# Patient Record
Sex: MALE | Race: BLACK OR AFRICAN AMERICAN | Employment: OTHER | URBAN - METROPOLITAN AREA
[De-identification: names, ages, dates, MRNs, and addresses within clinical notes are randomized per-mention and may not be internally consistent; named-entity substitution may affect disease eponyms.]

---

## 2017-01-03 ENCOUNTER — APPOINTMENT (OUTPATIENT)
Dept: GENERAL RADIOLOGY | Age: 52
DRG: 291 | End: 2017-01-03
Payer: MEDICARE

## 2017-01-03 ENCOUNTER — APPOINTMENT (OUTPATIENT)
Dept: CT IMAGING | Age: 52
DRG: 291 | End: 2017-01-03
Attending: EMERGENCY MEDICINE
Payer: MEDICARE

## 2017-01-03 ENCOUNTER — APPOINTMENT (OUTPATIENT)
Dept: GENERAL RADIOLOGY | Age: 52
DRG: 291 | End: 2017-01-03
Attending: EMERGENCY MEDICINE
Payer: MEDICARE

## 2017-01-03 ENCOUNTER — HOSPITAL ENCOUNTER (EMERGENCY)
Age: 52
Discharge: HOME | DRG: 291 | End: 2017-01-03
Attending: EMERGENCY MEDICINE
Payer: MEDICARE

## 2017-01-03 VITALS
HEIGHT: 66 IN | OXYGEN SATURATION: 98 % | BODY MASS INDEX: 50.62 KG/M2 | HEART RATE: 76 BPM | RESPIRATION RATE: 18 BRPM | WEIGHT: 315 LBS | DIASTOLIC BLOOD PRESSURE: 69 MMHG | TEMPERATURE: 98.3 F | SYSTOLIC BLOOD PRESSURE: 178 MMHG

## 2017-01-03 DIAGNOSIS — R06.02 SOB (SHORTNESS OF BREATH): Primary | ICD-10-CM

## 2017-01-03 DIAGNOSIS — K59.00 CONSTIPATION, UNSPECIFIED CONSTIPATION TYPE: ICD-10-CM

## 2017-01-03 LAB
ALBUMIN SERPL BCP-MCNC: 3.1 G/DL (ref 3.5–5)
ALBUMIN/GLOB SERPL: 0.8 {RATIO} (ref 1.2–3.5)
ALP SERPL-CCNC: 148 U/L (ref 50–136)
ALT SERPL-CCNC: 25 U/L (ref 12–65)
ANION GAP BLD CALC-SCNC: 7 MMOL/L (ref 7–16)
AST SERPL W P-5'-P-CCNC: 18 U/L (ref 15–37)
BASOPHILS # BLD AUTO: 0 K/UL (ref 0–0.2)
BASOPHILS # BLD: 0 % (ref 0–2)
BILIRUB SERPL-MCNC: 1.1 MG/DL (ref 0.2–1.1)
BNP SERPL-MCNC: 371 PG/ML
BUN SERPL-MCNC: 33 MG/DL (ref 6–23)
CALCIUM SERPL-MCNC: 8.3 MG/DL (ref 8.3–10.4)
CHLORIDE SERPL-SCNC: 100 MMOL/L (ref 98–107)
CO2 SERPL-SCNC: 31 MMOL/L (ref 21–32)
CREAT SERPL-MCNC: 1.62 MG/DL (ref 0.8–1.5)
DIFFERENTIAL METHOD BLD: ABNORMAL
DIGOXIN SERPL-MCNC: 0.6 NG/ML (ref 0.9–2.1)
EOSINOPHIL # BLD: 0.1 K/UL (ref 0–0.8)
EOSINOPHIL NFR BLD: 1 % (ref 0.5–7.8)
ERYTHROCYTE [DISTWIDTH] IN BLOOD BY AUTOMATED COUNT: 19 % (ref 11.9–14.6)
GLOBULIN SER CALC-MCNC: 4.1 G/DL (ref 2.3–3.5)
GLUCOSE SERPL-MCNC: 142 MG/DL (ref 65–100)
HCT VFR BLD AUTO: 37.5 % (ref 41.1–50.3)
HGB BLD-MCNC: 11.9 G/DL (ref 13.6–17.2)
IMM GRANULOCYTES # BLD: 0 K/UL (ref 0–0.5)
IMM GRANULOCYTES NFR BLD AUTO: 0.4 % (ref 0–5)
LYMPHOCYTES # BLD AUTO: 24 % (ref 13–44)
LYMPHOCYTES # BLD: 2.3 K/UL (ref 0.5–4.6)
MCH RBC QN AUTO: 25.8 PG (ref 26.1–32.9)
MCHC RBC AUTO-ENTMCNC: 31.7 G/DL (ref 31.4–35)
MCV RBC AUTO: 81.2 FL (ref 79.6–97.8)
MONOCYTES # BLD: 0.8 K/UL (ref 0.1–1.3)
MONOCYTES NFR BLD AUTO: 8 % (ref 4–12)
NEUTS SEG # BLD: 6.4 K/UL (ref 1.7–8.2)
NEUTS SEG NFR BLD AUTO: 67 % (ref 43–78)
PLATELET # BLD AUTO: 242 K/UL (ref 150–450)
PMV BLD AUTO: 10.3 FL (ref 10.8–14.1)
POTASSIUM SERPL-SCNC: 3.9 MMOL/L (ref 3.5–5.1)
PROT SERPL-MCNC: 7.2 G/DL (ref 6.3–8.2)
RBC # BLD AUTO: 4.62 M/UL (ref 4.23–5.67)
SODIUM SERPL-SCNC: 138 MMOL/L (ref 136–145)
WBC # BLD AUTO: 9.7 K/UL (ref 4.3–11.1)

## 2017-01-03 RX ORDER — SODIUM CHLORIDE 0.9 % (FLUSH) 0.9 %
5-10 SYRINGE (ML) INJECTION AS NEEDED
Status: DISCONTINUED | OUTPATIENT
Start: 2017-01-03 | End: 2017-01-03 | Stop reason: HOSPADM

## 2017-01-03 RX ORDER — SODIUM CHLORIDE 0.9 % (FLUSH) 0.9 %
5-10 SYRINGE (ML) INJECTION EVERY 8 HOURS
Status: DISCONTINUED | OUTPATIENT
Start: 2017-01-03 | End: 2017-01-03 | Stop reason: HOSPADM

## 2017-01-03 RX ORDER — TORSEMIDE 100 MG/1
100 TABLET ORAL 2 TIMES DAILY
Qty: 10 TAB | Refills: 0 | Status: SHIPPED | OUTPATIENT
Start: 2017-01-03 | End: 2017-01-13

## 2017-01-03 NOTE — DISCHARGE INSTRUCTIONS
As we discussed, if after you go home, you do not experience relief from the torsemide and GoLYTELY you should return to the emergency department for reevaluation. Follow-up with your cardiologist or primary care doctor in one or 2 days for reevaluation.

## 2017-01-03 NOTE — ED PROVIDER NOTES
HPI Comments: 48-year-old gentleman with a history of severe congestive heart failure who presents with concerns about progressive shortness of breath and lower leg swelling. Patient says that he feels like over the last week he has held onto more fluid than normal.  He says that she has no fevers or vomiting but he gets extremely winded when he tries to get up and walk. He denies any vomiting or diarrhea and says he has no chest pain. Patient says that he ran out of his torsemide for about 3 weeks then was able to get restarted on it for 4 days ago. Elements of this note were created using speech recognition software. As such, errors of speech recognition may be present. Patient is a 45 y.o. male presenting with shortness of breath. The history is provided by the patient. Shortness of Breath   Associated symptoms include leg swelling. Pertinent negatives include no fever, no headaches, no rhinorrhea, no sore throat, no cough, no wheezing, no chest pain, no vomiting, no abdominal pain and no rash. Past Medical History:   Diagnosis Date    Acute systolic heart failure ([address] May, 2009     Also had transient acute renal failure secondary to poor perfusion.     AICD (automatic cardioverter/defibrillator) present 10/21/2015    Atypical chest pain 4/23/2010    Bronchitis     CAD (coronary artery disease)     Cardiomyopathy     Chest pain 10/21/2015    Chronic kidney disease      renal insufficiency    Chronic pain      back    Congestive heart failure (CHF) ([address] 10/21/2015    COPD     Diabetes ([address]      type 2 insulin reliant- BS average- 160's-180's    Diabetes mellitus type II, uncontrolled ([address] 7/2/2013    GERD (gastroesophageal reflux disease)     Gout     Heart failure ([address]      cardiomyopathy with ef 10-20%    Hypertension     Hyponatremia 12/20/2010    Ill-defined condition      gout, neuropathy, sciatica    Morbid obesity (HCC)     Nausea & vomiting 11/30/2015    Neuropathy     Obstructive sleep apnea 2/15/2010    Other unknown and unspecified cause of morbidity or mortality      Gout    Severe sepsis (HCC)     Unspecified sleep apnea      cpap       Past Surgical History:   Procedure Laterality Date    Hx pacemaker       may 2010    Hx heart catheterization  Sandy 10, 2008     Severe multivessel disease with severe LV dysfunction    Pr chest surgery procedure unlisted       thorocentesis         Family History:   Problem Relation Age of Onset    Heart Disease Father     Stroke Father     Diabetes Sister     Stroke Sister     Diabetes Sister     Heart Disease Other        Social History     Social History    Marital status:      Spouse name: N/A    Number of children: N/A    Years of education: N/A     Occupational History    Not on file. Social History Main Topics    Smoking status: Former Smoker     Packs/day: 0.25     Years: 1.00     Quit date: 7/12/1984    Smokeless tobacco: Never Used      Comment: pt states that he only tried smoking as a kid     Alcohol use No    Drug use: No    Sexual activity: Not on file     Other Topics Concern    Not on file     Social History Narrative         ALLERGIES: Dilaudid [hydromorphone]; Iodinated contrast media - oral and iv dye; and Penicillins    Review of Systems   Constitutional: Negative for chills, diaphoresis and fever. HENT: Negative for congestion, rhinorrhea and sore throat. Eyes: Negative for redness and visual disturbance. Respiratory: Positive for shortness of breath. Negative for cough, chest tightness and wheezing. Cardiovascular: Positive for leg swelling. Negative for chest pain and palpitations. Gastrointestinal: Negative for abdominal pain, blood in stool, diarrhea, nausea and vomiting. Endocrine: Negative for polydipsia and polyuria. Genitourinary: Negative for dysuria and hematuria. Musculoskeletal: Negative for arthralgias, myalgias and neck stiffness.    Skin: Negative for rash. Allergic/Immunologic: Negative for environmental allergies and food allergies. Neurological: Negative for dizziness, weakness and headaches. Hematological: Negative for adenopathy. Does not bruise/bleed easily. Psychiatric/Behavioral: Negative for confusion and sleep disturbance. The patient is not nervous/anxious. Vitals:    01/03/17 1110   BP: 122/65   Pulse: 100   Resp: 18   Temp: 97.6 °F (36.4 °C)   SpO2: 95%   Weight: (!) 168.7 kg (372 lb)   Height: 5' 6\" (1.676 m)            Physical Exam   Constitutional: He is oriented to person, place, and time. He appears well-developed and well-nourished. HENT:   Head: Normocephalic and atraumatic. Eyes: Conjunctivae and EOM are normal. Pupils are equal, round, and reactive to light. Neck: Normal range of motion. Cardiovascular: Normal rate and regular rhythm. Pulmonary/Chest: Effort normal and breath sounds normal. No respiratory distress. He has no wheezes. He has no rales. He exhibits no tenderness. Abdominal: Soft. Bowel sounds are normal. There is no rebound and no guarding. Musculoskeletal: Normal range of motion. He exhibits no edema or tenderness. Lymphadenopathy:     He has no cervical adenopathy. Neurological: He is alert and oriented to person, place, and time. Skin: Skin is warm and dry. Psychiatric: He has a normal mood and affect. Nursing note and vitals reviewed. MDM  Number of Diagnoses or Management Options  Diagnosis management comments: Patient's chest x-ray was unrevealing. However, he says that his foot is never seen on x-ray. Given his body habitus I do think the sensitivity of a chest x-ray may be suboptimal.  Therefore, I will get a noncontrasted CT of his chest.  I will also check a BNP and a digoxin level. Patient digoxin level was not elevated and his BNP was lower than previous. His CT scan did not show significant pulmonary edema or pulmonary infiltrate.   I discussed with the patient the options of constipation treatment and increasing his torsemide dose versus admission. At this time we will plan to discharge him home with constipation medicine in the torsemide change and see how he does.     ED Course       Procedures

## 2017-01-06 ENCOUNTER — HOSPITAL ENCOUNTER (INPATIENT)
Age: 52
LOS: 7 days | Discharge: HOME | DRG: 291 | End: 2017-01-13
Attending: INTERNAL MEDICINE | Admitting: INTERNAL MEDICINE
Payer: MEDICARE

## 2017-01-06 DIAGNOSIS — I42.9 CARDIOMYOPATHY (HCC): Primary | Chronic | ICD-10-CM

## 2017-01-06 LAB
ANION GAP BLD CALC-SCNC: 7 MMOL/L (ref 7–16)
BUN SERPL-MCNC: 31 MG/DL (ref 6–23)
CALCIUM SERPL-MCNC: 8.2 MG/DL (ref 8.3–10.4)
CHLORIDE SERPL-SCNC: 100 MMOL/L (ref 98–107)
CO2 SERPL-SCNC: 33 MMOL/L (ref 21–32)
CREAT SERPL-MCNC: 1.41 MG/DL (ref 0.8–1.5)
ERYTHROCYTE [DISTWIDTH] IN BLOOD BY AUTOMATED COUNT: 18.9 % (ref 11.9–14.6)
GLUCOSE BLD STRIP.AUTO-MCNC: 186 MG/DL (ref 65–100)
GLUCOSE BLD STRIP.AUTO-MCNC: 228 MG/DL (ref 65–100)
GLUCOSE BLD STRIP.AUTO-MCNC: 229 MG/DL (ref 65–100)
GLUCOSE SERPL-MCNC: 185 MG/DL (ref 65–100)
HCT VFR BLD AUTO: 39.1 % (ref 41.1–50.3)
HGB BLD-MCNC: 12.3 G/DL (ref 13.6–17.2)
MAGNESIUM SERPL-MCNC: 2.3 MG/DL (ref 1.8–2.4)
MCH RBC QN AUTO: 25.5 PG (ref 26.1–32.9)
MCHC RBC AUTO-ENTMCNC: 31.5 G/DL (ref 31.4–35)
MCV RBC AUTO: 81.1 FL (ref 79.6–97.8)
PLATELET # BLD AUTO: 261 K/UL (ref 150–450)
PMV BLD AUTO: 10 FL (ref 10.8–14.1)
POTASSIUM SERPL-SCNC: 3.9 MMOL/L (ref 3.5–5.1)
RBC # BLD AUTO: 4.82 M/UL (ref 4.23–5.67)
SODIUM SERPL-SCNC: 140 MMOL/L (ref 136–145)
WBC # BLD AUTO: 8.6 K/UL (ref 4.3–11.1)

## 2017-01-06 PROCEDURE — 85027 COMPLETE CBC AUTOMATED: CPT | Performed by: NURSE PRACTITIONER

## 2017-01-06 PROCEDURE — 82962 GLUCOSE BLOOD TEST: CPT

## 2017-01-06 PROCEDURE — 74011250636 HC RX REV CODE- 250/636: Performed by: NURSE PRACTITIONER

## 2017-01-06 PROCEDURE — 80048 BASIC METABOLIC PNL TOTAL CA: CPT | Performed by: NURSE PRACTITIONER

## 2017-01-06 PROCEDURE — 94760 N-INVAS EAR/PLS OXIMETRY 1: CPT

## 2017-01-06 PROCEDURE — 65660000000 HC RM CCU STEPDOWN

## 2017-01-06 PROCEDURE — 99218 HC RM OBSERVATION: CPT

## 2017-01-06 PROCEDURE — 83735 ASSAY OF MAGNESIUM: CPT | Performed by: NURSE PRACTITIONER

## 2017-01-06 PROCEDURE — 74011250637 HC RX REV CODE- 250/637: Performed by: NURSE PRACTITIONER

## 2017-01-06 PROCEDURE — 77010033678 HC OXYGEN DAILY

## 2017-01-06 PROCEDURE — 65270000029 HC RM PRIVATE

## 2017-01-06 PROCEDURE — 74011636637 HC RX REV CODE- 636/637: Performed by: NURSE PRACTITIONER

## 2017-01-06 RX ORDER — CARVEDILOL 25 MG/1
25 TABLET ORAL 2 TIMES DAILY WITH MEALS
Status: DISCONTINUED | OUTPATIENT
Start: 2017-01-06 | End: 2017-01-13 | Stop reason: HOSPADM

## 2017-01-06 RX ORDER — PRAVASTATIN SODIUM 80 MG/1
80 TABLET ORAL
Status: DISCONTINUED | OUTPATIENT
Start: 2017-01-06 | End: 2017-01-13 | Stop reason: HOSPADM

## 2017-01-06 RX ORDER — HYDRALAZINE HYDROCHLORIDE 25 MG/1
25 TABLET, FILM COATED ORAL 3 TIMES DAILY
Status: DISCONTINUED | OUTPATIENT
Start: 2017-01-06 | End: 2017-01-12

## 2017-01-06 RX ORDER — OXYCODONE HYDROCHLORIDE 5 MG/1
15 TABLET ORAL
Status: DISCONTINUED | OUTPATIENT
Start: 2017-01-06 | End: 2017-01-13 | Stop reason: HOSPADM

## 2017-01-06 RX ORDER — SODIUM CHLORIDE 0.9 % (FLUSH) 0.9 %
5-10 SYRINGE (ML) INJECTION EVERY 8 HOURS
Status: DISCONTINUED | OUTPATIENT
Start: 2017-01-06 | End: 2017-01-13 | Stop reason: HOSPADM

## 2017-01-06 RX ORDER — POLYETHYLENE GLYCOL 3350 17 G/17G
17 POWDER, FOR SOLUTION ORAL
Status: DISCONTINUED | OUTPATIENT
Start: 2017-01-06 | End: 2017-01-09

## 2017-01-06 RX ORDER — ISOSORBIDE DINITRATE 20 MG/1
20 TABLET ORAL 3 TIMES DAILY
Status: DISCONTINUED | OUTPATIENT
Start: 2017-01-06 | End: 2017-01-13 | Stop reason: HOSPADM

## 2017-01-06 RX ORDER — INSULIN LISPRO 100 [IU]/ML
INJECTION, SOLUTION INTRAVENOUS; SUBCUTANEOUS
Status: DISCONTINUED | OUTPATIENT
Start: 2017-01-06 | End: 2017-01-13 | Stop reason: HOSPADM

## 2017-01-06 RX ORDER — FUROSEMIDE 10 MG/ML
40 INJECTION INTRAMUSCULAR; INTRAVENOUS 2 TIMES DAILY
Status: DISCONTINUED | OUTPATIENT
Start: 2017-01-06 | End: 2017-01-08

## 2017-01-06 RX ORDER — SODIUM CHLORIDE 0.9 % (FLUSH) 0.9 %
5-10 SYRINGE (ML) INJECTION AS NEEDED
Status: DISCONTINUED | OUTPATIENT
Start: 2017-01-06 | End: 2017-01-13 | Stop reason: HOSPADM

## 2017-01-06 RX ORDER — ALLOPURINOL 100 MG/1
100 TABLET ORAL DAILY
Status: DISCONTINUED | OUTPATIENT
Start: 2017-01-07 | End: 2017-01-13 | Stop reason: HOSPADM

## 2017-01-06 RX ORDER — POTASSIUM CHLORIDE 750 MG/1
10 TABLET, EXTENDED RELEASE ORAL 2 TIMES DAILY
Status: DISCONTINUED | OUTPATIENT
Start: 2017-01-06 | End: 2017-01-12

## 2017-01-06 RX ORDER — GABAPENTIN 300 MG/1
600 CAPSULE ORAL 2 TIMES DAILY
Status: DISCONTINUED | OUTPATIENT
Start: 2017-01-06 | End: 2017-01-13 | Stop reason: HOSPADM

## 2017-01-06 RX ORDER — COLCHICINE 0.6 MG/1
0.6 TABLET ORAL DAILY
Status: DISCONTINUED | OUTPATIENT
Start: 2017-01-07 | End: 2017-01-13 | Stop reason: HOSPADM

## 2017-01-06 RX ORDER — DOCUSATE SODIUM 100 MG/1
100 CAPSULE, LIQUID FILLED ORAL 2 TIMES DAILY
Status: DISCONTINUED | OUTPATIENT
Start: 2017-01-06 | End: 2017-01-08

## 2017-01-06 RX ORDER — NITROGLYCERIN 0.4 MG/1
0.4 TABLET SUBLINGUAL
Status: DISCONTINUED | OUTPATIENT
Start: 2017-01-06 | End: 2017-01-13 | Stop reason: HOSPADM

## 2017-01-06 RX ORDER — FAMOTIDINE 20 MG/1
20 TABLET, FILM COATED ORAL EVERY EVENING
Status: DISCONTINUED | OUTPATIENT
Start: 2017-01-06 | End: 2017-01-13 | Stop reason: HOSPADM

## 2017-01-06 RX ORDER — INSULIN GLARGINE 100 [IU]/ML
50 INJECTION, SOLUTION SUBCUTANEOUS
Status: DISCONTINUED | OUTPATIENT
Start: 2017-01-06 | End: 2017-01-13 | Stop reason: HOSPADM

## 2017-01-06 RX ORDER — SPIRONOLACTONE 25 MG/1
25 TABLET ORAL DAILY
Status: DISCONTINUED | OUTPATIENT
Start: 2017-01-07 | End: 2017-01-13 | Stop reason: HOSPADM

## 2017-01-06 RX ORDER — DIGOXIN 125 MCG
0.12 TABLET ORAL DAILY
Status: DISCONTINUED | OUTPATIENT
Start: 2017-01-07 | End: 2017-01-09

## 2017-01-06 RX ADMIN — HYDRALAZINE HYDROCHLORIDE 25 MG: 25 TABLET, FILM COATED ORAL at 14:02

## 2017-01-06 RX ADMIN — GABAPENTIN 600 MG: 300 CAPSULE ORAL at 23:05

## 2017-01-06 RX ADMIN — DOCUSATE SODIUM 100 MG: 100 CAPSULE, LIQUID FILLED ORAL at 17:06

## 2017-01-06 RX ADMIN — Medication 5 ML: at 21:54

## 2017-01-06 RX ADMIN — POLYETHYLENE GLYCOL 3350 17 G: 17 POWDER, FOR SOLUTION ORAL at 23:05

## 2017-01-06 RX ADMIN — OXYCODONE HYDROCHLORIDE 15 MG: 5 TABLET ORAL at 23:05

## 2017-01-06 RX ADMIN — HYDRALAZINE HYDROCHLORIDE 25 MG: 25 TABLET, FILM COATED ORAL at 21:50

## 2017-01-06 RX ADMIN — APIXABAN 5 MG: 5 TABLET, FILM COATED ORAL at 21:50

## 2017-01-06 RX ADMIN — FAMOTIDINE 20 MG: 20 TABLET ORAL at 17:06

## 2017-01-06 RX ADMIN — APIXABAN 5 MG: 5 TABLET, FILM COATED ORAL at 14:02

## 2017-01-06 RX ADMIN — ISOSORBIDE DINITRATE 20 MG: 20 TABLET ORAL at 21:50

## 2017-01-06 RX ADMIN — GABAPENTIN 600 MG: 300 CAPSULE ORAL at 17:06

## 2017-01-06 RX ADMIN — INSULIN LISPRO 186 UNITS: 100 INJECTION, SOLUTION INTRAVENOUS; SUBCUTANEOUS at 17:51

## 2017-01-06 RX ADMIN — FUROSEMIDE 40 MG: 10 INJECTION, SOLUTION INTRAMUSCULAR; INTRAVENOUS at 17:06

## 2017-01-06 RX ADMIN — POTASSIUM CHLORIDE 10 MEQ: 10 TABLET, EXTENDED RELEASE ORAL at 17:06

## 2017-01-06 RX ADMIN — INSULIN GLARGINE 50 UNITS: 100 INJECTION, SOLUTION SUBCUTANEOUS at 22:01

## 2017-01-06 RX ADMIN — CARVEDILOL 25 MG: 25 TABLET, FILM COATED ORAL at 17:06

## 2017-01-06 RX ADMIN — Medication 10 ML: at 14:07

## 2017-01-06 RX ADMIN — ISOSORBIDE DINITRATE 20 MG: 20 TABLET ORAL at 14:02

## 2017-01-06 RX ADMIN — INSULIN LISPRO 4 UNITS: 100 INJECTION, SOLUTION INTRAVENOUS; SUBCUTANEOUS at 22:02

## 2017-01-06 RX ADMIN — PRAVASTATIN SODIUM 80 MG: 80 TABLET ORAL at 21:50

## 2017-01-06 NOTE — PROGRESS NOTES
Soap suds enema given to pt. Pt placed on left side and 700 ML instilled to rectum. Pt instructed to hold enema as long as he can and to call for assistance when he needs to get up.   BSC has been placed next to the bed to assist pt and provide safety when he has to have a BM

## 2017-01-06 NOTE — PROGRESS NOTES
Patient received to room 331 as direct admit from Dr. Jin Martínez. Patient oriented to room, call light and plan of care. Admission assessment completed. Admission skin assessment completed with second RN and reveals the following: Pt skin is without breakkdown or redness. His abdomen is very tight with stretch marks on it that he states are only there when he is this full of fluid.

## 2017-01-06 NOTE — IP AVS SNAPSHOT
[address] [address] 
[phone] Patient: Kassi Orourke MRN: [id number] You are allergic to the following Allergen Reactions Dilaudid (Hydromorphone) Other (comments) Hotflashes, patient states he's not allergic Iodinated Contrast Media - Oral And Iv Dye Other (comments) \"shuts my kidneys down\" Penicillins Unknown (comments) Pt states he is not allergic his mother just wouldn't allow him take it Recent Documentation Height Weight BMI Smoking Status 1.676 m (!) 179.2 kg 63.75 kg/m2 Former Smoker Emergency Contacts Name Discharge Info Relation Home Work Mobile Holly Lamb  Other Relative [6] [phone] Edinson Cabrera  Child [2] [phone] Che Roche  Daughter [21] [phone] About your hospitalization You were admitted on:  January 6, 2017 You last received care in the:  [facility] 3 CLINICAL OBSERVATION You were discharged on:  January 13, 2017 Unit phone number:  [phone] Why you were hospitalized Your primary diagnosis was:  Not on File Your diagnoses also included:  Cardiomyopathy (Hcc), Morbid Obesity (Hcc), Ckd (Chronic Kidney Disease) Stage 3, Gfr 30-59 Ml/Min, Constipation Providers Seen During Your Hospitalizations Provider Role Specialty Primary office phone Fred Murphy MD Attending Provider Cardiology [phone] Your Primary Care Physician (PCP) Primary Care Physician Office Phone Office Fax [place] [phone] Follow-up Information Follow up With Details Comments Contact Info G[address]LE[address] [facility] [address] 
[phone] rFed Murphy MD  Thursday Jan 19 at [facility] office  Formerly [facility], [facility] [address]  [address] [facility] [address] [phone] Fahad Flynn MD  As needed [phone] [address] [address] [facility] [id number] [phone] [facility]  Office will call you to make an appointment. [address] [place] [address] Adele Walsh [phone] Your Appointments Thursday January 19, 2017 11:30 AM EST TRANSITIONAL CARE MANAGEMENT with Camilla Anguiano MD  
[facility] ([address] [place] [address]) [address] [place]  
[address] Abe Sim 81  
[phone] Wednesday February 01, 2017  2:00 PM EST MONITOR APPLICATION with [facility] ([address] [place] [address]) 2 [address] 
[address] Abe Sim [address]  
[phone] Tuesday February 07, 2017 10:00 AM EST Office Visit with Camilla Anguiano MD  
[facility] ([address] [place] [address]) [address] [place]  
[address] Abe [place] [address]  
[phone] Current Discharge Medication List  
  
START taking these medications Dose & Instructions Dispensing Information Comments Morning Noon Evening Bedtime  
 bisacodyl 10 mg suppository Commonly known as:  DULCOLAX Your next dose is:  Tomorrow Dose:  10 mg Insert 10 mg into rectum daily. Quantity:  1 Suppository Refills:  2 CONTINUE these medications which have CHANGED Dose & Instructions Dispensing Information Comments Morning Noon Evening Bedtime  
 torsemide 100 mg tablet Commonly known as:  DEMADEX What changed:  when to take this Your next dose is:  Tomorrow Dose:  100 mg Take 1 Tab by mouth daily. Quantity:  30 Tab Refills:  11 CONTINUE these medications which have NOT CHANGED Dose & Instructions Dispensing Information Comments Morning Noon Evening Bedtime  
 allopurinol 100 mg tablet Commonly known as:  Delona Gauze  
   
 Dose:  100 mg Take 100 mg by mouth daily. Refills:  0  
     
   
   
   
  
 apixaban 5 mg tablet Commonly known as:  Gigi Means Dose:  5 mg Take 1 Tab by mouth every twelve (12) hours. Quantity:  60 Tab Refills:  0  
     
   
   
   
  
 carvedilol 25 mg tablet Commonly known as:  Ignacio Mabry Dose:  25 mg Take 1 Tab by mouth two (2) times daily (with meals). Quantity:  60 Tab Refills:  3  
     
   
   
   
  
 colchicine 0.6 mg tablet Dose:  0.6 mg Take 0.6 mg by mouth every morning. Refills:  0  
     
   
   
   
  
 cpap machine kit 10 cm qhs Refills:  0  
     
   
   
   
  
 docusate sodium 100 mg capsule Commonly known as:  Martha Pacific Dose:  100 mg Take 1 Cap by mouth two (2) times a day for 90 days. Quantity:  60 Cap Refills:  0  
     
   
   
   
  
 gabapentin 600 mg tablet Commonly known as:  NEURONTIN Take  by mouth two (2) times a day. Refills:  0  
     
   
   
   
  
 glucose blood VI test strips strip Commonly known as:  ASCENSIA AUTODISC VI, ONE TOUCH ULTRA TEST VI Test strips for 30 day supply Quantity:  120 strip Refills:  0  
     
   
   
   
  
 hydrALAZINE 25 mg tablet Commonly known as:  APRESOLINE Dose:  25 mg Take 1 Tab by mouth three (3) times daily. Quantity:  90 Tab Refills:  3  
     
   
   
   
  
 insulin glargine 100 unit/mL injection Commonly known as:  LANTUS Dose:  50 Units 50 Units by SubCUTAneous route nightly. Quantity:  1 Vial  
Refills:  0  
     
   
   
   
  
 insulin lispro 100 unit/mL injection Commonly known as:  HUMALOG Dose:  15 Units 15 Units by SubCUTAneous route three (3) times daily (with meals). Quantity:  1 Vial  
Refills:  0  
     
   
   
   
  
 isosorbide dinitrate 20 mg tablet Commonly known as:  ISORDIL Dose:  20 mg Take 1 Tab by mouth three (3) times daily. Quantity:  90 Tab Refills:  3 nitroglycerin 0.4 mg SL tablet Commonly known as:  NITROSTAT Dose:  0.4 mg  
1 Tab by SubLINGual route every five (5) minutes as needed for Chest Pain. Quantity:  4 Bottle Refills:  6  
     
   
   
   
  
 oxyCODONE IR 15 mg immediate release tablet Commonly known as:  OXY-IR Dose:  15 mg Take 15 mg by mouth every four (4) hours as needed for Pain. Refills:  0 OXYGEN-AIR DELIVERY SYSTEMS  
   
 2 lpm qhs Refills:  0  
     
   
   
   
  
 polyethylene glycol 17 gram packet Commonly known as:  Ernestene Score Dose:  17 g Take 1 Packet by mouth daily as needed (constipation). Quantity:  10 Packet Refills:  5  
     
   
   
   
  
 potassium chloride 10 mEq tablet Commonly known as:  K-DUR, KLOR-CON Dose:  10 mEq Take 1 Tab by mouth two (2) times a day. Quantity:  60 Tab Refills:  5  
     
   
   
   
  
 pravastatin 80 mg tablet Commonly known as:  PRAVACHOL Dose:  80 mg Take 1 Tab by mouth nightly. Quantity:  30 Tab Refills:  0  
     
   
   
   
  
 raNITIdine 150 mg tablet Commonly known as:  ZANTAC Dose:  150 mg Take 150 mg by mouth nightly. Refills:  0  
     
   
   
   
  
 spironolactone 25 mg tablet Commonly known as:  ALDACTONE Dose:  25 mg Take 1 Tab by mouth daily. Quantity:  30 Tab Refills:  11 STOP taking these medications   
 digoxin 0.125 mg tablet Commonly known as:  LANOXIN Where to Get Your Medications Information on where to get these meds will be given to you by the nurse or doctor. ! Ask your nurse or doctor about these medications  
  bisacodyl 10 mg suppository  
 torsemide 100 mg tablet Discharge Instructions Constipation: Care Instructions Your Care Instructions Constipation means that you have a hard time passing stools (bowel movements). People pass stools from 3 times a day to once every 3 days. What is normal for you may be different. Constipation may occur with pain in the rectum and cramping. The pain may get worse when you try to pass stools. Sometimes there are small amounts of bright red blood on toilet paper or the surface of stools. This is because of enlarged veins near the rectum (hemorrhoids). A few changes in your diet and lifestyle may help you avoid ongoing constipation. Your doctor may also prescribe medicine to help loosen your stool. Some medicines can cause constipation. These include pain medicines and antidepressants. Tell your doctor about all the medicines you take. Your doctor may want to make a medicine change to ease your symptoms. Follow-up care is a key part of your treatment and safety. Be sure to make and go to all appointments, and call your doctor if you are having problems. It's also a good idea to know your test results and keep a list of the medicines you take. How can you care for yourself at home? · Drink plenty of fluids, enough so that your urine is light yellow or clear like water. If you have kidney, heart, or liver disease and have to limit fluids, talk with your doctor before you increase the amount of fluids you drink. · Include high-fiber foods in your diet each day. These include fruits, vegetables, beans, and whole grains. · Get at least 30 minutes of exercise on most days of the week. Walking is a good choice. You also may want to do other activities, such as running, swimming, cycling, or playing tennis or team sports. · Take a fiber supplement, such as Citrucel or Metamucil, every day. Read and follow all instructions on the label. · Schedule time each day for a bowel movement. A daily routine may help. Take your time having your bowel movement. · Support your feet with a small step stool when you sit on the toilet. This helps flex your hips and places your pelvis in a squatting position. · Your doctor may recommend an over-the-counter laxative to relieve your constipation. Examples are Milk of Magnesia and MiraLax. Read and follow all instructions on the label. Do not use laxatives on a long-term basis. When should you call for help? Call your doctor now or seek immediate medical care if: 
· You have new or worse belly pain. · You have new or worse nausea or vomiting. · You have blood in your stools. Watch closely for changes in your health, and be sure to contact your doctor if: 
· Your constipation is getting worse. · You do not get better as expected. Where can you learn more? Go to http://armando-nirav.info/. Enter 21 [phone] in the search box to learn more about \"Constipation: Care Instructions. \" Current as of: May 27, 2016 Content Version: 11.1 © 3056-2503 [employer]. Care instructions adapted under license by [employer] (which disclaims liability or warranty for this information). If you have questions about a medical condition or this instruction, always ask your healthcare professional. [place] [address] any warranty or liability for your use of this information. Hypertrophic Cardiomyopathy: Care Instructions Your Care Instructions Hypertrophic cardiomyopathy (say \"hy-per-TROH-fik dwk-qio-xb-xk-UWX-po-thee\") is a disease in which the heart muscle grows too thick. The thickened heart muscle can make it hard for the heart to pump blood well. This can cause shortness of breath, chest pain, dizziness, fainting, and fatigue. It also can affect the heart's electrical system. This increases the risk for life-threatening abnormal heartbeats. Your doctor may recommend a device called an ICD (implantable cardioverter-defibrillator) to stop these abnormal heartbeats.  
Good care at home can help you cope with symptoms and get the best results from your medications. It is very important that you follow up with your doctor. Follow-up care is a key part of your treatment and safety. Be sure to make and go to all appointments, and call your doctor if you are having problems. It's also a good idea to know your test results and keep a list of the medicines you take. How can you care for yourself at home? · Take your medicines exactly as prescribed. Call your doctor if you think you are having a problem with your medicine. You will get more details on the specific medicines your doctor prescribes. · Limit alcohol. Alcohol can make your heart weaker. · Talk to your doctor about what level of exercise and what kinds of activities are safe for you. You may need to avoid too much strenuous activity. · Have your family members tested for hypertrophic cardiomyopathy. The condition runs in families, and regular testing can identify it before it causes symptoms. · Do not smoke. Smoking can make this condition worse. If you need help quitting, talk to your doctor about stop-smoking programs and medicines. These can increase your chances of quitting for good. When should you call for help? Call 911 anytime you think you may need emergency care. For example, call if: 
· You have symptoms of a heart attack. These may include: ¨ Chest pain or pressure, or a strange feeling in the chest. 
¨ Sweating. ¨ Shortness of breath. ¨ Nausea or vomiting. ¨ Pain, pressure, or a strange feeling in the back, neck, jaw, or upper belly or in one or both shoulders or arms. ¨ Lightheadedness or sudden weakness. After you call 911, the  may tell you to chew 1 adult-strength or 2 to 4 low-dose aspirin. Wait for an ambulance. Do not try to drive yourself. · You have severe trouble breathing. · You cough up pink, foamy mucus and you have trouble breathing. · You passed out (lost consciousness). Call your doctor now or seek immediate medical care if: · You have new or increased shortness of breath. · You are dizzy or lightheaded, or you feel like you may faint. · You have sudden weight gain, such as 3 pounds or more in 2 to 3 days. · You have increased swelling in your legs, ankles, or feet. · You have an ICD and you have signs of infection, such as: 
¨ Increased pain, swelling, warmth, or redness. ¨ Red streaks leading from the cut (incision). ¨ Pus draining from the incision. ¨ A fever. · You have a sudden episode of a skipping heartbeat or a very fast heartbeat. Watch closely for changes in your health, and be sure to contact your doctor if you have any problems. Where can you learn more? Go to http://armando-nirav.info/. Enter Q219 in the search box to learn more about \"Hypertrophic Cardiomyopathy: Care Instructions. \" Current as of: January 27, 2016 Content Version: 11.1 © 9331-1682 [employer]. Care instructions adapted under license by [employer] (which disclaims liability or warranty for this information). If you have questions about a medical condition or this instruction, always ask your healthcare professional. [place] [address] any warranty or liability for your use of this information. Heart Failure: Care Instructions Your Care Instructions Heart failure occurs when your heart does not pump as much blood as the body needs. Failure does not mean that the heart has stopped pumping but rather that it is not pumping as well as it should. Over time, this causes fluid buildup in your lungs and other parts of your body. Fluid buildup can cause shortness of breath, fatigue, swollen ankles, and other problems. By taking medicines regularly, reducing sodium (salt) in your diet, checking your weight every day, and making lifestyle changes, you can feel better and live longer. Follow-up care is a key part of your treatment and safety.  Be sure to make and go to all appointments, and call your doctor if you are having problems. It's also a good idea to know your test results and keep a list of the medicines you take. How can you care for yourself at home? Medicines · Be safe with medicines. Take your medicines exactly as prescribed. Call your doctor if you think you are having a problem with your medicine. · Do not take any vitamins, over-the-counter medicine, or herbal products without talking to your doctor first. Lisa Links not take ibuprofen (Advil or Motrin) and naproxen (Aleve) without talking to your doctor first. They could make your heart failure worse. · You may be taking some of the following medicine. ¨ Beta-blockers can slow heart rate, decrease blood pressure, and improve your condition. Taking a beta-blocker may lower your chance of needing to be hospitalized. ¨ Angiotensin-converting enzyme inhibitors (ACEIs) reduce the heart's workload, lower blood pressure, and reduce swelling. Taking an ACEI may lower your chance of needing to be hospitalized again. ¨ Angiotensin II receptor blockers (ARBs) work like ACEIs. Your doctor may prescribe them instead of ACEIs. ¨ Diuretics, also called water pills, reduce swelling. ¨ Potassium supplements replace this important mineral, which is sometimes lost with diuretics. ¨ Aspirin and other blood thinners prevent blood clots, which can cause a stroke or heart attack. You will get more details on the specific medicines your doctor prescribes. Diet · Your doctor may suggest that you limit sodium to 2,000 milligrams (mg) a day or less. That is less than 1 teaspoon of salt a day, including all the salt you eat in cooking or in packaged foods. People get most of their sodium from processed foods. Fast food and restaurant meals also tend to be very high in sodium. · Ask your doctor how much liquid you can drink each day. You may have to limit liquids. Weight · Weigh yourself without clothing at the same time each day. Record your weight. Call your doctor if you gain more than 3 pounds in 2 to 3 days. A sudden weight gain may mean that your heart failure is getting worse. Activity level · Start light exercise (if your doctor says it is okay). Even if you can only do a small amount, exercise will help you get stronger, have more energy, and manage your weight and your stress. Walking is an easy way to get exercise. Start out by walking a little more than you did before. Bit by bit, increase the amount you walk. · When you exercise, watch for signs that your heart is working too hard. You are pushing yourself too hard if you cannot talk while you are exercising. If you become short of breath or dizzy or have chest pain, stop, sit down, and rest. 
· If you feel \"wiped out\" the day after you exercise, walk slower or for a shorter distance until you can work up to a better pace. · Get enough rest at night. Sleeping with 1 or 2 pillows under your upper body and head may help you breathe easier. Lifestyle changes · Do not smoke. Smoking can make a heart condition worse. If you need help quitting, talk to your doctor about stop-smoking programs and medicines. These can increase your chances of quitting for good. Quitting smoking may be the most important step you can take to protect your heart. · Limit alcohol to 2 drinks a day for men and 1 drink a day for women. Too much alcohol can cause health problems. · Avoid getting sick from colds and the flu. Get a pneumococcal vaccine shot. If you have had one before, ask your doctor whether you need another dose. Get a flu shot each year. If you must be around people with colds or the flu, wash your hands often. When should you call for help? Call 911 if you have symptoms of sudden heart failure such as: 
· You have severe trouble breathing. · You cough up pink, foamy mucus. · You have a new irregular or rapid heartbeat. Call your doctor now or seek immediate medical care if: 
· You have new or increased shortness of breath. · You are dizzy or lightheaded, or you feel like you may faint. · You have sudden weight gain, such as 3 pounds or more in 2 to 3 days. · You have increased swelling in your legs, ankles, or feet. · You are suddenly so tired or weak that you cannot do your usual activities. Watch closely for changes in your health, and be sure to contact your doctor if: 
· You develop new symptoms. Where can you learn more? Go to http://armando-nirav.info/. Enter S262 in the search box to learn more about \"Heart Failure: Care Instructions. \" Current as of: January 27, 2016 Content Version: 11.1 © 8811-7209 [employer]. Care instructions adapted under license by [employer] (which disclaims liability or warranty for this information). If you have questions about a medical condition or this instruction, always ask your healthcare professional. [place] [address] any warranty or liability for your use of this information. Limiting Sodium and Fluids With Heart Failure: Care Instructions Your Care Instructions Sodium causes your body to keep extra water, making it harder for your heart to pump. By limiting sodium, you will feel better and lower your risk of having to go to the hospital. 
People get most of their sodium from processed foods. Fast food and restaurant meals also tend to be very high in sodium. Your doctor may suggest that you limit sodium to 2,000 milligrams (mg) a day or less. That is less than 1 teaspoon of salt a day, including all the salt you eat in cooked or packaged foods. Usually, you have to limit the amount of liquids you drink only if your heart failure is severe. Limiting sodium alone often is enough to help your body get rid of extra fluids. However, your doctor may tell you to limit your fluid intake to a set amount each day. Follow-up care is a key part of your treatment and safety. Be sure to make and go to all appointments, and call your doctor if you are having problems. It's also a good idea to know your test results and keep a list of the medicines you take. How can you care for yourself at home? Read food labels · Read food labels on cans and food packages. The labels tell you how much sodium is in each serving. Make sure that you look at the serving size. If you eat more than the serving size, you have eaten more sodium than is listed for one serving. · Food labels also tell you the Percent Daily Value. If the Percent Daily Value says 50%, it means that you will get at least 50% of all the sodium you need for the entire day in one serving. Choose products with low Percent Daily Values for sodium. · Be aware that sodium can come in forms other than salt, including monosodium glutamate (MSG), sodium citrate, and sodium bicarbonate (baking soda). MSG is often added to Asian food. You can sometimes ask for food without MSG or salt. Buy low-sodium foods · Buy foods that are labeled \"unsalted\" (no salt added), \"sodium-free\" (less than 5 mg of sodium per serving), or \"low-sodium\" (less than 140 mg of sodium per serving). A food labeled \"light sodium\" has less than half of the full-sodium version of that food. Foods labeled \"reduced-sodium\" may still have too much sodium. · Buy fresh vegetables or plain, frozen vegetables. Buy low-sodium versions of canned vegetables, soups, and other canned goods. Prepare low-sodium meals · Use less salt each day when cooking. Reducing salt in this way will help you adjust to the taste. Do not add salt after cooking. Take the salt shaker off the table. · Flavor your food with garlic, lemon juice, onion, vinegar, herbs, and spices instead of salt. Do not use soy sauce, steak sauce, onion salt, garlic salt, mustard, or ketchup on your food. · Make your own salad dressings, sauces, and ketchup without adding salt. · Use less salt (or none) when recipes call for it. You can often use half the salt a recipe calls for without losing flavor. Other dishes like rice, pasta, and grains do not need added salt. · Rinse canned vegetables. This removes somebut not allof the salt. · Avoid water that has a naturally high sodium content or that has been treated with water softeners, which add sodium. Call your local water company to find out the sodium content of your water supply. If you buy bottled water, read the label and choose a sodium-free brand. Avoid high-sodium foods, such as: 
· Smoked, cured, salted, and canned meat, fish, and poultry. · Ham, fuller, hot dogs, and luncheon meats. · Regular, hard, and processed cheese and regular peanut butter. · Crackers with salted tops. · Frozen prepared meals. · Canned and dried soups, broths, and bouillon, unless labeled sodium-free or low-sodium. · Canned vegetables, unless labeled sodium-free or low-sodium. · Salted snack foods such as chips and pretzels. · Western Dariela fries, pizza, tacos, and other fast foods. · Pickles, olives, ketchup, and other condiments, especially soy sauce, unless labeled sodium-free or low-sodium. If you cannot cook for yourself · Have family members or friends help you, or have someone cook low-sodium meals. · Check with your local senior nutrition program to find out where meals are served and whether they offer a low-sodium option. You can often find these programs through your local health department or hospital. 
· Have meals delivered to your home. Most [facility] have a Meals on [employer]. These programs provide one hot meal a day for older adults, delivered to their homes. Ask whether these meals are low-sodium. Let them know that you are on a low-sodium diet. Limiting fluid intake · Find a method that works for you.  You might simply write down how much you drink every time you do. Some people keep a container filled with the amount of fluid allowed for that day. If they drink from a source other than the container, then they pour out that amount. · Measure your regular drinking glasses to find out how much fluid each one holds. Once you know this, you will not have to measure every time. · Besides water, milk, juices, and other drinks, some foods have a lot of fluid. Count any foods that will melt (such as ice cream or gelatin dessert) or liquid foods (such as soup) as part of your fluid intake for the day. Where can you learn more? Go to http://armando-nirav.info/. Enter A166 in the search box to learn more about \"Limiting Sodium and Fluids With Heart Failure: Care Instructions. \" Current as of: January 27, 2016 Content Version: 11.1 © 2969-1893 [employer]. Care instructions adapted under license by [employer] (which disclaims liability or warranty for this information). If you have questions about a medical condition or this instruction, always ask your healthcare professional. [place] [address] any warranty or liability for your use of this information. Discharge Orders Procedure Order Date Status Priority Quantity Spec Type Associated Dx REFERRAL TO CARDIAC REHAB 01/13/17 1426 Normal Routine 1 METABOLIC PANEL, BASIC 41/72/20 1431 Future Routine 1 Serum produkte[employer] Announcement We are excited to announce that we are making your provider's discharge notes available to you in Buffer. You will see these notes when they are completed and signed by the physician that discharged you from your recent hospital stay. If you have any questions or concerns about any information you see in Buffer, please call the Health Information Department where you were seen or reach out to your Primary Care Provider for more information about your plan of care. Introducing [facility]! [employer] introduces [employer] patient portal. Now you can access parts of your medical record, email your doctor's office, and request medication refills online. 1. In your internet browser, go to [email or web address] 2. Click on the First Time User? Click Here link in the Sign In box. You will see the New Member Sign Up page. 3. Enter your [employer] Access Code exactly as it appears below. You will not need to use this code after youve completed the sign-up process. If you do not sign up before the expiration date, you must request a new code. · [employer] Access Code: [email or web address] Expires: 4/10/2017  3:06 PM 
 
4. Enter the last four digits of your Social Security Number (xxxx) and Date of Birth (mm/dd/yyyy) as indicated and click Submit. You will be taken to the next sign-up page. 5. Create a [employer] ID. This will be your [employer] login ID and cannot be changed, so think of one that is secure and easy to remember. 6. Create a [employer] password. You can change your password at any time. 7. Enter your Password Reset Question and Answer. This can be used at a later time if you forget your password. 8. Enter your e-mail address. You will receive e-mail notification when new information is available in [address] 9. Click Sign Up. You can now view and download portions of your medical record. 10. Click the Download Summary menu link to download a portable copy of your medical information. If you have questions, please visit the Frequently Asked Questions section of the [employer] website. Remember, [employer] is NOT to be used for urgent needs. For medical emergencies, dial 911. Now available from your iPhone and Android! General Information Please provide this summary of care documentation to your next provider. Patient Signature:  ____________________________________________________________ Date:  ____________________________________________________________  
  
Hurshel Och Provider Signature:  ____________________________________________________________ Date:  ____________________________________________________________

## 2017-01-06 NOTE — IP AVS SNAPSHOT
Current Discharge Medication List  
  
Take these medications at their scheduled times Dose & Instructions Dispensing Information Comments Morning Noon Evening Bedtime  
 allopurinol 100 mg tablet Commonly known as:  Mary Annimelda Jasper Dose:  100 mg Take 100 mg by mouth daily. Refills:  0  
     
   
   
   
  
 apixaban 5 mg tablet Commonly known as:  Albino Beards Dose:  5 mg Take 1 Tab by mouth every twelve (12) hours. Quantity:  60 Tab Refills:  0  
     
   
   
   
  
 bisacodyl 10 mg suppository Commonly known as:  DULCOLAX Your next dose is:  Tomorrow Dose:  10 mg Insert 10 mg into rectum daily. Quantity:  1 Suppository Refills:  2  
     
   
   
   
  
 carvedilol 25 mg tablet Commonly known as:  Es Locus Dose:  25 mg Take 1 Tab by mouth two (2) times daily (with meals). Quantity:  60 Tab Refills:  3  
     
   
   
   
  
 colchicine 0.6 mg tablet Dose:  0.6 mg Take 0.6 mg by mouth every morning. Refills:  0  
     
   
   
   
  
 docusate sodium 100 mg capsule Commonly known as:  Theola Duet Dose:  100 mg Take 1 Cap by mouth two (2) times a day for 90 days. Quantity:  60 Cap Refills:  0  
     
   
   
   
  
 gabapentin 600 mg tablet Commonly known as:  NEURONTIN Take  by mouth two (2) times a day. Refills:  0  
     
   
   
   
  
 hydrALAZINE 25 mg tablet Commonly known as:  APRESOLINE Dose:  25 mg Take 1 Tab by mouth three (3) times daily. Quantity:  90 Tab Refills:  3  
     
   
   
   
  
 insulin glargine 100 unit/mL injection Commonly known as:  LANTUS Dose:  50 Units 50 Units by SubCUTAneous route nightly. Quantity:  1 Vial  
Refills:  0  
     
   
   
   
  
 insulin lispro 100 unit/mL injection Commonly known as:  HUMALOG Dose:  15 Units 15 Units by SubCUTAneous route three (3) times daily (with meals). Quantity:  1 Vial  
Refills:  0 isosorbide dinitrate 20 mg tablet Commonly known as:  ISORDIL Dose:  20 mg Take 1 Tab by mouth three (3) times daily. Quantity:  90 Tab Refills:  3  
     
   
   
   
  
 potassium chloride 10 mEq tablet Commonly known as:  K-DUR KLOR-CON Dose:  10 mEq Take 1 Tab by mouth two (2) times a day. Quantity:  60 Tab Refills:  5  
     
   
   
   
  
 pravastatin 80 mg tablet Commonly known as:  PRAVACHOL Dose:  80 mg Take 1 Tab by mouth nightly. Quantity:  30 Tab Refills:  0  
     
   
   
   
  
 raNITIdine 150 mg tablet Commonly known as:  ZANTAC Dose:  150 mg Take 150 mg by mouth nightly. Refills:  0  
     
   
   
   
  
 spironolactone 25 mg tablet Commonly known as:  ALDACTONE Dose:  25 mg Take 1 Tab by mouth daily. Quantity:  30 Tab Refills:  11  
     
   
   
   
  
 torsemide 100 mg tablet Commonly known as:  DEMADEX Your next dose is:  Tomorrow Dose:  100 mg Take 1 Tab by mouth daily. Quantity:  30 Tab Refills:  11 Take these medications as needed Dose & Instructions Dispensing Information Comments Morning Noon Evening Bedtime  
 nitroglycerin 0.4 mg SL tablet Commonly known as:  NITROSTAT Dose:  0.4 mg  
1 Tab by SubLINGual route every five (5) minutes as needed for Chest Pain. Quantity:  4 Bottle Refills:  6  
     
   
   
   
  
 oxyCODONE IR 15 mg immediate release tablet Commonly known as:  OXY-IR Dose:  15 mg Take 15 mg by mouth every four (4) hours as needed for Pain. Refills:  0  
     
   
   
   
  
 polyethylene glycol 17 gram packet Commonly known as:  Gillermina [place] Dose:  17 g Take 1 Packet by mouth daily as needed (constipation). Quantity:  10 Packet Refills:  5 Take these medications as directed Dose & Instructions Dispensing Information Comments Morning Noon Evening Bedtime  
 cpap machine kit 10 cm qhs Refills:  0  
     
   
   
   
  
 glucose blood VI test strips strip Commonly known as:  ASCENSIA AUTODISC VI, ONE TOUCH ULTRA TEST VI Test strips for 30 day supply Quantity:  120 strip Refills:  0 OXYGEN-AIR DELIVERY SYSTEMS  
   
 2 lpm qhs Refills:  0 Where to Get Your Medications Information about where to get these medications is not yet available ! Ask your nurse or doctor about these medications  
  bisacodyl 10 mg suppository  
 torsemide 100 mg tablet

## 2017-01-07 LAB
ANION GAP BLD CALC-SCNC: 8 MMOL/L (ref 7–16)
BUN SERPL-MCNC: 34 MG/DL (ref 6–23)
CALCIUM SERPL-MCNC: 8.4 MG/DL (ref 8.3–10.4)
CHLORIDE SERPL-SCNC: 101 MMOL/L (ref 98–107)
CO2 SERPL-SCNC: 32 MMOL/L (ref 21–32)
CREAT SERPL-MCNC: 1.64 MG/DL (ref 0.8–1.5)
GLUCOSE BLD STRIP.AUTO-MCNC: 170 MG/DL (ref 65–100)
GLUCOSE BLD STRIP.AUTO-MCNC: 186 MG/DL (ref 65–100)
GLUCOSE BLD STRIP.AUTO-MCNC: 223 MG/DL (ref 65–100)
GLUCOSE BLD STRIP.AUTO-MCNC: 266 MG/DL (ref 65–100)
GLUCOSE SERPL-MCNC: 170 MG/DL (ref 65–100)
MAGNESIUM SERPL-MCNC: 2.2 MG/DL (ref 1.8–2.4)
POTASSIUM SERPL-SCNC: 4 MMOL/L (ref 3.5–5.1)
SODIUM SERPL-SCNC: 141 MMOL/L (ref 136–145)

## 2017-01-07 PROCEDURE — 74011250637 HC RX REV CODE- 250/637: Performed by: NURSE PRACTITIONER

## 2017-01-07 PROCEDURE — 80048 BASIC METABOLIC PNL TOTAL CA: CPT | Performed by: NURSE PRACTITIONER

## 2017-01-07 PROCEDURE — 74011636637 HC RX REV CODE- 636/637: Performed by: NURSE PRACTITIONER

## 2017-01-07 PROCEDURE — 65660000000 HC RM CCU STEPDOWN

## 2017-01-07 PROCEDURE — 36415 COLL VENOUS BLD VENIPUNCTURE: CPT | Performed by: NURSE PRACTITIONER

## 2017-01-07 PROCEDURE — 83735 ASSAY OF MAGNESIUM: CPT | Performed by: NURSE PRACTITIONER

## 2017-01-07 PROCEDURE — 82962 GLUCOSE BLOOD TEST: CPT

## 2017-01-07 PROCEDURE — 74011250636 HC RX REV CODE- 250/636: Performed by: NURSE PRACTITIONER

## 2017-01-07 RX ADMIN — COLCHICINE 0.6 MG: 0.6 TABLET, FILM COATED ORAL at 09:01

## 2017-01-07 RX ADMIN — FUROSEMIDE 40 MG: 10 INJECTION, SOLUTION INTRAMUSCULAR; INTRAVENOUS at 17:36

## 2017-01-07 RX ADMIN — DOCUSATE SODIUM 100 MG: 100 CAPSULE, LIQUID FILLED ORAL at 17:37

## 2017-01-07 RX ADMIN — GABAPENTIN 600 MG: 300 CAPSULE ORAL at 09:04

## 2017-01-07 RX ADMIN — FAMOTIDINE 20 MG: 20 TABLET ORAL at 17:37

## 2017-01-07 RX ADMIN — DOCUSATE SODIUM 100 MG: 100 CAPSULE, LIQUID FILLED ORAL at 09:04

## 2017-01-07 RX ADMIN — HYDRALAZINE HYDROCHLORIDE 25 MG: 25 TABLET, FILM COATED ORAL at 14:33

## 2017-01-07 RX ADMIN — GABAPENTIN 600 MG: 300 CAPSULE ORAL at 17:37

## 2017-01-07 RX ADMIN — HYDRALAZINE HYDROCHLORIDE 25 MG: 25 TABLET, FILM COATED ORAL at 06:33

## 2017-01-07 RX ADMIN — INSULIN LISPRO 2 UNITS: 100 INJECTION, SOLUTION INTRAVENOUS; SUBCUTANEOUS at 08:20

## 2017-01-07 RX ADMIN — ALLOPURINOL 100 MG: 100 TABLET ORAL at 09:02

## 2017-01-07 RX ADMIN — Medication 10 ML: at 14:42

## 2017-01-07 RX ADMIN — SPIRONOLACTONE 25 MG: 25 TABLET, FILM COATED ORAL at 09:04

## 2017-01-07 RX ADMIN — DIGOXIN 0.12 MG: 125 TABLET ORAL at 09:02

## 2017-01-07 RX ADMIN — PRAVASTATIN SODIUM 80 MG: 80 TABLET ORAL at 21:04

## 2017-01-07 RX ADMIN — CARVEDILOL 25 MG: 25 TABLET, FILM COATED ORAL at 08:20

## 2017-01-07 RX ADMIN — ISOSORBIDE DINITRATE 20 MG: 20 TABLET ORAL at 06:33

## 2017-01-07 RX ADMIN — Medication 10 ML: at 21:06

## 2017-01-07 RX ADMIN — ISOSORBIDE DINITRATE 20 MG: 20 TABLET ORAL at 21:05

## 2017-01-07 RX ADMIN — FUROSEMIDE 40 MG: 10 INJECTION, SOLUTION INTRAMUSCULAR; INTRAVENOUS at 09:04

## 2017-01-07 RX ADMIN — Medication 10 ML: at 06:33

## 2017-01-07 RX ADMIN — POLYETHYLENE GLYCOL 3350 17 G: 17 POWDER, FOR SOLUTION ORAL at 12:15

## 2017-01-07 RX ADMIN — POTASSIUM CHLORIDE 10 MEQ: 10 TABLET, EXTENDED RELEASE ORAL at 09:04

## 2017-01-07 RX ADMIN — INSULIN LISPRO 4 UNITS: 100 INJECTION, SOLUTION INTRAVENOUS; SUBCUTANEOUS at 21:40

## 2017-01-07 RX ADMIN — ISOSORBIDE DINITRATE 20 MG: 20 TABLET ORAL at 14:33

## 2017-01-07 RX ADMIN — HYDRALAZINE HYDROCHLORIDE 25 MG: 25 TABLET, FILM COATED ORAL at 21:05

## 2017-01-07 RX ADMIN — INSULIN LISPRO 2 UNITS: 100 INJECTION, SOLUTION INTRAVENOUS; SUBCUTANEOUS at 12:15

## 2017-01-07 RX ADMIN — POTASSIUM CHLORIDE 10 MEQ: 10 TABLET, EXTENDED RELEASE ORAL at 17:37

## 2017-01-07 RX ADMIN — INSULIN LISPRO 6 UNITS: 100 INJECTION, SOLUTION INTRAVENOUS; SUBCUTANEOUS at 17:36

## 2017-01-07 RX ADMIN — OXYCODONE HYDROCHLORIDE 15 MG: 5 TABLET ORAL at 21:05

## 2017-01-07 RX ADMIN — APIXABAN 5 MG: 5 TABLET, FILM COATED ORAL at 21:04

## 2017-01-07 RX ADMIN — APIXABAN 5 MG: 5 TABLET, FILM COATED ORAL at 09:01

## 2017-01-07 RX ADMIN — CARVEDILOL 25 MG: 25 TABLET, FILM COATED ORAL at 17:37

## 2017-01-07 RX ADMIN — INSULIN GLARGINE 50 UNITS: 100 INJECTION, SOLUTION SUBCUTANEOUS at 21:40

## 2017-01-07 NOTE — PROGRESS NOTES
Bedside and Verbal shift change report given to self (oncoming nurse) by Stacie Smith RN (offgoing nurse). Report included the following information SBAR, Kardex, MAR and Recent Results.

## 2017-01-07 NOTE — PROGRESS NOTES
Verbal bedside report given to monica Dunn RN. Patient's situation, background, assessment and recommendations provided. Opportunity for questions provided. Oncoming RN assumed care of patient.

## 2017-01-07 NOTE — PROGRESS NOTES
Problem: Falls - Risk of  Goal: *Absence of falls  Outcome: Progressing Towards Goal  Patient Alert, oriented x3,  proper use of call light, pages for assistance before getting  OOB. Nonskid socks on feet prior to getting OOB. Staff compliant with keeping bed in low, locked position; with both left and right upper side rails raised/ elevated. Bedside table and personal items within reach.  Telephone, eye glassess, [employer]

## 2017-01-07 NOTE — PROGRESS NOTES
[facility] CARDIOLOGY PROGRESS NOTE           1/7/2017 9:51 AM    Admit Date: 1/6/2017    Admit Diagnosis: sob;Cardiomyopathy ([address]      Subjective:   No complaints this AM, improved shortness of breath    Interval History: (History of pertinent interval events obtained from nursing staff)  Diuresis overnight    ROS:  GEN:  No fever or chills  Cardiovascular:  As noted above  Pulmonary:  As noted above  Neuro:  No new focal motor or sensory loss      Objective:     Vitals:    01/07/17 0048 01/07/17 0425 01/07/17 0745 01/07/17 0901   BP: 103/57 117/90 131/59 122/75   Pulse: 85 85 85 86   Resp: 16 17 22    Temp: 97.9 °F (36.6 °C) 97.6 °F (36.4 °C) 98.1 °F (36.7 °C)    SpO2: 96% 97% 98%    Weight:  (!) 174.3 kg (384 lb 3.2 oz)     Height:           Physical Exam:  General- morbidly obese, NAD  Neck- supple  CV- regular rate and rhythm, very distant S1, S2  Lung- clear bilaterally but distant  Abd- soft, nontender, nondistended  Ext- + edema  Skin- warm and dry    Current Facility-Administered Medications   Medication Dose Route Frequency    carvedilol (COREG) tablet 25 mg  25 mg Oral BID WITH MEALS    oxyCODONE IR (ROXICODONE) tablet 15 mg  15 mg Oral Q4H PRN    gabapentin (NEURONTIN) capsule 600 mg  600 mg Oral BID    pravastatin (PRAVACHOL) tablet 80 mg  80 mg Oral QHS    hydrALAZINE (APRESOLINE) tablet 25 mg  25 mg Oral TID    colchicine tablet 0.6 mg  0.6 mg Oral DAILY    digoxin (LANOXIN) tablet 0.125 mg  0.125 mg Oral DAILY    apixaban (ELIQUIS) tablet 5 mg  5 mg Oral Q12H    famotidine (PEPCID) tablet 20 mg  20 mg Oral QPM    allopurinol (ZYLOPRIM) tablet 100 mg  100 mg Oral DAILY    insulin glargine (LANTUS) injection 50 Units  50 Units SubCUTAneous QHS    docusate sodium (COLACE) capsule 100 mg  100 mg Oral BID    polyethylene glycol (MIRALAX) packet 17 g  17 g Oral DAILY PRN    potassium chloride (K-DUR, KLOR-CON) tablet 10 mEq  10 mEq Oral BID    spironolactone (ALDACTONE) tablet 25 mg  25 mg Oral DAILY    isosorbide dinitrate (ISORDIL) tablet 20 mg  20 mg Oral TID    nitroglycerin (NITROSTAT) tablet 0.4 mg  0.4 mg SubLINGual Q5MIN PRN    sodium chloride (NS) flush 5-10 mL  5-10 mL IntraVENous Q8H    sodium chloride (NS) flush 5-10 mL  5-10 mL IntraVENous PRN    furosemide (LASIX) injection 40 mg  40 mg IntraVENous BID    insulin lispro (HUMALOG) injection   SubCUTAneous AC&HS     Data Review:   Recent Results (from the past 24 hour(s))   GLUCOSE, POC    Collection Time: 01/06/17 12:43 PM   Result Value Ref Range    Glucose (POC) 229 (H) 65 - 721 mg/dL   METABOLIC PANEL, BASIC    Collection Time: 01/06/17  1:55 PM   Result Value Ref Range    Sodium 140 136 - 145 mmol/L    Potassium 3.9 3.5 - 5.1 mmol/L    Chloride 100 98 - 107 mmol/L    CO2 33 (H) 21 - 32 mmol/L    Anion gap 7 7 - 16 mmol/L    Glucose 185 (H) 65 - 100 mg/dL    BUN 31 (H) 6 - 23 MG/DL    Creatinine 1.41 0.8 - 1.5 MG/DL    GFR est AA >60 >60 ml/min/1.73m2    GFR est non-AA 56 (L) >60 ml/min/1.73m2    Calcium 8.2 (L) 8.3 - 10.4 MG/DL   CBC W/O DIFF    Collection Time: 01/06/17  1:55 PM   Result Value Ref Range    WBC 8.6 4.3 - 11.1 K/uL    RBC 4.82 4.23 - 5.67 M/uL    HGB 12.3 (L) 13.6 - 17.2 g/dL    HCT 39.1 (L) 41.1 - 50.3 %    MCV 81.1 79.6 - 97.8 FL    MCH 25.5 (L) 26.1 - 32.9 PG    MCHC 31.5 31.4 - 35.0 g/dL    RDW 18.9 (H) 11.9 - 14.6 %    PLATELET 743 905 - 790 K/uL    MPV 10.0 (L) 10.8 - 14.1 FL   MAGNESIUM    Collection Time: 01/06/17  1:55 PM   Result Value Ref Range    Magnesium 2.3 1.8 - 2.4 mg/dL   GLUCOSE, POC    Collection Time: 01/06/17  4:23 PM   Result Value Ref Range    Glucose (POC) 186 (H) 65 - 100 mg/dL   GLUCOSE, POC    Collection Time: 01/06/17  9:06 PM   Result Value Ref Range    Glucose (POC) 228 (H) 65 - 210 mg/dL   METABOLIC PANEL, BASIC    Collection Time: 01/07/17  4:00 AM   Result Value Ref Range    Sodium 141 136 - 145 mmol/L    Potassium 4.0 3.5 - 5.1 mmol/L    Chloride 101 98 - 107 mmol/L    CO2 32 21 - 32 mmol/L    Anion gap 8 7 - 16 mmol/L    Glucose 170 (H) 65 - 100 mg/dL    BUN 34 (H) 6 - 23 MG/DL    Creatinine 1.64 (H) 0.8 - 1.5 MG/DL    GFR est AA 57 (L) >60 ml/min/1.73m2    GFR est non-AA 47 (L) >60 ml/min/1.73m2    Calcium 8.4 8.3 - 10.4 MG/DL   MAGNESIUM    Collection Time: 01/07/17  4:00 AM   Result Value Ref Range    Magnesium 2.2 1.8 - 2.4 mg/dL   GLUCOSE, POC    Collection Time: 01/07/17  6:17 AM   Result Value Ref Range    Glucose (POC) 170 (H) 65 - 100 mg/dL       EKG:  (EKG has been independently visualized by me with interpretation below)  Assessment:     Active Problems:    Cardiomyopathy ([address] (10/21/2015)      Plan:   1. NICM, uncontrolled: Pt body habitus makes assessment of his volume status very difficult. He has had significant weight gain since November and reports worsening SORENSON, orthonpea, PND, leg swelling consistent with decompensated CHF. Pt was seen in the ER several days ago and had his diuretic increased with reportedly no results. --continue lasix diuresis today, follow up BMP in AM, BNP in AM  --consider transitioning to oral lasix tomorrow     3. Constipation: BM yesterday     4. ICD: cont device follow up, functioning normally     5. CKD: will need daily monitoring while getting diuresed    Hermila Roque MD  Cardiology/Electrophysiology

## 2017-01-08 PROBLEM — K59.00 CONSTIPATION: Status: ACTIVE | Noted: 2017-01-08

## 2017-01-08 LAB
ANION GAP BLD CALC-SCNC: 7 MMOL/L (ref 7–16)
BASOPHILS # BLD AUTO: 0 K/UL (ref 0–0.2)
BASOPHILS # BLD: 0 % (ref 0–2)
BNP SERPL-MCNC: 492 PG/ML
BUN SERPL-MCNC: 41 MG/DL (ref 6–23)
CALCIUM SERPL-MCNC: 7.9 MG/DL (ref 8.3–10.4)
CHLORIDE SERPL-SCNC: 99 MMOL/L (ref 98–107)
CO2 SERPL-SCNC: 31 MMOL/L (ref 21–32)
CREAT SERPL-MCNC: 1.87 MG/DL (ref 0.8–1.5)
DIFFERENTIAL METHOD BLD: ABNORMAL
EOSINOPHIL # BLD: 0.3 K/UL (ref 0–0.8)
EOSINOPHIL NFR BLD: 4 % (ref 0.5–7.8)
ERYTHROCYTE [DISTWIDTH] IN BLOOD BY AUTOMATED COUNT: 18.7 % (ref 11.9–14.6)
GLUCOSE BLD STRIP.AUTO-MCNC: 151 MG/DL (ref 65–100)
GLUCOSE BLD STRIP.AUTO-MCNC: 212 MG/DL (ref 65–100)
GLUCOSE BLD STRIP.AUTO-MCNC: 240 MG/DL (ref 65–100)
GLUCOSE BLD STRIP.AUTO-MCNC: 258 MG/DL (ref 65–100)
GLUCOSE SERPL-MCNC: 155 MG/DL (ref 65–100)
HCT VFR BLD AUTO: 35.9 % (ref 41.1–50.3)
HGB BLD-MCNC: 11.2 G/DL (ref 13.6–17.2)
IMM GRANULOCYTES # BLD: 0 K/UL (ref 0–0.5)
IMM GRANULOCYTES NFR BLD AUTO: 0.3 % (ref 0–5)
LYMPHOCYTES # BLD AUTO: 34 % (ref 13–44)
LYMPHOCYTES # BLD: 2.9 K/UL (ref 0.5–4.6)
MAGNESIUM SERPL-MCNC: 2.1 MG/DL (ref 1.8–2.4)
MCH RBC QN AUTO: 25.2 PG (ref 26.1–32.9)
MCHC RBC AUTO-ENTMCNC: 31.2 G/DL (ref 31.4–35)
MCV RBC AUTO: 80.7 FL (ref 79.6–97.8)
MONOCYTES # BLD: 0.6 K/UL (ref 0.1–1.3)
MONOCYTES NFR BLD AUTO: 7 % (ref 4–12)
NEUTS SEG # BLD: 4.8 K/UL (ref 1.7–8.2)
NEUTS SEG NFR BLD AUTO: 55 % (ref 43–78)
PLATELET # BLD AUTO: 227 K/UL (ref 150–450)
PMV BLD AUTO: 9.7 FL (ref 10.8–14.1)
POTASSIUM SERPL-SCNC: 4 MMOL/L (ref 3.5–5.1)
RBC # BLD AUTO: 4.45 M/UL (ref 4.23–5.67)
SODIUM SERPL-SCNC: 137 MMOL/L (ref 136–145)
WBC # BLD AUTO: 8.7 K/UL (ref 4.3–11.1)

## 2017-01-08 PROCEDURE — 82962 GLUCOSE BLOOD TEST: CPT

## 2017-01-08 PROCEDURE — 83880 ASSAY OF NATRIURETIC PEPTIDE: CPT | Performed by: INTERNAL MEDICINE

## 2017-01-08 PROCEDURE — 74011000250 HC RX REV CODE- 250: Performed by: INTERNAL MEDICINE

## 2017-01-08 PROCEDURE — 83735 ASSAY OF MAGNESIUM: CPT | Performed by: NURSE PRACTITIONER

## 2017-01-08 PROCEDURE — 80048 BASIC METABOLIC PNL TOTAL CA: CPT | Performed by: NURSE PRACTITIONER

## 2017-01-08 PROCEDURE — 65660000000 HC RM CCU STEPDOWN

## 2017-01-08 PROCEDURE — 74011250637 HC RX REV CODE- 250/637: Performed by: NURSE PRACTITIONER

## 2017-01-08 PROCEDURE — 85025 COMPLETE CBC W/AUTO DIFF WBC: CPT | Performed by: NURSE PRACTITIONER

## 2017-01-08 PROCEDURE — 74011250637 HC RX REV CODE- 250/637: Performed by: INTERNAL MEDICINE

## 2017-01-08 PROCEDURE — 36415 COLL VENOUS BLD VENIPUNCTURE: CPT | Performed by: NURSE PRACTITIONER

## 2017-01-08 PROCEDURE — 74011636637 HC RX REV CODE- 636/637: Performed by: NURSE PRACTITIONER

## 2017-01-08 PROCEDURE — 74011250636 HC RX REV CODE- 250/636: Performed by: INTERNAL MEDICINE

## 2017-01-08 RX ORDER — TETRAHYDROZOLINE HCL 0.05 %
1 DROPS OPHTHALMIC (EYE) 3 TIMES DAILY
Status: DISCONTINUED | OUTPATIENT
Start: 2017-01-08 | End: 2017-01-13 | Stop reason: HOSPADM

## 2017-01-08 RX ORDER — POLYETHYLENE GLYCOL 3350 17 G/17G
17 POWDER, FOR SOLUTION ORAL DAILY
Status: DISCONTINUED | OUTPATIENT
Start: 2017-01-08 | End: 2017-01-09

## 2017-01-08 RX ORDER — DEXTROMETHORPHAN POLISTIREX 30 MG/5 ML
SUSPENSION, EXTENDED RELEASE 12 HR ORAL
Status: ACTIVE | OUTPATIENT
Start: 2017-01-08 | End: 2017-01-08

## 2017-01-08 RX ORDER — FUROSEMIDE 10 MG/ML
80 INJECTION INTRAMUSCULAR; INTRAVENOUS 2 TIMES DAILY
Status: DISCONTINUED | OUTPATIENT
Start: 2017-01-08 | End: 2017-01-10

## 2017-01-08 RX ORDER — AMOXICILLIN 250 MG
3 CAPSULE ORAL EVERY 12 HOURS
Status: DISCONTINUED | OUTPATIENT
Start: 2017-01-08 | End: 2017-01-09

## 2017-01-08 RX ADMIN — HYDRALAZINE HYDROCHLORIDE 25 MG: 25 TABLET, FILM COATED ORAL at 14:08

## 2017-01-08 RX ADMIN — TETRAHYDROZOLINE HYDROCHLORIDE 1 DROP: 0.5 SOLUTION/ DROPS OPHTHALMIC at 18:10

## 2017-01-08 RX ADMIN — Medication 10 ML: at 14:00

## 2017-01-08 RX ADMIN — ALLOPURINOL 100 MG: 100 TABLET ORAL at 09:21

## 2017-01-08 RX ADMIN — INSULIN LISPRO 4 UNITS: 100 INJECTION, SOLUTION INTRAVENOUS; SUBCUTANEOUS at 12:45

## 2017-01-08 RX ADMIN — STANDARDIZED SENNA CONCENTRATE AND DOCUSATE SODIUM 3 TABLET: 8.6; 5 TABLET, FILM COATED ORAL at 11:18

## 2017-01-08 RX ADMIN — STANDARDIZED SENNA CONCENTRATE AND DOCUSATE SODIUM 3 TABLET: 8.6; 5 TABLET, FILM COATED ORAL at 21:46

## 2017-01-08 RX ADMIN — POTASSIUM CHLORIDE 10 MEQ: 10 TABLET, EXTENDED RELEASE ORAL at 18:08

## 2017-01-08 RX ADMIN — Medication 10 ML: at 21:46

## 2017-01-08 RX ADMIN — APIXABAN 5 MG: 5 TABLET, FILM COATED ORAL at 09:21

## 2017-01-08 RX ADMIN — CARVEDILOL 25 MG: 25 TABLET, FILM COATED ORAL at 18:08

## 2017-01-08 RX ADMIN — Medication 5 ML: at 05:01

## 2017-01-08 RX ADMIN — COLCHICINE 0.6 MG: 0.6 TABLET, FILM COATED ORAL at 09:21

## 2017-01-08 RX ADMIN — POTASSIUM CHLORIDE 10 MEQ: 10 TABLET, EXTENDED RELEASE ORAL at 09:21

## 2017-01-08 RX ADMIN — ISOSORBIDE DINITRATE 20 MG: 20 TABLET ORAL at 14:08

## 2017-01-08 RX ADMIN — HYDRALAZINE HYDROCHLORIDE 25 MG: 25 TABLET, FILM COATED ORAL at 21:46

## 2017-01-08 RX ADMIN — INSULIN GLARGINE 50 UNITS: 100 INJECTION, SOLUTION SUBCUTANEOUS at 21:48

## 2017-01-08 RX ADMIN — HYDRALAZINE HYDROCHLORIDE 25 MG: 25 TABLET, FILM COATED ORAL at 05:01

## 2017-01-08 RX ADMIN — SPIRONOLACTONE 25 MG: 25 TABLET, FILM COATED ORAL at 09:22

## 2017-01-08 RX ADMIN — OXYCODONE HYDROCHLORIDE 15 MG: 5 TABLET ORAL at 21:47

## 2017-01-08 RX ADMIN — PRAVASTATIN SODIUM 80 MG: 80 TABLET ORAL at 21:45

## 2017-01-08 RX ADMIN — FAMOTIDINE 20 MG: 20 TABLET ORAL at 18:08

## 2017-01-08 RX ADMIN — FUROSEMIDE 80 MG: 10 INJECTION, SOLUTION INTRAMUSCULAR; INTRAVENOUS at 18:13

## 2017-01-08 RX ADMIN — INSULIN LISPRO 2 UNITS: 100 INJECTION, SOLUTION INTRAVENOUS; SUBCUTANEOUS at 09:23

## 2017-01-08 RX ADMIN — GABAPENTIN 600 MG: 300 CAPSULE ORAL at 09:21

## 2017-01-08 RX ADMIN — INSULIN LISPRO 4 UNITS: 100 INJECTION, SOLUTION INTRAVENOUS; SUBCUTANEOUS at 21:48

## 2017-01-08 RX ADMIN — APIXABAN 5 MG: 5 TABLET, FILM COATED ORAL at 21:45

## 2017-01-08 RX ADMIN — POLYETHYLENE GLYCOL 3350 17 G: 17 POWDER, FOR SOLUTION ORAL at 11:22

## 2017-01-08 RX ADMIN — INSULIN LISPRO 6 UNITS: 100 INJECTION, SOLUTION INTRAVENOUS; SUBCUTANEOUS at 18:09

## 2017-01-08 RX ADMIN — ISOSORBIDE DINITRATE 20 MG: 20 TABLET ORAL at 05:01

## 2017-01-08 RX ADMIN — ISOSORBIDE DINITRATE 20 MG: 20 TABLET ORAL at 22:04

## 2017-01-08 RX ADMIN — TETRAHYDROZOLINE HYDROCHLORIDE 1 DROP: 0.5 SOLUTION/ DROPS OPHTHALMIC at 11:18

## 2017-01-08 RX ADMIN — GABAPENTIN 600 MG: 300 CAPSULE ORAL at 18:08

## 2017-01-08 RX ADMIN — DIGOXIN 0.12 MG: 125 TABLET ORAL at 09:22

## 2017-01-08 RX ADMIN — FUROSEMIDE 80 MG: 10 INJECTION, SOLUTION INTRAMUSCULAR; INTRAVENOUS at 09:24

## 2017-01-08 RX ADMIN — TETRAHYDROZOLINE HYDROCHLORIDE 1 DROP: 0.5 SOLUTION/ DROPS OPHTHALMIC at 21:46

## 2017-01-08 RX ADMIN — CARVEDILOL 25 MG: 25 TABLET, FILM COATED ORAL at 09:21

## 2017-01-08 NOTE — PROGRESS NOTES
Bedside shift change report received from [address]. Report included the following information SBAR, Kardex, MAR and Recent Results.

## 2017-01-08 NOTE — PROGRESS NOTES
Educated pt importance of using urinal. Explained importance of measuring output while diuresing. Pt verbalizes understanding and states he will start using urinal. Will continue to monitor.

## 2017-01-08 NOTE — PROGRESS NOTES
Bedside and Verbal shift change report given to Dea Quintero RN (oncoming nurse) by self Marta Hutchison nurse). Report included the following information SBAR, Kardex, MAR and Recent Results.

## 2017-01-08 NOTE — PROGRESS NOTES
Problem: Heart Failure: Day 2  Goal: Medications  Outcome: Progressing Towards Goal  Pt progressing towards goal. Pt currently on IVP lasix BID.

## 2017-01-08 NOTE — PROGRESS NOTES
Problem: Falls - Risk of  Goal: *Absence of falls  Outcome: Progressing Towards Goal  Pt progressing towards goal. No falls since admission. Bed low and locked. Call light within reach. Side rails x 2. Gripper socks applied. Personal belongings within reach. Pt verbalizes understanding to call for assistance.

## 2017-01-08 NOTE — PROGRESS NOTES
[facility] CARDIOLOGY PROGRESS NOTE           1/8/2017 8:42 AM    Admit Date: 1/6/2017    Admit Diagnosis: sob;Cardiomyopathy ([address]      Subjective:   Continues to report constipation, no chest pain, shortness of breath continues    Interval History: (History of pertinent interval events obtained from nursing staff)  Minimal diuresis overnight    ROS:  GEN:  No fever or chills  Cardiovascular:  As noted above  Pulmonary:  As noted above  Neuro:  No new focal motor or sensory loss      Objective:     Vitals:    01/07/17 2105 01/07/17 2352 01/08/17 0501 01/08/17 0538   BP: 117/78 96/59 103/83 103/83   Pulse: 85 80 79 78   Resp:  16  18   Temp:  97.3 °F (36.3 °C)  97.5 °F (36.4 °C)   SpO2:  99%  99%   Weight:    (!) 175.1 kg (386 lb)   Height:    5' 6\" (1.676 m)       Physical Exam:  General- morbidly obese, NAD  Neck- supple  CV- regular rate and rhythm, very distant S1, S2  Lung- clear bilaterally but distant  Abd- soft, nontender, nondistended  Ext- + edema  Skin- warm and dry    Current Facility-Administered Medications   Medication Dose Route Frequency    furosemide (LASIX) injection 80 mg  80 mg IntraVENous BID    carvedilol (COREG) tablet 25 mg  25 mg Oral BID WITH MEALS    oxyCODONE IR (ROXICODONE) tablet 15 mg  15 mg Oral Q4H PRN    gabapentin (NEURONTIN) capsule 600 mg  600 mg Oral BID    pravastatin (PRAVACHOL) tablet 80 mg  80 mg Oral QHS    hydrALAZINE (APRESOLINE) tablet 25 mg  25 mg Oral TID    colchicine tablet 0.6 mg  0.6 mg Oral DAILY    digoxin (LANOXIN) tablet 0.125 mg  0.125 mg Oral DAILY    apixaban (ELIQUIS) tablet 5 mg  5 mg Oral Q12H    famotidine (PEPCID) tablet 20 mg  20 mg Oral QPM    allopurinol (ZYLOPRIM) tablet 100 mg  100 mg Oral DAILY    insulin glargine (LANTUS) injection 50 Units  50 Units SubCUTAneous QHS    docusate sodium (COLACE) capsule 100 mg  100 mg Oral BID    polyethylene glycol (MIRALAX) packet 17 g  17 g Oral DAILY PRN    potassium chloride (K-DUR, KLOR-CON) tablet 10 mEq  10 mEq Oral BID    spironolactone (ALDACTONE) tablet 25 mg  25 mg Oral DAILY    isosorbide dinitrate (ISORDIL) tablet 20 mg  20 mg Oral TID    nitroglycerin (NITROSTAT) tablet 0.4 mg  0.4 mg SubLINGual Q5MIN PRN    sodium chloride (NS) flush 5-10 mL  5-10 mL IntraVENous Q8H    sodium chloride (NS) flush 5-10 mL  5-10 mL IntraVENous PRN    insulin lispro (HUMALOG) injection   SubCUTAneous AC&HS     Data Review:   Recent Results (from the past 24 hour(s))   GLUCOSE, POC    Collection Time: 01/07/17 11:42 AM   Result Value Ref Range    Glucose (POC) 186 (H) 65 - 100 mg/dL   GLUCOSE, POC    Collection Time: 01/07/17  5:26 PM   Result Value Ref Range    Glucose (POC) 266 (H) 65 - 100 mg/dL   GLUCOSE, POC    Collection Time: 01/07/17  9:16 PM   Result Value Ref Range    Glucose (POC) 223 (H) 65 - 150 mg/dL   METABOLIC PANEL, BASIC    Collection Time: 01/08/17  5:20 AM   Result Value Ref Range    Sodium 137 136 - 145 mmol/L    Potassium 4.0 3.5 - 5.1 mmol/L    Chloride 99 98 - 107 mmol/L    CO2 31 21 - 32 mmol/L    Anion gap 7 7 - 16 mmol/L    Glucose 155 (H) 65 - 100 mg/dL    BUN 41 (H) 6 - 23 MG/DL    Creatinine 1.87 (H) 0.8 - 1.5 MG/DL    GFR est AA 49 (L) >60 ml/min/1.73m2    GFR est non-AA 41 (L) >60 ml/min/1.73m2    Calcium 7.9 (L) 8.3 - 10.4 MG/DL   MAGNESIUM    Collection Time: 01/08/17  5:20 AM   Result Value Ref Range    Magnesium 2.1 1.8 - 2.4 mg/dL   BNP    Collection Time: 01/08/17  5:20 AM   Result Value Ref Range     pg/mL   CBC WITH AUTOMATED DIFF    Collection Time: 01/08/17  5:20 AM   Result Value Ref Range    WBC 8.7 4.3 - 11.1 K/uL    RBC 4.45 4.23 - 5.67 M/uL    HGB 11.2 (L) 13.6 - 17.2 g/dL    HCT 35.9 (L) 41.1 - 50.3 %    MCV 80.7 79.6 - 97.8 FL    MCH 25.2 (L) 26.1 - 32.9 PG    MCHC 31.2 (L) 31.4 - 35.0 g/dL    RDW 18.7 (H) 11.9 - 14.6 %    PLATELET 875 580 - 152 K/uL    MPV 9.7 (L) 10.8 - 14.1 FL    DF AUTOMATED      NEUTROPHILS 55 43 - 78 % LYMPHOCYTES 34 13 - 44 %    MONOCYTES 7 4.0 - 12.0 %    EOSINOPHILS 4 0.5 - 7.8 %    BASOPHILS 0 0.0 - 2.0 %    IMMATURE GRANULOCYTES 0.3 0.0 - 5.0 %    ABS. NEUTROPHILS 4.8 1.7 - 8.2 K/UL    ABS. LYMPHOCYTES 2.9 0.5 - 4.6 K/UL    ABS. MONOCYTES 0.6 0.1 - 1.3 K/UL    ABS. EOSINOPHILS 0.3 0.0 - 0.8 K/UL    ABS. BASOPHILS 0.0 0.0 - 0.2 K/UL    ABS. IMM. GRANS. 0.0 0.0 - 0.5 K/UL   GLUCOSE, POC    Collection Time: 01/08/17  6:09 AM   Result Value Ref Range    Glucose (POC) 151 (H) 65 - 100 mg/dL       EKG:  (EKG has been independently visualized by me with interpretation below)  Assessment:     Active Problems: Morbid obesity ([facility] [address] (7/2/2013)      CKD (chronic kidney disease) stage 3, GFR 30-59 ml/min (8/13/2014)      Cardiomyopathy ([address] (10/21/2015)      Constipation (1/8/2017)      Plan:   1. NICM, uncontrolled: Pt body habitus makes assessment of his volume status very difficult. He has had significant weight gain since November and reports worsening SORENSON, orthonpea, PND, leg swelling consistent with decompensated CHF. Poor diuresis yesterday, elevated BNP.  --increase lasix today to 80mg IV BID  --continue daily BMP      3. Constipation: pericolace BID, miralax and enema this AM      4. ICD: cont device follow up, functioning normally      5. CKD: renal function slightly worse this AM, cont to monitor, increasing diruesis today    Babatunde Roque MD  Cardiology/Electrophysiology

## 2017-01-09 ENCOUNTER — APPOINTMENT (OUTPATIENT)
Dept: GENERAL RADIOLOGY | Age: 52
DRG: 291 | End: 2017-01-09
Attending: INTERNAL MEDICINE
Payer: MEDICARE

## 2017-01-09 LAB
GLUCOSE BLD STRIP.AUTO-MCNC: 171 MG/DL (ref 65–100)
GLUCOSE BLD STRIP.AUTO-MCNC: 207 MG/DL (ref 65–100)
GLUCOSE BLD STRIP.AUTO-MCNC: 224 MG/DL (ref 65–100)
GLUCOSE BLD STRIP.AUTO-MCNC: 268 MG/DL (ref 65–100)
MAGNESIUM SERPL-MCNC: 2.4 MG/DL (ref 1.8–2.4)

## 2017-01-09 PROCEDURE — 74011636637 HC RX REV CODE- 636/637: Performed by: NURSE PRACTITIONER

## 2017-01-09 PROCEDURE — 83735 ASSAY OF MAGNESIUM: CPT | Performed by: NURSE PRACTITIONER

## 2017-01-09 PROCEDURE — 74011250637 HC RX REV CODE- 250/637: Performed by: NURSE PRACTITIONER

## 2017-01-09 PROCEDURE — 82962 GLUCOSE BLOOD TEST: CPT

## 2017-01-09 PROCEDURE — 74000 XR ABD (KUB): CPT

## 2017-01-09 PROCEDURE — 74011250637 HC RX REV CODE- 250/637: Performed by: INTERNAL MEDICINE

## 2017-01-09 PROCEDURE — 36415 COLL VENOUS BLD VENIPUNCTURE: CPT | Performed by: NURSE PRACTITIONER

## 2017-01-09 PROCEDURE — 74011250637 HC RX REV CODE- 250/637: Performed by: PHYSICIAN ASSISTANT

## 2017-01-09 PROCEDURE — 74011250636 HC RX REV CODE- 250/636: Performed by: INTERNAL MEDICINE

## 2017-01-09 PROCEDURE — 65660000000 HC RM CCU STEPDOWN

## 2017-01-09 PROCEDURE — 77030018846 HC SOL IRR STRL H20 ICUM -A

## 2017-01-09 PROCEDURE — 51798 US URINE CAPACITY MEASURE: CPT

## 2017-01-09 RX ORDER — POLYETHYLENE GLYCOL 3350 17 G/17G
17 POWDER, FOR SOLUTION ORAL 2 TIMES DAILY
Status: DISCONTINUED | OUTPATIENT
Start: 2017-01-09 | End: 2017-01-13 | Stop reason: HOSPADM

## 2017-01-09 RX ORDER — DOCUSATE SODIUM 100 MG/1
100 CAPSULE, LIQUID FILLED ORAL 2 TIMES DAILY
Status: DISCONTINUED | OUTPATIENT
Start: 2017-01-09 | End: 2017-01-13 | Stop reason: HOSPADM

## 2017-01-09 RX ADMIN — TETRAHYDROZOLINE HYDROCHLORIDE 1 DROP: 0.5 SOLUTION/ DROPS OPHTHALMIC at 08:50

## 2017-01-09 RX ADMIN — CARVEDILOL 25 MG: 25 TABLET, FILM COATED ORAL at 17:48

## 2017-01-09 RX ADMIN — APIXABAN 5 MG: 5 TABLET, FILM COATED ORAL at 08:56

## 2017-01-09 RX ADMIN — DOCUSATE SODIUM 100 MG: 100 CAPSULE, LIQUID FILLED ORAL at 17:48

## 2017-01-09 RX ADMIN — INSULIN LISPRO 2 UNITS: 100 INJECTION, SOLUTION INTRAVENOUS; SUBCUTANEOUS at 08:53

## 2017-01-09 RX ADMIN — INSULIN LISPRO 4 UNITS: 100 INJECTION, SOLUTION INTRAVENOUS; SUBCUTANEOUS at 11:30

## 2017-01-09 RX ADMIN — HYDRALAZINE HYDROCHLORIDE 25 MG: 25 TABLET, FILM COATED ORAL at 17:10

## 2017-01-09 RX ADMIN — POTASSIUM CHLORIDE 10 MEQ: 10 TABLET, EXTENDED RELEASE ORAL at 09:00

## 2017-01-09 RX ADMIN — GABAPENTIN 600 MG: 300 CAPSULE ORAL at 17:49

## 2017-01-09 RX ADMIN — ISOSORBIDE DINITRATE 20 MG: 20 TABLET ORAL at 06:01

## 2017-01-09 RX ADMIN — GABAPENTIN 600 MG: 300 CAPSULE ORAL at 08:59

## 2017-01-09 RX ADMIN — HYDRALAZINE HYDROCHLORIDE 25 MG: 25 TABLET, FILM COATED ORAL at 06:01

## 2017-01-09 RX ADMIN — COLCHICINE 0.6 MG: 0.6 TABLET, FILM COATED ORAL at 08:58

## 2017-01-09 RX ADMIN — ALLOPURINOL 100 MG: 100 TABLET ORAL at 08:56

## 2017-01-09 RX ADMIN — OXYCODONE HYDROCHLORIDE 15 MG: 5 TABLET ORAL at 22:01

## 2017-01-09 RX ADMIN — APIXABAN 5 MG: 5 TABLET, FILM COATED ORAL at 21:52

## 2017-01-09 RX ADMIN — PRAVASTATIN SODIUM 80 MG: 80 TABLET ORAL at 21:52

## 2017-01-09 RX ADMIN — POLYETHYLENE GLYCOL 3350 17 G: 17 POWDER, FOR SOLUTION ORAL at 17:51

## 2017-01-09 RX ADMIN — ISOSORBIDE DINITRATE 20 MG: 20 TABLET ORAL at 22:32

## 2017-01-09 RX ADMIN — STANDARDIZED SENNA CONCENTRATE AND DOCUSATE SODIUM 3 TABLET: 8.6; 5 TABLET, FILM COATED ORAL at 08:58

## 2017-01-09 RX ADMIN — INSULIN LISPRO 6 UNITS: 100 INJECTION, SOLUTION INTRAVENOUS; SUBCUTANEOUS at 22:03

## 2017-01-09 RX ADMIN — INSULIN GLARGINE 50 UNITS: 100 INJECTION, SOLUTION SUBCUTANEOUS at 22:03

## 2017-01-09 RX ADMIN — FAMOTIDINE 20 MG: 20 TABLET ORAL at 17:48

## 2017-01-09 RX ADMIN — INSULIN LISPRO 4 UNITS: 100 INJECTION, SOLUTION INTRAVENOUS; SUBCUTANEOUS at 16:30

## 2017-01-09 RX ADMIN — FUROSEMIDE 80 MG: 10 INJECTION, SOLUTION INTRAMUSCULAR; INTRAVENOUS at 17:49

## 2017-01-09 RX ADMIN — SPIRONOLACTONE 25 MG: 25 TABLET, FILM COATED ORAL at 08:59

## 2017-01-09 RX ADMIN — TETRAHYDROZOLINE HYDROCHLORIDE 1 DROP: 0.5 SOLUTION/ DROPS OPHTHALMIC at 22:00

## 2017-01-09 RX ADMIN — Medication 10 ML: at 22:03

## 2017-01-09 RX ADMIN — FUROSEMIDE 80 MG: 10 INJECTION, SOLUTION INTRAMUSCULAR; INTRAVENOUS at 08:58

## 2017-01-09 RX ADMIN — CARVEDILOL 25 MG: 25 TABLET, FILM COATED ORAL at 09:01

## 2017-01-09 RX ADMIN — POTASSIUM CHLORIDE 10 MEQ: 10 TABLET, EXTENDED RELEASE ORAL at 17:50

## 2017-01-09 RX ADMIN — HYDRALAZINE HYDROCHLORIDE 25 MG: 25 TABLET, FILM COATED ORAL at 21:52

## 2017-01-09 RX ADMIN — TETRAHYDROZOLINE HYDROCHLORIDE 1 DROP: 0.5 SOLUTION/ DROPS OPHTHALMIC at 17:50

## 2017-01-09 NOTE — ROUTINE PROCESS
TRANSFER - IN REPORT:    Verbal report received from xray on Burnice Raven  being received from [facility] for routine progression of care      Report consisted of patients Situation, Background, Assessment and   Recommendations(SBAR). Information from the following report(s) Kardex was reviewed with the receiving nurse. Opportunity for questions and clarification was provided. Assessment completed upon patients arrival to unit and care assumed.

## 2017-01-09 NOTE — PROGRESS NOTES
Bedside shift change report given to Ana Spence RN. Report included the following information SBAR, Kardex, MAR and Recent Results.

## 2017-01-09 NOTE — CONSULTS
[facility] Consult Note       Primary GI Physician:     Referring Physician:  Dr. Thomas Shoemaker Date:  1/9/2017    Admit Date:  1/6/2017    Chief Complaint:  Constipation, failed mag citrate, multiple enema    Subjective:     History of Present Illness:  Patient is a 45 y.o. 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 Camelia Leyva for constipation, failed mag citrate, multiple enema. He was admitted after presenting with worsening SOB, weight gain, leg swelling, constipation. He had been seen in the ER a couple of days prior to admission and Demadex dosing was increased and he was recommended stool softener for constipation, but did not have improvement. He states he usually have a BM every other day to every 2 days, but has been having gradually worsening constipation for awhile, especially recently, with no BM for 7 days. He states he was prescribed Golytely by the ER on 3 Saul 2017, but had no response to it (he does not recall trying Mag citrate). He has had increased fullness and bloating over his abdomen, with discomfort related to the bloating and distention. He has also had increased SOB. He has not been eating as much due to the increased fullness. Since his admission, he was initially treated with Miralax daily PRN (receiving a dose daily) and Colace BID, and had 2 watery BMs on 6 Jan 2017 after receiving an enema, but then did not have another BM. A fleet enema was ordered on 8 Jan 2017, but he states he did not receive it. On 8 Jan 2017, Colace was held and he was started on Pericolace BID and Miralax changed to scheduled daily dosing. He has been receiving Lasix over this admission, but continues to have increased swelling and SOB.   He was sent for an abd x-ray today, which was negative for obstruction or significant amount of retained stool. He had a small BM after returning from radiology, passing soft stool with gas, and has felt some decrease in SOB since then. He denies any bloody or black stools. He has had nausea and small amount of vomiting occasionally, but none recently. He denies any prior evaluation with a colonoscopy. He denies any family history of GI illnesses. He was previously seen in Dec 2015 for diarrhea and was not felt to be a candidate for colonoscopy due to his cardiac disease. Stool studies were negative for infection and he was treated with Imodium, and after symptoms improved, recommended Imodium and Miralax PRN. He was recommended to follow up as outpatient, but we have not seen him since then. He states the diarrhea did eventually improve after that admission and he continued using Imodium and Miralax PRN. He denies any heartburn or difficulty swallowing. He has had chest pain with the recent admission, but more severe prior to this hospitalization. He has also had increased coughing and hoarseness. PMH:  Past Medical History   Diagnosis Date    Acute systolic heart failure ([address] May, 2009     Also had transient acute renal failure secondary to poor perfusion.     AICD (automatic cardioverter/defibrillator) present 10/21/2015    Atypical chest pain 4/23/2010    Bronchitis     CAD (coronary artery disease)     Cardiomyopathy     Chest pain 10/21/2015    Chronic kidney disease      renal insufficiency    Chronic pain      back    Congestive heart failure (CHF) ([address] 10/21/2015    COPD     Diabetes ([address]      type 2 insulin reliant- BS average- 160's-180's    Diabetes mellitus type II, uncontrolled ([address] 7/2/2013    GERD (gastroesophageal reflux disease)     Gout     Heart failure ([address]      cardiomyopathy with ef 10-20%    Hypertension     Hyponatremia 12/20/2010    Ill-defined condition      gout, neuropathy, sciatica    Morbid obesity [facility]     Nausea & vomiting 11/30/2015    Neuropathy     Obstructive sleep apnea 2/15/2010    Other unknown and unspecified cause of morbidity or mortality      Gout    Severe sepsis (HCC)     Unspecified sleep apnea      cpap     Per prior consult note - He has had complications with anesthesia in the past when changing the pacemaker battery and reported that he \"stopped breathing for 20 minutes. \"    PSH:  Past Surgical History   Procedure Laterality Date    Hx pacemaker       may 2010    Hx heart catheterization  Sandy 10, 2008     Severe multivessel disease with severe LV dysfunction    Pr chest surgery procedure unlisted       thorocentesis       Allergies: Allergies   Allergen Reactions    Dilaudid [Hydromorphone] Other (comments)     Hotflashes, patient states he's not allergic    Iodinated Contrast Media - Oral And Iv Dye Other (comments)     \"shuts my kidneys down\"    Penicillins Unknown (comments)     Pt states he is not allergic his mother just wouldn't allow him take it       Home Medications:  Prior to Admission medications    Medication Sig Start Date End Date Taking? Authorizing Provider   torsemide (DEMADEX) 100 mg tablet Take 1 Tab by mouth two (2) times a day for 5 days. 1/3/17 1/8/17  Yenifer Arthur MD   apixaban (ELIQUIS) 5 mg tablet Take 1 Tab by mouth every twelve (12) hours. 12/26/16   Laura Ybarra MD   gabapentin (NEURONTIN) 600 mg tablet Take  by mouth two (2) times a day. Historical Provider   oxyCODONE IR (OXY-IR) 15 mg immediate release tablet Take 15 mg by mouth every four (4) hours as needed for Pain. Historical Provider   carvedilol (COREG) 25 mg tablet Take 1 Tab by mouth two (2) times daily (with meals). 10/26/16   Lidia Mcfadden PA-C   hydrALAZINE (APRESOLINE) 25 mg tablet Take 1 Tab by mouth three (3) times daily. 10/26/16   Lidia Mcfadden PA-C   digoxin (LANOXIN) 0.125 mg tablet Take 1 Tab by mouth daily.  10/26/16   Lidia Mcfadden PA-C   insulin glargine (LANTUS) 100 unit/mL injection 50 Units by SubCUTAneous route nightly. 10/26/16   Lidia Mcfadden PA-C   insulin lispro (HUMALOG) 100 unit/mL injection 15 Units by SubCUTAneous route three (3) times daily (with meals). 10/26/16   Lidia Mcfadden PA-C   docusate sodium (COLACE) 100 mg capsule Take 1 Cap by mouth two (2) times a day for 90 days. 10/26/16 1/24/17  Lidia Mcfadden PA-C   isosorbide dinitrate (ISORDIL) 20 mg tablet Take 1 Tab by mouth three (3) times daily. 10/26/16   Lidia Mcfadden PA-C   ranitidine (ZANTAC) 150 mg tablet Take 150 mg by mouth nightly. Historical Provider   spironolactone (ALDACTONE) 25 mg tablet Take 1 Tab by mouth daily. 7/14/16   Ariadna Machuca MD   pravastatin (PRAVACHOL) 80 mg tablet Take 1 Tab by mouth nightly. 7/14/16   Ariadna Machuca MD   potassium chloride (K-DUR, KLOR-CON) 10 mEq tablet Take 1 Tab by mouth two (2) times a day. 5/18/16   Neil Sood MD   polyethylene glycol (MIRALAX) 17 gram packet Take 1 Packet by mouth daily as needed (constipation). 12/4/15   Smith Oneill MD   cpap machine kit 10 cm qhs    Historical Provider   OXYGEN-AIR DELIVERY SYSTEMS 2 lpm qhs    Historical Provider   allopurinol (ZYLOPRIM) 100 mg tablet Take 100 mg by mouth daily. Rhiannon Mosqueda MD   glucose blood VI test strips (ASCENSIA AUTODISC VI, ONE TOUCH ULTRA TEST VI) strip Test strips for 30 day supply 8/22/14   Elicia Garcia MD   nitroglycerin (NITROSTAT) 0.4 mg SL tablet 1 Tab by SubLINGual route every five (5) minutes as needed for Chest Pain.  4/24/10   FATEMEH Reyes   colchicine 0.6 mg tablet Take 0.6 mg by mouth every morning. 3/22/10   Rhiannon Mosqueda MD       Hospital Medications:  Current Facility-Administered Medications   Medication Dose Route Frequency    furosemide (LASIX) injection 80 mg  80 mg IntraVENous BID    senna-docusate (PERICOLACE) 8.6-50 mg per tablet 3 Tab  3 Tab Oral Q12H    polyethylene glycol (MIRALAX) packet 17 g  17 g Oral DAILY    tetrahydrozoline (OPTI-CLEAR) 0.05 % ophthalmic drops 1 Drop  1 Drop Both Eyes TID    carvedilol (COREG) tablet 25 mg  25 mg Oral BID WITH MEALS    oxyCODONE IR (ROXICODONE) tablet 15 mg  15 mg Oral Q4H PRN    gabapentin (NEURONTIN) capsule 600 mg  600 mg Oral BID    pravastatin (PRAVACHOL) tablet 80 mg  80 mg Oral QHS    hydrALAZINE (APRESOLINE) tablet 25 mg  25 mg Oral TID    colchicine tablet 0.6 mg  0.6 mg Oral DAILY    apixaban (ELIQUIS) tablet 5 mg  5 mg Oral Q12H    famotidine (PEPCID) tablet 20 mg  20 mg Oral QPM    allopurinol (ZYLOPRIM) tablet 100 mg  100 mg Oral DAILY    insulin glargine (LANTUS) injection 50 Units  50 Units SubCUTAneous QHS    polyethylene glycol (MIRALAX) packet 17 g  17 g Oral DAILY PRN    potassium chloride (K-DUR, KLOR-CON) tablet 10 mEq  10 mEq Oral BID    spironolactone (ALDACTONE) tablet 25 mg  25 mg Oral DAILY    isosorbide dinitrate (ISORDIL) tablet 20 mg  20 mg Oral TID    nitroglycerin (NITROSTAT) tablet 0.4 mg  0.4 mg SubLINGual Q5MIN PRN    sodium chloride (NS) flush 5-10 mL  5-10 mL IntraVENous Q8H    sodium chloride (NS) flush 5-10 mL  5-10 mL IntraVENous PRN    insulin lispro (HUMALOG) injection   SubCUTAneous AC&HS       Social History:  Social History   Substance Use Topics    Smoking status: Former Smoker     Packs/day: 0.25     Years: 1.00     Quit date: 7/12/1984    Smokeless tobacco: Never Used      Comment: pt states that he only tried smoking as a kid     Alcohol use None in over 3 yrs       Pt has children. He has a history of drug use, but none in over 20 yrs. Family History:  Family History   Problem Relation Age of Onset    Heart Disease Father     Stroke Father     Diabetes Sister     Stroke Sister     Diabetes Sister     Heart Disease Other      Father had prostate cancer. No family history of GI illnesses. Review of Systems:  A detailed 10 system ROS is obtained, with pertinent positives as listed above. All others are negative.     Diet: Cardiac    Objective:     Physical Exam:  Vitals:  Visit Vitals    /71 (BP 1 Location: Left arm, BP Patient Position: Head of bed elevated (Comment degrees))    Pulse 80    Temp 97.4 °F (36.3 °C)    Resp 18    Ht 5' 6\" (1.676 m)    Wt (!) 176.7 kg (389 lb 9.6 oz)    SpO2 94%    BMI 62.88 kg/m2     Gen:  Pt is alert, cooperative, no acute distress, sitting at bedside  Skin:  Extremities and face reveal no rashes. HEENT: Sclerae anicteric. Extra-occular muscles are intact. No oral ulcers. No abnormal pigmentation of the lips. The neck is supple. Cardiovascular: Regular rate and rhythm. No murmurs, gallops, or rubs. Respiratory:  Comfortable breathing with no accessory muscle use. Coarse breath sounds anteriorly. GI:  Abdomen nondistended, soft, obese, and nontender. Normal active bowel sounds. Fluid wave noted and some pitting edema noted when stethoscope placed on abd. Rectal:  Performed with no stool noted in rectal vault. Very trace amount of stool noted on gloved finger, with no blood noted. Musculoskeletal:  Pitting edema of the lower legs. Neurological:  Gross memory appears intact. Patient is alert and oriented. Psychiatric:  Mood appears appropriate with judgement intact. Lymphatic:  No cervical or supraclavicular adenopathy. Laboratory:    Recent Labs      01/09/17   0440  01/08/17   0520  01/07/17   0400  01/06/17   1355   WBC   --   8.7   --   8.6   HGB   --   11.2*   --   12.3*   HCT   --   35.9*   --   39.1*   PLT   --   227   --   261   MCV   --   80.7   --   81.1   NA   --   137  141  140   K   --   4.0  4.0  3.9   CL   --   99  101  100   CO2   --   31  32  33*   BUN   --   41*  34*  31*   CREA   --   1.87*  1.64*  1.41   CA   --   7.9*  8.4  8.2*   MG  2.4  2.1  2.2  2.3   GLU   --   155*  170*  185*      Ref.  Range 1/3/2017 11:16 1/6/2017 13:55 1/7/2017 04:00 1/8/2017 05:20   BNP Latest Units: pg/mL 371   492     CT THORAX WITHOUT CONTRAST 3 Saul 2017   HISTORY: shortness of breath chronic, 45 years Male family history of aortic  aneurysm, history of CHF   COMPARISON: CT chest August 15, 2014   TECHNIQUE: Noncontrast axial helical images from the thoracic inlet through  diaphragm were obtained. Radiation dose reduction techniques were used for this  study: Our CT scanners use one or all of the following: Automated exposure  control, adjustment of the mA and/or kVp according to patient's size, iterative  reconstruction.   FINDINGS:  Partially visualized thyroid unremarkable. No evidence of significant  mediastinal, or axillary lymphadenopathy. Mild calcific atherosclerosis of a  normal caliber aortic arch and descending aorta. Low lung volumes. Mild  dependent subsegmental atelectasis bilateral lung bases. No evidence of pleural  effusion or air space consolidation. No evidence of new pulmonary nodule. Visualized proximal airways unremarkable.    Visualized upper abdominal viscera including the adrenal glands are otherwise  unremarkable. Visualized osseous structures unremarkable.   IMPRESSION  IMPRESSION:   No acute pathology identified. Low lung volumes. KUB supine views 3 Saul 2017   History: constipation, 45 years Male acute moderate constipation and abdominal  pain x 1 week   Comparison: Chest radiograph earlier today   Findings: No free air is evident. The bowel gas pattern is nonspecific and  there is no evidence of ileus or obstruction. No suspicious renal or ureteral  calculi are evident. Visualized osseous structures unremarkable.   IMPRESSION  Impression: No acute pathology identified.     KUB 9 Jan 2017  FINDINGS: The bowel gas pattern is nonspecific. No evidence of free air or  obstruction. The lung bases are clear.   IMPRESSION  IMPRESSION: No findings to suggest free air or obstruction. Assessment:     Active Problems:     Morbid obesity ([address] (7/2/2013)      CKD (chronic kidney disease) stage 3, GFR 30-59 ml/min (8/13/2014)      Cardiomyopathy ([address] (10/21/2015)      Constipation (1/8/2017)    44 yo male with PMH of CAD, nonischemic dilated cardiomyopathy with EF 10-15%, CKD stage III, CHF, s/p Biotronik ICD, COPD, DM 2, GERD, gout, HTN, sleep apnea - uses CPAP, Atrial flutter s/p ablation, who was seen in consultation at the request of Dr. Hipolito Arizmendi for constipation, failed mag citrate, multiple enema. He was admitted for uncontrolled cardiomyopathy and decompensated CHF, and has been treated with IV Lasix. He has had continued constipation despite taking Miralax daily and completing Golytely prior to admission. He had 2 BMs on 6 Jan 2017, but only after an enema. KUB on 3 Saul 2017 and 9 Jan 2017 is not significant for obstruction or large amount of stool present. Plan:     -Miralax increase to 17 gms po BID. -Colace 100 mg po BID. -Consider CT colonography to rule out other etiologies given his increased risk with sedation, once Cr improves. -Follow response. Patient is seen and examined in collaboration with Dr. Vincent Mcmillan. Assessment and plan as per Dr. John Hamilton. Sarah Anderson PA-C  [facility]    Patient seen and examined. Agree with above. Patient lethargic on my rounds and did not participate in discussion. Discussed assessment and plans with daughter at bedside. She expresses concern for invasive procedures given his severe heart disease. Possible evaluation with Sigmoidoscopy or limited Colonoscopy with limited bowel prep and sedation was discussed but will be deferred if he responds to conservative management. Alan Hamilton MD

## 2017-01-09 NOTE — PROGRESS NOTES
Bedside and Verbal shift change report given to Joe Yi RN (oncoming nurse) by self Gabriella Martínez nurse). Report included the following information SBAR, Kardex, MAR and Recent Results.

## 2017-01-09 NOTE — PROGRESS NOTES
[facility] CARDIOLOGY PROGRESS NOTE           1/9/2017 8:31 AM    Admit Date: 1/6/2017    Admit Diagnosis: sob;Cardiomyopathy ([address]      Subjective:   No chest pain or shortness of breath, continues to complain of constipation    Interval History: (History of pertinent interval events obtained from nursing staff)  No significant results after multiple enemas this weekend    ROS:  GEN:  No fever or chills  Cardiovascular:  As noted above  Pulmonary:  As noted above  Neuro:  No new focal motor or sensory loss      Objective:     Vitals:    01/08/17 2123 01/09/17 0044 01/09/17 0558 01/09/17 0752   BP: (!) 127/96 119/72 119/70 (!) 82/55   Pulse: 87 78 79 77   Resp: 19 18 18 18   Temp: 97.8 °F (36.6 °C) 96.7 °F (35.9 °C) 97 °F (36.1 °C) 97.5 °F (36.4 °C)   SpO2: 99% 97% 93% 94%   Weight:   (!) 176.7 kg (389 lb 9.6 oz)    Height:           Physical Exam:  General- morbidly obese, NAD  Neck- supple  CV- regular rate and rhythm, very distant S1, S2  Lung- clear bilaterally but distant  Abd- soft, nontender, nondistended  Ext- + edema  Skin- warm and dry    Current Facility-Administered Medications   Medication Dose Route Frequency    furosemide (LASIX) injection 80 mg  80 mg IntraVENous BID    senna-docusate (PERICOLACE) 8.6-50 mg per tablet 3 Tab  3 Tab Oral Q12H    polyethylene glycol (MIRALAX) packet 17 g  17 g Oral DAILY    tetrahydrozoline (OPTI-CLEAR) 0.05 % ophthalmic drops 1 Drop  1 Drop Both Eyes TID    carvedilol (COREG) tablet 25 mg  25 mg Oral BID WITH MEALS    oxyCODONE IR (ROXICODONE) tablet 15 mg  15 mg Oral Q4H PRN    gabapentin (NEURONTIN) capsule 600 mg  600 mg Oral BID    pravastatin (PRAVACHOL) tablet 80 mg  80 mg Oral QHS    hydrALAZINE (APRESOLINE) tablet 25 mg  25 mg Oral TID    colchicine tablet 0.6 mg  0.6 mg Oral DAILY    apixaban (ELIQUIS) tablet 5 mg  5 mg Oral Q12H    famotidine (PEPCID) tablet 20 mg  20 mg Oral QPM    allopurinol (ZYLOPRIM) tablet 100 mg  100 mg Oral DAILY    insulin glargine (LANTUS) injection 50 Units  50 Units SubCUTAneous QHS    polyethylene glycol (MIRALAX) packet 17 g  17 g Oral DAILY PRN    potassium chloride (K-DUR, KLOR-CON) tablet 10 mEq  10 mEq Oral BID    spironolactone (ALDACTONE) tablet 25 mg  25 mg Oral DAILY    isosorbide dinitrate (ISORDIL) tablet 20 mg  20 mg Oral TID    nitroglycerin (NITROSTAT) tablet 0.4 mg  0.4 mg SubLINGual Q5MIN PRN    sodium chloride (NS) flush 5-10 mL  5-10 mL IntraVENous Q8H    sodium chloride (NS) flush 5-10 mL  5-10 mL IntraVENous PRN    insulin lispro (HUMALOG) injection   SubCUTAneous AC&HS     Data Review:   Recent Results (from the past 24 hour(s))   GLUCOSE, POC    Collection Time: 01/08/17 12:20 PM   Result Value Ref Range    Glucose (POC) 240 (H) 65 - 100 mg/dL   GLUCOSE, POC    Collection Time: 01/08/17  5:03 PM   Result Value Ref Range    Glucose (POC) 258 (H) 65 - 100 mg/dL   GLUCOSE, POC    Collection Time: 01/08/17  9:21 PM   Result Value Ref Range    Glucose (POC) 212 (H) 65 - 100 mg/dL   MAGNESIUM    Collection Time: 01/09/17  4:40 AM   Result Value Ref Range    Magnesium 2.4 1.8 - 2.4 mg/dL   GLUCOSE, POC    Collection Time: 01/09/17  6:23 AM   Result Value Ref Range    Glucose (POC) 171 (H) 65 - 100 mg/dL       EKG:  (EKG has been independently visualized by me with interpretation below)  Assessment:     Active Problems: Morbid obesity ([address] (7/2/2013)      CKD (chronic kidney disease) stage 3, GFR 30-59 ml/min (8/13/2014)      Cardiomyopathy ([address] (10/21/2015)      Constipation (1/8/2017)      Plan:   1. NICM, uncontrolled: Pt body habitus makes assessment of his volume status very difficult. He has had significant weight gain since November and reports worsening SORENSON, orthonpea, PND, leg swelling consistent with decompensated CHF. Poor diuresis yesterday, elevated BNP, however there is concern we are not getting an accurate assessment of his UOP.   --basic metabolic pending this AM  --continue daily BMP  --may adjust lasix dose based on AM labs      3. Constipation: Pt had mag citrate prior to admission with no results, pericolace BID, miralax and enemas x 2 this admission with no significant results, will check KUB today and consult GI for management.      4. ICD: cont device follow up, functioning normally      5. CKD: renal function slightly worse yesterday, BMP this AM pending, cont to monitor, cont current lasix dose at this time    Angelika Roque MD  Cardiology/Electrophysiology

## 2017-01-09 NOTE — PROGRESS NOTES
Care Management Interventions  PCP Verified by CM: Yes  Transition of Care Consult (CM Consult): [address]: Yes  Current Support Network: Relative's Home  Confirm Follow Up Transport: Family  Plan discussed with Pt/Family/Caregiver: Yes  Freedom of Choice Offered: Yes  Discharge Location  Discharge Placement: Home with home health    [facility] screening. Adm with CHF. Alert and oriented in all spheres. Ambulates occasionally with a cane. Independent with ADLs. [address] - home oxygen, nebulizer, CPAP   Has a PCP and Rx plan  See past SW note dated 10/18/16 for details of pt's social/housing situation. Currently, he and his daughter, Yaakov Gutierrez, and son Cristian Powell, are living b/w the homes of his son Samantha Smith (who lives in [place]) and zhang [facility] Adhikari (who lives in [place]). Manuel Renteria [phone], lives at [place] [phone] [phone]. Pt can be reached at [phone]  Pt was living in a rental house most recently. Stated that it had mold. He contacted the landlord who reportedly did nothing to resolve the problem. Pt held back the rent and was evicted. Pt was given contact #s for [facility] at [facility] and [employer] workers who can assist in facilitating linkage with rehousing resources. Given CHF , pt would benefit from [facility] nursing for meds and disease education, eval for telemontioring at [facility]. He is agreeable to same. [facility] following.

## 2017-01-10 ENCOUNTER — HOME HEALTH ADMISSION (OUTPATIENT)
Dept: HOME HEALTH SERVICES | Facility: HOME HEALTH | Age: 52
End: 2017-01-10
Payer: MEDICARE

## 2017-01-10 LAB
ANION GAP BLD CALC-SCNC: 10 MMOL/L (ref 7–16)
APPEARANCE UR: CLEAR
ATRIAL RATE: 78 BPM
BASOPHILS # BLD AUTO: 0 K/UL (ref 0–0.2)
BASOPHILS # BLD: 0 % (ref 0–2)
BILIRUB UR QL: NEGATIVE
BNP SERPL-MCNC: 611 PG/ML
BUN SERPL-MCNC: 51 MG/DL (ref 6–23)
CALCIUM SERPL-MCNC: 7.8 MG/DL (ref 8.3–10.4)
CALCULATED P AXIS, ECG09: 60 DEGREES
CALCULATED R AXIS, ECG10: -166 DEGREES
CALCULATED T AXIS, ECG11: 60 DEGREES
CHLORIDE SERPL-SCNC: 100 MMOL/L (ref 98–107)
CO2 SERPL-SCNC: 28 MMOL/L (ref 21–32)
COLOR UR: YELLOW
CREAT SERPL-MCNC: 1.97 MG/DL (ref 0.8–1.5)
DIAGNOSIS, 93000: NORMAL
DIASTOLIC BP, ECG02: NORMAL MMHG
DIFFERENTIAL METHOD BLD: ABNORMAL
EOSINOPHIL # BLD: 0.2 K/UL (ref 0–0.8)
EOSINOPHIL NFR BLD: 3 % (ref 0.5–7.8)
ERYTHROCYTE [DISTWIDTH] IN BLOOD BY AUTOMATED COUNT: 18.7 % (ref 11.9–14.6)
GLUCOSE BLD STRIP.AUTO-MCNC: 132 MG/DL (ref 65–100)
GLUCOSE BLD STRIP.AUTO-MCNC: 143 MG/DL (ref 65–100)
GLUCOSE BLD STRIP.AUTO-MCNC: 148 MG/DL (ref 65–100)
GLUCOSE BLD STRIP.AUTO-MCNC: 171 MG/DL (ref 65–100)
GLUCOSE SERPL-MCNC: 165 MG/DL (ref 65–100)
GLUCOSE UR STRIP.AUTO-MCNC: NEGATIVE MG/DL
HCT VFR BLD AUTO: 35.1 % (ref 41.1–50.3)
HGB BLD-MCNC: 11.1 G/DL (ref 13.6–17.2)
HGB UR QL STRIP: NEGATIVE
IMM GRANULOCYTES # BLD: 0 K/UL (ref 0–0.5)
IMM GRANULOCYTES NFR BLD AUTO: 0.1 % (ref 0–5)
KETONES UR QL STRIP.AUTO: NEGATIVE MG/DL
LEUKOCYTE ESTERASE UR QL STRIP.AUTO: NEGATIVE
LYMPHOCYTES # BLD AUTO: 35 % (ref 13–44)
LYMPHOCYTES # BLD: 2.7 K/UL (ref 0.5–4.6)
MAGNESIUM SERPL-MCNC: 2.5 MG/DL (ref 1.8–2.4)
MCH RBC QN AUTO: 25.4 PG (ref 26.1–32.9)
MCHC RBC AUTO-ENTMCNC: 31.6 G/DL (ref 31.4–35)
MCV RBC AUTO: 80.3 FL (ref 79.6–97.8)
MONOCYTES # BLD: 0.7 K/UL (ref 0.1–1.3)
MONOCYTES NFR BLD AUTO: 9 % (ref 4–12)
NEUTS SEG # BLD: 4.1 K/UL (ref 1.7–8.2)
NEUTS SEG NFR BLD AUTO: 53 % (ref 43–78)
NITRITE UR QL STRIP.AUTO: NEGATIVE
P-R INTERVAL, ECG05: 230 MS
PH UR STRIP: 5 [PH] (ref 5–9)
PLATELET # BLD AUTO: 226 K/UL (ref 150–450)
PMV BLD AUTO: 9.7 FL (ref 10.8–14.1)
POTASSIUM SERPL-SCNC: 4.1 MMOL/L (ref 3.5–5.1)
PROT UR STRIP-MCNC: ABNORMAL MG/DL
Q-T INTERVAL, ECG07: 406 MS
QRS DURATION, ECG06: 94 MS
QTC CALCULATION (BEZET), ECG08: 462 MS
RBC # BLD AUTO: 4.37 M/UL (ref 4.23–5.67)
SODIUM SERPL-SCNC: 138 MMOL/L (ref 136–145)
SODIUM UR-SCNC: 9 MMOL/L
SP GR UR REFRACTOMETRY: 1.01 (ref 1–1.02)
SYSTOLIC BP, ECG01: NORMAL MMHG
UROBILINOGEN UR QL STRIP.AUTO: 1 EU/DL (ref 0.2–1)
VENTRICULAR RATE, ECG03: 78 BPM
WBC # BLD AUTO: 7.7 K/UL (ref 4.3–11.1)

## 2017-01-10 PROCEDURE — 74011250637 HC RX REV CODE- 250/637: Performed by: PHYSICIAN ASSISTANT

## 2017-01-10 PROCEDURE — 74011250637 HC RX REV CODE- 250/637: Performed by: NURSE PRACTITIONER

## 2017-01-10 PROCEDURE — 74011250637 HC RX REV CODE- 250/637: Performed by: INTERNAL MEDICINE

## 2017-01-10 PROCEDURE — 83735 ASSAY OF MAGNESIUM: CPT | Performed by: NURSE PRACTITIONER

## 2017-01-10 PROCEDURE — 84300 ASSAY OF URINE SODIUM: CPT | Performed by: INTERNAL MEDICINE

## 2017-01-10 PROCEDURE — 36415 COLL VENOUS BLD VENIPUNCTURE: CPT | Performed by: NURSE PRACTITIONER

## 2017-01-10 PROCEDURE — 76937 US GUIDE VASCULAR ACCESS: CPT

## 2017-01-10 PROCEDURE — 81003 URINALYSIS AUTO W/O SCOPE: CPT | Performed by: INTERNAL MEDICINE

## 2017-01-10 PROCEDURE — 93005 ELECTROCARDIOGRAM TRACING: CPT | Performed by: INTERNAL MEDICINE

## 2017-01-10 PROCEDURE — 80048 BASIC METABOLIC PNL TOTAL CA: CPT | Performed by: NURSE PRACTITIONER

## 2017-01-10 PROCEDURE — 65660000000 HC RM CCU STEPDOWN

## 2017-01-10 PROCEDURE — 82962 GLUCOSE BLOOD TEST: CPT

## 2017-01-10 PROCEDURE — 85025 COMPLETE CBC W/AUTO DIFF WBC: CPT | Performed by: NURSE PRACTITIONER

## 2017-01-10 PROCEDURE — 74011636637 HC RX REV CODE- 636/637: Performed by: NURSE PRACTITIONER

## 2017-01-10 PROCEDURE — 83880 ASSAY OF NATRIURETIC PEPTIDE: CPT | Performed by: INTERNAL MEDICINE

## 2017-01-10 RX ORDER — FACIAL-BODY WIPES
10 EACH TOPICAL DAILY
Status: DISCONTINUED | OUTPATIENT
Start: 2017-01-10 | End: 2017-01-13 | Stop reason: HOSPADM

## 2017-01-10 RX ADMIN — COLCHICINE 0.6 MG: 0.6 TABLET, FILM COATED ORAL at 08:42

## 2017-01-10 RX ADMIN — DOCUSATE SODIUM 100 MG: 100 CAPSULE, LIQUID FILLED ORAL at 08:41

## 2017-01-10 RX ADMIN — OXYCODONE HYDROCHLORIDE 15 MG: 5 TABLET ORAL at 20:46

## 2017-01-10 RX ADMIN — INSULIN GLARGINE 50 UNITS: 100 INJECTION, SOLUTION SUBCUTANEOUS at 22:15

## 2017-01-10 RX ADMIN — Medication 10 ML: at 14:49

## 2017-01-10 RX ADMIN — HYDRALAZINE HYDROCHLORIDE 25 MG: 25 TABLET, FILM COATED ORAL at 05:55

## 2017-01-10 RX ADMIN — APIXABAN 5 MG: 5 TABLET, FILM COATED ORAL at 20:46

## 2017-01-10 RX ADMIN — APIXABAN 5 MG: 5 TABLET, FILM COATED ORAL at 08:42

## 2017-01-10 RX ADMIN — FAMOTIDINE 20 MG: 20 TABLET ORAL at 17:07

## 2017-01-10 RX ADMIN — POTASSIUM CHLORIDE 10 MEQ: 10 TABLET, EXTENDED RELEASE ORAL at 08:42

## 2017-01-10 RX ADMIN — DOCUSATE SODIUM 100 MG: 100 CAPSULE, LIQUID FILLED ORAL at 17:07

## 2017-01-10 RX ADMIN — TETRAHYDROZOLINE HYDROCHLORIDE 1 DROP: 0.5 SOLUTION/ DROPS OPHTHALMIC at 16:44

## 2017-01-10 RX ADMIN — HYDRALAZINE HYDROCHLORIDE 25 MG: 25 TABLET, FILM COATED ORAL at 22:15

## 2017-01-10 RX ADMIN — Medication 10 ML: at 22:15

## 2017-01-10 RX ADMIN — POTASSIUM CHLORIDE 10 MEQ: 10 TABLET, EXTENDED RELEASE ORAL at 17:07

## 2017-01-10 RX ADMIN — ALLOPURINOL 100 MG: 100 TABLET ORAL at 08:42

## 2017-01-10 RX ADMIN — TETRAHYDROZOLINE HYDROCHLORIDE 1 DROP: 0.5 SOLUTION/ DROPS OPHTHALMIC at 22:15

## 2017-01-10 RX ADMIN — ISOSORBIDE DINITRATE 20 MG: 20 TABLET ORAL at 14:49

## 2017-01-10 RX ADMIN — ISOSORBIDE DINITRATE 20 MG: 20 TABLET ORAL at 22:15

## 2017-01-10 RX ADMIN — GABAPENTIN 600 MG: 300 CAPSULE ORAL at 17:07

## 2017-01-10 RX ADMIN — BISACODYL 10 MG: 10 SUPPOSITORY RECTAL at 17:07

## 2017-01-10 RX ADMIN — POLYETHYLENE GLYCOL 3350 17 G: 17 POWDER, FOR SOLUTION ORAL at 17:07

## 2017-01-10 RX ADMIN — ISOSORBIDE DINITRATE 20 MG: 20 TABLET ORAL at 05:54

## 2017-01-10 RX ADMIN — HYDRALAZINE HYDROCHLORIDE 25 MG: 25 TABLET, FILM COATED ORAL at 14:50

## 2017-01-10 RX ADMIN — CARVEDILOL 25 MG: 25 TABLET, FILM COATED ORAL at 08:42

## 2017-01-10 RX ADMIN — INSULIN LISPRO 2 UNITS: 100 INJECTION, SOLUTION INTRAVENOUS; SUBCUTANEOUS at 16:30

## 2017-01-10 RX ADMIN — PRAVASTATIN SODIUM 80 MG: 80 TABLET ORAL at 22:15

## 2017-01-10 RX ADMIN — GABAPENTIN 600 MG: 300 CAPSULE ORAL at 08:42

## 2017-01-10 RX ADMIN — SPIRONOLACTONE 25 MG: 25 TABLET, FILM COATED ORAL at 08:42

## 2017-01-10 RX ADMIN — TETRAHYDROZOLINE HYDROCHLORIDE 1 DROP: 0.5 SOLUTION/ DROPS OPHTHALMIC at 08:42

## 2017-01-10 RX ADMIN — POLYETHYLENE GLYCOL 3350 17 G: 17 POWDER, FOR SOLUTION ORAL at 08:41

## 2017-01-10 RX ADMIN — CARVEDILOL 25 MG: 25 TABLET, FILM COATED ORAL at 17:07

## 2017-01-10 NOTE — ADT AUTH CERT NOTES
Cari Bhardwaj MD   Cardiology  H & P     []Hide copied text     FATEMEH Gutiérrez  [address] [facility], [address]  PHONE: [phone]  1965      SUBJECTIVE:   Ingrid Sims is a 45 y.o. male seen for a follow up visit regarding the following:          Chief Complaint   Patient presents with    Follow-up   This note will function as and H and P for his admission today     HPI:   Ingrid Sims is a very pleasant 46yo with KELSIE, morbid obesity, nonischemic dilated cardiomyopathy with EF 10%, uncontrolled DM, CKD stage III, s/p [employer] ICD who presents today with multiple complaints. Pt main complaint is he has not had a bowel movement in a very long time. He complains he has gained weight and also thinks he has excess fluid in his system. He feel short of breath, has SORENSON that is chronic but worsening, orthopnea, and chronic OSMAN that he feels is worsening. He was recently seen in the ER, had labs completed and was not felt to be in acutely decompensated CHF and was discharged with oral stool softener for constipation. Pt presents today and reports he needs to be admitted to the hospital. Pt has constipation that is worsening, no aggravating or alleviating factors. Pt has severe NICM with last EF of 10-15% and reports worsening SOB/SORENSON, weight gain, leg swelling, orthopnea, no alleviating factors, had his demadex increased when he presented to the ER to twice a day dosing.      Cardiac PMH: (Old records have been reviewed and summarized below)  Aflutter ablation 10/21/16     Past Medical History, Past Surgical History, Family history, Social History, and Medications were all reviewed with the patient today and updated as necessary.             Outpatient Prescriptions Marked as Taking for the 17 encounter (Office Visit) with Cari Bhardwaj MD   Medication Sig Dispense Refill    torsemide (DEMADEX) 100 mg tablet Take 1 Tab by mouth two (2) times a day for 5 days.  10 Tab 0    apixaban (ELIQUIS) 5 mg tablet Take 1 Tab by mouth every twelve (12) hours. 60 Tab 0    gabapentin (NEURONTIN) 600 mg tablet Take by mouth two (2) times a day.        oxyCODONE IR (OXY-IR) 15 mg immediate release tablet Take 15 mg by mouth every four (4) hours as needed for Pain.        carvedilol (COREG) 25 mg tablet Take 1 Tab by mouth two (2) times daily (with meals). 60 Tab 3    hydrALAZINE (APRESOLINE) 25 mg tablet Take 1 Tab by mouth three (3) times daily. 90 Tab 3    digoxin (LANOXIN) 0.125 mg tablet Take 1 Tab by mouth daily. 30 Tab 3    insulin glargine (LANTUS) 100 unit/mL injection 50 Units by SubCUTAneous route nightly. 1 Vial 0    insulin lispro (HUMALOG) 100 unit/mL injection 15 Units by SubCUTAneous route three (3) times daily (with meals). 1 Vial 0    docusate sodium (COLACE) 100 mg capsule Take 1 Cap by mouth two (2) times a day for 90 days. 60 Cap 0    isosorbide dinitrate (ISORDIL) 20 mg tablet Take 1 Tab by mouth three (3) times daily. 90 Tab 3    ranitidine (ZANTAC) 150 mg tablet Take 150 mg by mouth nightly.        spironolactone (ALDACTONE) 25 mg tablet Take 1 Tab by mouth daily. 30 Tab 11    pravastatin (PRAVACHOL) 80 mg tablet Take 1 Tab by mouth nightly. 30 Tab 0    potassium chloride (K-DUR, KLOR-CON) 10 mEq tablet Take 1 Tab by mouth two (2) times a day. 60 Tab 5    polyethylene glycol (MIRALAX) 17 gram packet Take 1 Packet by mouth daily as needed (constipation). 10 Packet 5    cpap machine kit 10 cm qhs        OXYGEN-AIR DELIVERY SYSTEMS 2 lpm qhs        allopurinol (ZYLOPRIM) 100 mg tablet Take 100 mg by mouth daily.        glucose blood VI test strips (ASCENSIA AUTODISC VI, ONE TOUCH ULTRA TEST VI) strip Test strips for 30 day supply 120 strip 0    nitroglycerin (NITROSTAT) 0.4 mg SL tablet 1 Tab by SubLINGual route every five (5) minutes as needed for Chest Pain.  4 Bottle 6    colchicine 0.6 mg tablet Take 0.6 mg by mouth every morning.         Allergies   Allergen Reactions    Dilaudid [Hydromorphone] Other (comments)       Hotflashes, patient states he's not allergic    Iodinated Contrast Media - Oral And Iv Dye Other (comments)       \"shuts my kidneys down\"    Penicillins Unknown (comments)       Pt states he is not allergic his mother just wouldn't allow him take it           Patient Active Problem List     Diagnosis    Morbid obesity with BMI of 60.0-69.9, adult (HCC)    Hypoxemia    Hypersomnia    Obesity hypoventilation syndrome (HCC)    Atrial flutter (HCC)    Acute on chronic systolic (congestive) heart failure (HCC)    Nausea & vomiting    Lactic acidosis    Hypertension    Chest pain    AICD (automatic cardioverter/defibrillator) present    Congestive heart failure (CHF) (HCC)    Cardiomyopathy (HCC)    Abdominal fluid collection       Perisplenic and splenic, likely related to pancreatitis    Pleural effusion    CKD (chronic kidney disease) stage 3, GFR 30-59 ml/min    Chronic pancreatitis (HCC)    Morbid obesity (HCC)    Nonischemic dilated cardiomyopathy (HCC)       LVEF 10-15% on ECHO from 12/17/10    Dyslipidemia    Diabetes mellitus type II, uncontrolled ([address]    Noncompliance with medication regimen    Chronic back pain    Obstructive sleep apnea            Past Medical History   Diagnosis Date    Acute systolic heart failure ([address] May, 2009       Also had transient acute renal failure secondary to poor perfusion.     AICD (automatic cardioverter/defibrillator) present 10/21/2015    Atypical chest pain 4/23/2010    Bronchitis      CAD (coronary artery disease)      Cardiomyopathy      Chest pain 10/21/2015    Chronic kidney disease         renal insufficiency    Chronic pain         back    Congestive heart failure (CHF) ([address] 10/21/2015    COPD      Diabetes (HCC)         type 2 insulin reliant- BS average- 160's-180's    Diabetes mellitus type II, uncontrolled ([address] 7/2/2013    GERD (gastroesophageal reflux disease)      Gout      Heart failure ([facility]         cardiomyopathy with ef 10-20%    Hypertension      Hyponatremia 12/20/2010    Ill-defined condition         gout, neuropathy, sciatica    Morbid obesity ([facility]      Nausea & vomiting 11/30/2015    Neuropathy      Obstructive sleep apnea 2/15/2010    Other unknown and unspecified cause of morbidity or mortality         Gout    Severe sepsis (HCC)      Unspecified sleep apnea         cpap             Past Surgical History   Procedure Laterality Date    Hx pacemaker           may 2010    Hx heart catheterization   Sandy 10, 2008       Severe multivessel disease with severe LV dysfunction    Pr chest surgery procedure unlisted           thorocentesis            Family History   Problem Relation Age of Onset    Heart Disease Father      Stroke Father      Diabetes Sister      Stroke Sister      Diabetes Sister      Heart Disease Other               Social History   Substance Use Topics    Smoking status: Former Smoker       Packs/day: 0.25       Years: 1.00       Quit date: 7/12/1984    Smokeless tobacco: Never Used         Comment: pt states that he only tried smoking as a kid     Alcohol use No         PHYSICAL EXAM:          Visit Vitals    BP (!) 140/100 (BP 1 Location: Left arm, BP Patient Position: Sitting)    Pulse (!) 108    Ht 5' 6\" (1.676 m)    Wt (!) 384 lb (174.2 kg)    BMI 61.98 kg/m2             Wt Readings from Last 5 Encounters:   01/06/17 (!) 384 lb (174.2 kg)   01/03/17 (!) 372 lb (168.7 kg)   11/28/16 (!) 372 lb (168.7 kg)   11/23/16 (!) 368 lb (166.9 kg)   11/08/16 (!) 356 lb (161.5 kg)             BP Readings from Last 5 Encounters:   01/06/17 (!) 140/100   01/03/17 178/69   11/28/16 (!) 158/100   11/23/16 122/78   11/08/16 130/90         General appearance: Alert, morbidly obese, mild respiratory distress  ENT: Hearing grossly normal bilaterally, no oral lesions, poor dentition  CV: RRR, distant S1, S2, no M/R/G, PMI not palpable, no JVD, normal distal pulses, 1+ OSMAN  Pulm: Clear to auscultation but very distant BS, pulmonary exam limited by obesity  GI: Soft, nontender, nondistended, normal bowel sounds  Neuro: Alert and oriented  Psych: normal affect and appropriate mood  MSK: no muscle tenderness     Medical problems and test results were reviewed with the patient today.            Lab Results   Component Value Date/Time     Potassium 3.9 01/03/2017 11:16 AM     POTASSIUM 5.22 10/21/2016 03:15 PM            Lab Results   Component Value Date/Time     Creatinine 1.62 01/03/2017 11:16 AM            Lab Results   Component Value Date/Time     HGB 11.9 01/03/2017 11:16 AM            Lab Results   Component Value Date/Time     INR 1.0 09/17/2015 12:45 PM     INR 1.0 09/02/2015 10:42 AM     INR 1.1 08/18/2014 08:45 AM     Prothrombin time 10.2 09/17/2015 12:45 PM     Prothrombin time 9.9 09/02/2015 10:42 AM     Prothrombin time 11.7 08/18/2014 08:45 AM         EKG: (EKG has been independently visualized by me with interpretation below)  Sinus Rhythm -First degree A-V block   Sammie = 228  -Prominent R(V1) and right axis -consider right ventricular hypertrophy Low voltage -possible pulmonary disease.   -Old anterior infarct.   - Nonspecific T-abnormality.          ICD-10-CM ICD-9-CM     1. Atrial flutter, unspecified type (HCC) I48.92 427.32 AMB POC EKG ROUTINE W/ 12 LEADS, INTER & REP         ASSESSMENT and PLAN  1. Aflutter: Pt is s/p ablation and is doing well today without recurrence.     2. NICM, uncontrolled: Pt body habitus makes assessment of his volume status very difficult. He has had significant weight gain since November and reports worsening SORENSON, orthonpea, PND, leg swelling consistent with decompensated CHF. Pt was seen in the ER several days ago and had his diuretic increased with reportedly no results.   --admit to telemetry floor, daily weights, strict I and Os  --will need IV diuresis, monitoring RF and electrolytes  --cont OMT     3. Constipation, uncontrolled: feel this is a significant contributor to his overall feeling poorly and symptoms. Will need enema after admission.     4. ICD: cont device follow up, functioning normally     5. CKD: will need daily monitoring while getting diuresed     Patient has been instructed and agrees to call our office with any issues or other concerns related to their cardiac condition(s) and/or complaint(s).     Follow-up Disposition: Not on File           Babatunde Holloway MD, MS  Cardiology/Electrophysiology  1/6/2017  10:22 AM      Electronically signed by Vianey Ragsdale MD at 01/06/17 1035                                         Note shared with patient   Note Details   Author Vianey Ragsdale MD File Time 01/06/17 1035   Author Type Physician Status Signed   Last  Vianey Ragsdale MD Specialty Cardiology

## 2017-01-10 NOTE — PROGRESS NOTES
Progress Note    Patient: Ingrid Sims MRN: [id number]  SSN: [id number]    YOB: 1965  Age: 45 y.o. Sex: male      Admit Date: 1/6/2017    LOS: 4 days     Subjective:     Presents resting in bed with his eyes closed. Responds to questions appropriately. Edema persists in lower extremities. Denies CP, some SOB. No nausea, vomiting. Objective:     Vitals:    01/10/17 0420 01/10/17 0554 01/10/17 0819 01/10/17 1230   BP: 101/71 116/69 93/60 122/75   Pulse: 71 74 73 76   Resp: 18  18 19   Temp: 96.3 °F (35.7 °C)  97.5 °F (36.4 °C) 98 °F (36.7 °C)   SpO2: 99%  96% 98%   Weight: (!) 176.9 kg (390 lb)      Height:            Intake and Output:  Current Shift:    Last three shifts: 01/08 1901 - 01/10 0700  In: 480 [P.O.:480]  Out: 450 [Urine:450]    Physical Exam:   GEN: Morbidly obese -American male who presents resting in bed  HEENT: EOMI, Mucosa moist  CARDIO: Heart sounds difficult to appreciate. RRR  PULM: CTA  ABD: Protuberant, distended. BS minimal x 4. No tenderness to palpation  MSK: Edema in bilateral lower extremities to the knee    Lab/Data Review:  Lab results reviewed. For significant abnormal values and values requiring intervention, see assessment and plan. Assessment:     Active Problems: Morbid obesity ([address] (7/2/2013)      CKD (chronic kidney disease) stage 3, GFR 30-59 ml/min (8/13/2014)      Cardiomyopathy ([address] (10/21/2015)      Constipation (1/8/2017)        Plan:     - BUN currently 51. Averaged 30s during prior hospitalizations. Creatinine currently 1.97. Per prior hospital records, averaged 1.30-1. 40. GFR 46.  - Likely unable to dialyze given poor EF (10-15%) and multiple comorbidities. - Hold lasix as tolerated. Monitor I&O, weights. Signed By: Scot Quintana     January 10, 2017            *ATTENTION:  This note has been created by a medical student for educational purposes only.   Please do not refer to the content of this note for clinical decision-making, billing, or other purposes. Please see attending physicians note to obtain clinical information on this patient. *

## 2017-01-10 NOTE — PROGRESS NOTES
[facility] CARDIOLOGY PROGRESS NOTE           1/10/2017 7:16 AM    Admit Date: 1/6/2017    Admit Diagnosis: sob;Cardiomyopathy ([facility]      Subjective:   No complaints this AM, no chest pain or shortness of breath    Interval History: (History of pertinent interval events obtained from nursing staff)  Creatinine continues to worsen    ROS:  GEN:  No fever or chills  Cardiovascular:  As noted above  Pulmonary:  As noted above  Neuro:  No new focal motor or sensory loss      Objective:     Vitals:    01/09/17 2152 01/10/17 0038 01/10/17 0420 01/10/17 0554   BP: 121/80 141/73 101/71 116/69   Pulse: 77 80 71 74   Resp:  18 18    Temp:  97.7 °F (36.5 °C) 96.3 °F (35.7 °C)    SpO2:  92% 99%    Weight:   (!) 176.9 kg (390 lb)    Height:           Physical Exam:  General- morbidly obese, NAD  Neck- supple  CV- regular rate and rhythm, very distant S1, S2  Lung- clear bilaterally but distant  Abd- soft, nontender, nondistended  Ext- + edema  Skin- warm and dry    Current Facility-Administered Medications   Medication Dose Route Frequency    polyethylene glycol (MIRALAX) packet 17 g  17 g Oral BID    docusate sodium (COLACE) capsule 100 mg  100 mg Oral BID    tetrahydrozoline (OPTI-CLEAR) 0.05 % ophthalmic drops 1 Drop  1 Drop Both Eyes TID    carvedilol (COREG) tablet 25 mg  25 mg Oral BID WITH MEALS    oxyCODONE IR (ROXICODONE) tablet 15 mg  15 mg Oral Q4H PRN    gabapentin (NEURONTIN) capsule 600 mg  600 mg Oral BID    pravastatin (PRAVACHOL) tablet 80 mg  80 mg Oral QHS    hydrALAZINE (APRESOLINE) tablet 25 mg  25 mg Oral TID    colchicine tablet 0.6 mg  0.6 mg Oral DAILY    apixaban (ELIQUIS) tablet 5 mg  5 mg Oral Q12H    famotidine (PEPCID) tablet 20 mg  20 mg Oral QPM    allopurinol (ZYLOPRIM) tablet 100 mg  100 mg Oral DAILY    insulin glargine (LANTUS) injection 50 Units  50 Units SubCUTAneous QHS    potassium chloride (K-DUR, KLOR-CON) tablet 10 mEq  10 mEq Oral BID    spironolactone (ALDACTONE) tablet 25 mg  25 mg Oral DAILY    isosorbide dinitrate (ISORDIL) tablet 20 mg  20 mg Oral TID    nitroglycerin (NITROSTAT) tablet 0.4 mg  0.4 mg SubLINGual Q5MIN PRN    sodium chloride (NS) flush 5-10 mL  5-10 mL IntraVENous Q8H    sodium chloride (NS) flush 5-10 mL  5-10 mL IntraVENous PRN    insulin lispro (HUMALOG) injection   SubCUTAneous AC&HS     Data Review:   Recent Results (from the past 24 hour(s))   GLUCOSE, POC    Collection Time: 01/09/17 10:38 AM   Result Value Ref Range    Glucose (POC) 207 (H) 65 - 100 mg/dL   GLUCOSE, POC    Collection Time: 01/09/17  4:00 PM   Result Value Ref Range    Glucose (POC) 224 (H) 65 - 100 mg/dL   GLUCOSE, POC    Collection Time: 01/09/17  9:26 PM   Result Value Ref Range    Glucose (POC) 268 (H) 65 - 100 mg/dL   MAGNESIUM    Collection Time: 01/10/17  4:20 AM   Result Value Ref Range    Magnesium 2.5 (H) 1.8 - 2.4 mg/dL   METABOLIC PANEL, BASIC    Collection Time: 01/10/17  4:20 AM   Result Value Ref Range    Sodium 138 136 - 145 mmol/L    Potassium 4.1 3.5 - 5.1 mmol/L    Chloride 100 98 - 107 mmol/L    CO2 28 21 - 32 mmol/L    Anion gap 10 7 - 16 mmol/L    Glucose 165 (H) 65 - 100 mg/dL    BUN 51 (H) 6 - 23 MG/DL    Creatinine 1.97 (H) 0.8 - 1.5 MG/DL    GFR est AA 46 (L) >60 ml/min/1.73m2    GFR est non-AA 38 (L) >60 ml/min/1.73m2    Calcium 7.8 (L) 8.3 - 10.4 MG/DL   CBC WITH AUTOMATED DIFF    Collection Time: 01/10/17  4:20 AM   Result Value Ref Range    WBC 7.7 4.3 - 11.1 K/uL    RBC 4.37 4.23 - 5.67 M/uL    HGB 11.1 (L) 13.6 - 17.2 g/dL    HCT 35.1 (L) 41.1 - 50.3 %    MCV 80.3 79.6 - 97.8 FL    MCH 25.4 (L) 26.1 - 32.9 PG    MCHC 31.6 31.4 - 35.0 g/dL    RDW 18.7 (H) 11.9 - 14.6 %    PLATELET 052 427 - 885 K/uL    MPV 9.7 (L) 10.8 - 14.1 FL    DF AUTOMATED      NEUTROPHILS 53 43 - 78 %    LYMPHOCYTES 35 13 - 44 %    MONOCYTES 9 4.0 - 12.0 %    EOSINOPHILS 3 0.5 - 7.8 %    BASOPHILS 0 0.0 - 2.0 %    IMMATURE GRANULOCYTES 0.1 0.0 - 5.0 %    ABS. NEUTROPHILS 4.1 1.7 - 8.2 K/UL    ABS. LYMPHOCYTES 2.7 0.5 - 4.6 K/UL    ABS. MONOCYTES 0.7 0.1 - 1.3 K/UL    ABS. EOSINOPHILS 0.2 0.0 - 0.8 K/UL    ABS. BASOPHILS 0.0 0.0 - 0.2 K/UL    ABS. IMM. GRANS. 0.0 0.0 - 0.5 K/UL   GLUCOSE, POC    Collection Time: 01/10/17  5:37 AM   Result Value Ref Range    Glucose (POC) 148 (H) 65 - 100 mg/dL       EKG:  (EKG has been independently visualized by me with interpretation below)  Assessment:     Active Problems: Morbid obesity ([facility] (7/2/2013)      CKD (chronic kidney disease) stage 3, GFR 30-59 ml/min (8/13/2014)      Cardiomyopathy ([facility] (10/21/2015)      Constipation (1/8/2017)      Plan:   1. NICM, uncontrolled: Pt body habitus makes assessment of his volume status very difficult. Prior to admission he has had significant weight gain since November and reports worsening SORENSON, orthonpea, PND, leg swelling consistent with decompensated CHF. He has been on increased doses of lasix since admission, and am concerned we are not getting an accurate assessment of his I and Os. As his creatinine continues to worsen, will stop lasix today and ask nephrology to see the patient. He has daily weights ordered, but these are not being completed and will reinforce this with the nursing staff today. Will repeat BNP today. --hold lasix this AM       --nephrology consult       --DAILY WEIGHTS       --recheck BNP today      3. Constipation, uncontrolled: Appreciate GI consult      4. ICD: cont device follow up, functioning normally      5. CKD, worsening: hold lasix, nephrology, BNP, daily weights    Babatunde Roque MD  Cardiology/Electrophysiology

## 2017-01-10 NOTE — PROGRESS NOTES
GI DAILY PROGRESS NOTE    Admit Date:  1/6/2017    Today's Date:  1/10/2017    CC:  Constipation, failed mag citrate, multiple enema    Subjective:     Patient reports having a small \"mushy\" nonbloody BM yesterday after obtaining XRAY. No N/V or abdominal pain. Medications:   Current Facility-Administered Medications   Medication Dose Route Frequency    polyethylene glycol (MIRALAX) packet 17 g  17 g Oral BID    docusate sodium (COLACE) capsule 100 mg  100 mg Oral BID    tetrahydrozoline (OPTI-CLEAR) 0.05 % ophthalmic drops 1 Drop  1 Drop Both Eyes TID    carvedilol (COREG) tablet 25 mg  25 mg Oral BID WITH MEALS    oxyCODONE IR (ROXICODONE) tablet 15 mg  15 mg Oral Q4H PRN    gabapentin (NEURONTIN) capsule 600 mg  600 mg Oral BID    pravastatin (PRAVACHOL) tablet 80 mg  80 mg Oral QHS    hydrALAZINE (APRESOLINE) tablet 25 mg  25 mg Oral TID    colchicine tablet 0.6 mg  0.6 mg Oral DAILY    apixaban (ELIQUIS) tablet 5 mg  5 mg Oral Q12H    famotidine (PEPCID) tablet 20 mg  20 mg Oral QPM    allopurinol (ZYLOPRIM) tablet 100 mg  100 mg Oral DAILY    insulin glargine (LANTUS) injection 50 Units  50 Units SubCUTAneous QHS    potassium chloride (K-DUR, KLOR-CON) tablet 10 mEq  10 mEq Oral BID    spironolactone (ALDACTONE) tablet 25 mg  25 mg Oral DAILY    isosorbide dinitrate (ISORDIL) tablet 20 mg  20 mg Oral TID    nitroglycerin (NITROSTAT) tablet 0.4 mg  0.4 mg SubLINGual Q5MIN PRN    sodium chloride (NS) flush 5-10 mL  5-10 mL IntraVENous Q8H    sodium chloride (NS) flush 5-10 mL  5-10 mL IntraVENous PRN    insulin lispro (HUMALOG) injection   SubCUTAneous AC&HS       Review of Systems:  ROS was obtained, with pertinent positives as listed above. No chest pain or SOB. Diet:  Reg diet.      Objective:   Vitals:  Visit Vitals    BP 93/60 (BP 1 Location: Left arm, BP Patient Position: At rest)    Pulse 73    Temp 97.5 °F (36.4 °C)    Resp 18    Ht 5' 6\" (1.676 m)    Wt (!) 176.9 kg (390 lb)    SpO2 96%    BMI 62.95 kg/m2     Intake/Output:     01/08 1901 - 01/10 0700  In: 480 [P.O.:480]  Out: 450 [Urine:450]  Exam:  General appearance: alert, cooperative, no distress  Lungs: DECREASE BREATH SOUNDS DUE TO BODY HABITUS, COARSE BS NOTED ANTERIOR LY. Heart: RRR  Abdomen: OBESE, SOFT, NT with NABS. Extremities: PITTING EDEMA NOTED. Neuro:  alert and oriented    Data Review (Labs):    Recent Labs      01/10/17   0420  01/09/17   0440  01/08/17   0520   WBC  7.7   --   8.7   HGB  11.1*   --   11.2*   HCT  35.1*   --   35.9*   PLT  226   --   227   MCV  80.3   --   80.7   NA  138   --   137   K  4.1   --   4.0   CL  100   --   99   CO2  28   --   31   BUN  51*   --   41*   CREA  1.97*   --   1.87*   CA  7.8*   --   7.9*   MG  2.5*  2.4  2.1   GLU  165*   --   155*       Assessment:     Active Problems: Morbid obesity ([facility] (7/2/2013)  CKD (chronic kidney disease) stage 3, GFR 30-59 ml/min (8/13/2014)  Cardiomyopathy ([facility] (10/21/2015)  Constipation (1/8/2017)      Plan:     Continue Miralax 17g bid & colace 100mg po bid -- monitor response. Possible evaluation with sigmoidoscopy, limited colonoscopy with limited bowel prep, & CT colonography were discussed with patient based on patient's clinical course, improvement in clinical status, & if patient does not respond to conservative management. Lasix held due to worsening Cr. Nephrology consult pending. Nicole Winkler PA-C    Patient seen and examined. Agree with above. Feels bloated and constipated. Also SOB. Discussed assessment and plans with patient and two daughters at bedside. Suppositories and Enemas now and as needed. Too sick for Colonoscopy. May consider Gastrograffin enema to rule out obstruction and possible therapeutic relief. .    Alan Jordan MD

## 2017-01-10 NOTE — PROGRESS NOTES
Bedside and Verbal shift change report given to Leonie Shoemaker RN (oncoming nurse) by self (offgoing nurse). Report included the following information SBAR, Kardex, MAR and Recent Results.

## 2017-01-10 NOTE — CONSULTS
[facility] consultation    We were asked to see the patient at the request of Dr. Olga Wolfe for evaluation of ARF on CRF    Admission Date:  1/6/2017    Admission Diagnosis:  sob  Cardiomyopathy ([address]    History of Present Illness:    Patient is a 46 y/o morbidly obese male with hx of NIDCM (EF 10-15%), CHF, ICD (2010), KELSIE (CPAP), HTN presented with constipation and weight gain. He also reported some dyspnea on exertion and orthopnea. He was admitted and diuresed with Lasix 40 mg bid on 1/6 and 1/7. He then had his Lasix increased to 80 mg bid through 1/9. His baseline creatinine is 1.4 mg/dL. He also tells me he was on dialysis for a couple treatments back in 2014 here at [facility] after he received some contrast.  However, after thorough chart review, there is no evidence to support that. Hemodynamics have been relatively stable. He denies NSAIDs. Past Medical History   Diagnosis Date    Acute systolic heart failure ([address] May, 2009     Also had transient acute renal failure secondary to poor perfusion.     AICD (automatic cardioverter/defibrillator) present 10/21/2015    Atypical chest pain 4/23/2010    Bronchitis     CAD (coronary artery disease)     Cardiomyopathy     Chest pain 10/21/2015    Chronic kidney disease      renal insufficiency    Chronic pain      back    Congestive heart failure (CHF) ([address] 10/21/2015    COPD     Diabetes ([address]      type 2 insulin reliant- BS average- 160's-180's    Diabetes mellitus type II, uncontrolled ([address] 7/2/2013    GERD (gastroesophageal reflux disease)     Gout     Heart failure ([address]      cardiomyopathy with ef 10-20%    Hypertension     Hyponatremia 12/20/2010    Ill-defined condition      gout, neuropathy, sciatica    Morbid obesity (HCC)     Nausea & vomiting 11/30/2015    Neuropathy     Obstructive sleep apnea 2/15/2010    Other unknown and unspecified cause of morbidity or mortality      Gout    Severe sepsis ([address]  Unspecified sleep apnea      cpap      Past Surgical History   Procedure Laterality Date    Hx pacemaker       may 2010    Hx heart catheterization  S[place] 10, 2008     Severe multivessel disease with severe LV dysfunction    Pr chest surgery procedure unlisted       thorocentesis      Current Facility-Administered Medications   Medication Dose Route Frequency    polyethylene glycol (MIRALAX) packet 17 g  17 g Oral BID    docusate sodium (COLACE) capsule 100 mg  100 mg Oral BID    tetrahydrozoline (OPTI-CLEAR) 0.05 % ophthalmic drops 1 Drop  1 Drop Both Eyes TID    carvedilol (COREG) tablet 25 mg  25 mg Oral BID WITH MEALS    oxyCODONE IR (ROXICODONE) tablet 15 mg  15 mg Oral Q4H PRN    gabapentin (NEURONTIN) capsule 600 mg  600 mg Oral BID    pravastatin (PRAVACHOL) tablet 80 mg  80 mg Oral QHS    hydrALAZINE (APRESOLINE) tablet 25 mg  25 mg Oral TID    colchicine tablet 0.6 mg  0.6 mg Oral DAILY    apixaban (ELIQUIS) tablet 5 mg  5 mg Oral Q12H    famotidine (PEPCID) tablet 20 mg  20 mg Oral QPM    allopurinol (ZYLOPRIM) tablet 100 mg  100 mg Oral DAILY    insulin glargine (LANTUS) injection 50 Units  50 Units SubCUTAneous QHS    potassium chloride (K-DUR, KLOR-CON) tablet 10 mEq  10 mEq Oral BID    spironolactone (ALDACTONE) tablet 25 mg  25 mg Oral DAILY    isosorbide dinitrate (ISORDIL) tablet 20 mg  20 mg Oral TID    nitroglycerin (NITROSTAT) tablet 0.4 mg  0.4 mg SubLINGual Q5MIN PRN    sodium chloride (NS) flush 5-10 mL  5-10 mL IntraVENous Q8H    sodium chloride (NS) flush 5-10 mL  5-10 mL IntraVENous PRN    insulin lispro (HUMALOG) injection   SubCUTAneous AC&HS     Allergies   Allergen Reactions    Dilaudid [Hydromorphone] Other (comments)     Hotflashes, patient states he's not allergic    Iodinated Contrast Media - Oral And Iv Dye Other (comments)     \"shuts my kidneys down\"    Penicillins Unknown (comments)     Pt states he is not allergic his mother just wouldn't allow him take it      Social History   Substance Use Topics    Smoking status: Former Smoker     Packs/day: 0.25     Years: 1.00     Quit date: 7/12/1984    Smokeless tobacco: Never Used      Comment: pt states that he only tried smoking as a kid     Alcohol use No      Family History   Problem Relation Age of Onset    Heart Disease Father     Stroke Father     Diabetes Sister     Stroke Sister     Diabetes Sister     Heart Disease Other         Review of Systems:  Gen - no fever, appetite good - he is hungry  CV - no chest pain, no palpitation  Lung - noted shortness of breath, no cough  Abd - no tenderness, no nausea/vomiting, no diarrhea  Ext - noted edema      Objective:  Vitals:    01/10/17 0420 01/10/17 0554 01/10/17 0819 01/10/17 1230   BP: 101/71 116/69 93/60 122/75   Pulse: 71 74 73 76   Resp: 18  18 19   Temp: 96.3 °F (35.7 °C)  97.5 °F (36.4 °C) 98 °F (36.7 °C)   SpO2: 99%  96% 98%   Weight: (!) 176.9 kg (390 lb)      Height:           Intake/Output Summary (Last 24 hours) at 01/10/17 1238  Last data filed at 01/09/17 1847   Gross per 24 hour   Intake              240 ml   Output                0 ml   Net              240 ml     Wt Readings from Last 3 Encounters:   01/10/17 (!) 176.9 kg (390 lb)   01/06/17 (!) 174.2 kg (384 lb)   01/03/17 (!) 168.7 kg (372 lb)       GEN - in no distress, alert and oriented, lying comfortably on CPAP upon entrance to room  Neck - unable to evaluate JVD  CV - S1, S2, no rub  Lung - clear bilaterally, lungs expand symmetrically  Chest wall - normal appearance  Abd - soft, obese  Ext - trace edema        Data Review:     Recent Labs      01/10/17   0420  01/08/17   0520   WBC  7.7  8.7   HGB  11.1*  11.2*   HCT  35.1*  35.9*   PLT  226  227        Recent Labs      01/10/17   0420  01/09/17   0440  01/08/17   0520   NA  138   --   137   K  4.1   --   4.0   CL  100   --   99   CO2  28   --   31   BUN  51*   --   41*   CREA  1.97*   --   1.87*   CA  7.8*   --   7.9*   GLU  165* --   155*   MG  2.5*  2.4  2.1         Assessment/Plan:     1.  ARF/RED on Stage III CKD - pre-renal.    2.  Morbid Obesity/KELSIE    3. HTN - stable    4. LVSD with EF 10-15%    Again, morbidly obese male with underlying pre-renal RED. Agree with temporarily holding Lasix but will need to be re-started once renal indices stabilize. We will follow with you. All was discussed with patient and daughter at bedside.

## 2017-01-11 LAB
ANION GAP BLD CALC-SCNC: 9 MMOL/L (ref 7–16)
BUN SERPL-MCNC: 54 MG/DL (ref 6–23)
CALCIUM SERPL-MCNC: 8.3 MG/DL (ref 8.3–10.4)
CHLORIDE SERPL-SCNC: 103 MMOL/L (ref 98–107)
CO2 SERPL-SCNC: 28 MMOL/L (ref 21–32)
CREAT SERPL-MCNC: 1.86 MG/DL (ref 0.8–1.5)
GLUCOSE BLD STRIP.AUTO-MCNC: 130 MG/DL (ref 65–100)
GLUCOSE BLD STRIP.AUTO-MCNC: 147 MG/DL (ref 65–100)
GLUCOSE BLD STRIP.AUTO-MCNC: 91 MG/DL (ref 65–100)
GLUCOSE BLD STRIP.AUTO-MCNC: 95 MG/DL (ref 65–100)
GLUCOSE SERPL-MCNC: 88 MG/DL (ref 65–100)
MAGNESIUM SERPL-MCNC: 2.8 MG/DL (ref 1.8–2.4)
POTASSIUM SERPL-SCNC: 4.3 MMOL/L (ref 3.5–5.1)
SODIUM SERPL-SCNC: 140 MMOL/L (ref 136–145)

## 2017-01-11 PROCEDURE — 36415 COLL VENOUS BLD VENIPUNCTURE: CPT | Performed by: NURSE PRACTITIONER

## 2017-01-11 PROCEDURE — 74011250637 HC RX REV CODE- 250/637: Performed by: NURSE PRACTITIONER

## 2017-01-11 PROCEDURE — 74011250637 HC RX REV CODE- 250/637: Performed by: PHYSICIAN ASSISTANT

## 2017-01-11 PROCEDURE — 65660000000 HC RM CCU STEPDOWN

## 2017-01-11 PROCEDURE — 74011636637 HC RX REV CODE- 636/637: Performed by: NURSE PRACTITIONER

## 2017-01-11 PROCEDURE — 80048 BASIC METABOLIC PNL TOTAL CA: CPT | Performed by: NURSE PRACTITIONER

## 2017-01-11 PROCEDURE — 74011250636 HC RX REV CODE- 250/636: Performed by: INTERNAL MEDICINE

## 2017-01-11 PROCEDURE — 77030011943

## 2017-01-11 PROCEDURE — 77030018846 HC SOL IRR STRL H20 ICUM -A

## 2017-01-11 PROCEDURE — 82962 GLUCOSE BLOOD TEST: CPT

## 2017-01-11 PROCEDURE — 83735 ASSAY OF MAGNESIUM: CPT | Performed by: NURSE PRACTITIONER

## 2017-01-11 RX ORDER — FUROSEMIDE 10 MG/ML
40 INJECTION INTRAMUSCULAR; INTRAVENOUS ONCE
Status: COMPLETED | OUTPATIENT
Start: 2017-01-11 | End: 2017-01-11

## 2017-01-11 RX ADMIN — POLYETHYLENE GLYCOL 3350 17 G: 17 POWDER, FOR SOLUTION ORAL at 08:19

## 2017-01-11 RX ADMIN — APIXABAN 5 MG: 5 TABLET, FILM COATED ORAL at 08:19

## 2017-01-11 RX ADMIN — ALLOPURINOL 100 MG: 100 TABLET ORAL at 08:19

## 2017-01-11 RX ADMIN — POLYETHYLENE GLYCOL 3350 17 G: 17 POWDER, FOR SOLUTION ORAL at 17:11

## 2017-01-11 RX ADMIN — COLCHICINE 0.6 MG: 0.6 TABLET, FILM COATED ORAL at 08:19

## 2017-01-11 RX ADMIN — POTASSIUM CHLORIDE 10 MEQ: 10 TABLET, EXTENDED RELEASE ORAL at 08:19

## 2017-01-11 RX ADMIN — ISOSORBIDE DINITRATE 20 MG: 20 TABLET ORAL at 05:37

## 2017-01-11 RX ADMIN — HYDRALAZINE HYDROCHLORIDE 25 MG: 25 TABLET, FILM COATED ORAL at 13:15

## 2017-01-11 RX ADMIN — CARVEDILOL 25 MG: 25 TABLET, FILM COATED ORAL at 17:11

## 2017-01-11 RX ADMIN — CARVEDILOL 25 MG: 25 TABLET, FILM COATED ORAL at 08:19

## 2017-01-11 RX ADMIN — Medication 5 ML: at 05:37

## 2017-01-11 RX ADMIN — HYDRALAZINE HYDROCHLORIDE 25 MG: 25 TABLET, FILM COATED ORAL at 05:37

## 2017-01-11 RX ADMIN — GABAPENTIN 600 MG: 300 CAPSULE ORAL at 08:19

## 2017-01-11 RX ADMIN — TETRAHYDROZOLINE HYDROCHLORIDE 1 DROP: 0.5 SOLUTION/ DROPS OPHTHALMIC at 08:20

## 2017-01-11 RX ADMIN — SPIRONOLACTONE 25 MG: 25 TABLET, FILM COATED ORAL at 08:19

## 2017-01-11 RX ADMIN — FUROSEMIDE 40 MG: 10 INJECTION, SOLUTION INTRAMUSCULAR; INTRAVENOUS at 13:15

## 2017-01-11 RX ADMIN — DOCUSATE SODIUM 100 MG: 100 CAPSULE, LIQUID FILLED ORAL at 08:19

## 2017-01-11 RX ADMIN — APIXABAN 5 MG: 5 TABLET, FILM COATED ORAL at 22:01

## 2017-01-11 RX ADMIN — DOCUSATE SODIUM 100 MG: 100 CAPSULE, LIQUID FILLED ORAL at 17:11

## 2017-01-11 RX ADMIN — PRAVASTATIN SODIUM 80 MG: 80 TABLET ORAL at 22:01

## 2017-01-11 RX ADMIN — POTASSIUM CHLORIDE 10 MEQ: 10 TABLET, EXTENDED RELEASE ORAL at 17:11

## 2017-01-11 RX ADMIN — GABAPENTIN 600 MG: 300 CAPSULE ORAL at 17:14

## 2017-01-11 RX ADMIN — OXYCODONE HYDROCHLORIDE 15 MG: 5 TABLET ORAL at 22:58

## 2017-01-11 RX ADMIN — INSULIN GLARGINE 50 UNITS: 100 INJECTION, SOLUTION SUBCUTANEOUS at 22:01

## 2017-01-11 RX ADMIN — TETRAHYDROZOLINE HYDROCHLORIDE 1 DROP: 0.5 SOLUTION/ DROPS OPHTHALMIC at 22:02

## 2017-01-11 RX ADMIN — TETRAHYDROZOLINE HYDROCHLORIDE 1 DROP: 0.5 SOLUTION/ DROPS OPHTHALMIC at 16:00

## 2017-01-11 RX ADMIN — ISOSORBIDE DINITRATE 20 MG: 20 TABLET ORAL at 13:15

## 2017-01-11 RX ADMIN — Medication 5 ML: at 13:15

## 2017-01-11 RX ADMIN — Medication 5 ML: at 22:02

## 2017-01-11 RX ADMIN — FAMOTIDINE 20 MG: 20 TABLET ORAL at 17:11

## 2017-01-11 NOTE — PROGRESS NOTES
[facility]        Subjective: Breathing is about the same. Review of Systems -   General ROS: negative for - chills, fatigue or fever  Respiratory ROS: no cough, shortness of breath, or wheezing  Cardiovascular ROS: no chest pain or dyspnea on exertion  Gastrointestinal ROS: no abdominal pain, change in bowel habits, or black or bloody stools  Genito-Urinary ROS: no dysuria, trouble voiding, or hematuria  Neurological ROS: no TIA or stroke symptoms        Objective:    Vitals:    01/11/17 0536 01/11/17 0537 01/11/17 0831 01/11/17 1233   BP: 94/46 108/77 118/84 107/40   Pulse: 68 67 68 70   Resp:   18 19   Temp:   98 °F (36.7 °C) 97.3 °F (36.3 °C)   SpO2:   99% 100%   Weight:       Height:           PE  Gen: in no acute distress  CV: reg rate  Chest: clear  Abd: soft  Ext/Access:2+ edema       . LAB  Recent Labs      01/10/17   0420   WBC  7.7   HGB  11.1*   HCT  35.1*   PLT  226     Recent Labs      01/11/17   0513  01/10/17   0420  01/09/17   0440   NA  140  138   --    K  4.3  4.1   --    CL  103  100   --    CO2  28  28   --    GLU  88  165*   --    BUN  54*  51*   --    CREA  1.86*  1.97*   --    MG  2.8*  2.5*  2.4   CA  8.3  7.8*   --    BNPP   --   611   --            Radiology    A/P:   Patient Active Problem List   Diagnosis Code    Obstructive sleep apnea G47.33    Chronic back pain M54.9, G89.29    Morbid obesity (HCC) E66.01    Nonischemic dilated cardiomyopathy (HCC) I42.9    Dyslipidemia E78.5    Diabetes mellitus type II, uncontrolled ([facility]) E11.65    Noncompliance with medication regimen Z91.14    Pleural effusion J90    CKD (chronic kidney disease) stage 3, GFR 30-59 ml/min N18.3    Chronic pancreatitis ([facility]) K86.1    Abdominal fluid collection R18.8    Chest pain R07.9    AICD (automatic cardioverter/defibrillator) present Z95.810    Congestive heart failure (CHF) ([facility]) I50.9    Cardiomyopathy ([facility]) I42.9    Nausea & vomiting R11.2    Lactic acidosis E87.2    Hypertension I10    Acute on chronic systolic (congestive) heart failure (HCC) I50.23    Atrial flutter (HCC) I48.92    Obesity hypoventilation syndrome (HCC) E66.2    Morbid obesity with BMI of 60.0-69.9, adult (HCC) E66.01, Z68.44    Hypoxemia R09.02    Hypersomnia G47.10    Constipation K59.00         Wt is up, so I agree with restarting lasix. Renal function is stable.      Zaheer Sullivan MD

## 2017-01-11 NOTE — PROGRESS NOTES
[facility] CARDIOLOGY PROGRESS NOTE           1/11/2017 12:31 PM    Admit Date: 1/6/2017    Admit Diagnosis: sob;Cardiomyopathy (HCC)      Subjective:   Patient reports having fluid overload. He was able to urinated this morning. Objective:     Vitals:    01/11/17 0534 01/11/17 0536 01/11/17 0537 01/11/17 0831   BP: 97/48 94/46 108/77 118/84   Pulse: 68 68 67 68   Resp: 18   18   Temp: 98.2 °F (36.8 °C)   98 °F (36.7 °C)   SpO2: 100%   99%   Weight: (!) 390 lb 6.4 oz (177.1 kg)      Height:           Physical Exam:  General-Well Developed, Well Nourished, No Acute Distress, Alert & Oriented x 3, appropriate mood. Neck- supple, no JVD  CV- regular rate and rhythm no MRG  Lung- clear bilaterally  Abd- soft, nontender, nondistended  Ext- +2 edema bilaterally.   Skin- warm and dry    Current Facility-Administered Medications   Medication Dose Route Frequency    bisacodyl (DULCOLAX) suppository 10 mg  10 mg Rectal DAILY    polyethylene glycol (MIRALAX) packet 17 g  17 g Oral BID    docusate sodium (COLACE) capsule 100 mg  100 mg Oral BID    tetrahydrozoline (OPTI-CLEAR) 0.05 % ophthalmic drops 1 Drop  1 Drop Both Eyes TID    carvedilol (COREG) tablet 25 mg  25 mg Oral BID WITH MEALS    oxyCODONE IR (ROXICODONE) tablet 15 mg  15 mg Oral Q4H PRN    gabapentin (NEURONTIN) capsule 600 mg  600 mg Oral BID    pravastatin (PRAVACHOL) tablet 80 mg  80 mg Oral QHS    hydrALAZINE (APRESOLINE) tablet 25 mg  25 mg Oral TID    colchicine tablet 0.6 mg  0.6 mg Oral DAILY    apixaban (ELIQUIS) tablet 5 mg  5 mg Oral Q12H    famotidine (PEPCID) tablet 20 mg  20 mg Oral QPM    allopurinol (ZYLOPRIM) tablet 100 mg  100 mg Oral DAILY    insulin glargine (LANTUS) injection 50 Units  50 Units SubCUTAneous QHS    potassium chloride (K-DUR, KLOR-CON) tablet 10 mEq  10 mEq Oral BID    spironolactone (ALDACTONE) tablet 25 mg  25 mg Oral DAILY    isosorbide dinitrate (ISORDIL) tablet 20 mg  20 mg Oral TID   Dominique Marks nitroglycerin (NITROSTAT) tablet 0.4 mg  0.4 mg SubLINGual Q5MIN PRN    sodium chloride (NS) flush 5-10 mL  5-10 mL IntraVENous Q8H    sodium chloride (NS) flush 5-10 mL  5-10 mL IntraVENous PRN    insulin lispro (HUMALOG) injection   SubCUTAneous AC&HS     Data Review:   Recent Results (from the past 24 hour(s))   URINALYSIS W/ RFLX MICROSCOPIC    Collection Time: 01/10/17  2:26 PM   Result Value Ref Range    Color YELLOW      Appearance CLEAR      Specific gravity 1.012 1.001 - 1.023      pH (UA) 5.0 5.0 - 9.0      Protein TRACE (A) NEG mg/dL    Glucose NEGATIVE  mg/dL    Ketone NEGATIVE  NEG mg/dL    Bilirubin NEGATIVE  NEG      Blood NEGATIVE  NEG      Urobilinogen 1.0 0.2 - 1.0 EU/dL    Nitrites NEGATIVE  NEG      Leukocyte Esterase NEGATIVE  NEG     SODIUM, UR, RANDOM    Collection Time: 01/10/17  2:27 PM   Result Value Ref Range    Sodium urine, random 9 MMOL/L   GLUCOSE, POC    Collection Time: 01/10/17  4:00 PM   Result Value Ref Range    Glucose (POC) 171 (H) 65 - 100 mg/dL   EKG, 12 LEAD, INITIAL    Collection Time: 01/10/17  5:28 PM   Result Value Ref Range    Systolic BP  mmHg    Diastolic BP  mmHg    Ventricular Rate 78 BPM    Atrial Rate 78 BPM    P-R Interval 230 ms    QRS Duration 94 ms    Q-T Interval 406 ms    QTC Calculation (Bezet) 462 ms    Calculated P Axis 60 degrees    Calculated R Axis -166 degrees    Calculated T Axis 60 degrees    Diagnosis       Sinus rhythm with 1st degree A-V block  Right superior axis deviation  Low voltage QRS  Cannot rule out Anterior infarct , age undetermined  Abnormal ECG  Confirmed by ST ARSLAN BARKER MD (), RYLAND MANCIA ([id number]) on 1/10/2017 8:45:26 PM     GLUCOSE, POC    Collection Time: 01/10/17  9:35 PM   Result Value Ref Range    Glucose (POC) 143 (H) 65 - 100 mg/dL   MAGNESIUM    Collection Time: 01/11/17  5:13 AM   Result Value Ref Range    Magnesium 2.8 (H) 1.8 - 2.4 mg/dL   METABOLIC PANEL, BASIC    Collection Time: 01/11/17  5:13 AM   Result Value Ref Range Sodium 140 136 - 145 mmol/L    Potassium 4.3 3.5 - 5.1 mmol/L    Chloride 103 98 - 107 mmol/L    CO2 28 21 - 32 mmol/L    Anion gap 9 7 - 16 mmol/L    Glucose 88 65 - 100 mg/dL    BUN 54 (H) 6 - 23 MG/DL    Creatinine 1.86 (H) 0.8 - 1.5 MG/DL    GFR est AA 49 (L) >60 ml/min/1.73m2    GFR est non-AA 41 (L) >60 ml/min/1.73m2    Calcium 8.3 8.3 - 10.4 MG/DL   GLUCOSE, POC    Collection Time: 01/11/17  6:24 AM   Result Value Ref Range    Glucose (POC) 91 65 - 100 mg/dL   GLUCOSE, POC    Collection Time: 01/11/17 11:03 AM   Result Value Ref Range    Glucose (POC) 95 65 - 100 mg/dL     Assessment:     Active Problems: Morbid obesity ([facility] (7/2/2013)      CKD (chronic kidney disease) stage 3, GFR 30-59 ml/min (8/13/2014)      Cardiomyopathy ([facility] (10/21/2015)      Constipation (1/8/2017)      Plan:   1. Acute on Chronic Systolic Heart Failure - EF 10-15%, Holding lasix 80 secondary to a/c renal failure. On Coreg. No ACEI/ARB secondary to renal function. Will give on dose of lasix iv 40mg today. Monitor BP, May need to adjust meds  2. CKD - Has stabilized - May need to adjust HTN meds    Rodney Blount. Ashish Bashir M.D., F.A.C.C, F.H.R.S.   Cardiology/Electrophysiology

## 2017-01-11 NOTE — PROGRESS NOTES
Bedside and Verbal shift change report given to Alcon Ramirez RN (oncoming nurse) by self (offgoing nurse). Report included the following information SBAR, Kardex, MAR and Recent Results.

## 2017-01-11 NOTE — PROGRESS NOTES
[facility] [place] - INPATIENT  Face to Face Encounter    Patients Name: Grant Welch    YOB: 1965    Ordering Physician: Dr. Jaron Yu    Primary Diagnosis: Cardiomyopathy; CHF    Date of Face to Face:   1/9/2017                                  Face to Face Encounter findings are related to primary reason for home care:   yes. 1. I certify that the patient needs intermittent care as follows: skilled nursing care:  skilled observation/assessment, patient education; eval for telemonitoring    2. I certify that this patient is homebound, that is: 1) patient requires the use of a cane device, special transportation, or assistance of another to leave the home; or 2) patient's condition makes leaving the home medically contraindicated; and 3) patient has a normal inability to leave the home and leaving the home requires considerable and taxing effort. Patient may leave the home for infrequent and short duration for medical reasons, and occasional absences for non-medical reasons. Homebound status is due to the following functional limitations: Patient with strength deficits limiting the performance of all ADL's without caregiver assistance or the use of an assistive device. ; shortness of breath limiting ambulation    3. I certify that this patient is under my care and that I, or a nurse practitioner or  [id number], or clinical nurse specialist, or certified nurse midwife, working with me, had a Face-to-Face Encounter that meets the physician Face-to-Face Encounter requirements.   The following are the clinical findings from the [address] [place] [address] encounter that support the need for skilled services and is a summary of the encounter:     See  Hospital chart      [place]  1/11/2017      THE FOLLOWING TO BE COMPLETED BY THE COMMUNITY PHYSICIAN:    I concur with the findings described above from the [facility] encounter that this patient is homebound and in need of a skilled service.     Certifying Physician: _____________________________________      Printed Certifying Physician Name: _____________________________________    Date: _________________

## 2017-01-11 NOTE — PROGRESS NOTES
GI DAILY PROGRESS NOTE    Admit Date:  1/6/2017    Today's Date:  1/11/2017    CC:  Constipation, failed Golytely, multiple enemas    Subjective:     Patient states he has had only a small BM with passing gas today, like a squirt. He denies any abdominal pain, but has had increased bloating over his abdomen and feels like he is retaining more fluid. He has not urinated today. He denies any vomiting, but has nausea in the morning, improved with spitting up phlegm.     Medications:   Current Facility-Administered Medications   Medication Dose Route Frequency    bisacodyl (DULCOLAX) suppository 10 mg  10 mg Rectal DAILY    polyethylene glycol (MIRALAX) packet 17 g  17 g Oral BID    docusate sodium (COLACE) capsule 100 mg  100 mg Oral BID    tetrahydrozoline (OPTI-CLEAR) 0.05 % ophthalmic drops 1 Drop  1 Drop Both Eyes TID    carvedilol (COREG) tablet 25 mg  25 mg Oral BID WITH MEALS    oxyCODONE IR (ROXICODONE) tablet 15 mg  15 mg Oral Q4H PRN    gabapentin (NEURONTIN) capsule 600 mg  600 mg Oral BID    pravastatin (PRAVACHOL) tablet 80 mg  80 mg Oral QHS    hydrALAZINE (APRESOLINE) tablet 25 mg  25 mg Oral TID    colchicine tablet 0.6 mg  0.6 mg Oral DAILY    apixaban (ELIQUIS) tablet 5 mg  5 mg Oral Q12H    famotidine (PEPCID) tablet 20 mg  20 mg Oral QPM    allopurinol (ZYLOPRIM) tablet 100 mg  100 mg Oral DAILY    insulin glargine (LANTUS) injection 50 Units  50 Units SubCUTAneous QHS    potassium chloride (K-DUR, KLOR-CON) tablet 10 mEq  10 mEq Oral BID    spironolactone (ALDACTONE) tablet 25 mg  25 mg Oral DAILY    isosorbide dinitrate (ISORDIL) tablet 20 mg  20 mg Oral TID    nitroglycerin (NITROSTAT) tablet 0.4 mg  0.4 mg SubLINGual Q5MIN PRN    sodium chloride (NS) flush 5-10 mL  5-10 mL IntraVENous Q8H    sodium chloride (NS) flush 5-10 mL  5-10 mL IntraVENous PRN    insulin lispro (HUMALOG) injection   SubCUTAneous AC&HS       Review of Systems:  ROS was obtained, with pertinent positives as listed above. He states he has had chest pain overnight and discussed it with the RN. SOB has been stable with O2. Diet:      Objective:   Vitals:  Visit Vitals    /84 (BP 1 Location: Left arm, BP Patient Position: At rest)    Pulse 68    Temp 98 °F (36.7 °C)    Resp 18    Ht 5' 6\" (1.676 m)    Wt (!) 177.1 kg (390 lb 6.4 oz)    SpO2 99%    BMI 63.01 kg/m2     Intake/Output:        Exam:  General appearance: alert, cooperative, no distress, lying on left side in bed, O2 NC in place  Lungs: coarse BS to auscultation bilaterally anteriorly  Heart: regular rate and rhythm  Abdomen: distended, non-tender. Bowel sounds normal. No masses, no organomegaly  Neuro:  alert and oriented    Data Review (Labs):    Recent Labs      01/11/17   0513  01/10/17   0420  01/09/17   0440   WBC   --   7.7   --    HGB   --   11.1*   --    HCT   --   35.1*   --    PLT   --   226   --    MCV   --   80.3   --    NA  140  138   --    K  4.3  4.1   --    CL  103  100   --    CO2  28  28   --    BUN  54*  51*   --    CREA  1.86*  1.97*   --    CA  8.3  7.8*   --    MG  2.8*  2.5*  2.4   GLU  88  165*   --        Ref. Range 1/3/2017 11:16 1/6/2017 13:55 1/7/2017 04:00 1/8/2017 05:20   BNP Latest Units: pg/mL 371     492      CT THORAX WITHOUT CONTRAST 3 Saul 2017   HISTORY: shortness of breath chronic, 45 years Male family history of aortic  aneurysm, history of CHF   COMPARISON: CT chest August 15, 2014   TECHNIQUE: Noncontrast axial helical images from the thoracic inlet through  diaphragm were obtained. Radiation dose reduction techniques were used for this  study: Our CT scanners use one or all of the following: Automated exposure  control, adjustment of the mA and/or kVp according to patient's size, iterative  reconstruction.   FINDINGS:  Partially visualized thyroid unremarkable. No evidence of significant  mediastinal, or axillary lymphadenopathy.  Mild calcific atherosclerosis of a  normal caliber aortic arch and descending aorta. Low lung volumes. Mild  dependent subsegmental atelectasis bilateral lung bases. No evidence of pleural  effusion or air space consolidation. No evidence of new pulmonary nodule. Visualized proximal airways unremarkable.    Visualized upper abdominal viscera including the adrenal glands are otherwise  unremarkable. Visualized osseous structures unremarkable.   IMPRESSION  IMPRESSION:   No acute pathology identified. Low lung volumes.     KUB supine views 3 Saul 2017   History: constipation, 45 years Male acute moderate constipation and abdominal  pain x 1 week   Comparison: Chest radiograph earlier today   Findings: No free air is evident. The bowel gas pattern is nonspecific and  there is no evidence of ileus or obstruction. No suspicious renal or ureteral  calculi are evident. Visualized osseous structures unremarkable.   IMPRESSION  Impression: No acute pathology identified.      KUB 9 Jan 2017  FINDINGS: The bowel gas pattern is nonspecific. No evidence of free air or  obstruction. The lung bases are clear.   IMPRESSION  IMPRESSION: No findings to suggest free air or obstruction. Assessment:     Active Problems: Morbid obesity ([address] (7/2/2013)      CKD (chronic kidney disease) stage 3, GFR 30-59 ml/min (8/13/2014)      Cardiomyopathy ([address] (10/21/2015)      Constipation (1/8/2017)      44 yo male with PMH of CAD, nonischemic dilated cardiomyopathy with EF 10-15%, CKD stage III, CHF, s/p Biotronik ICD, COPD, DM 2, GERD, gout, HTN, sleep apnea - uses CPAP, Atrial flutter s/p ablation, who was seen in consultation at the request of Dr. Eben Zelaya for constipation, failed mag citrate, multiple enema. He was admitted for uncontrolled cardiomyopathy and decompensated CHF, and has been treated with IV Lasix. He has had continued constipation despite taking Miralax daily and completing Golytely prior to admission. He had 2 BMs on 6 Jan 2017, but only after an enema.  KUB on 3 Saul 2017 and 9 Jan 2017 is not significant for obstruction or large amount of stool present. He is describing only have very small amount of BM today and continued bloating. He states he has not urinated today. ? If bloating related to fluid overload or urinary retention as x-rays have been negative for significant constipation. Plan:     -Continue Miralax 17g BID and Colace 100mg po bid.  -Recommend Gastrograffin enema as he is not having improvement.   -Lasix held due to worsening Cr. Nephrology following.   -Discussed with RN bladder scanning versus in/out cath to assess for any urinary retention.  -Monitor response. Patient is seen and examined in collaboration with Dr. Brandon Aguilar. Assessment and plan as per Dr. Timur Rosado. Sarah Botello PA-C  [facility]    Patient seen and examined. Agree with above. Had another bowel movement which was much better. Feels improved with less abdominal distension. Wants to eat. Will order cardia diet and reevaluate in am.    Discussed assessment and plans. Continue bowel regimen. Consider Gastografin enema if symptoms recur. Alan Rosado MD

## 2017-01-11 NOTE — PROGRESS NOTES
Bedside and Verbal shift change report given to self (oncoming nurse) by Dionte Elmore RN (offgoing nurse). Report included the following information SBAR, Kardex, MAR and Recent Results.

## 2017-01-11 NOTE — PROGRESS NOTES
Pt reported to GI NP that he hasn't voided today. Pt reported that he feels he is retaining urine. Discussed with Dr. Stanislaw Childress the advantages of bladder scan vs in and out cath. Due to patient's weight in and out cath determined to be best. When I entered the room to discuss the in and out cath, pt states that he has voided a large amount this morning.

## 2017-01-11 NOTE — PROGRESS NOTES
Pt c/o SOB and pain. VS stable. Lung sounds diminished. Pt rating right sided back pain at 9/10. Pt states he has had this pain before. Pt requesting pain medication. Will continue to monitor.

## 2017-01-12 LAB
ANION GAP BLD CALC-SCNC: 11 MMOL/L (ref 7–16)
BUN SERPL-MCNC: 54 MG/DL (ref 6–23)
CALCIUM SERPL-MCNC: 8 MG/DL (ref 8.3–10.4)
CHLORIDE SERPL-SCNC: 102 MMOL/L (ref 98–107)
CO2 SERPL-SCNC: 28 MMOL/L (ref 21–32)
CREAT SERPL-MCNC: 1.99 MG/DL (ref 0.8–1.5)
GLUCOSE BLD STRIP.AUTO-MCNC: 113 MG/DL (ref 65–100)
GLUCOSE BLD STRIP.AUTO-MCNC: 183 MG/DL (ref 65–100)
GLUCOSE BLD STRIP.AUTO-MCNC: 193 MG/DL (ref 65–100)
GLUCOSE BLD STRIP.AUTO-MCNC: 235 MG/DL (ref 65–100)
GLUCOSE SERPL-MCNC: 113 MG/DL (ref 65–100)
MAGNESIUM SERPL-MCNC: 2.6 MG/DL (ref 1.8–2.4)
POTASSIUM SERPL-SCNC: 4.3 MMOL/L (ref 3.5–5.1)
SODIUM SERPL-SCNC: 141 MMOL/L (ref 136–145)

## 2017-01-12 PROCEDURE — 83735 ASSAY OF MAGNESIUM: CPT | Performed by: NURSE PRACTITIONER

## 2017-01-12 PROCEDURE — 74011250637 HC RX REV CODE- 250/637: Performed by: INTERNAL MEDICINE

## 2017-01-12 PROCEDURE — 74011250637 HC RX REV CODE- 250/637: Performed by: NURSE PRACTITIONER

## 2017-01-12 PROCEDURE — 74011636637 HC RX REV CODE- 636/637: Performed by: NURSE PRACTITIONER

## 2017-01-12 PROCEDURE — 82962 GLUCOSE BLOOD TEST: CPT

## 2017-01-12 PROCEDURE — 80048 BASIC METABOLIC PNL TOTAL CA: CPT | Performed by: NURSE PRACTITIONER

## 2017-01-12 PROCEDURE — 74011250637 HC RX REV CODE- 250/637: Performed by: PHYSICIAN ASSISTANT

## 2017-01-12 PROCEDURE — 65660000000 HC RM CCU STEPDOWN

## 2017-01-12 PROCEDURE — 36415 COLL VENOUS BLD VENIPUNCTURE: CPT | Performed by: NURSE PRACTITIONER

## 2017-01-12 RX ORDER — HYDRALAZINE HYDROCHLORIDE 25 MG/1
25 TABLET, FILM COATED ORAL 2 TIMES DAILY
Status: DISCONTINUED | OUTPATIENT
Start: 2017-01-12 | End: 2017-01-13 | Stop reason: HOSPADM

## 2017-01-12 RX ORDER — TORSEMIDE 100 MG/1
100 TABLET ORAL DAILY
Status: DISCONTINUED | OUTPATIENT
Start: 2017-01-12 | End: 2017-01-13 | Stop reason: HOSPADM

## 2017-01-12 RX ADMIN — Medication 5 ML: at 21:11

## 2017-01-12 RX ADMIN — GABAPENTIN 600 MG: 300 CAPSULE ORAL at 08:48

## 2017-01-12 RX ADMIN — CARVEDILOL 25 MG: 25 TABLET, FILM COATED ORAL at 08:48

## 2017-01-12 RX ADMIN — Medication 5 ML: at 05:43

## 2017-01-12 RX ADMIN — ISOSORBIDE DINITRATE 20 MG: 20 TABLET ORAL at 22:29

## 2017-01-12 RX ADMIN — ALLOPURINOL 100 MG: 100 TABLET ORAL at 08:48

## 2017-01-12 RX ADMIN — POLYETHYLENE GLYCOL 3350 17 G: 17 POWDER, FOR SOLUTION ORAL at 08:48

## 2017-01-12 RX ADMIN — APIXABAN 5 MG: 5 TABLET, FILM COATED ORAL at 08:48

## 2017-01-12 RX ADMIN — OXYCODONE HYDROCHLORIDE 15 MG: 5 TABLET ORAL at 21:08

## 2017-01-12 RX ADMIN — SPIRONOLACTONE 25 MG: 25 TABLET, FILM COATED ORAL at 08:48

## 2017-01-12 RX ADMIN — Medication 5 ML: at 14:00

## 2017-01-12 RX ADMIN — PRAVASTATIN SODIUM 80 MG: 80 TABLET ORAL at 22:29

## 2017-01-12 RX ADMIN — TETRAHYDROZOLINE HYDROCHLORIDE 1 DROP: 0.5 SOLUTION/ DROPS OPHTHALMIC at 22:47

## 2017-01-12 RX ADMIN — FAMOTIDINE 20 MG: 20 TABLET ORAL at 18:06

## 2017-01-12 RX ADMIN — CARVEDILOL 25 MG: 25 TABLET, FILM COATED ORAL at 18:06

## 2017-01-12 RX ADMIN — DOCUSATE SODIUM 100 MG: 100 CAPSULE, LIQUID FILLED ORAL at 08:48

## 2017-01-12 RX ADMIN — TETRAHYDROZOLINE HYDROCHLORIDE 1 DROP: 0.5 SOLUTION/ DROPS OPHTHALMIC at 08:50

## 2017-01-12 RX ADMIN — DOCUSATE SODIUM 100 MG: 100 CAPSULE, LIQUID FILLED ORAL at 18:06

## 2017-01-12 RX ADMIN — POLYETHYLENE GLYCOL 3350 17 G: 17 POWDER, FOR SOLUTION ORAL at 18:06

## 2017-01-12 RX ADMIN — ISOSORBIDE DINITRATE 20 MG: 20 TABLET ORAL at 13:18

## 2017-01-12 RX ADMIN — COLCHICINE 0.6 MG: 0.6 TABLET, FILM COATED ORAL at 08:48

## 2017-01-12 RX ADMIN — ISOSORBIDE DINITRATE 20 MG: 20 TABLET ORAL at 05:43

## 2017-01-12 RX ADMIN — HYDRALAZINE HYDROCHLORIDE 25 MG: 25 TABLET, FILM COATED ORAL at 05:43

## 2017-01-12 RX ADMIN — INSULIN GLARGINE 50 UNITS: 100 INJECTION, SOLUTION SUBCUTANEOUS at 22:29

## 2017-01-12 RX ADMIN — HYDRALAZINE HYDROCHLORIDE 25 MG: 25 TABLET, FILM COATED ORAL at 18:06

## 2017-01-12 RX ADMIN — POTASSIUM CHLORIDE 10 MEQ: 10 TABLET, EXTENDED RELEASE ORAL at 08:48

## 2017-01-12 RX ADMIN — INSULIN LISPRO 4 UNITS: 100 INJECTION, SOLUTION INTRAVENOUS; SUBCUTANEOUS at 22:30

## 2017-01-12 RX ADMIN — GABAPENTIN 600 MG: 300 CAPSULE ORAL at 18:06

## 2017-01-12 RX ADMIN — INSULIN LISPRO 2 UNITS: 100 INJECTION, SOLUTION INTRAVENOUS; SUBCUTANEOUS at 18:06

## 2017-01-12 RX ADMIN — APIXABAN 5 MG: 5 TABLET, FILM COATED ORAL at 21:05

## 2017-01-12 RX ADMIN — TETRAHYDROZOLINE HYDROCHLORIDE 1 DROP: 0.5 SOLUTION/ DROPS OPHTHALMIC at 16:00

## 2017-01-12 RX ADMIN — INSULIN LISPRO 2 UNITS: 100 INJECTION, SOLUTION INTRAVENOUS; SUBCUTANEOUS at 13:18

## 2017-01-12 RX ADMIN — TORSEMIDE 100 MG: 100 TABLET ORAL at 13:18

## 2017-01-12 NOTE — PROGRESS NOTES
BP 92/68 right arm. BP 99/72 left arm. Pt asymptomatic and resting quietly. Spoke with Dr. Lashaun Solano and received orders to hold hydralazine 25mg and isordil 20mg tonight. Will continue to monitor.

## 2017-01-12 NOTE — PROGRESS NOTES
Subjective:   Daily Progress Note: 2017 12:01 PM    No complaints    Current Facility-Administered Medications   Medication Dose Route Frequency    hydrALAZINE (APRESOLINE) tablet 25 mg  25 mg Oral BID    bisacodyl (DULCOLAX) suppository 10 mg  10 mg Rectal DAILY    polyethylene glycol (MIRALAX) packet 17 g  17 g Oral BID    docusate sodium (COLACE) capsule 100 mg  100 mg Oral BID    tetrahydrozoline (OPTI-CLEAR) 0.05 % ophthalmic drops 1 Drop  1 Drop Both Eyes TID    carvedilol (COREG) tablet 25 mg  25 mg Oral BID WITH MEALS    oxyCODONE IR (ROXICODONE) tablet 15 mg  15 mg Oral Q4H PRN    gabapentin (NEURONTIN) capsule 600 mg  600 mg Oral BID    pravastatin (PRAVACHOL) tablet 80 mg  80 mg Oral QHS    colchicine tablet 0.6 mg  0.6 mg Oral DAILY    apixaban (ELIQUIS) tablet 5 mg  5 mg Oral Q12H    famotidine (PEPCID) tablet 20 mg  20 mg Oral QPM    allopurinol (ZYLOPRIM) tablet 100 mg  100 mg Oral DAILY    insulin glargine (LANTUS) injection 50 Units  50 Units SubCUTAneous QHS    spironolactone (ALDACTONE) tablet 25 mg  25 mg Oral DAILY    isosorbide dinitrate (ISORDIL) tablet 20 mg  20 mg Oral TID    nitroglycerin (NITROSTAT) tablet 0.4 mg  0.4 mg SubLINGual Q5MIN PRN    sodium chloride (NS) flush 5-10 mL  5-10 mL IntraVENous Q8H    sodium chloride (NS) flush 5-10 mL  5-10 mL IntraVENous PRN    insulin lispro (HUMALOG) injection   SubCUTAneous AC&HS               Objective:     Visit Vitals    /58 (BP 1 Location: Right arm, BP Patient Position: Lying left side)    Pulse 72    Temp 97.4 °F (36.3 °C)    Resp 19    Ht 5' 6\" (1.676 m)    Wt (!) 178.4 kg (393 lb 3.2 oz)    SpO2 99%    BMI 63.46 kg/m2    O2 Flow Rate (L/min): 2 l/min O2 Device: Nasal cannula    Temp (24hrs), Av.3 °F (36.3 °C), Min:96.8 °F (36 °C), Max:97.6 °F (36.4 °C)      01/12 0701 - 1900  In: 250 [P.O.:250]  Out: -   01/10 1901 -  07  In:  [P.O.:]  Out: 3151 [Urine:3150]      Visit Vitals  /58 (BP 1 Location: Right arm, BP Patient Position: Lying left side)    Pulse 72    Temp 97.4 °F (36.3 °C)    Resp 19    Ht 5' 6\" (1.676 m)    Wt (!) 178.4 kg (393 lb 3.2 oz)    SpO2 99%    BMI 63.46 kg/m2       Lungs: clear to auscultation bilaterally  Heart: regular rate and rhythm  Abdomen: soft, non-tender.   Extremities: + edema          Data Review    Recent Results (from the past 48 hour(s))   URINALYSIS W/ RFLX MICROSCOPIC    Collection Time: 01/10/17  2:26 PM   Result Value Ref Range    Color YELLOW      Appearance CLEAR      Specific gravity 1.012 1.001 - 1.023      pH (UA) 5.0 5.0 - 9.0      Protein TRACE (A) NEG mg/dL    Glucose NEGATIVE  mg/dL    Ketone NEGATIVE  NEG mg/dL    Bilirubin NEGATIVE  NEG      Blood NEGATIVE  NEG      Urobilinogen 1.0 0.2 - 1.0 EU/dL    Nitrites NEGATIVE  NEG      Leukocyte Esterase NEGATIVE  NEG     SODIUM, UR, RANDOM    Collection Time: 01/10/17  2:27 PM   Result Value Ref Range    Sodium urine, random 9 MMOL/L   GLUCOSE, POC    Collection Time: 01/10/17  4:00 PM   Result Value Ref Range    Glucose (POC) 171 (H) 65 - 100 mg/dL   EKG, 12 LEAD, INITIAL    Collection Time: 01/10/17  5:28 PM   Result Value Ref Range    Systolic BP  mmHg    Diastolic BP  mmHg    Ventricular Rate 78 BPM    Atrial Rate 78 BPM    P-R Interval 230 ms    QRS Duration 94 ms    Q-T Interval 406 ms    QTC Calculation (Bezet) 462 ms    Calculated P Axis 60 degrees    Calculated R Axis -166 degrees    Calculated T Axis 60 degrees    Diagnosis       Sinus rhythm with 1st degree A-V block  Right superior axis deviation  Low voltage QRS  Cannot rule out Anterior infarct , age undetermined  Abnormal ECG  Confirmed by ST ARSLAN BARKER MD (), RYLAND MANCIA ([id number]) on 1/10/2017 8:45:26 PM     GLUCOSE, POC    Collection Time: 01/10/17  9:35 PM   Result Value Ref Range    Glucose (POC) 143 (H) 65 - 100 mg/dL   MAGNESIUM    Collection Time: 01/11/17  5:13 AM   Result Value Ref Range    Magnesium 2.8 (H) 1.8 - 2.4 mg/dL   METABOLIC PANEL, BASIC    Collection Time: 01/11/17  5:13 AM   Result Value Ref Range    Sodium 140 136 - 145 mmol/L    Potassium 4.3 3.5 - 5.1 mmol/L    Chloride 103 98 - 107 mmol/L    CO2 28 21 - 32 mmol/L    Anion gap 9 7 - 16 mmol/L    Glucose 88 65 - 100 mg/dL    BUN 54 (H) 6 - 23 MG/DL    Creatinine 1.86 (H) 0.8 - 1.5 MG/DL    GFR est AA 49 (L) >60 ml/min/1.73m2    GFR est non-AA 41 (L) >60 ml/min/1.73m2    Calcium 8.3 8.3 - 10.4 MG/DL   GLUCOSE, POC    Collection Time: 01/11/17  6:24 AM   Result Value Ref Range    Glucose (POC) 91 65 - 100 mg/dL   GLUCOSE, POC    Collection Time: 01/11/17 11:03 AM   Result Value Ref Range    Glucose (POC) 95 65 - 100 mg/dL   GLUCOSE, POC    Collection Time: 01/11/17  4:06 PM   Result Value Ref Range    Glucose (POC) 130 (H) 65 - 100 mg/dL   GLUCOSE, POC    Collection Time: 01/11/17  9:25 PM   Result Value Ref Range    Glucose (POC) 147 (H) 65 - 100 mg/dL   MAGNESIUM    Collection Time: 01/12/17  4:05 AM   Result Value Ref Range    Magnesium 2.6 (H) 1.8 - 2.4 mg/dL   METABOLIC PANEL, BASIC    Collection Time: 01/12/17  4:05 AM   Result Value Ref Range    Sodium 141 136 - 145 mmol/L    Potassium 4.3 3.5 - 5.1 mmol/L    Chloride 102 98 - 107 mmol/L    CO2 28 21 - 32 mmol/L    Anion gap 11 7 - 16 mmol/L    Glucose 113 (H) 65 - 100 mg/dL    BUN 54 (H) 6 - 23 MG/DL    Creatinine 1.99 (H) 0.8 - 1.5 MG/DL    GFR est AA 46 (L) >60 ml/min/1.73m2    GFR est non-AA 38 (L) >60 ml/min/1.73m2    Calcium 8.0 (L) 8.3 - 10.4 MG/DL   GLUCOSE, POC    Collection Time: 01/12/17  6:33 AM   Result Value Ref Range    Glucose (POC) 113 (H) 65 - 100 mg/dL   GLUCOSE, POC    Collection Time: 01/12/17 11:11 AM   Result Value Ref Range    Glucose (POC) 183 (H) 65 - 100 mg/dL       Assessment     Patient Active Problem List    Diagnosis Date Noted    Constipation 01/08/2017    Morbid obesity with BMI of 60.0-69.9, adult ([facility] [address] 11/28/2016    Hypoxemia 11/28/2016    Hypersomnia 11/28/2016    Obesity hypoventilation syndrome ([facility] [address] 10/24/2016    Atrial flutter ([address] 10/20/2016    Acute on chronic systolic (congestive) heart failure (HCC) 10/18/2016    Nausea & vomiting 11/30/2015    Lactic acidosis 11/30/2015    Hypertension 11/30/2015    Chest pain 10/21/2015    AICD (automatic cardioverter/defibrillator) present 10/21/2015    Congestive heart failure (CHF) ([facility] [address] 10/21/2015    Cardiomyopathy ([facility] [address] 10/21/2015    Abdominal fluid collection 08/18/2014    Pleural effusion 08/13/2014    CKD (chronic kidney disease) stage 3, GFR 30-59 ml/min 08/13/2014    Chronic pancreatitis ([facility] [address] 08/13/2014    Morbid obesity ([facility] [address] 07/02/2013    Nonischemic dilated cardiomyopathy ([facility] [address] 07/02/2013    Dyslipidemia 07/02/2013    Diabetes mellitus type II, uncontrolled ([facility] [address] 07/02/2013    Noncompliance with medication regimen 07/02/2013    Chronic back pain 02/20/2010    Obstructive sleep apnea 02/15/2010           Problems Addressed by Nephrology     CKD 3  CMP  Obesity    Plan     Good response to diuretics. Reduce Hydralazine, discontinue potassium supplementation. Renal function remains stable. Will arrange for outpatient f/u and sign off. Please recall as needed, thanks!

## 2017-01-12 NOTE — PROGRESS NOTES
[facility] CARDIOLOGY PROGRESS NOTE           1/12/2017 12:09 PM    Admit Date: 1/6/2017    Admit Diagnosis: sob;Cardiomyopathy ([address]      Subjective:   No complaints this AM, no chest pain or shortness of breath    Interval History: (History of pertinent interval events obtained from nursing staff)  Diureses > 1L yesterday    ROS:  GEN:  No fever or chills  Cardiovascular:  As noted above  Pulmonary:  As noted above  Neuro:  No new focal motor or sensory loss      Objective:     Vitals:    01/12/17 0116 01/12/17 0531 01/12/17 0750 01/12/17 1153   BP: 99/61 111/66 98/70 107/58   Pulse: 69 69 68 72   Resp: 18 16 19 19   Temp: 97.6 °F (36.4 °C) 97.3 °F (36.3 °C) 97.3 °F (36.3 °C) 97.4 °F (36.3 °C)   SpO2: 99% 99% 100% 99%   Weight:  (!) 178.4 kg (393 lb 3.2 oz)     Height:           Physical Exam:  General- morbidly obese, NAD  Neck- supple  CV- regular rate and rhythm, very distant S1, S2  Lung- clear bilaterally but distant  Abd- soft, nontender, nondistended  Ext- + edema  Skin- warm and dry    Current Facility-Administered Medications   Medication Dose Route Frequency    hydrALAZINE (APRESOLINE) tablet 25 mg  25 mg Oral BID    bisacodyl (DULCOLAX) suppository 10 mg  10 mg Rectal DAILY    polyethylene glycol (MIRALAX) packet 17 g  17 g Oral BID    docusate sodium (COLACE) capsule 100 mg  100 mg Oral BID    tetrahydrozoline (OPTI-CLEAR) 0.05 % ophthalmic drops 1 Drop  1 Drop Both Eyes TID    carvedilol (COREG) tablet 25 mg  25 mg Oral BID WITH MEALS    oxyCODONE IR (ROXICODONE) tablet 15 mg  15 mg Oral Q4H PRN    gabapentin (NEURONTIN) capsule 600 mg  600 mg Oral BID    pravastatin (PRAVACHOL) tablet 80 mg  80 mg Oral QHS    colchicine tablet 0.6 mg  0.6 mg Oral DAILY    apixaban (ELIQUIS) tablet 5 mg  5 mg Oral Q12H    famotidine (PEPCID) tablet 20 mg  20 mg Oral QPM    allopurinol (ZYLOPRIM) tablet 100 mg  100 mg Oral DAILY    insulin glargine (LANTUS) injection 50 Units  50 Units SubCUTAneous QHS    spironolactone (ALDACTONE) tablet 25 mg  25 mg Oral DAILY    isosorbide dinitrate (ISORDIL) tablet 20 mg  20 mg Oral TID    nitroglycerin (NITROSTAT) tablet 0.4 mg  0.4 mg SubLINGual Q5MIN PRN    sodium chloride (NS) flush 5-10 mL  5-10 mL IntraVENous Q8H    sodium chloride (NS) flush 5-10 mL  5-10 mL IntraVENous PRN    insulin lispro (HUMALOG) injection   SubCUTAneous AC&HS     Data Review:   Recent Results (from the past 24 hour(s))   GLUCOSE, POC    Collection Time: 01/11/17  4:06 PM   Result Value Ref Range    Glucose (POC) 130 (H) 65 - 100 mg/dL   GLUCOSE, POC    Collection Time: 01/11/17  9:25 PM   Result Value Ref Range    Glucose (POC) 147 (H) 65 - 100 mg/dL   MAGNESIUM    Collection Time: 01/12/17  4:05 AM   Result Value Ref Range    Magnesium 2.6 (H) 1.8 - 2.4 mg/dL   METABOLIC PANEL, BASIC    Collection Time: 01/12/17  4:05 AM   Result Value Ref Range    Sodium 141 136 - 145 mmol/L    Potassium 4.3 3.5 - 5.1 mmol/L    Chloride 102 98 - 107 mmol/L    CO2 28 21 - 32 mmol/L    Anion gap 11 7 - 16 mmol/L    Glucose 113 (H) 65 - 100 mg/dL    BUN 54 (H) 6 - 23 MG/DL    Creatinine 1.99 (H) 0.8 - 1.5 MG/DL    GFR est AA 46 (L) >60 ml/min/1.73m2    GFR est non-AA 38 (L) >60 ml/min/1.73m2    Calcium 8.0 (L) 8.3 - 10.4 MG/DL   GLUCOSE, POC    Collection Time: 01/12/17  6:33 AM   Result Value Ref Range    Glucose (POC) 113 (H) 65 - 100 mg/dL   GLUCOSE, POC    Collection Time: 01/12/17 11:11 AM   Result Value Ref Range    Glucose (POC) 183 (H) 65 - 100 mg/dL       EKG:  (EKG has been independently visualized by me with interpretation below)  Assessment:     Active Problems: Morbid obesity ([address] (7/2/2013)      CKD (chronic kidney disease) stage 3, GFR 30-59 ml/min (8/13/2014)      Cardiomyopathy ([facility] [address] (10/21/2015)      Constipation (1/8/2017)      Plan:   1. NICM, uncontrolled: Pt body habitus makes assessment of his volume status very difficult.  Prior to admission he has had significant weight gain since November and reports worsening SORENSON, orthonpea, PND, leg swelling consistent with decompensated CHF. He has been on increased doses of lasix since admission, and am concerned we are not getting an accurate assessment of his I and Os. Creatinine seems to have stabilized over the past 1-2 days  --will restart home torsemide today  --appreciate nephrology recs      3. Constipation, improving: Appreciate GI consult      4. ICD: cont device follow up, functioning normally      5. CKD: restarting home torsemide, nephrology, BNP, daily weights    Babatunde Roque MD  Cardiology/Electrophysiology

## 2017-01-12 NOTE — PROGRESS NOTES
Problem: Falls - Risk of  Goal: *Absence of falls  Outcome: Progressing Towards Goal  Pt progressing towards goal. No falls since admission. Bed low and locked. Call light within reach. Side rails x 2. Gripper socks applied. Personal belongings within reach. Pt verbalizes understanding to call for assistance. Problem: Heart Failure: Day 5  Goal: Activity/Safety  Outcome: Progressing Towards Goal  Pt progressing towards goal. Pt out of bed with assistance as tolerated. Goal: Nutrition/Diet  Outcome: Progressing Towards Goal  Pt progressing towards goal. Pt on 2L fluid restriction. Goal: Medications  Outcome: Progressing Towards Goal  Pt progressing towards goal. IVP lasix was held due to creatinine function. One time dose of lasix given on 1/11/17.

## 2017-01-12 NOTE — PROGRESS NOTES
Notified by monitor tech that patient had 9 beat run of Vtach. Pt resting in bed quietly. Will continue to monitor. Strip placed in chart.

## 2017-01-12 NOTE — PROGRESS NOTES
Bedside and Verbal shift change report given to self (oncoming nurse) by Faraz Durham RN (offgoing nurse). Report included the following information SBAR, Kardex, MAR and Recent Results.

## 2017-01-12 NOTE — PROGRESS NOTES
Bedside and Verbal shift change report given to Frank Rodriguez RN (oncoming nurse) by self Marta Hutchison nurse). Report included the following information SBAR, Kardex, MAR and Recent Results.

## 2017-01-12 NOTE — ANCILLARY DISCHARGE INSTRUCTIONS
[facility] [address] Pharmacist consult. I met with Mr. Marcus Graham and his family in his hospital room. He is a prior patient from 2014 but did remember me. I reviewed his current medications. He is on the same PTA medications except for torsemide and digoxin. I gave him my card with my name and cell number on it. I told him I would call him after discharge to review his discharge. He said to call him at [phone]. He does not know when he will be discharged.   1/12/17 [id number]

## 2017-01-12 NOTE — PROGRESS NOTES
Urinal checked throughout the night to assess output. Last urine emptied at 2001 on 1/11/17. No urine output noted since then. Pt states this AM that he has not urinated all night. Pt states he only drank one cup (360mL) of diet rell mist and sips of water with medications. Standing weight increased by 3 lbs this AM. Patient concerned that he is not taking his PTA Demadex in the hospital. Pt encouraged to try urinating. Spoke with Dr. Finn Cm and received orders for in/out cath if patient has not urinated. Will continue to monitor.

## 2017-01-13 VITALS
DIASTOLIC BLOOD PRESSURE: 58 MMHG | BODY MASS INDEX: 50.62 KG/M2 | WEIGHT: 315 LBS | HEIGHT: 66 IN | HEART RATE: 69 BPM | OXYGEN SATURATION: 93 % | TEMPERATURE: 98 F | SYSTOLIC BLOOD PRESSURE: 138 MMHG | RESPIRATION RATE: 22 BRPM

## 2017-01-13 LAB
ANION GAP BLD CALC-SCNC: 9 MMOL/L (ref 7–16)
BUN SERPL-MCNC: 53 MG/DL (ref 6–23)
CALCIUM SERPL-MCNC: 8.2 MG/DL (ref 8.3–10.4)
CHLORIDE SERPL-SCNC: 102 MMOL/L (ref 98–107)
CO2 SERPL-SCNC: 29 MMOL/L (ref 21–32)
CREAT SERPL-MCNC: 1.93 MG/DL (ref 0.8–1.5)
GLUCOSE BLD STRIP.AUTO-MCNC: 155 MG/DL (ref 65–100)
GLUCOSE BLD STRIP.AUTO-MCNC: 226 MG/DL (ref 65–100)
GLUCOSE SERPL-MCNC: 118 MG/DL (ref 65–100)
MAGNESIUM SERPL-MCNC: 2.8 MG/DL (ref 1.8–2.4)
POTASSIUM SERPL-SCNC: 4.3 MMOL/L (ref 3.5–5.1)
SODIUM SERPL-SCNC: 140 MMOL/L (ref 136–145)

## 2017-01-13 PROCEDURE — 74011636637 HC RX REV CODE- 636/637: Performed by: NURSE PRACTITIONER

## 2017-01-13 PROCEDURE — 83735 ASSAY OF MAGNESIUM: CPT | Performed by: NURSE PRACTITIONER

## 2017-01-13 PROCEDURE — 74011250637 HC RX REV CODE- 250/637: Performed by: NURSE PRACTITIONER

## 2017-01-13 PROCEDURE — 82962 GLUCOSE BLOOD TEST: CPT

## 2017-01-13 PROCEDURE — 74011250637 HC RX REV CODE- 250/637: Performed by: INTERNAL MEDICINE

## 2017-01-13 PROCEDURE — 80048 BASIC METABOLIC PNL TOTAL CA: CPT | Performed by: NURSE PRACTITIONER

## 2017-01-13 PROCEDURE — 74011250637 HC RX REV CODE- 250/637: Performed by: PHYSICIAN ASSISTANT

## 2017-01-13 RX ORDER — TORSEMIDE 100 MG/1
100 TABLET ORAL DAILY
Qty: 30 TAB | Refills: 11 | Status: SHIPPED | OUTPATIENT
Start: 2017-01-13 | End: 2017-02-24

## 2017-01-13 RX ORDER — FACIAL-BODY WIPES
10 EACH TOPICAL DAILY
Qty: 1 SUPPOSITORY | Refills: 2 | Status: SHIPPED | OUTPATIENT
Start: 2017-01-13 | End: 2017-04-05

## 2017-01-13 RX ADMIN — SPIRONOLACTONE 25 MG: 25 TABLET, FILM COATED ORAL at 08:50

## 2017-01-13 RX ADMIN — HYDRALAZINE HYDROCHLORIDE 25 MG: 25 TABLET, FILM COATED ORAL at 08:50

## 2017-01-13 RX ADMIN — CARVEDILOL 25 MG: 25 TABLET, FILM COATED ORAL at 08:50

## 2017-01-13 RX ADMIN — DOCUSATE SODIUM 100 MG: 100 CAPSULE, LIQUID FILLED ORAL at 08:49

## 2017-01-13 RX ADMIN — INSULIN LISPRO 2 UNITS: 100 INJECTION, SOLUTION INTRAVENOUS; SUBCUTANEOUS at 08:50

## 2017-01-13 RX ADMIN — ISOSORBIDE DINITRATE 20 MG: 20 TABLET ORAL at 14:50

## 2017-01-13 RX ADMIN — TETRAHYDROZOLINE HYDROCHLORIDE 1 DROP: 0.5 SOLUTION/ DROPS OPHTHALMIC at 08:50

## 2017-01-13 RX ADMIN — ISOSORBIDE DINITRATE 20 MG: 20 TABLET ORAL at 06:04

## 2017-01-13 RX ADMIN — ALLOPURINOL 100 MG: 100 TABLET ORAL at 08:50

## 2017-01-13 RX ADMIN — INSULIN LISPRO 4 UNITS: 100 INJECTION, SOLUTION INTRAVENOUS; SUBCUTANEOUS at 12:41

## 2017-01-13 RX ADMIN — Medication 10 ML: at 06:04

## 2017-01-13 RX ADMIN — GABAPENTIN 600 MG: 300 CAPSULE ORAL at 08:49

## 2017-01-13 RX ADMIN — COLCHICINE 0.6 MG: 0.6 TABLET, FILM COATED ORAL at 08:49

## 2017-01-13 RX ADMIN — APIXABAN 5 MG: 5 TABLET, FILM COATED ORAL at 08:50

## 2017-01-13 RX ADMIN — POLYETHYLENE GLYCOL 3350 17 G: 17 POWDER, FOR SOLUTION ORAL at 08:50

## 2017-01-13 RX ADMIN — TORSEMIDE 100 MG: 100 TABLET ORAL at 08:50

## 2017-01-13 NOTE — PROGRESS NOTES
Bedside and Verbal shift change report given to Florinda Caruso RN (oncoming nurse) by self Shawn valencia).  Report included the following information SBAR, Kardex, ED Summary, Procedure Summary, Intake/Output, MAR, Recent Results and Cardiac Rhythm SR.

## 2017-01-13 NOTE — PROGRESS NOTES
Problem: Nutrition Deficit  Goal: *Optimize nutritional status  Nutrition LOS Note: Day 7  Assessment  Diet order(s): Cardiac  Food,Nutrition, and Pertinent History: Pt with h/o type 2 diabetes, CKD stage 3 and CHF. Pt states that his appetite is doing well and he denies any issues with nausea, vomiting, constipation or diarrhea at this time. Pt states that he came in due to constipation and diarrhea issues but that his doctor now has him on a bowel regimen. Also states that the doctor told him about cardiac rehab here. Pt is very interested in starting this. Pt states that he has already participated in cardiac rehab at [place] [facility]. Pt is agitated with the scales that we have here. States that every time he steps on the scale his weight goes up. Acknowledges that he needs help with his weight and is interested in a healthier diet. Provided pt with tips for easy healthy diet changes. Pt [facility] year from a previous RD. Reviewed how to make quick healthy meals with frozen vegetables. Discussed lean cuts of meat and choosing these more often. Pt set a few easy achievable goals during RD session. Pt is going to cut out sugary beverages and choose options such as water, crystal light, sugar free asael aid or unsweet tea. Pt's other goal is to eliminate fried foods. Pt is interested in getting started with working about but scared about his heart rate going to high and dropping too low. Anthropometrics: Height: 5' 6\" (167.6 cm), Weight Source: Standing scale (comment), Weight: (!) 179.2 kg (395 lb), Body mass index is 63.75 kg/(m^2). BMI class of overweight. Macronutrient Needs:  · EER:  9441-2560 kcal /day (10-12 kcal/kg actual BW)  · EPR:  52-35 grams protein/day (0.8-1 grams/kg IBW)  Intake/Comparative Standards: Per RD meal rounds: 100% of lunch. Average intake for past 5 day(s)/9 recorded meal(s): 100%.  This potentially meets ~100% of kcal and ~100% of protein needs Nutrition Diagnosis: No nutrition diagnosis at this time     Intervention: Meals and snacks: Continue current diet. Megan Otoole [address], [address] [place] [address], [phone]

## 2017-01-13 NOTE — PROGRESS NOTES
Discharge instructions reviewed with mitchel. Prescriptions given for dulcolax and demadex and med info sheets provided for all new medications. Opportunity for questions provided. Patient and family voiced understanding of all discharge instructions. IVs removed and monitor off at discharge.

## 2017-01-13 NOTE — DISCHARGE SUMMARY
[id number] [facility] Cardiology Discharge Summary     Patient ID:  Paty Marx  [id number]  71 y.o.  1965    Admit date: 1/6/2017    Discharge date:  01/13/20117    Admitting Physician: Lily Valdivia MD     Discharge Physician: Vicente Epps NP/Dr. Vitale    Admission Diagnoses: sob  Cardiomyopathy [facility])    Discharge Diagnoses:    Diagnosis    Constipation    Morbid obesity with BMI of 60.0-69.9, adult ([facility]    Hypoxemia    Hypersomnia    Obesity hypoventilation syndrome (HCC)    Atrial flutter (HCC)    Acute on chronic systolic (congestive) heart failure (HCC)    Nausea & vomiting    Lactic acidosis    Hypertension    Chest pain    AICD (automatic cardioverter/defibrillator) present    Congestive heart failure (CHF) (HCC)    Cardiomyopathy (HCC)    Abdominal fluid collection    Pleural effusion    CKD (chronic kidney disease) stage 3, GFR 30-59 ml/min    [facility]    Morbid obesity (HCC)    Nonischemic dilated cardiomyopathy ([facility]    Dyslipidemia    Diabetes mellitus type II, uncontrolled ([facility]    Noncompliance with medication regimen    Chronic back pain    Obstructive sleep apnea       Cardiology Procedures this admission:  None  Consults: None    Hospital Course: Patient presented to the office with complaints of constipation,  progressive shortness of breath, worsening SORENSON, orthopnea, and lower extremity edema. Patient was evaluated and subsequently admitted for further cardiac evaluation and treatment. Patient was treated with IV lasix. Nephrology was consulted for ARF on CRF, which was felt to be pre renal.  Lasix was temporarily held and was restarted once renal indices stabilize. Renal function stabilized and lasix IV was resumed. Patient's home torsemide was restarted the day prior to discharge, and was -1900 cc at discharge. GI was consulted for continued constipation. His constipation was treated with suppositories and enemas.   He was felt no a candidate for colonoscopy given his multiple comorbidities. Patient needs to follow up as OP with [facility] in 3-4 weeks. At time of discharge, patient was up feeling well without any complaints shortness of breath. LE edema was much improved. Patient's labs were stable with creatinine of 1.93. Patient was seen and examined by Dr. Fatoumata Connolly and determined stable and ready for discharge. Patient was instructed on the importance of medication compliance, low sodium diet, 2 liter per day fluid restriction and daily weights. For maximized medical therapy of congestive heart failure, patient will continue use of BB. No ACE with recent ARF on CRF. The patient has been scheduled for 7 day transitional care follow up with [facility] -- Dr. Myron Li on 1/19/17 @ 1130. Will have BMP prior to follow up. DISPOSITION: The patient is being discharged home in stable condition on a low saturated fat, low cholesterol and low salt diet. The patient is instructed to advance activities as tolerated to the limit of fatigue or shortness of breath. The patient is informed to monitor daily weights and maintain a 2 liter per day fluid restriction. The patient is instructed to call the office for any shortness of breath, weight gain, or increased peripheral edema. Discharge Exam:   Visit Vitals    /58 (BP 1 Location: Right arm, BP Patient Position: At rest)    Pulse 69    Temp 98 °F (36.7 °C)    Resp 22    Ht 5' 6\" (1.676 m)    Wt (!) 179.2 kg (395 lb)    SpO2 93%    BMI 63.75 kg/m2     Patient has been seen by Dr. Fatoumata Connolly: see his progress note for exam details.     Recent Results (from the past 24 hour(s))   GLUCOSE, POC    Collection Time: 01/12/17  4:34 PM   Result Value Ref Range    Glucose (POC) 193 (H) 65 - 100 mg/dL   GLUCOSE, POC    Collection Time: 01/12/17  9:22 PM   Result Value Ref Range    Glucose (POC) 235 (H) 65 - 100 mg/dL   MAGNESIUM    Collection Time: 01/13/17  5:00 AM   Result Value Ref Range    Magnesium 2.8 (H) 1.8 - 2.4 mg/dL   METABOLIC PANEL, BASIC    Collection Time: 01/13/17  5:00 AM   Result Value Ref Range    Sodium 140 136 - 145 mmol/L    Potassium 4.3 3.5 - 5.1 mmol/L    Chloride 102 98 - 107 mmol/L    CO2 29 21 - 32 mmol/L    Anion gap 9 7 - 16 mmol/L    Glucose 118 (H) 65 - 100 mg/dL    BUN 53 (H) 6 - 23 MG/DL    Creatinine 1.93 (H) 0.8 - 1.5 MG/DL    GFR est AA 47 (L) >60 ml/min/1.73m2    GFR est non-AA 39 (L) >60 ml/min/1.73m2    Calcium 8.2 (L) 8.3 - 10.4 MG/DL   GLUCOSE, POC    Collection Time: 01/13/17  6:56 AM   Result Value Ref Range    Glucose (POC) 155 (H) 65 - 100 mg/dL   GLUCOSE, POC    Collection Time: 01/13/17 12:00 PM   Result Value Ref Range    Glucose (POC) 226 (H) 65 - 100 mg/dL         Patient Instructions:     Current Discharge Medication List      START taking these medications    Details   bisacodyl (DULCOLAX) 10 mg suppository Insert 10 mg into rectum daily. Qty: 1 Suppository, Refills: 2         CONTINUE these medications which have CHANGED    Details   torsemide (DEMADEX) 100 mg tablet Take 1 Tab by mouth daily. Qty: 30 Tab, Refills: 11         CONTINUE these medications which have NOT CHANGED    Details   apixaban (ELIQUIS) 5 mg tablet Take 1 Tab by mouth every twelve (12) hours. Qty: 60 Tab, Refills: 0      gabapentin (NEURONTIN) 600 mg tablet Take  by mouth two (2) times a day. oxyCODONE IR (OXY-IR) 15 mg immediate release tablet Take 15 mg by mouth every four (4) hours as needed for Pain. carvedilol (COREG) 25 mg tablet Take 1 Tab by mouth two (2) times daily (with meals). Qty: 60 Tab, Refills: 3      hydrALAZINE (APRESOLINE) 25 mg tablet Take 1 Tab by mouth three (3) times daily. Qty: 90 Tab, Refills: 3      insulin glargine (LANTUS) 100 unit/mL injection 50 Units by SubCUTAneous route nightly.   Qty: 1 Vial, Refills: 0      insulin lispro (HUMALOG) 100 unit/mL injection 15 Units by SubCUTAneous route three (3) times daily (with meals). Qty: 1 Vial, Refills: 0      docusate sodium (COLACE) 100 mg capsule Take 1 Cap by mouth two (2) times a day for 90 days. Qty: 60 Cap, Refills: 0      isosorbide dinitrate (ISORDIL) 20 mg tablet Take 1 Tab by mouth three (3) times daily. Qty: 90 Tab, Refills: 3      ranitidine (ZANTAC) 150 mg tablet Take 150 mg by mouth nightly. spironolactone (ALDACTONE) 25 mg tablet Take 1 Tab by mouth daily. Qty: 30 Tab, Refills: 11      pravastatin (PRAVACHOL) 80 mg tablet Take 1 Tab by mouth nightly. Qty: 30 Tab, Refills: 0      potassium chloride (K-DUR, KLOR-CON) 10 mEq tablet Take 1 Tab by mouth two (2) times a day. Qty: 60 Tab, Refills: 5      polyethylene glycol (MIRALAX) 17 gram packet Take 1 Packet by mouth daily as needed (constipation). Qty: 10 Packet, Refills: 5      cpap machine kit 10 cm qhs      OXYGEN-AIR DELIVERY SYSTEMS 2 lpm qhs      allopurinol (ZYLOPRIM) 100 mg tablet Take 100 mg by mouth daily. glucose blood VI test strips (ASCENSIA AUTODISC VI, ONE TOUCH ULTRA TEST VI) strip Test strips for 30 day supply  Qty: 120 strip, Refills: 0      nitroglycerin (NITROSTAT) 0.4 mg SL tablet 1 Tab by SubLINGual route every five (5) minutes as needed for Chest Pain. Qty: 4 Bottle, Refills: 6      colchicine 0.6 mg tablet Take 0.6 mg by mouth every morning.          STOP taking these medications       digoxin (LANOXIN) 0.125 mg tablet Comments:   Reason for Stopping:                 Signed:  Melanie Boss NP  1/13/2017 1:48 PM

## 2017-01-13 NOTE — DISCHARGE INSTRUCTIONS
Constipation: Care Instructions  Your Care Instructions  Constipation means that you have a hard time passing stools (bowel movements). People pass stools from 3 times a day to once every 3 days. What is normal for you may be different. Constipation may occur with pain in the rectum and cramping. The pain may get worse when you try to pass stools. Sometimes there are small amounts of bright red blood on toilet paper or the surface of stools. This is because of enlarged veins near the rectum (hemorrhoids). A few changes in your diet and lifestyle may help you avoid ongoing constipation. Your doctor may also prescribe medicine to help loosen your stool. Some medicines can cause constipation. These include pain medicines and antidepressants. Tell your doctor about all the medicines you take. Your doctor may want to make a medicine change to ease your symptoms. Follow-up care is a key part of your treatment and safety. Be sure to make and go to all appointments, and call your doctor if you are having problems. It's also a good idea to know your test results and keep a list of the medicines you take. How can you care for yourself at home? · Drink plenty of fluids, enough so that your urine is light yellow or clear like water. If you have kidney, heart, or liver disease and have to limit fluids, talk with your doctor before you increase the amount of fluids you drink. · Include high-fiber foods in your diet each day. These include fruits, vegetables, beans, and whole grains. · Get at least 30 minutes of exercise on most days of the week. Walking is a good choice. You also may want to do other activities, such as running, swimming, cycling, or playing tennis or team sports. · Take a fiber supplement, such as Citrucel or Metamucil, every day. Read and follow all instructions on the label. · Schedule time each day for a bowel movement. A daily routine may help.  Take your time having your bowel movement. · Support your feet with a small step stool when you sit on the toilet. This helps flex your hips and places your pelvis in a squatting position. · Your doctor may recommend an over-the-counter laxative to relieve your constipation. Examples are Milk of Magnesia and MiraLax. Read and follow all instructions on the label. Do not use laxatives on a long-term basis. When should you call for help? Call your doctor now or seek immediate medical care if:  · You have new or worse belly pain. · You have new or worse nausea or vomiting. · You have blood in your stools. Watch closely for changes in your health, and be sure to contact your doctor if:  · Your constipation is getting worse. · You do not get better as expected. Where can you learn more? Go to http://armando-nirav.info/. Enter [id number] in the search box to learn more about \"Constipation: Care Instructions. \"  Current as of: May 27, 2016  Content Version: 11.1  © 1559-3028 [employer]. Care instructions adapted under license by [employer] (which disclaims liability or warranty for this information). If you have questions about a medical condition or this instruction, always ask your healthcare professional. [place] [address] any warranty or liability for your use of this information. Hypertrophic Cardiomyopathy: Care Instructions  Your Care Instructions    Hypertrophic cardiomyopathy (say \"hy-per-CARLAH-fik eaq-fad-mr-ua-ZMZ-nz-thee\") is a disease in which the heart muscle grows too thick. The thickened heart muscle can make it hard for the heart to pump blood well. This can cause shortness of breath, chest pain, dizziness, fainting, and fatigue. It also can affect the heart's electrical system. This increases the risk for life-threatening abnormal heartbeats.  Your doctor may recommend a device called an ICD (implantable cardioverter-defibrillator) to stop these abnormal heartbeats. Good care at home can help you cope with symptoms and get the best results from your medications. It is very important that you follow up with your doctor. Follow-up care is a key part of your treatment and safety. Be sure to make and go to all appointments, and call your doctor if you are having problems. It's also a good idea to know your test results and keep a list of the medicines you take. How can you care for yourself at home? · Take your medicines exactly as prescribed. Call your doctor if you think you are having a problem with your medicine. You will get more details on the specific medicines your doctor prescribes. · Limit alcohol. Alcohol can make your heart weaker. · Talk to your doctor about what level of exercise and what kinds of activities are safe for you. You may need to avoid too much strenuous activity. · Have your family members tested for hypertrophic cardiomyopathy. The condition runs in families, and regular testing can identify it before it causes symptoms. · Do not smoke. Smoking can make this condition worse. If you need help quitting, talk to your doctor about stop-smoking programs and medicines. These can increase your chances of quitting for good. When should you call for help? Call 911 anytime you think you may need emergency care. For example, call if:  · You have symptoms of a heart attack. These may include:  ¨ Chest pain or pressure, or a strange feeling in the chest.  ¨ Sweating. ¨ Shortness of breath. ¨ Nausea or vomiting. ¨ Pain, pressure, or a strange feeling in the back, neck, jaw, or upper belly or in one or both shoulders or arms. ¨ Lightheadedness or sudden weakness. After you call 911, the  may tell you to chew 1 adult-strength or 2 to 4 low-dose aspirin. Wait for an ambulance. Do not try to drive yourself. · You have severe trouble breathing. · You cough up pink, foamy mucus and you have trouble breathing.   · You passed out (lost consciousness). Call your doctor now or seek immediate medical care if:  · You have new or increased shortness of breath. · You are dizzy or lightheaded, or you feel like you may faint. · You have sudden weight gain, such as 3 pounds or more in 2 to 3 days. · You have increased swelling in your legs, ankles, or feet. · You have an ICD and you have signs of infection, such as:  ¨ Increased pain, swelling, warmth, or redness. ¨ Red streaks leading from the cut (incision). ¨ Pus draining from the incision. ¨ A fever. · You have a sudden episode of a skipping heartbeat or a very fast heartbeat. Watch closely for changes in your health, and be sure to contact your doctor if you have any problems. Where can you learn more? Go to http://armando-nirav.info/. Enter Q219 in the search box to learn more about \"Hypertrophic Cardiomyopathy: Care Instructions. \"  Current as of: January 27, 2016  Content Version: 11.1  © 6416-9011 [employer]. Care instructions adapted under license by [employer] (which disclaims liability or warranty for this information). If you have questions about a medical condition or this instruction, always ask your healthcare professional. [place] [address] any warranty or liability for your use of this information. Heart Failure: Care Instructions  Your Care Instructions    Heart failure occurs when your heart does not pump as much blood as the body needs. Failure does not mean that the heart has stopped pumping but rather that it is not pumping as well as it should. Over time, this causes fluid buildup in your lungs and other parts of your body. Fluid buildup can cause shortness of breath, fatigue, swollen ankles, and other problems. By taking medicines regularly, reducing sodium (salt) in your diet, checking your weight every day, and making lifestyle changes, you can feel better and live longer.   Follow-up care is a key part of your treatment and safety. Be sure to make and go to all appointments, and call your doctor if you are having problems. It's also a good idea to know your test results and keep a list of the medicines you take. How can you care for yourself at home? Medicines  · Be safe with medicines. Take your medicines exactly as prescribed. Call your doctor if you think you are having a problem with your medicine. · Do not take any vitamins, over-the-counter medicine, or herbal products without talking to your doctor first. Audrey Pitch not take ibuprofen (Advil or Motrin) and naproxen (Aleve) without talking to your doctor first. They could make your heart failure worse. · You may be taking some of the following medicine. ¨ Beta-blockers can slow heart rate, decrease blood pressure, and improve your condition. Taking a beta-blocker may lower your chance of needing to be hospitalized. ¨ Angiotensin-converting enzyme inhibitors (ACEIs) reduce the heart's workload, lower blood pressure, and reduce swelling. Taking an ACEI may lower your chance of needing to be hospitalized again. ¨ Angiotensin II receptor blockers (ARBs) work like ACEIs. Your doctor may prescribe them instead of ACEIs. ¨ Diuretics, also called water pills, reduce swelling. ¨ Potassium supplements replace this important mineral, which is sometimes lost with diuretics. ¨ Aspirin and other blood thinners prevent blood clots, which can cause a stroke or heart attack. You will get more details on the specific medicines your doctor prescribes. Diet  · Your doctor may suggest that you limit sodium to 2,000 milligrams (mg) a day or less. That is less than 1 teaspoon of salt a day, including all the salt you eat in cooking or in packaged foods. People get most of their sodium from processed foods. Fast food and restaurant meals also tend to be very high in sodium. · Ask your doctor how much liquid you can drink each day.  You may have to limit liquids. Weight  · Weigh yourself without clothing at the same time each day. Record your weight. Call your doctor if you gain more than 3 pounds in 2 to 3 days. A sudden weight gain may mean that your heart failure is getting worse. Activity level  · Start light exercise (if your doctor says it is okay). Even if you can only do a small amount, exercise will help you get stronger, have more energy, and manage your weight and your stress. Walking is an easy way to get exercise. Start out by walking a little more than you did before. Bit by bit, increase the amount you walk. · When you exercise, watch for signs that your heart is working too hard. You are pushing yourself too hard if you cannot talk while you are exercising. If you become short of breath or dizzy or have chest pain, stop, sit down, and rest.  · If you feel \"wiped out\" the day after you exercise, walk slower or for a shorter distance until you can work up to a better pace. · Get enough rest at night. Sleeping with 1 or 2 pillows under your upper body and head may help you breathe easier. Lifestyle changes  · Do not smoke. Smoking can make a heart condition worse. If you need help quitting, talk to your doctor about stop-smoking programs and medicines. These can increase your chances of quitting for good. Quitting smoking may be the most important step you can take to protect your heart. · Limit alcohol to 2 drinks a day for men and 1 drink a day for women. Too much alcohol can cause health problems. · Avoid getting sick from colds and the flu. Get a pneumococcal vaccine shot. If you have had one before, ask your doctor whether you need another dose. Get a flu shot each year. If you must be around people with colds or the flu, wash your hands often. When should you call for help? Call 911 if you have symptoms of sudden heart failure such as:  · You have severe trouble breathing. · You cough up pink, foamy mucus.   · You have a new irregular or rapid heartbeat. Call your doctor now or seek immediate medical care if:  · You have new or increased shortness of breath. · You are dizzy or lightheaded, or you feel like you may faint. · You have sudden weight gain, such as 3 pounds or more in 2 to 3 days. · You have increased swelling in your legs, ankles, or feet. · You are suddenly so tired or weak that you cannot do your usual activities. Watch closely for changes in your health, and be sure to contact your doctor if:  · You develop new symptoms. Where can you learn more? Go to http://armando-nirav.info/. Enter Z887 in the search box to learn more about \"Heart Failure: Care Instructions. \"  Current as of: January 27, 2016  Content Version: 11.1  © 3673-1959 [employer]. Care instructions adapted under license by [employer] (which disclaims liability or warranty for this information). If you have questions about a medical condition or this instruction, always ask your healthcare professional. [place] [address] any warranty or liability for your use of this information. Limiting Sodium and Fluids With Heart Failure: Care Instructions  Your Care Instructions  Sodium causes your body to keep extra water, making it harder for your heart to pump. By limiting sodium, you will feel better and lower your risk of having to go to the hospital.  People get most of their sodium from processed foods. Fast food and restaurant meals also tend to be very high in sodium. Your doctor may suggest that you limit sodium to 2,000 milligrams (mg) a day or less. That is less than 1 teaspoon of salt a day, including all the salt you eat in cooked or packaged foods. Usually, you have to limit the amount of liquids you drink only if your heart failure is severe. Limiting sodium alone often is enough to help your body get rid of extra fluids.  However, your doctor may tell you to limit your fluid intake to a set amount each day. Follow-up care is a key part of your treatment and safety. Be sure to make and go to all appointments, and call your doctor if you are having problems. It's also a good idea to know your test results and keep a list of the medicines you take. How can you care for yourself at home? Read food labels  · Read food labels on cans and food packages. The labels tell you how much sodium is in each serving. Make sure that you look at the serving size. If you eat more than the serving size, you have eaten more sodium than is listed for one serving. · Food labels also tell you the Percent Daily Value. If the Percent Daily Value says 50%, it means that you will get at least 50% of all the sodium you need for the entire day in one serving. Choose products with low Percent Daily Values for sodium. · Be aware that sodium can come in forms other than salt, including monosodium glutamate (MSG), sodium citrate, and sodium bicarbonate (baking soda). MSG is often added to Asian food. You can sometimes ask for food without MSG or salt. Buy low-sodium foods  · Buy foods that are labeled \"unsalted\" (no salt added), \"sodium-free\" (less than 5 mg of sodium per serving), or \"low-sodium\" (less than 140 mg of sodium per serving). A food labeled \"light sodium\" has less than half of the full-sodium version of that food. Foods labeled \"reduced-sodium\" may still have too much sodium. · Buy fresh vegetables or plain, frozen vegetables. Buy low-sodium versions of canned vegetables, soups, and other canned goods. Prepare low-sodium meals  · Use less salt each day when cooking. Reducing salt in this way will help you adjust to the taste. Do not add salt after cooking. Take the salt shaker off the table. · Flavor your food with garlic, lemon juice, onion, vinegar, herbs, and spices instead of salt. Do not use soy sauce, steak sauce, onion salt, garlic salt, mustard, or ketchup on your food.   · Make your own salad dressings, sauces, and ketchup without adding salt. · Use less salt (or none) when recipes call for it. You can often use half the salt a recipe calls for without losing flavor. Other dishes like rice, pasta, and grains do not need added salt. · Rinse canned vegetables. This removes some--but not all--of the salt. · Avoid water that has a naturally high sodium content or that has been treated with water softeners, which add sodium. Call your local water company to find out the sodium content of your water supply. If you buy bottled water, read the label and choose a sodium-free brand. Avoid high-sodium foods, such as:  · Smoked, cured, salted, and canned meat, fish, and poultry. · Ham, fuller, hot dogs, and luncheon meats. · Regular, hard, and processed cheese and regular peanut butter. · Crackers with salted tops. · Frozen prepared meals. · Canned and dried soups, broths, and bouillon, unless labeled sodium-free or low-sodium. · Canned vegetables, unless labeled sodium-free or low-sodium. · Salted snack foods such as chips and pretzels. · [place] fries, pizza, tacos, and other fast foods. · Pickles, olives, ketchup, and other condiments, especially soy sauce, unless labeled sodium-free or low-sodium. If you cannot cook for yourself  · Have family members or friends help you, or have someone cook low-sodium meals. · Check with your local senior nutrition program to find out where meals are served and whether they offer a low-sodium option. You can often find these programs through your local health department or hospital.  · Have meals delivered to your home. Most [facility] have a Meals on [employer]. These programs provide one hot meal a day for older adults, delivered to their homes. Ask whether these meals are low-sodium. Let them know that you are on a low-sodium diet. Limiting fluid intake  · Find a method that works for you. You might simply write down how much you drink every time you do.  Some people keep a container filled with the amount of fluid allowed for that day. If they drink from a source other than the container, then they pour out that amount. · Measure your regular drinking glasses to find out how much fluid each one holds. Once you know this, you will not have to measure every time. · Besides water, milk, juices, and other drinks, some foods have a lot of fluid. Count any foods that will melt (such as ice cream or gelatin dessert) or liquid foods (such as soup) as part of your fluid intake for the day. Where can you learn more? Go to http://armando-nirav.info/. Enter A166 in the search box to learn more about \"Limiting Sodium and Fluids With Heart Failure: Care Instructions. \"  Current as of: January 27, 2016  Content Version: 11.1  © 0038-3684 [employer]. Care instructions adapted under license by [employer] (which disclaims liability or warranty for this information). If you have questions about a medical condition or this instruction, always ask your healthcare professional. [place] [address] any warranty or liability for your use of this information.

## 2017-01-13 NOTE — ADT AUTH CERT NOTES
Myocarditis - Care Day 7 (1/12/2017) by Nicolette Barclay        Review Entered Review Status       1/13/2017 Completed       Details              Care Day: 7 Care Date: 1/12/2017 Level of Care: Telemetry       Guideline Day 3        Clinical Status       (X) * Hemodynamic stability       1/13/2017 10:04 AM EST by Consuelo Mac         T 97.3, BP 98/70, P 68, R 19              (X) * Pulmonary edema absent or improved       (X) * ECG changes absent or stable       (X) * Pain absent or managed       ( ) * Renal function at baseline or stable       (X) * Dangerous arrhythmia absent       (X) * MI ruled out       (X) * No evidence of significant pericardial effusion       ( ) * Discharge plans and education understood              Activity       (X) * Ambulatory [D]              Routes       (X) * Oral hydration, medications, and diet              Interventions       (X) BUN, creatinine       1/13/2017 10:04 AM EST by Consuelo Mac         BUN 54, CREAT 1.99                     1/13/2017 10:04 AM EST by Consuelo Mac       Subject: Additional Clinical Information               [facility] PROGRESS NOTE                    Subjective:                 No complaints this AM, no chest pain or shortness of breath. , 02 SAT 99% 2L NC         GLUC 113, , CA 8.0, MAG 2.6,                    Plan:                 1. NICM, uncontrolled: Pt body habitus makes assessment of his volume status very difficult. Prior to admission he has had significant weight gain since November and reports worsening SORENSON, orthonpea, PND, leg swelling consistent with decompensated CHF. He has been on increased doses of lasix since admission, and am concerned we are not getting an accurate assessment of his I and Os. Creatinine seems to have stabilized over the past 1-2 days         --will restart home torsemide today         --appreciate nephrology recs         ãã         3.  Constipation, improving: Appreciate GI consult         ãã       4. ICD: cont device follow up, functioning normally         ãã         5. CKD: restarting home torsemide, nephrology, BNP, daily weights         ã                  LANTUS 50U SC Q HS, SSI SC AC & HS, DEMADEX PO QD                                          * Milestone                  Myocarditis - Care Day 6 (1/11/2017) by Kadi Cooley        Review Entered Review Status       1/13/2017 Completed       Details              Care Day: 6 Care Date: 1/11/2017 Level of Care: Telemetry       Guideline Day 3        Clinical Status       (X) * Hemodynamic stability       ( ) * Pulmonary edema absent or improved       (X) * ECG changes absent or stable       (X) * Pain absent or managed       ( ) * Renal function at baseline or stable       (X) * Dangerous arrhythmia absent       (X) * MI ruled out       (X) * No evidence of significant pericardial effusion       ( ) * Discharge plans and education understood              Activity       (X) * Ambulatory [D]       1/13/2017 9:58 AM EST by Chintan Almendarez         AMBULATE TO BATHROOM                     Routes       (X) * Oral hydration, medications, and diet              Interventions       (X) BUN, creatinine       1/13/2017 9:58 AM EST by Chintan Almendarez         BUN 45, CREAT 1.86, MAG 2.8                     1/13/2017 9:58 AM EST by Chintan Almendarez       Subject: Additional Clinical Information               GI DAILY PROGRESS NOTE         Patient states he has had only a small BM with passing gas today, like a squirt. He denies any abdominal pain, but has had increased bloating over his abdomen and feels like he is retaining more fluid. He has not urinated today. He denies any vomiting, but has nausea in the morning, improved with spitting up phlegm. He states he has had chest pain overnight and discussed it with the RN. SOB has been stable with O2.            EXAM: Lungs: coarse BS to auscultation bilaterally anteriorly. Abdomen: distended, non-tender.  Bowel sounds normal. No masses, no organomegaly         T 98. /84, P 68, R 18, 02 SAT 99% CPAP MASK                             Plan:                 ã         -Continue Miralax 17g BID and Colace 100mg po bid.         -Recommend Gastrograffin enema as he is not having improvement.         -Lasix held due to worsening Cr. Nephrology following.         -Discussed with RN bladder scanning versus in/out cath to assess for any urinary retention.         -Monitor response.         ã         [facility] CARDIOLOGY PROGRESS NOTE         Patient reports having fluid overload. He was able to urinated this morning.                    Plan:                            Acute on Chronic Systolic Heart Failure - EF 10-15%, Holding lasix 80 secondary to a/c renal failure. On Coreg. No ACEI/ARB secondary to renal function. Will give on dose of lasix iv 40mg today. Monitor BP, May need to adjust meds                                        CKD - Has stabilized - May need to adjust HTN meds                             ã         NEPHROLOGY CONSULT:         ã         Wt is up, so I agree with restarting lasix.  Renal function is stable.         LANTUS 50U SC Q HS, SSI SC AC & HS, LASIX 40 MG IV X1                                          * Milestone                  Myocarditis - Care Day 5 (1/10/2017) by Mili Vega RN        Review Entered Review Status       1/10/2017 Completed       Details              Care Day: 5 Care Date: 1/10/2017 Level of Care: Telemetry       Guideline Day 3        Clinical Status       (X) * Hemodynamic stability       1/10/2017 12:48 PM EST by Ken Bruno         61-59-83 122/75 sat 98 cpap mask              (X) * Pulmonary edema absent or improved       (X) * ECG changes absent or stable       (X) * Pain absent or managed       ( ) * Renal function at baseline or stable       1/10/2017 12:48 PM EST by Ken Bruno         slightly worsened  bun 51 creat 1.97              (X) * Dangerous arrhythmia absent       (X) * MI ruled out       (X) * No evidence of significant pericardial effusion       ( ) * Discharge plans and education understood              Activity       ( ) * Ambulatory [D]       1/10/2017 12:48 PM EST by Renita Qiu         bedrest                     Routes       (X) * Oral hydration, medications, and diet       1/10/2017 12:48 PM EST by Renita Qiu         clear liquid diet                     1/10/2017 12:48 PM EST by Renita Qiu       Subject: Additional Clinical Information       creatinine continues to worsen-Lasix now on hold+ extremity edemadenies chest painsmall \"mushy\" nonbloody BM yesterday after Xraylungs decreased breath sounds due to body habitus, coarse BS anteriorlyGIPossible evaluation with sigmoidoscopy, limited colonoscopy with limited bowel prep, & CT colonographyglucose 165 bun 51 creat 1.97 calcium 7.8 mag 2.5 hgb 11.1 hct 35.1 [id number]-tv obstruction or findings of free air                                   * Milestone

## 2017-01-13 NOTE — PROGRESS NOTES
Bedside and Verbal shift change report given to self (oncoming nurse) by Navid Hicks RN (offgoing nurse).  Report included the following information SBAR, Kardex, ED Summary, Intake/Output, MAR, Recent Results and Cardiac Rhythm SR.

## 2017-01-15 ENCOUNTER — HOME CARE VISIT (OUTPATIENT)
Dept: SCHEDULING | Facility: HOME HEALTH | Age: 52
End: 2017-01-15
Payer: MEDICARE

## 2017-01-15 VITALS
BODY MASS INDEX: 50.62 KG/M2 | HEIGHT: 66 IN | HEART RATE: 82 BPM | DIASTOLIC BLOOD PRESSURE: 86 MMHG | TEMPERATURE: 97.3 F | RESPIRATION RATE: 22 BRPM | WEIGHT: 315 LBS | OXYGEN SATURATION: 98 % | SYSTOLIC BLOOD PRESSURE: 138 MMHG

## 2017-01-15 PROCEDURE — G0299 HHS/HOSPICE OF RN EA 15 MIN: HCPCS

## 2017-01-15 PROCEDURE — 3331090002 HH PPS REVENUE DEBIT

## 2017-01-15 PROCEDURE — 400013 HH SOC

## 2017-01-15 PROCEDURE — 3331090001 HH PPS REVENUE CREDIT

## 2017-01-16 ENCOUNTER — TELEPHONE (OUTPATIENT)
Dept: HOME HEALTH SERVICES | Facility: HOME HEALTH | Age: 52
End: 2017-01-16

## 2017-01-16 PROCEDURE — 3331090001 HH PPS REVENUE CREDIT

## 2017-01-16 PROCEDURE — 3331090002 HH PPS REVENUE DEBIT

## 2017-01-16 NOTE — TELEPHONE ENCOUNTER
I spoke with family member who said Mr. Pollard was asleep. I discussed his medications. He has all but the isosorbide-it is at the drugstore, but they want a co-pay of $63.00 and he does not have that. I told her I would contact the 3rd floor SW and see if there was anything she can do to help.

## 2017-01-17 ENCOUNTER — HOSPITAL ENCOUNTER (OUTPATIENT)
Dept: LAB | Age: 52
Discharge: HOME | End: 2017-01-17

## 2017-01-17 ENCOUNTER — HOME CARE VISIT (OUTPATIENT)
Dept: SCHEDULING | Facility: HOME HEALTH | Age: 52
End: 2017-01-17
Payer: MEDICARE

## 2017-01-17 ENCOUNTER — HOSPITAL ENCOUNTER (OUTPATIENT)
Dept: LAB | Age: 52
Discharge: HOME | DRG: 308 | End: 2017-01-17
Payer: MEDICARE

## 2017-01-17 LAB
ANION GAP BLD CALC-SCNC: 12 MMOL/L (ref 7–16)
BUN SERPL-MCNC: 43 MG/DL (ref 6–23)
CALCIUM SERPL-MCNC: 8.5 MG/DL (ref 8.3–10.4)
CHLORIDE SERPL-SCNC: 101 MMOL/L (ref 98–107)
CO2 SERPL-SCNC: 26 MMOL/L (ref 21–32)
CREAT SERPL-MCNC: 1.45 MG/DL (ref 0.8–1.5)
GLUCOSE SERPL-MCNC: 201 MG/DL (ref 65–100)
POTASSIUM SERPL-SCNC: 4.6 MMOL/L (ref 3.5–5.1)
SODIUM SERPL-SCNC: 139 MMOL/L (ref 136–145)

## 2017-01-17 PROCEDURE — 3331090002 HH PPS REVENUE DEBIT

## 2017-01-17 PROCEDURE — G0299 HHS/HOSPICE OF RN EA 15 MIN: HCPCS

## 2017-01-17 PROCEDURE — 3331090001 HH PPS REVENUE CREDIT

## 2017-01-18 VITALS
DIASTOLIC BLOOD PRESSURE: 92 MMHG | OXYGEN SATURATION: 95 % | RESPIRATION RATE: 20 BRPM | SYSTOLIC BLOOD PRESSURE: 115 MMHG | TEMPERATURE: 98.2 F | HEART RATE: 92 BPM

## 2017-01-18 PROCEDURE — 3331090002 HH PPS REVENUE DEBIT

## 2017-01-18 PROCEDURE — 3331090001 HH PPS REVENUE CREDIT

## 2017-01-19 ENCOUNTER — HOSPITAL ENCOUNTER (INPATIENT)
Age: 52
LOS: 36 days | Discharge: NURSING, REHAB OR LONG-TERM CARE | DRG: 308 | End: 2017-02-24
Attending: EMERGENCY MEDICINE | Admitting: INTERNAL MEDICINE
Payer: MEDICARE

## 2017-01-19 ENCOUNTER — APPOINTMENT (OUTPATIENT)
Dept: GENERAL RADIOLOGY | Age: 52
DRG: 308 | End: 2017-01-19
Payer: MEDICARE

## 2017-01-19 DIAGNOSIS — I47.29 NONSUSTAINED VENTRICULAR TACHYCARDIA: Primary | ICD-10-CM

## 2017-01-19 DIAGNOSIS — I50.23 ACUTE ON CHRONIC SYSTOLIC (CONGESTIVE) HEART FAILURE (HCC): ICD-10-CM

## 2017-01-19 DIAGNOSIS — I50.9 ACUTE ON CHRONIC CONGESTIVE HEART FAILURE, UNSPECIFIED CONGESTIVE HEART FAILURE TYPE: ICD-10-CM

## 2017-01-19 DIAGNOSIS — I47.29 NSVT (NONSUSTAINED VENTRICULAR TACHYCARDIA): ICD-10-CM

## 2017-01-19 DIAGNOSIS — R55 SYNCOPE, UNSPECIFIED SYNCOPE TYPE: ICD-10-CM

## 2017-01-19 LAB
ALBUMIN SERPL BCP-MCNC: 3.3 G/DL (ref 3.5–5)
ALBUMIN/GLOB SERPL: 0.8 {RATIO} (ref 1.2–3.5)
ALP SERPL-CCNC: 182 U/L (ref 50–136)
ALT SERPL-CCNC: 36 U/L (ref 12–65)
ANION GAP BLD CALC-SCNC: 10 MMOL/L (ref 7–16)
APTT PPP: 29.6 SEC (ref 23.5–31.7)
ARTERIAL PATENCY WRIST A: ABNORMAL
AST SERPL W P-5'-P-CCNC: 20 U/L (ref 15–37)
ATRIAL RATE: 81 BPM
BASE DEFICIT BLDA-SCNC: 2.7 MMOL/L (ref 0–2)
BASOPHILS # BLD AUTO: 0 K/UL (ref 0–0.2)
BASOPHILS # BLD: 0 % (ref 0–2)
BDY SITE: ABNORMAL
BILIRUB SERPL-MCNC: 1.2 MG/DL (ref 0.2–1.1)
BNP SERPL-MCNC: 542 PG/ML
BUN SERPL-MCNC: 42 MG/DL (ref 6–23)
CALCIUM SERPL-MCNC: 8.3 MG/DL (ref 8.3–10.4)
CALCULATED P AXIS, ECG09: 58 DEGREES
CALCULATED R AXIS, ECG10: 161 DEGREES
CALCULATED T AXIS, ECG11: 65 DEGREES
CHLORIDE SERPL-SCNC: 102 MMOL/L (ref 98–107)
CO2 SERPL-SCNC: 29 MMOL/L (ref 21–32)
COHGB MFR BLD: 1.4 % (ref 0.5–1.5)
CREAT SERPL-MCNC: 1.75 MG/DL (ref 0.8–1.5)
DIAGNOSIS, 93000: NORMAL
DIASTOLIC BP, ECG02: NORMAL MMHG
DIFFERENTIAL METHOD BLD: ABNORMAL
DO-HGB BLD-MCNC: 3 % (ref 0–5)
EOSINOPHIL # BLD: 0.2 K/UL (ref 0–0.8)
EOSINOPHIL NFR BLD: 4 % (ref 0.5–7.8)
ERYTHROCYTE [DISTWIDTH] IN BLOOD BY AUTOMATED COUNT: 19.2 % (ref 11.9–14.6)
FIO2 ON VENT: 30 %
GLOBULIN SER CALC-MCNC: 4.1 G/DL (ref 2.3–3.5)
GLUCOSE BLD STRIP.AUTO-MCNC: 143 MG/DL (ref 65–100)
GLUCOSE SERPL-MCNC: 138 MG/DL (ref 65–100)
HCO3 BLDA-SCNC: 22 MMOL/L (ref 22–26)
HCT VFR BLD AUTO: 35.3 % (ref 41.1–50.3)
HGB BLD-MCNC: 11 G/DL (ref 13.6–17.2)
HGB BLDMV-MCNC: 12.4 GM/DL (ref 11.7–15)
IMM GRANULOCYTES # BLD: 0 K/UL (ref 0–0.5)
IMM GRANULOCYTES NFR BLD AUTO: 0.1 % (ref 0–5)
INR PPP: 1.2 (ref 0.9–1.2)
LYMPHOCYTES # BLD AUTO: 28 % (ref 13–44)
LYMPHOCYTES # BLD: 1.9 K/UL (ref 0.5–4.6)
MAGNESIUM SERPL-MCNC: 2.4 MG/DL (ref 1.8–2.4)
MCH RBC QN AUTO: 24.7 PG (ref 26.1–32.9)
MCHC RBC AUTO-ENTMCNC: 31.2 G/DL (ref 31.4–35)
MCV RBC AUTO: 79.1 FL (ref 79.6–97.8)
METHGB MFR BLD: 0.1 % (ref 0–1.5)
MONOCYTES # BLD: 0.9 K/UL (ref 0.1–1.3)
MONOCYTES NFR BLD AUTO: 13 % (ref 4–12)
NEUTS SEG # BLD: 3.7 K/UL (ref 1.7–8.2)
NEUTS SEG NFR BLD AUTO: 55 % (ref 43–78)
OXYHGB MFR BLDA: 95.4 % (ref 94–97)
P-R INTERVAL, ECG05: 232 MS
PCO2 BLDA: 38 MMHG (ref 35–45)
PH BLDA: 7.38 [PH] (ref 7.35–7.45)
PHOSPHATE SERPL-MCNC: 4 MG/DL (ref 2.5–4.5)
PLATELET # BLD AUTO: 244 K/UL (ref 150–450)
PMV BLD AUTO: 9.7 FL (ref 10.8–14.1)
PO2 BLDA: 101 MMHG (ref 75–100)
POTASSIUM SERPL-SCNC: 4.2 MMOL/L (ref 3.5–5.1)
PROT SERPL-MCNC: 7.4 G/DL (ref 6.3–8.2)
PROTHROMBIN TIME: 12.6 SEC (ref 9.6–12)
Q-T INTERVAL, ECG07: 352 MS
QRS DURATION, ECG06: 82 MS
QTC CALCULATION (BEZET), ECG08: 408 MS
RBC # BLD AUTO: 4.46 M/UL (ref 4.23–5.67)
SAO2 % BLD: 97 % (ref 92–98.5)
SODIUM SERPL-SCNC: 141 MMOL/L (ref 136–145)
SYSTOLIC BP, ECG01: NORMAL MMHG
TROPONIN I SERPL-MCNC: <0.02 NG/ML (ref 0.02–0.05)
VENTILATION MODE VENT: ABNORMAL
VENTRICULAR RATE, ECG03: 81 BPM
WBC # BLD AUTO: 6.8 K/UL (ref 4.3–11.1)

## 2017-01-19 PROCEDURE — 93005 ELECTROCARDIOGRAM TRACING: CPT

## 2017-01-19 PROCEDURE — 74011250637 HC RX REV CODE- 250/637: Performed by: NURSE PRACTITIONER

## 2017-01-19 PROCEDURE — 96374 THER/PROPH/DIAG INJ IV PUSH: CPT | Performed by: EMERGENCY MEDICINE

## 2017-01-19 PROCEDURE — 85730 THROMBOPLASTIN TIME PARTIAL: CPT | Performed by: EMERGENCY MEDICINE

## 2017-01-19 PROCEDURE — 82803 BLOOD GASES ANY COMBINATION: CPT

## 2017-01-19 PROCEDURE — 3331090002 HH PPS REVENUE DEBIT

## 2017-01-19 PROCEDURE — 65660000000 HC RM CCU STEPDOWN

## 2017-01-19 PROCEDURE — 83880 ASSAY OF NATRIURETIC PEPTIDE: CPT

## 2017-01-19 PROCEDURE — 3331090001 HH PPS REVENUE CREDIT

## 2017-01-19 PROCEDURE — 85025 COMPLETE CBC W/AUTO DIFF WBC: CPT

## 2017-01-19 PROCEDURE — 71010 XR CHEST PORT: CPT

## 2017-01-19 PROCEDURE — 74011250636 HC RX REV CODE- 250/636: Performed by: NURSE PRACTITIONER

## 2017-01-19 PROCEDURE — 93005 ELECTROCARDIOGRAM TRACING: CPT | Performed by: EMERGENCY MEDICINE

## 2017-01-19 PROCEDURE — 93005 ELECTROCARDIOGRAM TRACING: CPT | Performed by: INTERNAL MEDICINE

## 2017-01-19 PROCEDURE — 85610 PROTHROMBIN TIME: CPT | Performed by: EMERGENCY MEDICINE

## 2017-01-19 PROCEDURE — 94660 CPAP INITIATION&MGMT: CPT

## 2017-01-19 PROCEDURE — 84100 ASSAY OF PHOSPHORUS: CPT | Performed by: EMERGENCY MEDICINE

## 2017-01-19 PROCEDURE — 74011000258 HC RX REV CODE- 258: Performed by: NURSE PRACTITIONER

## 2017-01-19 PROCEDURE — 80053 COMPREHEN METABOLIC PANEL: CPT

## 2017-01-19 PROCEDURE — 82962 GLUCOSE BLOOD TEST: CPT

## 2017-01-19 PROCEDURE — 99285 EMERGENCY DEPT VISIT HI MDM: CPT | Performed by: EMERGENCY MEDICINE

## 2017-01-19 PROCEDURE — 74011250636 HC RX REV CODE- 250/636: Performed by: EMERGENCY MEDICINE

## 2017-01-19 PROCEDURE — 84484 ASSAY OF TROPONIN QUANT: CPT

## 2017-01-19 PROCEDURE — 83735 ASSAY OF MAGNESIUM: CPT | Performed by: EMERGENCY MEDICINE

## 2017-01-19 PROCEDURE — 36600 WITHDRAWAL OF ARTERIAL BLOOD: CPT

## 2017-01-19 RX ORDER — CARVEDILOL 25 MG/1
25 TABLET ORAL 2 TIMES DAILY WITH MEALS
Status: DISCONTINUED | OUTPATIENT
Start: 2017-01-19 | End: 2017-02-24 | Stop reason: HOSPADM

## 2017-01-19 RX ORDER — SODIUM CHLORIDE 0.9 % (FLUSH) 0.9 %
5-10 SYRINGE (ML) INJECTION EVERY 8 HOURS
Status: DISCONTINUED | OUTPATIENT
Start: 2017-01-19 | End: 2017-02-24 | Stop reason: HOSPADM

## 2017-01-19 RX ORDER — SPIRONOLACTONE 25 MG/1
25 TABLET ORAL DAILY
Status: DISCONTINUED | OUTPATIENT
Start: 2017-01-20 | End: 2017-02-03

## 2017-01-19 RX ORDER — POLYETHYLENE GLYCOL 3350 17 G/17G
17 POWDER, FOR SOLUTION ORAL
Status: DISCONTINUED | OUTPATIENT
Start: 2017-01-19 | End: 2017-02-24 | Stop reason: HOSPADM

## 2017-01-19 RX ORDER — FACIAL-BODY WIPES
10 EACH TOPICAL DAILY
Status: DISCONTINUED | OUTPATIENT
Start: 2017-01-20 | End: 2017-02-18

## 2017-01-19 RX ORDER — FUROSEMIDE 10 MG/ML
40 INJECTION INTRAMUSCULAR; INTRAVENOUS
Status: COMPLETED | OUTPATIENT
Start: 2017-01-19 | End: 2017-01-19

## 2017-01-19 RX ORDER — HYDRALAZINE HYDROCHLORIDE 25 MG/1
25 TABLET, FILM COATED ORAL 3 TIMES DAILY
Status: DISCONTINUED | OUTPATIENT
Start: 2017-01-19 | End: 2017-02-24 | Stop reason: HOSPADM

## 2017-01-19 RX ORDER — INSULIN LISPRO 100 [IU]/ML
15 INJECTION, SOLUTION INTRAVENOUS; SUBCUTANEOUS
Status: DISCONTINUED | OUTPATIENT
Start: 2017-01-19 | End: 2017-02-16

## 2017-01-19 RX ORDER — ALLOPURINOL 100 MG/1
100 TABLET ORAL DAILY
Status: DISCONTINUED | OUTPATIENT
Start: 2017-01-20 | End: 2017-02-24 | Stop reason: HOSPADM

## 2017-01-19 RX ORDER — POTASSIUM CHLORIDE 750 MG/1
10 TABLET, EXTENDED RELEASE ORAL 2 TIMES DAILY
Status: DISCONTINUED | OUTPATIENT
Start: 2017-01-19 | End: 2017-01-29

## 2017-01-19 RX ORDER — INSULIN GLARGINE 100 [IU]/ML
50 INJECTION, SOLUTION SUBCUTANEOUS
Status: DISCONTINUED | OUTPATIENT
Start: 2017-01-19 | End: 2017-02-24 | Stop reason: HOSPADM

## 2017-01-19 RX ORDER — OXYCODONE HYDROCHLORIDE 15 MG/1
15 TABLET ORAL
Status: DISCONTINUED | OUTPATIENT
Start: 2017-01-19 | End: 2017-02-24 | Stop reason: HOSPADM

## 2017-01-19 RX ORDER — MORPHINE SULFATE 2 MG/ML
2 INJECTION, SOLUTION INTRAMUSCULAR; INTRAVENOUS
Status: DISCONTINUED | OUTPATIENT
Start: 2017-01-19 | End: 2017-02-24 | Stop reason: HOSPADM

## 2017-01-19 RX ORDER — SODIUM CHLORIDE 0.9 % (FLUSH) 0.9 %
5-10 SYRINGE (ML) INJECTION AS NEEDED
Status: DISCONTINUED | OUTPATIENT
Start: 2017-01-19 | End: 2017-02-24 | Stop reason: HOSPADM

## 2017-01-19 RX ORDER — NITROGLYCERIN 0.4 MG/1
0.4 TABLET SUBLINGUAL
Status: DISCONTINUED | OUTPATIENT
Start: 2017-01-19 | End: 2017-02-24 | Stop reason: HOSPADM

## 2017-01-19 RX ORDER — DOCUSATE SODIUM 100 MG/1
100 CAPSULE, LIQUID FILLED ORAL 2 TIMES DAILY
Status: DISCONTINUED | OUTPATIENT
Start: 2017-01-19 | End: 2017-02-24 | Stop reason: HOSPADM

## 2017-01-19 RX ORDER — FAMOTIDINE 20 MG/1
20 TABLET, FILM COATED ORAL 2 TIMES DAILY
Status: DISCONTINUED | OUTPATIENT
Start: 2017-01-19 | End: 2017-02-12

## 2017-01-19 RX ORDER — GABAPENTIN 300 MG/1
600 CAPSULE ORAL 3 TIMES DAILY
Status: DISCONTINUED | OUTPATIENT
Start: 2017-01-19 | End: 2017-02-24 | Stop reason: HOSPADM

## 2017-01-19 RX ORDER — PRAVASTATIN SODIUM 80 MG/1
80 TABLET ORAL
Status: DISCONTINUED | OUTPATIENT
Start: 2017-01-19 | End: 2017-02-24 | Stop reason: HOSPADM

## 2017-01-19 RX ORDER — COLCHICINE 0.6 MG/1
0.6 TABLET ORAL DAILY
Status: DISCONTINUED | OUTPATIENT
Start: 2017-01-20 | End: 2017-02-24 | Stop reason: HOSPADM

## 2017-01-19 RX ORDER — ISOSORBIDE DINITRATE 20 MG/1
20 TABLET ORAL 3 TIMES DAILY
Status: DISCONTINUED | OUTPATIENT
Start: 2017-01-19 | End: 2017-02-24 | Stop reason: HOSPADM

## 2017-01-19 RX ADMIN — NITROGLYCERIN 0.4 MG: 0.4 TABLET SUBLINGUAL at 22:48

## 2017-01-19 RX ADMIN — AMIODARONE HYDROCHLORIDE 1 MG/MIN: 50 INJECTION, SOLUTION INTRAVENOUS at 16:36

## 2017-01-19 RX ADMIN — APIXABAN 5 MG: 5 TABLET, FILM COATED ORAL at 21:38

## 2017-01-19 RX ADMIN — FAMOTIDINE 20 MG: 20 TABLET ORAL at 21:37

## 2017-01-19 RX ADMIN — PRAVASTATIN SODIUM 80 MG: 80 TABLET ORAL at 21:37

## 2017-01-19 RX ADMIN — HYDRALAZINE HYDROCHLORIDE 25 MG: 25 TABLET, FILM COATED ORAL at 21:38

## 2017-01-19 RX ADMIN — GABAPENTIN 600 MG: 300 CAPSULE ORAL at 21:37

## 2017-01-19 RX ADMIN — AMIODARONE HYDROCHLORIDE 150 MG: 50 INJECTION, SOLUTION INTRAVENOUS at 16:36

## 2017-01-19 RX ADMIN — DOCUSATE SODIUM 100 MG: 100 CAPSULE, LIQUID FILLED ORAL at 21:37

## 2017-01-19 RX ADMIN — FUROSEMIDE 40 MG: 10 INJECTION, SOLUTION INTRAMUSCULAR; INTRAVENOUS at 15:26

## 2017-01-19 RX ADMIN — AMIODARONE HYDROCHLORIDE 0.5 MG/MIN: 50 INJECTION, SOLUTION INTRAVENOUS at 23:23

## 2017-01-19 RX ADMIN — POTASSIUM CHLORIDE 10 MEQ: 10 TABLET, EXTENDED RELEASE ORAL at 21:38

## 2017-01-19 RX ADMIN — ISOSORBIDE DINITRATE 20 MG: 20 TABLET ORAL at 21:37

## 2017-01-19 NOTE — PROGRESS NOTES
Spiritual Care visit. Initial Visit. Patient came to hospital with shortness of breath. Was brought from front entrance to ED by stretcher. Will follow up as able.     Visit by Madelin Le M.Ed., Th.B. ,Staff

## 2017-01-19 NOTE — ED PROVIDER NOTES
HPI Comments: 65-year-old male with a history of severe cardiomyopathy and AICD, presents as the response of a CODE BLUE. The patient states that he was running late for his appointment with the cardiologist so was rescheduled for this afternoon. He had been experiencing extreme fatigue, so decided to come to the emergency department. When asking a  for assistance, a CODE BLUE was called. There was no syncopal event or fall at that time. Patient states that he \"passed out\" 2 times while driving to his appointment this morning. He had to pull over on the side of the road. These were associated with chest pain and shortness of breath. He was just discharged from the hospital one week ago for CHF exacerbation, complicated by acute prerenal kidney injury. Patient states he is still struggling to get fluid off and has persistent weakness, excessive sleepiness, and shortness of breath. He denies having any shocks from his AICD. Patient is a 45 y.o. male presenting with shortness of breath. The history is provided by the patient and a relative. Shortness of Breath   Associated symptoms include chest pain and leg swelling. Pertinent negatives include no fever, no headaches, no cough, no vomiting, no abdominal pain and no rash. Past Medical History:   Diagnosis Date    Acute systolic heart failure ([address] May, 2009     Also had transient acute renal failure secondary to poor perfusion.     AICD (automatic cardioverter/defibrillator) present 10/21/2015    Atypical chest pain 4/23/2010    Bronchitis     CAD (coronary artery disease)     Cardiomyopathy     Chest pain 10/21/2015    Chronic kidney disease      renal insufficiency    Chronic pain      back    Congestive heart failure (CHF) ([address] 10/21/2015    COPD     Diabetes ([address]      type 2 insulin reliant- BS average- 160's-180's    Diabetes mellitus type II, uncontrolled ([address] 7/2/2013    GERD (gastroesophageal reflux disease)     Gout     Heart failure ([facility]      cardiomyopathy with ef 10-20%    Hypertension     Hyponatremia 12/20/2010    Ill-defined condition      gout, neuropathy, sciatica    Morbid obesity [facility]     Nausea & vomiting 11/30/2015    Neuropathy     Obstructive sleep apnea 2/15/2010    Other unknown and unspecified cause of morbidity or mortality      Gout    Severe sepsis ([facility]     Unspecified sleep apnea      cpap       Past Surgical History:   Procedure Laterality Date    Hx pacemaker       may 2010    Hx heart catheterization  Sandy 10, 2008     Severe multivessel disease with severe LV dysfunction    Pr chest surgery procedure unlisted       thorocentesis         Family History:   Problem Relation Age of Onset    Heart Disease Father     Stroke Father     Diabetes Sister     Stroke Sister     Diabetes Sister     Heart Disease Other        Social History     Social History    Marital status:      Spouse name: N/A    Number of children: N/A    Years of education: N/A     Occupational History    Not on file. Social History Main Topics    Smoking status: Former Smoker     Packs/day: 0.25     Years: 1.00     Quit date: 7/12/1984    Smokeless tobacco: Never Used      Comment: pt states that he only tried smoking as a kid     Alcohol use No    Drug use: No    Sexual activity: Not on file     Other Topics Concern    Not on file     Social History Narrative         ALLERGIES: Dilaudid [hydromorphone]; Iodinated contrast media - oral and iv dye; and Penicillins    Review of Systems   Constitutional: Positive for fatigue. Negative for chills and fever. HENT: Negative for hearing loss. Eyes: Negative for visual disturbance. Respiratory: Positive for shortness of breath. Negative for cough. Cardiovascular: Positive for chest pain and leg swelling. Negative for palpitations. Gastrointestinal: Negative for abdominal pain, diarrhea, nausea and vomiting.    Genitourinary: Negative for difficulty urinating. Musculoskeletal: Negative for back pain. Skin: Negative for rash. Neurological: Negative for weakness and headaches. Psychiatric/Behavioral: Negative for confusion. Vitals:    01/19/17 1338   BP: 137/88   Pulse: 80   Resp: 22   Temp: 98.6 °F (37 °C)   SpO2: 98%   Weight: (!) 181.4 kg (400 lb)   Height: 5' 6\" (1.676 m)            Physical Exam   Constitutional: He appears well-developed and well-nourished. Morbid obesity   HENT:   Head: Normocephalic and atraumatic. Right Ear: External ear normal.   Left Ear: External ear normal.   Nose: Nose normal.   Mouth/Throat: Oropharynx is clear and moist.   Eyes: Conjunctivae are normal. Pupils are equal, round, and reactive to light. Neck: Normal range of motion. Neck supple. Cardiovascular: Regular rhythm and intact distal pulses. Unable to appreciate heart sounds due to body habitus   Pulmonary/Chest: Effort normal. No respiratory distress. He has no wheezes. Diminished breath sounds due to body habitus   Abdominal: Soft. Bowel sounds are normal. He exhibits no distension. There is no tenderness. Musculoskeletal: Normal range of motion. He exhibits edema. 4+ pitting edema bilateral legs and abdomen   Neurological: He is alert. Skin: Skin is warm and dry. Psychiatric: Judgment normal.   Nursing note and vitals reviewed. MDM  Number of Diagnoses or Management Options  Diagnosis management comments: Parts of this document were created using dragon voice recognition software. The chart has been reviewed but errors may still be present. Patient had what appears like an episode of ventricular tachycardia during an episode of extreme sleepiness. [employer] called to interrogate AICD. Call Dr. Lazaro Chandra, cardiology, for consultation and admission. Pt updated.        Amount and/or Complexity of Data Reviewed  Clinical lab tests: reviewed and ordered (Results for orders placed or performed during the hospital encounter of 01/19/17  -CBC WITH AUTOMATED DIFF       Result                                            Value                         Ref Range                       WBC                                               6.8                           4.3 - 11.1 K/uL                 RBC                                               4.46                          4.23 - 5.67 M/uL                HGB                                               11.0 (L)                      13.6 - 17.2 g/dL                HCT                                               35.3 (L)                      41.1 - 50.3 %                   MCV                                               79.1 (L)                      79.6 - 97.8 FL                  MCH                                               24.7 (L)                      26.1 - 32.9 PG                  MCHC                                              31.2 (L)                      31.4 - 35.0 g/dL                RDW                                               19.2 (H)                      11.9 - 14.6 %                   PLATELET                                          244                           150 - 450 K/uL                  MPV                                               9.7 (L)                       10.8 - 14.1 FL                  DF                                                AUTOMATED                                                     NEUTROPHILS                                       55                            43 - 78 %                       LYMPHOCYTES                                       28                            13 - 44 %                       MONOCYTES                                         13 (H)                        4.0 - 12.0 %                    EOSINOPHILS                                       4                             0.5 - 7.8 %                     BASOPHILS                                         0                             0.0 - 2.0 % IMMATURE GRANULOCYTES                             0.1                           0.0 - 5.0 %                     ABS. NEUTROPHILS                                  3.7                           1.7 - 8.2 K/UL                  ABS. LYMPHOCYTES                                  1.9                           0.5 - 4.6 K/UL                  ABS. MONOCYTES                                    0.9                           0.1 - 1.3 K/UL                  ABS. EOSINOPHILS                                  0.2                           0.0 - 0.8 K/UL                  ABS. BASOPHILS                                    0.0                           0.0 - 0.2 K/UL                  ABS. IMM.  GRANS.                                  0.0                           0.0 - 0.5 K/UL             -METABOLIC PANEL, COMPREHENSIVE       Result                                            Value                         Ref Range                       Sodium                                            141                           136 - 145 mmol/L                Potassium                                         4.2                           3.5 - 5.1 mmol/L                Chloride                                          102                           98 - 107 mmol/L                 CO2                                               29                            21 - 32 mmol/L                  Anion gap                                         10                            7 - 16 mmol/L                   Glucose                                           138 (H)                       65 - 100 mg/dL                  BUN                                               42 (H)                        6 - 23 MG/DL                    Creatinine                                        1.75 (H)                      0.8 - 1.5 MG/DL                 GFR est AA                                        53 (L)                        >60 ml/min/1.73m2               GFR est non-AA                                    44 (L)                        >60 ml/min/1.73m2               Calcium                                           8.3                           8.3 - 10.4 MG/DL                Bilirubin, total                                  1.2 (H)                       0.2 - 1.1 MG/DL                 ALT                                               36                            12 - 65 U/L                     AST                                               20                            15 - 37 U/L                     Alk. phosphatase                                  182 (H)                       50 - 136 U/L                    Protein, total                                    7.4                           6.3 - 8.2 g/dL                  Albumin                                           3.3 (L)                       3.5 - 5.0 g/dL                  Globulin                                          4.1 (H)                       2.3 - 3.5 g/dL                  A-G Ratio                                         0.8 (L)                       1.2 - 3.5                  -TROPONIN I       Result                                            Value                         Ref Range                       Troponin-I, Qt.                                   <0.02 (L)                     0.02 - 0.05 NG/ML          -BNP       Result                                            Value                         Ref Range                       BNP                                               542                           pg/mL                      -EKG, 12 LEAD, INITIAL       Result                                            Value                         Ref Range                       Systolic BP                                                                     mmHg                            Diastolic BP                                                                    mmHg Ventricular Rate                                  81                            BPM                             Atrial Rate                                       81                            BPM                             P-R Interval                                      232                           ms                              QRS Duration                                      82                            ms                              Q-T Interval                                      352                           ms                              QTC Calculation (Bezet)                           408                           ms                              Calculated P Axis                                 58                            degrees                         Calculated R Axis                                 161                           degrees                         Calculated T Axis                                 65                            degrees                         Diagnosis                                                                                                   !! AGE AND GENDER SPECIFIC ECG ANALYSIS !! Sinus rhythm with marked sinus arrhythmia with 1st degree A-V block   Right axis deviation   Low voltage QRS   Cannot rule out Anterior infarct , age undetermined   Abnormal ECG     )  Tests in the radiology section of CPT®: ordered and reviewed (Xr Chest Port    Result Date: 1/19/2017  PORTABLE CHEST, January 19, 2017 at 1401 hours CLINICAL HISTORY:  Shortness of breath for 4 days. COMPARISON:  January 3, 2017. FINDINGS:  AP erect image demonstrates no confluent infiltrate or significant pleural fluid. The heart remains moderately enlarged without evidence of congestive heart failure or pneumothorax. The bony thorax appears intact on this view. Examination is technically limited by scatter radiation in this large patient.      IMPRESSION:  STABLE MODERATE CARDIOMEGALY WITHOUT EVIDENCE OF RANDA CONGESTIVE HEART FAILURE OR OTHER ACUTE CARDIOPULMONARY DISEASE IDENTIFIED.    )  Tests in the medicine section of CPT®: ordered and reviewed      ED Course       Procedures

## 2017-01-19 NOTE — IP AVS SNAPSHOT
Current Discharge Medication List  
  
Take these medications at their scheduled times Dose & Instructions Dispensing Information Comments Morning Noon Evening Bedtime  
 allopurinol 100 mg tablet Commonly known as:  Jackson Craft Your next dose is:  Tomorrow Dose:  100 mg Take 100 mg by mouth daily. Refills:  0  
     
  
   
   
   
  
 amiodarone 200 mg tablet Commonly known as:  CORDARONE Your next dose is:  Tomorrow Dose:  200 mg Take 1 Tab by mouth daily. Quantity:  30 Tab Refills:  6  
     
  
   
   
   
  
 apixaban 5 mg tablet Commonly known as:  Trinity Sp Your next dose is: Today Dose:  5 mg Take 1 Tab by mouth every twelve (12) hours. Quantity:  60 Tab Refills:  0  
     
  
   
   
   
  
  
 bisacodyl 10 mg suppository Commonly known as:  DULCOLAX Your next dose is:  Tomorrow Dose:  10 mg Insert 10 mg into rectum daily. Quantity:  1 Suppository Refills:  2  
     
  
   
   
   
  
 carvedilol 25 mg tablet Commonly known as:  Veronica Spry Your next dose is: Today Dose:  25 mg Take 1 Tab by mouth two (2) times daily (with meals). Quantity:  60 Tab Refills:  3  
     
  
   
   
  
   
  
 colchicine 0.6 mg tablet Your next dose is:  Tomorrow Dose:  0.6 mg Take 0.6 mg by mouth every morning. Refills:  0  
     
  
   
   
   
  
 docusate sodium 100 mg capsule Commonly known as:  Anglican Lecher Your next dose is: Today Dose:  100 mg Take 1 Cap by mouth two (2) times a day. Quantity:  60 Cap Refills:  0  
     
  
   
   
  
   
  
 furosemide 40 mg tablet Commonly known as:  LASIX Your next dose is: Today Dose:  40 mg Take 1 Tab by mouth Before breakfast and dinner. Quantity:  60 Tab Refills:  2  
     
  
   
   
  
   
  
 gabapentin 600 mg tablet Commonly known as:  NEURONTIN Your next dose is: Today  Dose:  600 mg  
 Take 600 mg by mouth three (3) times daily. Refills:  0  
     
  
   
  
   
   
  
  
 hydrALAZINE 25 mg tablet Commonly known as:  APRESOLINE Your next dose is: Today Dose:  25 mg Take 1 Tab by mouth three (3) times daily. Quantity:  90 Tab Refills:  3  
     
  
   
   
   
  
  
 insulin glargine 100 unit/mL injection Commonly known as:  LANTUS Your next dose is: Today Dose:  50 Units 50 Units by SubCUTAneous route nightly. Quantity:  1 Vial  
Refills:  0  
     
   
   
   
  
  
 insulin lispro 100 unit/mL injection Commonly known as:  HUMALOG Your next dose is: Today Dose:  15 Units 15 Units by SubCUTAneous route three (3) times daily (with meals). Quantity:  1 Vial  
Refills:  0  
     
  
   
  
   
  
   
  
 isosorbide dinitrate 20 mg tablet Commonly known as:  ISORDIL Your next dose is: Today Dose:  20 mg Take 1 Tab by mouth three (3) times daily. Quantity:  90 Tab Refills:  3  
     
  
   
  
   
   
  
  
 pravastatin 80 mg tablet Commonly known as:  PRAVACHOL Your next dose is: Today Dose:  80 mg Take 1 Tab by mouth nightly. Quantity:  30 Tab Refills:  0  
     
   
   
   
  
  
 raNITIdine 150 mg tablet Commonly known as:  ZANTAC Your next dose is: Today Dose:  150 mg Take 150 mg by mouth nightly. Refills:  0  
     
   
   
   
  
  
 sacubitril-valsartan 24-26 mg tablet Commonly known as:  ENTRESTO Your next dose is: Today Dose:  1 Tab Take 1 Tab by mouth every twelve (12) hours. Quantity:  60 Tab Refills:  6  
     
  
   
   
   
  
  
 spironolactone 25 mg tablet Commonly known as:  ALDACTONE Your next dose is:  Tomorrow Dose:  25 mg Take 1 Tab by mouth daily. Quantity:  30 Tab Refills:  11 Take these medications as needed Dose & Instructions Dispensing Information Comments Morning Noon Evening Bedtime nitroglycerin 0.4 mg SL tablet Commonly known as:  NITROSTAT Notes to Patient:  AS NEEDED Dose:  0.4 mg  
1 Tab by SubLINGual route every five (5) minutes as needed for Chest Pain. Quantity:  4 Bottle Refills:  6  
     
   
   
   
  
 oxyCODONE IR 15 mg immediate release tablet Commonly known as:  OXY-IR Notes to Patient:  AS NEEDED Dose:  15 mg Take 15 mg by mouth every four (4) hours as needed for Pain. Refills:  0  
     
   
   
   
  
 polyethylene glycol 17 gram packet Commonly known as:  Hernandez Carey Notes to Patient:  AS NEEDED Dose:  17 g Take 1 Packet by mouth daily as needed (constipation). Quantity:  10 Packet Refills:  5 Take these medications as directed Dose & Instructions Dispensing Information Comments Morning Noon Evening Bedtime  
 cpap machine kit Your next dose is: Today 10 cm qhs Refills:  0  
     
   
   
   
  
  
 glucose blood VI test strips strip Commonly known as:  ASCENSIA AUTODISC VI, ONE TOUCH ULTRA TEST VI Test strips for 30 day supply Quantity:  120 strip Refills:  0 OXYGEN-AIR DELIVERY SYSTEMS Your next dose is: Today 2 lpm qhs Refills:  0 Where to Get Your Medications Information about where to get these medications is not yet available ! Ask your nurse or doctor about these medications  
  amiodarone 200 mg tablet  
 furosemide 40 mg tablet  
 sacubitril-valsartan 24-26 mg tablet

## 2017-01-19 NOTE — IP AVS SNAPSHOT
[address] 
[phone] Patient: Chai Kim MRN: [id number] LEL:[birth year] You are allergic to the following Allergen Reactions Dilaudid (Hydromorphone) Other (comments) Hotflashes, patient states he's not allergic Iodinated Contrast Media - Oral And Iv Dye Other (comments) \"shuts my kidneys down\" Penicillins Unknown (comments) Pt states he is not allergic his mother just wouldn't allow him take it Immunizations Administered for This Admission Name Date  
 TB Skin Test (PPD) Intradermal 1/30/2017 Recent Documentation Height  
  
  
  
  
  
 1.676 m Emergency Contacts Name Discharge Info Relation Home Work Mobile [place] Mariam  Other Relative [6] [phone] Mason Hutton  Child [2] [phone] Che Roche  Daughter [21] [phone] About your hospitalization You were admitted on:  January 19, 2017 You last received care in the:  [facility] 3 TELEMETRY You were discharged on:  February 24, 2017 Unit phone number:  [phone] Why you were hospitalized Your primary diagnosis was:  Nonsustained Ventricular Tachycardia (Hcc) Your diagnoses also included:  Nonischemic Dilated Cardiomyopathy (Hcc), Obstructive Sleep Apnea, Obesity Hypoventilation Syndrome (Hcc), Morbid Obesity With Bmi Of 60.0-69.9, Adult (Hcc), Syncope, Ckd (Chronic Kidney Disease) Stage 3, Gfr 30-59 Ml/Min, Acute On Chronic Systolic (Congestive) Heart Failure (Hcc), Atrial Flutter (Hcc), Aicd (Automatic Cardioverter/Defibrillator) Present, Nsvt (Nonsustained Ventricular Tachycardia) (Hcc) Providers Seen During Your Hospitalizations Provider Role Specialty Primary office phone Saurabh Cummings MD Attending Provider Emergency Medicine [phone] Gina Ortiz MD Attending Provider Cardiology [phone] Your Primary Care Physician (PCP) Primary Care Physician Office Phone Office Fax Paolo Luna [phone] Follow-up Information Follow up With Details Comments Contact Info [facility] CARDIOLOGY [place] On 3/3/2017 Follow up with Dr. Brittany Man on Friday, March 3rd at 10:15am ([address])  [facility] 32 [address] [facility] 
[phone] Melinda Dietz MD  As needed [address] [place] [address] [address] [place] [id number] 
[phone] Your Appointments Friday March 03, 2017 10:15 AM EST TRANSITIONAL CARE MANAGEMENT with Julia Man MD  
[facility] Cardiology ([address] [facility]) [address]  
[phone] Current Discharge Medication List  
  
START taking these medications Dose & Instructions Dispensing Information Comments Morning Noon Evening Bedtime  
 amiodarone 200 mg tablet Commonly known as:  CORDARONE Your next dose is:  Tomorrow Dose:  200 mg Take 1 Tab by mouth daily. Quantity:  30 Tab Refills:  6  
     
  
   
   
   
  
 furosemide 40 mg tablet Commonly known as:  LASIX Your next dose is: Today Dose:  40 mg Take 1 Tab by mouth Before breakfast and dinner. Quantity:  60 Tab Refills:  2  
     
  
   
   
  
   
  
 sacubitril-valsartan 24-26 mg tablet Commonly known as:  ENTRESTO Your next dose is: Today Dose:  1 Tab Take 1 Tab by mouth every twelve (12) hours. Quantity:  60 Tab Refills:  6 CONTINUE these medications which have NOT CHANGED Dose & Instructions Dispensing Information Comments Morning Noon Evening Bedtime  
 allopurinol 100 mg tablet Commonly known as:  Ollen Epley Your next dose is:  Tomorrow Dose:  100 mg Take 100 mg by mouth daily. Refills:  0  
     
  
   
   
   
  
 apixaban 5 mg tablet Commonly known as:  Yanira Garcia Your next dose is: Today Dose:  5 mg Take 1 Tab by mouth every twelve (12) hours. Quantity:  60 Tab Refills:  0  
     
  
   
   
   
  
  
 bisacodyl 10 mg suppository Commonly known as:  DULCOLAX Your next dose is:  Tomorrow Dose:  10 mg Insert 10 mg into rectum daily. Quantity:  1 Suppository Refills:  2  
     
  
   
   
   
  
 carvedilol 25 mg tablet Commonly known as:  Pepe Lawman Your next dose is: Today Dose:  25 mg Take 1 Tab by mouth two (2) times daily (with meals). Quantity:  60 Tab Refills:  3  
     
  
   
   
  
   
  
 colchicine 0.6 mg tablet Your next dose is:  Tomorrow Dose:  0.6 mg Take 0.6 mg by mouth every morning. Refills:  0  
     
  
   
   
   
  
 cpap machine kit Your next dose is: Today 10 cm qhs Refills:  0  
     
   
   
   
  
  
 docusate sodium 100 mg capsule Commonly known as:  Kilo Acosta Your next dose is: Today Dose:  100 mg Take 1 Cap by mouth two (2) times a day. Quantity:  60 Cap Refills:  0  
     
  
   
   
  
   
  
 gabapentin 600 mg tablet Commonly known as:  NEURONTIN Your next dose is: Today Dose:  600 mg Take 600 mg by mouth three (3) times daily. Refills:  0  
     
  
   
  
   
   
  
  
 glucose blood VI test strips strip Commonly known as:  ASCENSIA AUTODISC VI, ONE TOUCH ULTRA TEST VI Test strips for 30 day supply Quantity:  120 strip Refills:  0  
     
   
   
   
  
 hydrALAZINE 25 mg tablet Commonly known as:  APRESOLINE Your next dose is: Today Dose:  25 mg Take 1 Tab by mouth three (3) times daily. Quantity:  90 Tab Refills:  3  
     
  
   
   
   
  
  
 insulin glargine 100 unit/mL injection Commonly known as:  LANTUS Your next dose is: Today Dose:  50 Units 50 Units by SubCUTAneous route nightly. Quantity:  1 Vial  
Refills:  0  
     
   
   
   
  
  
 insulin lispro 100 unit/mL injection Commonly known as:  HUMALOG Your next dose is: Today Dose:  15 Units 15 Units by SubCUTAneous route three (3) times daily (with meals). Quantity:  1 Vial  
Refills:  0  
     
  
   
  
   
  
   
  
 isosorbide dinitrate 20 mg tablet Commonly known as:  ISORDIL Your next dose is: Today Dose:  20 mg Take 1 Tab by mouth three (3) times daily. Quantity:  90 Tab Refills:  3  
     
  
   
  
   
   
  
  
 nitroglycerin 0.4 mg SL tablet Commonly known as:  NITROSTAT Notes to Patient:  AS NEEDED Dose:  0.4 mg  
1 Tab by SubLINGual route every five (5) minutes as needed for Chest Pain. Quantity:  4 Bottle Refills:  6  
     
   
   
   
  
 oxyCODONE IR 15 mg immediate release tablet Commonly known as:  OXY-IR Notes to Patient:  AS NEEDED Dose:  15 mg Take 15 mg by mouth every four (4) hours as needed for Pain. Refills:  0 OXYGEN-AIR DELIVERY SYSTEMS Your next dose is: Today 2 lpm qhs Refills:  0  
     
   
   
   
  
  
 polyethylene glycol 17 gram packet Commonly known as:  [facility] Notes to Patient:  AS NEEDED Dose:  17 g Take 1 Packet by mouth daily as needed (constipation). Quantity:  10 Packet Refills:  5  
     
   
   
   
  
 pravastatin 80 mg tablet Commonly known as:  PRAVACHOL Your next dose is: Today Dose:  80 mg Take 1 Tab by mouth nightly. Quantity:  30 Tab Refills:  0  
     
   
   
   
  
  
 raNITIdine 150 mg tablet Commonly known as:  ZANTAC Your next dose is: Today Dose:  150 mg Take 150 mg by mouth nightly. Refills:  0  
     
   
   
   
  
  
 spironolactone 25 mg tablet Commonly known as:  ALDACTONE Your next dose is:  Tomorrow Dose:  25 mg Take 1 Tab by mouth daily. Quantity:  30 Tab Refills:  11 STOP taking these medications   
 pioglitazone 30 mg tablet Commonly known as:  ACTOS potassium chloride 10 mEq tablet Commonly known as:  K-DUR, KLOR-CON  
   
  
 torsemide 100 mg tablet Commonly known as:  DEMADEX Where to Get Your Medications Information on where to get these meds will be given to you by the nurse or doctor. ! Ask your nurse or doctor about these medications  
  amiodarone 200 mg tablet  
 furosemide 40 mg tablet  
 sacubitril-valsartan 24-26 mg tablet Discharge Instructions Heart Failure: Care Instructions Your Care Instructions Heart failure occurs when your heart does not pump as much blood as the body needs. Failure does not mean that the heart has stopped pumping but rather that it is not pumping as well as it should. Over time, this causes fluid buildup in your lungs and other parts of your body. Fluid buildup can cause shortness of breath, fatigue, swollen ankles, and other problems. By taking medicines regularly, reducing sodium (salt) in your diet, checking your weight every day, and making lifestyle changes, you can feel better and live longer. Follow-up care is a key part of your treatment and safety. Be sure to make and go to all appointments, and call your doctor if you are having problems. It's also a good idea to know your test results and keep a list of the medicines you take. How can you care for yourself at home? Medicines · Be safe with medicines. Take your medicines exactly as prescribed. Call your doctor if you think you are having a problem with your medicine. · Do not take any vitamins, over-the-counter medicine, or herbal products without talking to your doctor first. [place] Beari not take ibuprofen (Advil or Motrin) and naproxen (Aleve) without talking to your doctor first. They could make your heart failure worse. · You may be taking some of the following medicine.  
¨ Beta-blockers can slow heart rate, decrease blood pressure, and improve your condition. Taking a beta-blocker may lower your chance of needing to be hospitalized. ¨ Angiotensin-converting enzyme inhibitors (ACEIs) reduce the heart's workload, lower blood pressure, and reduce swelling. Taking an ACEI may lower your chance of needing to be hospitalized again. ¨ Angiotensin II receptor blockers (ARBs) work like ACEIs. Your doctor may prescribe them instead of ACEIs. ¨ Diuretics, also called water pills, reduce swelling. ¨ Potassium supplements replace this important mineral, which is sometimes lost with diuretics. ¨ Aspirin and other blood thinners prevent blood clots, which can cause a stroke or heart attack. You will get more details on the specific medicines your doctor prescribes. Diet · Your doctor may suggest that you limit sodium to 2,000 milligrams (mg) a day or less. That is less than 1 teaspoon of salt a day, including all the salt you eat in cooking or in packaged foods. People get most of their sodium from processed foods. Fast food and restaurant meals also tend to be very high in sodium. · Ask your doctor how much liquid you can drink each day. You may have to limit liquids. Weight · Weigh yourself without clothing at the same time each day. Record your weight. Call your doctor if you gain more than 3 pounds in 2 to 3 days. A sudden weight gain may mean that your heart failure is getting worse. Activity level · Start light exercise (if your doctor says it is okay). Even if you can only do a small amount, exercise will help you get stronger, have more energy, and manage your weight and your stress. Walking is an easy way to get exercise. Start out by walking a little more than you did before. Bit by bit, increase the amount you walk. · When you exercise, watch for signs that your heart is working too hard. You are pushing yourself too hard if you cannot talk while you are exercising.  If you become short of breath or dizzy or have chest pain, stop, sit down, and rest. 
· If you feel \"wiped out\" the day after you exercise, walk slower or for a shorter distance until you can work up to a better pace. · Get enough rest at night. Sleeping with 1 or 2 pillows under your upper body and head may help you breathe easier. Lifestyle changes · Do not smoke. Smoking can make a heart condition worse. If you need help quitting, talk to your doctor about stop-smoking programs and medicines. These can increase your chances of quitting for good. Quitting smoking may be the most important step you can take to protect your heart. · Limit alcohol to 2 drinks a day for men and 1 drink a day for women. Too much alcohol can cause health problems. · Avoid getting sick from colds and the flu. Get a pneumococcal vaccine shot. If you have had one before, ask your doctor whether you need another dose. Get a flu shot each year. If you must be around people with colds or the flu, wash your hands often. When should you call for help? Call 911 if you have symptoms of sudden heart failure such as: 
· You have severe trouble breathing. · You cough up pink, foamy mucus. · You have a new irregular or rapid heartbeat. Call your doctor now or seek immediate medical care if: 
· You have new or increased shortness of breath. · You are dizzy or lightheaded, or you feel like you may faint. · You have sudden weight gain, such as 3 pounds or more in 2 to 3 days. · You have increased swelling in your legs, ankles, or feet. · You are suddenly so tired or weak that you cannot do your usual activities. Watch closely for changes in your health, and be sure to contact your doctor if: 
· You develop new symptoms. Where can you learn more? Go to http://armando-nirav.info/. Enter L344 in the search box to learn more about \"Heart Failure: Care Instructions. \" Current as of: January 27, 2016 Content Version: 11.1 © 3662-6055 [employer]. Care instructions adapted under license by [employer] (which disclaims liability or warranty for this information). If you have questions about a medical condition or this instruction, always ask your healthcare professional. [address] any warranty or liability for your use of this information. Avoiding Triggers With Heart Failure: Care Instructions Your Care Instructions Triggers are anything that make your heart failure flare up. A flare-up is also called \"sudden heart failure\" or \"acute heart failure. \" When you have a flare-up, fluid builds up in your lungs, and you have problems breathing. You might need to go to the hospital. By watching for changes in your condition and avoiding triggers, you can prevent heart failure flare-ups. Follow-up care is a key part of your treatment and safety. Be sure to make and go to all appointments, and call your doctor if you are having problems. It's also a good idea to know your test results and keep a list of the medicines you take. How can you care for yourself at home? Watch for changes in your weight and condition · Weigh yourself without clothing at the same time each day. Record your weight. Call your doctor if you gain 3 pounds or more in 2 to 3 days. A sudden weight gain may mean that your heart failure is getting worse. · Keep a daily record of your symptoms. Write down any changes in how you feel, such as new shortness of breath, cough, or problems eating. Also record if your ankles are more swollen than usual and if you have to urinate in the night more often. Note anything that you ate or did that could have triggered these changes. Limit sodium Sodium causes your body to hold on to water, making it harder for your heart to pump. People get most of their sodium from processed foods. Fast food and restaurant meals also tend to be very high in sodium. · Your doctor may suggest that you limit sodium to 2,000 milligrams (mg) a day or less. That is less than 1 teaspoon of salt a day, including all the salt you eat in cooking or in packaged foods. · Read food labels on cans and food packages. They tell you how much sodium you get in one serving. Check the serving size. If you eat more than one serving, you are getting more sodium. · Be aware that sodium can come in forms other than salt, including monosodium glutamate (MSG), sodium citrate, and sodium bicarbonate (baking soda). MSG is often added to Asian food. You can sometimes ask for food without MSG or salt. · Slowly reducing salt will help you adjust to the taste. Take the salt shaker off the table. · Flavor your food with garlic, lemon juice, onion, vinegar, herbs, and spices instead of salt. Do not use soy sauce, steak sauce, onion salt, garlic salt, mustard, or ketchup on your food, unless it is labeled \"low-sodium\" or \"low-salt. \" 
· Make your own salad dressings, sauces, and ketchup without adding salt. · Use fresh or frozen ingredients, instead of canned ones, whenever you can. Choose low-sodium canned goods. · Eat less processed food and food from restaurants, including fast food. Exercise as directed Moderate, regular exercise is very good for your heart. It improves your blood flow and helps control your weight. But too much exercise can stress your heart and cause a heart failure flare-up. · Check with your doctor before you start an exercise program. 
· Walking is an easy way to get exercise. Start out slowly. Gradually increase the length and pace of your walk. Swimming, riding a bike, and using a treadmill are also good forms of exercise. · When you exercise, watch for signs that your heart is working too hard. You are pushing yourself too hard if you cannot talk while you are exercising.  If you become short of breath or dizzy or have chest pain, stop, sit down, and rest. 
 · Do not exercise when you do not feel well. Take medicines correctly · Take your medicines exactly as prescribed. Call your doctor if you think you are having a problem with your medicine. · Make a list of all the medicines you take. Include those prescribed to you by other doctors and any over-the-counter medicines, vitamins, or supplements you take. Take this list with you when you go to any doctor. · Take your medicines at the same time every day. It may help you to post a list of all the medicines you take every day and what time of day you take them. · Make taking your medicine as simple as you can. Plan times to take your medicines when you are doing other things, such as eating a meal or getting ready for bed. This will make it easier to remember to take your medicines. · Get organized. Use helpful tools, such as daily or weekly pill containers. When should you call for help? Call 911 if you have symptoms of sudden heart failure such as: 
· You have severe trouble breathing. · You cough up pink, foamy mucus. · You have a new irregular or rapid heartbeat. Call your doctor now or seek immediate medical care if: 
· You have new or increased shortness of breath. · You are dizzy or lightheaded, or you feel like you may faint. · You have sudden weight gain, such as 3 pounds or more in 2 to 3 days. · You have increased swelling in your legs, ankles, or feet. · You are suddenly so tired or weak that you cannot do your usual activities. Watch closely for changes in your health, and be sure to contact your doctor if you develop new symptoms. Where can you learn more? Go to http://armando-nirav.info/. Enter J738 in the search box to learn more about \"Avoiding Triggers With Heart Failure: Care Instructions. \" Current as of: April 27, 2016 Content Version: 11.1 © 8092-1619 [employer].  Care instructions adapted under license by [facility] [address] (which disclaims liability or warranty for this information). If you have questions about a medical condition or this instruction, always ask your healthcare professional. [address] any warranty or liability for your use of this information. Limiting Sodium and Fluids With Heart Failure: Care Instructions Your Care Instructions Sodium causes your body to keep extra water, making it harder for your heart to pump. By limiting sodium, you will feel better and lower your risk of having to go to the hospital. 
People get most of their sodium from processed foods. Fast food and restaurant meals also tend to be very high in sodium. Your doctor may suggest that you limit sodium to 2,000 milligrams (mg) a day or less. That is less than 1 teaspoon of salt a day, including all the salt you eat in cooked or packaged foods. Usually, you have to limit the amount of liquids you drink only if your heart failure is severe. Limiting sodium alone often is enough to help your body get rid of extra fluids. However, your doctor may tell you to limit your fluid intake to a set amount each day. Follow-up care is a key part of your treatment and safety. Be sure to make and go to all appointments, and call your doctor if you are having problems. It's also a good idea to know your test results and keep a list of the medicines you take. How can you care for yourself at home? Read food labels · Read food labels on cans and food packages. The labels tell you how much sodium is in each serving. Make sure that you look at the serving size. If you eat more than the serving size, you have eaten more sodium than is listed for one serving. · Food labels also tell you the Percent Daily Value. If the Percent Daily Value says 50%, it means that you will get at least 50% of all the sodium you need for the entire day in one serving.  Choose products with low Percent Daily Values for sodium. · Be aware that sodium can come in forms other than salt, including monosodium glutamate (MSG), sodium citrate, and sodium bicarbonate (baking soda). MSG is often added to Asian food. You can sometimes ask for food without MSG or salt. Buy low-sodium foods · Buy foods that are labeled \"unsalted\" (no salt added), \"sodium-free\" (less than 5 mg of sodium per serving), or \"low-sodium\" (less than 140 mg of sodium per serving). A food labeled \"light sodium\" has less than half of the full-sodium version of that food. Foods labeled \"reduced-sodium\" may still have too much sodium. · Buy fresh vegetables or plain, frozen vegetables. Buy low-sodium versions of canned vegetables, soups, and other canned goods. Prepare low-sodium meals · Use less salt each day when cooking. Reducing salt in this way will help you adjust to the taste. Do not add salt after cooking. Take the salt shaker off the table. · Flavor your food with garlic, lemon juice, onion, vinegar, herbs, and spices instead of salt. Do not use soy sauce, steak sauce, onion salt, garlic salt, mustard, or ketchup on your food. · Make your own salad dressings, sauces, and ketchup without adding salt. · Use less salt (or none) when recipes call for it. You can often use half the salt a recipe calls for without losing flavor. Other dishes like rice, pasta, and grains do not need added salt. · Rinse canned vegetables. This removes somebut not allof the salt. · Avoid water that has a naturally high sodium content or that has been treated with water softeners, which add sodium. Call your local water company to find out the sodium content of your water supply. If you buy bottled water, read the label and choose a sodium-free brand. Avoid high-sodium foods, such as: 
· Smoked, cured, salted, and canned meat, fish, and poultry. · Ham, fuller, hot dogs, and luncheon meats. · Regular, hard, and processed cheese and regular peanut butter. · Crackers with salted tops. · Frozen prepared meals. · Canned and dried soups, broths, and bouillon, unless labeled sodium-free or low-sodium. · Canned vegetables, unless labeled sodium-free or low-sodium. · Salted snack foods such as chips and pretzels. · [place]iela fries, pizza, tacos, and other fast foods. · Pickles, olives, ketchup, and other condiments, especially soy sauce, unless labeled sodium-free or low-sodium. If you cannot cook for yourself · Have family members or friends help you, or have someone cook low-sodium meals. · Check with your local senior nutrition program to find out where meals are served and whether they offer a low-sodium option. You can often find these programs through your local health department or hospital. 
· Have meals delivered to your home. Most [facility] have a Meals on [place]. These programs provide one hot meal a day for older adults, delivered to their homes. Ask whether these meals are low-sodium. Let them know that you are on a low-sodium diet. Limiting fluid intake · Find a method that works for you. You might simply write down how much you drink every time you do. Some people keep a container filled with the amount of fluid allowed for that day. If they drink from a source other than the container, then they pour out that amount. · Measure your regular drinking glasses to find out how much fluid each one holds. Once you know this, you will not have to measure every time. · Besides water, milk, juices, and other drinks, some foods have a lot of fluid. Count any foods that will melt (such as ice cream or gelatin dessert) or liquid foods (such as soup) as part of your fluid intake for the day. Where can you learn more? Go to http://armando-nirav.info/. Enter A166 in the search box to learn more about \"Limiting Sodium and Fluids With Heart Failure: Care Instructions. \" 
 Current as of: January 27, 2016 Content Version: 11.1 © 4013-4099 [employer]. Care instructions adapted under license by [employer] (which disclaims liability or warranty for this information). If you have questions about a medical condition or this instruction, always ask your healthcare professional. [address] any warranty or liability for your use of this information. DISCHARGE SUMMARY from Nurse The following personal items are in your possession at time of discharge: 
 
Dental Appliances: None Home Medications: None Jewelry: None Clothing: At bedside Other Valuables: None PATIENT INSTRUCTIONS: 
 
 
F-face looks uneven A-arms unable to move or move unevenly S-speech slurred or non-existent T-time-call 911 as soon as signs and symptoms begin-DO NOT go Back to bed or wait to see if you get better-TIME IS BRAIN. Warning Signs of HEART ATTACK Call 911 if you have these symptoms: 
? Chest discomfort. Most heart attacks involve discomfort in the center of the chest that lasts more than a few minutes, or that goes away and comes back. It can feel like uncomfortable pressure, squeezing, fullness, or pain. ? Discomfort in other areas of the upper body. Symptoms can include pain or discomfort in one or both arms, the back, neck, jaw, or stomach. ? Shortness of breath with or without chest discomfort. ? Other signs may include breaking out in a cold sweat, nausea, or lightheadedness. Don't wait more than five minutes to call [address]! Fast action can save your life.  Calling 911 is almost always the fastest way to get lifesaving treatment. Emergency Medical Services staff can begin treatment when they arrive  up to an hour sooner than if someone gets to the hospital by car. The discharge information has been reviewed with the patient. The patient verbalized understanding. Discharge medications reviewed with the patient and appropriate educational materials and side effects teaching were provided. Discharge Orders None [email or web address][employer]TV[employer] Announcement We are excited to announce that we are making your provider's discharge notes available to you in [employer]. You will see these notes when they are completed and signed by the physician that discharged you from your recent hospital stay. If you have any questions or concerns about any information you see in [employer], please call the Health Information Department where you were seen or reach out to your Primary Care Provider for more information about your plan of care. Introducing [facility]! Fili Mccloud introduces [employer] patient portal. Now you can access parts of your medical record, email your doctor's office, and request medication refills online. 1. In your internet browser, go to [email or web address] 2. Click on the First Time User? Click Here link in the Sign In box. You will see the New Member Sign Up page. 3. Enter your [employer] Access Code exactly as it appears below. You will not need to use this code after youve completed the sign-up process. If you do not sign up before the expiration date, you must request a new code. · [employer] Access Code: [email or web address] Expires: 4/10/2017  3:06 PM 
 
4. Enter the last four digits of your Social Security Number (xxxx) and Date of Birth (mm/dd/yyyy) as indicated and click Submit. You will be taken to the next sign-up page. 5. Create a [employer] ID. This will be your [employer] login ID and cannot be changed, so think of one that is secure and easy to remember. 6. Create a [employer] password. You can change your password at any time. 7. Enter your Password Reset Question and Answer. This can be used at a later time if you forget your password. 8. Enter your e-mail address. You will receive e-mail notification when new information is available in [address] 9. Click Sign Up. You can now view and download portions of your medical record. 10. Click the Download Summary menu link to download a portable copy of your medical information. If you have questions, please visit the Frequently Asked Questions section of the [employer] website. Remember, [employer] is NOT to be used for urgent needs. For medical emergencies, dial 911. Now available from your iPhone and Android! General Information Please provide this summary of care documentation to your next provider. Patient Signature:  ____________________________________________________________ Date:  ____________________________________________________________  
  
Alphonse Beltran Provider Signature:  ____________________________________________________________ Date:  ____________________________________________________________

## 2017-01-19 NOTE — ED NOTES
Pt to er. .. Pt c/o sob and chest pain. .. Pt drove self to er, called code and er staff went out to get pt. .. Brought pt to rm 8 and pt c/o having sob for 4 days. .. sts chest pain started on the way to the er driving. ...  Pt c/o nausea but denies v/d

## 2017-01-19 NOTE — PROGRESS NOTES
Spiritual Care visit. Follow up visit. Spiritual Care Visit, in the Emergency Department. Gave support to patient and family members. Affirmed his statement to family about what was going on. He seemed to understand his condition and to be able to articulate it. Visit, prayer, with patient and family member. Signed by Marylen Cruz Jewel Calamity., Staff

## 2017-01-19 NOTE — H&P
[facility] Cardiology H&P    Admitting Cardiologist:Dr. Johnny Sandoval      Primary Care Physician:DR. Leander Rodriguez    Subjective:     Jessie Santos is a 45 y.o. male with known NICM with documented EF 10 % with [employer] single chamber ICD in place. He was just discharged on 1/13 with initial complaint of constipation and volume overload. He developed worsening renal failure necessitating hold his diuretics temporarily. He was discharged home on addition of demadex 100 mg daily and miralax for constipation. Really since mid December he has not felt well with worsening orthopnea, increasing abdominal girth, increased wt and lower ext edema. Today while driving in car he felt presyncopal and was able to pull to side of em. He did not loose consciousness. He had repeat episode of presyncope prior to coming to ER. While in ER he was noted to have nonsustained ventricular tachycardia. He is not on antiarrhythmia agent. Interrogation of his device revealed normal operation but his VT zone is set at 158 bpm with noted VT in ER at approximately 145 bpm.     Past Medical History   Diagnosis Date    Acute systolic heart failure ([address] May, 2009     Also had transient acute renal failure secondary to poor perfusion.     AICD (automatic cardioverter/defibrillator) present 10/21/2015    Atypical chest pain 4/23/2010    Bronchitis     CAD (coronary artery disease)     Cardiomyopathy     Chest pain 10/21/2015    Chronic kidney disease      renal insufficiency    Chronic pain      back    Congestive heart failure (CHF) ([address] 10/21/2015    COPD     Diabetes ([address]      type 2 insulin reliant- BS average- 160's-180's    Diabetes mellitus type II, uncontrolled ([address] 7/2/2013    GERD (gastroesophageal reflux disease)     Gout     Heart failure ([address]      cardiomyopathy with ef 10-20%    Hypertension     Hyponatremia 12/20/2010    Ill-defined condition      gout, neuropathy, sciatica    Morbid obesity (HCC)     Nausea & vomiting 11/30/2015    Neuropathy     Obstructive sleep apnea 2/15/2010    Other unknown and unspecified cause of morbidity or mortality      Gout    Severe sepsis [facility]     Unspecified sleep apnea      cpap      Past Surgical History   Procedure Laterality Date    Hx pacemaker       may 2010    Hx heart catheterization  S[place] 10, 2008     Severe multivessel disease with severe LV dysfunction    Pr chest surgery procedure unlisted       thorocentesis      Current Facility-Administered Medications   Medication Dose Route Frequency    amiodarone (CORDARONE) 150 mg in dextrose 5% 100 mL bolus infusion  150 mg IntraVENous ONCE    amiodarone (CORDARONE) 450 mg in dextrose 5% 250 mL infusion  1 mg/min IntraVENous CONTINUOUS     Current Outpatient Prescriptions   Medication Sig    gabapentin (NEURONTIN) 600 mg tablet Take 600 mg by mouth three (3) times daily.  bisacodyl (DULCOLAX) 10 mg suppository Insert 10 mg into rectum daily.  torsemide (DEMADEX) 100 mg tablet Take 1 Tab by mouth daily.  apixaban (ELIQUIS) 5 mg tablet Take 1 Tab by mouth every twelve (12) hours.  oxyCODONE IR (OXY-IR) 15 mg immediate release tablet Take 15 mg by mouth every four (4) hours as needed for Pain.  carvedilol (COREG) 25 mg tablet Take 1 Tab by mouth two (2) times daily (with meals).  hydrALAZINE (APRESOLINE) 25 mg tablet Take 1 Tab by mouth three (3) times daily.  insulin glargine (LANTUS) 100 unit/mL injection 50 Units by SubCUTAneous route nightly.  insulin lispro (HUMALOG) 100 unit/mL injection 15 Units by SubCUTAneous route three (3) times daily (with meals).  docusate sodium (COLACE) 100 mg capsule Take 1 Cap by mouth two (2) times a day for 90 days.  isosorbide dinitrate (ISORDIL) 20 mg tablet Take 1 Tab by mouth three (3) times daily.  ranitidine (ZANTAC) 150 mg tablet Take 150 mg by mouth nightly.  spironolactone (ALDACTONE) 25 mg tablet Take 1 Tab by mouth daily.     pravastatin (PRAVACHOL) 80 mg tablet Take 1 Tab by mouth nightly.  potassium chloride (K-DUR, KLOR-CON) 10 mEq tablet Take 1 Tab by mouth two (2) times a day.  polyethylene glycol (MIRALAX) 17 gram packet Take 1 Packet by mouth daily as needed (constipation).  cpap machine kit 10 cm qhs    OXYGEN-AIR DELIVERY SYSTEMS 2 lpm qhs    allopurinol (ZYLOPRIM) 100 mg tablet Take 100 mg by mouth daily.  glucose blood VI test strips (ASCENSIA AUTODISC VI, ONE TOUCH ULTRA TEST VI) strip Test strips for 30 day supply    nitroglycerin (NITROSTAT) 0.4 mg SL tablet 1 Tab by SubLINGual route every five (5) minutes as needed for Chest Pain.  colchicine 0.6 mg tablet Take 0.6 mg by mouth every morning. Allergies   Allergen Reactions    Dilaudid [Hydromorphone] Other (comments)     Hotflashes, patient states he's not allergic    Iodinated Contrast Media - Oral And Iv Dye Other (comments)     \"shuts my kidneys down\"    Penicillins Unknown (comments)     Pt states he is not allergic his mother just wouldn't allow him take it      Social History   Substance Use Topics    Smoking status: Former Smoker     Packs/day: 0.25     Years: 1.00     Quit date: 7/12/1984    Smokeless tobacco: Never Used      Comment: pt states that he only tried smoking as a kid     Alcohol use No      Family History   Problem Relation Age of Onset    Heart Disease Father     Stroke Father     Diabetes Sister     Stroke Sister     Diabetes Sister     Heart Disease Other         Review of Systems  Gen: Denies fever, chills, malaise or fatigue. Appetite good. HEENT: Denies frequent headaches, dizzyness, visual disturbances, Neck pain or swallowing difficulty  Lungs: Denies shortness of breath, hx of COPD, breathing problems  Cardiovascular: Denies chest pain, orthopnea, PND, no syncope or near syncope  GI: + Chronic constipation with improvement with miralax.    : Denies dysuria, no complaints of frequency, nocturia  Heme: No prior bleeding disorders, no prior Cancer  Neuro: Denies prior CVA, TIA. Endocrine: +DM  Psychiatric: Denies anxiety, or other psychiatric illnesses. Objective:     Visit Vitals    /88    Pulse 80    Temp 98.6 °F (37 °C)    Resp 22    Ht 5' 6\" (1.676 m)    Wt (!) 181.4 kg (400 lb)    SpO2 98%    BMI 64.56 kg/m2     General:Alert, cooperative, no distress, appears stated age  Head: Normocephalic, without obvious abnormality, atraumatic. Eyes: Conjunctivae/corneas clear. PERRL, EOMs intact  Nose:Nares normal. Septum midline. Mucosa normal. No drainage or sinus tenderness. Throat: Lips, mucosa, and tongue normal. Teeth and gums normal.   Neck: Supple, symmetrical, trachea midline,  no carotid bruit and no JVD. Lungs:bibasilar rales, diminished BS throughout   Chest wall: No tenderness or deformity. Heart: Regular rate and rhythm, S1, S2 normal, no murmur, click, rub or gallop. Abdomen:Soft, non-tender. Bowel sounds normal. No masses, No organomegaly. Extremities: Extremities normal, atraumatic, no cyanosis or edema. Pulses: 2+ and symmetric all extremities. Skin: Skin color, texture, turgor normal. No rashes or lesions  Lymph nodes: Cervical, supraclavicular, and axillary nodes normal  Neurologic:No focal deficits identified                 ECG: atrial flutter with controlled response.      Data Review:     Recent Results (from the past 24 hour(s))   CBC WITH AUTOMATED DIFF    Collection Time: 01/19/17  2:00 PM   Result Value Ref Range    WBC 6.8 4.3 - 11.1 K/uL    RBC 4.46 4.23 - 5.67 M/uL    HGB 11.0 (L) 13.6 - 17.2 g/dL    HCT 35.3 (L) 41.1 - 50.3 %    MCV 79.1 (L) 79.6 - 97.8 FL    MCH 24.7 (L) 26.1 - 32.9 PG    MCHC 31.2 (L) 31.4 - 35.0 g/dL    RDW 19.2 (H) 11.9 - 14.6 %    PLATELET 679 596 - 107 K/uL    MPV 9.7 (L) 10.8 - 14.1 FL    DF AUTOMATED      NEUTROPHILS 55 43 - 78 %    LYMPHOCYTES 28 13 - 44 %    MONOCYTES 13 (H) 4.0 - 12.0 %    EOSINOPHILS 4 0.5 - 7.8 %    BASOPHILS 0 0.0 - 2.0 % IMMATURE GRANULOCYTES 0.1 0.0 - 5.0 %    ABS. NEUTROPHILS 3.7 1.7 - 8.2 K/UL    ABS. LYMPHOCYTES 1.9 0.5 - 4.6 K/UL    ABS. MONOCYTES 0.9 0.1 - 1.3 K/UL    ABS. EOSINOPHILS 0.2 0.0 - 0.8 K/UL    ABS. BASOPHILS 0.0 0.0 - 0.2 K/UL    ABS. IMM. GRANS. 0.0 0.0 - 0.5 K/UL   METABOLIC PANEL, COMPREHENSIVE    Collection Time: 01/19/17  2:00 PM   Result Value Ref Range    Sodium 141 136 - 145 mmol/L    Potassium 4.2 3.5 - 5.1 mmol/L    Chloride 102 98 - 107 mmol/L    CO2 29 21 - 32 mmol/L    Anion gap 10 7 - 16 mmol/L    Glucose 138 (H) 65 - 100 mg/dL    BUN 42 (H) 6 - 23 MG/DL    Creatinine 1.75 (H) 0.8 - 1.5 MG/DL    GFR est AA 53 (L) >60 ml/min/1.73m2    GFR est non-AA 44 (L) >60 ml/min/1.73m2    Calcium 8.3 8.3 - 10.4 MG/DL    Bilirubin, total 1.2 (H) 0.2 - 1.1 MG/DL    ALT 36 12 - 65 U/L    AST 20 15 - 37 U/L    Alk. phosphatase 182 (H) 50 - 136 U/L    Protein, total 7.4 6.3 - 8.2 g/dL    Albumin 3.3 (L) 3.5 - 5.0 g/dL    Globulin 4.1 (H) 2.3 - 3.5 g/dL    A-G Ratio 0.8 (L) 1.2 - 3.5     TROPONIN I    Collection Time: 01/19/17  2:00 PM   Result Value Ref Range    Troponin-I, Qt. <0.02 (L) 0.02 - 0.05 NG/ML   BNP    Collection Time: 01/19/17  2:00 PM   Result Value Ref Range     pg/mL   EKG, 12 LEAD, INITIAL    Collection Time: 01/19/17  2:59 PM   Result Value Ref Range    Systolic BP  mmHg    Diastolic BP  mmHg    Ventricular Rate 81 BPM    Atrial Rate 81 BPM    P-R Interval 232 ms    QRS Duration 82 ms    Q-T Interval 352 ms    QTC Calculation (Bezet) 408 ms    Calculated P Axis 58 degrees    Calculated R Axis 161 degrees    Calculated T Axis 65 degrees    Diagnosis       !! AGE AND GENDER SPECIFIC ECG ANALYSIS !!   Sinus rhythm with marked sinus arrhythmia with 1st degree A-V block  Right axis deviation  Low voltage QRS  Cannot rule out Anterior infarct , age undetermined  Abnormal ECG           Assessment / Plan     Principal Problem:    Nonsustained ventricular tachycardia (HCC) (1/19/2017)--admit to telemetery, add IV amiodarone    Active Problems:  Acute on chronic systolic (congestive) heart failure (HCC) (10/18/2016)--continue home meds, IV lasix infusion with enormous volume overload. Monitor renal function closely. No ACE or ARB due to renal failure. Obstructive sleep apnea (2/15/2010)--cpap at night. Nonischemic dilated cardiomyopathy ([facility] (7/2/2013)      Overview: LVEF 10-15% on ECHO from 12/17/10      CKD (chronic kidney disease) stage 3, GFR 30-59 ml/min (8/13/2014)--monitor closely. AICD (automatic cardioverter/defibrillator) present (10/21/2015)--consideration for decreasing VT threshold      Atrial flutter ([facility] (10/20/2016)--continue oral anticoagulant. Obesity hypoventilation syndrome ([facility] (10/24/2016)      Morbid obesity with BMI of 60.0-69.9, adult ([facility] (11/28/2016)      Syncope (1/19/2017)--near syncope, suspect related to NSVT under VT detection zone. As above.                Sherell Holter, TAMANNA

## 2017-01-20 LAB
ANION GAP BLD CALC-SCNC: 12 MMOL/L (ref 7–16)
ATRIAL RATE: 72 BPM
BUN SERPL-MCNC: 47 MG/DL (ref 6–23)
CALCIUM SERPL-MCNC: 8.3 MG/DL (ref 8.3–10.4)
CALCULATED P AXIS, ECG09: 37 DEGREES
CALCULATED R AXIS, ECG10: -170 DEGREES
CALCULATED T AXIS, ECG11: 54 DEGREES
CHLORIDE SERPL-SCNC: 103 MMOL/L (ref 98–107)
CO2 SERPL-SCNC: 25 MMOL/L (ref 21–32)
CREAT SERPL-MCNC: 1.94 MG/DL (ref 0.8–1.5)
DIAGNOSIS, 93000: NORMAL
DIASTOLIC BP, ECG02: NORMAL MMHG
ERYTHROCYTE [DISTWIDTH] IN BLOOD BY AUTOMATED COUNT: 19.2 % (ref 11.9–14.6)
GLUCOSE BLD STRIP.AUTO-MCNC: 104 MG/DL (ref 65–100)
GLUCOSE BLD STRIP.AUTO-MCNC: 156 MG/DL (ref 65–100)
GLUCOSE BLD STRIP.AUTO-MCNC: 163 MG/DL (ref 65–100)
GLUCOSE BLD STRIP.AUTO-MCNC: 168 MG/DL (ref 65–100)
GLUCOSE BLD STRIP.AUTO-MCNC: 88 MG/DL (ref 65–100)
GLUCOSE SERPL-MCNC: 162 MG/DL (ref 65–100)
HCT VFR BLD AUTO: 35.4 % (ref 41.1–50.3)
HGB BLD-MCNC: 11 G/DL (ref 13.6–17.2)
MCH RBC QN AUTO: 24.4 PG (ref 26.1–32.9)
MCHC RBC AUTO-ENTMCNC: 31.1 G/DL (ref 31.4–35)
MCV RBC AUTO: 78.7 FL (ref 79.6–97.8)
P-R INTERVAL, ECG05: 252 MS
PLATELET # BLD AUTO: 236 K/UL (ref 150–450)
PMV BLD AUTO: 10.4 FL (ref 10.8–14.1)
POTASSIUM SERPL-SCNC: 4.4 MMOL/L (ref 3.5–5.1)
Q-T INTERVAL, ECG07: 438 MS
QRS DURATION, ECG06: 90 MS
QTC CALCULATION (BEZET), ECG08: 479 MS
RBC # BLD AUTO: 4.5 M/UL (ref 4.23–5.67)
SODIUM SERPL-SCNC: 140 MMOL/L (ref 136–145)
SYSTOLIC BP, ECG01: NORMAL MMHG
VENTRICULAR RATE, ECG03: 72 BPM
WBC # BLD AUTO: 9.6 K/UL (ref 4.3–11.1)

## 2017-01-20 PROCEDURE — 80048 BASIC METABOLIC PNL TOTAL CA: CPT | Performed by: INTERNAL MEDICINE

## 2017-01-20 PROCEDURE — 65660000000 HC RM CCU STEPDOWN

## 2017-01-20 PROCEDURE — 74011250637 HC RX REV CODE- 250/637: Performed by: INTERNAL MEDICINE

## 2017-01-20 PROCEDURE — 74011250636 HC RX REV CODE- 250/636: Performed by: INTERNAL MEDICINE

## 2017-01-20 PROCEDURE — 94660 CPAP INITIATION&MGMT: CPT

## 2017-01-20 PROCEDURE — 36415 COLL VENOUS BLD VENIPUNCTURE: CPT | Performed by: INTERNAL MEDICINE

## 2017-01-20 PROCEDURE — 82962 GLUCOSE BLOOD TEST: CPT

## 2017-01-20 PROCEDURE — 74011250637 HC RX REV CODE- 250/637: Performed by: NURSE PRACTITIONER

## 2017-01-20 PROCEDURE — 3331090001 HH PPS REVENUE CREDIT

## 2017-01-20 PROCEDURE — 77010033678 HC OXYGEN DAILY

## 2017-01-20 PROCEDURE — 74011250636 HC RX REV CODE- 250/636: Performed by: NURSE PRACTITIONER

## 2017-01-20 PROCEDURE — 74011636637 HC RX REV CODE- 636/637: Performed by: NURSE PRACTITIONER

## 2017-01-20 PROCEDURE — 85027 COMPLETE CBC AUTOMATED: CPT | Performed by: INTERNAL MEDICINE

## 2017-01-20 PROCEDURE — 74011000258 HC RX REV CODE- 258: Performed by: NURSE PRACTITIONER

## 2017-01-20 PROCEDURE — 94760 N-INVAS EAR/PLS OXIMETRY 1: CPT

## 2017-01-20 PROCEDURE — 3331090002 HH PPS REVENUE DEBIT

## 2017-01-20 RX ORDER — DIAPER,BRIEF,INFANT-TODD,DISP
EACH MISCELLANEOUS 2 TIMES DAILY
Status: DISCONTINUED | OUTPATIENT
Start: 2017-01-21 | End: 2017-02-18

## 2017-01-20 RX ORDER — AMIODARONE HYDROCHLORIDE 200 MG/1
400 TABLET ORAL 2 TIMES DAILY
Status: DISCONTINUED | OUTPATIENT
Start: 2017-01-20 | End: 2017-01-24

## 2017-01-20 RX ORDER — FUROSEMIDE 10 MG/ML
80 INJECTION INTRAMUSCULAR; INTRAVENOUS 2 TIMES DAILY
Status: DISCONTINUED | OUTPATIENT
Start: 2017-01-20 | End: 2017-01-20

## 2017-01-20 RX ORDER — MILRINONE LACTATE 0.2 MG/ML
0.5 INJECTION, SOLUTION INTRAVENOUS CONTINUOUS
Status: DISCONTINUED | OUTPATIENT
Start: 2017-01-20 | End: 2017-01-20 | Stop reason: SDUPTHER

## 2017-01-20 RX ORDER — POLYETHYLENE GLYCOL 3350 17 G/17G
34 POWDER, FOR SOLUTION ORAL DAILY
Status: DISCONTINUED | OUTPATIENT
Start: 2017-01-20 | End: 2017-02-15

## 2017-01-20 RX ORDER — DIAPER,BRIEF,INFANT-TODD,DISP
EACH MISCELLANEOUS 2 TIMES DAILY
Status: DISCONTINUED | OUTPATIENT
Start: 2017-01-21 | End: 2017-01-20

## 2017-01-20 RX ORDER — MILRINONE LACTATE 0.2 MG/ML
0.5 INJECTION, SOLUTION INTRAVENOUS CONTINUOUS
Status: DISCONTINUED | OUTPATIENT
Start: 2017-01-20 | End: 2017-01-27

## 2017-01-20 RX ADMIN — AMIODARONE HYDROCHLORIDE 400 MG: 200 TABLET ORAL at 17:51

## 2017-01-20 RX ADMIN — HYDROCORTISONE: 5 CREAM TOPICAL at 23:07

## 2017-01-20 RX ADMIN — MILRINONE LACTATE 0.5 MCG/KG/MIN: 200 INJECTION, SOLUTION INTRAVENOUS at 16:14

## 2017-01-20 RX ADMIN — PRAVASTATIN SODIUM 80 MG: 80 TABLET ORAL at 21:55

## 2017-01-20 RX ADMIN — ISOSORBIDE DINITRATE 20 MG: 20 TABLET ORAL at 15:04

## 2017-01-20 RX ADMIN — CARVEDILOL 25 MG: 25 TABLET, FILM COATED ORAL at 08:09

## 2017-01-20 RX ADMIN — FAMOTIDINE 20 MG: 20 TABLET ORAL at 17:51

## 2017-01-20 RX ADMIN — INSULIN GLARGINE 50 UNITS: 100 INJECTION, SOLUTION SUBCUTANEOUS at 21:57

## 2017-01-20 RX ADMIN — POLYETHYLENE GLYCOL 3350 34 G: 17 POWDER, FOR SOLUTION ORAL at 09:44

## 2017-01-20 RX ADMIN — CARVEDILOL 25 MG: 25 TABLET, FILM COATED ORAL at 17:51

## 2017-01-20 RX ADMIN — GABAPENTIN 600 MG: 300 CAPSULE ORAL at 15:04

## 2017-01-20 RX ADMIN — SPIRONOLACTONE 25 MG: 25 TABLET, FILM COATED ORAL at 08:09

## 2017-01-20 RX ADMIN — AMIODARONE HYDROCHLORIDE 400 MG: 200 TABLET ORAL at 08:09

## 2017-01-20 RX ADMIN — INSULIN LISPRO 15 UNITS: 100 INJECTION, SOLUTION INTRAVENOUS; SUBCUTANEOUS at 08:09

## 2017-01-20 RX ADMIN — INSULIN LISPRO 15 UNITS: 100 INJECTION, SOLUTION INTRAVENOUS; SUBCUTANEOUS at 12:24

## 2017-01-20 RX ADMIN — AMIODARONE HYDROCHLORIDE 0.5 MG/MIN: 50 INJECTION, SOLUTION INTRAVENOUS at 02:50

## 2017-01-20 RX ADMIN — HYDRALAZINE HYDROCHLORIDE 25 MG: 25 TABLET, FILM COATED ORAL at 21:56

## 2017-01-20 RX ADMIN — APIXABAN 5 MG: 5 TABLET, FILM COATED ORAL at 08:08

## 2017-01-20 RX ADMIN — HYDRALAZINE HYDROCHLORIDE 25 MG: 25 TABLET, FILM COATED ORAL at 15:04

## 2017-01-20 RX ADMIN — GABAPENTIN 600 MG: 300 CAPSULE ORAL at 21:56

## 2017-01-20 RX ADMIN — FUROSEMIDE 10 MG/HR: 10 INJECTION, SOLUTION INTRAVENOUS at 08:00

## 2017-01-20 RX ADMIN — DOCUSATE SODIUM 100 MG: 100 CAPSULE, LIQUID FILLED ORAL at 08:08

## 2017-01-20 RX ADMIN — DOCUSATE SODIUM 100 MG: 100 CAPSULE, LIQUID FILLED ORAL at 17:51

## 2017-01-20 RX ADMIN — COLCHICINE 0.6 MG: 0.6 TABLET, FILM COATED ORAL at 08:08

## 2017-01-20 RX ADMIN — FUROSEMIDE 10 MG/HR: 10 INJECTION, SOLUTION INTRAVENOUS at 16:05

## 2017-01-20 RX ADMIN — GABAPENTIN 600 MG: 300 CAPSULE ORAL at 07:08

## 2017-01-20 RX ADMIN — ISOSORBIDE DINITRATE 20 MG: 20 TABLET ORAL at 21:56

## 2017-01-20 RX ADMIN — FUROSEMIDE 80 MG: 10 INJECTION, SOLUTION INTRAMUSCULAR; INTRAVENOUS at 02:49

## 2017-01-20 RX ADMIN — APIXABAN 5 MG: 5 TABLET, FILM COATED ORAL at 21:55

## 2017-01-20 RX ADMIN — ISOSORBIDE DINITRATE 20 MG: 20 TABLET ORAL at 07:08

## 2017-01-20 RX ADMIN — POTASSIUM CHLORIDE 10 MEQ: 10 TABLET, EXTENDED RELEASE ORAL at 08:09

## 2017-01-20 RX ADMIN — Medication 10 ML: at 06:17

## 2017-01-20 RX ADMIN — POTASSIUM CHLORIDE 10 MEQ: 10 TABLET, EXTENDED RELEASE ORAL at 17:51

## 2017-01-20 RX ADMIN — FAMOTIDINE 20 MG: 20 TABLET ORAL at 08:09

## 2017-01-20 RX ADMIN — ALLOPURINOL 100 MG: 100 TABLET ORAL at 08:09

## 2017-01-20 RX ADMIN — HYDRALAZINE HYDROCHLORIDE 25 MG: 25 TABLET, FILM COATED ORAL at 07:08

## 2017-01-20 NOTE — PROGRESS NOTES
Checked on patient for prn nebulizer treatment. Patient did not need tx, however was not wearing bipap because sometimes it made him nauseated. Pt went back on bipap, no distress noted.

## 2017-01-20 NOTE — PROGRESS NOTES
Called to bedside by patient. Minimal urine output since this am, nauseated, worsening dyspnea --discussed with Dr. Tommy Verdugo. Will place on milrinone, monitor closely for ventricular arrhythmias.

## 2017-01-20 NOTE — PROGRESS NOTES
After several attempts to get an I.V access spoke with Mikki Keith NP regarding additional I.V. Order I.V push lasix. Will continue to monitor.

## 2017-01-20 NOTE — PROGRESS NOTES
[facility] and [facility]

## 2017-01-20 NOTE — PROGRESS NOTES
[facility] CARDIOLOGY PROGRESS NOTE           1/20/2017 7:45 AM    Admit Date: 1/19/2017      Subjective:   Minimal urine output last pm. Venous access a problem. No further NSVT    ROS:  Cardiovascular:  As noted above    Objective:      Vitals:    01/19/17 2148 01/20/17 0214 01/20/17 0249 01/20/17 0557   BP: 102/68 108/73 107/72 137/90   Pulse: 70 78 74 70   Resp: 19 20 18   Temp: 97.7 °F (36.5 °C) 97.3 °F (36.3 °C)  98.3 °F (36.8 °C)   SpO2: 99% 99%  96%   Weight:    (!) 188.4 kg (415 lb 4.8 oz)   Height:           Physical Exam:  General-No Acute Distress  Neck- supple, no JVD  CV- regular rate and rhythm no MRG  Lung- clear bilaterally  Abd- soft, nontender, nondistended  Ext-3+massive pitting edema  Skin- warm and dry    Data Review:   Recent Labs      01/20/17   0504  01/19/17   1400   NA  140  141   K  4.4  4.2   MG   --   2.4   BUN  47*  42*   CREA  1.94*  1.75*   GLU  162*  138*   WBC  9.6  6.8   HGB  11.0*  11.0*   HCT  35.4*  35.3*   PLT  236  244   INR   --   1.2   TROIQ   --   <0.02*       Assessment/Plan:     Principal Problem:    Nonsustained ventricular tachycardia (HCC) (1/19/2017)--improved, switch to po amiodarone    Active Problems:    Obstructive sleep apnea (2/15/2010)      Nonischemic dilated cardiomyopathy ([address] (7/2/2013)      Overview: LVEF 10-15% on ECHO from 12/17/10      CKD (chronic kidney disease) stage 3, GFR 30-59 ml/min (8/13/2014)--monitoring closely. AICD (automatic cardioverter/defibrillator) present (10/21/2015)      Acute on chronic systolic (congestive) heart failure (HCC) (10/18/2016)--massive volume overload, most likely component of cor pulmonale as well with morbid obesity --KELSIE--will have pt wear BIPAP all day except with meals.        Atrial flutter ([address] (10/20/2016)--stable, on eliquis      Obesity hypoventilation syndrome ([facility] (10/24/2016)      Morbid obesity with BMI of 60.0-69.9, adult ([facility] (11/28/2016)      Syncope (1/19/2017) NSVT (nonsustained ventricular tachycardia) ([facility] (1/19/2017)          Viviana Rob NP  1/20/2017 7:45 AM

## 2017-01-20 NOTE — PROGRESS NOTES
Pt placed on V60 BiPAP at this time. 01/19/17 2018   Oxygen Therapy   O2 Sat (%) 99 %   Pulse via Oximetry 77 beats per minute   O2 Device BIPAP   FIO2 (%) 30 %   Respiratory   Respiratory (WDL) X   Respiratory Pattern Dyspnea at rest;Labored   Chest/Tracheal Assessment Chest expansion, symmetrical   Breath Sounds Bilateral Diminished   Breath Sounds Left Diminished   Breath Sounds Right Diminished   CPAP/BIPAP   CPAP/BIPAP Start/Stop On   Device Mode PCV;BIPAP   $$ Bipap Daily Yes   Mask Type and Size Medium; Full face   Skin Condition Intact   PIP Observed 22 cm H20   IPAP (cm H2O) 22 cm H2O   EPAP (cm H2O) 8 cm H2O   Inspiratory Time (sec) 1 seconds   Vt Spont (ml) 737 ml   Ve Observed (l/min) 12.4 l/min   Backup Rate 12   Total RR (Spontaneous) 17 breaths per minute   Insp Rise Time (sec) 4   Leak (Estimated) 93 L/min  (Pt has full beard)   Pt's Home Machine No   Biomedical Check Performed Yes   Settings Verified Yes   Alarm Settings   High Pressure 30   Low Pressure 2   Low Ve 2   High Rate 50   Low Rate 5   Pulmonary Toilet   Pulmonary Toilet H. O.B elevated

## 2017-01-21 ENCOUNTER — HOME CARE VISIT (OUTPATIENT)
Dept: HOME HEALTH SERVICES | Facility: HOME HEALTH | Age: 52
End: 2017-01-21
Payer: MEDICARE

## 2017-01-21 LAB
ANION GAP BLD CALC-SCNC: 7 MMOL/L (ref 7–16)
BUN SERPL-MCNC: 48 MG/DL (ref 6–23)
CALCIUM SERPL-MCNC: 8.3 MG/DL (ref 8.3–10.4)
CHLORIDE SERPL-SCNC: 99 MMOL/L (ref 98–107)
CO2 SERPL-SCNC: 31 MMOL/L (ref 21–32)
CREAT SERPL-MCNC: 2.11 MG/DL (ref 0.8–1.5)
GLUCOSE BLD STRIP.AUTO-MCNC: 118 MG/DL (ref 65–100)
GLUCOSE BLD STRIP.AUTO-MCNC: 135 MG/DL (ref 65–100)
GLUCOSE BLD STRIP.AUTO-MCNC: 204 MG/DL (ref 65–100)
GLUCOSE BLD STRIP.AUTO-MCNC: 220 MG/DL (ref 65–100)
GLUCOSE SERPL-MCNC: 212 MG/DL (ref 65–100)
POTASSIUM SERPL-SCNC: 3.8 MMOL/L (ref 3.5–5.1)
SODIUM SERPL-SCNC: 137 MMOL/L (ref 136–145)

## 2017-01-21 PROCEDURE — 3331090002 HH PPS REVENUE DEBIT

## 2017-01-21 PROCEDURE — 74011636637 HC RX REV CODE- 636/637: Performed by: NURSE PRACTITIONER

## 2017-01-21 PROCEDURE — 82962 GLUCOSE BLOOD TEST: CPT

## 2017-01-21 PROCEDURE — 74011250637 HC RX REV CODE- 250/637: Performed by: NURSE PRACTITIONER

## 2017-01-21 PROCEDURE — 94660 CPAP INITIATION&MGMT: CPT

## 2017-01-21 PROCEDURE — 94760 N-INVAS EAR/PLS OXIMETRY 1: CPT

## 2017-01-21 PROCEDURE — 74011000258 HC RX REV CODE- 258: Performed by: NURSE PRACTITIONER

## 2017-01-21 PROCEDURE — 65660000000 HC RM CCU STEPDOWN

## 2017-01-21 PROCEDURE — 74011250636 HC RX REV CODE- 250/636: Performed by: NURSE PRACTITIONER

## 2017-01-21 PROCEDURE — 36415 COLL VENOUS BLD VENIPUNCTURE: CPT | Performed by: INTERNAL MEDICINE

## 2017-01-21 PROCEDURE — 74011250636 HC RX REV CODE- 250/636: Performed by: INTERNAL MEDICINE

## 2017-01-21 PROCEDURE — 3331090001 HH PPS REVENUE CREDIT

## 2017-01-21 PROCEDURE — 80048 BASIC METABOLIC PNL TOTAL CA: CPT | Performed by: INTERNAL MEDICINE

## 2017-01-21 RX ADMIN — HYDRALAZINE HYDROCHLORIDE 25 MG: 25 TABLET, FILM COATED ORAL at 06:14

## 2017-01-21 RX ADMIN — INSULIN LISPRO 15 UNITS: 100 INJECTION, SOLUTION INTRAVENOUS; SUBCUTANEOUS at 09:39

## 2017-01-21 RX ADMIN — Medication 10 ML: at 14:35

## 2017-01-21 RX ADMIN — DOCUSATE SODIUM 100 MG: 100 CAPSULE, LIQUID FILLED ORAL at 09:38

## 2017-01-21 RX ADMIN — ISOSORBIDE DINITRATE 20 MG: 20 TABLET ORAL at 21:33

## 2017-01-21 RX ADMIN — FUROSEMIDE 10 MG/HR: 10 INJECTION, SOLUTION INTRAVENOUS at 02:16

## 2017-01-21 RX ADMIN — AMIODARONE HYDROCHLORIDE 400 MG: 200 TABLET ORAL at 17:21

## 2017-01-21 RX ADMIN — CARVEDILOL 25 MG: 25 TABLET, FILM COATED ORAL at 17:21

## 2017-01-21 RX ADMIN — MILRINONE LACTATE 0.5 MCG/KG/MIN: 200 INJECTION, SOLUTION INTRAVENOUS at 01:45

## 2017-01-21 RX ADMIN — HYDRALAZINE HYDROCHLORIDE 25 MG: 25 TABLET, FILM COATED ORAL at 13:49

## 2017-01-21 RX ADMIN — HYDRALAZINE HYDROCHLORIDE 25 MG: 25 TABLET, FILM COATED ORAL at 21:34

## 2017-01-21 RX ADMIN — FUROSEMIDE 10 MG/HR: 10 INJECTION, SOLUTION INTRAVENOUS at 14:32

## 2017-01-21 RX ADMIN — MILRINONE LACTATE 0.5 MCG/KG/MIN: 200 INJECTION, SOLUTION INTRAVENOUS at 10:28

## 2017-01-21 RX ADMIN — GABAPENTIN 600 MG: 300 CAPSULE ORAL at 21:34

## 2017-01-21 RX ADMIN — APIXABAN 5 MG: 5 TABLET, FILM COATED ORAL at 21:33

## 2017-01-21 RX ADMIN — ISOSORBIDE DINITRATE 20 MG: 20 TABLET ORAL at 13:49

## 2017-01-21 RX ADMIN — GABAPENTIN 600 MG: 300 CAPSULE ORAL at 06:14

## 2017-01-21 RX ADMIN — FAMOTIDINE 20 MG: 20 TABLET ORAL at 09:39

## 2017-01-21 RX ADMIN — INSULIN LISPRO 15 UNITS: 100 INJECTION, SOLUTION INTRAVENOUS; SUBCUTANEOUS at 17:21

## 2017-01-21 RX ADMIN — POTASSIUM CHLORIDE 10 MEQ: 10 TABLET, EXTENDED RELEASE ORAL at 17:21

## 2017-01-21 RX ADMIN — INSULIN GLARGINE 50 UNITS: 100 INJECTION, SOLUTION SUBCUTANEOUS at 21:34

## 2017-01-21 RX ADMIN — MILRINONE LACTATE 0.5 MCG/KG/MIN: 200 INJECTION, SOLUTION INTRAVENOUS at 20:01

## 2017-01-21 RX ADMIN — POLYETHYLENE GLYCOL 3350 34 G: 17 POWDER, FOR SOLUTION ORAL at 09:37

## 2017-01-21 RX ADMIN — ISOSORBIDE DINITRATE 20 MG: 20 TABLET ORAL at 06:14

## 2017-01-21 RX ADMIN — ALLOPURINOL 100 MG: 100 TABLET ORAL at 09:38

## 2017-01-21 RX ADMIN — DOCUSATE SODIUM 100 MG: 100 CAPSULE, LIQUID FILLED ORAL at 17:21

## 2017-01-21 RX ADMIN — PRAVASTATIN SODIUM 80 MG: 80 TABLET ORAL at 21:34

## 2017-01-21 RX ADMIN — GABAPENTIN 600 MG: 300 CAPSULE ORAL at 13:49

## 2017-01-21 RX ADMIN — AMIODARONE HYDROCHLORIDE 400 MG: 200 TABLET ORAL at 09:38

## 2017-01-21 RX ADMIN — APIXABAN 5 MG: 5 TABLET, FILM COATED ORAL at 09:38

## 2017-01-21 RX ADMIN — BISACODYL 10 MG: 10 SUPPOSITORY RECTAL at 09:00

## 2017-01-21 RX ADMIN — COLCHICINE 0.6 MG: 0.6 TABLET, FILM COATED ORAL at 09:38

## 2017-01-21 RX ADMIN — HYDROCORTISONE: 5 CREAM TOPICAL at 09:39

## 2017-01-21 RX ADMIN — SPIRONOLACTONE 25 MG: 25 TABLET, FILM COATED ORAL at 09:38

## 2017-01-21 RX ADMIN — INSULIN LISPRO 15 UNITS: 100 INJECTION, SOLUTION INTRAVENOUS; SUBCUTANEOUS at 12:21

## 2017-01-21 RX ADMIN — CARVEDILOL 25 MG: 25 TABLET, FILM COATED ORAL at 09:38

## 2017-01-21 RX ADMIN — FAMOTIDINE 20 MG: 20 TABLET ORAL at 17:21

## 2017-01-21 RX ADMIN — POTASSIUM CHLORIDE 10 MEQ: 10 TABLET, EXTENDED RELEASE ORAL at 09:38

## 2017-01-21 NOTE — PROGRESS NOTES
Bedside and Verbal shift change report given to self (oncoming nurse) by Lars Melton (offgoing nurse). Report included the following information SBAR, Kardex, MAR, Accordion and Recent Results.

## 2017-01-21 NOTE — PROGRESS NOTES
Verbal bedside report given to oncoming RN, Isabel Angulo. Patient's situation, background, assessment and recommendations provided. Opportunity for questions provided. Oncoming RN assumed care of patient.

## 2017-01-21 NOTE — PROGRESS NOTES
Problem: Breathing Pattern - Ineffective  Goal: *Absence of hypoxia  Outcome: Progressing Towards Goal  Pt sating 99% on BiPAP with the following settings: 18/8, RR 12, and 30%. BBS diminished.

## 2017-01-21 NOTE — PROGRESS NOTES
[facility] CARDIOLOGY PROGRESS NOTE           1/21/2017 7:29 AM    Admit Date: 1/19/2017    Admit Diagnosis: NSVT (nonsustained ventricular tachycardia) (HCC)      Subjective:   Patient reports feeling better. Good urinary outpt on drips. Objective:     Vitals:    01/20/17 2112 01/21/17 0055 01/21/17 0101 01/21/17 0502   BP: 132/81  105/55 119/62   Pulse: 68  68 68   Resp: 18  18 18   Temp: 98.4 °F (36.9 °C)  98 °F (36.7 °C) 97.8 °F (36.6 °C)   SpO2: 100% 100% 100% 100%   Weight:    (!) 415 lb (188.2 kg)   Height:           Physical Exam:  General-Well Developed, Well Nourished, No Acute Distress, Alert & Oriented x 3, appropriate mood. Neck- supple, no JVD  CV- regular rate and rhythm no MRG  Lung- clear bilaterally  Abd- soft, nontender, nondistended  Ext- +2 edema bilaterally.   Skin- warm and dry    Current Facility-Administered Medications   Medication Dose Route Frequency    furosemide (LASIX) 100 mg in 0.9% sodium chloride 100 mL infusion  10 mg/hr IntraVENous CONTINUOUS    amiodarone (CORDARONE) tablet 400 mg  400 mg Oral BID    polyethylene glycol (MIRALAX) packet 34 g  34 g Oral DAILY    milrinone (PRIMACOR) 20 MG/100 ML D5W infusion  0.5 mcg/kg/min (Order-Specific) IntraVENous CONTINUOUS    hydrocortisone (CORTAID) 0.5 % cream   Topical BID    allopurinol (ZYLOPRIM) tablet 100 mg  100 mg Oral DAILY    apixaban (ELIQUIS) tablet 5 mg  5 mg Oral Q12H    bisacodyl (DULCOLAX) suppository 10 mg  10 mg Rectal DAILY    carvedilol (COREG) tablet 25 mg  25 mg Oral BID WITH MEALS    colchicine tablet 0.6 mg  0.6 mg Oral DAILY    docusate sodium (COLACE) capsule 100 mg  100 mg Oral BID    gabapentin (NEURONTIN) capsule 600 mg  600 mg Oral TID    hydrALAZINE (APRESOLINE) tablet 25 mg  25 mg Oral TID    insulin glargine (LANTUS) injection 50 Units  50 Units SubCUTAneous QHS    insulin lispro (HUMALOG) injection 15 Units  15 Units SubCUTAneous TID WITH MEALS    isosorbide dinitrate (ISORDIL) tablet 20 mg  20 mg Oral TID    nitroglycerin (NITROSTAT) tablet 0.4 mg  0.4 mg SubLINGual Q5MIN PRN    oxyCODONE IR (OXY-IR) immediate release tablet 15 mg  15 mg Oral Q4H PRN    polyethylene glycol (MIRALAX) packet 17 g  17 g Oral DAILY PRN    potassium chloride (K-DUR, KLOR-CON) tablet 10 mEq  10 mEq Oral BID    pravastatin (PRAVACHOL) tablet 80 mg  80 mg Oral QHS    famotidine (PEPCID) tablet 20 mg  20 mg Oral BID    spironolactone (ALDACTONE) tablet 25 mg  25 mg Oral DAILY    sodium chloride (NS) flush 5-10 mL  5-10 mL IntraVENous Q8H    sodium chloride (NS) flush 5-10 mL  5-10 mL IntraVENous PRN    morphine injection 2 mg  2 mg IntraVENous Q4H PRN     Data Review:   Recent Results (from the past 24 hour(s))   GLUCOSE, POC    Collection Time: 01/20/17 11:38 AM   Result Value Ref Range    Glucose (POC) 168 (H) 65 - 100 mg/dL   GLUCOSE, POC    Collection Time: 01/20/17  4:05 PM   Result Value Ref Range    Glucose (POC) 88 65 - 100 mg/dL   GLUCOSE, POC    Collection Time: 01/20/17  4:40 PM   Result Value Ref Range    Glucose (POC) 104 (H) 65 - 100 mg/dL   GLUCOSE, POC    Collection Time: 01/20/17  9:49 PM   Result Value Ref Range    Glucose (POC) 163 (H) 65 - 100 mg/dL   GLUCOSE, POC    Collection Time: 01/21/17  6:32 AM   Result Value Ref Range    Glucose (POC) 204 (H) 65 - 100 mg/dL     Assessment:     Principal Problem:    Nonsustained ventricular tachycardia (HCC) (1/19/2017)    Active Problems:    Obstructive sleep apnea (2/15/2010)      Nonischemic dilated cardiomyopathy ([address] (7/2/2013)      Overview: LVEF 10-15% on ECHO from 12/17/10      CKD (chronic kidney disease) stage 3, GFR 30-59 ml/min (8/13/2014)      AICD (automatic cardioverter/defibrillator) present (10/21/2015)      Acute on chronic systolic (congestive) heart failure (HCC) (10/18/2016)      Atrial flutter ([address] (10/20/2016)      Obesity hypoventilation syndrome ([facility] (10/24/2016)      Morbid obesity with BMI of 60.0-69.9, adult [facility]) (11/28/2016)      Syncope (1/19/2017)      NSVT (nonsustained ventricular tachycardia) ([facility] (1/19/2017)      Plan:   1. Nonsustained ventricular tachycardia --improved, switch to po amiodarone  2. CKD (chronic kidney disease) stage 3, GFR 30-59 ml/min (8/13/2014)--monitoring closely. 3. Acute on chronic systolic (congestive) heart failure -massive volume overload, most likely component of cor pulmonale as well with morbid obesity --KELSIE--will have pt wear BIPAP all day except with meals. Needs to diuresis at least an additional 5-6 liters. 4. Atrial flutter --stable, on eliquis      Dave Bowels. Tommy Verdugo M.D., F.A.C.C, F.H.R.S.   Cardiology/Electrophysiology

## 2017-01-22 LAB
ANION GAP BLD CALC-SCNC: 9 MMOL/L (ref 7–16)
ATRIAL RATE: 73 BPM
BUN SERPL-MCNC: 48 MG/DL (ref 6–23)
CALCIUM SERPL-MCNC: 8.4 MG/DL (ref 8.3–10.4)
CALCULATED P AXIS, ECG09: 59 DEGREES
CALCULATED R AXIS, ECG10: 163 DEGREES
CALCULATED T AXIS, ECG11: 69 DEGREES
CHLORIDE SERPL-SCNC: 100 MMOL/L (ref 98–107)
CO2 SERPL-SCNC: 30 MMOL/L (ref 21–32)
CREAT SERPL-MCNC: 2.03 MG/DL (ref 0.8–1.5)
DIAGNOSIS, 93000: NORMAL
DIASTOLIC BP, ECG02: NORMAL MMHG
GLUCOSE BLD STRIP.AUTO-MCNC: 147 MG/DL (ref 65–100)
GLUCOSE BLD STRIP.AUTO-MCNC: 169 MG/DL (ref 65–100)
GLUCOSE BLD STRIP.AUTO-MCNC: 228 MG/DL (ref 65–100)
GLUCOSE BLD STRIP.AUTO-MCNC: 89 MG/DL (ref 65–100)
GLUCOSE SERPL-MCNC: 145 MG/DL (ref 65–100)
P-R INTERVAL, ECG05: 240 MS
POTASSIUM SERPL-SCNC: 4.1 MMOL/L (ref 3.5–5.1)
Q-T INTERVAL, ECG07: 458 MS
QRS DURATION, ECG06: 96 MS
QTC CALCULATION (BEZET), ECG08: 504 MS
SODIUM SERPL-SCNC: 139 MMOL/L (ref 136–145)
SYSTOLIC BP, ECG01: NORMAL MMHG
VENTRICULAR RATE, ECG03: 73 BPM

## 2017-01-22 PROCEDURE — 74011250637 HC RX REV CODE- 250/637: Performed by: NURSE PRACTITIONER

## 2017-01-22 PROCEDURE — 3331090002 HH PPS REVENUE DEBIT

## 2017-01-22 PROCEDURE — 74011636637 HC RX REV CODE- 636/637: Performed by: NURSE PRACTITIONER

## 2017-01-22 PROCEDURE — 93005 ELECTROCARDIOGRAM TRACING: CPT | Performed by: INTERNAL MEDICINE

## 2017-01-22 PROCEDURE — 94660 CPAP INITIATION&MGMT: CPT

## 2017-01-22 PROCEDURE — 80048 BASIC METABOLIC PNL TOTAL CA: CPT | Performed by: INTERNAL MEDICINE

## 2017-01-22 PROCEDURE — 74011250636 HC RX REV CODE- 250/636: Performed by: INTERNAL MEDICINE

## 2017-01-22 PROCEDURE — 74011000258 HC RX REV CODE- 258: Performed by: NURSE PRACTITIONER

## 2017-01-22 PROCEDURE — 74011250636 HC RX REV CODE- 250/636: Performed by: NURSE PRACTITIONER

## 2017-01-22 PROCEDURE — 3331090001 HH PPS REVENUE CREDIT

## 2017-01-22 PROCEDURE — 65660000000 HC RM CCU STEPDOWN

## 2017-01-22 PROCEDURE — 82962 GLUCOSE BLOOD TEST: CPT

## 2017-01-22 PROCEDURE — 36415 COLL VENOUS BLD VENIPUNCTURE: CPT | Performed by: INTERNAL MEDICINE

## 2017-01-22 RX ADMIN — FUROSEMIDE 10 MG/HR: 10 INJECTION, SOLUTION INTRAVENOUS at 00:28

## 2017-01-22 RX ADMIN — ISOSORBIDE DINITRATE 20 MG: 20 TABLET ORAL at 21:37

## 2017-01-22 RX ADMIN — GABAPENTIN 600 MG: 300 CAPSULE ORAL at 05:45

## 2017-01-22 RX ADMIN — AMIODARONE HYDROCHLORIDE 400 MG: 200 TABLET ORAL at 08:53

## 2017-01-22 RX ADMIN — APIXABAN 5 MG: 5 TABLET, FILM COATED ORAL at 21:36

## 2017-01-22 RX ADMIN — INSULIN GLARGINE 50 UNITS: 100 INJECTION, SOLUTION SUBCUTANEOUS at 21:38

## 2017-01-22 RX ADMIN — ISOSORBIDE DINITRATE 20 MG: 20 TABLET ORAL at 15:20

## 2017-01-22 RX ADMIN — HYDRALAZINE HYDROCHLORIDE 25 MG: 25 TABLET, FILM COATED ORAL at 21:38

## 2017-01-22 RX ADMIN — POTASSIUM CHLORIDE 10 MEQ: 10 TABLET, EXTENDED RELEASE ORAL at 08:54

## 2017-01-22 RX ADMIN — APIXABAN 5 MG: 5 TABLET, FILM COATED ORAL at 08:54

## 2017-01-22 RX ADMIN — DOCUSATE SODIUM 100 MG: 100 CAPSULE, LIQUID FILLED ORAL at 17:25

## 2017-01-22 RX ADMIN — MILRINONE LACTATE 0.5 MCG/KG/MIN: 200 INJECTION, SOLUTION INTRAVENOUS at 16:17

## 2017-01-22 RX ADMIN — CARVEDILOL 25 MG: 25 TABLET, FILM COATED ORAL at 08:53

## 2017-01-22 RX ADMIN — Medication 10 ML: at 21:39

## 2017-01-22 RX ADMIN — FUROSEMIDE 10 MG/HR: 10 INJECTION, SOLUTION INTRAVENOUS at 10:40

## 2017-01-22 RX ADMIN — GABAPENTIN 600 MG: 300 CAPSULE ORAL at 15:20

## 2017-01-22 RX ADMIN — HYDRALAZINE HYDROCHLORIDE 25 MG: 25 TABLET, FILM COATED ORAL at 15:20

## 2017-01-22 RX ADMIN — SPIRONOLACTONE 25 MG: 25 TABLET, FILM COATED ORAL at 08:53

## 2017-01-22 RX ADMIN — MILRINONE LACTATE 0.5 MCG/KG/MIN: 200 INJECTION, SOLUTION INTRAVENOUS at 05:44

## 2017-01-22 RX ADMIN — GABAPENTIN 600 MG: 300 CAPSULE ORAL at 21:37

## 2017-01-22 RX ADMIN — ALLOPURINOL 100 MG: 100 TABLET ORAL at 08:54

## 2017-01-22 RX ADMIN — POLYETHYLENE GLYCOL 3350 34 G: 17 POWDER, FOR SOLUTION ORAL at 08:51

## 2017-01-22 RX ADMIN — FAMOTIDINE 20 MG: 20 TABLET ORAL at 08:53

## 2017-01-22 RX ADMIN — CARVEDILOL 25 MG: 25 TABLET, FILM COATED ORAL at 17:26

## 2017-01-22 RX ADMIN — HYDRALAZINE HYDROCHLORIDE 25 MG: 25 TABLET, FILM COATED ORAL at 05:46

## 2017-01-22 RX ADMIN — INSULIN LISPRO 15 UNITS: 100 INJECTION, SOLUTION INTRAVENOUS; SUBCUTANEOUS at 12:22

## 2017-01-22 RX ADMIN — PRAVASTATIN SODIUM 80 MG: 80 TABLET ORAL at 21:38

## 2017-01-22 RX ADMIN — INSULIN LISPRO 15 UNITS: 100 INJECTION, SOLUTION INTRAVENOUS; SUBCUTANEOUS at 08:51

## 2017-01-22 RX ADMIN — NITROGLYCERIN 0.4 MG: 0.4 TABLET SUBLINGUAL at 06:31

## 2017-01-22 RX ADMIN — FUROSEMIDE 10 MG/HR: 10 INJECTION, SOLUTION INTRAVENOUS at 22:00

## 2017-01-22 RX ADMIN — POTASSIUM CHLORIDE 10 MEQ: 10 TABLET, EXTENDED RELEASE ORAL at 17:25

## 2017-01-22 RX ADMIN — OXYCODONE HYDROCHLORIDE 15 MG: 15 TABLET ORAL at 10:44

## 2017-01-22 RX ADMIN — ISOSORBIDE DINITRATE 20 MG: 20 TABLET ORAL at 05:46

## 2017-01-22 RX ADMIN — FAMOTIDINE 20 MG: 20 TABLET ORAL at 17:26

## 2017-01-22 RX ADMIN — COLCHICINE 0.6 MG: 0.6 TABLET, FILM COATED ORAL at 08:53

## 2017-01-22 RX ADMIN — DOCUSATE SODIUM 100 MG: 100 CAPSULE, LIQUID FILLED ORAL at 08:54

## 2017-01-22 RX ADMIN — OXYCODONE HYDROCHLORIDE 15 MG: 15 TABLET ORAL at 21:44

## 2017-01-22 RX ADMIN — AMIODARONE HYDROCHLORIDE 400 MG: 200 TABLET ORAL at 17:25

## 2017-01-22 NOTE — PROGRESS NOTES
0730: Patient says he has chest pain  5/10 /63, BIPAP off at the time, BIPAP placed back on patient. One nitro tab given SL.    [id number]: Chest pain 1/10 /56, will continue to monitor.

## 2017-01-22 NOTE — PROGRESS NOTES
Bedside and Verbal shift change report given to self (oncoming nurse) by Garett Mayers (offgoing nurse). Report included the following information SBAR, Kardex, MAR and Recent Results.

## 2017-01-22 NOTE — PROGRESS NOTES
Verbal bedside report given to oncoming RN, Lorna Ordonez. Patient's situation, background, assessment and recommendations provided. Opportunity for questions provided. Oncoming RN assumed care of patient.

## 2017-01-22 NOTE — PROGRESS NOTES
Bedside and Verbal shift change report given to Akua Younger RN.     Leg wraps removed per Dr. Toro wang

## 2017-01-22 NOTE — PROGRESS NOTES
[facility] CARDIOLOGY PROGRESS NOTE           1/22/2017 7:29 AM    Admit Date: 1/19/2017    Admit Diagnosis: NSVT (nonsustained ventricular tachycardia) (HCC)      Subjective:   Patient reports feeling better. Good urinary outpt on drips. Objective:     Vitals:    01/22/17 0133 01/22/17 0546 01/22/17 0631 01/22/17 0843   BP: 98/53 102/46 125/63 102/65   Pulse: 69 75 72 73   Resp: 18 20  18   Temp: 97.4 °F (36.3 °C) 97.4 °F (36.3 °C)  97.1 °F (36.2 °C)   SpO2: 96% 95%  92%   Weight:  (!) 411 lb 3.2 oz (186.5 kg)     Height:           Physical Exam:  General-Well Developed, Well Nourished, No Acute Distress, Alert & Oriented x 3, appropriate mood. Neck- supple, no JVD  CV- regular rate and rhythm no MRG  Lung- clear bilaterally  Abd- soft, nontender, nondistended  Ext- +2 edema bilaterally.   Skin- warm and dry    Current Facility-Administered Medications   Medication Dose Route Frequency    furosemide (LASIX) 100 mg in 0.9% sodium chloride 100 mL infusion  10 mg/hr IntraVENous CONTINUOUS    amiodarone (CORDARONE) tablet 400 mg  400 mg Oral BID    polyethylene glycol (MIRALAX) packet 34 g  34 g Oral DAILY    milrinone (PRIMACOR) 20 MG/100 ML D5W infusion  0.5 mcg/kg/min (Order-Specific) IntraVENous CONTINUOUS    hydrocortisone (CORTAID) 0.5 % cream   Topical BID    allopurinol (ZYLOPRIM) tablet 100 mg  100 mg Oral DAILY    apixaban (ELIQUIS) tablet 5 mg  5 mg Oral Q12H    bisacodyl (DULCOLAX) suppository 10 mg  10 mg Rectal DAILY    carvedilol (COREG) tablet 25 mg  25 mg Oral BID WITH MEALS    colchicine tablet 0.6 mg  0.6 mg Oral DAILY    docusate sodium (COLACE) capsule 100 mg  100 mg Oral BID    gabapentin (NEURONTIN) capsule 600 mg  600 mg Oral TID    hydrALAZINE (APRESOLINE) tablet 25 mg  25 mg Oral TID    insulin glargine (LANTUS) injection 50 Units  50 Units SubCUTAneous QHS    insulin lispro (HUMALOG) injection 15 Units  15 Units SubCUTAneous TID WITH MEALS    isosorbide dinitrate (ISORDIL) tablet 20 mg  20 mg Oral TID    nitroglycerin (NITROSTAT) tablet 0.4 mg  0.4 mg SubLINGual Q5MIN PRN    oxyCODONE IR (OXY-IR) immediate release tablet 15 mg  15 mg Oral Q4H PRN    polyethylene glycol (MIRALAX) packet 17 g  17 g Oral DAILY PRN    potassium chloride (K-DUR, KLOR-CON) tablet 10 mEq  10 mEq Oral BID    pravastatin (PRAVACHOL) tablet 80 mg  80 mg Oral QHS    famotidine (PEPCID) tablet 20 mg  20 mg Oral BID    spironolactone (ALDACTONE) tablet 25 mg  25 mg Oral DAILY    sodium chloride (NS) flush 5-10 mL  5-10 mL IntraVENous Q8H    sodium chloride (NS) flush 5-10 mL  5-10 mL IntraVENous PRN    morphine injection 2 mg  2 mg IntraVENous Q4H PRN     Data Review:   Recent Results (from the past 24 hour(s))   GLUCOSE, POC    Collection Time: 01/21/17 11:21 AM   Result Value Ref Range    Glucose (POC) 220 (H) 65 - 100 mg/dL   GLUCOSE, POC    Collection Time: 01/21/17  4:21 PM   Result Value Ref Range    Glucose (POC) 135 (H) 65 - 100 mg/dL   GLUCOSE, POC    Collection Time: 01/21/17  8:37 PM   Result Value Ref Range    Glucose (POC) 118 (H) 65 - 100 mg/dL   GLUCOSE, POC    Collection Time: 01/22/17  6:35 AM   Result Value Ref Range    Glucose (POC) 169 (H) 65 - 243 mg/dL   METABOLIC PANEL, BASIC    Collection Time: 01/22/17  8:10 AM   Result Value Ref Range    Sodium 139 136 - 145 mmol/L    Potassium 4.1 3.5 - 5.1 mmol/L    Chloride 100 98 - 107 mmol/L    CO2 30 21 - 32 mmol/L    Anion gap 9 7 - 16 mmol/L    Glucose 145 (H) 65 - 100 mg/dL    BUN 48 (H) 6 - 23 MG/DL    Creatinine 2.03 (H) 0.8 - 1.5 MG/DL    GFR est AA 45 (L) >60 ml/min/1.73m2    GFR est non-AA 37 (L) >60 ml/min/1.73m2    Calcium 8.4 8.3 - 10.4 MG/DL     Assessment:     Principal Problem:    Nonsustained ventricular tachycardia (HCC) (1/19/2017)    Active Problems:    Obstructive sleep apnea (2/15/2010)      Nonischemic dilated cardiomyopathy ([facility] (7/2/2013)      Overview: LVEF 10-15% on ECHO from 12/17/10      CKD (chronic kidney disease) stage 3, GFR 30-59 ml/min (8/13/2014)      AICD (automatic cardioverter/defibrillator) present (10/21/2015)      Acute on chronic systolic (congestive) heart failure ([facility] (10/18/2016)      Atrial flutter ([facility] (10/20/2016)      Obesity hypoventilation syndrome ([facility] (10/24/2016)      Morbid obesity with BMI of 60.0-69.9, adult ([facility] (11/28/2016)      Syncope (1/19/2017)      NSVT (nonsustained ventricular tachycardia) ([facility] (1/19/2017)      Plan:   1. Nonsustained ventricular tachycardia --improved, switch to po amiodarone  2. CKD (chronic kidney disease) stage 3, GFR 30-59 ml/min (8/13/2014)--monitoring closely. 3. Acute on chronic systolic (congestive) heart failure -massive volume overload, most likely component of cor pulmonale as well with morbid obesity --KELSIE--will have pt wear BIPAP all day except with meals. Needs to diuresis at least an additional 5 liters. 4. Atrial flutter --stable, on eliquis  5. Edema - wrap legs today      Themikel Long M.D., F.A.C.C, F.H.R.S.   Cardiology/Electrophysiology

## 2017-01-22 NOTE — PROGRESS NOTES
Bedside and Verbal shift change report given to self (oncoming nurse) by Dick Nieves (offgoing nurse). Report included the following information SBAR, Kardex, MAR, Accordion and Recent Results.

## 2017-01-23 ENCOUNTER — APPOINTMENT (OUTPATIENT)
Dept: GENERAL RADIOLOGY | Age: 52
DRG: 308 | End: 2017-01-23
Attending: NURSE PRACTITIONER
Payer: MEDICARE

## 2017-01-23 LAB
ANION GAP BLD CALC-SCNC: 9 MMOL/L (ref 7–16)
BUN SERPL-MCNC: 49 MG/DL (ref 6–23)
CALCIUM SERPL-MCNC: 8.5 MG/DL (ref 8.3–10.4)
CHLORIDE SERPL-SCNC: 99 MMOL/L (ref 98–107)
CO2 SERPL-SCNC: 29 MMOL/L (ref 21–32)
CREAT SERPL-MCNC: 2.24 MG/DL (ref 0.8–1.5)
GLUCOSE BLD STRIP.AUTO-MCNC: 135 MG/DL (ref 65–100)
GLUCOSE BLD STRIP.AUTO-MCNC: 137 MG/DL (ref 65–100)
GLUCOSE BLD STRIP.AUTO-MCNC: 156 MG/DL (ref 65–100)
GLUCOSE BLD STRIP.AUTO-MCNC: 163 MG/DL (ref 65–100)
GLUCOSE SERPL-MCNC: 134 MG/DL (ref 65–100)
POTASSIUM SERPL-SCNC: 4.2 MMOL/L (ref 3.5–5.1)
SODIUM SERPL-SCNC: 137 MMOL/L (ref 136–145)

## 2017-01-23 PROCEDURE — 74011250636 HC RX REV CODE- 250/636: Performed by: NURSE PRACTITIONER

## 2017-01-23 PROCEDURE — 02HV33Z INSERTION OF INFUSION DEVICE INTO SUPERIOR VENA CAVA, PERCUTANEOUS APPROACH: ICD-10-PCS | Performed by: INTERNAL MEDICINE

## 2017-01-23 PROCEDURE — 80048 BASIC METABOLIC PNL TOTAL CA: CPT | Performed by: INTERNAL MEDICINE

## 2017-01-23 PROCEDURE — 74011250637 HC RX REV CODE- 250/637: Performed by: NURSE PRACTITIONER

## 2017-01-23 PROCEDURE — 74011250636 HC RX REV CODE- 250/636: Performed by: INTERNAL MEDICINE

## 2017-01-23 PROCEDURE — 82962 GLUCOSE BLOOD TEST: CPT

## 2017-01-23 PROCEDURE — 36415 COLL VENOUS BLD VENIPUNCTURE: CPT | Performed by: INTERNAL MEDICINE

## 2017-01-23 PROCEDURE — 3331090002 HH PPS REVENUE DEBIT

## 2017-01-23 PROCEDURE — 3331090001 HH PPS REVENUE CREDIT

## 2017-01-23 PROCEDURE — 65660000000 HC RM CCU STEPDOWN

## 2017-01-23 PROCEDURE — 71010 XR CHEST PORT: CPT

## 2017-01-23 PROCEDURE — 74011000258 HC RX REV CODE- 258: Performed by: NURSE PRACTITIONER

## 2017-01-23 PROCEDURE — 74011636637 HC RX REV CODE- 636/637: Performed by: NURSE PRACTITIONER

## 2017-01-23 PROCEDURE — 36556 INSERT NON-TUNNEL CV CATH: CPT | Performed by: INTERNAL MEDICINE

## 2017-01-23 PROCEDURE — 94660 CPAP INITIATION&MGMT: CPT

## 2017-01-23 PROCEDURE — 74011250636 HC RX REV CODE- 250/636

## 2017-01-23 RX ORDER — ONDANSETRON 2 MG/ML
INJECTION INTRAMUSCULAR; INTRAVENOUS
Status: COMPLETED
Start: 2017-01-23 | End: 2017-01-23

## 2017-01-23 RX ORDER — ONDANSETRON 2 MG/ML
4 INJECTION INTRAMUSCULAR; INTRAVENOUS ONCE
Status: COMPLETED | OUTPATIENT
Start: 2017-01-23 | End: 2017-01-23

## 2017-01-23 RX ADMIN — FUROSEMIDE 10 MG/HR: 10 INJECTION, SOLUTION INTRAVENOUS at 12:35

## 2017-01-23 RX ADMIN — OXYCODONE HYDROCHLORIDE 15 MG: 15 TABLET ORAL at 18:11

## 2017-01-23 RX ADMIN — Medication 5 ML: at 12:39

## 2017-01-23 RX ADMIN — ONDANSETRON 4 MG: 2 INJECTION INTRAMUSCULAR; INTRAVENOUS at 07:00

## 2017-01-23 RX ADMIN — AMIODARONE HYDROCHLORIDE 400 MG: 200 TABLET ORAL at 08:36

## 2017-01-23 RX ADMIN — SPIRONOLACTONE 25 MG: 25 TABLET, FILM COATED ORAL at 08:36

## 2017-01-23 RX ADMIN — POLYETHYLENE GLYCOL 3350 34 G: 17 POWDER, FOR SOLUTION ORAL at 08:38

## 2017-01-23 RX ADMIN — POTASSIUM CHLORIDE 10 MEQ: 10 TABLET, EXTENDED RELEASE ORAL at 08:36

## 2017-01-23 RX ADMIN — CARVEDILOL 25 MG: 25 TABLET, FILM COATED ORAL at 08:36

## 2017-01-23 RX ADMIN — GABAPENTIN 600 MG: 300 CAPSULE ORAL at 15:27

## 2017-01-23 RX ADMIN — ALLOPURINOL 100 MG: 100 TABLET ORAL at 08:36

## 2017-01-23 RX ADMIN — ONDANSETRON 4 MG: 2 INJECTION, SOLUTION INTRAMUSCULAR; INTRAVENOUS at 07:00

## 2017-01-23 RX ADMIN — GABAPENTIN 600 MG: 300 CAPSULE ORAL at 05:44

## 2017-01-23 RX ADMIN — DOCUSATE SODIUM 100 MG: 100 CAPSULE, LIQUID FILLED ORAL at 18:06

## 2017-01-23 RX ADMIN — INSULIN GLARGINE 50 UNITS: 100 INJECTION, SOLUTION SUBCUTANEOUS at 21:41

## 2017-01-23 RX ADMIN — Medication 2 MG: at 23:26

## 2017-01-23 RX ADMIN — APIXABAN 5 MG: 5 TABLET, FILM COATED ORAL at 21:40

## 2017-01-23 RX ADMIN — HYDRALAZINE HYDROCHLORIDE 25 MG: 25 TABLET, FILM COATED ORAL at 05:44

## 2017-01-23 RX ADMIN — COLCHICINE 0.6 MG: 0.6 TABLET, FILM COATED ORAL at 08:36

## 2017-01-23 RX ADMIN — FAMOTIDINE 20 MG: 20 TABLET ORAL at 18:06

## 2017-01-23 RX ADMIN — INSULIN LISPRO 15 UNITS: 100 INJECTION, SOLUTION INTRAVENOUS; SUBCUTANEOUS at 08:37

## 2017-01-23 RX ADMIN — FUROSEMIDE 10 MG/HR: 10 INJECTION, SOLUTION INTRAVENOUS at 02:10

## 2017-01-23 RX ADMIN — INSULIN LISPRO 15 UNITS: 100 INJECTION, SOLUTION INTRAVENOUS; SUBCUTANEOUS at 12:35

## 2017-01-23 RX ADMIN — POTASSIUM CHLORIDE 10 MEQ: 10 TABLET, EXTENDED RELEASE ORAL at 18:06

## 2017-01-23 RX ADMIN — DOCUSATE SODIUM 100 MG: 100 CAPSULE, LIQUID FILLED ORAL at 08:36

## 2017-01-23 RX ADMIN — CARVEDILOL 25 MG: 25 TABLET, FILM COATED ORAL at 18:06

## 2017-01-23 RX ADMIN — ISOSORBIDE DINITRATE 20 MG: 20 TABLET ORAL at 15:27

## 2017-01-23 RX ADMIN — ISOSORBIDE DINITRATE 20 MG: 20 TABLET ORAL at 21:40

## 2017-01-23 RX ADMIN — ISOSORBIDE DINITRATE 20 MG: 20 TABLET ORAL at 05:44

## 2017-01-23 RX ADMIN — AMIODARONE HYDROCHLORIDE 400 MG: 200 TABLET ORAL at 18:06

## 2017-01-23 RX ADMIN — GABAPENTIN 600 MG: 300 CAPSULE ORAL at 21:40

## 2017-01-23 RX ADMIN — APIXABAN 5 MG: 5 TABLET, FILM COATED ORAL at 08:36

## 2017-01-23 RX ADMIN — PRAVASTATIN SODIUM 80 MG: 80 TABLET ORAL at 21:40

## 2017-01-23 RX ADMIN — Medication 10 ML: at 05:45

## 2017-01-23 RX ADMIN — Medication 5 ML: at 21:40

## 2017-01-23 RX ADMIN — MILRINONE LACTATE 0.5 MCG/KG/MIN: 200 INJECTION, SOLUTION INTRAVENOUS at 02:14

## 2017-01-23 RX ADMIN — HYDRALAZINE HYDROCHLORIDE 25 MG: 25 TABLET, FILM COATED ORAL at 15:27

## 2017-01-23 RX ADMIN — FAMOTIDINE 20 MG: 20 TABLET ORAL at 08:36

## 2017-01-23 RX ADMIN — MILRINONE LACTATE 0.5 MCG/KG/MIN: 200 INJECTION, SOLUTION INTRAVENOUS at 12:35

## 2017-01-23 RX ADMIN — HYDRALAZINE HYDROCHLORIDE 25 MG: 25 TABLET, FILM COATED ORAL at 21:40

## 2017-01-23 NOTE — PROGRESS NOTES
Reviewed notes prior to visit  Per notes patient is able to communicate  Patient appears calm  [place] family is at bedside  Patient/family was receptive to  presence  Winnie identified and contact information provided in system  Listened actively as patient talked about his CHF  He said it was bad  Comforted patient and  family that God is with them and in control  Assured family of our prayers and support  prayer  Continue to assess needs during admission  Signed by Arnulfo Diallo MDIV, [facility]

## 2017-01-23 NOTE — PROGRESS NOTES
BLAINE contacted [employer]/ [phone] to request that patient's CPAP get serviced. Hadley Pruett agreed to come to hospital tomorrow/ Tuesday to check machine and provide additional education to patient on use of machine as needed. We have asked [employer] to follow up with SW/ or assigned nurse following their contact.

## 2017-01-23 NOTE — PROGRESS NOTES
Verbal bedside report given to Jessica Navarro, oncoming RN. Patient's situation, background, assessment and recommendations provided. Opportunity for questions provided. Oncoming RN assumed care of patient. Primacor and Lasix IV drips verified at bedside with oncoming RN.

## 2017-01-23 NOTE — PROGRESS NOTES
Problem: Heart Failure: Day 5  Goal: Activity/Safety  Outcome: Progressing Towards Goal  Red non-skid socks in place, call light within reach, and bed rails up x3. Daughter consistently at bedside. Verbalized understanding to call for any assistance. Goal: Medications  Outcome: Progressing Towards Goal  Primacor and Lasix drips infusing.

## 2017-01-23 NOTE — PROGRESS NOTES
Pt requested to be placed on [employer] BiPAP, he said his machine is not working. Pt in no distress. Minimal leak noted. No redness on nose noted.

## 2017-01-23 NOTE — PROGRESS NOTES
Verbal bedside report received from Alessandra May RN. Assumed care of patient. Primacor and Lasix IV drips verified at bedside with outgoing RN.

## 2017-01-23 NOTE — PROGRESS NOTES
[facility] CARDIOLOGY PROGRESS NOTE           1/23/2017 7:33 AM    Admit Date: 1/19/2017    Admit Diagnosis: NSVT (nonsustained ventricular tachycardia) ([facility])      Subjective:   No complaints this AM, no chest pain or shortness of breath, BiPAP in place, cardiomyopathy is a chronic problem with recent acute exacerbation requiring inotropes, slight improvement, aggravated by morbid obesity, improved SOB    Interval History: (History of pertinent interval events obtained from nursing staff)  Remains on milrinone and IV lasix, > 1L UOP last 24 hours    ROS:  GEN:  No fever or chills  Cardiovascular:  As noted above  Pulmonary:  As noted above  Neuro:  No new focal motor or sensory loss      Objective:     Vitals:    01/23/17 0220 01/23/17 0450 01/23/17 0453 01/23/17 0541   BP: 143/70  133/90 122/64   Pulse:   71 73   Resp:   16    Temp:   97.6 °F (36.4 °C)    SpO2:  96% 99%    Weight:   (!) 187.7 kg (413 lb 12.8 oz)    Height:           Physical Exam:  General-morbidly obese, NAD, BiPAP in place  Neck- supple, no JVD  CV- regular rate and rhythm, very distant S1, S2, PMI not palpable, no audible murmur  Lung- clear bilaterally but very distant  Abd- soft, nontender, nondistended  Ext- 2-3+ OSMAN  Skin- warm and dry    Current Facility-Administered Medications   Medication Dose Route Frequency    furosemide (LASIX) 100 mg in 0.9% sodium chloride 100 mL infusion  10 mg/hr IntraVENous CONTINUOUS    amiodarone (CORDARONE) tablet 400 mg  400 mg Oral BID    polyethylene glycol (MIRALAX) packet 34 g  34 g Oral DAILY    milrinone (PRIMACOR) 20 MG/100 ML D5W infusion  0.5 mcg/kg/min (Order-Specific) IntraVENous CONTINUOUS    hydrocortisone (CORTAID) 0.5 % cream   Topical BID    allopurinol (ZYLOPRIM) tablet 100 mg  100 mg Oral DAILY    apixaban (ELIQUIS) tablet 5 mg  5 mg Oral Q12H    bisacodyl (DULCOLAX) suppository 10 mg  10 mg Rectal DAILY    carvedilol (COREG) tablet 25 mg  25 mg Oral BID WITH MEALS    colchicine tablet 0.6 mg  0.6 mg Oral DAILY    docusate sodium (COLACE) capsule 100 mg  100 mg Oral BID    gabapentin (NEURONTIN) capsule 600 mg  600 mg Oral TID    hydrALAZINE (APRESOLINE) tablet 25 mg  25 mg Oral TID    insulin glargine (LANTUS) injection 50 Units  50 Units SubCUTAneous QHS    insulin lispro (HUMALOG) injection 15 Units  15 Units SubCUTAneous TID WITH MEALS    isosorbide dinitrate (ISORDIL) tablet 20 mg  20 mg Oral TID    nitroglycerin (NITROSTAT) tablet 0.4 mg  0.4 mg SubLINGual Q5MIN PRN    oxyCODONE IR (OXY-IR) immediate release tablet 15 mg  15 mg Oral Q4H PRN    polyethylene glycol (MIRALAX) packet 17 g  17 g Oral DAILY PRN    potassium chloride (K-DUR, KLOR-CON) tablet 10 mEq  10 mEq Oral BID    pravastatin (PRAVACHOL) tablet 80 mg  80 mg Oral QHS    famotidine (PEPCID) tablet 20 mg  20 mg Oral BID    spironolactone (ALDACTONE) tablet 25 mg  25 mg Oral DAILY    sodium chloride (NS) flush 5-10 mL  5-10 mL IntraVENous Q8H    sodium chloride (NS) flush 5-10 mL  5-10 mL IntraVENous PRN    morphine injection 2 mg  2 mg IntraVENous Q4H PRN     Data Review:   Recent Results (from the past 24 hour(s))   METABOLIC PANEL, BASIC    Collection Time: 01/22/17  8:10 AM   Result Value Ref Range    Sodium 139 136 - 145 mmol/L    Potassium 4.1 3.5 - 5.1 mmol/L    Chloride 100 98 - 107 mmol/L    CO2 30 21 - 32 mmol/L    Anion gap 9 7 - 16 mmol/L    Glucose 145 (H) 65 - 100 mg/dL    BUN 48 (H) 6 - 23 MG/DL    Creatinine 2.03 (H) 0.8 - 1.5 MG/DL    GFR est AA 45 (L) >60 ml/min/1.73m2    GFR est non-AA 37 (L) >60 ml/min/1.73m2    Calcium 8.4 8.3 - 10.4 MG/DL   GLUCOSE, POC    Collection Time: 01/22/17 11:38 AM   Result Value Ref Range    Glucose (POC) 228 (H) 65 - 100 mg/dL   GLUCOSE, POC    Collection Time: 01/22/17  4:45 PM   Result Value Ref Range    Glucose (POC) 89 65 - 100 mg/dL   GLUCOSE, POC    Collection Time: 01/22/17  8:09 PM   Result Value Ref Range    Glucose (POC) 147 (H) 65 - 100 mg/dL   GLUCOSE, POC    Collection Time: 01/23/17  5:43 AM   Result Value Ref Range    Glucose (POC) 156 (H) 65 - 487 mg/dL   METABOLIC PANEL, BASIC    Collection Time: 01/23/17  5:55 AM   Result Value Ref Range    Sodium 137 136 - 145 mmol/L    Potassium 4.2 3.5 - 5.1 mmol/L    Chloride 99 98 - 107 mmol/L    CO2 29 21 - 32 mmol/L    Anion gap 9 7 - 16 mmol/L    Glucose 134 (H) 65 - 100 mg/dL    BUN 49 (H) 6 - 23 MG/DL    Creatinine 2.24 (H) 0.8 - 1.5 MG/DL    GFR est AA 40 (L) >60 ml/min/1.73m2    GFR est non-AA 33 (L) >60 ml/min/1.73m2    Calcium 8.5 8.3 - 10.4 MG/DL       EKG:  (EKG has been independently visualized by me with interpretation below)  Assessment:     Principal Problem:    Nonsustained ventricular tachycardia (HCC) (1/19/2017)    Active Problems:    Obstructive sleep apnea (2/15/2010)      Nonischemic dilated cardiomyopathy ([facility] (7/2/2013)      Overview: LVEF 10-15% on ECHO from 12/17/10      CKD (chronic kidney disease) stage 3, GFR 30-59 ml/min (8/13/2014)      AICD (automatic cardioverter/defibrillator) present (10/21/2015)      Acute on chronic systolic (congestive) heart failure ([facility]) (10/18/2016)      Atrial flutter ([address] (10/20/2016)      Obesity hypoventilation syndrome ([facility] (10/24/2016)      Morbid obesity with BMI of 60.0-69.9, adult ([address] (11/28/2016)      Syncope (1/19/2017)      NSVT (nonsustained ventricular tachycardia) ([facility]) (1/19/2017)      Plan:   1. Nonsustained ventricular tachycardia, controlled: improved, continue amiodarone 400mg Q12H  2. CKD (chronic kidney disease) stage 3, GFR 30-59 ml/min (8/13/2014) uncontrolled--monitoring closely with aggressive diuresis  3. Acute on chronic systolic (congestive) heart failure, uncontrolled -massive volume overload, most likely component of cor pulmonale as well with morbid obesity --KELSIE--will have pt wear BIPAP all day except with meals. Needs continued aggressive diuresis  4. Atrial flutter --stable, on eliquis  5.  Edema, uncontrolled - wrap legs again today  6. AICD: functioning normally, no shocks  7. Obesity hypoventilation, KELSIE: BiPAP today as above    Zaid Roque MD  Cardiology/Electrophysiology

## 2017-01-23 NOTE — PROGRESS NOTES
Problem: Patient Education: Go to Patient Education Activity  Goal: Patient/Family Education  Outcome: Progressing Towards Goal  Reposition patient due to laying in bed. Encourage sitting in chair.

## 2017-01-24 LAB
ANION GAP BLD CALC-SCNC: 2 MMOL/L (ref 7–16)
ARTERIAL PATENCY WRIST A: POSITIVE
BASE EXCESS BLDA CALC-SCNC: 2.6 MMOL/L (ref 0–3)
BDY SITE: ABNORMAL
BUN SERPL-MCNC: 58 MG/DL (ref 6–23)
CALCIUM SERPL-MCNC: 8.6 MG/DL (ref 8.3–10.4)
CHLORIDE SERPL-SCNC: 98 MMOL/L (ref 98–107)
CO2 SERPL-SCNC: 36 MMOL/L (ref 21–32)
COHGB MFR BLD: 1.9 % (ref 0.5–1.5)
CREAT SERPL-MCNC: 2.67 MG/DL (ref 0.8–1.5)
DO-HGB BLD-MCNC: 4 % (ref 0–5)
FIO2 ON VENT: 32 %
GAS FLOW.O2 O2 DELIVERY SYS: 3 L/MIN
GLUCOSE BLD STRIP.AUTO-MCNC: 104 MG/DL (ref 65–100)
GLUCOSE BLD STRIP.AUTO-MCNC: 123 MG/DL (ref 65–100)
GLUCOSE BLD STRIP.AUTO-MCNC: 133 MG/DL (ref 65–100)
GLUCOSE BLD STRIP.AUTO-MCNC: 157 MG/DL (ref 65–100)
GLUCOSE BLD STRIP.AUTO-MCNC: 93 MG/DL (ref 65–100)
GLUCOSE SERPL-MCNC: 134 MG/DL (ref 65–100)
HCO3 BLDA-SCNC: 28 MMOL/L (ref 22–26)
HGB BLDMV-MCNC: 11.3 GM/DL (ref 11.7–15)
METHGB MFR BLD: 0.3 % (ref 0–1.5)
OXYHGB MFR BLDA: 94 % (ref 94–97)
PCO2 BLDA: 49 MMHG (ref 35–45)
PH BLDA: 7.38 [PH] (ref 7.35–7.45)
PO2 BLDA: 88 MMHG (ref 75–100)
POTASSIUM SERPL-SCNC: 4.9 MMOL/L (ref 3.5–5.1)
SAO2 % BLD: 96 % (ref 92–98.5)
SERVICE CMNT-IMP: ABNORMAL
SODIUM SERPL-SCNC: 136 MMOL/L (ref 136–145)
VENTILATION MODE VENT: ABNORMAL

## 2017-01-24 PROCEDURE — 36591 DRAW BLOOD OFF VENOUS DEVICE: CPT

## 2017-01-24 PROCEDURE — 36556 INSERT NON-TUNNEL CV CATH: CPT

## 2017-01-24 PROCEDURE — 3331090001 HH PPS REVENUE CREDIT

## 2017-01-24 PROCEDURE — 97161 PT EVAL LOW COMPLEX 20 MIN: CPT

## 2017-01-24 PROCEDURE — 74011250636 HC RX REV CODE- 250/636: Performed by: NURSE PRACTITIONER

## 2017-01-24 PROCEDURE — 80048 BASIC METABOLIC PNL TOTAL CA: CPT | Performed by: INTERNAL MEDICINE

## 2017-01-24 PROCEDURE — 74011250636 HC RX REV CODE- 250/636: Performed by: INTERNAL MEDICINE

## 2017-01-24 PROCEDURE — 94760 N-INVAS EAR/PLS OXIMETRY 1: CPT

## 2017-01-24 PROCEDURE — 82803 BLOOD GASES ANY COMBINATION: CPT

## 2017-01-24 PROCEDURE — 74011250637 HC RX REV CODE- 250/637: Performed by: NURSE PRACTITIONER

## 2017-01-24 PROCEDURE — 82962 GLUCOSE BLOOD TEST: CPT

## 2017-01-24 PROCEDURE — 77010033678 HC OXYGEN DAILY

## 2017-01-24 PROCEDURE — 94660 CPAP INITIATION&MGMT: CPT

## 2017-01-24 PROCEDURE — 3331090002 HH PPS REVENUE DEBIT

## 2017-01-24 PROCEDURE — 36600 WITHDRAWAL OF ARTERIAL BLOOD: CPT

## 2017-01-24 PROCEDURE — 65660000000 HC RM CCU STEPDOWN

## 2017-01-24 PROCEDURE — 74011636637 HC RX REV CODE- 636/637: Performed by: NURSE PRACTITIONER

## 2017-01-24 RX ORDER — AMIODARONE HYDROCHLORIDE 200 MG/1
200 TABLET ORAL DAILY
Status: DISCONTINUED | OUTPATIENT
Start: 2017-01-25 | End: 2017-02-24 | Stop reason: HOSPADM

## 2017-01-24 RX ADMIN — INSULIN LISPRO 15 UNITS: 100 INJECTION, SOLUTION INTRAVENOUS; SUBCUTANEOUS at 12:30

## 2017-01-24 RX ADMIN — PRAVASTATIN SODIUM 80 MG: 80 TABLET ORAL at 22:13

## 2017-01-24 RX ADMIN — ALLOPURINOL 100 MG: 100 TABLET ORAL at 08:45

## 2017-01-24 RX ADMIN — MILRINONE LACTATE 0.5 MCG/KG/MIN: 200 INJECTION, SOLUTION INTRAVENOUS at 20:53

## 2017-01-24 RX ADMIN — INSULIN LISPRO 15 UNITS: 100 INJECTION, SOLUTION INTRAVENOUS; SUBCUTANEOUS at 08:45

## 2017-01-24 RX ADMIN — FAMOTIDINE 20 MG: 20 TABLET ORAL at 17:32

## 2017-01-24 RX ADMIN — DOCUSATE SODIUM 100 MG: 100 CAPSULE, LIQUID FILLED ORAL at 17:32

## 2017-01-24 RX ADMIN — POTASSIUM CHLORIDE 10 MEQ: 10 TABLET, EXTENDED RELEASE ORAL at 17:32

## 2017-01-24 RX ADMIN — SPIRONOLACTONE 25 MG: 25 TABLET, FILM COATED ORAL at 08:45

## 2017-01-24 RX ADMIN — HYDRALAZINE HYDROCHLORIDE 25 MG: 25 TABLET, FILM COATED ORAL at 06:07

## 2017-01-24 RX ADMIN — ISOSORBIDE DINITRATE 20 MG: 20 TABLET ORAL at 06:07

## 2017-01-24 RX ADMIN — INSULIN LISPRO 15 UNITS: 100 INJECTION, SOLUTION INTRAVENOUS; SUBCUTANEOUS at 17:32

## 2017-01-24 RX ADMIN — Medication 2 MG: at 04:20

## 2017-01-24 RX ADMIN — DOCUSATE SODIUM 100 MG: 100 CAPSULE, LIQUID FILLED ORAL at 08:45

## 2017-01-24 RX ADMIN — Medication 10 ML: at 14:18

## 2017-01-24 RX ADMIN — COLCHICINE 0.6 MG: 0.6 TABLET, FILM COATED ORAL at 08:45

## 2017-01-24 RX ADMIN — APIXABAN 5 MG: 5 TABLET, FILM COATED ORAL at 08:45

## 2017-01-24 RX ADMIN — GABAPENTIN 600 MG: 300 CAPSULE ORAL at 22:12

## 2017-01-24 RX ADMIN — GABAPENTIN 600 MG: 300 CAPSULE ORAL at 14:18

## 2017-01-24 RX ADMIN — Medication 10 ML: at 04:21

## 2017-01-24 RX ADMIN — MILRINONE LACTATE 0.5 MCG/KG/MIN: 200 INJECTION, SOLUTION INTRAVENOUS at 00:07

## 2017-01-24 RX ADMIN — POLYETHYLENE GLYCOL 3350 34 G: 17 POWDER, FOR SOLUTION ORAL at 08:45

## 2017-01-24 RX ADMIN — HYDRALAZINE HYDROCHLORIDE 25 MG: 25 TABLET, FILM COATED ORAL at 14:17

## 2017-01-24 RX ADMIN — APIXABAN 5 MG: 5 TABLET, FILM COATED ORAL at 21:23

## 2017-01-24 RX ADMIN — CARVEDILOL 25 MG: 25 TABLET, FILM COATED ORAL at 17:32

## 2017-01-24 RX ADMIN — CARVEDILOL 25 MG: 25 TABLET, FILM COATED ORAL at 08:45

## 2017-01-24 RX ADMIN — HYDRALAZINE HYDROCHLORIDE 25 MG: 25 TABLET, FILM COATED ORAL at 22:13

## 2017-01-24 RX ADMIN — OXYCODONE HYDROCHLORIDE 15 MG: 15 TABLET ORAL at 10:04

## 2017-01-24 RX ADMIN — AMIODARONE HYDROCHLORIDE 400 MG: 200 TABLET ORAL at 08:45

## 2017-01-24 RX ADMIN — POTASSIUM CHLORIDE 10 MEQ: 10 TABLET, EXTENDED RELEASE ORAL at 08:45

## 2017-01-24 RX ADMIN — ISOSORBIDE DINITRATE 20 MG: 20 TABLET ORAL at 14:18

## 2017-01-24 RX ADMIN — GABAPENTIN 600 MG: 300 CAPSULE ORAL at 06:07

## 2017-01-24 RX ADMIN — Medication 10 ML: at 22:13

## 2017-01-24 RX ADMIN — FAMOTIDINE 20 MG: 20 TABLET ORAL at 08:45

## 2017-01-24 RX ADMIN — HYDROCORTISONE: 5 CREAM TOPICAL at 08:46

## 2017-01-24 RX ADMIN — ISOSORBIDE DINITRATE 20 MG: 20 TABLET ORAL at 22:13

## 2017-01-24 RX ADMIN — MILRINONE LACTATE 0.5 MCG/KG/MIN: 200 INJECTION, SOLUTION INTRAVENOUS at 12:35

## 2017-01-24 NOTE — PROGRESS NOTES
Right IJ Quad lumen central line placed by Dr Mignon Alcantara without difficulty. PCXR ordered. Pt resting quietly. Tolerated procedure well.   OK to use line by Dr Taiwo SERRANO

## 2017-01-24 NOTE — PROCEDURES
Procedure:     US GUIDED CENTRAL LINE PLACEMENT    Indication:     OPoor venous access    Summary:    Informed consent was obtained from the patient/ patient's family. Using theFrictionless Commerceosite Turbo M with a 5-10 MHz vascular probe transducer and sterile seldinger technique, the RIJV vein was identified and under direct real time visualization was cannulated. The CVC kit guide wire was then inserted and its position within the subclavian vein verified in short and long axis views on vascular probe US. A quadruple lumen catheter was then placed in the right ij vein, after dilation of entry port without difficulty. There was some bleeding during the procedure due to elevated cvp. Good flush and drawback was noted. The CVC was then affixed with supplied 2.0 silk sutures via the proximal and distal limiters, with the insertion point affixed at 20 cm. A biostatic disc was applied to the entry port followed by a transparent dressing. A chest x-ray is pending. There were no immediate complications.     EBL: 5 ml    Ko Hewitt MD.

## 2017-01-24 NOTE — PROGRESS NOTES
Pt has had multiple attempts at IV access with no success. Unable to infuse primacor at present. Spoke with Susy NOLEN for UC. Consult to Dr Matheus Castañeda for central line placement called. Consent obtained and on chart.

## 2017-01-24 NOTE — PROGRESS NOTES
Problem: Mobility Impaired (Adult and Pediatric)  Goal: *Acute Goals and Plan of Care (Insert Text)  LTG:  (1.)Mr. Pollard will move from supine to sit and sit to supine , scoot up and down and roll side to side with INDEPENDENT within 7 day(s) from flat surface without handrail. (2.)Mr. Pollard will transfer from bed to chair and chair to bed with INDEPENDENT using the least restrictive device within 7 day(s). (3.)Mr. Pollard will ambulate with INDEPENDENT for 150 feet with the least restrictive device within 7 day(s), O2 stats >90%. ___________________________________________________________________________________________      PHYSICAL THERAPY: INITIAL ASSESSMENT, AM 1/24/2017  INPATIENT: Hospital Day: 6  Payor: CARE IMPROVEMENT PLUS [place] / Product Type: Managed Care Medicare /      NAME/AGE/GENDER: Frederick Thibodeaux is a 45 y.o. male         PRIMARY DIAGNOSIS: NSVT (nonsustained ventricular tachycardia) ([facility] Nonsustained ventricular tachycardia (HCC) Nonsustained ventricular tachycardia (HCC)        ICD-10: Treatment Diagnosis:       · Generalized Muscle Weakness (M62.81)  · Difficulty in walking, Not elsewhere classified (R26.2)   Precaution/Allergies:  Dilaudid [hydromorphone]; Iodinated contrast media - oral and iv dye; and Penicillins       ASSESSMENT:      Mr. Junior Hector presents with decreased bed mobility, transfers, ambulation, balance, activity tolerance, and overall general functional mobility s/p visit to ED on 1/19/17 with chest pain, SOB. Pt admitted with nonsustained ventricular tachycardia. Pt noted to have B LE edema. Upon entering room for therapy assessment, pt in bathroom, daughters in room who assisted pt to restroom. Pt coming out of rest room, states feeling dizzy and \"not right\". Pt assist with MIN A from bathroom to sitting EOB, /84, pt on room air coming out of bathroom. Pt O2 stats 81% on room air; replaced O2 at 5 L/min via n.c., increased to 94%.  Pt told not to take off O2 with mobility. Pt MIN A for transfers and ambulation from bed to chair in room. Pt with limited B LE AROM due to increased girth/adipose tissue. Pt relates difficulty with donning socks and shoes recently, required assist from daughter. PT to follow for acute care needs to address deficits noted above. Pt functioning below baseline level of independence with ambulation for short distances. Pt may benefit from rehab or HHPT at discharge pending progress. Pt does state has 24 hour assist at home if needed. This section established at most recent assessment   PROBLEM LIST (Impairments causing functional limitations):  1. Decreased Strength  2. Decreased ADL/Functional Activities  3. Decreased Transfer Abilities  4. Decreased Ambulation Ability/Technique  5. Decreased Balance  6. Decreased Activity Tolerance  7. Increased Fatigue  8. Increased Shortness of Breath  9. Edema/Girth    INTERVENTIONS PLANNED: (Benefits and precautions of physical therapy have been discussed with the patient.)  1. Balance Exercise  2. Bed Mobility  3. Family Education  4. Gait Training  5. Home Exercise Program (HEP)  6. Therapeutic Activites  7. Therapeutic Exercise/Strengthening  8. Transfer Training  9. Group Therapy      TREATMENT PLAN: Frequency/Duration: 3 times a week for duration of hospital stay  Rehabilitation Potential For Stated Goals: GOOD      RECOMMENDED REHABILITATION/EQUIPMENT: (at time of discharge pending progress): Continue Skilled Therapy and HHPT or rehab pending progress. Brenda Lee HISTORY:   History of Present Injury/Illness (Reason for Referral):  Jorgito Villatoro is a 45 y.o. male with known NICM with documented EF 10 % with [employer] single chamber ICD in place. He was just discharged on 1/13 with initial complaint of constipation and volume overload. He developed worsening renal failure necessitating hold his diuretics temporarily.  He was discharged home on addition of demadex 100 mg daily and miralax for constipation. Really since mid December he has not felt well with worsening orthopnea, increasing abdominal girth, increased wt and lower ext edema. Today while driving in car he felt presyncopal and was able to pull to side of rode. He did not loose consciousness. He had repeat episode of presyncope prior to coming to ER. While in ER he was noted to have nonsustained ventricular tachycardia. He is not on antiarrhythmia agent. Interrogation of his device revealed normal operation but his VT zone is set at 158 bpm with noted VT in ER at approximately 145 bpm.   Past Medical History/Comorbidities:   Mr. Joy Hankins  has a past medical history of Acute systolic heart failure [facility]) (May, 2009); AICD (automatic cardioverter/defibrillator) present (10/21/2015); Atypical chest pain (4/23/2010); Bronchitis; CAD (coronary artery disease); Cardiomyopathy; Chest pain (10/21/2015); Chronic kidney disease; Chronic pain; Congestive heart failure (CHF) ([address] (10/21/2015); COPD; Diabetes ([address] Diabetes mellitus type II, uncontrolled ([address] (7/2/2013); GERD (gastroesophageal reflux disease); Gout; Heart failure ([address] Hypertension; Hyponatremia (12/20/2010); Ill-defined condition; Morbid obesity ([address] Nausea & vomiting (11/30/2015); Neuropathy; Obstructive sleep apnea (2/15/2010); Other unknown and unspecified cause of morbidity or mortality; Severe sepsis ([address] and Unspecified sleep apnea. He also has no past medical history of Adverse effect of anesthesia; Aneurysm ([address] Coagulation disorder ([address] DEMENTIA; Difficult intubation; Malignant hyperthermia due to anesthesia; Neurological disorder; Other ill-defined conditions(799.89); Pseudocholinesterase deficiency; or Psychiatric disorder. Mr. Joy Hankins  has a past surgical history that includes pacemaker; heart catheterization ([place] 10, 2008); and chest surgery procedure unlisted.   Social History/Living Environment:   Home Environment:  (daughters house)  # Steps to Enter: 0  One/Two Story Residence: One story  Living Alone: No  Support Systems: Family member(s), Friends \ neighbors  Patient Expects to be Discharged to[de-identified] Private residence  Current DME Used/Available at Home: None  Tub or Shower Type: Tub/Shower combination  Prior Level of Function/Work/Activity:  Lives with daughter; typically independent with ambulation for short distances; drives. Personal Factors:          Sex:  male        Age:  45 y.o. Number of Personal Factors/Comorbidities that affect the Plan of Care:  CHF, DM, obesity, edema 3+: HIGH COMPLEXITY   EXAMINATION:   Most Recent Physical Functioning:   Gross Assessment:  AROM: Generally decreased, functional (B LE)  Strength: Generally decreased, functional (B LE)  Coordination: Generally decreased, functional  Sensation: Impaired (B LE neuropathy)               Posture:  Posture (WDL): Exceptions to WDL  Posture Assessment: Rounded shoulders  Balance:  Sitting: Impaired  Sitting - Static: Good (unsupported); Prop sitting  Sitting - Dynamic: Fair (occasional)  Standing: Impaired  Standing - Static: Fair  Standing - Dynamic : Fair (fair -) Bed Mobility:  Rolling:  (up in bathroom on arrival)  Sit to Supine:  (seated to EOB, then seated in chair)  Scooting: Stand-by asssistance  Wheelchair Mobility:     Transfers:  Sit to Stand: Contact guard assistance  Stand to Sit: Contact guard assistance  Bed to Chair: Contact guard assistance  Gait:     Base of Support: Center of gravity altered; Widened  Speed/Mira: Pace decreased (<100 feet/min)  Step Length: Left shortened;Right shortened  Swing Pattern: Left symmetrical;Right symmetrical  Gait Abnormalities: Decreased step clearance; Steppage gait;Trunk sway increased  Distance (ft): 8 Feet (ft) (from bathroom to bed, bed to chair in room)  Assistive Device:  (HHA and close contact)  Ambulation - Level of Assistance: Contact guard assistance;Minimal assistance  Interventions: Safety awareness training;Verbal cues       Body Structures Involved:  1. Lungs  2. Joints  3. Muscles Body Functions Affected:  1. Respiratory  2. Movement Related Activities and Participation Affected:  1. General Tasks and Demands  2. Mobility  3. Self Care  4. Community, Social and [place]   Number of elements that affect the Plan of Care: 4+: HIGH COMPLEXITY   CLINICAL PRESENTATION:   Presentation: Stable and uncomplicated: LOW COMPLEXITY   CLINICAL DECISION MAKIN [facility] Mobility Inpatient Short Form  How much difficulty does the patient currently have. .. Unable A Lot A Little None   1. Turning over in bed (including adjusting bedclothes, sheets and blankets)? [ ] 1   [ ] 2   [X] 3   [ ] 4   2. Sitting down on and standing up from a chair with arms ( e.g., wheelchair, bedside commode, etc.)   [ ] 1   [ ] 2   [X] 3   [ ] 4   3. Moving from lying on back to sitting on the side of the bed? [ ] 1   [ ] 2   [X] 3   [ ] 4   How much help from another person does the patient currently need. .. Total A Lot A Little None   4. Moving to and from a bed to a chair (including a wheelchair)? [ ] 1   [ ] 2   [X] 3   [ ] 4   5. Need to walk in hospital room? [ ] 1   [ ] 2   [X] 3   [ ] 4   6. Climbing 3-5 steps with a railing? [ ] 1   [ ] 2   [X] 3   [ ] 4   © , Trustees of [address] [place] [address], under license to [employer]. All rights reserved    Score:  Initial: 18 Most Recent: X (Date: -- )     Interpretation of Tool:  Represents activities that are increasingly more difficult (i.e. Bed mobility, Transfers, Gait).        Score 24 23 22-20 19-15 14-10 9-7 6       Modifier CH CI CJ CK CL CM CN         · Mobility - Walking and Moving Around:               - CURRENT STATUS:    CK - 40%-59% impaired, limited or restricted               - GOAL STATUS:           CJ - 20%-39% impaired, limited or restricted               - D/C STATUS:                       ---------------To be determined---------------  Payor: CARE IMPROVEMENT PLUS [place] / Product Type: Managed Care Medicare /       Medical Necessity:     · Patient is expected to demonstrate progress in strength, range of motion, balance and coordination to decrease assistance required with overall functional mobility, transfers, ambulation. · Patient demonstrates good rehab potential due to higher previous functional level. Reason for Services/Other Comments:  · Patient continues to require present interventions due to patient's inability to perform bed mobility, transfers, ambulation all safely and effectively at prior level of function of independence. Use of outcome tool(s) and clinical judgement create a POC that gives a: Clear prediction of patient's progress: LOW COMPLEXITY                 TREATMENT:   (In addition to Assessment/Re-Assessment sessions the following treatments were rendered)   Pre-treatment Symptoms/Complaints:  Fatigue, dizziness  Pain: Initial:   Pain Intensity 1: 0  Pain Intervention(s) 1: Repositioned  Post Session:  0      Assessment/Reassessment only, no treatment provided today     Treatment/Session Assessment:    · Response to Treatment:  Fatigue, no pain, O2 at 5 L/min, stats dropped on room air to 81%  · Interdisciplinary Collaboration:  · Physical Therapist  · Registered Nurse  · After treatment position/precautions:  · Up in chair  · Bed/Chair-wheels locked  · Bed in low position  · Call light within reach  · RN notified  · Family at bedside  · Compliance with Program/Exercises: Will assess as treatment progresses. · Recommendations/Intent for next treatment session: \"Next visit will focus on advancements to more challenging activities\".   Total Treatment Duration:  PT Patient Time In/Time Out  Time In: 1059  Time Out: [address], PT

## 2017-01-24 NOTE — PROGRESS NOTES
[facility] CARDIOLOGY PROGRESS NOTE           1/24/2017 5:07 PM    Admit Date: 1/19/2017    Admit Diagnosis: NSVT (nonsustained ventricular tachycardia) (HCC)      Subjective:   Patient with ongoing SOB. Bun/Cr are increased    Objective:     Vitals:    01/24/17 1059 01/24/17 1208 01/24/17 1417 01/24/17 1633   BP: 135/84 135/84 119/53 108/64   Pulse:  78 74 73   Resp:  18  18   Temp:  98.2 °F (36.8 °C)  98 °F (36.7 °C)   SpO2: 94% 93%  96%   Weight:       Height:           Physical Exam:  General-Well Developed, Well Nourished, No Acute Distress, Alert & Oriented x 3, appropriate mood. Neck- supple, no JVD  CV- regular rate and rhythm no MRG  Lung- clear bilaterally  Abd- soft, nontender, nondistended  Ext- +2 edema bilaterally.   Skin- warm and dry    Current Facility-Administered Medications   Medication Dose Route Frequency    amiodarone (CORDARONE) tablet 400 mg  400 mg Oral BID    polyethylene glycol (MIRALAX) packet 34 g  34 g Oral DAILY    milrinone (PRIMACOR) 20 MG/100 ML D5W infusion  0.5 mcg/kg/min (Order-Specific) IntraVENous CONTINUOUS    hydrocortisone (CORTAID) 0.5 % cream   Topical BID    allopurinol (ZYLOPRIM) tablet 100 mg  100 mg Oral DAILY    apixaban (ELIQUIS) tablet 5 mg  5 mg Oral Q12H    bisacodyl (DULCOLAX) suppository 10 mg  10 mg Rectal DAILY    carvedilol (COREG) tablet 25 mg  25 mg Oral BID WITH MEALS    colchicine tablet 0.6 mg  0.6 mg Oral DAILY    docusate sodium (COLACE) capsule 100 mg  100 mg Oral BID    gabapentin (NEURONTIN) capsule 600 mg  600 mg Oral TID    hydrALAZINE (APRESOLINE) tablet 25 mg  25 mg Oral TID    insulin glargine (LANTUS) injection 50 Units  50 Units SubCUTAneous QHS    insulin lispro (HUMALOG) injection 15 Units  15 Units SubCUTAneous TID WITH MEALS    isosorbide dinitrate (ISORDIL) tablet 20 mg  20 mg Oral TID    nitroglycerin (NITROSTAT) tablet 0.4 mg  0.4 mg SubLINGual Q5MIN PRN    oxyCODONE IR (OXY-IR) immediate release tablet 15 mg  15 mg Oral Q4H PRN    polyethylene glycol (MIRALAX) packet 17 g  17 g Oral DAILY PRN    potassium chloride (K-DUR, KLOR-CON) tablet 10 mEq  10 mEq Oral BID    pravastatin (PRAVACHOL) tablet 80 mg  80 mg Oral QHS    famotidine (PEPCID) tablet 20 mg  20 mg Oral BID    spironolactone (ALDACTONE) tablet 25 mg  25 mg Oral DAILY    sodium chloride (NS) flush 5-10 mL  5-10 mL IntraVENous Q8H    sodium chloride (NS) flush 5-10 mL  5-10 mL IntraVENous PRN    morphine injection 2 mg  2 mg IntraVENous Q4H PRN     Data Review:   Recent Results (from the past 24 hour(s))   GLUCOSE, POC    Collection Time: 01/23/17 10:00 PM   Result Value Ref Range    Glucose (POC) 135 (H) 65 - 630 mg/dL   METABOLIC PANEL, BASIC    Collection Time: 01/24/17  4:40 AM   Result Value Ref Range    Sodium 136 136 - 145 mmol/L    Potassium 4.9 3.5 - 5.1 mmol/L    Chloride 98 98 - 107 mmol/L    CO2 36 (H) 21 - 32 mmol/L    Anion gap 2 (L) 7 - 16 mmol/L    Glucose 134 (H) 65 - 100 mg/dL    BUN 58 (H) 6 - 23 MG/DL    Creatinine 2.67 (H) 0.8 - 1.5 MG/DL    GFR est AA 32 (L) >60 ml/min/1.73m2    GFR est non-AA 27 (L) >60 ml/min/1.73m2    Calcium 8.6 8.3 - 10.4 MG/DL   GLUCOSE, POC    Collection Time: 01/24/17  6:22 AM   Result Value Ref Range    Glucose (POC) 133 (H) 65 - 100 mg/dL   GLUCOSE, POC    Collection Time: 01/24/17 10:46 AM   Result Value Ref Range    Glucose (POC) 157 (H) 65 - 100 mg/dL   GLUCOSE, POC    Collection Time: 01/24/17  4:21 PM   Result Value Ref Range    Glucose (POC) 123 (H) 65 - 100 mg/dL     Assessment:     Principal Problem:    Nonsustained ventricular tachycardia (HCC) (1/19/2017)    Active Problems:    Obstructive sleep apnea (2/15/2010)      Nonischemic dilated cardiomyopathy ([facility] (7/2/2013)      Overview: LVEF 10-15% on ECHO from 12/17/10      CKD (chronic kidney disease) stage 3, GFR 30-59 ml/min (8/13/2014)      AICD (automatic cardioverter/defibrillator) present (10/21/2015)      Acute on chronic systolic (congestive) heart failure ([facility] (10/18/2016)      Atrial flutter ([facility] (10/20/2016)      Obesity hypoventilation syndrome ([facility] (10/24/2016)      Morbid obesity with BMI of 60.0-69.9, adult ([facility] (11/28/2016)      Syncope (1/19/2017)      NSVT (nonsustained ventricular tachycardia) ([facility] (1/19/2017)      Plan:   1. Nonsustained ventricular tachycardia --improved, switch to po amiodarone. Decrease dose to 200mg qam  2. CKD (chronic kidney disease) stage 3, GFR 30-59 ml/min (8/13/2014)--monitoring closely. On Lasix for 12-14 hours  3. Acute on chronic systolic (congestive) heart failure -massive volume overload, most likely component of cor pulmonale as well with morbid obesity --KELSIE--On BIPAP   4. Atrial flutter --stable, on eliquis  5. Edema - Rewrap legs today    Dave Bowels. Tommy Verdugo M.D., F.A.C.C, F.H.R.S.   Cardiology/Electrophysiology

## 2017-01-24 NOTE — PROGRESS NOTES
Bedside and Verbal shift change report received from [facility]. Report included the following information SBAR, Kardex, Procedure Summary, Intake/Output, MAR, Recent Results and Cardiac Rhythm NSR.

## 2017-01-24 NOTE — PROGRESS NOTES
Pt using his own home Bipap at ordered settings. Inspected and confirmed proper function as pt. states that it sounded and felt different than the hospital provided [place]. I explained the reasons for those differences in equipment. Pt. and family confirm their understanding.

## 2017-01-24 NOTE — PROGRESS NOTES
Verbal bedside report given to monica Medley RN. Patient's situation, background, assessment and recommendations provided. Opportunity for questions provided. Oncoming RN assumed care of patient. Primacor, lasix IV drip verified at bedside with oncoming RN.

## 2017-01-24 NOTE — PROGRESS NOTES
HOB at 30 degrees 1 hr after hemostasis achieved.   Right groin and right radial without bleeding/hematoma

## 2017-01-24 NOTE — PROGRESS NOTES
Bedside report received from [facility]. Pt sitting in chair. Family at bedside. BiPap on. Denies complaints. WIll monitor.

## 2017-01-24 NOTE — PROGRESS NOTES
Patient more lethargic/somnolent and falling asleep in the middle of conversation and while trying to eat. [facility], NP notified. Orders received for ABG.

## 2017-01-25 LAB
ANION GAP BLD CALC-SCNC: 10 MMOL/L (ref 7–16)
BUN SERPL-MCNC: 65 MG/DL (ref 6–23)
CALCIUM SERPL-MCNC: 8.8 MG/DL (ref 8.3–10.4)
CHLORIDE SERPL-SCNC: 97 MMOL/L (ref 98–107)
CO2 SERPL-SCNC: 29 MMOL/L (ref 21–32)
CREAT SERPL-MCNC: 3.01 MG/DL (ref 0.8–1.5)
GLUCOSE BLD STRIP.AUTO-MCNC: 121 MG/DL (ref 65–100)
GLUCOSE BLD STRIP.AUTO-MCNC: 131 MG/DL (ref 65–100)
GLUCOSE BLD STRIP.AUTO-MCNC: 135 MG/DL (ref 65–100)
GLUCOSE BLD STRIP.AUTO-MCNC: 176 MG/DL (ref 65–100)
GLUCOSE BLD STRIP.AUTO-MCNC: 93 MG/DL (ref 65–100)
GLUCOSE SERPL-MCNC: 121 MG/DL (ref 65–100)
POTASSIUM SERPL-SCNC: 4.7 MMOL/L (ref 3.5–5.1)
SODIUM SERPL-SCNC: 136 MMOL/L (ref 136–145)

## 2017-01-25 PROCEDURE — 94760 N-INVAS EAR/PLS OXIMETRY 1: CPT

## 2017-01-25 PROCEDURE — 74011250637 HC RX REV CODE- 250/637: Performed by: INTERNAL MEDICINE

## 2017-01-25 PROCEDURE — 3331090001 HH PPS REVENUE CREDIT

## 2017-01-25 PROCEDURE — 65660000000 HC RM CCU STEPDOWN

## 2017-01-25 PROCEDURE — 74011250637 HC RX REV CODE- 250/637: Performed by: NURSE PRACTITIONER

## 2017-01-25 PROCEDURE — 77010033678 HC OXYGEN DAILY

## 2017-01-25 PROCEDURE — 74011636637 HC RX REV CODE- 636/637: Performed by: NURSE PRACTITIONER

## 2017-01-25 PROCEDURE — 36591 DRAW BLOOD OFF VENOUS DEVICE: CPT

## 2017-01-25 PROCEDURE — 80048 BASIC METABOLIC PNL TOTAL CA: CPT | Performed by: NURSE PRACTITIONER

## 2017-01-25 PROCEDURE — 82962 GLUCOSE BLOOD TEST: CPT

## 2017-01-25 PROCEDURE — 77030018846 HC SOL IRR STRL H20 ICUM -A

## 2017-01-25 PROCEDURE — 3331090002 HH PPS REVENUE DEBIT

## 2017-01-25 PROCEDURE — 74011250636 HC RX REV CODE- 250/636: Performed by: INTERNAL MEDICINE

## 2017-01-25 RX ADMIN — APIXABAN 5 MG: 5 TABLET, FILM COATED ORAL at 21:50

## 2017-01-25 RX ADMIN — HYDROCORTISONE: 5 CREAM TOPICAL at 08:40

## 2017-01-25 RX ADMIN — ISOSORBIDE DINITRATE 20 MG: 20 TABLET ORAL at 13:24

## 2017-01-25 RX ADMIN — GABAPENTIN 600 MG: 300 CAPSULE ORAL at 13:24

## 2017-01-25 RX ADMIN — HYDRALAZINE HYDROCHLORIDE 25 MG: 25 TABLET, FILM COATED ORAL at 13:24

## 2017-01-25 RX ADMIN — DOCUSATE SODIUM 100 MG: 100 CAPSULE, LIQUID FILLED ORAL at 08:37

## 2017-01-25 RX ADMIN — ISOSORBIDE DINITRATE 20 MG: 20 TABLET ORAL at 21:51

## 2017-01-25 RX ADMIN — INSULIN LISPRO 15 UNITS: 100 INJECTION, SOLUTION INTRAVENOUS; SUBCUTANEOUS at 08:40

## 2017-01-25 RX ADMIN — POTASSIUM CHLORIDE 10 MEQ: 10 TABLET, EXTENDED RELEASE ORAL at 08:38

## 2017-01-25 RX ADMIN — INSULIN GLARGINE 50 UNITS: 100 INJECTION, SOLUTION SUBCUTANEOUS at 21:55

## 2017-01-25 RX ADMIN — COLCHICINE 0.6 MG: 0.6 TABLET, FILM COATED ORAL at 08:38

## 2017-01-25 RX ADMIN — ISOSORBIDE DINITRATE 20 MG: 20 TABLET ORAL at 06:09

## 2017-01-25 RX ADMIN — CARVEDILOL 25 MG: 25 TABLET, FILM COATED ORAL at 08:38

## 2017-01-25 RX ADMIN — GABAPENTIN 600 MG: 300 CAPSULE ORAL at 06:09

## 2017-01-25 RX ADMIN — ALLOPURINOL 100 MG: 100 TABLET ORAL at 08:38

## 2017-01-25 RX ADMIN — OXYCODONE HYDROCHLORIDE 15 MG: 15 TABLET ORAL at 13:27

## 2017-01-25 RX ADMIN — APIXABAN 5 MG: 5 TABLET, FILM COATED ORAL at 08:38

## 2017-01-25 RX ADMIN — OXYCODONE HYDROCHLORIDE 15 MG: 15 TABLET ORAL at 21:48

## 2017-01-25 RX ADMIN — POTASSIUM CHLORIDE 10 MEQ: 10 TABLET, EXTENDED RELEASE ORAL at 17:50

## 2017-01-25 RX ADMIN — MILRINONE LACTATE 0.5 MCG/KG/MIN: 200 INJECTION, SOLUTION INTRAVENOUS at 15:10

## 2017-01-25 RX ADMIN — HYDRALAZINE HYDROCHLORIDE 25 MG: 25 TABLET, FILM COATED ORAL at 06:09

## 2017-01-25 RX ADMIN — FAMOTIDINE 20 MG: 20 TABLET ORAL at 08:38

## 2017-01-25 RX ADMIN — Medication 10 ML: at 06:10

## 2017-01-25 RX ADMIN — POLYETHYLENE GLYCOL 3350 34 G: 17 POWDER, FOR SOLUTION ORAL at 08:37

## 2017-01-25 RX ADMIN — AMIODARONE HYDROCHLORIDE 200 MG: 200 TABLET ORAL at 08:37

## 2017-01-25 RX ADMIN — INSULIN LISPRO 15 UNITS: 100 INJECTION, SOLUTION INTRAVENOUS; SUBCUTANEOUS at 17:49

## 2017-01-25 RX ADMIN — HYDROCORTISONE: 5 CREAM TOPICAL at 17:52

## 2017-01-25 RX ADMIN — HYDRALAZINE HYDROCHLORIDE 25 MG: 25 TABLET, FILM COATED ORAL at 21:53

## 2017-01-25 RX ADMIN — INSULIN LISPRO 15 UNITS: 100 INJECTION, SOLUTION INTRAVENOUS; SUBCUTANEOUS at 13:03

## 2017-01-25 RX ADMIN — MILRINONE LACTATE 0.5 MCG/KG/MIN: 200 INJECTION, SOLUTION INTRAVENOUS at 06:09

## 2017-01-25 RX ADMIN — DOCUSATE SODIUM 100 MG: 100 CAPSULE, LIQUID FILLED ORAL at 17:50

## 2017-01-25 RX ADMIN — CARVEDILOL 25 MG: 25 TABLET, FILM COATED ORAL at 17:50

## 2017-01-25 RX ADMIN — SPIRONOLACTONE 25 MG: 25 TABLET, FILM COATED ORAL at 08:38

## 2017-01-25 RX ADMIN — Medication 5 ML: at 13:04

## 2017-01-25 RX ADMIN — PRAVASTATIN SODIUM 80 MG: 80 TABLET ORAL at 21:52

## 2017-01-25 RX ADMIN — FAMOTIDINE 20 MG: 20 TABLET ORAL at 17:50

## 2017-01-25 RX ADMIN — GABAPENTIN 600 MG: 300 CAPSULE ORAL at 21:53

## 2017-01-25 NOTE — PROGRESS NOTES
Bedside and Verbal shift change report given to [place]. Report included the following information SBAR, Kardex, MAR, Accordion and Recent Results. Primacore gtt verified. See MAR.

## 2017-01-25 NOTE — PROGRESS NOTES
PT note: Patient receiving bath at this time. Will attempt PT treatment later as time and schedule permit. Thank you.    Jose Givens, PT  1/25/2017

## 2017-01-25 NOTE — PROGRESS NOTES
[facility] CARDIOLOGY PROGRESS NOTE           1/25/2017 6:48 AM    Admit Date: 1/19/2017    Admit Diagnosis: NSVT (nonsustained ventricular tachycardia) ([facility])      Subjective:   No complaints this AM, no chest pain or shortness of breath, BiPAP in place, cardiomyopathy is a chronic problem with recent acute exacerbation requiring inotropes, slight improvement, aggravated by morbid obesity, improved SOB    Interval History: (History of pertinent interval events obtained from nursing staff)    ROS:  GEN:  No fever or chills  Cardiovascular:  As noted above  Pulmonary:  As noted above  Neuro:  No new focal motor or sensory loss      Objective:     Vitals:    01/25/17 0042 01/25/17 0115 01/25/17 0457 01/25/17 0609   BP:  137/71 104/81 115/64   Pulse:  71 74 76   Resp:  20 20    Temp:  97.5 °F (36.4 °C) 97.9 °F (36.6 °C)    SpO2: 94% 98% 95%    Weight:   (!) 421 lb (191 kg)    Height:           Physical Exam:  General-morbidly obese, NAD, BiPAP in place  Neck- supple, no JVD  CV- regular rate and rhythm, very distant S1, S2, PMI not palpable, no audible murmur  Lung- clear bilaterally but very distant  Abd- soft, nontender, nondistended  Ext- 2-3+ OSMAN  Skin- warm and dry    Current Facility-Administered Medications   Medication Dose Route Frequency    amiodarone (CORDARONE) tablet 200 mg  200 mg Oral DAILY    polyethylene glycol (MIRALAX) packet 34 g  34 g Oral DAILY    milrinone (PRIMACOR) 20 MG/100 ML D5W infusion  0.5 mcg/kg/min (Order-Specific) IntraVENous CONTINUOUS    hydrocortisone (CORTAID) 0.5 % cream   Topical BID    allopurinol (ZYLOPRIM) tablet 100 mg  100 mg Oral DAILY    apixaban (ELIQUIS) tablet 5 mg  5 mg Oral Q12H    bisacodyl (DULCOLAX) suppository 10 mg  10 mg Rectal DAILY    carvedilol (COREG) tablet 25 mg  25 mg Oral BID WITH MEALS    colchicine tablet 0.6 mg  0.6 mg Oral DAILY    docusate sodium (COLACE) capsule 100 mg  100 mg Oral BID    gabapentin (NEURONTIN) capsule 600 mg  600 mg Oral TID    hydrALAZINE (APRESOLINE) tablet 25 mg  25 mg Oral TID    insulin glargine (LANTUS) injection 50 Units  50 Units SubCUTAneous QHS    insulin lispro (HUMALOG) injection 15 Units  15 Units SubCUTAneous TID WITH MEALS    isosorbide dinitrate (ISORDIL) tablet 20 mg  20 mg Oral TID    nitroglycerin (NITROSTAT) tablet 0.4 mg  0.4 mg SubLINGual Q5MIN PRN    oxyCODONE IR (OXY-IR) immediate release tablet 15 mg  15 mg Oral Q4H PRN    polyethylene glycol (MIRALAX) packet 17 g  17 g Oral DAILY PRN    potassium chloride (K-DUR, KLOR-CON) tablet 10 mEq  10 mEq Oral BID    pravastatin (PRAVACHOL) tablet 80 mg  80 mg Oral QHS    famotidine (PEPCID) tablet 20 mg  20 mg Oral BID    spironolactone (ALDACTONE) tablet 25 mg  25 mg Oral DAILY    sodium chloride (NS) flush 5-10 mL  5-10 mL IntraVENous Q8H    sodium chloride (NS) flush 5-10 mL  5-10 mL IntraVENous PRN    morphine injection 2 mg  2 mg IntraVENous Q4H PRN     Data Review:   Recent Results (from the past 24 hour(s))   GLUCOSE, POC    Collection Time: 01/24/17 10:46 AM   Result Value Ref Range    Glucose (POC) 157 (H) 65 - 100 mg/dL   GLUCOSE, POC    Collection Time: 01/24/17  4:21 PM   Result Value Ref Range    Glucose (POC) 123 (H) 65 - 100 mg/dL   BLOOD GAS, ARTERIAL    Collection Time: 01/24/17  6:30 PM   Result Value Ref Range    pH 7.38 7.35 - 7.45      PCO2 49 (H) 35.0 - 45.0 mmHg    PO2 88 75.0 - 100.0 mmHg    BICARBONATE 28 (H) 22.0 - 26.0 mmol/L    BASE EXCESS 2.6 0 - 3 mmol/L    TOTAL HEMOGLOBIN 11.3 (L) 11.7 - 15.0 GM/DL    O2 SAT 96 92.0 - 98.5 %    ARTERIAL O2 HGB 94.0 94.0 - 97.0 %    CARBOXYHEMOGLOBIN 1.9 (H) 0.5 - 1.5 %    METHEMOGLOBIN 0.3 0.0 - 1.5 %    DEOXYHEMOGLOBIN 4 0.0 - 5.0 %    SITE LR     ALLENS TEST POSITIVE      MODE BIPAP home unit 20 16     FIO2 32.0 %    O2 FLOW 3.00 L/min    Respiratory comment: Vianey ROBBINS at . Read back.     GLUCOSE, POC    Collection Time: 01/24/17  9:52 PM   Result Value Ref Range Glucose (POC) 93 65 - 100 mg/dL   GLUCOSE, POC    Collection Time: 01/24/17 11:16 PM   Result Value Ref Range    Glucose (POC) 104 (H) 65 - 100 mg/dL   GLUCOSE, POC    Collection Time: 01/25/17  3:39 AM   Result Value Ref Range    Glucose (POC) 131 (H) 65 - 057 mg/dL   METABOLIC PANEL, BASIC    Collection Time: 01/25/17  5:00 AM   Result Value Ref Range    Sodium 136 136 - 145 mmol/L    Potassium 4.7 3.5 - 5.1 mmol/L    Chloride 97 (L) 98 - 107 mmol/L    CO2 29 21 - 32 mmol/L    Anion gap 10 7 - 16 mmol/L    Glucose 121 (H) 65 - 100 mg/dL    BUN 65 (H) 6 - 23 MG/DL    Creatinine 3.01 (H) 0.8 - 1.5 MG/DL    GFR est AA 28 (L) >60 ml/min/1.73m2    GFR est non-AA 23 (L) >60 ml/min/1.73m2    Calcium 8.8 8.3 - 10.4 MG/DL   GLUCOSE, POC    Collection Time: 01/25/17  6:04 AM   Result Value Ref Range    Glucose (POC) 135 (H) 65 - 100 mg/dL       EKG:  (EKG has been independently visualized by me with interpretation below)  Assessment:     Principal Problem:    Nonsustained ventricular tachycardia (HCC) (1/19/2017)    Active Problems:    Obstructive sleep apnea (2/15/2010)      Nonischemic dilated cardiomyopathy ([address] (7/2/2013)      Overview: LVEF 10-15% on ECHO from 12/17/10      CKD (chronic kidney disease) stage 3, GFR 30-59 ml/min (8/13/2014)      AICD (automatic cardioverter/defibrillator) present (10/21/2015)      Acute on chronic systolic (congestive) heart failure (HCC) (10/18/2016)      Atrial flutter ([address] (10/20/2016)      Obesity hypoventilation syndrome ([address] (10/24/2016)      Morbid obesity with BMI of 60.0-69.9, adult ([address] (11/28/2016)      Syncope (1/19/2017)      NSVT (nonsustained ventricular tachycardia) ([address] (1/19/2017)      Plan:   1. Nonsustained ventricular tachycardia, controlled: improved, continue amiodarone 400mg Q12H  2. CKD (chronic kidney disease) stage 3, GFR 30-59 ml/min (8/13/2014) uncontrolled--monitoring closely, continue to hold off on diuresis.   3. Acute on chronic systolic (congestive) heart failure, uncontrolled -massive volume overload, most likely component of cor pulmonale as well with morbid obesity --KELSIE--will continue to wear BiPAP, wrap legs today  4. Atrial flutter --stable, on eliquis  5. Edema, uncontrolled - wrap legs again today  6. AICD: functioning normally, no shocks  7. Obesity hypoventilation, KELSIE: BiPAP today as above    Scarlet Roque MD  Cardiology/Electrophysiology

## 2017-01-26 LAB
ANION GAP BLD CALC-SCNC: 8 MMOL/L (ref 7–16)
BASOPHILS # BLD AUTO: 0 K/UL (ref 0–0.2)
BASOPHILS # BLD: 0 % (ref 0–2)
BNP SERPL-MCNC: 392 PG/ML
BUN SERPL-MCNC: 64 MG/DL (ref 6–23)
CALCIUM SERPL-MCNC: 8.7 MG/DL (ref 8.3–10.4)
CHLORIDE SERPL-SCNC: 98 MMOL/L (ref 98–107)
CO2 SERPL-SCNC: 30 MMOL/L (ref 21–32)
CREAT SERPL-MCNC: 2.78 MG/DL (ref 0.8–1.5)
DIFFERENTIAL METHOD BLD: ABNORMAL
EOSINOPHIL # BLD: 0.3 K/UL (ref 0–0.8)
EOSINOPHIL NFR BLD: 4 % (ref 0.5–7.8)
ERYTHROCYTE [DISTWIDTH] IN BLOOD BY AUTOMATED COUNT: 18.7 % (ref 11.9–14.6)
GLUCOSE BLD STRIP.AUTO-MCNC: 111 MG/DL (ref 65–100)
GLUCOSE BLD STRIP.AUTO-MCNC: 137 MG/DL (ref 65–100)
GLUCOSE BLD STRIP.AUTO-MCNC: 162 MG/DL (ref 65–100)
GLUCOSE BLD STRIP.AUTO-MCNC: 182 MG/DL (ref 65–100)
GLUCOSE SERPL-MCNC: 119 MG/DL (ref 65–100)
HCT VFR BLD AUTO: 32.5 % (ref 41.1–50.3)
HGB BLD-MCNC: 10.3 G/DL (ref 13.6–17.2)
IMM GRANULOCYTES # BLD: 0 K/UL (ref 0–0.5)
IMM GRANULOCYTES NFR BLD AUTO: 0.1 % (ref 0–5)
LYMPHOCYTES # BLD AUTO: 23 % (ref 13–44)
LYMPHOCYTES # BLD: 1.6 K/UL (ref 0.5–4.6)
MCH RBC QN AUTO: 24.6 PG (ref 26.1–32.9)
MCHC RBC AUTO-ENTMCNC: 31.7 G/DL (ref 31.4–35)
MCV RBC AUTO: 77.8 FL (ref 79.6–97.8)
MONOCYTES # BLD: 1.3 K/UL (ref 0.1–1.3)
MONOCYTES NFR BLD AUTO: 18 % (ref 4–12)
NEUTS SEG # BLD: 3.8 K/UL (ref 1.7–8.2)
NEUTS SEG NFR BLD AUTO: 55 % (ref 43–78)
PLATELET # BLD AUTO: 224 K/UL (ref 150–450)
PMV BLD AUTO: 10.3 FL (ref 10.8–14.1)
POTASSIUM SERPL-SCNC: 4.8 MMOL/L (ref 3.5–5.1)
RBC # BLD AUTO: 4.18 M/UL (ref 4.23–5.67)
SODIUM SERPL-SCNC: 136 MMOL/L (ref 136–145)
WBC # BLD AUTO: 6.9 K/UL (ref 4.3–11.1)

## 2017-01-26 PROCEDURE — 3331090002 HH PPS REVENUE DEBIT

## 2017-01-26 PROCEDURE — 36591 DRAW BLOOD OFF VENOUS DEVICE: CPT

## 2017-01-26 PROCEDURE — 65660000000 HC RM CCU STEPDOWN

## 2017-01-26 PROCEDURE — 74011250637 HC RX REV CODE- 250/637: Performed by: INTERNAL MEDICINE

## 2017-01-26 PROCEDURE — 74011636637 HC RX REV CODE- 636/637: Performed by: NURSE PRACTITIONER

## 2017-01-26 PROCEDURE — 82962 GLUCOSE BLOOD TEST: CPT

## 2017-01-26 PROCEDURE — 74011250636 HC RX REV CODE- 250/636: Performed by: INTERNAL MEDICINE

## 2017-01-26 PROCEDURE — 97116 GAIT TRAINING THERAPY: CPT

## 2017-01-26 PROCEDURE — 80048 BASIC METABOLIC PNL TOTAL CA: CPT | Performed by: INTERNAL MEDICINE

## 2017-01-26 PROCEDURE — 97530 THERAPEUTIC ACTIVITIES: CPT

## 2017-01-26 PROCEDURE — 83880 ASSAY OF NATRIURETIC PEPTIDE: CPT | Performed by: INTERNAL MEDICINE

## 2017-01-26 PROCEDURE — 74011250637 HC RX REV CODE- 250/637: Performed by: NURSE PRACTITIONER

## 2017-01-26 PROCEDURE — 85025 COMPLETE CBC W/AUTO DIFF WBC: CPT | Performed by: INTERNAL MEDICINE

## 2017-01-26 PROCEDURE — 97110 THERAPEUTIC EXERCISES: CPT

## 2017-01-26 PROCEDURE — 3331090001 HH PPS REVENUE CREDIT

## 2017-01-26 RX ADMIN — MILRINONE LACTATE 0.5 MCG/KG/MIN: 200 INJECTION, SOLUTION INTRAVENOUS at 10:16

## 2017-01-26 RX ADMIN — HYDRALAZINE HYDROCHLORIDE 25 MG: 25 TABLET, FILM COATED ORAL at 06:24

## 2017-01-26 RX ADMIN — FAMOTIDINE 20 MG: 20 TABLET ORAL at 09:10

## 2017-01-26 RX ADMIN — APIXABAN 5 MG: 5 TABLET, FILM COATED ORAL at 21:42

## 2017-01-26 RX ADMIN — HYDROCORTISONE: 5 CREAM TOPICAL at 17:52

## 2017-01-26 RX ADMIN — INSULIN LISPRO 15 UNITS: 100 INJECTION, SOLUTION INTRAVENOUS; SUBCUTANEOUS at 09:07

## 2017-01-26 RX ADMIN — HYDRALAZINE HYDROCHLORIDE 25 MG: 25 TABLET, FILM COATED ORAL at 21:43

## 2017-01-26 RX ADMIN — ALLOPURINOL 100 MG: 100 TABLET ORAL at 09:10

## 2017-01-26 RX ADMIN — GABAPENTIN 600 MG: 300 CAPSULE ORAL at 06:24

## 2017-01-26 RX ADMIN — PRAVASTATIN SODIUM 80 MG: 80 TABLET ORAL at 21:43

## 2017-01-26 RX ADMIN — POTASSIUM CHLORIDE 10 MEQ: 10 TABLET, EXTENDED RELEASE ORAL at 09:10

## 2017-01-26 RX ADMIN — OXYCODONE HYDROCHLORIDE 15 MG: 15 TABLET ORAL at 21:40

## 2017-01-26 RX ADMIN — HYDROCORTISONE: 5 CREAM TOPICAL at 09:12

## 2017-01-26 RX ADMIN — CARVEDILOL 25 MG: 25 TABLET, FILM COATED ORAL at 09:10

## 2017-01-26 RX ADMIN — ISOSORBIDE DINITRATE 20 MG: 20 TABLET ORAL at 13:29

## 2017-01-26 RX ADMIN — ISOSORBIDE DINITRATE 20 MG: 20 TABLET ORAL at 06:24

## 2017-01-26 RX ADMIN — FAMOTIDINE 20 MG: 20 TABLET ORAL at 17:51

## 2017-01-26 RX ADMIN — Medication 10 ML: at 21:44

## 2017-01-26 RX ADMIN — Medication 10 ML: at 05:58

## 2017-01-26 RX ADMIN — MILRINONE LACTATE 0.5 MCG/KG/MIN: 200 INJECTION, SOLUTION INTRAVENOUS at 00:52

## 2017-01-26 RX ADMIN — HYDRALAZINE HYDROCHLORIDE 25 MG: 25 TABLET, FILM COATED ORAL at 13:29

## 2017-01-26 RX ADMIN — GABAPENTIN 600 MG: 300 CAPSULE ORAL at 21:41

## 2017-01-26 RX ADMIN — INSULIN LISPRO 15 UNITS: 100 INJECTION, SOLUTION INTRAVENOUS; SUBCUTANEOUS at 17:50

## 2017-01-26 RX ADMIN — AMIODARONE HYDROCHLORIDE 200 MG: 200 TABLET ORAL at 09:10

## 2017-01-26 RX ADMIN — GABAPENTIN 600 MG: 300 CAPSULE ORAL at 14:44

## 2017-01-26 RX ADMIN — MILRINONE LACTATE 0.5 MCG/KG/MIN: 200 INJECTION, SOLUTION INTRAVENOUS at 18:57

## 2017-01-26 RX ADMIN — SPIRONOLACTONE 25 MG: 25 TABLET, FILM COATED ORAL at 09:10

## 2017-01-26 RX ADMIN — APIXABAN 5 MG: 5 TABLET, FILM COATED ORAL at 09:10

## 2017-01-26 RX ADMIN — INSULIN LISPRO 15 UNITS: 100 INJECTION, SOLUTION INTRAVENOUS; SUBCUTANEOUS at 13:28

## 2017-01-26 RX ADMIN — POTASSIUM CHLORIDE 10 MEQ: 10 TABLET, EXTENDED RELEASE ORAL at 17:51

## 2017-01-26 RX ADMIN — INSULIN GLARGINE 50 UNITS: 100 INJECTION, SOLUTION SUBCUTANEOUS at 21:47

## 2017-01-26 RX ADMIN — COLCHICINE 0.6 MG: 0.6 TABLET, FILM COATED ORAL at 09:10

## 2017-01-26 RX ADMIN — CARVEDILOL 25 MG: 25 TABLET, FILM COATED ORAL at 17:51

## 2017-01-26 RX ADMIN — Medication 10 ML: at 13:20

## 2017-01-26 RX ADMIN — ISOSORBIDE DINITRATE 20 MG: 20 TABLET ORAL at 21:42

## 2017-01-26 NOTE — PROGRESS NOTES
[facility] CARDIOLOGY PROGRESS NOTE           1/26/2017 1:34 PM    Admit Date: 1/19/2017    Admit Diagnosis: NSVT (nonsustained ventricular tachycardia) ([facility])      Subjective:   No complaints this AM, did not open eyes or attempt to participate in discussion this AM, case was discussed with his daughter who remains at bedside    Interval History: (History of pertinent interval events obtained from nursing staff)  Diuresis off IV lasix yesterday    ROS:  GEN:  No fever or chills  Cardiovascular:  As noted above  Pulmonary:  As noted above  Neuro:  No new focal motor or sensory loss      Objective:     Vitals:    01/26/17 0133 01/26/17 0602 01/26/17 0700 01/26/17 1100   BP: 112/40 121/58 114/57 (!) 119/97   Pulse: 75 75 71 74   Resp: 17 17 16 18   Temp: 96.8 °F (36 °C) 96.8 °F (36 °C) 96.9 °F (36.1 °C) 96.8 °F (36 °C)   SpO2: 99% 99% 97% 95%   Weight:  (!) 426 lb 3.2 oz (193.3 kg)     Height:           Physical Exam:  General-morbidly obese, NAD, BiPAP in place  Neck- supple, no JVD  CV- regular rate and rhythm, very distant S1, S2, PMI not palpable, no audible murmur  Lung- clear bilaterally but very distant  Abd- soft, nontender, nondistended  Ext- 2+ OSMAN  Skin- warm and dry    Current Facility-Administered Medications   Medication Dose Route Frequency    amiodarone (CORDARONE) tablet 200 mg  200 mg Oral DAILY    polyethylene glycol (MIRALAX) packet 34 g  34 g Oral DAILY    milrinone (PRIMACOR) 20 MG/100 ML D5W infusion  0.5 mcg/kg/min (Order-Specific) IntraVENous CONTINUOUS    hydrocortisone (CORTAID) 0.5 % cream   Topical BID    allopurinol (ZYLOPRIM) tablet 100 mg  100 mg Oral DAILY    apixaban (ELIQUIS) tablet 5 mg  5 mg Oral Q12H    bisacodyl (DULCOLAX) suppository 10 mg  10 mg Rectal DAILY    carvedilol (COREG) tablet 25 mg  25 mg Oral BID WITH MEALS    colchicine tablet 0.6 mg  0.6 mg Oral DAILY    docusate sodium (COLACE) capsule 100 mg  100 mg Oral BID    gabapentin (NEURONTIN) capsule 600 mg  600 mg Oral TID    hydrALAZINE (APRESOLINE) tablet 25 mg  25 mg Oral TID    insulin glargine (LANTUS) injection 50 Units  50 Units SubCUTAneous QHS    insulin lispro (HUMALOG) injection 15 Units  15 Units SubCUTAneous TID WITH MEALS    isosorbide dinitrate (ISORDIL) tablet 20 mg  20 mg Oral TID    nitroglycerin (NITROSTAT) tablet 0.4 mg  0.4 mg SubLINGual Q5MIN PRN    oxyCODONE IR (OXY-IR) immediate release tablet 15 mg  15 mg Oral Q4H PRN    polyethylene glycol (MIRALAX) packet 17 g  17 g Oral DAILY PRN    potassium chloride (K-DUR, KLOR-CON) tablet 10 mEq  10 mEq Oral BID    pravastatin (PRAVACHOL) tablet 80 mg  80 mg Oral QHS    famotidine (PEPCID) tablet 20 mg  20 mg Oral BID    spironolactone (ALDACTONE) tablet 25 mg  25 mg Oral DAILY    sodium chloride (NS) flush 5-10 mL  5-10 mL IntraVENous Q8H    sodium chloride (NS) flush 5-10 mL  5-10 mL IntraVENous PRN    morphine injection 2 mg  2 mg IntraVENous Q4H PRN     Data Review:   Recent Results (from the past 24 hour(s))   GLUCOSE, POC    Collection Time: 01/25/17  3:54 PM   Result Value Ref Range    Glucose (POC) 121 (H) 65 - 100 mg/dL   GLUCOSE, POC    Collection Time: 01/25/17  8:52 PM   Result Value Ref Range    Glucose (POC) 93 65 - 100 mg/dL   GLUCOSE, POC    Collection Time: 01/26/17  6:28 AM   Result Value Ref Range    Glucose (POC) 111 (H) 65 - 280 mg/dL   METABOLIC PANEL, BASIC    Collection Time: 01/26/17  7:30 AM   Result Value Ref Range    Sodium 136 136 - 145 mmol/L    Potassium 4.8 3.5 - 5.1 mmol/L    Chloride 98 98 - 107 mmol/L    CO2 30 21 - 32 mmol/L    Anion gap 8 7 - 16 mmol/L    Glucose 119 (H) 65 - 100 mg/dL    BUN 64 (H) 6 - 23 MG/DL    Creatinine 2.78 (H) 0.8 - 1.5 MG/DL    GFR est AA 31 (L) >60 ml/min/1.73m2    GFR est non-AA 26 (L) >60 ml/min/1.73m2    Calcium 8.7 8.3 - 10.4 MG/DL   BNP    Collection Time: 01/26/17  7:30 AM   Result Value Ref Range     pg/mL   CBC WITH AUTOMATED DIFF Collection Time: 01/26/17  7:30 AM   Result Value Ref Range    WBC 6.9 4.3 - 11.1 K/uL    RBC 4.18 (L) 4.23 - 5.67 M/uL    HGB 10.3 (L) 13.6 - 17.2 g/dL    HCT 32.5 (L) 41.1 - 50.3 %    MCV 77.8 (L) 79.6 - 97.8 FL    MCH 24.6 (L) 26.1 - 32.9 PG    MCHC 31.7 31.4 - 35.0 g/dL    RDW 18.7 (H) 11.9 - 14.6 %    PLATELET 196 925 - 322 K/uL    MPV 10.3 (L) 10.8 - 14.1 FL    DF AUTOMATED      NEUTROPHILS 55 43 - 78 %    LYMPHOCYTES 23 13 - 44 %    MONOCYTES 18 (H) 4.0 - 12.0 %    EOSINOPHILS 4 0.5 - 7.8 %    BASOPHILS 0 0.0 - 2.0 %    IMMATURE GRANULOCYTES 0.1 0.0 - 5.0 %    ABS. NEUTROPHILS 3.8 1.7 - 8.2 K/UL    ABS. LYMPHOCYTES 1.6 0.5 - 4.6 K/UL    ABS. MONOCYTES 1.3 0.1 - 1.3 K/UL    ABS. EOSINOPHILS 0.3 0.0 - 0.8 K/UL    ABS. BASOPHILS 0.0 0.0 - 0.2 K/UL    ABS. IMM. GRANS. 0.0 0.0 - 0.5 K/UL   GLUCOSE, POC    Collection Time: 01/26/17 10:41 AM   Result Value Ref Range    Glucose (POC) 182 (H) 65 - 100 mg/dL       EKG:  (EKG has been independently visualized by me with interpretation below)  Assessment:     Principal Problem:    Nonsustained ventricular tachycardia ([facility] (1/19/2017)    Active Problems:    Obstructive sleep apnea (2/15/2010)      Nonischemic dilated cardiomyopathy ([address] (7/2/2013)      Overview: LVEF 10-15% on ECHO from 12/17/10      CKD (chronic kidney disease) stage 3, GFR 30-59 ml/min (8/13/2014)      AICD (automatic cardioverter/defibrillator) present (10/21/2015)      Acute on chronic systolic (congestive) heart failure ([address] (10/18/2016)      At[facility] flutter ([facility] (10/20/2016)      Obesity hypoventilation syndrome ([facility] (10/24/2016)      Morbid obesity with BMI of 60.0-69.9, adult ([facility] (11/28/2016)      Syncope (1/19/2017)      NSVT (nonsustained ventricular tachycardia) ([facility] (1/19/2017)      Plan:   1. Nonsustained ventricular tachycardia, controlled: improved, continue amiodarone 400mg Q12H  2.  CKD (chronic kidney disease) stage 3, GFR 30-59 ml/min (8/13/2014) uncontrolled--monitoring closely, kidney function improved, plan to restart oral diuretics tomorrow and wean milrinone today  3. Acute on chronic systolic (congestive) heart failure -improved volume overload, most likely component of cor pulmonale as well with morbid obesity --KELSIE--will continue to wear BiPAP, legs wrapped  4. Atrial flutter --stable, on eliquis  5. Edema, uncontrolled - legs wrapped, oral diuretics tomorrow  6. AICD: functioning normally, no shocks  7. Obesity hypoventilation, KELSIE: BiPAP today as above    Tr Roque MD  Cardiology/Electrophysiology

## 2017-01-26 NOTE — PROGRESS NOTES
Bedside and Verbal shift change report given to Baldomero Vera RN (oncoming nurse) by self Ashley valencia). Report included the following information SBAR, Kardex, MAR and Recent Results.

## 2017-01-26 NOTE — PROGRESS NOTES
01/25/17 2343   Oxygen Therapy   O2 Sat (%) 98 %   O2 Device BIPAP   O2 Flow Rate (L/min) 4 l/min   FIO2 (%) 36 %

## 2017-01-26 NOTE — PROGRESS NOTES
Problem: Mobility Impaired (Adult and Pediatric)  Goal: *Acute Goals and Plan of Care (Insert Text)  LTG:  (1.)Mr. Pollard will move from supine to sit and sit to supine , scoot up and down and roll side to side with INDEPENDENT within 7 day(s) from flat surface without handrail. (2.)Mr. Pollard will transfer from bed to chair and chair to bed with INDEPENDENT using the least restrictive device within 7 day(s). (3.)Mr. Pollard will ambulate with INDEPENDENT for 150 feet with the least restrictive device within 7 day(s), O2 stats >90%. ___________________________________________________________________________________________      PHYSICAL THERAPY: Daily Note, Treatment Day: 1st and AM 1/26/2017  INPATIENT: Hospital Day: 8  Payor: CARE IMPROVEMENT PLUS [place] / Product Type: [employer] Medicare /      NAME/AGE/GENDER: Frederick Thibodeaux is a 45 y.o. male         PRIMARY DIAGNOSIS: NSVT (nonsustained ventricular tachycardia) ([facility] [address] Nonsustained ventricular tachycardia (HCC) Nonsustained ventricular tachycardia (HCC)        ICD-10: Treatment Diagnosis:       · Generalized Muscle Weakness (M62.81)  · Difficulty in walking, Not elsewhere classified (R26.2)   Precaution/Allergies:  Dilaudid [hydromorphone]; Iodinated contrast media - oral and iv dye; and Penicillins       ASSESSMENT:      Mr. Junior Hector presents with decreased bed mobility, transfers, ambulation, balance, activity tolerance, and overall general functional mobility s/p visit to ED on 1/19/17 with chest pain, SOB. Pt admitted with nonsustained ventricular tachycardia. Pt noted to have B LE edema. Pt sitting on EOB on contact. Agreed to treatment with encouragement. Denied pain. On 3L/min O2. Pt sat for an extended period with his eyes closed before he would stand but he eventually stood with CGA. Will probably need bariatric walker for ambulation. He stood ~ 5 min total and marched in place part of this time.  He returned to EOB and again rested an extended time with eyes closed. He performed bilateral LE ex. He became tearful at one time stating he needs to get his independence back. PT to follow for acute care needs to address deficits noted above. Pt functioning below baseline level of independence with ambulation for short distances. Pt may benefit from rehab or HHPT at discharge pending progress. Pt does state has 24 hour assist at home if needed. This section established at most recent assessment   PROBLEM LIST (Impairments causing functional limitations):  1. Decreased Strength  2. Decreased ADL/Functional Activities  3. Decreased Transfer Abilities  4. Decreased Ambulation Ability/Technique  5. Decreased Balance  6. Decreased Activity Tolerance  7. Increased Fatigue  8. Increased Shortness of Breath  9. Edema/Girth    INTERVENTIONS PLANNED: (Benefits and precautions of physical therapy have been discussed with the patient.)  1. Balance Exercise  2. Bed Mobility  3. Family Education  4. Gait Training  5. Home Exercise Program (HEP)  6. Therapeutic Activites  7. Therapeutic Exercise/Strengthening  8. Transfer Training  9. Group Therapy      TREATMENT PLAN: Frequency/Duration: 3 times a week for duration of hospital stay  Rehabilitation Potential For Stated Goals: GOOD      RECOMMENDED REHABILITATION/EQUIPMENT: (at time of discharge pending progress): Continue Skilled Therapy and HHPT or rehab pending progress. Candance Huger HISTORY:   History of Present Injury/Illness (Reason for Referral):  Vin Lee is a 45 y.o. male with known NICM with documented EF 10 % with [employer] single chamber ICD in place. He was just discharged on 1/13 with initial complaint of constipation and volume overload. He developed worsening renal failure necessitating hold his diuretics temporarily. He was discharged home on addition of demadex 100 mg daily and miralax for constipation.  Really since mid December he has not felt well with worsening orthopnea, increasing abdominal girth, increased wt and lower ext edema. Today while driving in car he felt presyncopal and was able to pull to side of rode. He did not loose consciousness. He had repeat episode of presyncope prior to coming to ER. While in ER he was noted to have nonsustained ventricular tachycardia. He is not on antiarrhythmia agent. Interrogation of his device revealed normal operation but his VT zone is set at 158 bpm with noted VT in ER at approximately 145 bpm.   Past Medical History/Comorbidities:   Mr. Freda Dent  has a past medical history of Acute systolic heart failure [facility]) (May, 2009); AICD (automatic cardioverter/defibrillator) present (10/21/2015); Atypical chest pain (4/23/2010); Bronchitis; CAD (coronary artery disease); Cardiomyopathy; Chest pain (10/21/2015); Chronic kidney disease; Chronic pain; Congestive heart failure (CHF) ([address] (10/21/2015); COPD; Diabetes ([address] Diabetes mellitus type II, uncontrolled ([address] (7/2/2013); GERD (gastroesophageal reflux disease); Gout; Heart failure ([address] Hypertension; Hyponatremia (12/20/2010); Ill-defined condition; Morbid obesity ([address] Nausea & vomiting (11/30/2015); Neuropathy; Obstructive sleep apnea (2/15/2010); Other unknown and unspecified cause of morbidity or mortality; Severe sepsis ([address] and Unspecified sleep apnea. He also has no past medical history of Adverse effect of anesthesia; Aneurysm ([address] Coagulation disorder ([address] DEMENTIA; Difficult intubation; Malignant hyperthermia due to anesthesia; Neurological disorder; Other ill-defined conditions(799.89); Pseudocholinesterase deficiency; or Psychiatric disorder. Mr. Freda Dent  has a past surgical history that includes pacemaker; heart catheterization ([place] 10, 2008); and chest surgery procedure unlisted.   Social History/Living Environment:   Home Environment:  (daughters house)  # Steps to Enter: 0  One/Two Story Residence: One story  Living Alone: No  Support Systems: Family member(s), Friends \ neighbors  Patient Expects to be Discharged to[de-identified] Private residence  Current DME Used/Available at Home: None  Tub or Shower Type: Tub/Shower combination  Prior Level of Function/Work/Activity:  Lives with daughter; typically independent with ambulation for short distances; drives. Personal Factors:          Sex:  male        Age:  45 y.o. Number of Personal Factors/Comorbidities that affect the Plan of Care:  CHF, DM, obesity, edema 3+: HIGH COMPLEXITY   EXAMINATION:   Most Recent Physical Functioning:   Gross Assessment:                  Posture:  Posture (WDL): Exceptions to WDL  Posture Assessment: Forward head, Rounded shoulders  Balance:  Sitting: Intact  Sitting - Static: Good (unsupported)  Sitting - Dynamic: Good (unsupported)  Standing: Impaired  Standing - Static: Fair  Standing - Dynamic : Fair Bed Mobility:     Wheelchair Mobility:     Transfers:  Sit to Stand: Contact guard assistance  Stand to Sit: Contact guard assistance  Gait:             Body Structures Involved:  1. Lungs  2. Joints  3. Muscles Body Functions Affected:  1. Respiratory  2. Movement Related Activities and Participation Affected:  1. General Tasks and Demands  2. Mobility  3. Self Care  4. Community, Social and [place]   Number of elements that affect the Plan of Care: 4+: HIGH COMPLEXITY   CLINICAL PRESENTATION:   Presentation: Stable and uncomplicated: LOW COMPLEXITY   CLINICAL DECISION MAKIN [facility] Inpatient Short Form  How much difficulty does the patient currently have. .. Unable A Lot A Little None   1. Turning over in bed (including adjusting bedclothes, sheets and blankets)? [ ] 1   [ ] 2   [X] 3   [ ] 4   2. Sitting down on and standing up from a chair with arms ( e.g., wheelchair, bedside commode, etc.)   [ ] 1   [ ] 2   [X] 3   [ ] 4   3. Moving from lying on back to sitting on the side of the bed?    [ ] 1   [ ] 2   [X] 3   [ ] 4   How much help from another person does the patient currently need. .. Total A Lot A Little None   4. Moving to and from a bed to a chair (including a wheelchair)? [ ] 1   [ ] 2   [X] 3   [ ] 4   5. Need to walk in hospital room? [ ] 1   [ ] 2   [X] 3   [ ] 4   6. Climbing 3-5 steps with a railing? [ ] 1   [ ] 2   [X] 3   [ ] 4   © 2007, Trustees of [address] [place] [address] [address] [id number], under license to [employer]. All rights reserved    Score:  Initial: 18 Most Recent: X (Date: -- )     Interpretation of Tool:  Represents activities that are increasingly more difficult (i.e. Bed mobility, Transfers, Gait). Score 24 23 22-20 19-15 14-10 9-7 6       Modifier CH CI CJ CK CL CM CN         · Mobility - Walking and Moving Around:               - CURRENT STATUS:    CK - 40%-59% impaired, limited or restricted               - GOAL STATUS:           CJ - 20%-39% impaired, limited or restricted               - D/C STATUS:                       ---------------To be determined---------------  Payor: CARE IMPROVEMENT PLUS [place] / Product Type: Managed Care Medicare /       Medical Necessity:     · Patient is expected to demonstrate progress in strength, range of motion, balance and coordination to decrease assistance required with overall functional mobility, transfers, ambulation. · Patient demonstrates good rehab potential due to higher previous functional level. Reason for Services/Other Comments:  · Patient continues to require present interventions due to patient's inability to perform bed mobility, transfers, ambulation all safely and effectively at prior level of function of independence.    Use of outcome tool(s) and clinical judgement create a POC that gives a: Clear prediction of patient's progress: LOW COMPLEXITY                 TREATMENT:   (In addition to Assessment/Re-Assessment sessions the following treatments were rendered)   Pre-treatment Symptoms/Complaints:  Fatigue, dizziness  Pain: Initial: 0     Post Session:  0      Therapeutic Activity: (    24 min): Therapeutic activities including sit to stand, standing balance, standing ex, LE ex in sitting to improve mobility, strength and balance. Required min   to promote dynamic balance in standing. Date:   Date:   Date:     Activity/Exercise Parameters Parameters Parameters   Standing marching in place Multiple x bilaterally     Seated AP's X 10 B     Seated LAQ's X 10 B     Seated hip ABD/ADD X 10 B partial range d/t edema and adipose tissue. Treatment/Session Assessment:    · Response to Treatment:  Fatigue, no pain, O2 at 3 L/min  · Interdisciplinary Collaboration:  · Physical Therapy Assistant and Registered Nurse  · After treatment position/precautions:  · Bed/Chair-wheels locked, Bed in low position, Call light within reach, RN notified, Family at bedside and sitting EOB  · Compliance with Program/Exercises: Will assess as treatment progresses. · Recommendations/Intent for next treatment session: \"Next visit will focus on advancements to more challenging activities\".   Total Treatment Duration:  PT Patient Time In/Time Out  Time In: 1010  Time Out: 200 [facility]

## 2017-01-26 NOTE — PROGRESS NOTES
Verbal order received from Dr. Rupert Harris to stop patient's milrinone. Patient became very agitated and angry stating that he did \"not want the medicine turned off. It's the only thing keeping me alive\". Dr. Rupert Harris notified of patient's agitation, order received to continue milrinone today and it will be reassessed tomorrow.

## 2017-01-26 NOTE — PROGRESS NOTES
Bedside and Verbal shift change report given to self (oncoming nurse) by Ramila Galeano RN (offgoing nurse). Report included the following information SBAR, Kardex, MAR and Recent Results.

## 2017-01-26 NOTE — PROGRESS NOTES
Hourly BP and HR stable throughout shift while on Primacor gtt. Printed and placed on bedside chart.

## 2017-01-27 LAB
ANION GAP BLD CALC-SCNC: 7 MMOL/L (ref 7–16)
BUN SERPL-MCNC: 65 MG/DL (ref 6–23)
CALCIUM SERPL-MCNC: 9 MG/DL (ref 8.3–10.4)
CHLORIDE SERPL-SCNC: 100 MMOL/L (ref 98–107)
CO2 SERPL-SCNC: 29 MMOL/L (ref 21–32)
CREAT SERPL-MCNC: 2.38 MG/DL (ref 0.8–1.5)
GLUCOSE BLD STRIP.AUTO-MCNC: 149 MG/DL (ref 65–100)
GLUCOSE BLD STRIP.AUTO-MCNC: 153 MG/DL (ref 65–100)
GLUCOSE BLD STRIP.AUTO-MCNC: 77 MG/DL (ref 65–100)
GLUCOSE BLD STRIP.AUTO-MCNC: 95 MG/DL (ref 65–100)
GLUCOSE SERPL-MCNC: 87 MG/DL (ref 65–100)
MAGNESIUM SERPL-MCNC: 3 MG/DL (ref 1.8–2.4)
POTASSIUM SERPL-SCNC: 5 MMOL/L (ref 3.5–5.1)
SODIUM SERPL-SCNC: 136 MMOL/L (ref 136–145)

## 2017-01-27 PROCEDURE — 74011636637 HC RX REV CODE- 636/637: Performed by: NURSE PRACTITIONER

## 2017-01-27 PROCEDURE — 94760 N-INVAS EAR/PLS OXIMETRY 1: CPT

## 2017-01-27 PROCEDURE — 36591 DRAW BLOOD OFF VENOUS DEVICE: CPT

## 2017-01-27 PROCEDURE — 77010033678 HC OXYGEN DAILY

## 2017-01-27 PROCEDURE — 97530 THERAPEUTIC ACTIVITIES: CPT

## 2017-01-27 PROCEDURE — 3331090001 HH PPS REVENUE CREDIT

## 2017-01-27 PROCEDURE — C1751 CATH, INF, PER/CENT/MIDLINE: HCPCS

## 2017-01-27 PROCEDURE — 82962 GLUCOSE BLOOD TEST: CPT

## 2017-01-27 PROCEDURE — 74011250637 HC RX REV CODE- 250/637: Performed by: NURSE PRACTITIONER

## 2017-01-27 PROCEDURE — 74011250637 HC RX REV CODE- 250/637: Performed by: INTERNAL MEDICINE

## 2017-01-27 PROCEDURE — 3331090002 HH PPS REVENUE DEBIT

## 2017-01-27 PROCEDURE — 65660000000 HC RM CCU STEPDOWN

## 2017-01-27 PROCEDURE — 74011250636 HC RX REV CODE- 250/636: Performed by: INTERNAL MEDICINE

## 2017-01-27 PROCEDURE — 80048 BASIC METABOLIC PNL TOTAL CA: CPT | Performed by: INTERNAL MEDICINE

## 2017-01-27 PROCEDURE — 83735 ASSAY OF MAGNESIUM: CPT | Performed by: INTERNAL MEDICINE

## 2017-01-27 RX ORDER — TORSEMIDE 100 MG/1
100 TABLET ORAL DAILY
Status: DISCONTINUED | OUTPATIENT
Start: 2017-01-27 | End: 2017-01-31

## 2017-01-27 RX ADMIN — FAMOTIDINE 20 MG: 20 TABLET ORAL at 08:27

## 2017-01-27 RX ADMIN — Medication 10 ML: at 14:00

## 2017-01-27 RX ADMIN — HYDROCORTISONE: 5 CREAM TOPICAL at 01:36

## 2017-01-27 RX ADMIN — Medication 10 ML: at 22:00

## 2017-01-27 RX ADMIN — INSULIN LISPRO 15 UNITS: 100 INJECTION, SOLUTION INTRAVENOUS; SUBCUTANEOUS at 17:36

## 2017-01-27 RX ADMIN — HYDRALAZINE HYDROCHLORIDE 25 MG: 25 TABLET, FILM COATED ORAL at 15:51

## 2017-01-27 RX ADMIN — POTASSIUM CHLORIDE 10 MEQ: 10 TABLET, EXTENDED RELEASE ORAL at 17:36

## 2017-01-27 RX ADMIN — TORSEMIDE 100 MG: 100 TABLET ORAL at 08:27

## 2017-01-27 RX ADMIN — ISOSORBIDE DINITRATE 20 MG: 20 TABLET ORAL at 21:34

## 2017-01-27 RX ADMIN — ISOSORBIDE DINITRATE 20 MG: 20 TABLET ORAL at 15:51

## 2017-01-27 RX ADMIN — FAMOTIDINE 20 MG: 20 TABLET ORAL at 17:36

## 2017-01-27 RX ADMIN — GABAPENTIN 600 MG: 300 CAPSULE ORAL at 05:32

## 2017-01-27 RX ADMIN — OXYCODONE HYDROCHLORIDE 15 MG: 15 TABLET ORAL at 21:39

## 2017-01-27 RX ADMIN — Medication 10 ML: at 05:34

## 2017-01-27 RX ADMIN — HYDROCORTISONE: 5 CREAM TOPICAL at 08:30

## 2017-01-27 RX ADMIN — HYDRALAZINE HYDROCHLORIDE 25 MG: 25 TABLET, FILM COATED ORAL at 21:34

## 2017-01-27 RX ADMIN — POTASSIUM CHLORIDE 10 MEQ: 10 TABLET, EXTENDED RELEASE ORAL at 08:27

## 2017-01-27 RX ADMIN — ALLOPURINOL 100 MG: 100 TABLET ORAL at 08:27

## 2017-01-27 RX ADMIN — PRAVASTATIN SODIUM 80 MG: 80 TABLET ORAL at 21:34

## 2017-01-27 RX ADMIN — COLCHICINE 0.6 MG: 0.6 TABLET, FILM COATED ORAL at 08:26

## 2017-01-27 RX ADMIN — INSULIN LISPRO 15 UNITS: 100 INJECTION, SOLUTION INTRAVENOUS; SUBCUTANEOUS at 12:23

## 2017-01-27 RX ADMIN — HYDRALAZINE HYDROCHLORIDE 25 MG: 25 TABLET, FILM COATED ORAL at 05:38

## 2017-01-27 RX ADMIN — OXYCODONE HYDROCHLORIDE 15 MG: 15 TABLET ORAL at 09:35

## 2017-01-27 RX ADMIN — MILRINONE LACTATE 0.5 MCG/KG/MIN: 200 INJECTION, SOLUTION INTRAVENOUS at 03:22

## 2017-01-27 RX ADMIN — ISOSORBIDE DINITRATE 20 MG: 20 TABLET ORAL at 05:38

## 2017-01-27 RX ADMIN — GABAPENTIN 600 MG: 300 CAPSULE ORAL at 15:51

## 2017-01-27 RX ADMIN — GABAPENTIN 600 MG: 300 CAPSULE ORAL at 21:34

## 2017-01-27 RX ADMIN — SPIRONOLACTONE 25 MG: 25 TABLET, FILM COATED ORAL at 08:27

## 2017-01-27 RX ADMIN — APIXABAN 5 MG: 5 TABLET, FILM COATED ORAL at 21:34

## 2017-01-27 RX ADMIN — DOCUSATE SODIUM 100 MG: 100 CAPSULE, LIQUID FILLED ORAL at 17:36

## 2017-01-27 RX ADMIN — DOCUSATE SODIUM 100 MG: 100 CAPSULE, LIQUID FILLED ORAL at 08:27

## 2017-01-27 RX ADMIN — AMIODARONE HYDROCHLORIDE 200 MG: 200 TABLET ORAL at 08:27

## 2017-01-27 RX ADMIN — APIXABAN 5 MG: 5 TABLET, FILM COATED ORAL at 08:27

## 2017-01-27 RX ADMIN — CARVEDILOL 25 MG: 25 TABLET, FILM COATED ORAL at 17:36

## 2017-01-27 RX ADMIN — POLYETHYLENE GLYCOL 3350 34 G: 17 POWDER, FOR SOLUTION ORAL at 08:31

## 2017-01-27 RX ADMIN — CARVEDILOL 25 MG: 25 TABLET, FILM COATED ORAL at 08:27

## 2017-01-27 NOTE — PROGRESS NOTES
[facility] CARDIOLOGY PROGRESS NOTE           1/27/2017 12:09 PM    Admit Date: 1/19/2017    Admit Diagnosis: NSVT (nonsustained ventricular tachycardia) ([facility])      Subjective:   No complaints this AM, no chest pain or shortness of breath, concerned about stopping milrinone    Interval History: (History of pertinent interval events obtained from nursing staff)  No events overnight    ROS:  GEN:  No fever or chills  Cardiovascular:  As noted above  Pulmonary:  As noted above  Neuro:  No new focal motor or sensory loss      Objective:     Vitals:    01/27/17 0535 01/27/17 0538 01/27/17 0825 01/27/17 1001   BP: 104/58 108/56 102/41    Pulse: 78 77 77    Resp: 19 19    Temp: 97.4 °F (36.3 °C)  97.8 °F (36.6 °C)    SpO2: 96%  92% 99%   Weight: (!) 193 kg (425 lb 8 oz)      Height:           Physical Exam:  General-morbidly obese, NAD, BiPAP in place  Neck- supple, no JVD  CV- regular rate and rhythm, very distant S1, S2, PMI not palpable, no audible murmur  Lung- clear bilaterally but very distant  Abd- soft, nontender, nondistended  Ext- 2+ OSMAN  Skin- warm and dry    Current Facility-Administered Medications   Medication Dose Route Frequency    torsemide (DEMADEX) tablet 100 mg  100 mg Oral DAILY    amiodarone (CORDARONE) tablet 200 mg  200 mg Oral DAILY    polyethylene glycol (MIRALAX) packet 34 g  34 g Oral DAILY    milrinone (PRIMACOR) 20 MG/100 ML D5W infusion  0.5 mcg/kg/min (Order-Specific) IntraVENous CONTINUOUS    hydrocortisone (CORTAID) 0.5 % cream   Topical BID    allopurinol (ZYLOPRIM) tablet 100 mg  100 mg Oral DAILY    apixaban (ELIQUIS) tablet 5 mg  5 mg Oral Q12H    bisacodyl (DULCOLAX) suppository 10 mg  10 mg Rectal DAILY    carvedilol (COREG) tablet 25 mg  25 mg Oral BID WITH MEALS    colchicine tablet 0.6 mg  0.6 mg Oral DAILY    docusate sodium (COLACE) capsule 100 mg  100 mg Oral BID    gabapentin (NEURONTIN) capsule 600 mg  600 mg Oral TID    hydrALAZINE (APRESOLINE) tablet 25 mg  25 mg Oral TID    insulin glargine (LANTUS) injection 50 Units  50 Units SubCUTAneous QHS    insulin lispro (HUMALOG) injection 15 Units  15 Units SubCUTAneous TID WITH MEALS    isosorbide dinitrate (ISORDIL) tablet 20 mg  20 mg Oral TID    nitroglycerin (NITROSTAT) tablet 0.4 mg  0.4 mg SubLINGual Q5MIN PRN    oxyCODONE IR (OXY-IR) immediate release tablet 15 mg  15 mg Oral Q4H PRN    polyethylene glycol (MIRALAX) packet 17 g  17 g Oral DAILY PRN    potassium chloride (K-DUR, KLOR-CON) tablet 10 mEq  10 mEq Oral BID    pravastatin (PRAVACHOL) tablet 80 mg  80 mg Oral QHS    famotidine (PEPCID) tablet 20 mg  20 mg Oral BID    spironolactone (ALDACTONE) tablet 25 mg  25 mg Oral DAILY    sodium chloride (NS) flush 5-10 mL  5-10 mL IntraVENous Q8H    sodium chloride (NS) flush 5-10 mL  5-10 mL IntraVENous PRN    morphine injection 2 mg  2 mg IntraVENous Q4H PRN     Data Review:   Recent Results (from the past 24 hour(s))   GLUCOSE, POC    Collection Time: 01/26/17  4:17 PM   Result Value Ref Range    Glucose (POC) 162 (H) 65 - 100 mg/dL   GLUCOSE, POC    Collection Time: 01/26/17  9:40 PM   Result Value Ref Range    Glucose (POC) 137 (H) 65 - 715 mg/dL   METABOLIC PANEL, BASIC    Collection Time: 01/27/17  6:00 AM   Result Value Ref Range    Sodium 136 136 - 145 mmol/L    Potassium 5.0 3.5 - 5.1 mmol/L    Chloride 100 98 - 107 mmol/L    CO2 29 21 - 32 mmol/L    Anion gap 7 7 - 16 mmol/L    Glucose 87 65 - 100 mg/dL    BUN 65 (H) 6 - 23 MG/DL    Creatinine 2.38 (H) 0.8 - 1.5 MG/DL    GFR est AA 37 (L) >60 ml/min/1.73m2    GFR est non-AA 31 (L) >60 ml/min/1.73m2    Calcium 9.0 8.3 - 10.4 MG/DL   MAGNESIUM    Collection Time: 01/27/17  6:00 AM   Result Value Ref Range    Magnesium 3.0 (H) 1.8 - 2.4 mg/dL   GLUCOSE, POC    Collection Time: 01/27/17  6:20 AM   Result Value Ref Range    Glucose (POC) 95 65 - 100 mg/dL   GLUCOSE, POC    Collection Time: 01/27/17 11:22 AM   Result Value Ref Range    Glucose (POC) 149 (H) 65 - 100 mg/dL       EKG:  (EKG has been independently visualized by me with interpretation below)  Assessment:     Principal Problem:    Nonsustained ventricular tachycardia (HCC) (1/19/2017)    Active Problems:    Obstructive sleep apnea (2/15/2010)      Nonischemic dilated cardiomyopathy ([facility] [address] (7/2/2013)      Overview: LVEF 10-15% on ECHO from 12/17/10      CKD (chronic kidney disease) stage 3, GFR 30-59 ml/min (8/13/2014)      AICD (automatic cardioverter/defibrillator) present (10/21/2015)      Acute on chronic systolic (congestive) heart failure ([facility] [address] (10/18/2016)      Atrial flutter ([facility] [address] (10/20/2016)      Obesity hypoventilation syndrome ([facility] [address] (10/24/2016)      Morbid obesity with BMI of 60.0-69.9, adult ([facility] [address] (11/28/2016)      Syncope (1/19/2017)      NSVT (nonsustained ventricular tachycardia) ([facility] [address] (1/19/2017)      Plan:   1. Nonsustained ventricular tachycardia, controlled: improved, continue amiodarone 400mg Q12H  2. CKD (chronic kidney disease) stage 3, GFR 30-59 ml/min (8/13/2014): improving, restarted home dose of demadex today, cont strict I and Os. 3. Acute on chronic systolic (congestive) heart failure -improved volume overload, most likely component of cor pulmonale as well with morbid obesity --KELSIE--will continue to wear BiPAP, legs wrapped, restarted home diuretics, will plan to stop milrinone and monitor response today. 4. Atrial flutter --stable, on eliquis  5. Edema, uncontrolled - legs wrapped, home diuretic  6. AICD: functioning normally, no shocks  7. Obesity hypoventilation, KELSIE: BiPAP     Babatunde Roque MD  Cardiology/Electrophysiology

## 2017-01-27 NOTE — PROGRESS NOTES
Bedside and Verbal shift change report given to Cara Powers Dr (oncoming nurse) by self Edgard valencia). Report included the following information SBAR, Kardex, MAR and Recent Results.

## 2017-01-27 NOTE — PROGRESS NOTES
Bedside shift change report received from RN's Lanette SOOD And Cortez Rivas. Report included the following information SBAR, Kardex, MAR and Recent Results. Primacor gtt verified.

## 2017-01-27 NOTE — PROGRESS NOTES
Patient on Primacor gtt at 0.5mcg/kg/min throughout shift. Hourly BP and HR printed and placed on bedside chart.

## 2017-01-27 NOTE — PROGRESS NOTES
Pt complaining of itching back. Daughter at bedside scratching and massaging back. Small abrasion in center of back noted possibly from being scratched. Not currently bleeding. Hydrocortisone cream applied to back.

## 2017-01-27 NOTE — PROGRESS NOTES
Problem: Nutrition Deficit  Goal: *Optimize nutritional status  Nutrition LOS Note: day 8  Assessment  Diet order(s): [facility]  Food,Nutrition, and Pertinent History: Patient with h/o CKD, A/CHF, DM. Patient has received numerous diet educations with RDs in 2014, October of 2016 and recently on 1/13/17. Patient reports that he is \"just going to eat salads from now on as that is the only thing he is allowed to eat. \"  RD reinforced how salads can be made very \"unhealthy\" and certain toppings can provide additional fat and sodium. RD encouraged lean protein, vegetables, fruits, and low sodium upon discharge. Patient reports to RD that he has had weight gain related to fluid retention, no due to eating more. Labs are remarkable for K 5, Cr 2.38, GFR 37, POC glucose  mg/dl  Anthropometrics: Height: 5' 6\" (167.6 cm), Weight Source: Standing scale (comment), Weight: (!) 193 kg (425 lb 8 oz), Body mass index is 68.68 kg/(m^2). BMI class of morbid obesity class III. RN notes patient with 2-3+ pitting edema to lower extremities. Patient continues to receive 100 mg demadex daily. Macronutrient Needs:  · EER:  8832-9605 kcal /day (20-25 kcal/kg I BW)  · EPR:  58-73 grams protein/day (0.8-1 grams/kg IBW)  Intake/Comparative Standards: Per RD meal rounds: 100% of lunch. Average intake for past 8 day(s)/11 recorded meal(s): 100%. This potentially meets ~100% of kcal and ~100% of protein needs     Nutrition Diagnosis: Limited adherence to nutrition related recommendations r/t cardiac, [facility] diet, as evidenced by patient     Intervention: Meals and snacks:  Add cardiac diet restriction to current [facility] diet restrition     Megan Dahl [address], [address], [phone]

## 2017-01-27 NOTE — PROGRESS NOTES
Problem: Mobility Impaired (Adult and Pediatric)  Goal: *Acute Goals and Plan of Care (Insert Text)  LTG:  (1.)Mr. Pollard will move from supine to sit and sit to supine , scoot up and down and roll side to side with INDEPENDENT within 7 day(s) from flat surface without handrail. (2.)Mr. Pollard will transfer from bed to chair and chair to bed with INDEPENDENT using the least restrictive device within 7 day(s). (3.)Mr. Pollard will ambulate with INDEPENDENT for 150 feet with the least restrictive device within 7 day(s), O2 stats >90%. ___________________________________________________________________________________________      PHYSICAL THERAPY: Daily Note, Treatment Day: 2nd and PM 1/27/2017  INPATIENT: Hospital Day: 9  Payor: CARE IMPROVEMENT PLUS / [place] / Product Type: [employer] Medicare /      NAME/AGE/GENDER: Josiane Marquez is a 45 y.o. male         PRIMARY DIAGNOSIS: NSVT (nonsustained ventricular tachycardia) ([facility] [address] Nonsustained ventricular tachycardia (HCC) Nonsustained ventricular tachycardia (HCC)        ICD-10: Treatment Diagnosis:       · Generalized Muscle Weakness (M62.81)  · Difficulty in walking, Not elsewhere classified (R26.2)   Precaution/Allergies:  Dilaudid [hydromorphone]; Iodinated contrast media - oral and iv dye; and Penicillins       ASSESSMENT:      Mr. Joy Hankins presents with decreased bed mobility, transfers, ambulation, balance, activity tolerance, and overall general functional mobility s/p visit to ED on 1/19/17 with chest pain, SOB. Pt admitted with nonsustained ventricular tachycardia. Pt noted to have B LE edema. Refused AM treatment but OK'ed PM treatment with encouragement. Pt sitting on EOB on contact. Denied pain. On 3L/min O2. Pt sat for an extended period with his eyes closed before he would stand but he eventually stood with CGA. He stood ~ 5 min total and marched in place part of this time.  He returned to EOB and again rested an extended time with eyes closed. He performed bilateral LE ex. Refused to stand a second time. Sat an extended time again with eyes closed and returned supine with mod assist x 2. PT to follow for acute care needs to address deficits noted above. Pt functioning below baseline level of independence with ambulation for short distances. Pt may benefit from rehab or HHPT at discharge pending progress. Pt does state has 24 hour assist at home if needed. This section established at most recent assessment   PROBLEM LIST (Impairments causing functional limitations):  1. Decreased Strength  2. Decreased ADL/Functional Activities  3. Decreased Transfer Abilities  4. Decreased Ambulation Ability/Technique  5. Decreased Balance  6. Decreased Activity Tolerance  7. Increased Fatigue  8. Increased Shortness of Breath  9. Edema/Girth    INTERVENTIONS PLANNED: (Benefits and precautions of physical therapy have been discussed with the patient.)  1. Balance Exercise  2. Bed Mobility  3. Family Education  4. Gait Training  5. Home Exercise Program (HEP)  6. Therapeutic Activites  7. Therapeutic Exercise/Strengthening  8. Transfer Training  9. Group Therapy      TREATMENT PLAN: Frequency/Duration: 3 times a week for duration of hospital stay  Rehabilitation Potential For Stated Goals: GOOD      RECOMMENDED REHABILITATION/EQUIPMENT: (at time of discharge pending progress): Continue Skilled Therapy and HHPT or rehab pending progress. Leslie Beasley HISTORY:   History of Present Injury/Illness (Reason for Referral):  Josiane Marquez is a 45 y.o. male with known NICM with documented EF 10 % with [employer] single chamber ICD in place. He was just discharged on 1/13 with initial complaint of constipation and volume overload. He developed worsening renal failure necessitating hold his diuretics temporarily. He was discharged home on addition of demadex 100 mg daily and miralax for constipation.  Really since mid December he has not felt well with worsening orthopnea, increasing abdominal girth, increased wt and lower ext edema. Today while driving in car he felt presyncopal and was able to pull to side of rode. He did not loose consciousness. He had repeat episode of presyncope prior to coming to ER. While in ER he was noted to have nonsustained ventricular tachycardia. He is not on antiarrhythmia agent. Interrogation of his device revealed normal operation but his VT zone is set at 158 bpm with noted VT in ER at approximately 145 bpm.   Past Medical History/Comorbidities:   Mr. Willy Ballesteros  has a past medical history of Acute systolic heart failure [facility]) (May, 2009); AICD (automatic cardioverter/defibrillator) present (10/21/2015); Atypical chest pain (4/23/2010); Bronchitis; CAD (coronary artery disease); Cardiomyopathy; Chest pain (10/21/2015); Chronic kidney disease; Chronic pain; Congestive heart failure (CHF) ([address] (10/21/2015); COPD; Diabetes ([address] Diabetes mellitus type II, uncontrolled ([address] (7/2/2013); GERD (gastroesophageal reflux disease); Gout; Heart failure ([address] Hypertension; Hyponatremia (12/20/2010); Ill-defined condition; Morbid obesity ([address] Nausea & vomiting (11/30/2015); Neuropathy; Obstructive sleep apnea (2/15/2010); Other unknown and unspecified cause of morbidity or mortality; Severe sepsis ([address] and Unspecified sleep apnea. He also has no past medical history of Adverse effect of anesthesia; Aneurysm ([address] Coagulation disorder ([address] DEMENTIA; Difficult intubation; Malignant hyperthermia due to anesthesia; Neurological disorder; Other ill-defined conditions(799.89); Pseudocholinesterase deficiency; or Psychiatric disorder. Mr. Willy Ballesteros  has a past surgical history that includes pacemaker; heart catheterization ([place] 10, 2008); and chest surgery procedure unlisted.   Social History/Living Environment:   Home Environment:  ([facility] house)  # Steps to Enter: 0  One/Two Story Residence: One story  Living Alone: No  Support Systems: Family member(s), Friends \ neighbors  Patient Expects to be Discharged to[de-identified] Private residence  Current DME Used/Available at Home: None  Tub or Shower Type: Tub/Shower combination  Prior Level of Function/Work/Activity:  Lives with daughter; typically independent with ambulation for short distances; drives. Personal Factors:          Sex:  male        Age:  45 y.o. Number of Personal Factors/Comorbidities that affect the Plan of Care:  CHF, DM, obesity, edema 3+: HIGH COMPLEXITY   EXAMINATION:   Most Recent Physical Functioning:   Gross Assessment:                  Posture:  Posture (WDL): Exceptions to WDL  Posture Assessment: Forward head, Rounded shoulders  Balance:  Sitting: Intact  Standing: Impaired  Standing - Static: Fair (+)  Standing - Dynamic : Fair Bed Mobility:  Sit to Supine: Moderate assistance;Assist x2  Wheelchair Mobility:     Transfers:  Sit to Stand: Contact guard assistance  Stand to Sit: Contact guard assistance  Gait:             Body Structures Involved:  1. Lungs  2. Joints  3. Muscles Body Functions Affected:  1. Respiratory  2. Movement Related Activities and Participation Affected:  1. General Tasks and Demands  2. Mobility  3. Self Care  4. Community, Social and [place]   Number of elements that affect the Plan of Care: 4+: HIGH COMPLEXITY   CLINICAL PRESENTATION:   Presentation: Stable and uncomplicated: LOW COMPLEXITY   CLINICAL DECISION MAKIN [facility] Mobility Inpatient Short Form  How much difficulty does the patient currently have. .. Unable A Lot A Little None   1. Turning over in bed (including adjusting bedclothes, sheets and blankets)? [ ] 1   [ ] 2   [X] 3   [ ] 4   2. Sitting down on and standing up from a chair with arms ( e.g., wheelchair, bedside commode, etc.)   [ ] 1   [ ] 2   [X] 3   [ ] 4   3. Moving from lying on back to sitting on the side of the bed?    [ ] 1   [ ] 2   [X] 3   [ ] 4   How much help from another person does the patient currently need. .. Total A Lot A Little None   4. Moving to and from a bed to a chair (including a wheelchair)? [ ] 1   [ ] 2   [X] 3   [ ] 4   5. Need to walk in hospital room? [ ] 1   [ ] 2   [X] 3   [ ] 4   6. Climbing 3-5 steps with a railing? [ ] 1   [ ] 2   [X] 3   [ ] 4   © 2007, Trustees of [address] [place] [address] [address] [id number], under license to [employer]. All rights reserved    Score:  Initial: 18 Most Recent: X (Date: -- )     Interpretation of Tool:  Represents activities that are increasingly more difficult (i.e. Bed mobility, Transfers, Gait). Score 24 23 22-20 19-15 14-10 9-7 6       Modifier CH CI CJ CK CL CM CN         · Mobility - Walking and Moving Around:               - CURRENT STATUS:    CK - 40%-59% impaired, limited or restricted               - GOAL STATUS:           CJ - 20%-39% impaired, limited or restricted               - D/C STATUS:                       ---------------To be determined---------------  Payor: CARE IMPROVEMENT PLUS [place] / Product Type: Managed Care Medicare /       Medical Necessity:     · Patient is expected to demonstrate progress in strength, range of motion, balance and coordination to decrease assistance required with overall functional mobility, transfers, ambulation. · Patient demonstrates good rehab potential due to higher previous functional level. Reason for Services/Other Comments:  · Patient continues to require present interventions due to patient's inability to perform bed mobility, transfers, ambulation all safely and effectively at prior level of function of independence.    Use of outcome tool(s) and clinical judgement create a POC that gives a: Clear prediction of patient's progress: LOW COMPLEXITY                 TREATMENT:   (In addition to Assessment/Re-Assessment sessions the following treatments were rendered)   Pre-treatment Symptoms/Complaints:  Fatigue, dizziness  Pain: Initial: Would not rate but stated he was in pain and that he has had pain med. Post Session:  As prior to treatment. Therapeutic Activity: (    23 min): Therapeutic activities including sit to stand, standing balance, standing ex, LE ex in sitting to improve mobility, strength and balance. Required min   to promote dynamic balance in standing. Date:  1/26/17 Date:  1/27/17 Date:     Activity/Exercise Parameters Parameters Parameters   Standing marching in place Multiple x bilaterally Multiple x B    Seated AP's X 10 B X 10 B    Seated LAQ's X 10 B X 10 B    Seated hip ABD/ADD X 10 B partial range d/t edema and adipose tissue. Treatment/Session Assessment:    · Response to Treatment:  Fatigue, O2 at 3 L/min  · Interdisciplinary Collaboration:  · Physical Therapy Assistant and Registered Nurse  · After treatment position/precautions:  · Bed/Chair-wheels locked, Bed in low position, Call light within reach, RN notified, Family at bedside and sitting EOB  · Compliance with Program/Exercises: Will assess as treatment progresses. · Recommendations/Intent for next treatment session: \"Next visit will focus on advancements to more challenging activities\".   Total Treatment Duration:  PT Patient Time In/Time Out  Time In: 1410  Time Out: Jimmy Anuel 56 Jackelyn, PTA

## 2017-01-27 NOTE — PROGRESS NOTES
Bedside and Verbal shift change report given to self (oncoming nurse) by Bora Gloria RN (offgoing nurse). Report included the following information SBAR, Kardex, MAR and Recent Results.

## 2017-01-27 NOTE — PROGRESS NOTES
01/27/17 1001   Oxygen Therapy   O2 Sat (%) 99 %   Pulse via Oximetry 79 beats per minute   O2 Device Nasal cannula   O2 Flow Rate (L/min) 4 l/min  (o2 decreased to 3l nc)

## 2017-01-28 LAB
ALBUMIN SERPL BCP-MCNC: 3 G/DL (ref 3.5–5)
ALBUMIN/GLOB SERPL: 0.7 {RATIO} (ref 1.2–3.5)
ALP SERPL-CCNC: 217 U/L (ref 50–136)
ALT SERPL-CCNC: 218 U/L (ref 12–65)
ANION GAP BLD CALC-SCNC: 6 MMOL/L (ref 7–16)
AST SERPL W P-5'-P-CCNC: 45 U/L (ref 15–37)
BILIRUB DIRECT SERPL-MCNC: 0.8 MG/DL
BILIRUB SERPL-MCNC: 1.5 MG/DL (ref 0.2–1.1)
BUN SERPL-MCNC: 65 MG/DL (ref 6–23)
CALCIUM SERPL-MCNC: 8.5 MG/DL (ref 8.3–10.4)
CHLORIDE SERPL-SCNC: 100 MMOL/L (ref 98–107)
CO2 SERPL-SCNC: 31 MMOL/L (ref 21–32)
CREAT SERPL-MCNC: 2.45 MG/DL (ref 0.8–1.5)
GLOBULIN SER CALC-MCNC: 4.3 G/DL (ref 2.3–3.5)
GLUCOSE BLD STRIP.AUTO-MCNC: 142 MG/DL (ref 65–100)
GLUCOSE BLD STRIP.AUTO-MCNC: 145 MG/DL (ref 65–100)
GLUCOSE BLD STRIP.AUTO-MCNC: 147 MG/DL (ref 65–100)
GLUCOSE BLD STRIP.AUTO-MCNC: 163 MG/DL (ref 65–100)
GLUCOSE BLD STRIP.AUTO-MCNC: 175 MG/DL (ref 65–100)
GLUCOSE SERPL-MCNC: 106 MG/DL (ref 65–100)
POTASSIUM SERPL-SCNC: 5.3 MMOL/L (ref 3.5–5.1)
PROT SERPL-MCNC: 7.3 G/DL (ref 6.3–8.2)
SODIUM SERPL-SCNC: 137 MMOL/L (ref 136–145)
T4 FREE SERPL-MCNC: 1.2 NG/DL (ref 0.78–1.46)
TSH SERPL DL<=0.005 MIU/L-ACNC: 5.48 UIU/ML (ref 0.36–3.74)

## 2017-01-28 PROCEDURE — 80048 BASIC METABOLIC PNL TOTAL CA: CPT | Performed by: INTERNAL MEDICINE

## 2017-01-28 PROCEDURE — 65660000000 HC RM CCU STEPDOWN

## 2017-01-28 PROCEDURE — 84439 ASSAY OF FREE THYROXINE: CPT | Performed by: INTERNAL MEDICINE

## 2017-01-28 PROCEDURE — 3331090002 HH PPS REVENUE DEBIT

## 2017-01-28 PROCEDURE — 3331090001 HH PPS REVENUE CREDIT

## 2017-01-28 PROCEDURE — 74011636637 HC RX REV CODE- 636/637: Performed by: NURSE PRACTITIONER

## 2017-01-28 PROCEDURE — 80076 HEPATIC FUNCTION PANEL: CPT | Performed by: INTERNAL MEDICINE

## 2017-01-28 PROCEDURE — 74011250637 HC RX REV CODE- 250/637: Performed by: INTERNAL MEDICINE

## 2017-01-28 PROCEDURE — 82962 GLUCOSE BLOOD TEST: CPT

## 2017-01-28 PROCEDURE — 94760 N-INVAS EAR/PLS OXIMETRY 1: CPT

## 2017-01-28 PROCEDURE — 84443 ASSAY THYROID STIM HORMONE: CPT | Performed by: INTERNAL MEDICINE

## 2017-01-28 PROCEDURE — 74011250636 HC RX REV CODE- 250/636: Performed by: INTERNAL MEDICINE

## 2017-01-28 PROCEDURE — 77010033678 HC OXYGEN DAILY

## 2017-01-28 PROCEDURE — 74011250637 HC RX REV CODE- 250/637: Performed by: NURSE PRACTITIONER

## 2017-01-28 PROCEDURE — 36591 DRAW BLOOD OFF VENOUS DEVICE: CPT

## 2017-01-28 RX ORDER — MILRINONE LACTATE 0.2 MG/ML
0.5 INJECTION, SOLUTION INTRAVENOUS CONTINUOUS
Status: DISCONTINUED | OUTPATIENT
Start: 2017-01-28 | End: 2017-02-03

## 2017-01-28 RX ADMIN — GABAPENTIN 600 MG: 300 CAPSULE ORAL at 13:44

## 2017-01-28 RX ADMIN — APIXABAN 5 MG: 5 TABLET, FILM COATED ORAL at 08:43

## 2017-01-28 RX ADMIN — POLYETHYLENE GLYCOL 3350 34 G: 17 POWDER, FOR SOLUTION ORAL at 08:43

## 2017-01-28 RX ADMIN — PRAVASTATIN SODIUM 80 MG: 80 TABLET ORAL at 21:38

## 2017-01-28 RX ADMIN — SPIRONOLACTONE 25 MG: 25 TABLET, FILM COATED ORAL at 08:43

## 2017-01-28 RX ADMIN — Medication 10 ML: at 06:06

## 2017-01-28 RX ADMIN — POTASSIUM CHLORIDE 10 MEQ: 10 TABLET, EXTENDED RELEASE ORAL at 08:43

## 2017-01-28 RX ADMIN — COLCHICINE 0.6 MG: 0.6 TABLET, FILM COATED ORAL at 08:43

## 2017-01-28 RX ADMIN — GABAPENTIN 600 MG: 300 CAPSULE ORAL at 21:38

## 2017-01-28 RX ADMIN — INSULIN GLARGINE 50 UNITS: 100 INJECTION, SOLUTION SUBCUTANEOUS at 21:41

## 2017-01-28 RX ADMIN — ISOSORBIDE DINITRATE 20 MG: 20 TABLET ORAL at 13:44

## 2017-01-28 RX ADMIN — OXYCODONE HYDROCHLORIDE 15 MG: 15 TABLET ORAL at 09:08

## 2017-01-28 RX ADMIN — ALLOPURINOL 100 MG: 100 TABLET ORAL at 08:43

## 2017-01-28 RX ADMIN — APIXABAN 5 MG: 5 TABLET, FILM COATED ORAL at 21:38

## 2017-01-28 RX ADMIN — Medication 5 ML: at 13:45

## 2017-01-28 RX ADMIN — MILRINONE LACTATE 0.5 MCG/KG/MIN: 200 INJECTION, SOLUTION INTRAVENOUS at 20:39

## 2017-01-28 RX ADMIN — FAMOTIDINE 20 MG: 20 TABLET ORAL at 08:43

## 2017-01-28 RX ADMIN — CARVEDILOL 25 MG: 25 TABLET, FILM COATED ORAL at 08:43

## 2017-01-28 RX ADMIN — OXYCODONE HYDROCHLORIDE 15 MG: 15 TABLET ORAL at 21:45

## 2017-01-28 RX ADMIN — ISOSORBIDE DINITRATE 20 MG: 20 TABLET ORAL at 06:05

## 2017-01-28 RX ADMIN — TORSEMIDE 100 MG: 100 TABLET ORAL at 08:43

## 2017-01-28 RX ADMIN — Medication 10 ML: at 21:45

## 2017-01-28 RX ADMIN — DOCUSATE SODIUM 100 MG: 100 CAPSULE, LIQUID FILLED ORAL at 08:43

## 2017-01-28 RX ADMIN — GABAPENTIN 600 MG: 300 CAPSULE ORAL at 06:05

## 2017-01-28 RX ADMIN — HYDRALAZINE HYDROCHLORIDE 25 MG: 25 TABLET, FILM COATED ORAL at 06:04

## 2017-01-28 RX ADMIN — HYDRALAZINE HYDROCHLORIDE 25 MG: 25 TABLET, FILM COATED ORAL at 13:44

## 2017-01-28 RX ADMIN — AMIODARONE HYDROCHLORIDE 200 MG: 200 TABLET ORAL at 08:43

## 2017-01-28 RX ADMIN — ISOSORBIDE DINITRATE 20 MG: 20 TABLET ORAL at 21:38

## 2017-01-28 RX ADMIN — HYDRALAZINE HYDROCHLORIDE 25 MG: 25 TABLET, FILM COATED ORAL at 21:38

## 2017-01-28 RX ADMIN — INSULIN LISPRO 4 UNITS: 100 INJECTION, SOLUTION INTRAVENOUS; SUBCUTANEOUS at 08:39

## 2017-01-28 NOTE — PROGRESS NOTES
Called into room and pt was c/o shortness of breath. Pt found in bed, laying flat. Assisted to sit up in bed. Pt cussing and yelling at nurse for me to fix him. Encouraged and educated pt that he needs to stay sitting up in bed and avoid laying flat due to his respiratory and cardiac status. Pt still yelling at staff demanding that the doctor be called and that we order an ABG. Dr. Nelida Lyle paged and made aware. Verbal orders received for ABG and to restart Primacor drip. Orders entered. Patient made aware. Will continue to monitor.

## 2017-01-28 NOTE — PROGRESS NOTES
01/28/17 1603   Oxygen Therapy   O2 Sat (%) 97 %   Pulse via Oximetry 76 beats per minute   O2 Device CPAP mask   O2 Flow Rate (L/min) 3 l/min

## 2017-01-28 NOTE — PROGRESS NOTES
[facility] CARDIOLOGY PROGRESS NOTE           1/28/2017 10:49 AM    Admit Date: 1/19/2017      Subjective:         Patient denies chest pain   ROS:  Cardiovascular:  As noted above    Objective:      Vitals:    01/28/17 0540 01/28/17 0604 01/28/17 0750 01/28/17 0843   BP: 157/80 157/80 112/67 112/67   Pulse: 68 68 65 67   Resp: 18  14    Temp: 97.2 °F (36.2 °C)  97.7 °F (36.5 °C)    SpO2: 100%  100%    Weight: (!) 193 kg (425 lb 8 oz)      Height:           Physical Exam:  General-No Acute Distress  Neck- supple, no JVD  CV- regular rate and rhythm no MRG  Lung- clear bilaterally  Abd- obese  Ext- Grade II stage II edema bilaterally.   Skin- warm and dry    Data Review:   Recent Labs      01/28/17   0445  01/27/17   0600  01/26/17   0730   NA  137  136  136   K  5.3*  5.0  4.8   MG   --   3.0*   --    BUN  65*  65*  64*   CREA  2.45*  2.38*  2.78*   GLU  106*  87  119*   WBC   --    --   6.9   HGB   --    --   10.3*   HCT   --    --   32.5*   PLT   --    --   224     Current Facility-Administered Medications   Medication Dose Route Frequency    torsemide (DEMADEX) tablet 100 mg  100 mg Oral DAILY    amiodarone (CORDARONE) tablet 200 mg  200 mg Oral DAILY    polyethylene glycol (MIRALAX) packet 34 g  34 g Oral DAILY    hydrocortisone (CORTAID) 0.5 % cream   Topical BID    allopurinol (ZYLOPRIM) tablet 100 mg  100 mg Oral DAILY    apixaban (ELIQUIS) tablet 5 mg  5 mg Oral Q12H    bisacodyl (DULCOLAX) suppository 10 mg  10 mg Rectal DAILY    carvedilol (COREG) tablet 25 mg  25 mg Oral BID WITH MEALS    colchicine tablet 0.6 mg  0.6 mg Oral DAILY    docusate sodium (COLACE) capsule 100 mg  100 mg Oral BID    gabapentin (NEURONTIN) capsule 600 mg  600 mg Oral TID    hydrALAZINE (APRESOLINE) tablet 25 mg  25 mg Oral TID    insulin glargine (LANTUS) injection 50 Units  50 Units SubCUTAneous QHS    insulin lispro (HUMALOG) injection 15 Units  15 Units SubCUTAneous TID WITH MEALS    isosorbide dinitrate (ISORDIL) tablet 20 mg  20 mg Oral TID    nitroglycerin (NITROSTAT) tablet 0.4 mg  0.4 mg SubLINGual Q5MIN PRN    oxyCODONE IR (OXY-IR) immediate release tablet 15 mg  15 mg Oral Q4H PRN    polyethylene glycol (MIRALAX) packet 17 g  17 g Oral DAILY PRN    potassium chloride (K-DUR, KLOR-CON) tablet 10 mEq  10 mEq Oral BID    pravastatin (PRAVACHOL) tablet 80 mg  80 mg Oral QHS    famotidine (PEPCID) tablet 20 mg  20 mg Oral BID    spironolactone (ALDACTONE) tablet 25 mg  25 mg Oral DAILY    sodium chloride (NS) flush 5-10 mL  5-10 mL IntraVENous Q8H    sodium chloride (NS) flush 5-10 mL  5-10 mL IntraVENous PRN    morphine injection 2 mg  2 mg IntraVENous Q4H PRN         Assessment/Plan:     Principal Problem:    Nonsustained ventricular tachycardia (HCC) (1/19/2017)    Active Problems:    Obstructive sleep apnea (2/15/2010)      Nonischemic dilated cardiomyopathy ([address] (7/2/2013)      Overview: LVEF 10-15% on ECHO from 12/17/10 previosu shock patient off inotrope for <24 milrinone will evaluate LFTs today, renal fxn. Patient may be able to go home in the next few days. Will watch UOP as surrogate for CO. CKD (chronic kidney disease) stage 3, GFR 30-59 ml/min (8/13/2014)      AICD (automatic cardioverter/defibrillator) present (10/21/2015)      Acute on chronic systolic (congestive) heart failure ([address] (10/18/2016)      Atrial flutter ([address] (10/20/2016) amio eliquis     Obesity hypoventilation syndrome ([address] (10/24/2016)      Morbid obesity with BMI of 60.0-69.9, adult ([address] (11/28/2016)      Syncope (1/19/2017)      NSVT (nonsustained ventricular tachycardia) ([address] (1/19/2017) No VT overnight on amio will get baseline tft lft.            Fitz Stafford MD  1/28/2017 10:49 AM

## 2017-01-28 NOTE — PROGRESS NOTES
Bedside shift change report given to Ivonne Olszewski, RN. Report included the following information SBAR, Kardex, MAR and Recent Results.

## 2017-01-29 LAB
GLUCOSE BLD STRIP.AUTO-MCNC: 101 MG/DL (ref 65–100)
GLUCOSE BLD STRIP.AUTO-MCNC: 110 MG/DL (ref 65–100)
GLUCOSE BLD STRIP.AUTO-MCNC: 143 MG/DL (ref 65–100)
GLUCOSE BLD STRIP.AUTO-MCNC: 167 MG/DL (ref 65–100)

## 2017-01-29 PROCEDURE — 77010033678 HC OXYGEN DAILY

## 2017-01-29 PROCEDURE — 74011250637 HC RX REV CODE- 250/637: Performed by: NURSE PRACTITIONER

## 2017-01-29 PROCEDURE — 82962 GLUCOSE BLOOD TEST: CPT

## 2017-01-29 PROCEDURE — 74011250636 HC RX REV CODE- 250/636: Performed by: INTERNAL MEDICINE

## 2017-01-29 PROCEDURE — 74011250637 HC RX REV CODE- 250/637: Performed by: INTERNAL MEDICINE

## 2017-01-29 PROCEDURE — 77030011256 HC DRSG MEPILEX <16IN NO BORD MOLN -A

## 2017-01-29 PROCEDURE — 74011636637 HC RX REV CODE- 636/637: Performed by: NURSE PRACTITIONER

## 2017-01-29 PROCEDURE — 3331090002 HH PPS REVENUE DEBIT

## 2017-01-29 PROCEDURE — 65660000000 HC RM CCU STEPDOWN

## 2017-01-29 PROCEDURE — 77030018846 HC SOL IRR STRL H20 ICUM -A

## 2017-01-29 PROCEDURE — 3331090001 HH PPS REVENUE CREDIT

## 2017-01-29 RX ADMIN — SPIRONOLACTONE 25 MG: 25 TABLET, FILM COATED ORAL at 08:47

## 2017-01-29 RX ADMIN — HYDRALAZINE HYDROCHLORIDE 25 MG: 25 TABLET, FILM COATED ORAL at 14:32

## 2017-01-29 RX ADMIN — GABAPENTIN 600 MG: 300 CAPSULE ORAL at 14:32

## 2017-01-29 RX ADMIN — COLCHICINE 0.6 MG: 0.6 TABLET, FILM COATED ORAL at 08:44

## 2017-01-29 RX ADMIN — AMIODARONE HYDROCHLORIDE 200 MG: 200 TABLET ORAL at 08:44

## 2017-01-29 RX ADMIN — MILRINONE LACTATE 0.5 MCG/KG/MIN: 200 INJECTION, SOLUTION INTRAVENOUS at 04:39

## 2017-01-29 RX ADMIN — CARVEDILOL 25 MG: 25 TABLET, FILM COATED ORAL at 08:45

## 2017-01-29 RX ADMIN — ISOSORBIDE DINITRATE 20 MG: 20 TABLET ORAL at 05:40

## 2017-01-29 RX ADMIN — TORSEMIDE 100 MG: 100 TABLET ORAL at 08:44

## 2017-01-29 RX ADMIN — ISOSORBIDE DINITRATE 20 MG: 20 TABLET ORAL at 21:54

## 2017-01-29 RX ADMIN — MILRINONE LACTATE 0.5 MCG/KG/MIN: 200 INJECTION, SOLUTION INTRAVENOUS at 19:44

## 2017-01-29 RX ADMIN — MILRINONE LACTATE 0.5 MCG/KG/MIN: 200 INJECTION, SOLUTION INTRAVENOUS at 01:28

## 2017-01-29 RX ADMIN — DOCUSATE SODIUM 100 MG: 100 CAPSULE, LIQUID FILLED ORAL at 17:39

## 2017-01-29 RX ADMIN — CARVEDILOL 25 MG: 25 TABLET, FILM COATED ORAL at 17:39

## 2017-01-29 RX ADMIN — Medication 5 ML: at 21:55

## 2017-01-29 RX ADMIN — HYDRALAZINE HYDROCHLORIDE 25 MG: 25 TABLET, FILM COATED ORAL at 05:40

## 2017-01-29 RX ADMIN — GABAPENTIN 600 MG: 300 CAPSULE ORAL at 21:54

## 2017-01-29 RX ADMIN — FAMOTIDINE 20 MG: 20 TABLET ORAL at 08:44

## 2017-01-29 RX ADMIN — INSULIN LISPRO 3 UNITS: 100 INJECTION, SOLUTION INTRAVENOUS; SUBCUTANEOUS at 17:43

## 2017-01-29 RX ADMIN — HYDROCORTISONE: 5 CREAM TOPICAL at 10:46

## 2017-01-29 RX ADMIN — GABAPENTIN 600 MG: 300 CAPSULE ORAL at 05:40

## 2017-01-29 RX ADMIN — APIXABAN 5 MG: 5 TABLET, FILM COATED ORAL at 08:44

## 2017-01-29 RX ADMIN — PRAVASTATIN SODIUM 80 MG: 80 TABLET ORAL at 21:54

## 2017-01-29 RX ADMIN — POLYETHYLENE GLYCOL 3350 34 G: 17 POWDER, FOR SOLUTION ORAL at 08:41

## 2017-01-29 RX ADMIN — HYDRALAZINE HYDROCHLORIDE 25 MG: 25 TABLET, FILM COATED ORAL at 21:54

## 2017-01-29 RX ADMIN — ALLOPURINOL 100 MG: 100 TABLET ORAL at 08:45

## 2017-01-29 RX ADMIN — Medication 10 ML: at 17:42

## 2017-01-29 RX ADMIN — Medication 10 ML: at 03:28

## 2017-01-29 RX ADMIN — Medication 10 ML: at 05:42

## 2017-01-29 RX ADMIN — INSULIN GLARGINE 50 UNITS: 100 INJECTION, SOLUTION SUBCUTANEOUS at 21:54

## 2017-01-29 RX ADMIN — INSULIN LISPRO 15 UNITS: 100 INJECTION, SOLUTION INTRAVENOUS; SUBCUTANEOUS at 12:37

## 2017-01-29 RX ADMIN — MILRINONE LACTATE 0.5 MCG/KG/MIN: 200 INJECTION, SOLUTION INTRAVENOUS at 22:44

## 2017-01-29 RX ADMIN — DOCUSATE SODIUM 100 MG: 100 CAPSULE, LIQUID FILLED ORAL at 08:44

## 2017-01-29 RX ADMIN — FAMOTIDINE 20 MG: 20 TABLET ORAL at 17:39

## 2017-01-29 RX ADMIN — INSULIN LISPRO 15 UNITS: 100 INJECTION, SOLUTION INTRAVENOUS; SUBCUTANEOUS at 08:43

## 2017-01-29 RX ADMIN — OXYCODONE HYDROCHLORIDE 15 MG: 15 TABLET ORAL at 12:33

## 2017-01-29 RX ADMIN — OXYCODONE HYDROCHLORIDE 15 MG: 15 TABLET ORAL at 21:56

## 2017-01-29 RX ADMIN — APIXABAN 5 MG: 5 TABLET, FILM COATED ORAL at 21:54

## 2017-01-29 RX ADMIN — MILRINONE LACTATE 0.5 MCG/KG/MIN: 200 INJECTION, SOLUTION INTRAVENOUS at 09:05

## 2017-01-29 RX ADMIN — ISOSORBIDE DINITRATE 20 MG: 20 TABLET ORAL at 14:32

## 2017-01-29 RX ADMIN — MILRINONE LACTATE 0.5 MCG/KG/MIN: 200 INJECTION, SOLUTION INTRAVENOUS at 14:42

## 2017-01-29 NOTE — PROGRESS NOTES
Skin assessment as follows:    Right neck with QLC with dressing intact. Right outer thigh/hip area with enlarged, opened stretch ej. Meplex applied to site. Scrotum grossly enlarged. Foreskin heavily edematous, folding in on itself. Unable to visualize penis, even with foreskin retracted. Nickel sized open blister/skin tear to exposed foreskin. EPC applied. Lower legs firm. Bottoms of feet very flaky and cracked with dry skin. Lotion applied to bottom of feet. No other skin issues noted.

## 2017-01-29 NOTE — PROGRESS NOTES
Verbal bedside report given to Jace Vaz oncoming RN. Patient's situation, background, assessment and recommendations provided. Opportunity for questions provided. Oncoming RN assumed care of patient. Primacor drip still has not arrived up from pharmacy.

## 2017-01-29 NOTE — PROGRESS NOTES
[facility] CARDIOLOGY PROGRESS NOTE           1/29/2017 10:49 AM    Admit Date: 1/19/2017      Subjective:     Patient had trouble breathing overnight. Milrinone resumed. Patient mainly concerned with scrotal swelling and pain. Nursing placed a sling with improvement of swelling. Patient denies chest pain   ROS:  Cardiovascular:  As noted above    Objective:      Vitals:    01/28/17 2138 01/29/17 0128 01/29/17 0510 01/29/17 0540   BP: 118/75 141/76  133/68   Pulse: 74 77  78   Resp:  19  18   Temp:  96.9 °F (36.1 °C)  97.4 °F (36.3 °C)   SpO2:  100%  99%   Weight:   (!) 192.1 kg (423 lb 9.6 oz)    Height:           Physical Exam:  General-No Acute Distress ill-appearing on CPAP  Neck- supple, no JVD  CV- regular rate and rhythm no MRG  Lung- clear bilaterally  Abd- obese  Genitourinary scrotal swelling excoriation on glans  Ext- Grade II stage II edema bilaterally.   Skin- warm and dry    Data Review:   Recent Labs      01/28/17   0445  01/27/17   0600   NA  137  136   K  5.3*  5.0   MG   --   3.0*   BUN  65*  65*   CREA  2.45*  2.38*   GLU  106*  87     Current Facility-Administered Medications   Medication Dose Route Frequency    milrinone (PRIMACOR) 20 MG/100 ML D5W infusion  0.5 mcg/kg/min IntraVENous CONTINUOUS    torsemide (DEMADEX) tablet 100 mg  100 mg Oral DAILY    amiodarone (CORDARONE) tablet 200 mg  200 mg Oral DAILY    polyethylene glycol (MIRALAX) packet 34 g  34 g Oral DAILY    hydrocortisone (CORTAID) 0.5 % cream   Topical BID    allopurinol (ZYLOPRIM) tablet 100 mg  100 mg Oral DAILY    apixaban (ELIQUIS) tablet 5 mg  5 mg Oral Q12H    bisacodyl (DULCOLAX) suppository 10 mg  10 mg Rectal DAILY    carvedilol (COREG) tablet 25 mg  25 mg Oral BID WITH MEALS    colchicine tablet 0.6 mg  0.6 mg Oral DAILY    docusate sodium (COLACE) capsule 100 mg  100 mg Oral BID    gabapentin (NEURONTIN) capsule 600 mg  600 mg Oral TID    hydrALAZINE (APRESOLINE) tablet 25 mg  25 mg Oral TID    insulin glargine (LANTUS) injection 50 Units  50 Units SubCUTAneous QHS    insulin lispro (HUMALOG) injection 15 Units  15 Units SubCUTAneous TID WITH MEALS    isosorbide dinitrate (ISORDIL) tablet 20 mg  20 mg Oral TID    nitroglycerin (NITROSTAT) tablet 0.4 mg  0.4 mg SubLINGual Q5MIN PRN    oxyCODONE IR (OXY-IR) immediate release tablet 15 mg  15 mg Oral Q4H PRN    polyethylene glycol (MIRALAX) packet 17 g  17 g Oral DAILY PRN    potassium chloride (K-DUR, KLOR-CON) tablet 10 mEq  10 mEq Oral BID    pravastatin (PRAVACHOL) tablet 80 mg  80 mg Oral QHS    famotidine (PEPCID) tablet 20 mg  20 mg Oral BID    spironolactone (ALDACTONE) tablet 25 mg  25 mg Oral DAILY    sodium chloride (NS) flush 5-10 mL  5-10 mL IntraVENous Q8H    sodium chloride (NS) flush 5-10 mL  5-10 mL IntraVENous PRN    morphine injection 2 mg  2 mg IntraVENous Q4H PRN         Assessment/Plan:     Principal Problem:    Nonsustained ventricular tachycardia (HCC) (1/19/2017)    Active Problems:    Obstructive sleep apnea (2/15/2010)      Nonischemic dilated cardiomyopathy ([facility] [address] (7/2/2013)     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 Patient somewhat inotropic dependent worsening cardiovascular status when milrinone was discontinued. LFTs mildly mildly elevated 1/29/2017 is hard to determine endpoint for this patient seems he has been reluctant to talk about end-of-life issues. We discussed these briefly this morning patient was not receptive. Home inotropes would be an option to improve symptoms limit rehospitalization.     Patient not on ACE inhibitor therapy due to chronic kidney disease        CKD (chronic kidney disease) stage 3, GFR 30-59 ml/min (8/13/2014) potassium elevated 1/29/2017 potassium supplementation discontinued patient on Aldactone may need to be can discontinue the potassium remains persistently elevated      AICD (automatic cardioverter/defibrillator) present (10/21/2015)      Acute on chronic systolic (congestive) heart failure ([facility] [address] (10/18/2016)      Atrial flutter ([facility] (10/20/2016) amio eliquis     Obesity hypoventilation syndrome ([facility] (10/24/2016)      Morbid obesity with BMI of 60.0-69.9, adult ([facility] (11/28/2016)     NSVT (nonsustained ventricular tachycardia) ([facility] (1/19/2017) no ventricular tachycardia noted 1/28/2017. ICD in place. Scrotal swelling improved with sling. Patient may need wound care for excoriation.           Haroon Dubois MD  1/29/2017 10:49 AM

## 2017-01-29 NOTE — PROGRESS NOTES
Pt c/o his testicles and penis swelling to the point of discomfort. Tammy care provided - open area noted to the foreskin, barrier cream applied. Sling made from pillow case and placed under testicles, then propped with a pillow for comfort and to alleviate swelling. Pt states he feels much better, primary RN Kate Julian updated.

## 2017-01-30 LAB
GLUCOSE BLD STRIP.AUTO-MCNC: 109 MG/DL (ref 65–100)
GLUCOSE BLD STRIP.AUTO-MCNC: 139 MG/DL (ref 65–100)
GLUCOSE BLD STRIP.AUTO-MCNC: 146 MG/DL (ref 65–100)
GLUCOSE BLD STRIP.AUTO-MCNC: 158 MG/DL (ref 65–100)
GLUCOSE BLD STRIP.AUTO-MCNC: 165 MG/DL (ref 65–100)

## 2017-01-30 PROCEDURE — 74011250637 HC RX REV CODE- 250/637: Performed by: INTERNAL MEDICINE

## 2017-01-30 PROCEDURE — 65660000000 HC RM CCU STEPDOWN

## 2017-01-30 PROCEDURE — 74011250637 HC RX REV CODE- 250/637: Performed by: NURSE PRACTITIONER

## 2017-01-30 PROCEDURE — 3331090001 HH PPS REVENUE CREDIT

## 2017-01-30 PROCEDURE — 74011250636 HC RX REV CODE- 250/636: Performed by: INTERNAL MEDICINE

## 2017-01-30 PROCEDURE — 74011636637 HC RX REV CODE- 636/637: Performed by: NURSE PRACTITIONER

## 2017-01-30 PROCEDURE — 3331090002 HH PPS REVENUE DEBIT

## 2017-01-30 PROCEDURE — 74011000302 HC RX REV CODE- 302: Performed by: INTERNAL MEDICINE

## 2017-01-30 PROCEDURE — 86580 TB INTRADERMAL TEST: CPT | Performed by: INTERNAL MEDICINE

## 2017-01-30 PROCEDURE — 82962 GLUCOSE BLOOD TEST: CPT

## 2017-01-30 PROCEDURE — 94760 N-INVAS EAR/PLS OXIMETRY 1: CPT

## 2017-01-30 PROCEDURE — 97530 THERAPEUTIC ACTIVITIES: CPT

## 2017-01-30 PROCEDURE — 77010033678 HC OXYGEN DAILY

## 2017-01-30 PROCEDURE — 97110 THERAPEUTIC EXERCISES: CPT

## 2017-01-30 RX ADMIN — HYDRALAZINE HYDROCHLORIDE 25 MG: 25 TABLET, FILM COATED ORAL at 15:29

## 2017-01-30 RX ADMIN — MILRINONE LACTATE 0.5 MCG/KG/MIN: 200 INJECTION, SOLUTION INTRAVENOUS at 19:48

## 2017-01-30 RX ADMIN — GABAPENTIN 600 MG: 300 CAPSULE ORAL at 15:29

## 2017-01-30 RX ADMIN — ALLOPURINOL 100 MG: 100 TABLET ORAL at 09:45

## 2017-01-30 RX ADMIN — GABAPENTIN 600 MG: 300 CAPSULE ORAL at 22:02

## 2017-01-30 RX ADMIN — Medication 10 ML: at 14:00

## 2017-01-30 RX ADMIN — OXYCODONE HYDROCHLORIDE 15 MG: 15 TABLET ORAL at 12:50

## 2017-01-30 RX ADMIN — MILRINONE LACTATE 0.5 MCG/KG/MIN: 200 INJECTION, SOLUTION INTRAVENOUS at 05:50

## 2017-01-30 RX ADMIN — FAMOTIDINE 20 MG: 20 TABLET ORAL at 09:46

## 2017-01-30 RX ADMIN — OXYCODONE HYDROCHLORIDE 15 MG: 15 TABLET ORAL at 22:53

## 2017-01-30 RX ADMIN — Medication 10 ML: at 22:46

## 2017-01-30 RX ADMIN — MILRINONE LACTATE 0.5 MCG/KG/MIN: 200 INJECTION, SOLUTION INTRAVENOUS at 13:09

## 2017-01-30 RX ADMIN — ISOSORBIDE DINITRATE 20 MG: 20 TABLET ORAL at 05:51

## 2017-01-30 RX ADMIN — DOCUSATE SODIUM 100 MG: 100 CAPSULE, LIQUID FILLED ORAL at 09:46

## 2017-01-30 RX ADMIN — CARVEDILOL 25 MG: 25 TABLET, FILM COATED ORAL at 17:09

## 2017-01-30 RX ADMIN — TUBERCULIN PURIFIED PROTEIN DERIVATIVE 5 UNITS: 5 INJECTION, SOLUTION INTRADERMAL at 12:45

## 2017-01-30 RX ADMIN — ISOSORBIDE DINITRATE 20 MG: 20 TABLET ORAL at 15:29

## 2017-01-30 RX ADMIN — HYDRALAZINE HYDROCHLORIDE 25 MG: 25 TABLET, FILM COATED ORAL at 05:51

## 2017-01-30 RX ADMIN — HYDRALAZINE HYDROCHLORIDE 25 MG: 25 TABLET, FILM COATED ORAL at 22:03

## 2017-01-30 RX ADMIN — MILRINONE LACTATE 0.5 MCG/KG/MIN: 200 INJECTION, SOLUTION INTRAVENOUS at 09:58

## 2017-01-30 RX ADMIN — INSULIN LISPRO 15 UNITS: 100 INJECTION, SOLUTION INTRAVENOUS; SUBCUTANEOUS at 12:48

## 2017-01-30 RX ADMIN — AMIODARONE HYDROCHLORIDE 200 MG: 200 TABLET ORAL at 09:46

## 2017-01-30 RX ADMIN — MILRINONE LACTATE 0.5 MCG/KG/MIN: 200 INJECTION, SOLUTION INTRAVENOUS at 16:58

## 2017-01-30 RX ADMIN — CARVEDILOL 25 MG: 25 TABLET, FILM COATED ORAL at 09:46

## 2017-01-30 RX ADMIN — SPIRONOLACTONE 25 MG: 25 TABLET, FILM COATED ORAL at 09:46

## 2017-01-30 RX ADMIN — COLCHICINE 0.6 MG: 0.6 TABLET, FILM COATED ORAL at 09:46

## 2017-01-30 RX ADMIN — MILRINONE LACTATE 0.5 MCG/KG/MIN: 200 INJECTION, SOLUTION INTRAVENOUS at 02:10

## 2017-01-30 RX ADMIN — APIXABAN 5 MG: 5 TABLET, FILM COATED ORAL at 22:02

## 2017-01-30 RX ADMIN — APIXABAN 5 MG: 5 TABLET, FILM COATED ORAL at 09:45

## 2017-01-30 RX ADMIN — HYDROCORTISONE: 5 CREAM TOPICAL at 18:00

## 2017-01-30 RX ADMIN — FAMOTIDINE 20 MG: 20 TABLET ORAL at 17:09

## 2017-01-30 RX ADMIN — POLYETHYLENE GLYCOL 3350 34 G: 17 POWDER, FOR SOLUTION ORAL at 09:48

## 2017-01-30 RX ADMIN — ISOSORBIDE DINITRATE 20 MG: 20 TABLET ORAL at 22:03

## 2017-01-30 RX ADMIN — TORSEMIDE 100 MG: 100 TABLET ORAL at 09:45

## 2017-01-30 RX ADMIN — GABAPENTIN 600 MG: 300 CAPSULE ORAL at 05:51

## 2017-01-30 RX ADMIN — DOCUSATE SODIUM 100 MG: 100 CAPSULE, LIQUID FILLED ORAL at 17:09

## 2017-01-30 RX ADMIN — INSULIN LISPRO 15 UNITS: 100 INJECTION, SOLUTION INTRAVENOUS; SUBCUTANEOUS at 09:44

## 2017-01-30 RX ADMIN — Medication 5 ML: at 05:51

## 2017-01-30 RX ADMIN — PRAVASTATIN SODIUM 80 MG: 80 TABLET ORAL at 22:03

## 2017-01-30 RX ADMIN — INSULIN GLARGINE 50 UNITS: 100 INJECTION, SOLUTION SUBCUTANEOUS at 22:46

## 2017-01-30 NOTE — PROGRESS NOTES
Bedside shift change report received from Ceci Del Real RN. Report included the following information SBAR, Kardex, MAR and Recent Results. Heparin gtt verified.

## 2017-01-30 NOTE — PROGRESS NOTES
[facility] CARDIOLOGY PROGRESS NOTE           1/30/2017 10:49 AM    Admit Date: 1/19/2017      Subjective:     Patient seems a little better this am    Patient denies chest pain   ROS:  Cardiovascular:  As noted above    Objective:      Vitals:    01/29/17 2100 01/29/17 2244 01/30/17 0025 01/30/17 0429   BP: 134/70 134/70 144/80 144/71   Pulse: 85 84 82 87   Resp: 18  18 18   Temp: 97 °F (36.1 °C)  96.8 °F (36 °C) 97.5 °F (36.4 °C)   SpO2: 97%  99% 99%   Weight:    (!) 191.3 kg (421 lb 11.2 oz)   Height:           Physical Exam:  General-No Acute Distress ill-appearing on CPAP  Neck- supple, no JVD  CV- regular rate and rhythm no MRG  Lung- clear bilaterally  Abd- obese  Genitourinary scrotal swelling excoriation on glans  Ext- Grade II stage II edema bilaterally.   Skin- warm and dry    Data Review:   Recent Labs      01/28/17   0445   NA  137   K  5.3*   BUN  65*   CREA  2.45*   GLU  106*     Current Facility-Administered Medications   Medication Dose Route Frequency    milrinone (PRIMACOR) 20 MG/100 ML D5W infusion  0.5 mcg/kg/min IntraVENous CONTINUOUS    torsemide (DEMADEX) tablet 100 mg  100 mg Oral DAILY    amiodarone (CORDARONE) tablet 200 mg  200 mg Oral DAILY    polyethylene glycol (MIRALAX) packet 34 g  34 g Oral DAILY    hydrocortisone (CORTAID) 0.5 % cream   Topical BID    allopurinol (ZYLOPRIM) tablet 100 mg  100 mg Oral DAILY    apixaban (ELIQUIS) tablet 5 mg  5 mg Oral Q12H    bisacodyl (DULCOLAX) suppository 10 mg  10 mg Rectal DAILY    carvedilol (COREG) tablet 25 mg  25 mg Oral BID WITH MEALS    colchicine tablet 0.6 mg  0.6 mg Oral DAILY    docusate sodium (COLACE) capsule 100 mg  100 mg Oral BID    gabapentin (NEURONTIN) capsule 600 mg  600 mg Oral TID    hydrALAZINE (APRESOLINE) tablet 25 mg  25 mg Oral TID    insulin glargine (LANTUS) injection 50 Units  50 Units SubCUTAneous QHS    insulin lispro (HUMALOG) injection 15 Units  15 Units SubCUTAneous TID WITH MEALS    isosorbide dinitrate (ISORDIL) tablet 20 mg  20 mg Oral TID    nitroglycerin (NITROSTAT) tablet 0.4 mg  0.4 mg SubLINGual Q5MIN PRN    oxyCODONE IR (OXY-IR) immediate release tablet 15 mg  15 mg Oral Q4H PRN    polyethylene glycol (MIRALAX) packet 17 g  17 g Oral DAILY PRN    pravastatin (PRAVACHOL) tablet 80 mg  80 mg Oral QHS    famotidine (PEPCID) tablet 20 mg  20 mg Oral BID    spironolactone (ALDACTONE) tablet 25 mg  25 mg Oral DAILY    sodium chloride (NS) flush 5-10 mL  5-10 mL IntraVENous Q8H    sodium chloride (NS) flush 5-10 mL  5-10 mL IntraVENous PRN    morphine injection 2 mg  2 mg IntraVENous Q4H PRN         Assessment/Plan:     Principal Problem:    Nonsustained ventricular tachycardia (HCC) (1/19/2017)    Active Problems:    Obstructive sleep apnea (2/15/2010)      Nonischemic dilated cardiomyopathy ([address] (7/2/2013)     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 Patient somewhat inotropic dependent worsening cardiovascular status when milrinone was discontinued. LFTs mildly mildly elevated 1/29/2017 is hard to determine endpoint for this patient     Patient not on ACE inhibitor therapy due to chronic kidney disease        CKD (chronic kidney disease) stage 3, GFR 30-59 ml/min (8/13/2014) potassium elevated 1/29/2017 potassium supplementation discontinued patient on Aldactone may need to be can discontinue the potassium remains persistently elevated      AICD (automatic cardioverter/defibrillator) present (10/21/2015)      Acute on chronic systolic (congestive) heart failure ([address] (10/18/2016)The current medical regimen is effective;  continue present plan and medications. Atrial flutter ([address] (10/20/2016) amio eliquis     Obesity hypoventilation syndrome ([address] (10/24/2016)      Morbid obesity with BMI of 60.0-69.9, adult ([address] (11/28/2016)     NSVT (nonsustained ventricular tachycardia) ([facility] (1/19/2017) no ventricular tachycardia noted 1/28/2017.  ICD in place. Scrotal swelling improved with sling. Patient may need wound care for excoriation.           Goldie Lake MD  1/30/2017 10:49 AM

## 2017-01-30 NOTE — PROGRESS NOTES
Patient verbalized that he wanted to [facility] if his heart is getting better\" and that he thought that he should \"go off the primacor\". He also said he felt \"sleepy\" and \"fuzzy\", and thought he needed a \"blood gas\". Patient's O2 sats at 97% on 2 L NC. Patient alert and oriented x4 and able to carry out a coherent conversation. Spoke with FATEMEH Reyna and she said to continue monitoring and inform if patient declines from current status. Patient stated he had not had any urine output today and that his genitals hurt. Assisted patient to side of bed and patient voided 950 ml's into urinal. Assisted patient to bathroom per request for potential bowel movement. Will continue to monitor.

## 2017-01-30 NOTE — PROGRESS NOTES
Care Management Interventions  PCP Verified by CM: Yes  Transition of Care Consult (CM Consult): Discharge Planning  Physical Therapy Consult: Yes  Current Support Network: Lives with Caregiver, Family Lives Nearby  Confirm Follow Up Transport: Family    SW met with patient and daughter in the room/ Donald Raina [phone] for continued D/C planning. At present pt lives with daughter/ Ria Romberg and BAKEBARE. Pt was receiving [facility] and is current with home health. Daughter stated that pt had not driven since his [facility] hospital admission. Daughter stated that pt was able to ambulate within the home, however he was dependent on family for assistance with cooking and cleaning. Pt and family plan at this time for pt to return home where he states, \" I have plenty of grown kids to help me\" and are receptive to resumption of home health Nursing and an addition for PT. SW discussed potential for STR and discussed what STR would entail. Pt agreed to discuss further with his other daughter and his mother and alert this writer if he is interested. A list of facilities was left with pt/family along with this writer's contact information. SW will plan to follow up with pt as to any additional D/C needs.

## 2017-01-30 NOTE — PROGRESS NOTES
Problem: Mobility Impaired (Adult and Pediatric)  Goal: *Acute Goals and Plan of Care (Insert Text)  LTG:  (1.)Mr. Pollard will move from supine to sit and sit to supine , scoot up and down and roll side to side with INDEPENDENT within 7 day(s) from flat surface without handrail. (2.)Mr. Pollard will transfer from bed to chair and chair to bed with INDEPENDENT using the least restrictive device within 7 day(s). (3.)Mr. Pollard will ambulate with INDEPENDENT for 150 feet with the least restrictive device within 7 day(s), O2 stats >90%. ___________________________________________________________________________________________      PHYSICAL THERAPY: Daily Note, Treatment Day: 3rd and AM 1/30/2017  INPATIENT: Hospital Day: 12  Payor: CARE IMPROVEMENT PLUS [place] / Product Type: [employer] Care Medicare /      NAME/AGE/GENDER: Radha Story is a 45 y.o. male         PRIMARY DIAGNOSIS: NSVT (nonsustained ventricular tachycardia) ([facility] Nonsustained ventricular tachycardia (HCC) Nonsustained ventricular tachycardia (HCC)        ICD-10: Treatment Diagnosis:       · Generalized Muscle Weakness (M62.81)  · Difficulty in walking, Not elsewhere classified (R26.2)   Precaution/Allergies:  Dilaudid [hydromorphone]; Iodinated contrast media - oral and iv dye; and Penicillins       ASSESSMENT:      Mr. Steve Starr was supine in bed with upon arrival with daughter present. He agreed to PT but refused OOB activity due to scrotal pain. Pt reports he has been getting up to use the restroom but could barely participate in bed mobility to scoot to the [facility]. Pt agreed to participate in bed exercise but was limited motion is limited due to scrotal swelling. Pt was given assistance for hip abduction/adduction in supine. Pt does not appear to be very motivated to get up and insists his children will be able to help care for him at home. Pt does not appear to have progressed much since last session.   Will continue POC.            This section established at most recent assessment   PROBLEM LIST (Impairments causing functional limitations):  1. Decreased Strength  2. Decreased ADL/Functional Activities  3. Decreased Transfer Abilities  4. Decreased Ambulation Ability/Technique  5. Decreased Balance  6. Decreased Activity Tolerance  7. Increased Fatigue  8. Increased Shortness of Breath  9. Edema/Girth    INTERVENTIONS PLANNED: (Benefits and precautions of physical therapy have been discussed with the patient.)  1. Balance Exercise  2. Bed Mobility  3. Family Education  4. Gait Training  5. Home Exercise Program (HEP)  6. Therapeutic Activites  7. Therapeutic Exercise/Strengthening  8. Transfer Training  9. Group Therapy      TREATMENT PLAN: Frequency/Duration: 3 times a week for duration of hospital stay  Rehabilitation Potential For Stated Goals: GOOD      RECOMMENDED REHABILITATION/EQUIPMENT: (at time of discharge pending progress): Continue Skilled Therapy and HHPT or rehab pending progress. [facility] HISTORY:   History of Present Injury/Illness (Reason for Referral):  Michaela Cm is a 45 y.o. male with known NICM with documented EF 10 % with [employer] single chamber ICD in place. He was just discharged on 1/13 with initial complaint of constipation and volume overload. He developed worsening renal failure necessitating hold his diuretics temporarily. He was discharged home on addition of demadex 100 mg daily and miralax for constipation. Really since mid December he has not felt well with worsening orthopnea, increasing abdominal girth, increased wt and lower ext edema. Today while driving in car he felt presyncopal and was able to pull to side of rode. He did not loose consciousness. He had repeat episode of presyncope prior to coming to ER. While in ER he was noted to have nonsustained ventricular tachycardia. He is not on antiarrhythmia agent.  Interrogation of his device revealed normal operation but his VT zone is set at 158 bpm with noted VT in ER at approximately 145 bpm.   Past Medical History/Comorbidities:   Mr. Junior Hector  has a past medical history of Acute systolic heart failure [facility]) (May, 2009); AICD (automatic cardioverter/defibrillator) present (10/21/2015); Atypical chest pain (4/23/2010); Bronchitis; CAD (coronary artery disease); Cardiomyopathy; Chest pain (10/21/2015); Chronic kidney disease; Chronic pain; Congestive heart failure (CHF) ([address] (10/21/2015); COPD; Diabetes ([address] Diabetes mellitus type II, uncontrolled ([address] (7/2/2013); GERD (gastroesophageal reflux disease); Gout; Heart failure ([address] Hypertension; Hyponatremia (12/20/2010); Ill-defined condition; Morbid obesity ([address] Nausea & vomiting (11/30/2015); Neuropathy; Obstructive sleep apnea (2/15/2010); Other unknown and unspecified cause of morbidity or mortality; Severe sepsis ([address] and Unspecified sleep apnea. He also has no past medical history of Adverse effect of anesthesia; Aneurysm ([address] Coagulation disorder ([address] DEMENTIA; Difficult intubation; Malignant hyperthermia due to anesthesia; Neurological disorder; Other ill-defined conditions(799.89); Pseudocholinesterase deficiency; or Psychiatric disorder. Mr. Junior Hector  has a past surgical history that includes pacemaker; heart catheterization ([place] 10, 2008); and chest surgery procedure unlisted. Social History/Living Environment:   Home Environment:  (daughters house)  # Steps to Enter: 0  One/Two Story Residence: One story  Living Alone: No  Support Systems: Family member(s), Friends \ neighbors  Patient Expects to be Discharged to[de-identified] Private residence  Current DME Used/Available at Home: None  Tub or Shower Type: Tub/Shower combination  Prior Level of Function/Work/Activity:  Lives with daughter; typically independent with ambulation for short distances; drives. Personal Factors:          Sex:  male        Age:  45 y.o.    Number of Personal Factors/Comorbidities that affect the Plan of Care:  CHF, DM, obesity, edema 3+: HIGH COMPLEXITY   EXAMINATION:   Most Recent Physical Functioning:   Gross Assessment:                  Posture:     Balance:    Bed Mobility:  Supine to Sit:  (Pt refused)  Scooting: Maximum assistance; Total assistance;Assist x2  Wheelchair Mobility:     Transfers:     Gait:             Body Structures Involved:  1. Lungs  2. Joints  3. Muscles Body Functions Affected:  1. Respiratory  2. Movement Related Activities and Participation Affected:  1. General Tasks and Demands  2. Mobility  3. Self Care  4. Community, Social and [place]   Number of elements that affect the Plan of Care: 4+: HIGH COMPLEXITY   CLINICAL PRESENTATION:   Presentation: Stable and uncomplicated: LOW COMPLEXITY   CLINICAL DECISION MAKIN [facility] Inpatient Short Form  How much difficulty does the patient currently have. .. Unable A Lot A Little None   1. Turning over in bed (including adjusting bedclothes, sheets and blankets)? [ ] 1   [ ] 2   [X] 3   [ ] 4   2. Sitting down on and standing up from a chair with arms ( e.g., wheelchair, bedside commode, etc.)   [ ] 1   [ ] 2   [X] 3   [ ] 4   3. Moving from lying on back to sitting on the side of the bed? [ ] 1   [ ] 2   [X] 3   [ ] 4   How much help from another person does the patient currently need. .. Total A Lot A Little None   4. Moving to and from a bed to a chair (including a wheelchair)? [ ] 1   [ ] 2   [X] 3   [ ] 4   5. Need to walk in hospital room? [ ] 1   [ ] 2   [X] 3   [ ] 4   6. Climbing 3-5 steps with a railing? [ ] 1   [ ] 2   [X] 3   [ ] 4   © , Trustees of [address] [place] [address] [address] [id number], under license to [employer]. All rights reserved    Score:  Initial: 18 Most Recent: X (Date: -- )     Interpretation of Tool:  Represents activities that are increasingly more difficult (i.e. Bed mobility, Transfers, Gait).        Score 24 23 22-20 19-15 14-10 9-7 6       Modifier CH CI CJ CK CL CM CN         · Mobility - Walking and Moving Around:               - CURRENT STATUS:    CK - 40%-59% impaired, limited or restricted               - GOAL STATUS:           CJ - 20%-39% impaired, limited or restricted               - D/C STATUS:                       ---------------To be determined---------------  Payor: CARE IMPROVEMENT PLUS [place] / Product Type: Managed Care Medicare /       Medical Necessity:     · Patient is expected to demonstrate progress in strength, range of motion, balance and coordination to decrease assistance required with overall functional mobility, transfers, ambulation. · Patient demonstrates good rehab potential due to higher previous functional level. Reason for Services/Other Comments:  · Patient continues to require present interventions due to patient's inability to perform bed mobility, transfers, ambulation all safely and effectively at prior level of function of independence. Use of outcome tool(s) and clinical judgement create a POC that gives a: Clear prediction of patient's progress: LOW COMPLEXITY                 TREATMENT:      Pre-treatment Symptoms/Complaints:  Tremors  Pain: Initial: . Pain Intensity 1: 7  Pain Location 1: Scrotum  Post Session:  6/10       Therapeutic Activity: (    8 min): Therapeutic activities including bed mobility to improve mobility, strength and balance. Required maximal manual and verbal cues   to promote coordination of bilateral, upper extremity(s), lower extremity(s). Therapeutic Exercise: (10 Minutes):  Exercises per grid below to improve mobility and strength. Required minimal visual, verbal and manual cues to promote proper body alignment and promote proper body mechanics. Progressed repetitions as indicated.           Date:  1/26/17 Date:  1/27/17 Date:  1/30/17   Activity/Exercise Parameters Parameters Parameters   Standing marching in place Multiple x bilaterally Multiple x B Seated AP's X 10 B X 10 B    Seated LAQ's X 10 B X 10 B    Seated hip ABD/ADD X 10 B partial range d/t edema and adipose tissue. Supine hip abd/add   X 10 B   Supine SAQ   X 20 B   Supine AP   X 20 B   Supine HS   X 20 B                            Treatment/Session Assessment:    · Response to Treatment:  See above  · Interdisciplinary Collaboration:  · Physical Therapist, Registered Nurse and   · After treatment position/precautions:  · Supine in bed, Bed/Chair-wheels locked, Bed in low position, Call light within reach, RN notified, Family at bedside and Nurse at bedside  · Compliance with Program/Exercises: Will assess as treatment progresses. · Recommendations/Intent for next treatment session: \"Next visit will focus on advancements to more challenging activities\".   Total Treatment Duration:  PT Patient Time In/Time Out  Time In: 1038  Time Out: [facility], PT, DPT

## 2017-01-30 NOTE — PROGRESS NOTES
Patient expressed concerns about primacro gtt rate. Discussed these concerns with patient and encouraged him to discuss with physician in the morning. Discussed the benefits of short term rehab and encouraged patient to consider this option. Patient mentioned concern about his \"disability check\" if he went to short term rehab. Patient feeling better about the option of short term rehab.

## 2017-01-31 LAB
ANION GAP BLD CALC-SCNC: 6 MMOL/L (ref 7–16)
BUN SERPL-MCNC: 47 MG/DL (ref 6–23)
CALCIUM SERPL-MCNC: 8.6 MG/DL (ref 8.3–10.4)
CHLORIDE SERPL-SCNC: 99 MMOL/L (ref 98–107)
CO2 SERPL-SCNC: 34 MMOL/L (ref 21–32)
CREAT SERPL-MCNC: 1.91 MG/DL (ref 0.8–1.5)
GLUCOSE BLD STRIP.AUTO-MCNC: 127 MG/DL (ref 65–100)
GLUCOSE BLD STRIP.AUTO-MCNC: 131 MG/DL (ref 65–100)
GLUCOSE BLD STRIP.AUTO-MCNC: 132 MG/DL (ref 65–100)
GLUCOSE BLD STRIP.AUTO-MCNC: 151 MG/DL (ref 65–100)
GLUCOSE BLD STRIP.AUTO-MCNC: 157 MG/DL (ref 65–100)
GLUCOSE SERPL-MCNC: 133 MG/DL (ref 65–100)
MM INDURATION POC: 0 MM (ref 0–5)
POTASSIUM SERPL-SCNC: 3.9 MMOL/L (ref 3.5–5.1)
PPD POC: NEGATIVE NEGATIVE
SODIUM SERPL-SCNC: 139 MMOL/L (ref 136–145)

## 2017-01-31 PROCEDURE — 65660000000 HC RM CCU STEPDOWN

## 2017-01-31 PROCEDURE — 82962 GLUCOSE BLOOD TEST: CPT

## 2017-01-31 PROCEDURE — 80048 BASIC METABOLIC PNL TOTAL CA: CPT | Performed by: INTERNAL MEDICINE

## 2017-01-31 PROCEDURE — 77030011256 HC DRSG MEPILEX <16IN NO BORD MOLN -A

## 2017-01-31 PROCEDURE — 3331090002 HH PPS REVENUE DEBIT

## 2017-01-31 PROCEDURE — 3331090001 HH PPS REVENUE CREDIT

## 2017-01-31 PROCEDURE — 77030019605

## 2017-01-31 PROCEDURE — 77030018846 HC SOL IRR STRL H20 ICUM -A

## 2017-01-31 PROCEDURE — 74011250636 HC RX REV CODE- 250/636: Performed by: INTERNAL MEDICINE

## 2017-01-31 PROCEDURE — 74011250637 HC RX REV CODE- 250/637: Performed by: NURSE PRACTITIONER

## 2017-01-31 PROCEDURE — 74011250637 HC RX REV CODE- 250/637: Performed by: INTERNAL MEDICINE

## 2017-01-31 PROCEDURE — 74011636637 HC RX REV CODE- 636/637: Performed by: NURSE PRACTITIONER

## 2017-01-31 PROCEDURE — 36591 DRAW BLOOD OFF VENOUS DEVICE: CPT

## 2017-01-31 RX ADMIN — DOCUSATE SODIUM 100 MG: 100 CAPSULE, LIQUID FILLED ORAL at 17:32

## 2017-01-31 RX ADMIN — INSULIN LISPRO 10 UNITS: 100 INJECTION, SOLUTION INTRAVENOUS; SUBCUTANEOUS at 12:37

## 2017-01-31 RX ADMIN — HYDRALAZINE HYDROCHLORIDE 25 MG: 25 TABLET, FILM COATED ORAL at 21:53

## 2017-01-31 RX ADMIN — Medication 5 ML: at 21:54

## 2017-01-31 RX ADMIN — MILRINONE LACTATE 0.5 MCG/KG/MIN: 200 INJECTION, SOLUTION INTRAVENOUS at 22:47

## 2017-01-31 RX ADMIN — APIXABAN 5 MG: 5 TABLET, FILM COATED ORAL at 21:53

## 2017-01-31 RX ADMIN — OXYCODONE HYDROCHLORIDE 15 MG: 15 TABLET ORAL at 23:36

## 2017-01-31 RX ADMIN — INSULIN GLARGINE 50 UNITS: 100 INJECTION, SOLUTION SUBCUTANEOUS at 23:01

## 2017-01-31 RX ADMIN — AMIODARONE HYDROCHLORIDE 200 MG: 200 TABLET ORAL at 08:20

## 2017-01-31 RX ADMIN — CARVEDILOL 25 MG: 25 TABLET, FILM COATED ORAL at 17:32

## 2017-01-31 RX ADMIN — GABAPENTIN 600 MG: 300 CAPSULE ORAL at 21:53

## 2017-01-31 RX ADMIN — APIXABAN 5 MG: 5 TABLET, FILM COATED ORAL at 08:20

## 2017-01-31 RX ADMIN — ALLOPURINOL 100 MG: 100 TABLET ORAL at 08:20

## 2017-01-31 RX ADMIN — COLCHICINE 0.6 MG: 0.6 TABLET, FILM COATED ORAL at 08:20

## 2017-01-31 RX ADMIN — MILRINONE LACTATE 0.5 MCG/KG/MIN: 200 INJECTION, SOLUTION INTRAVENOUS at 07:28

## 2017-01-31 RX ADMIN — HYDROCORTISONE: 5 CREAM TOPICAL at 09:00

## 2017-01-31 RX ADMIN — OXYCODONE HYDROCHLORIDE 15 MG: 15 TABLET ORAL at 12:49

## 2017-01-31 RX ADMIN — SPIRONOLACTONE 25 MG: 25 TABLET, FILM COATED ORAL at 08:20

## 2017-01-31 RX ADMIN — MILRINONE LACTATE 0.5 MCG/KG/MIN: 200 INJECTION, SOLUTION INTRAVENOUS at 18:50

## 2017-01-31 RX ADMIN — HYDRALAZINE HYDROCHLORIDE 25 MG: 25 TABLET, FILM COATED ORAL at 06:17

## 2017-01-31 RX ADMIN — Medication 10 ML: at 14:00

## 2017-01-31 RX ADMIN — DOCUSATE SODIUM 100 MG: 100 CAPSULE, LIQUID FILLED ORAL at 08:20

## 2017-01-31 RX ADMIN — FAMOTIDINE 20 MG: 20 TABLET ORAL at 17:32

## 2017-01-31 RX ADMIN — INSULIN LISPRO 3 UNITS: 100 INJECTION, SOLUTION INTRAVENOUS; SUBCUTANEOUS at 17:32

## 2017-01-31 RX ADMIN — CARVEDILOL 25 MG: 25 TABLET, FILM COATED ORAL at 08:20

## 2017-01-31 RX ADMIN — POLYETHYLENE GLYCOL 3350 34 G: 17 POWDER, FOR SOLUTION ORAL at 08:21

## 2017-01-31 RX ADMIN — HYDRALAZINE HYDROCHLORIDE 25 MG: 25 TABLET, FILM COATED ORAL at 13:50

## 2017-01-31 RX ADMIN — MILRINONE LACTATE 0.5 MCG/KG/MIN: 200 INJECTION, SOLUTION INTRAVENOUS at 15:06

## 2017-01-31 RX ADMIN — FAMOTIDINE 20 MG: 20 TABLET ORAL at 08:20

## 2017-01-31 RX ADMIN — MILRINONE LACTATE 0.5 MCG/KG/MIN: 200 INJECTION, SOLUTION INTRAVENOUS at 00:21

## 2017-01-31 RX ADMIN — MILRINONE LACTATE 0.5 MCG/KG/MIN: 200 INJECTION, SOLUTION INTRAVENOUS at 11:18

## 2017-01-31 RX ADMIN — GABAPENTIN 600 MG: 300 CAPSULE ORAL at 13:50

## 2017-01-31 RX ADMIN — Medication 10 ML: at 06:17

## 2017-01-31 RX ADMIN — PRAVASTATIN SODIUM 80 MG: 80 TABLET ORAL at 21:54

## 2017-01-31 RX ADMIN — ISOSORBIDE DINITRATE 20 MG: 20 TABLET ORAL at 06:19

## 2017-01-31 RX ADMIN — ISOSORBIDE DINITRATE 20 MG: 20 TABLET ORAL at 13:50

## 2017-01-31 RX ADMIN — ISOSORBIDE DINITRATE 20 MG: 20 TABLET ORAL at 21:54

## 2017-01-31 RX ADMIN — GABAPENTIN 600 MG: 300 CAPSULE ORAL at 06:17

## 2017-01-31 RX ADMIN — MILRINONE LACTATE 0.5 MCG/KG/MIN: 200 INJECTION, SOLUTION INTRAVENOUS at 04:11

## 2017-01-31 NOTE — PROGRESS NOTES
Patient refusing lasix gtt. Stated he doesn't understand why and that he wants to talk to a doctor. When asked what concerns patient had with lasix, patient yelled at his daughter who is sleeping at bedside demanding her to wake up saying \"She knows why\". Daughter stated \"It's bad for his kidneys. \" Patient visibly frustrated and upset and stating \"No doctor has talked to me this morning. \"  FATEMEH Phelps notified and stated she would contact Dr. Alejandro Solorzano. RN subjective note-  Daughter appears very worn out and frustrated with the way patient is treating her. She has not left the bedside since patient was admitted.

## 2017-01-31 NOTE — PROGRESS NOTES
Problem: Falls - Risk of  Goal: *Absence of falls  Outcome: Progressing Towards Goal  Pt progressing towards goal. No falls since admission. Bed low and locked. Call light within reach. Side rails x 2. Gripper socks applied. Personal belongings within reach. Pt verbalizes understanding to call for assistance.    Goal: *Knowledge of fall prevention  Outcome: Progressing Towards Goal  Call for assistance due to equipment

## 2017-01-31 NOTE — PROGRESS NOTES
Bedside and Verbal shift change report given to self (oncoming nurse) by [facility] RN (offgoing nurse). Report included the following information SBAR, Kardex, MAR and Recent Results. Verified Primacor at [employer] at bedside.

## 2017-01-31 NOTE — PROGRESS NOTES
Bedside and Verbal shift change report given to [address] [place] [address],[address] (oncoming nurse) by self (offgoing nurse). Report included the following information SBAR, Kardex, MAR and Recent Results. Verified Primacor at 0.5 at bedside.

## 2017-01-31 NOTE — PROGRESS NOTES
[facility] CARDIOLOGY PROGRESS NOTE           1/31/2017 10:49 AM    Admit Date: 1/19/2017      Subjective:     Patient seems a little better this am. Not losing enough fluid. Will try lasix gtt    Patient denies chest pain   ROS:  Cardiovascular:  As noted above    Objective:      Vitals:    01/31/17 0322 01/31/17 0411 01/31/17 0547 01/31/17 0617   BP:  131/73 116/69 136/72   Pulse:  82 83 88   Resp:   17    Temp:   97 °F (36.1 °C)    SpO2:   97%    Weight: (!) 191.8 kg (422 lb 12.8 oz)      Height:           Physical Exam:  General-No Acute Distress ill-appearing on CPAP  Neck- supple, no JVD  CV- regular rate and rhythm no MRG  Lung- clear bilaterally  Abd- obese  Genitourinary scrotal swelling excoriation on glans  Ext- Grade II stage II edema bilaterally. Skin- warm and dry    Data Review:   No results for input(s): NA, K, MG, BUN, CREA, GLU, WBC, HGB, HCT, PLT, INR, TROIQ, CHOL, TGL, LDLC, HDL, HGBEXT, HCTEXT, PLTEXT, HGBEXT, HCTEXT, PLTEXT in the last 72 hours.     No lab exists for component: TROIP, HDLC, TRP, INREXT, INREXT  Current Facility-Administered Medications   Medication Dose Route Frequency    furosemide (LASIX) 100 mg in 0.9% sodium chloride 100 mL infusion  10 mg/hr IntraVENous CONTINUOUS    milrinone (PRIMACOR) 20 MG/100 ML D5W infusion  0.5 mcg/kg/min IntraVENous CONTINUOUS    amiodarone (CORDARONE) tablet 200 mg  200 mg Oral DAILY    polyethylene glycol (MIRALAX) packet 34 g  34 g Oral DAILY    hydrocortisone (CORTAID) 0.5 % cream   Topical BID    allopurinol (ZYLOPRIM) tablet 100 mg  100 mg Oral DAILY    apixaban (ELIQUIS) tablet 5 mg  5 mg Oral Q12H    bisacodyl (DULCOLAX) suppository 10 mg  10 mg Rectal DAILY    carvedilol (COREG) tablet 25 mg  25 mg Oral BID WITH MEALS    colchicine tablet 0.6 mg  0.6 mg Oral DAILY    docusate sodium (COLACE) capsule 100 mg  100 mg Oral BID    gabapentin (NEURONTIN) capsule 600 mg  600 mg Oral TID    hydrALAZINE (APRESOLINE) tablet 25 mg  25 mg Oral TID    insulin glargine (LANTUS) injection 50 Units  50 Units SubCUTAneous QHS    insulin lispro (HUMALOG) injection 15 Units  15 Units SubCUTAneous TID WITH MEALS    isosorbide dinitrate (ISORDIL) tablet 20 mg  20 mg Oral TID    nitroglycerin (NITROSTAT) tablet 0.4 mg  0.4 mg SubLINGual Q5MIN PRN    oxyCODONE IR (OXY-IR) immediate release tablet 15 mg  15 mg Oral Q4H PRN    polyethylene glycol (MIRALAX) packet 17 g  17 g Oral DAILY PRN    pravastatin (PRAVACHOL) tablet 80 mg  80 mg Oral QHS    famotidine (PEPCID) tablet 20 mg  20 mg Oral BID    spironolactone (ALDACTONE) tablet 25 mg  25 mg Oral DAILY    sodium chloride (NS) flush 5-10 mL  5-10 mL IntraVENous Q8H    sodium chloride (NS) flush 5-10 mL  5-10 mL IntraVENous PRN    morphine injection 2 mg  2 mg IntraVENous Q4H PRN         Assessment/Plan:     Principal Problem:    Nonsustained ventricular tachycardia (HCC) The current medical regimen is effective;  continue present plan and medications.   (1/19/2017)    Active Problems:    Obstructive sleep apnea (2/15/2010) The current medical regimen is effective;  continue present plan and medications. 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 Patient somewhat inotropic dependent worsening cardiovascular status when milrinone was discontinued.   LFTs mildly mildly elevated 1/29/2017 is hard to determine endpoint for this patient     Patient not on ACE inhibitor therapy due to chronic kidney disease        CKD (chronic kidney disease) stage 3, GFR 30-59 ml/min (8/13/2014) potassium elevated 1/29/2017 potassium supplementation discontinued patient on Aldactone may need to be can discontinue the potassium remains persistently elevated      AICD (automatic cardioverter/defibrillator) present (10/21/2015)      Acute on chronic systolic (congestive) heart failure ([facility] (10/18/2016)The current medical regimen is effective;  continue present plan and medications. Atrial flutter ([facility] (10/20/2016) amio eliquis     Obesity hypoventilation syndrome ([facility] (10/24/2016)      Morbid obesity with BMI of 60.0-69.9, adult ([facility] (11/28/2016)     NSVT (nonsustained ventricular tachycardia) ([facility] (1/19/2017) no ventricular tachycardia noted 1/28/2017. ICD in place. Scrotal swelling improved with sling. Patient may need wound care for excoriation.  monitor          Paul Lucas MD  1/31/2017 10:49 AM

## 2017-01-31 NOTE — PROGRESS NOTES
Bedside shift change report given to Akua Younger RN. Report included the following information SBAR, Kardex, MAR and Recent Results. Primacor gtt rate verified.

## 2017-01-31 NOTE — PROGRESS NOTES
Patient wanted to know progress of request to speak to physician. Followed up with Román WELDON about whether or not Dr. Paula Sutherland would be able to speak to patient. Alejandro Finnegan stated she was unsure if he would be able to. Relayed this message to patient. Patient stated he did not want the lasix drip that was ordered until he could talk to a physician about it. Patient also asked why his order for demadex was discontinued. Patient states he was not woken up when the physician rounded this morning and is willing to wait until tomorrow morning to talk to whoever is rounding. Will pass along to night shift to wake patient up close to shift change tomorrow morning so he can speak to rounding physician.

## 2017-02-01 LAB
ANION GAP BLD CALC-SCNC: 7 MMOL/L (ref 7–16)
BUN SERPL-MCNC: 43 MG/DL (ref 6–23)
CALCIUM SERPL-MCNC: 8.7 MG/DL (ref 8.3–10.4)
CHLORIDE SERPL-SCNC: 98 MMOL/L (ref 98–107)
CO2 SERPL-SCNC: 34 MMOL/L (ref 21–32)
CREAT SERPL-MCNC: 1.96 MG/DL (ref 0.8–1.5)
EST. AVERAGE GLUCOSE BLD GHB EST-MCNC: 212 MG/DL
GLUCOSE BLD STRIP.AUTO-MCNC: 129 MG/DL (ref 65–100)
GLUCOSE BLD STRIP.AUTO-MCNC: 131 MG/DL (ref 65–100)
GLUCOSE BLD STRIP.AUTO-MCNC: 137 MG/DL (ref 65–100)
GLUCOSE BLD STRIP.AUTO-MCNC: 153 MG/DL (ref 65–100)
GLUCOSE SERPL-MCNC: 108 MG/DL (ref 65–100)
HBA1C MFR BLD: 9 % (ref 4.8–6)
MM INDURATION POC: 0 MM (ref 0–5)
POTASSIUM SERPL-SCNC: 3.8 MMOL/L (ref 3.5–5.1)
PPD POC: NEGATIVE NEGATIVE
SODIUM SERPL-SCNC: 139 MMOL/L (ref 136–145)

## 2017-02-01 PROCEDURE — 97530 THERAPEUTIC ACTIVITIES: CPT

## 2017-02-01 PROCEDURE — 74011000258 HC RX REV CODE- 258: Performed by: INTERNAL MEDICINE

## 2017-02-01 PROCEDURE — 82962 GLUCOSE BLOOD TEST: CPT

## 2017-02-01 PROCEDURE — 74011250636 HC RX REV CODE- 250/636: Performed by: INTERNAL MEDICINE

## 2017-02-01 PROCEDURE — 80048 BASIC METABOLIC PNL TOTAL CA: CPT | Performed by: INTERNAL MEDICINE

## 2017-02-01 PROCEDURE — 36591 DRAW BLOOD OFF VENOUS DEVICE: CPT

## 2017-02-01 PROCEDURE — 74011250637 HC RX REV CODE- 250/637: Performed by: INTERNAL MEDICINE

## 2017-02-01 PROCEDURE — 97110 THERAPEUTIC EXERCISES: CPT

## 2017-02-01 PROCEDURE — 74011250637 HC RX REV CODE- 250/637: Performed by: NURSE PRACTITIONER

## 2017-02-01 PROCEDURE — 3331090001 HH PPS REVENUE CREDIT

## 2017-02-01 PROCEDURE — 83036 HEMOGLOBIN GLYCOSYLATED A1C: CPT | Performed by: INTERNAL MEDICINE

## 2017-02-01 PROCEDURE — 65660000000 HC RM CCU STEPDOWN

## 2017-02-01 PROCEDURE — 3331090002 HH PPS REVENUE DEBIT

## 2017-02-01 PROCEDURE — 74011636637 HC RX REV CODE- 636/637: Performed by: NURSE PRACTITIONER

## 2017-02-01 RX ADMIN — Medication 5 ML: at 22:26

## 2017-02-01 RX ADMIN — CARVEDILOL 25 MG: 25 TABLET, FILM COATED ORAL at 17:45

## 2017-02-01 RX ADMIN — HYDROCORTISONE: 5 CREAM TOPICAL at 09:16

## 2017-02-01 RX ADMIN — AMIODARONE HYDROCHLORIDE 200 MG: 200 TABLET ORAL at 09:13

## 2017-02-01 RX ADMIN — DOCUSATE SODIUM 100 MG: 100 CAPSULE, LIQUID FILLED ORAL at 17:45

## 2017-02-01 RX ADMIN — APIXABAN 5 MG: 5 TABLET, FILM COATED ORAL at 22:19

## 2017-02-01 RX ADMIN — APIXABAN 5 MG: 5 TABLET, FILM COATED ORAL at 09:13

## 2017-02-01 RX ADMIN — Medication 10 ML: at 13:46

## 2017-02-01 RX ADMIN — GABAPENTIN 600 MG: 300 CAPSULE ORAL at 22:19

## 2017-02-01 RX ADMIN — SPIRONOLACTONE 25 MG: 25 TABLET, FILM COATED ORAL at 09:12

## 2017-02-01 RX ADMIN — FAMOTIDINE 20 MG: 20 TABLET ORAL at 09:13

## 2017-02-01 RX ADMIN — MILRINONE LACTATE 0.5 MCG/KG/MIN: 200 INJECTION, SOLUTION INTRAVENOUS at 09:20

## 2017-02-01 RX ADMIN — FUROSEMIDE 10 MG/HR: 10 INJECTION, SOLUTION INTRAVENOUS at 18:27

## 2017-02-01 RX ADMIN — MILRINONE LACTATE 0.5 MCG/KG/MIN: 200 INJECTION, SOLUTION INTRAVENOUS at 21:04

## 2017-02-01 RX ADMIN — INSULIN LISPRO 3 UNITS: 100 INJECTION, SOLUTION INTRAVENOUS; SUBCUTANEOUS at 17:44

## 2017-02-01 RX ADMIN — INSULIN GLARGINE 50 UNITS: 100 INJECTION, SOLUTION SUBCUTANEOUS at 22:22

## 2017-02-01 RX ADMIN — Medication 5 ML: at 05:42

## 2017-02-01 RX ADMIN — FAMOTIDINE 20 MG: 20 TABLET ORAL at 17:45

## 2017-02-01 RX ADMIN — HYDRALAZINE HYDROCHLORIDE 25 MG: 25 TABLET, FILM COATED ORAL at 13:07

## 2017-02-01 RX ADMIN — COLCHICINE 0.6 MG: 0.6 TABLET, FILM COATED ORAL at 09:13

## 2017-02-01 RX ADMIN — FUROSEMIDE 10 MG/HR: 10 INJECTION, SOLUTION INTRAVENOUS at 03:52

## 2017-02-01 RX ADMIN — ISOSORBIDE DINITRATE 20 MG: 20 TABLET ORAL at 13:07

## 2017-02-01 RX ADMIN — NITROGLYCERIN 0.4 MG: 0.4 TABLET SUBLINGUAL at 00:24

## 2017-02-01 RX ADMIN — ALLOPURINOL 100 MG: 100 TABLET ORAL at 09:12

## 2017-02-01 RX ADMIN — INSULIN LISPRO 3 UNITS: 100 INJECTION, SOLUTION INTRAVENOUS; SUBCUTANEOUS at 13:05

## 2017-02-01 RX ADMIN — ISOSORBIDE DINITRATE 20 MG: 20 TABLET ORAL at 22:19

## 2017-02-01 RX ADMIN — MILRINONE LACTATE 0.5 MCG/KG/MIN: 200 INJECTION, SOLUTION INTRAVENOUS at 13:23

## 2017-02-01 RX ADMIN — GABAPENTIN 600 MG: 300 CAPSULE ORAL at 13:07

## 2017-02-01 RX ADMIN — ISOSORBIDE DINITRATE 20 MG: 20 TABLET ORAL at 05:44

## 2017-02-01 RX ADMIN — HYDRALAZINE HYDROCHLORIDE 25 MG: 25 TABLET, FILM COATED ORAL at 22:19

## 2017-02-01 RX ADMIN — CARVEDILOL 25 MG: 25 TABLET, FILM COATED ORAL at 09:12

## 2017-02-01 RX ADMIN — PRAVASTATIN SODIUM 80 MG: 80 TABLET ORAL at 22:19

## 2017-02-01 RX ADMIN — MILRINONE LACTATE 0.5 MCG/KG/MIN: 200 INJECTION, SOLUTION INTRAVENOUS at 02:12

## 2017-02-01 RX ADMIN — GABAPENTIN 600 MG: 300 CAPSULE ORAL at 05:43

## 2017-02-01 RX ADMIN — INSULIN LISPRO 2 UNITS: 100 INJECTION, SOLUTION INTRAVENOUS; SUBCUTANEOUS at 09:15

## 2017-02-01 RX ADMIN — MILRINONE LACTATE 0.5 MCG/KG/MIN: 200 INJECTION, SOLUTION INTRAVENOUS at 16:49

## 2017-02-01 RX ADMIN — FUROSEMIDE 10 MG/HR: 10 INJECTION, SOLUTION INTRAVENOUS at 09:19

## 2017-02-01 RX ADMIN — DOCUSATE SODIUM 100 MG: 100 CAPSULE, LIQUID FILLED ORAL at 09:13

## 2017-02-01 RX ADMIN — POLYETHYLENE GLYCOL 3350 34 G: 17 POWDER, FOR SOLUTION ORAL at 09:12

## 2017-02-01 RX ADMIN — HYDRALAZINE HYDROCHLORIDE 25 MG: 25 TABLET, FILM COATED ORAL at 05:44

## 2017-02-01 NOTE — PROGRESS NOTES
[facility] CARDIOLOGY PROGRESS NOTE           2/1/2017 10:49 AM    Admit Date: 1/19/2017      Subjective:     Patient seems a little better this am. Responding to lasix gtt Patient denies chest pain   ROS:  Cardiovascular:  As noted above    Objective:      Vitals:    02/01/17 0025 02/01/17 0212 02/01/17 0544 02/01/17 0552   BP: 133/79 119/81 134/77 134/77   Pulse: 84 85 86 88   Resp: 18   20   Temp: 98 °F (36.7 °C)   97.9 °F (36.6 °C)   SpO2: 97%   90%   Weight:       Height:           Physical Exam:  General-No Acute Distress ill-appearing on CPAP  Neck- supple, no JVD  CV- regular rate and rhythm no MRG  Lung- clear bilaterally  Abd- obese  Genitourinary scrotal swelling excoriation on glans  Ext- Grade II stage II edema bilaterally.   Skin- warm and dry    Data Review:   Recent Labs      01/31/17   0958   NA  139   K  3.9   BUN  47*   CREA  1.91*   GLU  133*     Current Facility-Administered Medications   Medication Dose Route Frequency    furosemide (LASIX) 100 mg in 0.9% sodium chloride 100 mL infusion  10 mg/hr IntraVENous CONTINUOUS    milrinone (PRIMACOR) 20 MG/100 ML D5W infusion  0.5 mcg/kg/min IntraVENous CONTINUOUS    amiodarone (CORDARONE) tablet 200 mg  200 mg Oral DAILY    polyethylene glycol (MIRALAX) packet 34 g  34 g Oral DAILY    hydrocortisone (CORTAID) 0.5 % cream   Topical BID    allopurinol (ZYLOPRIM) tablet 100 mg  100 mg Oral DAILY    apixaban (ELIQUIS) tablet 5 mg  5 mg Oral Q12H    bisacodyl (DULCOLAX) suppository 10 mg  10 mg Rectal DAILY    carvedilol (COREG) tablet 25 mg  25 mg Oral BID WITH MEALS    colchicine tablet 0.6 mg  0.6 mg Oral DAILY    docusate sodium (COLACE) capsule 100 mg  100 mg Oral BID    gabapentin (NEURONTIN) capsule 600 mg  600 mg Oral TID    hydrALAZINE (APRESOLINE) tablet 25 mg  25 mg Oral TID    insulin glargine (LANTUS) injection 50 Units  50 Units SubCUTAneous QHS    insulin lispro (HUMALOG) injection 15 Units  15 Units SubCUTAneous TID WITH MEALS    isosorbide dinitrate (ISORDIL) tablet 20 mg  20 mg Oral TID    nitroglycerin (NITROSTAT) tablet 0.4 mg  0.4 mg SubLINGual Q5MIN PRN    oxyCODONE IR (OXY-IR) immediate release tablet 15 mg  15 mg Oral Q4H PRN    polyethylene glycol (MIRALAX) packet 17 g  17 g Oral DAILY PRN    pravastatin (PRAVACHOL) tablet 80 mg  80 mg Oral QHS    famotidine (PEPCID) tablet 20 mg  20 mg Oral BID    spironolactone (ALDACTONE) tablet 25 mg  25 mg Oral DAILY    sodium chloride (NS) flush 5-10 mL  5-10 mL IntraVENous Q8H    sodium chloride (NS) flush 5-10 mL  5-10 mL IntraVENous PRN    morphine injection 2 mg  2 mg IntraVENous Q4H PRN         Assessment/Plan:     Principal Problem:    Nonsustained ventricular tachycardia (HCC) The current medical regimen is effective;  continue present plan and medications.   (1/19/2017)    Active Problems:    Obstructive sleep apnea (2/15/2010) The current medical regimen is effective;  continue present plan and medications. 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 Patient somewhat inotropic dependent worsening cardiovascular status when milrinone was discontinued. LFTs mildly mildly elevated 1/29/2017 is hard to determine endpoint for this patient     Patient not on ACE inhibitor therapy due to chronic kidney disease        CKD (chronic kidney disease) stage 3, GFR 30-59 ml/min (8/13/2014) potassium elevated 1/29/2017 potassium supplementation discontinued patient on Aldactone may need to be can discontinue the potassium remains persistently elevated      AICD (automatic cardioverter/defibrillator) present (10/21/2015)      Acute on chronic systolic (congestive) heart failure ([facility] (10/18/2016)The current medical regimen is effective;  continue present plan and medications.         Atrial flutter ([facility] (10/20/2016) amio eliquis     Obesity hypoventilation syndrome ([facility] (10/24/2016)      Morbid obesity with BMI of 60.0-69.9, adult ([facility] (11/28/2016)     NSVT (nonsustained ventricular tachycardia) ([facility] (1/19/2017) no ventricular tachycardia noted 1/28/2017. ICD in place. Scrotal swelling improved with sling. Patient may need wound care for excoriation.  monitor          Tej Flynn MD  2/1/2017 10:49 AM

## 2017-02-01 NOTE — PROGRESS NOTES
Pt ACHS 127. Denies Lantus at 2200. States he needs to eat a sandwich with a drink and can administer medication at 2300.    50 units of Lantus administered at 2302 after eating a sandwich.

## 2017-02-01 NOTE — PROGRESS NOTES
Problem: Mobility Impaired (Adult and Pediatric)  Goal: *Acute Goals and Plan of Care (Insert Text)  LTG:  (1.)Mr. Pollard will move from supine to sit and sit to supine , scoot up and down and roll side to side with INDEPENDENT within 7 day(s) from flat surface without handrail. (2.)Mr. Pollard will transfer from bed to chair and chair to bed with INDEPENDENT using the least restrictive device within 7 day(s). (3.)Mr. Pollard will ambulate with INDEPENDENT for 150 feet with the least restrictive device within 7 day(s), O2 stats >90%. ___________________________________________________________________________________________      PHYSICAL THERAPY: Daily Note, Treatment Day: 4th and AM 2/1/2017  INPATIENT: Hospital Day: 14  Payor: CARE IMPROVEMENT PLUS / [place] / Product Type: [employer] Care Medicare /      NAME/AGE/GENDER: Camelia Butler is a 45 y.o. male         PRIMARY DIAGNOSIS: NSVT (nonsustained ventricular tachycardia) ([facility] Nonsustained ventricular tachycardia (HCC) Nonsustained ventricular tachycardia (HCC)        ICD-10: Treatment Diagnosis:       · Generalized Muscle Weakness (M62.81)  · Difficulty in walking, Not elsewhere classified (R26.2)   Precaution/Allergies:  Dilaudid [hydromorphone]; Iodinated contrast media - oral and iv dye; and Penicillins       ASSESSMENT:      Mr. García Branch presents sitting EOB, agreeable to therapy with nurse in room to adminster medications. He c/o scrotal swelling affecting his ability to ambulate; however, agreed to attempt with PT today. He stood with supervision and ambulated about 5 feet forward and then walked 5 feet backwards to bed. He was not able to ambulate around room to chair due to scrotal discomfort. He sat EOB and performed seated there ex as seen below. Scooting alongside bed performed without assistance from PT. He returned supine at end of session with Vicky to LEs.   Patients overall increased girth/obesity affects his ability to participate in mobility and seated exercises. Progress per POC. This section established at most recent assessment   PROBLEM LIST (Impairments causing functional limitations):  1. Decreased Strength  2. Decreased ADL/Functional Activities  3. Decreased Transfer Abilities  4. Decreased Ambulation Ability/Technique  5. Decreased Balance  6. Decreased Activity Tolerance  7. Increased Fatigue  8. Increased Shortness of Breath  9. Edema/Girth    INTERVENTIONS PLANNED: (Benefits and precautions of physical therapy have been discussed with the patient.)  1. Balance Exercise  2. Bed Mobility  3. Family Education  4. Gait Training  5. Home Exercise Program (HEP)  6. Therapeutic Activites  7. Therapeutic Exercise/Strengthening  8. Transfer Training  9. Group Therapy      TREATMENT PLAN: Frequency/Duration: 3 times a week for duration of hospital stay  Rehabilitation Potential For Stated Goals: GOOD      RECOMMENDED REHABILITATION/EQUIPMENT: (at time of discharge pending progress): Continue Skilled Therapy and HHPT or rehab pending progress. Rosa Leyva HISTORY:   History of Present Injury/Illness (Reason for Referral):  Minal Lo is a 45 y.o. male with known NICM with documented EF 10 % with [employer] single chamber ICD in place. He was just discharged on 1/13 with initial complaint of constipation and volume overload. He developed worsening renal failure necessitating hold his diuretics temporarily. He was discharged home on addition of demadex 100 mg daily and miralax for constipation. Really since mid December he has not felt well with worsening orthopnea, increasing abdominal girth, increased wt and lower ext edema. Today while driving in car he felt presyncopal and was able to pull to side of rode. He did not loose consciousness. He had repeat episode of presyncope prior to coming to ER. While in ER he was noted to have nonsustained ventricular tachycardia. He is not on antiarrhythmia agent. Interrogation of his device revealed normal operation but his VT zone is set at 158 bpm with noted VT in ER at approximately 145 bpm.   Past Medical History/Comorbidities:   Mr. Reji Arrieta  has a past medical history of Acute systolic heart failure [facility]) (May, 2009); AICD (automatic cardioverter/defibrillator) present (10/21/2015); Atypical chest pain (4/23/2010); Bronchitis; CAD (coronary artery disease); Cardiomyopathy; Chest pain (10/21/2015); Chronic kidney disease; Chronic pain; Congestive heart failure (CHF) ([address] (10/21/2015); COPD; Diabetes ([address] Diabetes mellitus type II, uncontrolled ([address] (7/2/2013); GERD (gastroesophageal reflux disease); Gout; Heart failure ([address] Hypertension; Hyponatremia (12/20/2010); Ill-defined condition; Morbid obesity ([address] Nausea & vomiting (11/30/2015); Neuropathy; Obstructive sleep apnea (2/15/2010); Other unknown and unspecified cause of morbidity or mortality; Severe sepsis ([address] and Unspecified sleep apnea. He also has no past medical history of Adverse effect of anesthesia; Aneurysm ([address] Coagulation disorder ([address] DEMENTIA; Difficult intubation; Malignant hyperthermia due to anesthesia; Neurological disorder; Other ill-defined conditions(799.89); Pseudocholinesterase deficiency; or Psychiatric disorder. Mr. Reji Arrieta  has a past surgical history that includes pacemaker; heart catheterization ([place] 10, 2008); and chest surgery procedure unlisted. Social History/Living Environment:   Home Environment:  (daughters house)  # Steps to Enter: 0  One/Two Story Residence: One story  Living Alone: No  Support Systems: Family member(s), Friends \ neighbors  Patient Expects to be Discharged to[de-identified] Private residence  Current DME Used/Available at Home: None  Tub or Shower Type: Tub/Shower combination  Prior Level of Function/Work/Activity:  Lives with daughter; typically independent with ambulation for short distances; drives. Personal Factors:          Sex:  male        Age:  45 y.o. Number of Personal Factors/Comorbidities that affect the Plan of Care:  CHF, DM, obesity, edema 3+: HIGH COMPLEXITY   EXAMINATION:   Most Recent Physical Functioning:   Gross Assessment:  AROM: Generally decreased, functional (limited LE mobility due to girth and scrotal swelling.)  PROM: Generally decreased, functional  Strength: Generally decreased, functional (4/5 LEs)  Coordination: Generally decreased, functional  Tone: Normal  Sensation: Intact               Posture:  Posture (WDL): Exceptions to WDL  Posture Assessment: Forward head  Balance:  Sitting: Intact  Sitting - Static: Good (unsupported)  Sitting - Dynamic: Good (unsupported)  Standing: Impaired  Standing - Static: Good  Standing - Dynamic : Fair Bed Mobility:  Sit to Supine: Minimum assistance  Wheelchair Mobility:     Transfers:  Sit to Stand: Supervision  Stand to Sit: Supervision  Gait:     Base of Support: Widened  Step Length: Right shortened;Left shortened  Gait Abnormalities: Decreased step clearance  Distance (ft): 10 Feet (ft)  Assistive Device: Walker, rolling  Ambulation - Level of Assistance: Supervision       Body Structures Involved:  1. Lungs  2. Joints  3. Muscles Body Functions Affected:  1. Respiratory  2. Movement Related Activities and Participation Affected:  1. General Tasks and Demands  2. Mobility  3. Self Care  4. Community, Social and [place]   Number of elements that affect the Plan of Care: 4+: HIGH COMPLEXITY   CLINICAL PRESENTATION:   Presentation: Stable and uncomplicated: LOW COMPLEXITY   CLINICAL DECISION MAKIN [facility] Inpatient Short Form  How much difficulty does the patient currently have. .. Unable A Lot A Little None   1. Turning over in bed (including adjusting bedclothes, sheets and blankets)? [ ] 1   [ ] 2   [X] 3   [ ] 4   2.   Sitting down on and standing up from a chair with arms ( e.g., wheelchair, bedside commode, etc.)   [ ] 1   [ ] 2   [X] 3   [ ] 4   3.  Moving from lying on back to sitting on the side of the bed? [ ] 1   [ ] 2   [X] 3   [ ] 4   How much help from another person does the patient currently need. .. Total A Lot A Little None   4. Moving to and from a bed to a chair (including a wheelchair)? [ ] 1   [ ] 2   [X] 3   [ ] 4   5. Need to walk in hospital room? [ ] 1   [ ] 2   [X] 3   [ ] 4   6. Climbing 3-5 steps with a railing? [ ] 1   [ ] 2   [X] 3   [ ] 4   © 2007, Trustees of [address] [place] [address] [address] [address], under license to [employer]. All rights reserved    Score:  Initial: 18 Most Recent: X (Date: -- )     Interpretation of Tool:  Represents activities that are increasingly more difficult (i.e. Bed mobility, Transfers, Gait). Score 24 23 22-20 19-15 14-10 9-7 6       Modifier CH CI CJ CK CL CM CN         · Mobility - Walking and Moving Around:               - CURRENT STATUS:    CK - 40%-59% impaired, limited or restricted               - GOAL STATUS:           CJ - 20%-39% impaired, limited or restricted               - D/C STATUS:                       ---------------To be determined---------------  Payor: CARE IMPROVEMENT PLUS / [place] CARE IMPROVEMENT [place] / Product Type: Managed Care Medicare /       Medical Necessity:     · Patient is expected to demonstrate progress in strength, range of motion, balance and coordination to decrease assistance required with overall functional mobility, transfers, ambulation. · Patient demonstrates good rehab potential due to higher previous functional level. Reason for Services/Other Comments:  · Patient continues to require present interventions due to patient's inability to perform bed mobility, transfers, ambulation all safely and effectively at prior level of function of independence.    Use of outcome tool(s) and clinical judgement create a POC that gives a: Clear prediction of patient's progress: LOW COMPLEXITY                 TREATMENT:      Pre-treatment Symptoms/Complaints:  Tremors  Pain: Initial: . Pain Intensity 1: 0  Post Session:  6/10       Therapeutic Activity: (    15 min): Therapeutic activities including bed mobility, ambulation 10 feet today using rolling walker, sit to stand, scooting to improve mobility, strength and balance. Required maximal manual and verbal cues   to promote coordination of bilateral, upper extremity(s), lower extremity(s). Therapeutic Exercise: ( 8 minutes):  Exercises per grid below to improve mobility and strength. Required minimal visual, verbal and manual cues to promote proper body alignment and promote proper body mechanics. Progressed repetitions as indicated. Date:  1/26/17 Date:  1/27/17 Date:  1/30/17 Date:  2/1/17   Activity/Exercise Parameters Parameters Parameters    Standing marching in place Multiple x bilaterally Multiple x B  10B A   Seated AP's X 10 B X 10 B  10 B A   Seated LAQ's X 10 B X 10 B  10 B A   Seated hip ABD/ADD X 10 B partial range d/t edema and adipose tissue. Supine hip abd/add   X 10 B    Supine SAQ   X 20 B    Supine AP   X 20 B    Supine HS   X 20 B                                Treatment/Session Assessment:    · Response to Treatment:  See above-- no complaints  · Interdisciplinary Collaboration:  · Physical Therapist and Registered Nurse  · After treatment position/precautions:  · Supine in bed, Bed/Chair-wheels locked, Bed in low position, Call light within reach, RN notified, Family at bedside and Nurse at bedside  · Compliance with Program/Exercises: Will assess as treatment progresses. · Recommendations/Intent for next treatment session: \"Next visit will focus on advancements to more challenging activities\".   Total Treatment Duration:  PT Patient Time In/Time Out  Time In: 0920  Time Out: Robbie Martinez [address] [place] [address] ANGÉLICA River

## 2017-02-01 NOTE — PROGRESS NOTES
Bedside and Verbal shift change report given to Melva Puri (oncoming nurse) by self (offgoing nurse). Report included the following information SBAR, Kardex, MAR and Recent Results. Verified Lasix 10 gtt and Primacor 0.5 mcg at bedside.

## 2017-02-01 NOTE — PROGRESS NOTES
Pt called to speak to nurse. Stated he had a dry mouth, feels like fluid is building up, and wants to speak to a physician. Spoke to Jane Kingsley, NP, and she stated she would talk to patient. She discussed the importance of the lasix drip that the patient refused due to physician ordering the medication this morning and not discussing the plan of care. She also discussed the importance of the Primacor drip which is helping to pump his heart. Will continue to monitor and will provide all needs of patient. Pt verbalizes he will resume lasix drip.

## 2017-02-01 NOTE — PROGRESS NOTES
Bedside and Verbal shift change report given to self (oncoming nurse) by Mirlande Tello RN (offgoing nurse). Report included the following information SBAR, Kardex, MAR and Recent Results. Verified Primacor at 0.5. Discussed with offgoing nurse the importance to wake up patient when physician or SW enters the room.

## 2017-02-01 NOTE — PROGRESS NOTES
Bedside shift change report given to Beau Selby RN. Report included the following information SBAR, Kardex, MAR and Recent Results. Primacor gtt rate verified. At 0.5mcg/kg/min.

## 2017-02-01 NOTE — PROGRESS NOTES
SW met with patient and daughter for long discussion about rehab. Bed Accepted at [facility]. Pt is receptive to this plan. Pt stated he would like to return to [place] where most of his family is living - he asked if it was a good idea to be transferred to a hospital there at this time. SW advised that it would likely be best to remain here, follow through with rehab and once stronger he could follow through on plans to relocate to [place]. Pt stated he has four children, 2 here in [place], 2 in [place]. Apparently pt raised all four of children alone - he is very concerned about being a burden to his daughter here and feels that moving to where his mother and other extended family is would be the best plan. Pt is agreeable to remain here until he is ready to go to rehab at [facility]. SW will remain available to coordinate final D/C plans.

## 2017-02-02 LAB
ANION GAP BLD CALC-SCNC: 8 MMOL/L (ref 7–16)
BUN SERPL-MCNC: 36 MG/DL (ref 6–23)
CALCIUM SERPL-MCNC: 8.5 MG/DL (ref 8.3–10.4)
CHLORIDE SERPL-SCNC: 100 MMOL/L (ref 98–107)
CO2 SERPL-SCNC: 33 MMOL/L (ref 21–32)
CREAT SERPL-MCNC: 1.67 MG/DL (ref 0.8–1.5)
GLUCOSE BLD STRIP.AUTO-MCNC: 114 MG/DL (ref 65–100)
GLUCOSE BLD STRIP.AUTO-MCNC: 146 MG/DL (ref 65–100)
GLUCOSE BLD STRIP.AUTO-MCNC: 160 MG/DL (ref 65–100)
GLUCOSE BLD STRIP.AUTO-MCNC: 91 MG/DL (ref 65–100)
GLUCOSE SERPL-MCNC: 97 MG/DL (ref 65–100)
MM INDURATION POC: 0 MM (ref 0–5)
POTASSIUM SERPL-SCNC: 3.7 MMOL/L (ref 3.5–5.1)
PPD POC: NEGATIVE NEGATIVE
SODIUM SERPL-SCNC: 141 MMOL/L (ref 136–145)

## 2017-02-02 PROCEDURE — 74011250637 HC RX REV CODE- 250/637: Performed by: NURSE PRACTITIONER

## 2017-02-02 PROCEDURE — 97110 THERAPEUTIC EXERCISES: CPT

## 2017-02-02 PROCEDURE — 74011250636 HC RX REV CODE- 250/636: Performed by: INTERNAL MEDICINE

## 2017-02-02 PROCEDURE — 3331090001 HH PPS REVENUE CREDIT

## 2017-02-02 PROCEDURE — 65660000000 HC RM CCU STEPDOWN

## 2017-02-02 PROCEDURE — 82962 GLUCOSE BLOOD TEST: CPT

## 2017-02-02 PROCEDURE — 74011250637 HC RX REV CODE- 250/637: Performed by: INTERNAL MEDICINE

## 2017-02-02 PROCEDURE — 74011000258 HC RX REV CODE- 258: Performed by: INTERNAL MEDICINE

## 2017-02-02 PROCEDURE — 77030019605

## 2017-02-02 PROCEDURE — 74011636637 HC RX REV CODE- 636/637: Performed by: NURSE PRACTITIONER

## 2017-02-02 PROCEDURE — 3331090002 HH PPS REVENUE DEBIT

## 2017-02-02 PROCEDURE — 36591 DRAW BLOOD OFF VENOUS DEVICE: CPT

## 2017-02-02 PROCEDURE — 80048 BASIC METABOLIC PNL TOTAL CA: CPT | Performed by: INTERNAL MEDICINE

## 2017-02-02 RX ADMIN — INSULIN LISPRO 3 UNITS: 100 INJECTION, SOLUTION INTRAVENOUS; SUBCUTANEOUS at 13:06

## 2017-02-02 RX ADMIN — MILRINONE LACTATE 0.5 MCG/KG/MIN: 200 INJECTION, SOLUTION INTRAVENOUS at 00:29

## 2017-02-02 RX ADMIN — MILRINONE LACTATE 0.5 MCG/KG/MIN: 200 INJECTION, SOLUTION INTRAVENOUS at 15:12

## 2017-02-02 RX ADMIN — HYDRALAZINE HYDROCHLORIDE 25 MG: 25 TABLET, FILM COATED ORAL at 22:02

## 2017-02-02 RX ADMIN — GABAPENTIN 600 MG: 300 CAPSULE ORAL at 05:39

## 2017-02-02 RX ADMIN — MILRINONE LACTATE 0.5 MCG/KG/MIN: 200 INJECTION, SOLUTION INTRAVENOUS at 08:00

## 2017-02-02 RX ADMIN — FAMOTIDINE 20 MG: 20 TABLET ORAL at 18:11

## 2017-02-02 RX ADMIN — ISOSORBIDE DINITRATE 20 MG: 20 TABLET ORAL at 13:10

## 2017-02-02 RX ADMIN — FUROSEMIDE 10 MG/HR: 10 INJECTION, SOLUTION INTRAVENOUS at 03:41

## 2017-02-02 RX ADMIN — CARVEDILOL 25 MG: 25 TABLET, FILM COATED ORAL at 18:10

## 2017-02-02 RX ADMIN — MILRINONE LACTATE 0.5 MCG/KG/MIN: 200 INJECTION, SOLUTION INTRAVENOUS at 19:16

## 2017-02-02 RX ADMIN — APIXABAN 5 MG: 5 TABLET, FILM COATED ORAL at 22:02

## 2017-02-02 RX ADMIN — ISOSORBIDE DINITRATE 20 MG: 20 TABLET ORAL at 05:39

## 2017-02-02 RX ADMIN — GABAPENTIN 600 MG: 300 CAPSULE ORAL at 13:10

## 2017-02-02 RX ADMIN — Medication 10 ML: at 22:05

## 2017-02-02 RX ADMIN — HYDRALAZINE HYDROCHLORIDE 25 MG: 25 TABLET, FILM COATED ORAL at 13:09

## 2017-02-02 RX ADMIN — Medication 5 ML: at 13:10

## 2017-02-02 RX ADMIN — MILRINONE LACTATE 0.5 MCG/KG/MIN: 200 INJECTION, SOLUTION INTRAVENOUS at 23:08

## 2017-02-02 RX ADMIN — DOCUSATE SODIUM 100 MG: 100 CAPSULE, LIQUID FILLED ORAL at 09:08

## 2017-02-02 RX ADMIN — AMIODARONE HYDROCHLORIDE 200 MG: 200 TABLET ORAL at 09:07

## 2017-02-02 RX ADMIN — COLCHICINE 0.6 MG: 0.6 TABLET, FILM COATED ORAL at 09:08

## 2017-02-02 RX ADMIN — FUROSEMIDE 10 MG/HR: 10 INJECTION, SOLUTION INTRAVENOUS at 23:08

## 2017-02-02 RX ADMIN — POLYETHYLENE GLYCOL 3350 34 G: 17 POWDER, FOR SOLUTION ORAL at 09:06

## 2017-02-02 RX ADMIN — ALLOPURINOL 100 MG: 100 TABLET ORAL at 09:08

## 2017-02-02 RX ADMIN — CARVEDILOL 25 MG: 25 TABLET, FILM COATED ORAL at 09:07

## 2017-02-02 RX ADMIN — MILRINONE LACTATE 0.5 MCG/KG/MIN: 200 INJECTION, SOLUTION INTRAVENOUS at 03:40

## 2017-02-02 RX ADMIN — OXYCODONE HYDROCHLORIDE 15 MG: 15 TABLET ORAL at 23:13

## 2017-02-02 RX ADMIN — Medication 10 ML: at 05:29

## 2017-02-02 RX ADMIN — HYDRALAZINE HYDROCHLORIDE 25 MG: 25 TABLET, FILM COATED ORAL at 05:39

## 2017-02-02 RX ADMIN — APIXABAN 5 MG: 5 TABLET, FILM COATED ORAL at 09:08

## 2017-02-02 RX ADMIN — SPIRONOLACTONE 25 MG: 25 TABLET, FILM COATED ORAL at 09:07

## 2017-02-02 RX ADMIN — DOCUSATE SODIUM 100 MG: 100 CAPSULE, LIQUID FILLED ORAL at 03:00

## 2017-02-02 RX ADMIN — MILRINONE LACTATE 0.5 MCG/KG/MIN: 200 INJECTION, SOLUTION INTRAVENOUS at 11:19

## 2017-02-02 RX ADMIN — FAMOTIDINE 20 MG: 20 TABLET ORAL at 09:07

## 2017-02-02 RX ADMIN — ISOSORBIDE DINITRATE 20 MG: 20 TABLET ORAL at 22:02

## 2017-02-02 RX ADMIN — INSULIN GLARGINE 50 UNITS: 100 INJECTION, SOLUTION SUBCUTANEOUS at 23:07

## 2017-02-02 RX ADMIN — PRAVASTATIN SODIUM 80 MG: 80 TABLET ORAL at 22:01

## 2017-02-02 RX ADMIN — GABAPENTIN 600 MG: 300 CAPSULE ORAL at 22:01

## 2017-02-02 RX ADMIN — FUROSEMIDE 10 MG/HR: 10 INJECTION, SOLUTION INTRAVENOUS at 14:45

## 2017-02-02 NOTE — PROGRESS NOTES
[facility] CARDIOLOGY PROGRESS NOTE           2/2/2017 9:44 AM    Admit Date: 1/19/2017      Subjective:   Sedated on cpap    ROS:  Cardiovascular:  As noted above    Objective:      Vitals:    02/02/17 0340 02/02/17 0535 02/02/17 0539 02/02/17 0730   BP: 106/57 131/59 102/52 132/86   Pulse: 90 87 91 90   Resp:  18  18   Temp:  97.9 °F (36.6 °C)  97.9 °F (36.6 °C)   SpO2:  96%  95%   Weight:       Height:           Physical Exam:  General-No Acute Distress  Neck- supple, no JVD  CV- regular rate and rhythm no MRG  Lung- clear bilaterally  Abd- soft, nontender, nondistended  Ext- no edema bilaterally. Skin- warm and dry    Data Review:   Recent Labs      02/02/17   0530  02/01/17   0540   NA  141  139   K  3.7  3.8   BUN  36*  43*   CREA  1.67*  1.96*   GLU  97  108*       Assessment/Plan:     Principal Problem:    Nonsustained ventricular tachycardia (HCC) (1/19/2017)    Active Problems:    Obstructive sleep apnea (2/15/2010)      Nonischemic dilated cardiomyopathy ([address] (7/2/2013)      Overview: LVEF 10-15% on ECHO from 12/17/10      CKD (chronic kidney disease) stage 3, GFR 30-59 ml/min (8/13/2014)      AICD (automatic cardioverter/defibrillator) present (10/21/2015)      Acute on chronic systolic (congestive) heart failure ([address] (10/18/2016)      Atrial flutter ([address] (10/20/2016)      Obesity hypoventilation syndrome ([address] (10/24/2016)      Morbid obesity with BMI of 60.0-69.9, adult ([address] (11/28/2016)      Syncope (1/19/2017)      NSVT (nonsustained ventricular tachycardia) ([address] (1/19/2017)      ///  Current therapy appears to be effective. Cont.  Lb Perez MD  2/2/2017 9:44 AM

## 2017-02-02 NOTE — PROGRESS NOTES
Bedside and Verbal shift change report given to Annie Denson RN (oncoming nurse) by self Althea Overlie nurse). Report included the following information SBAR, Kardex, MAR and Recent Results.

## 2017-02-02 NOTE — PROGRESS NOTES
Bedside and Verbal shift change report given to self (oncoming nurse) by Baldomero Vera RN (offgoing nurse). Report included the following information SBAR, Kardex, MAR and Recent Results.

## 2017-02-02 NOTE — PROGRESS NOTES
Problem: Nutrition Deficit  Goal: *Optimize nutritional status  Nutrition f/u day 14  Assessment  Diet order(s): CCHO, Cardiac  Food,Nutrition, and Pertinent History: Patient continues to be diuresed with 10 ml/hr lasix. He reports eating 100% of meals. Plan for patient to go to rehab facility once hemodynamically stable for discharge. Labs are remarkable for Cr 1.67, K 3.7, POC glucose 114-153. Anthropometrics: Height: 5' 6\" (167.6 cm), Weight Source: Standing scale (comment), Weight: 185.1 kg (408 pounds), Body mass index is 67.2 kg/(m^2). BMI class of morbid obesity class III. RN notes patient with 3+ edema to lower extremities. Patient has had a 17 pound fluid related weight loss in ~5 days. Macronutrient Needs:  · EER: 8524-5769 kcal /day (20-25 kcal/kg I BW)  · EPR: 58-73 grams protein/day (0.8-1 grams/kg IBW)(GFR 56)  Intake/Comparative Standards:  Average intake for past 6 day(s)/9 recorded meal(s): 97%. This potentially meets ~100% of kcal and ~100% of protein needs      Intervention: Meals and snacks: Continue current diet.       Megan Peters [address], [address] [place] [address], [facility] [address], [phone]

## 2017-02-03 LAB
ANION GAP BLD CALC-SCNC: 7 MMOL/L (ref 7–16)
BUN SERPL-MCNC: 34 MG/DL (ref 6–23)
CALCIUM SERPL-MCNC: 8.6 MG/DL (ref 8.3–10.4)
CHLORIDE SERPL-SCNC: 100 MMOL/L (ref 98–107)
CO2 SERPL-SCNC: 34 MMOL/L (ref 21–32)
CREAT SERPL-MCNC: 1.72 MG/DL (ref 0.8–1.5)
GLUCOSE BLD STRIP.AUTO-MCNC: 114 MG/DL (ref 65–100)
GLUCOSE BLD STRIP.AUTO-MCNC: 122 MG/DL (ref 65–100)
GLUCOSE BLD STRIP.AUTO-MCNC: 145 MG/DL (ref 65–100)
GLUCOSE BLD STRIP.AUTO-MCNC: 159 MG/DL (ref 65–100)
GLUCOSE SERPL-MCNC: 110 MG/DL (ref 65–100)
MAGNESIUM SERPL-MCNC: 2.2 MG/DL (ref 1.8–2.4)
POTASSIUM SERPL-SCNC: 3.7 MMOL/L (ref 3.5–5.1)
SODIUM SERPL-SCNC: 141 MMOL/L (ref 136–145)

## 2017-02-03 PROCEDURE — 74011250637 HC RX REV CODE- 250/637: Performed by: NURSE PRACTITIONER

## 2017-02-03 PROCEDURE — 3331090002 HH PPS REVENUE DEBIT

## 2017-02-03 PROCEDURE — 36591 DRAW BLOOD OFF VENOUS DEVICE: CPT

## 2017-02-03 PROCEDURE — 74011250637 HC RX REV CODE- 250/637: Performed by: INTERNAL MEDICINE

## 2017-02-03 PROCEDURE — 3331090001 HH PPS REVENUE CREDIT

## 2017-02-03 PROCEDURE — 94760 N-INVAS EAR/PLS OXIMETRY 1: CPT

## 2017-02-03 PROCEDURE — 77030018846 HC SOL IRR STRL H20 ICUM -A

## 2017-02-03 PROCEDURE — 80048 BASIC METABOLIC PNL TOTAL CA: CPT | Performed by: NURSE PRACTITIONER

## 2017-02-03 PROCEDURE — 74011250636 HC RX REV CODE- 250/636: Performed by: INTERNAL MEDICINE

## 2017-02-03 PROCEDURE — 82962 GLUCOSE BLOOD TEST: CPT

## 2017-02-03 PROCEDURE — 65660000000 HC RM CCU STEPDOWN

## 2017-02-03 PROCEDURE — 77010033678 HC OXYGEN DAILY

## 2017-02-03 PROCEDURE — 83735 ASSAY OF MAGNESIUM: CPT | Performed by: NURSE PRACTITIONER

## 2017-02-03 PROCEDURE — 74011636637 HC RX REV CODE- 636/637: Performed by: NURSE PRACTITIONER

## 2017-02-03 PROCEDURE — 97110 THERAPEUTIC EXERCISES: CPT

## 2017-02-03 RX ORDER — TORSEMIDE 100 MG/1
100 TABLET ORAL DAILY
Status: DISCONTINUED | OUTPATIENT
Start: 2017-02-03 | End: 2017-02-04

## 2017-02-03 RX ORDER — SPIRONOLACTONE 25 MG/1
50 TABLET ORAL DAILY
Status: DISCONTINUED | OUTPATIENT
Start: 2017-02-03 | End: 2017-02-24 | Stop reason: HOSPADM

## 2017-02-03 RX ORDER — METOLAZONE 5 MG/1
2.5 TABLET ORAL DAILY
Status: DISCONTINUED | OUTPATIENT
Start: 2017-02-03 | End: 2017-02-05

## 2017-02-03 RX ADMIN — FAMOTIDINE 20 MG: 20 TABLET ORAL at 08:38

## 2017-02-03 RX ADMIN — APIXABAN 5 MG: 5 TABLET, FILM COATED ORAL at 08:38

## 2017-02-03 RX ADMIN — MILRINONE LACTATE 0.5 MCG/KG/MIN: 200 INJECTION, SOLUTION INTRAVENOUS at 07:18

## 2017-02-03 RX ADMIN — DOCUSATE SODIUM 100 MG: 100 CAPSULE, LIQUID FILLED ORAL at 08:39

## 2017-02-03 RX ADMIN — COLCHICINE 0.6 MG: 0.6 TABLET, FILM COATED ORAL at 08:39

## 2017-02-03 RX ADMIN — Medication 5 ML: at 23:40

## 2017-02-03 RX ADMIN — GABAPENTIN 600 MG: 300 CAPSULE ORAL at 05:59

## 2017-02-03 RX ADMIN — Medication 10 ML: at 06:08

## 2017-02-03 RX ADMIN — ISOSORBIDE DINITRATE 20 MG: 20 TABLET ORAL at 05:59

## 2017-02-03 RX ADMIN — PRAVASTATIN SODIUM 80 MG: 80 TABLET ORAL at 21:41

## 2017-02-03 RX ADMIN — HYDRALAZINE HYDROCHLORIDE 25 MG: 25 TABLET, FILM COATED ORAL at 05:59

## 2017-02-03 RX ADMIN — GABAPENTIN 600 MG: 300 CAPSULE ORAL at 14:40

## 2017-02-03 RX ADMIN — HYDRALAZINE HYDROCHLORIDE 25 MG: 25 TABLET, FILM COATED ORAL at 21:42

## 2017-02-03 RX ADMIN — Medication 5 ML: at 14:48

## 2017-02-03 RX ADMIN — INSULIN LISPRO 5 UNITS: 100 INJECTION, SOLUTION INTRAVENOUS; SUBCUTANEOUS at 18:00

## 2017-02-03 RX ADMIN — AMIODARONE HYDROCHLORIDE 200 MG: 200 TABLET ORAL at 08:39

## 2017-02-03 RX ADMIN — ISOSORBIDE DINITRATE 20 MG: 20 TABLET ORAL at 21:41

## 2017-02-03 RX ADMIN — GABAPENTIN 600 MG: 300 CAPSULE ORAL at 21:41

## 2017-02-03 RX ADMIN — SPIRONOLACTONE 50 MG: 25 TABLET, FILM COATED ORAL at 08:38

## 2017-02-03 RX ADMIN — ALLOPURINOL 100 MG: 100 TABLET ORAL at 08:39

## 2017-02-03 RX ADMIN — DOCUSATE SODIUM 100 MG: 100 CAPSULE, LIQUID FILLED ORAL at 17:52

## 2017-02-03 RX ADMIN — METOLAZONE 2.5 MG: 5 TABLET ORAL at 08:38

## 2017-02-03 RX ADMIN — APIXABAN 5 MG: 5 TABLET, FILM COATED ORAL at 21:42

## 2017-02-03 RX ADMIN — HYDRALAZINE HYDROCHLORIDE 25 MG: 25 TABLET, FILM COATED ORAL at 14:40

## 2017-02-03 RX ADMIN — CARVEDILOL 25 MG: 25 TABLET, FILM COATED ORAL at 17:52

## 2017-02-03 RX ADMIN — OXYCODONE HYDROCHLORIDE 15 MG: 15 TABLET ORAL at 21:51

## 2017-02-03 RX ADMIN — TORSEMIDE 100 MG: 100 TABLET ORAL at 08:38

## 2017-02-03 RX ADMIN — POLYETHYLENE GLYCOL 3350 34 G: 17 POWDER, FOR SOLUTION ORAL at 08:40

## 2017-02-03 RX ADMIN — MILRINONE LACTATE 0.5 MCG/KG/MIN: 200 INJECTION, SOLUTION INTRAVENOUS at 03:11

## 2017-02-03 RX ADMIN — ISOSORBIDE DINITRATE 20 MG: 20 TABLET ORAL at 14:40

## 2017-02-03 RX ADMIN — INSULIN GLARGINE 50 UNITS: 100 INJECTION, SOLUTION SUBCUTANEOUS at 23:46

## 2017-02-03 RX ADMIN — CARVEDILOL 25 MG: 25 TABLET, FILM COATED ORAL at 08:38

## 2017-02-03 RX ADMIN — FAMOTIDINE 20 MG: 20 TABLET ORAL at 17:52

## 2017-02-03 RX ADMIN — OXYCODONE HYDROCHLORIDE 15 MG: 15 TABLET ORAL at 09:35

## 2017-02-03 NOTE — PROGRESS NOTES
[facility] CARDIOLOGY PROGRESS NOTE           2/3/2017 7:53 AM    Admit Date: 1/19/2017      Subjective:   Swollen testicles, little weight loss. ROS:  Cardiovascular:  As noted above    Objective:      Vitals:    02/03/17 0559 02/03/17 0624 02/03/17 0700 02/03/17 0718   BP: 127/64  124/65 124/65   Pulse: 86  86 84   Resp:   18    Temp:   97.2 °F (36.2 °C)    SpO2:   97%    Weight:  (!) 189.4 kg (417 lb 9.6 oz)     Height:           Physical Exam:  General-No Acute Distress      Data Review:   Recent Labs      02/03/17   0605  02/02/17   0530   NA  141  141   K  3.7  3.7   MG  2.2   --    BUN  34*  36*   CREA  1.72*  1.67*   GLU  110*  97       Assessment/Plan:     Principal Problem:    Nonsustained ventricular tachycardia (HCC) (1/19/2017)    Active Problems:    Obstructive sleep apnea (2/15/2010)      Nonischemic dilated cardiomyopathy ([address] (7/2/2013)      Overview: LVEF 10-15% on ECHO from 12/17/10      CKD (chronic kidney disease) stage 3, GFR 30-59 ml/min (8/13/2014)      AICD (automatic cardioverter/defibrillator) present (10/21/2015)      Acute on chronic systolic (congestive) heart failure ([address] (10/18/2016)      Atrial flutter ([address] (10/20/2016)      Obesity hypoventilation syndrome ([address] (10/24/2016)      Morbid obesity with BMI of 60.0-69.9, adult ([address] (11/28/2016)      Syncope (1/19/2017)      NSVT (nonsustained ventricular tachycardia) ([address] (1/19/2017)    ///  Hard to assess progress. Will stop parenteral rx and add torsemide 100 and metolazone 2.5.       Shemar Long MD  2/3/2017 7:53 AM

## 2017-02-03 NOTE — PROGRESS NOTES
Problem: Mobility Impaired (Adult and Pediatric)  Goal: *Acute Goals and Plan of Care (Insert Text)  LTG:  (1.)Mr. Pollard will move from supine to sit and sit to supine , scoot up and down and roll side to side with INDEPENDENT within 7 day(s) from flat surface without handrail. (2.)Mr. Pollard will transfer from bed to chair and chair to bed with INDEPENDENT using the least restrictive device within 7 day(s). (3.)Mr. Pollard will ambulate with INDEPENDENT for 150 feet with the least restrictive device within 7 day(s), O2 stats >90%. ___________________________________________________________________________________________      PHYSICAL THERAPY: Daily Note, Treatment Day: 6th and PM 2/3/2017  INPATIENT: Hospital Day: 16  Payor: CARE IMPROVEMENT PLUS [place] / Product Type: [employer] Care Medicare /      NAME/AGE/GENDER: Reynaldo Reid is a 45 y.o. male         PRIMARY DIAGNOSIS: NSVT (nonsustained ventricular tachycardia) ([facility] Nonsustained ventricular tachycardia ([facility] Nonsustained ventricular tachycardia (HCC)        ICD-10: Treatment Diagnosis:       · Generalized Muscle Weakness (M62.81)  · Difficulty in walking, Not elsewhere classified (R26.2)   Precaution/Allergies:  Dilaudid [hydromorphone]; Iodinated contrast media - oral and iv dye; and Penicillins       ASSESSMENT:      Mr. Silvino Beltrán presents supine in bed with daughter at bedside, agreeable to therapy but very groggy due to pain medication. Appeared too groggy to attempt out of bed activity. Performed LE exercises in bed, but barely able to stay awake. No progress noted today. Patients overall increased girth/obesity affects his ability to participate in mobility and some exercises. Progress per POC. This section established at most recent assessment   PROBLEM LIST (Impairments causing functional limitations):  1. Decreased Strength  2. Decreased ADL/Functional Activities  3. Decreased Transfer Abilities  4.  Decreased Ambulation Ability/Technique  5. Decreased Balance  6. Decreased Activity Tolerance  7. Increased Fatigue  8. Increased Shortness of Breath  9. Edema/Girth    INTERVENTIONS PLANNED: (Benefits and precautions of physical therapy have been discussed with the patient.)  1. Balance Exercise  2. Bed Mobility  3. Family Education  4. Gait Training  5. Home Exercise Program (HEP)  6. Therapeutic Activites  7. Therapeutic Exercise/Strengthening  8. Transfer Training  9. Group Therapy      TREATMENT PLAN: Frequency/Duration: 3 times a week for duration of hospital stay  Rehabilitation Potential For Stated Goals: GOOD      RECOMMENDED REHABILITATION/EQUIPMENT: (at time of discharge pending progress): Continue Skilled Therapy. HISTORY:   History of Present Injury/Illness (Reason for Referral):  Camelia Butler is a 45 y.o. male with known NICM with documented EF 10 % with [employer] single chamber ICD in place. He was just discharged on 1/13 with initial complaint of constipation and volume overload. He developed worsening renal failure necessitating hold his diuretics temporarily. He was discharged home on addition of demadex 100 mg daily and miralax for constipation. Really since mid December he has not felt well with worsening orthopnea, increasing abdominal girth, increased wt and lower ext edema. Today while driving in car he felt presyncopal and was able to pull to side of rode. He did not loose consciousness. He had repeat episode of presyncope prior to coming to ER. While in ER he was noted to have nonsustained ventricular tachycardia. He is not on antiarrhythmia agent. Interrogation of his device revealed normal operation but his VT zone is set at 158 bpm with noted VT in ER at approximately 145 bpm.   Past Medical History/Comorbidities:   Mr. García Branch  has a past medical history of Acute systolic heart failure [facility]) (May, 2009); AICD (automatic cardioverter/defibrillator) present (10/21/2015);  Atypical chest pain (4/23/2010); Bronchitis; CAD (coronary artery disease); Cardiomyopathy; Chest pain (10/21/2015); Chronic kidney disease; Chronic pain; Congestive heart failure (CHF) ([facility] (10/21/2015); COPD; Diabetes ([facility] Diabetes mellitus type II, uncontrolled ([facility] (7/2/2013); GERD (gastroesophageal reflux disease); Gout; Heart failure ([facility] Hypertension; Hyponatremia (12/20/2010); Ill-defined condition; Morbid obesity ([address] Nausea & vomiting (11/30/2015); Neuropathy; Obstructive sleep apnea (2/15/2010); Other unknown and unspecified cause of morbidity or mortality; Severe sepsis ([facility] and Unspecified sleep apnea. He also has no past medical history of Adverse effect of anesthesia; Aneurysm ([facility] Coagulation disorder ([facility] DEMENTIA; Difficult intubation; Malignant hyperthermia due to anesthesia; Neurological disorder; Other ill-defined conditions(799.89); Pseudocholinesterase deficiency; or Psychiatric disorder. Mr. Olguin Lipoma  has a past surgical history that includes pacemaker; heart catheterization ([place] 10, 2008); and chest surgery procedure unlisted. Social History/Living Environment:   Home Environment:  (daughters house)  # Steps to Enter: 0  One/Two Story Residence: One story  Living Alone: No  Support Systems: Family member(s), Friends \ neighbors  Patient Expects to be Discharged to[de-identified] Private residence  Current DME Used/Available at Home: None  Tub or Shower Type: Tub/Shower combination  Prior Level of Function/Work/Activity:  Lives with daughter; typically independent with ambulation for short distances; drives. Personal Factors:          Sex:  male        Age:  45 y.o.    Number of Personal Factors/Comorbidities that affect the Plan of Care:  CHF, DM, obesity, edema 3+: HIGH COMPLEXITY   EXAMINATION:   Most Recent Physical Functioning:   Gross Assessment:                  Posture:     Balance:    Bed Mobility:     Wheelchair Mobility:     Transfers:     Gait:             Body Structures Involved:  1. Lungs  2. Joints  3. Muscles Body Functions Affected:  1. Respiratory  2. Movement Related Activities and Participation Affected:  1. General Tasks and Demands  2. Mobility  3. Self Care  4. Community, Social and [place]   Number of elements that affect the Plan of Care: 4+: HIGH COMPLEXITY   CLINICAL PRESENTATION:   Presentation: Stable and uncomplicated: LOW COMPLEXITY   CLINICAL DECISION MAKIN [facility] Inpatient Short Form  How much difficulty does the patient currently have. .. Unable A Lot A Little None   1. Turning over in bed (including adjusting bedclothes, sheets and blankets)? [ ] 1   [ ] 2   [X] 3   [ ] 4   2. Sitting down on and standing up from a chair with arms ( e.g., wheelchair, bedside commode, etc.)   [ ] 1   [ ] 2   [X] 3   [ ] 4   3. Moving from lying on back to sitting on the side of the bed? [ ] 1   [ ] 2   [X] 3   [ ] 4   How much help from another person does the patient currently need. .. Total A Lot A Little None   4. Moving to and from a bed to a chair (including a wheelchair)? [ ] 1   [ ] 2   [X] 3   [ ] 4   5. Need to walk in hospital room? [ ] 1   [ ] 2   [X] 3   [ ] 4   6. Climbing 3-5 steps with a railing? [ ] 1   [ ] 2   [X] 3   [ ] 4   © , Trustees of [address] [place] [address], under license to [employer]. All rights reserved    Score:  Initial: 18 Most Recent: X (Date: -- )     Interpretation of Tool:  Represents activities that are increasingly more difficult (i.e. Bed mobility, Transfers, Gait).        Score 24 23 22-20 19-15 14-10 9-7 6       Modifier CH CI CJ CK CL CM CN         · Mobility - Walking and Moving Around:               - CURRENT STATUS:    CK - 40%-59% impaired, limited or restricted               - GOAL STATUS:           CJ - 20%-39% impaired, limited or restricted               - D/C STATUS:                       ---------------To be determined---------------  Payor: CARE IMPROVEMENT PLUS [place]PR[place] / Product Type: Managed Care Medicare /       Medical Necessity:     · Patient is expected to demonstrate progress in strength, range of motion, balance and coordination to decrease assistance required with overall functional mobility, transfers, ambulation. · Patient demonstrates good rehab potential due to higher previous functional level. Reason for Services/Other Comments:  · Patient continues to require present interventions due to patient's inability to perform bed mobility, transfers, ambulation all safely and effectively at prior level of function of independence. Use of outcome tool(s) and clinical judgement create a POC that gives a: Clear prediction of patient's progress: LOW COMPLEXITY                 TREATMENT:      Pre-treatment Symptoms/Complaints:    Pain: Initial: . Pain Intensity 1: 0  Pain Location 1: Back, Leg  Pain Intervention(s) 1: Ambulation/Increased Activity  Post Session:  0/10 at rest      Therapeutic Activity: (    ):  Therapeutic activities including bed mobility, ambulation 10 feet today using rolling walker, sit to stand, scooting to improve mobility, strength and balance. Required maximal manual and verbal cues   to promote coordination of bilateral, upper extremity(s), lower extremity(s). Therapeutic Exercise: (25 Minutes):  Exercises per grid below to improve mobility and strength. Required maximum visual, verbal and manual cues to promote proper body alignment and promote proper body mechanics. Progressed complexity of movement as indicated. Date:  1/26/17 Date:  1/27/17 Date:  1/30/17 Date:  2/1/17 Date:  2/2/17 Date:  2/3/17   Activity/Exercise Parameters Parameters Parameters      Standing marching in place Multiple x bilaterally Multiple x B  10B A     Seated AP's X 10 B X 10 B  10 B A x20 AB    Seated LAQ's X 10 B X 10 B  10 B A x10 AB    Seated hip ABD/ADD X 10 B partial range d/t edema and adipose tissue. Supine hip abd/add   X 10 B   x10 AB   Supine SAQ   X 20 B   x10 AB   Supine AP   X 20 B   x20 AB   Supine hip IR/ER      x10 AB   Supine HS   X 20 B   x10 AB   Seated hip flexion     x10 AB    Ambulation  Assist  Device     x25'  Supervision  Bariatric RW             Key:  A=active, AA=active assisted, B=bilaterally, R=right, L=left         Treatment/Session Assessment:    · Response to Treatment:  Falling asleep  · Interdisciplinary Collaboration:  · Physical Therapist and Registered Nurse  · After treatment position/precautions:  · Supine in bed, Bed/Chair-wheels locked, Bed in low position, Call light within reach and Family at bedside  · Compliance with Program/Exercises: Will assess as treatment progresses. · Recommendations/Intent for next treatment session: \"Next visit will focus on advancements to more challenging activities\".   Total Treatment Duration:  PT Patient Time In/Time Out  Time In: 3488  Time Out: GATO Gomez

## 2017-02-03 NOTE — PROGRESS NOTES
Bedside and Verbal shift change report given to self (oncoming nurse) by Alexy Scott RN (offgoing nurse). Report included the following information SBAR, Kardex, MAR and Recent Results.

## 2017-02-03 NOTE — PROGRESS NOTES
Bedside and Verbal shift change report given to Rae Beach RN (oncoming nurse) by self (offgoing nurse). Report included the following information SBAR, Kardex, MAR and Recent Results.

## 2017-02-03 NOTE — PROGRESS NOTES
Repositioned in bed. Pillows used to elevated all extremeties. Two pillows under each leg. Legs re-wraped. Underlying skin very dry and flaky. Lotion applied. Blister remains present to left heel. Cream applied to scrotum with is very edematous. Elevated as much as possible. Blisters noted to scrotal sac. Penis and devin-area cleaned well with alpesh wipes. Small abrasion remains on penis. Encouraged elevating legs and scrotal sac due to edema. Reinforced fluid restrictions and diet with patient and daughter at bedside. Questions answered.

## 2017-02-03 NOTE — PROGRESS NOTES
Verbal bedside report received from Jillian Krause RN and Morro Wilson RN. Assumed care of patient. Primacor and Lasix IV drips verified at bedside with outgoing RN.

## 2017-02-04 LAB
ANION GAP BLD CALC-SCNC: 5 MMOL/L (ref 7–16)
BUN SERPL-MCNC: 37 MG/DL (ref 6–23)
CALCIUM SERPL-MCNC: 8.5 MG/DL (ref 8.3–10.4)
CHLORIDE SERPL-SCNC: 98 MMOL/L (ref 98–107)
CO2 SERPL-SCNC: 36 MMOL/L (ref 21–32)
CREAT SERPL-MCNC: 1.94 MG/DL (ref 0.8–1.5)
GLUCOSE BLD STRIP.AUTO-MCNC: 110 MG/DL (ref 65–100)
GLUCOSE BLD STRIP.AUTO-MCNC: 111 MG/DL (ref 65–100)
GLUCOSE BLD STRIP.AUTO-MCNC: 131 MG/DL (ref 65–100)
GLUCOSE BLD STRIP.AUTO-MCNC: 141 MG/DL (ref 65–100)
GLUCOSE BLD STRIP.AUTO-MCNC: 148 MG/DL (ref 65–100)
GLUCOSE BLD STRIP.AUTO-MCNC: 93 MG/DL (ref 65–100)
GLUCOSE BLD STRIP.AUTO-MCNC: 93 MG/DL (ref 65–100)
GLUCOSE SERPL-MCNC: 95 MG/DL (ref 65–100)
MAGNESIUM SERPL-MCNC: 2.2 MG/DL (ref 1.8–2.4)
POTASSIUM SERPL-SCNC: 3.7 MMOL/L (ref 3.5–5.1)
SODIUM SERPL-SCNC: 139 MMOL/L (ref 136–145)

## 2017-02-04 PROCEDURE — 3331090001 HH PPS REVENUE CREDIT

## 2017-02-04 PROCEDURE — 74011000258 HC RX REV CODE- 258: Performed by: INTERNAL MEDICINE

## 2017-02-04 PROCEDURE — 80048 BASIC METABOLIC PNL TOTAL CA: CPT | Performed by: INTERNAL MEDICINE

## 2017-02-04 PROCEDURE — 82962 GLUCOSE BLOOD TEST: CPT

## 2017-02-04 PROCEDURE — 74011636637 HC RX REV CODE- 636/637: Performed by: NURSE PRACTITIONER

## 2017-02-04 PROCEDURE — 83735 ASSAY OF MAGNESIUM: CPT | Performed by: INTERNAL MEDICINE

## 2017-02-04 PROCEDURE — 74011250637 HC RX REV CODE- 250/637: Performed by: NURSE PRACTITIONER

## 2017-02-04 PROCEDURE — 77030019605

## 2017-02-04 PROCEDURE — 74011250636 HC RX REV CODE- 250/636: Performed by: INTERNAL MEDICINE

## 2017-02-04 PROCEDURE — 74011250636 HC RX REV CODE- 250/636: Performed by: NURSE PRACTITIONER

## 2017-02-04 PROCEDURE — 3331090002 HH PPS REVENUE DEBIT

## 2017-02-04 PROCEDURE — 65660000000 HC RM CCU STEPDOWN

## 2017-02-04 PROCEDURE — 36591 DRAW BLOOD OFF VENOUS DEVICE: CPT

## 2017-02-04 PROCEDURE — 74011250637 HC RX REV CODE- 250/637: Performed by: INTERNAL MEDICINE

## 2017-02-04 RX ORDER — ONDANSETRON 2 MG/ML
4 INJECTION INTRAMUSCULAR; INTRAVENOUS
Status: DISCONTINUED | OUTPATIENT
Start: 2017-02-04 | End: 2017-02-24 | Stop reason: HOSPADM

## 2017-02-04 RX ADMIN — HYDRALAZINE HYDROCHLORIDE 25 MG: 25 TABLET, FILM COATED ORAL at 21:07

## 2017-02-04 RX ADMIN — GABAPENTIN 600 MG: 300 CAPSULE ORAL at 13:55

## 2017-02-04 RX ADMIN — OXYCODONE HYDROCHLORIDE 15 MG: 15 TABLET ORAL at 06:09

## 2017-02-04 RX ADMIN — CARVEDILOL 25 MG: 25 TABLET, FILM COATED ORAL at 02:01

## 2017-02-04 RX ADMIN — DOCUSATE SODIUM 100 MG: 100 CAPSULE, LIQUID FILLED ORAL at 18:14

## 2017-02-04 RX ADMIN — FAMOTIDINE 20 MG: 20 TABLET ORAL at 18:14

## 2017-02-04 RX ADMIN — ISOSORBIDE DINITRATE 20 MG: 20 TABLET ORAL at 21:07

## 2017-02-04 RX ADMIN — ISOSORBIDE DINITRATE 20 MG: 20 TABLET ORAL at 13:55

## 2017-02-04 RX ADMIN — PRAVASTATIN SODIUM 80 MG: 80 TABLET ORAL at 21:07

## 2017-02-04 RX ADMIN — Medication 5 ML: at 05:31

## 2017-02-04 RX ADMIN — HYDRALAZINE HYDROCHLORIDE 25 MG: 25 TABLET, FILM COATED ORAL at 13:55

## 2017-02-04 RX ADMIN — INSULIN GLARGINE 50 UNITS: 100 INJECTION, SOLUTION SUBCUTANEOUS at 21:17

## 2017-02-04 RX ADMIN — Medication 10 ML: at 13:57

## 2017-02-04 RX ADMIN — SPIRONOLACTONE 50 MG: 25 TABLET, FILM COATED ORAL at 09:40

## 2017-02-04 RX ADMIN — APIXABAN 5 MG: 5 TABLET, FILM COATED ORAL at 21:07

## 2017-02-04 RX ADMIN — ALLOPURINOL 100 MG: 100 TABLET ORAL at 09:40

## 2017-02-04 RX ADMIN — CARVEDILOL 25 MG: 25 TABLET, FILM COATED ORAL at 09:40

## 2017-02-04 RX ADMIN — APIXABAN 5 MG: 5 TABLET, FILM COATED ORAL at 09:40

## 2017-02-04 RX ADMIN — ISOSORBIDE DINITRATE 20 MG: 20 TABLET ORAL at 05:27

## 2017-02-04 RX ADMIN — OXYCODONE HYDROCHLORIDE 15 MG: 15 TABLET ORAL at 21:12

## 2017-02-04 RX ADMIN — ONDANSETRON 4 MG: 2 INJECTION INTRAMUSCULAR; INTRAVENOUS at 17:03

## 2017-02-04 RX ADMIN — GABAPENTIN 600 MG: 300 CAPSULE ORAL at 05:26

## 2017-02-04 RX ADMIN — GABAPENTIN 600 MG: 300 CAPSULE ORAL at 21:07

## 2017-02-04 RX ADMIN — FUROSEMIDE 10 MG/HR: 10 INJECTION, SOLUTION INTRAMUSCULAR; INTRAVENOUS at 18:18

## 2017-02-04 RX ADMIN — POLYETHYLENE GLYCOL 3350 34 G: 17 POWDER, FOR SOLUTION ORAL at 09:41

## 2017-02-04 RX ADMIN — HYDRALAZINE HYDROCHLORIDE 25 MG: 25 TABLET, FILM COATED ORAL at 05:27

## 2017-02-04 RX ADMIN — DOCUSATE SODIUM 100 MG: 100 CAPSULE, LIQUID FILLED ORAL at 09:40

## 2017-02-04 RX ADMIN — AMIODARONE HYDROCHLORIDE 200 MG: 200 TABLET ORAL at 09:40

## 2017-02-04 RX ADMIN — METOLAZONE 2.5 MG: 5 TABLET ORAL at 09:40

## 2017-02-04 RX ADMIN — COLCHICINE 0.6 MG: 0.6 TABLET, FILM COATED ORAL at 09:40

## 2017-02-04 RX ADMIN — TORSEMIDE 100 MG: 100 TABLET ORAL at 09:40

## 2017-02-04 RX ADMIN — Medication 10 ML: at 21:08

## 2017-02-04 RX ADMIN — FAMOTIDINE 20 MG: 20 TABLET ORAL at 09:40

## 2017-02-04 NOTE — PROGRESS NOTES
[facility] CARDIOLOGY PROGRESS NOTE           2/4/2017 9:19 AM    Admit Date: 1/19/2017      Subjective:   Feels about the same. ROS:  GEN:  No fever or chills  Cardiovascular:  As noted above:no chest pain or palpitations. Pulmonary:  As noted above:SORENSON persists. Neuro:  No new focal motor or sensory loss    Objective:      Vitals:    02/04/17 0344 02/04/17 0518 02/04/17 0526 02/04/17 0703   BP:  116/77 109/52 108/73   Pulse:  72 72 70   Resp:  18  16   Temp:  97.2 °F (36.2 °C)  98 °F (36.7 °C)   SpO2:  97%  93%   Weight: (!) 183 kg (403 lb 8 oz)      Height:           Physical Exam:  General-no distress  Neck- supple, no JVD  CV- regular rate and rhythm no MRG  Lung- diminished bs bilaterally  Abd- soft, nontender, nondistended  Ext- 2+ edema bilaterally. Skin- warm and dry  Psychiatric:  Normal mood and affect. Neurologic:  Alert and oriented X 3      Data Review:   Recent Labs      02/04/17   0535  02/03/17   0605   NA  139  141   K  3.7  3.7   MG  2.2  2.2   BUN  37*  34*   CREA  1.94*  1.72*   GLU  95  110*       TELEMETRY:  NSR    Assessment/Plan:     Principal Problem:    Nonsustained ventricular tachycardia (HCC) (1/19/2017):resolved. Active Problems:    Obstructive sleep apnea (2/15/2010)      Nonischemic dilated cardiomyopathy (HCC) (7/2/2013):Continue Hydralazine and Coreg      Overview: LVEF 10-15% on ECHO from 12/17/10      CKD (chronic kidney disease) stage 3, GFR 30-59 ml/min (8/13/2014): Monitoring on Zaroxyln and Demadex. AICD (automatic cardioverter/defibrillator) present (10/21/2015)      Acute on chronic systolic (congestive) heart failure (HCC) (10/18/2016):Continue oral medications. Difficult to assess status due to obesity. Will CXR and BNP in  Am. Atrial flutter ([facility] (10/20/2016):Resolved.       Obesity hypoventilation syndrome ([address] (10/24/2016)      Morbid obesity with BMI of 60.0-69.9, adult ([facility] (11/28/2016)      Syncope (1/19/2017)      NSVT (nonsustained ventricular tachycardia) ([email or web address] (1/19/2017):resolved.                 Oren Rey MD  2/4/2017 9:19 AM

## 2017-02-04 NOTE — PROGRESS NOTES
Pt c/o increased SOB and \"something wrong. \" NSR in 70's, /72, O2 sat 100%. Lung sounds diminished, unchanged. Daughter requests RN page the doctor \"urgently\" to request Primcor gtt be restarted. Discussed pt situation with Dr. Jelly Jama, ordered to restart Lasix gtt at 10 mg/hr.

## 2017-02-04 NOTE — PROGRESS NOTES
Bedside and Verbal shift change report received from Sarah Chandler [facility] and Temi Stiles [facility]

## 2017-02-04 NOTE — PROGRESS NOTES
Problem: Falls - Risk of  Goal: *Absence of falls  Outcome: Progressing Towards Goal  Pt progressing towards goal. No falls since admission. Bed low and locked. Call light within reach. Side rails x 2. Gripper socks applied. Personal belongings within reach. Pt verbalizes understanding to call for assistance. Bed alarm on.

## 2017-02-04 NOTE — PROGRESS NOTES
Bedside and Verbal shift change report given to self (oncoming nurse) by Indra Piedra RN (offgoing nurse). Report included the following information SBAR, Kardex, MAR, Accordion and Recent Results.  Lasix gtt verified

## 2017-02-04 NOTE — PROGRESS NOTES
Bedside and Verbal shift change report given to self (oncoming nurse) by Glennette Mortimer, RN (offgoing nurse). Report included the following information SBAR, Kardex, MAR and Recent Results.

## 2017-02-04 NOTE — PROGRESS NOTES
Bedside and Verbal shift change report given to Lc Red RN (oncoming nurse) by self (offgoing nurse). Report included the following information SBAR, Kardex, MAR and Recent Results.

## 2017-02-04 NOTE — PROGRESS NOTES
Verbal bedside report given to Lynette Oseguera oncoming RN. Patient's situation, background, assessment and recommendations provided. Opportunity for questions provided. Oncoming RN assumed care of patient.

## 2017-02-05 ENCOUNTER — APPOINTMENT (OUTPATIENT)
Dept: GENERAL RADIOLOGY | Age: 52
DRG: 308 | End: 2017-02-05
Attending: INTERNAL MEDICINE
Payer: MEDICARE

## 2017-02-05 LAB
ANION GAP BLD CALC-SCNC: 6 MMOL/L (ref 7–16)
ANION GAP BLD CALC-SCNC: 7 MMOL/L (ref 7–16)
BNP SERPL-MCNC: 674 PG/ML
BUN SERPL-MCNC: 39 MG/DL (ref 6–23)
BUN SERPL-MCNC: 40 MG/DL (ref 6–23)
CALCIUM SERPL-MCNC: 7.8 MG/DL (ref 8.3–10.4)
CALCIUM SERPL-MCNC: 8.8 MG/DL (ref 8.3–10.4)
CHLORIDE SERPL-SCNC: 97 MMOL/L (ref 98–107)
CHLORIDE SERPL-SCNC: 99 MMOL/L (ref 98–107)
CO2 SERPL-SCNC: 34 MMOL/L (ref 21–32)
CO2 SERPL-SCNC: 37 MMOL/L (ref 21–32)
CREAT SERPL-MCNC: 2 MG/DL (ref 0.8–1.5)
CREAT SERPL-MCNC: 2.2 MG/DL (ref 0.8–1.5)
GLUCOSE BLD STRIP.AUTO-MCNC: 101 MG/DL (ref 65–100)
GLUCOSE BLD STRIP.AUTO-MCNC: 109 MG/DL (ref 65–100)
GLUCOSE BLD STRIP.AUTO-MCNC: 140 MG/DL (ref 65–100)
GLUCOSE BLD STRIP.AUTO-MCNC: 144 MG/DL (ref 65–100)
GLUCOSE BLD STRIP.AUTO-MCNC: 71 MG/DL (ref 65–100)
GLUCOSE BLD STRIP.AUTO-MCNC: 78 MG/DL (ref 65–100)
GLUCOSE BLD STRIP.AUTO-MCNC: 85 MG/DL (ref 65–100)
GLUCOSE BLD STRIP.AUTO-MCNC: 92 MG/DL (ref 65–100)
GLUCOSE SERPL-MCNC: 104 MG/DL (ref 65–100)
GLUCOSE SERPL-MCNC: 66 MG/DL (ref 65–100)
MAGNESIUM SERPL-MCNC: 2.3 MG/DL (ref 1.8–2.4)
POTASSIUM SERPL-SCNC: 3.4 MMOL/L (ref 3.5–5.1)
POTASSIUM SERPL-SCNC: 3.7 MMOL/L (ref 3.5–5.1)
SODIUM SERPL-SCNC: 140 MMOL/L (ref 136–145)
SODIUM SERPL-SCNC: 140 MMOL/L (ref 136–145)

## 2017-02-05 PROCEDURE — 74011636637 HC RX REV CODE- 636/637: Performed by: NURSE PRACTITIONER

## 2017-02-05 PROCEDURE — 74011250637 HC RX REV CODE- 250/637: Performed by: NURSE PRACTITIONER

## 2017-02-05 PROCEDURE — 80048 BASIC METABOLIC PNL TOTAL CA: CPT | Performed by: INTERNAL MEDICINE

## 2017-02-05 PROCEDURE — 36591 DRAW BLOOD OFF VENOUS DEVICE: CPT

## 2017-02-05 PROCEDURE — 74011000258 HC RX REV CODE- 258: Performed by: INTERNAL MEDICINE

## 2017-02-05 PROCEDURE — 83735 ASSAY OF MAGNESIUM: CPT | Performed by: INTERNAL MEDICINE

## 2017-02-05 PROCEDURE — 65660000000 HC RM CCU STEPDOWN

## 2017-02-05 PROCEDURE — 3331090002 HH PPS REVENUE DEBIT

## 2017-02-05 PROCEDURE — 82962 GLUCOSE BLOOD TEST: CPT

## 2017-02-05 PROCEDURE — 74011250637 HC RX REV CODE- 250/637: Performed by: INTERNAL MEDICINE

## 2017-02-05 PROCEDURE — 3331090001 HH PPS REVENUE CREDIT

## 2017-02-05 PROCEDURE — 83880 ASSAY OF NATRIURETIC PEPTIDE: CPT | Performed by: INTERNAL MEDICINE

## 2017-02-05 PROCEDURE — 74011250636 HC RX REV CODE- 250/636: Performed by: INTERNAL MEDICINE

## 2017-02-05 PROCEDURE — 71020 XR CHEST PA LAT: CPT

## 2017-02-05 RX ORDER — MILRINONE LACTATE 1 MG/ML
25 INJECTION INTRAVENOUS ONCE
Status: COMPLETED | OUTPATIENT
Start: 2017-02-05 | End: 2017-02-05

## 2017-02-05 RX ORDER — FUROSEMIDE 10 MG/ML
40 INJECTION INTRAMUSCULAR; INTRAVENOUS 2 TIMES DAILY
Status: DISCONTINUED | OUTPATIENT
Start: 2017-02-05 | End: 2017-02-12

## 2017-02-05 RX ORDER — MILRINONE LACTATE 0.2 MG/ML
0.5 INJECTION, SOLUTION INTRAVENOUS CONTINUOUS
Status: DISCONTINUED | OUTPATIENT
Start: 2017-02-05 | End: 2017-02-14 | Stop reason: SDUPTHER

## 2017-02-05 RX ORDER — MILRINONE LACTATE 0.2 MG/ML
0.25 INJECTION, SOLUTION INTRAVENOUS
Status: DISCONTINUED | OUTPATIENT
Start: 2017-02-05 | End: 2017-02-05

## 2017-02-05 RX ADMIN — HYDRALAZINE HYDROCHLORIDE 25 MG: 25 TABLET, FILM COATED ORAL at 21:27

## 2017-02-05 RX ADMIN — INSULIN LISPRO 5 UNITS: 100 INJECTION, SOLUTION INTRAVENOUS; SUBCUTANEOUS at 09:42

## 2017-02-05 RX ADMIN — GABAPENTIN 600 MG: 300 CAPSULE ORAL at 15:14

## 2017-02-05 RX ADMIN — FUROSEMIDE 40 MG: 10 INJECTION, SOLUTION INTRAMUSCULAR; INTRAVENOUS at 17:46

## 2017-02-05 RX ADMIN — DOCUSATE SODIUM 100 MG: 100 CAPSULE, LIQUID FILLED ORAL at 17:52

## 2017-02-05 RX ADMIN — ISOSORBIDE DINITRATE 20 MG: 20 TABLET ORAL at 15:14

## 2017-02-05 RX ADMIN — POLYETHYLENE GLYCOL 3350 34 G: 17 POWDER, FOR SOLUTION ORAL at 08:48

## 2017-02-05 RX ADMIN — FUROSEMIDE 40 MG: 10 INJECTION, SOLUTION INTRAMUSCULAR; INTRAVENOUS at 12:40

## 2017-02-05 RX ADMIN — MILRINONE LACTATE 0.5 MCG/KG/MIN: 200 INJECTION, SOLUTION INTRAVENOUS at 23:54

## 2017-02-05 RX ADMIN — MILRINONE LACTATE 4550 MCG: 1 INJECTION, SOLUTION INTRAVENOUS at 11:41

## 2017-02-05 RX ADMIN — FAMOTIDINE 20 MG: 20 TABLET ORAL at 17:47

## 2017-02-05 RX ADMIN — COLCHICINE 0.6 MG: 0.6 TABLET, FILM COATED ORAL at 08:49

## 2017-02-05 RX ADMIN — GABAPENTIN 600 MG: 300 CAPSULE ORAL at 05:37

## 2017-02-05 RX ADMIN — CARVEDILOL 25 MG: 25 TABLET, FILM COATED ORAL at 17:47

## 2017-02-05 RX ADMIN — MILRINONE LACTATE 0.5 MCG/KG/MIN: 200 INJECTION, SOLUTION INTRAVENOUS at 15:21

## 2017-02-05 RX ADMIN — HYDRALAZINE HYDROCHLORIDE 25 MG: 25 TABLET, FILM COATED ORAL at 15:14

## 2017-02-05 RX ADMIN — PRAVASTATIN SODIUM 80 MG: 80 TABLET ORAL at 21:27

## 2017-02-05 RX ADMIN — FAMOTIDINE 20 MG: 20 TABLET ORAL at 08:50

## 2017-02-05 RX ADMIN — MILRINONE LACTATE 0.5 MCG/KG/MIN: 200 INJECTION, SOLUTION INTRAVENOUS at 20:30

## 2017-02-05 RX ADMIN — DOCUSATE SODIUM 100 MG: 100 CAPSULE, LIQUID FILLED ORAL at 08:49

## 2017-02-05 RX ADMIN — FUROSEMIDE 10 MG/HR: 10 INJECTION, SOLUTION INTRAMUSCULAR; INTRAVENOUS at 02:35

## 2017-02-05 RX ADMIN — INSULIN GLARGINE 40 UNITS: 100 INJECTION, SOLUTION SUBCUTANEOUS at 22:00

## 2017-02-05 RX ADMIN — OXYCODONE HYDROCHLORIDE 15 MG: 15 TABLET ORAL at 21:27

## 2017-02-05 RX ADMIN — APIXABAN 5 MG: 5 TABLET, FILM COATED ORAL at 08:50

## 2017-02-05 RX ADMIN — Medication 10 ML: at 05:37

## 2017-02-05 RX ADMIN — OXYCODONE HYDROCHLORIDE 15 MG: 15 TABLET ORAL at 15:22

## 2017-02-05 RX ADMIN — CARVEDILOL 25 MG: 25 TABLET, FILM COATED ORAL at 08:50

## 2017-02-05 RX ADMIN — ISOSORBIDE DINITRATE 20 MG: 20 TABLET ORAL at 05:37

## 2017-02-05 RX ADMIN — Medication 10 ML: at 21:28

## 2017-02-05 RX ADMIN — GABAPENTIN 600 MG: 300 CAPSULE ORAL at 21:27

## 2017-02-05 RX ADMIN — ALLOPURINOL 100 MG: 100 TABLET ORAL at 08:50

## 2017-02-05 RX ADMIN — METOLAZONE 2.5 MG: 5 TABLET ORAL at 08:50

## 2017-02-05 RX ADMIN — ISOSORBIDE DINITRATE 20 MG: 20 TABLET ORAL at 21:27

## 2017-02-05 RX ADMIN — SPIRONOLACTONE 50 MG: 25 TABLET, FILM COATED ORAL at 08:48

## 2017-02-05 RX ADMIN — AMIODARONE HYDROCHLORIDE 200 MG: 200 TABLET ORAL at 08:50

## 2017-02-05 RX ADMIN — MILRINONE LACTATE 0.5 MCG/KG/MIN: 200 INJECTION, SOLUTION INTRAVENOUS at 11:38

## 2017-02-05 RX ADMIN — HYDRALAZINE HYDROCHLORIDE 25 MG: 25 TABLET, FILM COATED ORAL at 05:37

## 2017-02-05 RX ADMIN — APIXABAN 5 MG: 5 TABLET, FILM COATED ORAL at 21:27

## 2017-02-05 NOTE — PROGRESS NOTES
Spoke with Dr. Ziggy Santos regarding the patient's refusal of humalog, he stated to check the sugar and ask the pt what he would take. Will continue to monitor pt closely.

## 2017-02-05 NOTE — PROGRESS NOTES
Problem: Falls - Risk of  Goal: *Absence of falls  Outcome: Progressing Towards Goal  Pt progressing towards goal. No falls since admission. Bed low and locked. Call light within reach. Side rails x 2. Gripper socks applied. Personal belongings within reach. Pt verbalizes understanding to call for assistance. ISMAEL SCORE 3. BED ALARM PLACED FOR SAFETY CONCERNS.

## 2017-02-05 NOTE — PROGRESS NOTES
Bedside and Verbal shift change report given to Elvin Teixeira RN (oncoming nurse) by self (offgoing nurse). Report included the following information SBAR, Kardex, MAR, Recent Results and Med Rec Status. Lasix gtt verified. Bed alarm on and functioning. Hourly vitals stable throughout shift while on Lasix gtt. Printed and placed on bedside chart.

## 2017-02-05 NOTE — PROGRESS NOTES
[facility] CARDIOLOGY PROGRESS NOTE           2/5/2017 10:35 AM    Admit Date: 1/19/2017      Subjective:   Remains dyspneic. ROS:  GEN:  No fever or chills  Cardiovascular:  As noted above:no chest pain or palpitations. Pulmonary:  As noted above  Neuro:  No new focal motor or sensory loss    Objective:      Vitals:    02/05/17 0426 02/05/17 0537 02/05/17 0848 02/05/17 0912   BP:  113/82 131/85 112/78   Pulse:  70 69 71   Resp:    20   Temp:    96.7 °F (35.9 °C)   SpO2:    99%   Weight: (!) 181.9 kg (401 lb 1.6 oz)      Height:           Physical Exam:  General-no distress  Neck- supple, no JVD  CV- regular rate and rhythm no MRG  Lung- diminished bs bilaterally  Abd- soft obese  Ext- 2+ edema bilaterally. Skin- warm and dry  Psychiatric:  Normal mood and affect. Neurologic:  Alert and oriented X 3      Data Review:   Recent Labs      02/05/17   0530  02/04/17   0535   NA  140  139   K  3.7  3.7   MG  2.3  2.2   BUN  40*  37*   CREA  2.20*  1.94*   GLU  66  95       TELEMETRY:  NSR. Assessment/Plan:     Principal Problem:    Nonsustained ventricular tachycardia ([address] (1/19/2017):Resolved. Patient is on po Amiodarone. ICD in place. Active Problems:    Obstructive sleep apnea (2/15/2010)      Nonischemic dilated cardiomyopathy ([address] (7/2/2013): Appearing endstage. Continue COREG and Hydralazine. Primacor restarted. Stop Lasix drip and Zaroxyln with worsening renal failure. Switch to Lasix iv bid and continue Spironlactone. Overview: LVEF 10-15% on ECHO from 12/17/10      CKD (chronic kidney disease) stage 3, GFR 30-59 ml/min (8/13/2014): Worse with Lasix drip. Switch to Lasix bid and hold Zaroxyln. AICD (automatic cardioverter/defibrillator) present (10/21/2015)      Acute on chronic systolic (congestive) heart failure ([address] (10/18/2016): Volume status is difficult to assess due to obesity. Attempting diuresis . Primacor restarted.       Atrial flutter ([address] (10/20/2016):resolved. Patient is on Amiodarone and Eliquis. Obesity hypoventilation syndrome ([facility] (10/24/2016)      Morbid obesity with BMI of 60.0-69.9, adult ([facility] (11/28/2016)      Syncope (1/19/2017)      NSVT (nonsustained ventricular tachycardia) (HCC) (1/19/2017):Continue Amiodarone.                 Evangelina Vera MD  2/5/2017 10:35 AM

## 2017-02-06 LAB
ANION GAP BLD CALC-SCNC: 9 MMOL/L (ref 7–16)
BUN SERPL-MCNC: 40 MG/DL (ref 6–23)
CALCIUM SERPL-MCNC: 8.4 MG/DL (ref 8.3–10.4)
CHLORIDE SERPL-SCNC: 93 MMOL/L (ref 98–107)
CO2 SERPL-SCNC: 37 MMOL/L (ref 21–32)
CREAT SERPL-MCNC: 2.12 MG/DL (ref 0.8–1.5)
GLUCOSE BLD STRIP.AUTO-MCNC: 102 MG/DL (ref 65–100)
GLUCOSE BLD STRIP.AUTO-MCNC: 168 MG/DL (ref 65–100)
GLUCOSE BLD STRIP.AUTO-MCNC: 184 MG/DL (ref 65–100)
GLUCOSE BLD STRIP.AUTO-MCNC: 88 MG/DL (ref 65–100)
GLUCOSE BLD STRIP.AUTO-MCNC: 95 MG/DL (ref 65–100)
GLUCOSE BLD STRIP.AUTO-MCNC: 97 MG/DL (ref 65–100)
GLUCOSE SERPL-MCNC: 94 MG/DL (ref 65–100)
MAGNESIUM SERPL-MCNC: 2.2 MG/DL (ref 1.8–2.4)
POTASSIUM SERPL-SCNC: 3.1 MMOL/L (ref 3.5–5.1)
SODIUM SERPL-SCNC: 139 MMOL/L (ref 136–145)

## 2017-02-06 PROCEDURE — 97110 THERAPEUTIC EXERCISES: CPT

## 2017-02-06 PROCEDURE — 3331090002 HH PPS REVENUE DEBIT

## 2017-02-06 PROCEDURE — 65660000000 HC RM CCU STEPDOWN

## 2017-02-06 PROCEDURE — 80048 BASIC METABOLIC PNL TOTAL CA: CPT | Performed by: INTERNAL MEDICINE

## 2017-02-06 PROCEDURE — 74011250637 HC RX REV CODE- 250/637: Performed by: NURSE PRACTITIONER

## 2017-02-06 PROCEDURE — 74011250637 HC RX REV CODE- 250/637: Performed by: INTERNAL MEDICINE

## 2017-02-06 PROCEDURE — 94760 N-INVAS EAR/PLS OXIMETRY 1: CPT

## 2017-02-06 PROCEDURE — 77030018846 HC SOL IRR STRL H20 ICUM -A

## 2017-02-06 PROCEDURE — 74011250636 HC RX REV CODE- 250/636: Performed by: INTERNAL MEDICINE

## 2017-02-06 PROCEDURE — 36591 DRAW BLOOD OFF VENOUS DEVICE: CPT

## 2017-02-06 PROCEDURE — 3331090001 HH PPS REVENUE CREDIT

## 2017-02-06 PROCEDURE — 77010033678 HC OXYGEN DAILY

## 2017-02-06 PROCEDURE — 83735 ASSAY OF MAGNESIUM: CPT | Performed by: INTERNAL MEDICINE

## 2017-02-06 PROCEDURE — 74011636637 HC RX REV CODE- 636/637: Performed by: NURSE PRACTITIONER

## 2017-02-06 PROCEDURE — 82962 GLUCOSE BLOOD TEST: CPT

## 2017-02-06 RX ORDER — POTASSIUM CHLORIDE 20 MEQ/1
40 TABLET, EXTENDED RELEASE ORAL ONCE
Status: COMPLETED | OUTPATIENT
Start: 2017-02-06 | End: 2017-02-06

## 2017-02-06 RX ADMIN — FAMOTIDINE 20 MG: 20 TABLET ORAL at 18:37

## 2017-02-06 RX ADMIN — Medication 10 ML: at 20:37

## 2017-02-06 RX ADMIN — DOCUSATE SODIUM 100 MG: 100 CAPSULE, LIQUID FILLED ORAL at 18:37

## 2017-02-06 RX ADMIN — OXYCODONE HYDROCHLORIDE 15 MG: 15 TABLET ORAL at 20:33

## 2017-02-06 RX ADMIN — POTASSIUM CHLORIDE 40 MEQ: 20 TABLET, EXTENDED RELEASE ORAL at 18:38

## 2017-02-06 RX ADMIN — GABAPENTIN 600 MG: 300 CAPSULE ORAL at 20:34

## 2017-02-06 RX ADMIN — GABAPENTIN 600 MG: 300 CAPSULE ORAL at 05:25

## 2017-02-06 RX ADMIN — MILRINONE LACTATE 0.5 MCG/KG/MIN: 200 INJECTION, SOLUTION INTRAVENOUS at 16:02

## 2017-02-06 RX ADMIN — MILRINONE LACTATE 0.5 MCG/KG/MIN: 200 INJECTION, SOLUTION INTRAVENOUS at 20:29

## 2017-02-06 RX ADMIN — CARVEDILOL 25 MG: 25 TABLET, FILM COATED ORAL at 18:37

## 2017-02-06 RX ADMIN — AMIODARONE HYDROCHLORIDE 200 MG: 200 TABLET ORAL at 08:19

## 2017-02-06 RX ADMIN — HYDRALAZINE HYDROCHLORIDE 25 MG: 25 TABLET, FILM COATED ORAL at 05:25

## 2017-02-06 RX ADMIN — ISOSORBIDE DINITRATE 20 MG: 20 TABLET ORAL at 05:25

## 2017-02-06 RX ADMIN — INSULIN LISPRO 15 UNITS: 100 INJECTION, SOLUTION INTRAVENOUS; SUBCUTANEOUS at 12:42

## 2017-02-06 RX ADMIN — CARVEDILOL 25 MG: 25 TABLET, FILM COATED ORAL at 08:19

## 2017-02-06 RX ADMIN — SPIRONOLACTONE 50 MG: 25 TABLET, FILM COATED ORAL at 08:18

## 2017-02-06 RX ADMIN — COLCHICINE 0.6 MG: 0.6 TABLET, FILM COATED ORAL at 08:19

## 2017-02-06 RX ADMIN — Medication 10 ML: at 16:05

## 2017-02-06 RX ADMIN — ISOSORBIDE DINITRATE 20 MG: 20 TABLET ORAL at 20:33

## 2017-02-06 RX ADMIN — MILRINONE LACTATE 0.5 MCG/KG/MIN: 200 INJECTION, SOLUTION INTRAVENOUS at 11:46

## 2017-02-06 RX ADMIN — GABAPENTIN 600 MG: 300 CAPSULE ORAL at 14:55

## 2017-02-06 RX ADMIN — HYDRALAZINE HYDROCHLORIDE 25 MG: 25 TABLET, FILM COATED ORAL at 20:33

## 2017-02-06 RX ADMIN — MILRINONE LACTATE 0.5 MCG/KG/MIN: 200 INJECTION, SOLUTION INTRAVENOUS at 03:08

## 2017-02-06 RX ADMIN — FUROSEMIDE 40 MG: 10 INJECTION, SOLUTION INTRAMUSCULAR; INTRAVENOUS at 18:38

## 2017-02-06 RX ADMIN — APIXABAN 5 MG: 5 TABLET, FILM COATED ORAL at 20:33

## 2017-02-06 RX ADMIN — OXYCODONE HYDROCHLORIDE 15 MG: 15 TABLET ORAL at 09:34

## 2017-02-06 RX ADMIN — FAMOTIDINE 20 MG: 20 TABLET ORAL at 08:18

## 2017-02-06 RX ADMIN — ALLOPURINOL 100 MG: 100 TABLET ORAL at 08:19

## 2017-02-06 RX ADMIN — INSULIN GLARGINE 50 UNITS: 100 INJECTION, SOLUTION SUBCUTANEOUS at 20:48

## 2017-02-06 RX ADMIN — POLYETHYLENE GLYCOL 3350 34 G: 17 POWDER, FOR SOLUTION ORAL at 08:20

## 2017-02-06 RX ADMIN — FUROSEMIDE 40 MG: 10 INJECTION, SOLUTION INTRAMUSCULAR; INTRAVENOUS at 08:19

## 2017-02-06 RX ADMIN — APIXABAN 5 MG: 5 TABLET, FILM COATED ORAL at 08:19

## 2017-02-06 RX ADMIN — MILRINONE LACTATE 0.5 MCG/KG/MIN: 200 INJECTION, SOLUTION INTRAVENOUS at 08:37

## 2017-02-06 RX ADMIN — ISOSORBIDE DINITRATE 20 MG: 20 TABLET ORAL at 14:55

## 2017-02-06 RX ADMIN — DOCUSATE SODIUM 100 MG: 100 CAPSULE, LIQUID FILLED ORAL at 08:18

## 2017-02-06 RX ADMIN — Medication 5 ML: at 05:25

## 2017-02-06 RX ADMIN — HYDRALAZINE HYDROCHLORIDE 25 MG: 25 TABLET, FILM COATED ORAL at 14:55

## 2017-02-06 RX ADMIN — PRAVASTATIN SODIUM 80 MG: 80 TABLET ORAL at 20:34

## 2017-02-06 NOTE — PROGRESS NOTES
Mr. Errol Olszewski was originally attempted and receiving pain medicine. Asked PT to return. PT returned 20 minutes later and Mr. Pollard with CPAP on sleeping. Attempted to wake up and he did not. Not that he couldn't. Wouldn't. Daughter at the bedside. Tried to explain to her that it would be challenging if he wanted pain medicine for PT however this is what happens to him with pain medicine. She suggests its to relax him. Will attempt later as time allows but likely will not be until tomorrow.   Dick Varela, PT

## 2017-02-06 NOTE — PROGRESS NOTES
Problem: Mobility Impaired (Adult and Pediatric)  Goal: *Acute Goals and Plan of Care (Insert Text)  LTG:  (1.)Mr. Pollard will move from supine to sit and sit to supine , scoot up and down and roll side to side with INDEPENDENT within 7 day(s) from flat surface without handrail. (2.)Mr. Pollard will transfer from bed to chair and chair to bed with INDEPENDENT using the least restrictive device within 7 day(s). (3.)Mr. Pollard will ambulate with INDEPENDENT for 150 feet with the least restrictive device within 7 day(s), O2 stats >90%. ___________________________________________________________________________________________      PHYSICAL THERAPY: Re-evaluation and Treatment Day: Day of Assessment 2/6/2017  INPATIENT: Hospital Day: 23  Payor: CARE IMPROVEMENT PLUS / [place] / Product Type: [employer] Care Medicare /      NAME/AGE/GENDER: Andreina Olea is a 45 y.o. male         PRIMARY DIAGNOSIS: NSVT (nonsustained ventricular tachycardia) ([facility] [address] Nonsustained ventricular tachycardia (HCC) Nonsustained ventricular tachycardia (HCC)        ICD-10: Treatment Diagnosis:       · Generalized Muscle Weakness (M62.81)  · Difficulty in walking, Not elsewhere classified (R26.2)   Precaution/Allergies:  Dilaudid [hydromorphone]; Iodinated contrast media - oral and iv dye; and Penicillins       ASSESSMENT:      Mr. Errol Olszewski is doing ok. His scrotum is really preventing him from making real progress. It is still quite large. He has made progress but the goals need to be extended another week. He was cranky with me at first and once you see his scrotum you can understand why. We talked about the Super bowl and that seemed to calm him. He really wants to get better. He does not like being like this. Mr. Errol Olszewski is appropriate to continue PT and would be a great candidate for post acute rehab stay.         This section established at most recent assessment   PROBLEM LIST (Impairments causing functional limitations):  1. Decreased Strength  2. Decreased ADL/Functional Activities  3. Decreased Transfer Abilities  4. Decreased Ambulation Ability/Technique  5. Decreased Balance  6. Decreased Activity Tolerance  7. Increased Fatigue  8. Increased Shortness of Breath  9. Edema/Girth    INTERVENTIONS PLANNED: (Benefits and precautions of physical therapy have been discussed with the patient.)  1. Balance Exercise  2. Bed Mobility  3. Family Education  4. Gait Training  5. Home Exercise Program (HEP)  6. Therapeutic Activites  7. Therapeutic Exercise/Strengthening  8. Transfer Training  9. Group Therapy      TREATMENT PLAN: Frequency/Duration: 3 times a week for duration of hospital stay  Rehabilitation Potential For Stated Goals: GOOD      RECOMMENDED REHABILITATION/EQUIPMENT: (at time of discharge pending progress): Continue Skilled Therapy. HISTORY:   History of Present Injury/Illness (Reason for Referral):  Charmaine Rockwell is a 45 y.o. male with known NICM with documented EF 10 % with [employer] single chamber ICD in place. He was just discharged on 1/13 with initial complaint of constipation and volume overload. He developed worsening renal failure necessitating hold his diuretics temporarily. He was discharged home on addition of demadex 100 mg daily and miralax for constipation. Really since mid December he has not felt well with worsening orthopnea, increasing abdominal girth, increased wt and lower ext edema. Today while driving in car he felt presyncopal and was able to pull to side of rode. He did not loose consciousness. He had repeat episode of presyncope prior to coming to ER. While in ER he was noted to have nonsustained ventricular tachycardia. He is not on antiarrhythmia agent.  Interrogation of his device revealed normal operation but his VT zone is set at 158 bpm with noted VT in ER at approximately 145 bpm.   Past Medical History/Comorbidities:   Mr. Ayaka Phillips  has a past medical history of Acute systolic heart failure [facility]) (May, 2009); AICD (automatic cardioverter/defibrillator) present (10/21/2015); Atypical chest pain (4/23/2010); Bronchitis; CAD (coronary artery disease); Cardiomyopathy; Chest pain (10/21/2015); Chronic kidney disease; Chronic pain; Congestive heart failure (CHF) ([address] (10/21/2015); COPD; Diabetes ([address] Diabetes mellitus type II, uncontrolled ([address] (7/2/2013); GERD (gastroesophageal reflux disease); Gout; Heart failure ([address] Hypertension; Hyponatremia (12/20/2010); Ill-defined condition; Morbid obesity ([address] Nausea & vomiting (11/30/2015); Neuropathy; Obstructive sleep apnea (2/15/2010); Other unknown and unspecified cause of morbidity or mortality; Severe sepsis ([address] and Unspecified sleep apnea. He also has no past medical history of Adverse effect of anesthesia; Aneurysm ([address] Coagulation disorder ([address] DEMENTIA; Difficult intubation; Malignant hyperthermia due to anesthesia; Neurological disorder; Other ill-defined conditions(799.89); Pseudocholinesterase deficiency; or Psychiatric disorder. Mr. Ayaka Phillips  has a past surgical history that includes pacemaker; heart catheterization ([place] 10, 2008); and chest surgery procedure unlisted. Social History/Living Environment:   Home Environment:  ([facility] house)  # Steps to Enter: 0  One/Two Story Residence: One story  Living Alone: No  Support Systems: Family member(s), Friends \ neighbors  Patient Expects to be Discharged to[de-identified] Private residence  Current DME Used/Available at Home: None  Tub or Shower Type: Tub/Shower combination  Prior Level of Function/Work/Activity:  Lives with daughter; typically independent with ambulation for short distances; drives. Personal Factors:          Sex:  male        Age:  45 y.o.    Number of Personal Factors/Comorbidities that affect the Plan of Care:  CHF, DM, obesity, edema 3+: HIGH COMPLEXITY   EXAMINATION:   Most Recent Physical Functioning:   Gross Assessment: Posture:     Balance:  Sitting: Intact  Sitting - Static: Good (unsupported)  Sitting - Dynamic: Good (unsupported)  Standing: Impaired; With support  Standing - Static: Good  Standing - Dynamic : Fair Bed Mobility:  Supine to Sit: Contact guard assistance  Wheelchair Mobility:     Transfers:  Sit to Stand: Supervision  Gait:     Base of Support: Widened  Speed/Mira: Slow  Distance (ft): 2 Feet (ft) (side step holding rail.)       Body Structures Involved:  1. Lungs  2. Joints  3. Muscles Body Functions Affected:  1. Respiratory  2. Movement Related Activities and Participation Affected:  1. General Tasks and Demands  2. Mobility  3. Self Care  4. Community, Social and [place]   Number of elements that affect the Plan of Care: 4+: HIGH COMPLEXITY   CLINICAL PRESENTATION:   Presentation: Stable and uncomplicated: LOW COMPLEXITY   CLINICAL DECISION MAKIN [facility] Inpatient Short Form  How much difficulty does the patient currently have. .. Unable A Lot A Little None   1. Turning over in bed (including adjusting bedclothes, sheets and blankets)? [ ] 1   [ ] 2   [X] 3   [ ] 4   2. Sitting down on and standing up from a chair with arms ( e.g., wheelchair, bedside commode, etc.)   [ ] 1   [ ] 2   [X] 3   [ ] 4   3. Moving from lying on back to sitting on the side of the bed? [ ] 1   [ ] 2   [X] 3   [ ] 4   How much help from another person does the patient currently need. .. Total A Lot A Little None   4. Moving to and from a bed to a chair (including a wheelchair)? [ ] 1   [ ] 2   [X] 3   [ ] 4   5. Need to walk in hospital room? [ ] 1   [ ] 2   [X] 3   [ ] 4   6. Climbing 3-5 steps with a railing? [ ] 1   [ ] 2   [X] 3   [ ] 4   © , Trustees of [address] [place] [address] [address] [id number], under license to [employer].  All rights reserved    Score:  Initial: 18 Most Recent: X (Date: -- )     Interpretation of Tool:  Represents activities that are increasingly more difficult (i.e. Bed mobility, Transfers, Gait). Score 24 23 22-20 19-15 14-10 9-7 6       Modifier CH CI CJ CK CL CM CN         · Mobility - Walking and Moving Around:               - CURRENT STATUS:    CK - 40%-59% impaired, limited or restricted               - GOAL STATUS:           CJ - 20%-39% impaired, limited or restricted               - D/C STATUS:                       ---------------To be determined---------------  Payor: CARE IMPROVEMENT PLUS [place]PR[place] / Product Type: Managed Care Medicare /       Medical Necessity:     · Patient is expected to demonstrate progress in strength, range of motion, balance and coordination to decrease assistance required with overall functional mobility, transfers, ambulation. · Patient demonstrates good rehab potential due to higher previous functional level. Reason for Services/Other Comments:  · Patient continues to require present interventions due to patient's inability to perform bed mobility, transfers, ambulation all safely and effectively at prior level of function of independence. Use of outcome tool(s) and clinical judgement create a POC that gives a: Clear prediction of patient's progress: LOW COMPLEXITY                 TREATMENT:      Pre-treatment Symptoms/Complaints:  Visibly sore with movement. Pain: Initial: . Pain Intensity 1: 3  Pain Location 1: Scrotum  Post Session:  Unchanged toleraled well      Therapeutic Activity: (    ):  Therapeutic activities including bed mobility, ambulation 10 feet today using rolling walker, sit to stand, scooting to improve mobility, strength and balance. Required maximal manual and verbal cues   to promote coordination of bilateral, upper extremity(s), lower extremity(s). Therapeutic Exercise: ( 25):  Exercises per grid below to improve mobility and strength.   Required maximum visual, verbal and manual cues to promote proper body alignment and promote proper body mechanics. Progressed complexity of movement as indicated. Date:  1/26/17 Date:  1/27/17 Date:  1/30/17 Date:  2/1/17 Date:  2/2/17 Date:  2/3/17   Activity/Exercise Parameters Parameters Parameters      Standing marching in place         Seated AP's 2 x 20 active        Seated LAQ's 2 x 20        Seated hip ABD/ADD         Supine hip abd/add         Shoulder flexion 2x 20        Elbow flexion 2x 20                          Seated hip flexion 2 x 20        Ambulation  Assist  Device                  Key:  A=active, AA=active assisted, B=bilaterally, R=right, L=left         Treatment/Session Assessment:    · Response to Treatment:  Falling asleep  · Interdisciplinary Collaboration:  · Physical Therapist and Registered Nurse  · After treatment position/precautions:  · Supine in bed, Bed/Chair-wheels locked, Bed in low position, Call light within reach and Family at bedside  · Compliance with Program/Exercises: Will assess as treatment progresses. · Recommendations/Intent for next treatment session: \"Next visit will focus on advancements to more challenging activities\".   Total Treatment Duration:  PT Patient Time In/Time Out  Time In: 1350  Time Out: Mauricio 59, PT

## 2017-02-06 NOTE — PROGRESS NOTES
[facility] CARDIOLOGY PROGRESS NOTE           2/6/2017 1:48 PM    Admit Date: 1/19/2017    Admit Diagnosis: NSVT (nonsustained ventricular tachycardia) ([facility])      Subjective:   No chest pain or shortness of breath, feeling a little better today, complaining of scrotal pain and swelling    Interval History: (History of pertinent interval events obtained from nursing staff)  Continues on milrinone    ROS:  GEN:  No fever or chills  Cardiovascular:  As noted above  Pulmonary:  As noted above  Neuro:  No new focal motor or sensory loss      Objective:     Vitals:    02/06/17 0147 02/06/17 0607 02/06/17 0712 02/06/17 1213   BP: 130/79 135/74 151/70 137/59   Pulse: 79 87 79 75   Resp:  19 17 16   Temp:  97.1 °F (36.2 °C) 97.5 °F (36.4 °C) 97.7 °F (36.5 °C)   SpO2:  99% 98% 90%   Weight:  (!) 396 lb 9.6 oz (179.9 kg)     Height:           Physical Exam:  General- morbidly obese, NAD  Neck- supple  CV- regular rate and rhythm, very distant S1, S2  Lung- clear bilaterally but decreased BS B/L  Abd- soft, nontender, obese  Ext- 2+ OSMAN, legs wrapped  Skin- warm and dry    Current Facility-Administered Medications   Medication Dose Route Frequency    furosemide (LASIX) injection 40 mg  40 mg IntraVENous BID    milrinone (PRIMACOR) 20 MG/100 ML D5W infusion  0.5 mcg/kg/min IntraVENous CONTINUOUS    ondansetron (ZOFRAN) injection 4 mg  4 mg IntraVENous Q6H PRN    spironolactone (ALDACTONE) tablet 50 mg  50 mg Oral DAILY    amiodarone (CORDARONE) tablet 200 mg  200 mg Oral DAILY    polyethylene glycol (MIRALAX) packet 34 g  34 g Oral DAILY    hydrocortisone (CORTAID) 0.5 % cream   Topical BID    allopurinol (ZYLOPRIM) tablet 100 mg  100 mg Oral DAILY    apixaban (ELIQUIS) tablet 5 mg  5 mg Oral Q12H    bisacodyl (DULCOLAX) suppository 10 mg  10 mg Rectal DAILY    carvedilol (COREG) tablet 25 mg  25 mg Oral BID WITH MEALS    colchicine tablet 0.6 mg  0.6 mg Oral DAILY    docusate sodium (COLACE) capsule 100 mg  100 mg Oral BID    gabapentin (NEURONTIN) capsule 600 mg  600 mg Oral TID    hydrALAZINE (APRESOLINE) tablet 25 mg  25 mg Oral TID    insulin glargine (LANTUS) injection 50 Units  50 Units SubCUTAneous QHS    insulin lispro (HUMALOG) injection 15 Units  15 Units SubCUTAneous TID WITH MEALS    isosorbide dinitrate (ISORDIL) tablet 20 mg  20 mg Oral TID    nitroglycerin (NITROSTAT) tablet 0.4 mg  0.4 mg SubLINGual Q5MIN PRN    oxyCODONE IR (OXY-IR) immediate release tablet 15 mg  15 mg Oral Q4H PRN    polyethylene glycol (MIRALAX) packet 17 g  17 g Oral DAILY PRN    pravastatin (PRAVACHOL) tablet 80 mg  80 mg Oral QHS    famotidine (PEPCID) tablet 20 mg  20 mg Oral BID    sodium chloride (NS) flush 5-10 mL  5-10 mL IntraVENous Q8H    sodium chloride (NS) flush 5-10 mL  5-10 mL IntraVENous PRN    morphine injection 2 mg  2 mg IntraVENous Q4H PRN     Data Review:   Recent Results (from the past 24 hour(s))   GLUCOSE, POC    Collection Time: 02/05/17  4:13 PM   Result Value Ref Range    Glucose (POC) 71 65 - 100 mg/dL   GLUCOSE, POC    Collection Time: 02/05/17  5:22 PM   Result Value Ref Range    Glucose (POC) 78 65 - 100 mg/dL   GLUCOSE, POC    Collection Time: 02/05/17  9:12 PM   Result Value Ref Range    Glucose (POC) 140 (H) 65 - 533 mg/dL   METABOLIC PANEL, BASIC    Collection Time: 02/06/17  4:25 AM   Result Value Ref Range    Sodium 139 136 - 145 mmol/L    Potassium 3.1 (L) 3.5 - 5.1 mmol/L    Chloride 93 (L) 98 - 107 mmol/L    CO2 37 (H) 21 - 32 mmol/L    Anion gap 9 7 - 16 mmol/L    Glucose 94 65 - 100 mg/dL    BUN 40 (H) 6 - 23 MG/DL    Creatinine 2.12 (H) 0.8 - 1.5 MG/DL    GFR est AA 42 (L) >60 ml/min/1.73m2    GFR est non-AA 35 (L) >60 ml/min/1.73m2    Calcium 8.4 8.3 - 10.4 MG/DL   MAGNESIUM    Collection Time: 02/06/17  4:25 AM   Result Value Ref Range    Magnesium 2.2 1.8 - 2.4 mg/dL   GLUCOSE, POC    Collection Time: 02/06/17  6:47 AM   Result Value Ref Range    Glucose (POC) 97 65 - 100 mg/dL   GLUCOSE, POC    Collection Time: 02/06/17  7:36 AM   Result Value Ref Range    Glucose (POC) 102 (H) 65 - 100 mg/dL   GLUCOSE, POC    Collection Time: 02/06/17 11:20 AM   Result Value Ref Range    Glucose (POC) 168 (H) 65 - 100 mg/dL       EKG:  (EKG has been independently visualized by me with interpretation below)  Assessment:     Principal Problem:    Nonsustained ventricular tachycardia ([address] (1/19/2017)    Active Problems:    Obstructive sleep apnea (2/15/2010)      Nonischemic dilated cardiomyopathy ([address] (7/2/2013)      Overview: LVEF 10-15% on ECHO from 12/17/10      CKD (chronic kidney disease) stage 3, GFR 30-59 ml/min (8/13/2014)      AICD (automatic cardioverter/defibrillator) present (10/21/2015)      Acute on chronic systolic (congestive) heart failure ([address] (10/18/2016)      Atrial flutter ([address] (10/20/2016)      Obesity hypoventilation syndrome ([address] (10/24/2016)      Morbid obesity with BMI of 60.0-69.9, adult ([address] (11/28/2016)      Syncope (1/19/2017)      NSVT (nonsustained ventricular tachycardia) ([address] (1/19/2017)      Plan:   1. Nonsustained ventricular tachycardia, controlled: continue amiodarone, decreased to 200 mg daily  2. CKD (chronic kidney disease) stage 3, GFR 30-59 ml/min (8/13/2014): improving, strict I and Os, continued on milrinone and diuresis today  3. Acute on chronic systolic (congestive) heart failure -improved volume overload, most likely component of cor pulmonale as well with morbid obesity --KELSIE--will continue to wear BiPAP, legs wrapped, continued on IV lasix and milrinone, slow diuresis but with progress  4. Atrial flutter --stable, on eliquis  5. Edema, uncontrolled - legs wrapped, home diuretic  6. AICD: functioning normally, no shocks  7. Obesity hypoventilation, KELSIE: BiPAP   8. Scrotal pain: urology consult today    Shadia Valiente.  Mya FLOWER  Cardiology/Electrophysiology

## 2017-02-06 NOTE — PROGRESS NOTES
Verbal bedside report given to Gabriel Claude, oncoming RN. Patient's situation, background, assessment and recommendations provided. Opportunity for questions provided. Oncoming RN assumed care of patient. Primacor IV drip verified at bedside with oncoming RN.

## 2017-02-06 NOTE — PROGRESS NOTES
Bedside and Verbal shift change report received from [address]. Report included the following information SBAR, Kardex, ED Summary, OR Summary, Procedure Summary, Intake/Output, MAR, Accordion, Recent Results and Cardiac Rhythm NSR.

## 2017-02-06 NOTE — PROGRESS NOTES
Patient refused to take full dose of 50 units of lanutus that is ordered. Patient only wanted to take 40 units because he states his blood sugar drops too much. Given patient 40 units. Will monitor.

## 2017-02-07 LAB
ANION GAP BLD CALC-SCNC: 8 MMOL/L (ref 7–16)
BUN SERPL-MCNC: 45 MG/DL (ref 6–23)
CALCIUM SERPL-MCNC: 8.6 MG/DL (ref 8.3–10.4)
CHLORIDE SERPL-SCNC: 91 MMOL/L (ref 98–107)
CO2 SERPL-SCNC: 38 MMOL/L (ref 21–32)
CREAT SERPL-MCNC: 2.32 MG/DL (ref 0.8–1.5)
GLUCOSE BLD STRIP.AUTO-MCNC: 115 MG/DL (ref 65–100)
GLUCOSE BLD STRIP.AUTO-MCNC: 119 MG/DL (ref 65–100)
GLUCOSE BLD STRIP.AUTO-MCNC: 163 MG/DL (ref 65–100)
GLUCOSE BLD STRIP.AUTO-MCNC: 179 MG/DL (ref 65–100)
GLUCOSE BLD STRIP.AUTO-MCNC: 406 MG/DL (ref 65–100)
GLUCOSE BLD STRIP.AUTO-MCNC: 99 MG/DL (ref 65–100)
GLUCOSE SERPL-MCNC: 136 MG/DL (ref 65–100)
MAGNESIUM SERPL-MCNC: 2.3 MG/DL (ref 1.8–2.4)
POTASSIUM SERPL-SCNC: 3.5 MMOL/L (ref 3.5–5.1)
SODIUM SERPL-SCNC: 137 MMOL/L (ref 136–145)

## 2017-02-07 PROCEDURE — 65660000000 HC RM CCU STEPDOWN

## 2017-02-07 PROCEDURE — 74011250637 HC RX REV CODE- 250/637: Performed by: INTERNAL MEDICINE

## 2017-02-07 PROCEDURE — 77010033678 HC OXYGEN DAILY

## 2017-02-07 PROCEDURE — 83735 ASSAY OF MAGNESIUM: CPT | Performed by: INTERNAL MEDICINE

## 2017-02-07 PROCEDURE — 74011250636 HC RX REV CODE- 250/636: Performed by: INTERNAL MEDICINE

## 2017-02-07 PROCEDURE — 80048 BASIC METABOLIC PNL TOTAL CA: CPT | Performed by: INTERNAL MEDICINE

## 2017-02-07 PROCEDURE — 3331090002 HH PPS REVENUE DEBIT

## 2017-02-07 PROCEDURE — 74011250637 HC RX REV CODE- 250/637: Performed by: NURSE PRACTITIONER

## 2017-02-07 PROCEDURE — 36592 COLLECT BLOOD FROM PICC: CPT

## 2017-02-07 PROCEDURE — 3331090001 HH PPS REVENUE CREDIT

## 2017-02-07 PROCEDURE — 74011636637 HC RX REV CODE- 636/637: Performed by: NURSE PRACTITIONER

## 2017-02-07 PROCEDURE — 82962 GLUCOSE BLOOD TEST: CPT

## 2017-02-07 RX ADMIN — GABAPENTIN 600 MG: 300 CAPSULE ORAL at 05:15

## 2017-02-07 RX ADMIN — FUROSEMIDE 40 MG: 10 INJECTION, SOLUTION INTRAMUSCULAR; INTRAVENOUS at 17:36

## 2017-02-07 RX ADMIN — ISOSORBIDE DINITRATE 20 MG: 20 TABLET ORAL at 13:28

## 2017-02-07 RX ADMIN — CARVEDILOL 25 MG: 25 TABLET, FILM COATED ORAL at 17:36

## 2017-02-07 RX ADMIN — FAMOTIDINE 20 MG: 20 TABLET ORAL at 17:36

## 2017-02-07 RX ADMIN — INSULIN LISPRO 15 UNITS: 100 INJECTION, SOLUTION INTRAVENOUS; SUBCUTANEOUS at 13:27

## 2017-02-07 RX ADMIN — ISOSORBIDE DINITRATE 20 MG: 20 TABLET ORAL at 05:15

## 2017-02-07 RX ADMIN — DOCUSATE SODIUM 100 MG: 100 CAPSULE, LIQUID FILLED ORAL at 17:36

## 2017-02-07 RX ADMIN — MILRINONE LACTATE 0.5 MCG/KG/MIN: 200 INJECTION, SOLUTION INTRAVENOUS at 22:27

## 2017-02-07 RX ADMIN — SPIRONOLACTONE 50 MG: 25 TABLET, FILM COATED ORAL at 08:52

## 2017-02-07 RX ADMIN — OXYCODONE HYDROCHLORIDE 15 MG: 15 TABLET ORAL at 20:39

## 2017-02-07 RX ADMIN — MILRINONE LACTATE 0.5 MCG/KG/MIN: 200 INJECTION, SOLUTION INTRAVENOUS at 11:20

## 2017-02-07 RX ADMIN — FAMOTIDINE 20 MG: 20 TABLET ORAL at 08:51

## 2017-02-07 RX ADMIN — GABAPENTIN 600 MG: 300 CAPSULE ORAL at 13:28

## 2017-02-07 RX ADMIN — COLCHICINE 0.6 MG: 0.6 TABLET, FILM COATED ORAL at 08:52

## 2017-02-07 RX ADMIN — GABAPENTIN 600 MG: 300 CAPSULE ORAL at 22:29

## 2017-02-07 RX ADMIN — CARVEDILOL 25 MG: 25 TABLET, FILM COATED ORAL at 08:52

## 2017-02-07 RX ADMIN — INSULIN LISPRO 15 UNITS: 100 INJECTION, SOLUTION INTRAVENOUS; SUBCUTANEOUS at 17:35

## 2017-02-07 RX ADMIN — Medication 10 ML: at 05:15

## 2017-02-07 RX ADMIN — MILRINONE LACTATE 0.5 MCG/KG/MIN: 200 INJECTION, SOLUTION INTRAVENOUS at 15:05

## 2017-02-07 RX ADMIN — PRAVASTATIN SODIUM 80 MG: 80 TABLET ORAL at 22:28

## 2017-02-07 RX ADMIN — Medication 10 ML: at 22:29

## 2017-02-07 RX ADMIN — MILRINONE LACTATE 0.5 MCG/KG/MIN: 200 INJECTION, SOLUTION INTRAVENOUS at 04:09

## 2017-02-07 RX ADMIN — APIXABAN 5 MG: 5 TABLET, FILM COATED ORAL at 08:52

## 2017-02-07 RX ADMIN — ISOSORBIDE DINITRATE 20 MG: 20 TABLET ORAL at 22:29

## 2017-02-07 RX ADMIN — AMIODARONE HYDROCHLORIDE 200 MG: 200 TABLET ORAL at 08:52

## 2017-02-07 RX ADMIN — FUROSEMIDE 40 MG: 10 INJECTION, SOLUTION INTRAMUSCULAR; INTRAVENOUS at 08:51

## 2017-02-07 RX ADMIN — MILRINONE LACTATE 0.5 MCG/KG/MIN: 200 INJECTION, SOLUTION INTRAVENOUS at 08:51

## 2017-02-07 RX ADMIN — MILRINONE LACTATE 0.5 MCG/KG/MIN: 200 INJECTION, SOLUTION INTRAVENOUS at 00:16

## 2017-02-07 RX ADMIN — HYDRALAZINE HYDROCHLORIDE 25 MG: 25 TABLET, FILM COATED ORAL at 05:15

## 2017-02-07 RX ADMIN — INSULIN LISPRO 1 UNITS: 100 INJECTION, SOLUTION INTRAVENOUS; SUBCUTANEOUS at 08:00

## 2017-02-07 RX ADMIN — ALLOPURINOL 100 MG: 100 TABLET ORAL at 08:52

## 2017-02-07 RX ADMIN — HYDRALAZINE HYDROCHLORIDE 25 MG: 25 TABLET, FILM COATED ORAL at 13:28

## 2017-02-07 RX ADMIN — HYDRALAZINE HYDROCHLORIDE 25 MG: 25 TABLET, FILM COATED ORAL at 22:28

## 2017-02-07 RX ADMIN — OXYCODONE HYDROCHLORIDE 15 MG: 15 TABLET ORAL at 11:12

## 2017-02-07 RX ADMIN — POLYETHYLENE GLYCOL 3350 34 G: 17 POWDER, FOR SOLUTION ORAL at 08:51

## 2017-02-07 RX ADMIN — APIXABAN 5 MG: 5 TABLET, FILM COATED ORAL at 20:39

## 2017-02-07 RX ADMIN — DOCUSATE SODIUM 100 MG: 100 CAPSULE, LIQUID FILLED ORAL at 08:52

## 2017-02-07 NOTE — CONSULTS
[address]       Name:  Elliot Magana   MR#:  [id number]   :  1965   Account #:  [de-identified]   Date of Adm:  2017       INDICATION FOR CONSULTATION: Scrotal edema. HISTORY OF PRESENT ILLNESS: This is a 59-year-old gentleman who   was admitted on 2017 for nonsustained ventricular   tachycardia as well as acute on chronic systolic heart failure. He reports approximately 2-week history of significant scrotal   and penile swelling, as well as some mild scrotal discomfort. He   is voiding well without a catheter, he does report intermittent   stream as well as a decreased emptying, although contributes   this to his penile swelling. He states that his voiding at home   is unremarkable. He denies any recent scrotal trauma. He does   report a grandfather with a history of prostate cancer. PAST MEDICAL HISTORY: Heart failure, coronary artery disease,   cardiomyopathy, chronic renal insufficiency, COPD, diabetes,   gastroesophageal reflux disease, hypertension, morbid obesity,   sleep apnea. PAST SURGICAL HISTORY: Thoracentesis, heart catheterization,   pacemaker placement. MEDICATIONS: Please see [employer] for details. ALLERGIES:    1. DILAUDID. 2. CONTRAST DYE. 3. PENICILLIN. SOCIAL HISTORY: Former smoker, although quit in . No recent   alcohol or tobacco use. FAMILY HISTORY: Significant for heart disease, diabetes and   stroke. REVIEW OF SYSTEMS: As per HPI. The patient reports chronic   constipation. All other systems are reviewed are negative at   this time. PHYSICAL EXAMINATION   VITAL SIGNS: Temperature is 97.7, pulse 82, blood pressure   125/77, respirations 17. GENERAL: He is alert, in no obvious distress. ABDOMEN: Obese. GENITOURINARY: Reveals significant penile and scrotal edema. I   am unable to visualize either the phallus or palpate his testicles   bilaterally. EXTREMITIES: Have 2+ lower extremity edema. PERTINENT LABORATORIES: Creatinine is 2.12 today, it appears   that this is near his baseline. Urinalysis on 01/10/2017 was   unremarkable other than trace protein. He has had no recent   imaging of his scrotum. A CT abdomen and pelvis on 08/14/2015   showed normal upper tracts. ASSESSMENT: Scrotal and penile swelling secondary to generalized   anasarca. I recommended scrotal elevation until his volume   equilibrates. Thank you very much for the consult.         DO MARIA DEL ROSARIO Govea / Jan Kaba   D:  02/06/2017   18:48   T:  02/06/2017   19:22   Job #:  [id number]

## 2017-02-07 NOTE — PROGRESS NOTES
Bedside and Verbal shift change report given to self (oncoming nurse) by Jaja Le RN (offgoing nurse). Report included the following information SBAR, Kardex, MAR and Recent Results. Verified Primacor at 0.5 mcg at bedside.

## 2017-02-07 NOTE — PROGRESS NOTES
Mr. Kalyn Baron was sleeping. Unfortunately this PT had made a plan to see him this morning around 11 but this PTs schedule got messed up. Asked his daughter to have him do his exercises on the edge of the bed when he wakes up. Will see tomorrow.     Milton Varela, PT

## 2017-02-07 NOTE — PROGRESS NOTES
[facility] CARDIOLOGY PROGRESS NOTE           2/7/2017 9:59 AM    Admit Date: 1/19/2017    Admit Diagnosis: NSVT (nonsustained ventricular tachycardia) (HCC)      Subjective:   Patient continues to improve. He thinks his ICD went off last night. Scrotal edema is his major issue    Objective:     Vitals:    02/07/17 0018 02/07/17 0529 02/07/17 0625 02/07/17 0730   BP: 112/64 105/69  103/59   Pulse: 78 86  83   Resp: 17 17 18   Temp: 97.2 °F (36.2 °C) 97.6 °F (36.4 °C)  98.1 °F (36.7 °C)   SpO2: 93% 96% 97% 96%   Weight:  (!) 395 lb 8 oz (179.4 kg)     Height:           Physical Exam:  General-Well Developed, Well Nourished, No Acute Distress, Alert & Oriented x 3, appropriate mood. Neck- supple, no JVD  CV- regular rate and rhythm no MRG  Lung- clear bilaterally  Abd- soft, nontender, obese  Ext- +3 edema bilaterally.   Skin- warm and dry    Current Facility-Administered Medications   Medication Dose Route Frequency    furosemide (LASIX) injection 40 mg  40 mg IntraVENous BID    milrinone (PRIMACOR) 20 MG/100 ML D5W infusion  0.5 mcg/kg/min IntraVENous CONTINUOUS    ondansetron (ZOFRAN) injection 4 mg  4 mg IntraVENous Q6H PRN    spironolactone (ALDACTONE) tablet 50 mg  50 mg Oral DAILY    amiodarone (CORDARONE) tablet 200 mg  200 mg Oral DAILY    polyethylene glycol (MIRALAX) packet 34 g  34 g Oral DAILY    hydrocortisone (CORTAID) 0.5 % cream   Topical BID    allopurinol (ZYLOPRIM) tablet 100 mg  100 mg Oral DAILY    apixaban (ELIQUIS) tablet 5 mg  5 mg Oral Q12H    bisacodyl (DULCOLAX) suppository 10 mg  10 mg Rectal DAILY    carvedilol (COREG) tablet 25 mg  25 mg Oral BID WITH MEALS    colchicine tablet 0.6 mg  0.6 mg Oral DAILY    docusate sodium (COLACE) capsule 100 mg  100 mg Oral BID    gabapentin (NEURONTIN) capsule 600 mg  600 mg Oral TID    hydrALAZINE (APRESOLINE) tablet 25 mg  25 mg Oral TID    insulin glargine (LANTUS) injection 50 Units  50 Units SubCUTAneous QHS    insulin lispro (HUMALOG) injection 15 Units  15 Units SubCUTAneous TID WITH MEALS    isosorbide dinitrate (ISORDIL) tablet 20 mg  20 mg Oral TID    nitroglycerin (NITROSTAT) tablet 0.4 mg  0.4 mg SubLINGual Q5MIN PRN    oxyCODONE IR (OXY-IR) immediate release tablet 15 mg  15 mg Oral Q4H PRN    polyethylene glycol (MIRALAX) packet 17 g  17 g Oral DAILY PRN    pravastatin (PRAVACHOL) tablet 80 mg  80 mg Oral QHS    famotidine (PEPCID) tablet 20 mg  20 mg Oral BID    sodium chloride (NS) flush 5-10 mL  5-10 mL IntraVENous Q8H    sodium chloride (NS) flush 5-10 mL  5-10 mL IntraVENous PRN    morphine injection 2 mg  2 mg IntraVENous Q4H PRN     Data Review:   Recent Results (from the past 24 hour(s))   GLUCOSE, POC    Collection Time: 02/06/17 11:20 AM   Result Value Ref Range    Glucose (POC) 168 (H) 65 - 100 mg/dL   GLUCOSE, POC    Collection Time: 02/06/17  3:10 PM   Result Value Ref Range    Glucose (POC) 88 65 - 100 mg/dL   GLUCOSE, POC    Collection Time: 02/06/17  4:13 PM   Result Value Ref Range    Glucose (POC) 95 65 - 100 mg/dL   GLUCOSE, POC    Collection Time: 02/06/17  8:42 PM   Result Value Ref Range    Glucose (POC) 184 (H) 65 - 100 mg/dL   MAGNESIUM    Collection Time: 02/07/17  4:10 AM   Result Value Ref Range    Magnesium 2.3 1.8 - 2.4 mg/dL   METABOLIC PANEL, BASIC    Collection Time: 02/07/17  4:10 AM   Result Value Ref Range    Sodium 137 136 - 145 mmol/L    Potassium 3.5 3.5 - 5.1 mmol/L    Chloride 91 (L) 98 - 107 mmol/L    CO2 38 (H) 21 - 32 mmol/L    Anion gap 8 7 - 16 mmol/L    Glucose 136 (H) 65 - 100 mg/dL    BUN 45 (H) 6 - 23 MG/DL    Creatinine 2.32 (H) 0.8 - 1.5 MG/DL    GFR est AA 38 (L) >60 ml/min/1.73m2    GFR est non-AA 32 (L) >60 ml/min/1.73m2    Calcium 8.6 8.3 - 10.4 MG/DL   GLUCOSE, POC    Collection Time: 02/07/17  5:33 AM   Result Value Ref Range    Glucose (POC) 119 (H) 65 - 100 mg/dL     Assessment:     Principal Problem:    Nonsustained ventricular tachycardia (HCC) (1/19/2017)    Active Problems:    Obstructive sleep apnea (2/15/2010)      Nonischemic dilated cardiomyopathy ([address] (7/2/2013)      Overview: LVEF 10-15% on ECHO from 12/17/10      CKD (chronic kidney disease) stage 3, GFR 30-59 ml/min (8/13/2014)      AICD (automatic cardioverter/defibrillator) present (10/21/2015)      Acute on chronic systolic (congestive) heart failure ([address] (10/18/2016)      Atrial flutter ([address] (10/20/2016)      Obesity hypoventilation syndrome ([address] (10/24/2016)      Morbid obesity with BMI of 60.0-69.9, adult ([address] (11/28/2016)      Syncope (1/19/2017)      NSVT (nonsustained ventricular tachycardia) ([address] (1/19/2017)      Plan:   1. Nonsustained ventricular tachycardia, controlled: continue amiodarone, decreased to 200 mg daily  2. CKD (chronic kidney disease) stage 3, GFR 30-59 ml/min (8/13/2014): improving, strict I and Os, continued on milrinone and diuresis   3. Acute on chronic systolic (congestive) heart failure -improved volume overload, most likely component of cor pulmonale as well with morbid obesity --KELSIE--will continue to wear BiPAP, legs wrapped, continued on IV lasix and milrinone, slow diuresis but with progress  4. Atrial flutter --stable, on eliquis  5. Edema, uncontrolled - legs wrapped, iv lasix  6. AICD: functioning normally, ? Shock - Check ICD to confirm  7. Obesity hypoventilation, KELSIE: BiPAP   8. Scrotal edema: iv lasix    Jose L Aguillon. Joan Sky M.D., F.A.C.C, F.H.R.S.   Cardiology/Electrophysiology

## 2017-02-07 NOTE — PROGRESS NOTES
Patient states he was shocked by his ICD. Yola Rosales NP on the floor and looked at monitor at time of reported shock. Patient resting comfortably at this time. Will continue to monitor.

## 2017-02-08 LAB
GLUCOSE BLD STRIP.AUTO-MCNC: 110 MG/DL (ref 65–100)
GLUCOSE BLD STRIP.AUTO-MCNC: 118 MG/DL (ref 65–100)
GLUCOSE BLD STRIP.AUTO-MCNC: 132 MG/DL (ref 65–100)
GLUCOSE BLD STRIP.AUTO-MCNC: 136 MG/DL (ref 65–100)
GLUCOSE BLD STRIP.AUTO-MCNC: 145 MG/DL (ref 65–100)
GLUCOSE BLD STRIP.AUTO-MCNC: 229 MG/DL (ref 65–100)
MAGNESIUM SERPL-MCNC: 2.9 MG/DL (ref 1.8–2.4)

## 2017-02-08 PROCEDURE — 36592 COLLECT BLOOD FROM PICC: CPT

## 2017-02-08 PROCEDURE — 74011250637 HC RX REV CODE- 250/637: Performed by: INTERNAL MEDICINE

## 2017-02-08 PROCEDURE — 74011250636 HC RX REV CODE- 250/636: Performed by: INTERNAL MEDICINE

## 2017-02-08 PROCEDURE — 74011250637 HC RX REV CODE- 250/637: Performed by: NURSE PRACTITIONER

## 2017-02-08 PROCEDURE — 74011636637 HC RX REV CODE- 636/637: Performed by: NURSE PRACTITIONER

## 2017-02-08 PROCEDURE — 77030018846 HC SOL IRR STRL H20 ICUM -A

## 2017-02-08 PROCEDURE — 3331090002 HH PPS REVENUE DEBIT

## 2017-02-08 PROCEDURE — 77030019605

## 2017-02-08 PROCEDURE — 3331090001 HH PPS REVENUE CREDIT

## 2017-02-08 PROCEDURE — 83735 ASSAY OF MAGNESIUM: CPT | Performed by: INTERNAL MEDICINE

## 2017-02-08 PROCEDURE — 97166 OT EVAL MOD COMPLEX 45 MIN: CPT

## 2017-02-08 PROCEDURE — 65660000000 HC RM CCU STEPDOWN

## 2017-02-08 PROCEDURE — 77030011256 HC DRSG MEPILEX <16IN NO BORD MOLN -A

## 2017-02-08 PROCEDURE — 82962 GLUCOSE BLOOD TEST: CPT

## 2017-02-08 RX ADMIN — MILRINONE LACTATE 0.5 MCG/KG/MIN: 200 INJECTION, SOLUTION INTRAVENOUS at 05:52

## 2017-02-08 RX ADMIN — INSULIN GLARGINE 40 UNITS: 100 INJECTION, SOLUTION SUBCUTANEOUS at 00:16

## 2017-02-08 RX ADMIN — OXYCODONE HYDROCHLORIDE 15 MG: 15 TABLET ORAL at 10:51

## 2017-02-08 RX ADMIN — ISOSORBIDE DINITRATE 20 MG: 20 TABLET ORAL at 16:00

## 2017-02-08 RX ADMIN — MILRINONE LACTATE 0.5 MCG/KG/MIN: 200 INJECTION, SOLUTION INTRAVENOUS at 22:40

## 2017-02-08 RX ADMIN — DOCUSATE SODIUM 100 MG: 100 CAPSULE, LIQUID FILLED ORAL at 17:44

## 2017-02-08 RX ADMIN — APIXABAN 5 MG: 5 TABLET, FILM COATED ORAL at 21:34

## 2017-02-08 RX ADMIN — GABAPENTIN 600 MG: 300 CAPSULE ORAL at 16:00

## 2017-02-08 RX ADMIN — GABAPENTIN 600 MG: 300 CAPSULE ORAL at 05:55

## 2017-02-08 RX ADMIN — PRAVASTATIN SODIUM 80 MG: 80 TABLET ORAL at 21:35

## 2017-02-08 RX ADMIN — FUROSEMIDE 40 MG: 10 INJECTION, SOLUTION INTRAMUSCULAR; INTRAVENOUS at 17:44

## 2017-02-08 RX ADMIN — Medication 10 ML: at 21:36

## 2017-02-08 RX ADMIN — INSULIN LISPRO 15 UNITS: 100 INJECTION, SOLUTION INTRAVENOUS; SUBCUTANEOUS at 11:39

## 2017-02-08 RX ADMIN — OXYCODONE HYDROCHLORIDE 15 MG: 15 TABLET ORAL at 23:56

## 2017-02-08 RX ADMIN — Medication 10 ML: at 05:55

## 2017-02-08 RX ADMIN — ISOSORBIDE DINITRATE 20 MG: 20 TABLET ORAL at 05:55

## 2017-02-08 RX ADMIN — HYDRALAZINE HYDROCHLORIDE 25 MG: 25 TABLET, FILM COATED ORAL at 21:35

## 2017-02-08 RX ADMIN — SPIRONOLACTONE 50 MG: 25 TABLET, FILM COATED ORAL at 09:01

## 2017-02-08 RX ADMIN — INSULIN GLARGINE 40 UNITS: 100 INJECTION, SOLUTION SUBCUTANEOUS at 23:50

## 2017-02-08 RX ADMIN — APIXABAN 5 MG: 5 TABLET, FILM COATED ORAL at 09:01

## 2017-02-08 RX ADMIN — FAMOTIDINE 20 MG: 20 TABLET ORAL at 17:44

## 2017-02-08 RX ADMIN — FAMOTIDINE 20 MG: 20 TABLET ORAL at 09:01

## 2017-02-08 RX ADMIN — ALLOPURINOL 100 MG: 100 TABLET ORAL at 09:01

## 2017-02-08 RX ADMIN — POLYETHYLENE GLYCOL 3350 34 G: 17 POWDER, FOR SOLUTION ORAL at 09:00

## 2017-02-08 RX ADMIN — COLCHICINE 0.6 MG: 0.6 TABLET, FILM COATED ORAL at 09:01

## 2017-02-08 RX ADMIN — DOCUSATE SODIUM 100 MG: 100 CAPSULE, LIQUID FILLED ORAL at 09:01

## 2017-02-08 RX ADMIN — MILRINONE LACTATE 0.5 MCG/KG/MIN: 200 INJECTION, SOLUTION INTRAVENOUS at 02:13

## 2017-02-08 RX ADMIN — ISOSORBIDE DINITRATE 20 MG: 20 TABLET ORAL at 21:35

## 2017-02-08 RX ADMIN — FUROSEMIDE 40 MG: 10 INJECTION, SOLUTION INTRAMUSCULAR; INTRAVENOUS at 09:01

## 2017-02-08 RX ADMIN — CARVEDILOL 25 MG: 25 TABLET, FILM COATED ORAL at 09:01

## 2017-02-08 RX ADMIN — BISACODYL 10 MG: 10 SUPPOSITORY RECTAL at 11:53

## 2017-02-08 RX ADMIN — INSULIN LISPRO 5 UNITS: 100 INJECTION, SOLUTION INTRAVENOUS; SUBCUTANEOUS at 08:00

## 2017-02-08 RX ADMIN — MILRINONE LACTATE 0.5 MCG/KG/MIN: 200 INJECTION, SOLUTION INTRAVENOUS at 14:28

## 2017-02-08 RX ADMIN — AMIODARONE HYDROCHLORIDE 200 MG: 200 TABLET ORAL at 09:01

## 2017-02-08 RX ADMIN — GABAPENTIN 600 MG: 300 CAPSULE ORAL at 21:35

## 2017-02-08 RX ADMIN — HYDRALAZINE HYDROCHLORIDE 25 MG: 25 TABLET, FILM COATED ORAL at 16:00

## 2017-02-08 RX ADMIN — HYDRALAZINE HYDROCHLORIDE 25 MG: 25 TABLET, FILM COATED ORAL at 05:56

## 2017-02-08 RX ADMIN — CARVEDILOL 25 MG: 25 TABLET, FILM COATED ORAL at 17:44

## 2017-02-08 RX ADMIN — MILRINONE LACTATE 0.5 MCG/KG/MIN: 200 INJECTION, SOLUTION INTRAVENOUS at 09:44

## 2017-02-08 RX ADMIN — OXYCODONE HYDROCHLORIDE 15 MG: 15 TABLET ORAL at 16:00

## 2017-02-08 NOTE — PROGRESS NOTES
[facility] CARDIOLOGY PROGRESS NOTE           2/8/2017 3:20 PM    Admit Date: 1/19/2017    Admit Diagnosis: NSVT (nonsustained ventricular tachycardia) ([facility])      Subjective:   Pt continues to complain of scrotal swelling and pain, urology has seen patient    Interval History: (History of pertinent interval events obtained from nursing staff)  No events overnight    ROS:  GEN:  No fever or chills  Cardiovascular:  As noted above  Pulmonary:  As noted above  Neuro:  No new focal motor or sensory loss      Objective:     Vitals:    02/08/17 0500 02/08/17 0555 02/08/17 0700 02/08/17 1130   BP: 117/64 143/50 133/47 125/73   Pulse: 81 82 81 83   Resp: 18  18 18   Temp: 97.6 °F (36.4 °C)  97.4 °F (36.3 °C) 97.8 °F (36.6 °C)   SpO2: 94%  96% 95%   Weight: (!) 184.6 kg (406 lb 14.4 oz)      Height:           Physical Exam:  General- morbidly obese, NAD  Neck- supple  CV- regular rate and rhythm, very distant S1, S2  Lung- clear bilaterally but decreased BS B/L  Abd- soft, nontender, obese  Ext- 2+ OSMAN, legs wrapped  Skin- warm and dry    Current Facility-Administered Medications   Medication Dose Route Frequency    furosemide (LASIX) injection 40 mg  40 mg IntraVENous BID    milrinone (PRIMACOR) 20 MG/100 ML D5W infusion  0.5 mcg/kg/min IntraVENous CONTINUOUS    ondansetron (ZOFRAN) injection 4 mg  4 mg IntraVENous Q6H PRN    spironolactone (ALDACTONE) tablet 50 mg  50 mg Oral DAILY    amiodarone (CORDARONE) tablet 200 mg  200 mg Oral DAILY    polyethylene glycol (MIRALAX) packet 34 g  34 g Oral DAILY    hydrocortisone (CORTAID) 0.5 % cream   Topical BID    allopurinol (ZYLOPRIM) tablet 100 mg  100 mg Oral DAILY    apixaban (ELIQUIS) tablet 5 mg  5 mg Oral Q12H    bisacodyl (DULCOLAX) suppository 10 mg  10 mg Rectal DAILY    carvedilol (COREG) tablet 25 mg  25 mg Oral BID WITH MEALS    colchicine tablet 0.6 mg  0.6 mg Oral DAILY    docusate sodium (COLACE) capsule 100 mg  100 mg Oral BID    gabapentin (NEURONTIN) capsule 600 mg  600 mg Oral TID    hydrALAZINE (APRESOLINE) tablet 25 mg  25 mg Oral TID    insulin glargine (LANTUS) injection 50 Units  50 Units SubCUTAneous QHS    insulin lispro (HUMALOG) injection 15 Units  15 Units SubCUTAneous TID WITH MEALS    isosorbide dinitrate (ISORDIL) tablet 20 mg  20 mg Oral TID    nitroglycerin (NITROSTAT) tablet 0.4 mg  0.4 mg SubLINGual Q5MIN PRN    oxyCODONE IR (OXY-IR) immediate release tablet 15 mg  15 mg Oral Q4H PRN    polyethylene glycol (MIRALAX) packet 17 g  17 g Oral DAILY PRN    pravastatin (PRAVACHOL) tablet 80 mg  80 mg Oral QHS    famotidine (PEPCID) tablet 20 mg  20 mg Oral BID    sodium chloride (NS) flush 5-10 mL  5-10 mL IntraVENous Q8H    sodium chloride (NS) flush 5-10 mL  5-10 mL IntraVENous PRN    morphine injection 2 mg  2 mg IntraVENous Q4H PRN     Data Review:   Recent Results (from the past 24 hour(s))   GLUCOSE, POC    Collection Time: 02/07/17  5:02 PM   Result Value Ref Range    Glucose (POC) 163 (H) 65 - 100 mg/dL   GLUCOSE, POC    Collection Time: 02/07/17 10:02 PM   Result Value Ref Range    Glucose (POC) 99 65 - 100 mg/dL   GLUCOSE, POC    Collection Time: 02/07/17 11:00 PM   Result Value Ref Range    Glucose (POC) 115 (H) 65 - 100 mg/dL   GLUCOSE, POC    Collection Time: 02/08/17 12:08 AM   Result Value Ref Range    Glucose (POC) 118 (H) 65 - 100 mg/dL   GLUCOSE, POC    Collection Time: 02/08/17  5:45 AM   Result Value Ref Range    Glucose (POC) 136 (H) 65 - 100 mg/dL   MAGNESIUM    Collection Time: 02/08/17  6:06 AM   Result Value Ref Range    Magnesium 2.9 (H) 1.8 - 2.4 mg/dL   GLUCOSE, POC    Collection Time: 02/08/17 11:21 AM   Result Value Ref Range    Glucose (POC) 229 (H) 65 - 100 mg/dL       EKG:  (EKG has been independently visualized by me with interpretation below)  Assessment:     Principal Problem:    Nonsustained ventricular tachycardia (HCC) (1/19/2017)    Active Problems:    Obstructive sleep apnea (2/15/2010)      Nonischemic dilated cardiomyopathy ([address] (7/2/2013)      Overview: LVEF 10-15% on ECHO from 12/17/10      CKD (chronic kidney disease) stage 3, GFR 30-59 ml/min (8/13/2014)      AICD (automatic cardioverter/defibrillator) present (10/21/2015)      Acute on chronic systolic (congestive) heart failure ([address] (10/18/2016)      Atrial flutter ([address] (10/20/2016)      Obesity hypoventilation syndrome ([address] (10/24/2016)      Morbid obesity with BMI of 60.0-69.9, adult ([address] (11/28/2016)      Syncope (1/19/2017)      NSVT (nonsustained ventricular tachycardia) ([facility] (1/19/2017)      Plan:   1. Nonsustained ventricular tachycardia, controlled: continue amiodarone, decreased to 200 mg daily  2. CKD (chronic kidney disease) stage 3, GFR 30-59 ml/min (8/13/2014): improving, strict I and Os, continued on milrinone and diuresis today, labs ordered for tomorrow AM  3. Acute on chronic systolic (congestive) heart failure -improved volume overload, most likely component of cor pulmonale as well with morbid obesity --KELSIE--will continue to wear BiPAP, legs wrapped, continued on IV lasix and milrinone, negative > 2L yesterday  4. Atrial flutter --stable, on eliquis  5. Edema, uncontrolled - legs wrapped, home diuretic  6. AICD: functioning normally, no shocks  7. Obesity hypoventilation, KELSIE: BiPAP   8. Scrotal pain: appreciate urology input    Patel Roque MD  Cardiology/Electrophysiology

## 2017-02-08 NOTE — PROGRESS NOTES
Bedside and Verbal shift change report given to Chas Adams (oncoming nurse) by self Althea Jacques nurse). Report included the following information SBAR, Kardex, MAR and Recent Results.  Verified Primacor at 0.5 mcg at bedside

## 2017-02-08 NOTE — PROGRESS NOTES
SW met again with patient this date along with both his daughters and grandchildren. Plan remains for pt to go to [facility] when medically stable for transfer/ discharge. Pt has not made much progress in PT, pt stated that his scrotum is causing him significant pain and it is too painful to walk at this time. PT plans to suspend services for a few days and retry therapy once pt's pain is more manageable. Pt/Family has this writer's contact information for any needs on concerns.

## 2017-02-08 NOTE — PROGRESS NOTES
Problem: Falls - Risk of  Goal: *Absence of falls  Outcome: Progressing Towards Goal  Pt progressing towards goal. No falls since admission. Bed low and locked. Call light within reach. Side rails x 2. Gripper socks applied. Personal belongings within reach. Pt verbalizes understanding to call for assistance.    Goal: *Knowledge of fall prevention  Outcome: Progressing Towards Goal  When mobilizing, call for assistance due to general weakness

## 2017-02-08 NOTE — PROGRESS NOTES
Mr. Navarro Cunningham eating. Will return later. Mr. Navarro Cunningham is due for re evaluation today. PT has been limited by scrotal edema. At this point he gets to the edge of the bed and stands with little assist.  He sits on the side of the bed to do exercises but really can't do much more. Will try to get him to at least walk to the door and back today. Social work states rehab bed still available for now. Will continue efforts but concerned with potential during current state. Felicitas Bloch Culumovic, PT      2 more attempts made this morning to Mr. Pollard for re assessment. He refuses to ambulate. He can sit edge of bed and do his own exercises. There is no need for PT to be in there for that. PT will return in 1 week to re assess. Discussed with Erika Nascimento from social work.      Felicitas Bloch Culumovic, PT

## 2017-02-08 NOTE — PROGRESS NOTES
Pt ACHS 80. Stated he wanted to eat something to increase his blood sugar and then recheck his BSG at 2300 before I administered his 50 units Lantus. 2303: Rechecked ACHS 115. Pt stated he wants a sandwich to increase his blood sugar and then recheck his blood sugar. 2315: No sandwich's in refrigerator. Clinical Coordinator [employer]. Awaiting House Supervisor to bring michelle's.     0014: ACHS 118 and ate sandwich. Pt stated he only wants 40 units Lantus.  Will provide orange juice following

## 2017-02-08 NOTE — PROGRESS NOTES
Problem: Self Care Deficits Care Plan (Adult)  Goal: *Acute Goals and Plan of Care (Insert Text)  1. Patient will perform grooming and UE dressing with modified independence within 7 days with equipment as needed. 2. Patient will perform bathing with moderate assistance within 7 days with equipment as needed. 3. Patient will perform lower body dressing and toileting with moderate assistance within 7 days with equipment as needed. 4. Patient will perform toilet transfer with modified independence within 7 days with equipment as needed. 5. Pt will participate in B UE therapeutic exercises for 8 minutes with 3 rest breaks within 7 days. OCCUPATIONAL THERAPY: Initial Assessment and AM 2/8/2017  INPATIENT: Hospital Day: 21  Payor: CARE IMPROVEMENT PLUS / [place] / Product Type: Managed Care Medicare /      NAME/AGE/GENDER: Mallory Crawford is a 45 y.o. male         PRIMARY DIAGNOSIS:  NSVT (nonsustained ventricular tachycardia) ([facility] Nonsustained ventricular tachycardia (HCC) Nonsustained ventricular tachycardia ([address]        ICD-10: Treatment Diagnosis:        · Generalized Muscle Weakness (M62.81)  · Low Back Pain (M54.5)   Precautions/Allergies:        Fall, Dilaudid [hydromorphone]; Iodinated contrast media - oral and iv dye; and Penicillins       ASSESSMENT:      Mr. Emile Clement admitted with above diagnosis. Patient independent with ADLs prior to admit and patient was driving prior to admit. Patient's daughter present. Patient sat on edge of bed with CGA. Patient stood with supervision, but patient only agreeable to side stepping due to his enlarged and painful scrotum. Patient was CGA to side step towards head of bed. Patient remained sitting on edge of bed to eat lunch with his daughter present. Patient limited with activity due to his scrotum pain. Patient to benefit from Occupational Therapy to maximize ADL tasks. Cont OT skilled services.       This section established at most recent assessment   PROBLEM LIST (Impairments causing functional limitations):  1. Decreased Strength  2. Decreased ADL/Functional Activities  3. Decreased Transfer Abilities  4. Decreased Ambulation Ability/Technique  5. Decreased Balance  6. Increased Pain  7. Decreased Activity Tolerance  8. Decreased Work Simplification/Energy Conservation Techniques  9. Increased Fatigue  10. Increased Shortness of Breath  11. Decreased Flexibility/Joint Mobility  12. Edema/Girth  13. Decreased [place] with Home Exercise Program    INTERVENTIONS PLANNED: (Benefits and precautions of occupational therapy have been discussed with the patient.)  1. Activities of daily living training  2. Adaptive equipment training  3. Balance training  4. Clothing management  5. Cognitive training  6. Donning&doffing training  7. Group therapy  8. Hygiene training  9. Neuromuscular re-eduation  10. Prosthetic training  11. Re-evaluation  12. Sensory reintergration training  13. Theraputic activity  14. Theraputic exercise         TREATMENT PLAN: Frequency/Duration: Follow patient 2x's/week to address above goals. Rehabilitation Potential For Stated Goals: GOOD      RECOMMENDED REHABILITATION/EQUIPMENT: (at time of discharge pending progress): Continue Skilled Therapy. OCCUPATIONAL PROFILE AND HISTORY:   History of Present Injury/Illness (Reason for Referral):  Leola Swartz is a 45 y.o. male with known NICM with documented EF 10 % with [employer] single chamber ICD in place. He was just discharged on 1/13 with initial complaint of constipation and volume overload. He developed worsening renal failure necessitating hold his diuretics temporarily. He was discharged home on addition of demadex 100 mg daily and miralax for constipation. Really since mid December he has not felt well with worsening orthopnea, increasing abdominal girth, increased wt and lower ext edema.  Today while driving in car he felt presyncopal and was able to pull to side of rode. He did not loose consciousness. He had repeat episode of presyncope prior to coming to ER. While in ER he was noted to have nonsustained ventricular tachycardia. He is not on antiarrhythmia agent. Interrogation of his device revealed normal operation but his VT zone is set at 158 bpm with noted VT in ER at approximately 145 bpm.   Past Medical History/Comorbidities:   Mr. García Branch  has a past medical history of Acute systolic heart failure [facility]) (May, 2009); AICD (automatic cardioverter/defibrillator) present (10/21/2015); Atypical chest pain (4/23/2010); Bronchitis; CAD (coronary artery disease); Cardiomyopathy; Chest pain (10/21/2015); Chronic kidney disease; Chronic pain; Congestive heart failure (CHF) ([address] (10/21/2015); COPD; Diabetes ([address] Diabetes mellitus type II, uncontrolled ([address] (7/2/2013); GERD (gastroesophageal reflux disease); Gout; Heart failure ([address] Hypertension; Hyponatremia (12/20/2010); Ill-defined condition; Morbid obesity ([address] Nausea & vomiting (11/30/2015); Neuropathy; Obstructive sleep apnea (2/15/2010); Other unknown and unspecified cause of morbidity or mortality; Severe sepsis ([address] and Unspecified sleep apnea. He also has no past medical history of Adverse effect of anesthesia; Aneurysm ([address] Coagulation disorder ([address] DEMENTIA; Difficult intubation; Malignant hyperthermia due to anesthesia; Neurological disorder; Other ill-defined conditions(799.89); Pseudocholinesterase deficiency; or Psychiatric disorder. Mr. García Branch  has a past surgical history that includes pacemaker; heart catheterization ([place]andy 10, 2008); and chest surgery procedure unlisted.   Social History/Living Environment:   Home Environment:  (daughters house)  # Steps to Enter: 0  One/Two Story Residence: One story  Living Alone: No  Support Systems: Family member(s), Friends \ neighbors  Patient Expects to be Discharged to[de-identified] Private residence  Current DME Used/Available at Home: None  Tub or Shower Type: Tub/Shower combination  Prior Level of Function/Work/Activity:  Patient independent with ADLs and patient driving prior to admit. Number of Personal Factors/Comorbidities that affect the Plan of Care  · Edema  · COPD  · Morbid Obesity  · Chronic Kidney disease  · Neuropathy: Extensive review of physical, cognitive, and psychosocial performance (3+):  HIGH COMPLEXITY   ASSESSMENT OF OCCUPATIONAL PERFORMANCE[de-identified]   Activities of Daily Living:           Basic ADLs (From Assessment) Complex ADLs (From Assessment)   Basic ADL  Feeding: Modified independent  Oral Facial Hygiene/Grooming: Setup  Bathing: Maximum assistance  Upper Body Dressing: Minimum assistance  Lower Body Dressing: Total assistance  Toileting: Total assistance Instrumental ADL  Meal Preparation: Total assistance  Homemaking: Total assistance  Medication Management: Setup  Financial Management: Setup   Grooming/Bathing/Dressing Activities of Daily Living     Cognitive Retraining  Safety/Judgement: Fall prevention; Awareness of environment; Insight into deficits                       Bed/Mat Mobility  Supine to Sit: Contact guard assistance  Sit to Stand: Supervision          Most Recent Physical Functioning:   Gross Assessment:                  Posture:  Posture (WDL): Exceptions to WDL  Posture Assessment:  Forward head  Balance:  Sitting: Intact  Sitting - Static: Good (unsupported)  Sitting - Dynamic: Good (unsupported)  Standing: Impaired  Standing - Static: Good  Standing - Dynamic : Fair Bed Mobility:  Supine to Sit: Contact guard assistance  Wheelchair Mobility:     Transfers:  Sit to Stand: Supervision  Stand to Sit: Supervision                    Patient Vitals for the past 6 hrs:       BP BP Patient Position SpO2 Pulse   02/08/17 1130 125/73 Sitting 95 % 83        Mental Status  Neurologic State: Alert  Orientation Level: Oriented X4  Cognition: Appropriate safety awareness, Appropriate for age attention/concentration, Appropriate decision making  Perception: Appears intact  Perseveration: No perseveration noted  Safety/Judgement: Fall prevention, Awareness of environment, Insight into deficits                               Physical Skills Involved:  1. Balance  2. Mobility  3. Strength  4. Endurance Cognitive Skills Affected (resulting in the inability to perform in a timely and safe manner): 1. none Psychosocial Skills Affected:  1. Interpersonal Interactions  2. Habits  3. Routines and Behaviors   Number of elements that affect the Plan of Care: 3-5:  MODERATE COMPLEXITY   CLINICAL DECISION MAKIN[address] [place] [address] AM-PAC 6 Clicks   Basic Mobility Inpatient Short Form  How much help from another person does the patient currently need. .. Total A Lot A Little None   1. Putting on and taking off regular lower body clothing? [X] 1   [ ] 2   [ ] 3   [ ] 4   2. Bathing (including washing, rinsing, drying)? [ ] 1   [X] 2   [ ] 3   [ ] 4   3. Toileting, which includes using toilet, bedpan or urinal?   [X] 1   [ ] 2   [ ] 3   [ ] 4   4. Putting on and taking off regular upper body clothing?   [ ] 1   [ ] 2   [X] 3   [ ] 4   5. Taking care of personal grooming such as brushing teeth? [ ] 1   [ ] 2   [X] 3   [ ] 4   6. Eating meals? [ ] 1   [ ] 2   [ ] 3   [X] 4   © , Trustees of [address] [place] [address], under license to [employer]. All rights reserved    Score:  Initial: CL Most Recent: X (Date: -- )     Interpretation of Tool:  Represents activities that are increasingly more difficult (i.e. Bed mobility, Transfers, Gait).        Score 24 23 22-20 19-15 14-10 9-7 6       Modifier CH CI CJ CK CL CM CN         · Self Care:               - CURRENT STATUS:    CL - 60%-79% impaired, limited or restricted               - GOAL STATUS:           CK - 40%-59% impaired, limited or restricted               - D/C STATUS:                       ---------------To be determined---------------  Payor: CARE IMPROVEMENT PLUS [place] / Product Type: Managed Care Medicare /       Medical Necessity:     · Patient demonstrates good rehab potential due to higher previous functional level. Reason for Services/Other Comments:  · Patient continues to require skilled intervention due to patient's inability to take care of self. Use of outcome tool(s) and clinical judgement create a POC that gives a: MODERATE COMPLEXITY             TREATMENT:   (In addition to Assessment/Re-Assessment sessions the following treatments were rendered)      Pre-treatment Symptoms/Complaints:    Pain: Initial:   Pain Intensity 1: 8  Pain Intervention(s) 1: Rest  Post Session:  8      Assessment/Reassessment only, no treatment provided today     Braces/Orthotics/Lines/Etc:   · IV  · O2 Device: Nasal cannula  Treatment/Session Assessment:    · Response to Treatment:  Patient tolerated treatment well. · Interdisciplinary Collaboration:  · Physical Therapist  · Registered Nurse  · After treatment position/precautions:  · Bed/Chair-wheels locked  · Bed in low position  · Call light within reach  · Family at bedside  · sitting on edge of bed  · Compliance with Program/Exercises: Will assess as treatment progresses. · Recommendations/Intent for next treatment session: \"Next visit will focus on advancements to more challenging activities and reduction in assistance provided\".   Total Treatment Duration:  OT Patient Time In/Time Out  Time In: 1150  Time Out: 2480 Judy Carreno OT

## 2017-02-09 LAB
ANION GAP BLD CALC-SCNC: 8 MMOL/L (ref 7–16)
BASOPHILS # BLD AUTO: 0 K/UL (ref 0–0.2)
BASOPHILS # BLD: 1 % (ref 0–2)
BUN SERPL-MCNC: 48 MG/DL (ref 6–23)
CALCIUM SERPL-MCNC: 8.9 MG/DL (ref 8.3–10.4)
CHLORIDE SERPL-SCNC: 92 MMOL/L (ref 98–107)
CO2 SERPL-SCNC: 36 MMOL/L (ref 21–32)
CREAT SERPL-MCNC: 2.53 MG/DL (ref 0.8–1.5)
DIFFERENTIAL METHOD BLD: ABNORMAL
EOSINOPHIL # BLD: 0.4 K/UL (ref 0–0.8)
EOSINOPHIL NFR BLD: 7 % (ref 0.5–7.8)
ERYTHROCYTE [DISTWIDTH] IN BLOOD BY AUTOMATED COUNT: 19.2 % (ref 11.9–14.6)
GLUCOSE BLD STRIP.AUTO-MCNC: 126 MG/DL (ref 65–100)
GLUCOSE BLD STRIP.AUTO-MCNC: 139 MG/DL (ref 65–100)
GLUCOSE BLD STRIP.AUTO-MCNC: 139 MG/DL (ref 65–100)
GLUCOSE BLD STRIP.AUTO-MCNC: 160 MG/DL (ref 65–100)
GLUCOSE BLD STRIP.AUTO-MCNC: 174 MG/DL (ref 65–100)
GLUCOSE SERPL-MCNC: 138 MG/DL (ref 65–100)
HCT VFR BLD AUTO: 32.5 % (ref 41.1–50.3)
HGB BLD-MCNC: 10.4 G/DL (ref 13.6–17.2)
IMM GRANULOCYTES # BLD: 0 K/UL (ref 0–0.5)
IMM GRANULOCYTES NFR BLD AUTO: 0.2 % (ref 0–5)
LYMPHOCYTES # BLD AUTO: 29 % (ref 13–44)
LYMPHOCYTES # BLD: 1.8 K/UL (ref 0.5–4.6)
MAGNESIUM SERPL-MCNC: 2.5 MG/DL (ref 1.8–2.4)
MCH RBC QN AUTO: 24.8 PG (ref 26.1–32.9)
MCHC RBC AUTO-ENTMCNC: 32 G/DL (ref 31.4–35)
MCV RBC AUTO: 77.6 FL (ref 79.6–97.8)
MONOCYTES # BLD: 1 K/UL (ref 0.1–1.3)
MONOCYTES NFR BLD AUTO: 16 % (ref 4–12)
NEUTS SEG # BLD: 2.9 K/UL (ref 1.7–8.2)
NEUTS SEG NFR BLD AUTO: 47 % (ref 43–78)
PLATELET # BLD AUTO: 267 K/UL (ref 150–450)
PMV BLD AUTO: 10.5 FL (ref 10.8–14.1)
POTASSIUM SERPL-SCNC: 3.7 MMOL/L (ref 3.5–5.1)
RBC # BLD AUTO: 4.19 M/UL (ref 4.23–5.67)
SODIUM SERPL-SCNC: 136 MMOL/L (ref 136–145)
WBC # BLD AUTO: 6.1 K/UL (ref 4.3–11.1)

## 2017-02-09 PROCEDURE — 74011250636 HC RX REV CODE- 250/636: Performed by: INTERNAL MEDICINE

## 2017-02-09 PROCEDURE — 74011250637 HC RX REV CODE- 250/637: Performed by: NURSE PRACTITIONER

## 2017-02-09 PROCEDURE — 80048 BASIC METABOLIC PNL TOTAL CA: CPT | Performed by: INTERNAL MEDICINE

## 2017-02-09 PROCEDURE — 74011250637 HC RX REV CODE- 250/637: Performed by: INTERNAL MEDICINE

## 2017-02-09 PROCEDURE — 85025 COMPLETE CBC W/AUTO DIFF WBC: CPT | Performed by: INTERNAL MEDICINE

## 2017-02-09 PROCEDURE — 74011636637 HC RX REV CODE- 636/637: Performed by: NURSE PRACTITIONER

## 2017-02-09 PROCEDURE — 3331090002 HH PPS REVENUE DEBIT

## 2017-02-09 PROCEDURE — 3331090001 HH PPS REVENUE CREDIT

## 2017-02-09 PROCEDURE — 82962 GLUCOSE BLOOD TEST: CPT

## 2017-02-09 PROCEDURE — 65660000000 HC RM CCU STEPDOWN

## 2017-02-09 PROCEDURE — 83735 ASSAY OF MAGNESIUM: CPT | Performed by: INTERNAL MEDICINE

## 2017-02-09 PROCEDURE — 94760 N-INVAS EAR/PLS OXIMETRY 1: CPT

## 2017-02-09 RX ORDER — PETROLATUM 42 G/100G
OINTMENT TOPICAL DAILY
Status: DISCONTINUED | OUTPATIENT
Start: 2017-02-09 | End: 2017-02-24 | Stop reason: HOSPADM

## 2017-02-09 RX ADMIN — INSULIN LISPRO 6 UNITS: 100 INJECTION, SOLUTION INTRAVENOUS; SUBCUTANEOUS at 10:38

## 2017-02-09 RX ADMIN — ISOSORBIDE DINITRATE 20 MG: 20 TABLET ORAL at 05:52

## 2017-02-09 RX ADMIN — GABAPENTIN 600 MG: 300 CAPSULE ORAL at 05:50

## 2017-02-09 RX ADMIN — FUROSEMIDE 40 MG: 10 INJECTION, SOLUTION INTRAMUSCULAR; INTRAVENOUS at 09:42

## 2017-02-09 RX ADMIN — HYDROCORTISONE: 5 CREAM TOPICAL at 18:00

## 2017-02-09 RX ADMIN — MILRINONE LACTATE 0.5 MCG/KG/MIN: 200 INJECTION, SOLUTION INTRAVENOUS at 23:07

## 2017-02-09 RX ADMIN — COLCHICINE 0.6 MG: 0.6 TABLET, FILM COATED ORAL at 09:44

## 2017-02-09 RX ADMIN — APIXABAN 5 MG: 5 TABLET, FILM COATED ORAL at 09:44

## 2017-02-09 RX ADMIN — PETROLATUM: 42 OINTMENT TOPICAL at 12:00

## 2017-02-09 RX ADMIN — MILRINONE LACTATE 0.5 MCG/KG/MIN: 200 INJECTION, SOLUTION INTRAVENOUS at 09:58

## 2017-02-09 RX ADMIN — CARVEDILOL 25 MG: 25 TABLET, FILM COATED ORAL at 09:43

## 2017-02-09 RX ADMIN — AMIODARONE HYDROCHLORIDE 200 MG: 200 TABLET ORAL at 09:43

## 2017-02-09 RX ADMIN — ALLOPURINOL 100 MG: 100 TABLET ORAL at 09:44

## 2017-02-09 RX ADMIN — Medication 10 ML: at 22:21

## 2017-02-09 RX ADMIN — INSULIN LISPRO 5 UNITS: 100 INJECTION, SOLUTION INTRAVENOUS; SUBCUTANEOUS at 13:41

## 2017-02-09 RX ADMIN — HYDRALAZINE HYDROCHLORIDE 25 MG: 25 TABLET, FILM COATED ORAL at 13:53

## 2017-02-09 RX ADMIN — Medication 10 ML: at 10:45

## 2017-02-09 RX ADMIN — FAMOTIDINE 20 MG: 20 TABLET ORAL at 09:43

## 2017-02-09 RX ADMIN — MILRINONE LACTATE 0.5 MCG/KG/MIN: 200 INJECTION, SOLUTION INTRAVENOUS at 05:18

## 2017-02-09 RX ADMIN — CARVEDILOL 25 MG: 25 TABLET, FILM COATED ORAL at 17:43

## 2017-02-09 RX ADMIN — HYDRALAZINE HYDROCHLORIDE 25 MG: 25 TABLET, FILM COATED ORAL at 22:19

## 2017-02-09 RX ADMIN — HYDROCORTISONE: 5 CREAM TOPICAL at 09:00

## 2017-02-09 RX ADMIN — ISOSORBIDE DINITRATE 20 MG: 20 TABLET ORAL at 13:53

## 2017-02-09 RX ADMIN — GABAPENTIN 600 MG: 300 CAPSULE ORAL at 13:53

## 2017-02-09 RX ADMIN — MILRINONE LACTATE 0.5 MCG/KG/MIN: 200 INJECTION, SOLUTION INTRAVENOUS at 18:48

## 2017-02-09 RX ADMIN — BISACODYL 10 MG: 10 SUPPOSITORY RECTAL at 09:44

## 2017-02-09 RX ADMIN — MILRINONE LACTATE 0.5 MCG/KG/MIN: 200 INJECTION, SOLUTION INTRAVENOUS at 14:17

## 2017-02-09 RX ADMIN — FUROSEMIDE 40 MG: 10 INJECTION, SOLUTION INTRAMUSCULAR; INTRAVENOUS at 17:44

## 2017-02-09 RX ADMIN — OXYCODONE HYDROCHLORIDE 15 MG: 15 TABLET ORAL at 10:39

## 2017-02-09 RX ADMIN — POLYETHYLENE GLYCOL 3350 34 G: 17 POWDER, FOR SOLUTION ORAL at 09:42

## 2017-02-09 RX ADMIN — OXYCODONE HYDROCHLORIDE 15 MG: 15 TABLET ORAL at 18:51

## 2017-02-09 RX ADMIN — DOCUSATE SODIUM 100 MG: 100 CAPSULE, LIQUID FILLED ORAL at 17:43

## 2017-02-09 RX ADMIN — INSULIN GLARGINE 50 UNITS: 100 INJECTION, SOLUTION SUBCUTANEOUS at 22:20

## 2017-02-09 RX ADMIN — ISOSORBIDE DINITRATE 20 MG: 20 TABLET ORAL at 22:18

## 2017-02-09 RX ADMIN — DOCUSATE SODIUM 100 MG: 100 CAPSULE, LIQUID FILLED ORAL at 09:43

## 2017-02-09 RX ADMIN — SPIRONOLACTONE 50 MG: 25 TABLET, FILM COATED ORAL at 08:50

## 2017-02-09 RX ADMIN — APIXABAN 5 MG: 5 TABLET, FILM COATED ORAL at 22:19

## 2017-02-09 RX ADMIN — GABAPENTIN 600 MG: 300 CAPSULE ORAL at 22:19

## 2017-02-09 RX ADMIN — FAMOTIDINE 20 MG: 20 TABLET ORAL at 17:43

## 2017-02-09 RX ADMIN — PRAVASTATIN SODIUM 80 MG: 80 TABLET ORAL at 22:19

## 2017-02-09 RX ADMIN — INSULIN LISPRO 6 UNITS: 100 INJECTION, SOLUTION INTRAVENOUS; SUBCUTANEOUS at 18:34

## 2017-02-09 RX ADMIN — Medication 10 ML: at 05:52

## 2017-02-09 RX ADMIN — HYDRALAZINE HYDROCHLORIDE 25 MG: 25 TABLET, FILM COATED ORAL at 05:51

## 2017-02-09 RX ADMIN — MILRINONE LACTATE 0.5 MCG/KG/MIN: 200 INJECTION, SOLUTION INTRAVENOUS at 01:50

## 2017-02-09 NOTE — PROGRESS NOTES
[facility] CARDIOLOGY PROGRESS NOTE           2/9/2017 12:36 PM    Admit Date: 1/19/2017    Admit Diagnosis: NSVT (nonsustained ventricular tachycardia) ([facility])      Subjective:   No complaints this AM, no chest pain or shortness of breath    Interval History: (History of pertinent interval events obtained from nursing staff)  Diuresis slowed down in last 24 hours, continues on milrinone    ROS:  GEN:  No fever or chills  Cardiovascular:  As noted above  Pulmonary:  As noted above  Neuro:  No new focal motor or sensory loss      Objective:     Vitals:    02/09/17 0551 02/09/17 0615 02/09/17 0700 02/09/17 1158   BP: 132/66 132/66 130/52 137/78   Pulse: 83 84 86 87   Resp:  16 18 16   Temp:  97.3 °F (36.3 °C) 97.9 °F (36.6 °C) 96.8 °F (36 °C)   SpO2:  97% 95% 96%   Weight:  (!) 188.8 kg (416 lb 3.2 oz)     Height:           Physical Exam:  General- morbidly obese, NAD  Neck- supple  CV- regular rate and rhythm, very distant S1, S2  Lung- clear bilaterally but decreased BS B/L  Abd- soft, nontender, obese  Ext- 2+ OSMAN, legs wrapped  Skin- warm and dry    Current Facility-Administered Medications   Medication Dose Route Frequency    mineral oil-hydrophil petrolat (AQUAPHOR) ointment   Topical DAILY    furosemide (LASIX) injection 40 mg  40 mg IntraVENous BID    milrinone (PRIMACOR) 20 MG/100 ML D5W infusion  0.5 mcg/kg/min IntraVENous CONTINUOUS    ondansetron (ZOFRAN) injection 4 mg  4 mg IntraVENous Q6H PRN    spironolactone (ALDACTONE) tablet 50 mg  50 mg Oral DAILY    amiodarone (CORDARONE) tablet 200 mg  200 mg Oral DAILY    polyethylene glycol (MIRALAX) packet 34 g  34 g Oral DAILY    hydrocortisone (CORTAID) 0.5 % cream   Topical BID    allopurinol (ZYLOPRIM) tablet 100 mg  100 mg Oral DAILY    apixaban (ELIQUIS) tablet 5 mg  5 mg Oral Q12H    bisacodyl (DULCOLAX) suppository 10 mg  10 mg Rectal DAILY    carvedilol (COREG) tablet 25 mg  25 mg Oral BID WITH MEALS    colchicine tablet 0.6 mg  0.6 mg Oral DAILY    docusate sodium (COLACE) capsule 100 mg  100 mg Oral BID    gabapentin (NEURONTIN) capsule 600 mg  600 mg Oral TID    hydrALAZINE (APRESOLINE) tablet 25 mg  25 mg Oral TID    insulin glargine (LANTUS) injection 50 Units  50 Units SubCUTAneous QHS    insulin lispro (HUMALOG) injection 15 Units  15 Units SubCUTAneous TID WITH MEALS    isosorbide dinitrate (ISORDIL) tablet 20 mg  20 mg Oral TID    nitroglycerin (NITROSTAT) tablet 0.4 mg  0.4 mg SubLINGual Q5MIN PRN    oxyCODONE IR (OXY-IR) immediate release tablet 15 mg  15 mg Oral Q4H PRN    polyethylene glycol (MIRALAX) packet 17 g  17 g Oral DAILY PRN    pravastatin (PRAVACHOL) tablet 80 mg  80 mg Oral QHS    famotidine (PEPCID) tablet 20 mg  20 mg Oral BID    sodium chloride (NS) flush 5-10 mL  5-10 mL IntraVENous Q8H    sodium chloride (NS) flush 5-10 mL  5-10 mL IntraVENous PRN    morphine injection 2 mg  2 mg IntraVENous Q4H PRN     Data Review:   Recent Results (from the past 24 hour(s))   GLUCOSE, POC    Collection Time: 02/08/17  4:08 PM   Result Value Ref Range    Glucose (POC) 110 (H) 65 - 100 mg/dL   GLUCOSE, POC    Collection Time: 02/08/17  9:41 PM   Result Value Ref Range    Glucose (POC) 132 (H) 65 - 100 mg/dL   GLUCOSE, POC    Collection Time: 02/08/17 11:40 PM   Result Value Ref Range    Glucose (POC) 145 (H) 65 - 100 mg/dL   GLUCOSE, POC    Collection Time: 02/09/17  5:49 AM   Result Value Ref Range    Glucose (POC) 139 (H) 65 - 100 mg/dL   MAGNESIUM    Collection Time: 02/09/17  6:00 AM   Result Value Ref Range    Magnesium 2.5 (H) 1.8 - 2.4 mg/dL   METABOLIC PANEL, BASIC    Collection Time: 02/09/17  6:00 AM   Result Value Ref Range    Sodium 136 136 - 145 mmol/L    Potassium 3.7 3.5 - 5.1 mmol/L    Chloride 92 (L) 98 - 107 mmol/L    CO2 36 (H) 21 - 32 mmol/L    Anion gap 8 7 - 16 mmol/L    Glucose 138 (H) 65 - 100 mg/dL    BUN 48 (H) 6 - 23 MG/DL    Creatinine 2.53 (H) 0.8 - 1.5 MG/DL    GFR est AA 35 (L) >60 ml/min/1.73m2    GFR est non-AA 29 (L) >60 ml/min/1.73m2    Calcium 8.9 8.3 - 10.4 MG/DL   CBC WITH AUTOMATED DIFF    Collection Time: 02/09/17  6:00 AM   Result Value Ref Range    WBC 6.1 4.3 - 11.1 K/uL    RBC 4.19 (L) 4.23 - 5.67 M/uL    HGB 10.4 (L) 13.6 - 17.2 g/dL    HCT 32.5 (L) 41.1 - 50.3 %    MCV 77.6 (L) 79.6 - 97.8 FL    MCH 24.8 (L) 26.1 - 32.9 PG    MCHC 32.0 31.4 - 35.0 g/dL    RDW 19.2 (H) 11.9 - 14.6 %    PLATELET 735 026 - 093 K/uL    MPV 10.5 (L) 10.8 - 14.1 FL    DF AUTOMATED      NEUTROPHILS 47 43 - 78 %    LYMPHOCYTES 29 13 - 44 %    MONOCYTES 16 (H) 4.0 - 12.0 %    EOSINOPHILS 7 0.5 - 7.8 %    BASOPHILS 1 0.0 - 2.0 %    IMMATURE GRANULOCYTES 0.2 0.0 - 5.0 %    ABS. NEUTROPHILS 2.9 1.7 - 8.2 K/UL    ABS. LYMPHOCYTES 1.8 0.5 - 4.6 K/UL    ABS. MONOCYTES 1.0 0.1 - 1.3 K/UL    ABS. EOSINOPHILS 0.4 0.0 - 0.8 K/UL    ABS. BASOPHILS 0.0 0.0 - 0.2 K/UL    ABS. IMM. GRANS. 0.0 0.0 - 0.5 K/UL   GLUCOSE, POC    Collection Time: 02/09/17 10:59 AM   Result Value Ref Range    Glucose (POC) 174 (H) 65 - 100 mg/dL       EKG:  (EKG has been independently visualized by me with interpretation below)  Assessment:     Principal Problem:    Nonsustained ventricular tachycardia ([facility] (1/19/2017)    Active Problems:    Obstructive sleep apnea (2/15/2010)      Nonischemic dilated cardiomyopathy ([facility] (7/2/2013)      Overview: LVEF 10-15% on ECHO from 12/17/10      CKD (chronic kidney disease) stage 3, GFR 30-59 ml/min (8/13/2014)      AICD (automatic cardioverter/defibrillator) present (10/21/2015)      Acute on chronic systolic (congestive) heart failure ([facility] (10/18/2016)      Atrial flutter ([facility] (10/20/2016)      Obesity hypoventilation syndrome ([facility] (10/24/2016)      Morbid obesity with BMI of 60.0-69.9, adult ([facility] (11/28/2016)      Syncope (1/19/2017)      NSVT (nonsustained ventricular tachycardia) ([facility] (1/19/2017)      Plan:   1.  Nonsustained ventricular tachycardia, controlled: continue amiodarone, cont 200 mg daily  2. CKD (chronic kidney disease) stage 3, GFR 30-59 ml/min (8/13/2014): strict I and Os, continued on milrinone, urinary output slowed down creatinine mildly increased today, may need to back off on diuresis, labs ordered for tomorrow AM and will reassess  3. Acute on chronic systolic (congestive) heart failure -improved volume overload, most likely component of cor pulmonale as well with morbid obesity --KELSIE--will continue to wear BiPAP, legs wrapped, continued on IV lasix and milrinone, negative almost 40 Liters since admission  4. Atrial flutter --stable, on eliquis  5. Edema, uncontrolled - legs wrapped, home diuretic  6. AICD: functioning normally, no shocks  7. Obesity hypoventilation, KELSIE: BiPAP   8. Scrotal pain: appreciate urology input, continues to cause discomfort    Babatunde Roque MD  Cardiology/Electrophysiology

## 2017-02-09 NOTE — PROGRESS NOTES
2141: . Sandwhich and snack provided. Pt stated he wants to wait eat, wait an hour, and then recheck his BSG.     2349: Patient .  Pt states he wants 40 units Lantus

## 2017-02-09 NOTE — PROGRESS NOTES
Bedside and Verbal shift change report given to self (oncoming nurse) by Ana Luna RN (offgoing nurse). Report included the following information SBAR, Kardex, MAR and Recent Results. Verified Primacor at 0.5 mcg at bedside with offgoing nurse.

## 2017-02-09 NOTE — PROGRESS NOTES
Pt. Has been refusing his insulin Humalog with meals (Hbg A1C 9.0 on 02/01/2017). In addition, patient has been requesting regular sugar from dietary. Pt. Informed of diabetes education concerning insulin regimen with meals and lab values. Pt. States \"I been watching what I eat and can manage\". Continue to monitor.

## 2017-02-09 NOTE — PROGRESS NOTES
Problem: Nutrition Deficit  Goal: *Optimize nutritional status  Nutrition F/U:  Assessment:  Weight 188.8 kg (2/9/17 standing scale 3rd floor SFD), edema - 3+-4+/anasarca. The patient continues to eat well. Staff reports that he is getting sugar instead of using an artificial sweetener. Expect this is reflective of his typical lifestyle/eating habits. Labs are remarkable for AM glucose 138, BUN 48 and creatinine 2.54; SQBS (2/8) 136,229,110,132 and (2/9) 136,179. Last bowel movement 2/8/17. Macronutrient Needs:  · EER: 9221-7173 kcal /day (20-25 kcal/kg I BW)  · EPR: 58-73 grams protein/day (0.8-1 grams/kg IBW)(GFR 56)  Intake/Comparative Standards: This patient's average meal intake of consistent CHO, low sodium diet for the past 6 recorded days/7 meals: 100%. This potentially meets 100% of calorie and >100% of protein needs. Intervention:   Meals and Snacks: Continue current diet, expect continued non-compliance. Monitoring/Evaluation:  Monitor clinical course, POC, medication changes, GI function, oral intake, labs. Sewaren Kentrell [employer]  [phone]

## 2017-02-09 NOTE — PROGRESS NOTES
's follow-up visit with Mr. Lyndsay Kruse and his daughter to convey care and concern. Mr. Lyndsay Kruse is known to me from previous admissions. Offered pastoral interventions, including affirmation of katheryn, reflection on scripture reading, and prayer.

## 2017-02-09 NOTE — PROGRESS NOTES
Bedside and Verbal shift change report given to Rico Montes (oncoming nurse) by self (offgoing nurse). Report included the following information SBAR, Kardex, MAR and Recent Results. Verified Primacor 0.5 mcg at bedside with ongoing nurse.

## 2017-02-09 NOTE — WOUND CARE
Right hip with 4x1cm skin tear/ excoriation, patient states he has been scratching this area, no drainage, pink base, epithelization beginning at edges 5%, foam/ silicone in use, would continue. Left achilles area with 7x2cm clear fluid filled blister, using xeroform and gauze. Bilateral legs with pitting edema 2+, continue ace bandages as directed by cardiology. Scrotum/ penis edematous, noted urology recommends elevation, no open sores, elevated on pillow, would continue. Dry skin on lower legs, will add Aquaphor. Will monitor.

## 2017-02-10 LAB
ANION GAP BLD CALC-SCNC: 8 MMOL/L (ref 7–16)
BUN SERPL-MCNC: 50 MG/DL (ref 6–23)
CALCIUM SERPL-MCNC: 8.8 MG/DL (ref 8.3–10.4)
CHLORIDE SERPL-SCNC: 92 MMOL/L (ref 98–107)
CO2 SERPL-SCNC: 37 MMOL/L (ref 21–32)
CREAT SERPL-MCNC: 2.61 MG/DL (ref 0.8–1.5)
GLUCOSE BLD STRIP.AUTO-MCNC: 125 MG/DL (ref 65–100)
GLUCOSE BLD STRIP.AUTO-MCNC: 131 MG/DL (ref 65–100)
GLUCOSE BLD STRIP.AUTO-MCNC: 136 MG/DL (ref 65–100)
GLUCOSE BLD STRIP.AUTO-MCNC: 140 MG/DL (ref 65–100)
GLUCOSE BLD STRIP.AUTO-MCNC: 142 MG/DL (ref 65–100)
GLUCOSE BLD STRIP.AUTO-MCNC: 143 MG/DL (ref 65–100)
GLUCOSE SERPL-MCNC: 113 MG/DL (ref 65–100)
MAGNESIUM SERPL-MCNC: 2.3 MG/DL (ref 1.8–2.4)
POTASSIUM SERPL-SCNC: 3.7 MMOL/L (ref 3.5–5.1)
SODIUM SERPL-SCNC: 137 MMOL/L (ref 136–145)

## 2017-02-10 PROCEDURE — 74011250637 HC RX REV CODE- 250/637: Performed by: NURSE PRACTITIONER

## 2017-02-10 PROCEDURE — 74011250637 HC RX REV CODE- 250/637: Performed by: INTERNAL MEDICINE

## 2017-02-10 PROCEDURE — 77030011256 HC DRSG MEPILEX <16IN NO BORD MOLN -A

## 2017-02-10 PROCEDURE — 74011250636 HC RX REV CODE- 250/636: Performed by: INTERNAL MEDICINE

## 2017-02-10 PROCEDURE — 77030018846 HC SOL IRR STRL H20 ICUM -A

## 2017-02-10 PROCEDURE — 74011636637 HC RX REV CODE- 636/637: Performed by: NURSE PRACTITIONER

## 2017-02-10 PROCEDURE — 83735 ASSAY OF MAGNESIUM: CPT | Performed by: INTERNAL MEDICINE

## 2017-02-10 PROCEDURE — 3331090002 HH PPS REVENUE DEBIT

## 2017-02-10 PROCEDURE — 3331090001 HH PPS REVENUE CREDIT

## 2017-02-10 PROCEDURE — 94760 N-INVAS EAR/PLS OXIMETRY 1: CPT

## 2017-02-10 PROCEDURE — 80048 BASIC METABOLIC PNL TOTAL CA: CPT | Performed by: INTERNAL MEDICINE

## 2017-02-10 PROCEDURE — 77030019605

## 2017-02-10 PROCEDURE — 82962 GLUCOSE BLOOD TEST: CPT

## 2017-02-10 PROCEDURE — 65660000000 HC RM CCU STEPDOWN

## 2017-02-10 RX ADMIN — APIXABAN 5 MG: 5 TABLET, FILM COATED ORAL at 09:03

## 2017-02-10 RX ADMIN — MILRINONE LACTATE 0.5 MCG/KG/MIN: 200 INJECTION, SOLUTION INTRAVENOUS at 12:52

## 2017-02-10 RX ADMIN — DOCUSATE SODIUM 100 MG: 100 CAPSULE, LIQUID FILLED ORAL at 17:49

## 2017-02-10 RX ADMIN — MILRINONE LACTATE 0.5 MCG/KG/MIN: 200 INJECTION, SOLUTION INTRAVENOUS at 03:00

## 2017-02-10 RX ADMIN — DOCUSATE SODIUM 100 MG: 100 CAPSULE, LIQUID FILLED ORAL at 09:03

## 2017-02-10 RX ADMIN — Medication 20 ML: at 17:51

## 2017-02-10 RX ADMIN — SPIRONOLACTONE 50 MG: 25 TABLET, FILM COATED ORAL at 09:04

## 2017-02-10 RX ADMIN — FAMOTIDINE 20 MG: 20 TABLET ORAL at 09:05

## 2017-02-10 RX ADMIN — AMIODARONE HYDROCHLORIDE 200 MG: 200 TABLET ORAL at 09:04

## 2017-02-10 RX ADMIN — HYDRALAZINE HYDROCHLORIDE 25 MG: 25 TABLET, FILM COATED ORAL at 21:46

## 2017-02-10 RX ADMIN — Medication 20 ML: at 09:10

## 2017-02-10 RX ADMIN — MILRINONE LACTATE 0.5 MCG/KG/MIN: 200 INJECTION, SOLUTION INTRAVENOUS at 17:53

## 2017-02-10 RX ADMIN — POLYETHYLENE GLYCOL 3350 34 G: 17 POWDER, FOR SOLUTION ORAL at 09:01

## 2017-02-10 RX ADMIN — ISOSORBIDE DINITRATE 20 MG: 20 TABLET ORAL at 14:51

## 2017-02-10 RX ADMIN — FUROSEMIDE 40 MG: 10 INJECTION, SOLUTION INTRAMUSCULAR; INTRAVENOUS at 17:49

## 2017-02-10 RX ADMIN — Medication 10 ML: at 05:25

## 2017-02-10 RX ADMIN — MILRINONE LACTATE 0.5 MCG/KG/MIN: 200 INJECTION, SOLUTION INTRAVENOUS at 21:59

## 2017-02-10 RX ADMIN — HYDRALAZINE HYDROCHLORIDE 25 MG: 25 TABLET, FILM COATED ORAL at 05:24

## 2017-02-10 RX ADMIN — OXYCODONE HYDROCHLORIDE 15 MG: 15 TABLET ORAL at 05:32

## 2017-02-10 RX ADMIN — GABAPENTIN 600 MG: 300 CAPSULE ORAL at 05:24

## 2017-02-10 RX ADMIN — APIXABAN 5 MG: 5 TABLET, FILM COATED ORAL at 21:42

## 2017-02-10 RX ADMIN — GABAPENTIN 600 MG: 300 CAPSULE ORAL at 21:46

## 2017-02-10 RX ADMIN — Medication 5 ML: at 12:54

## 2017-02-10 RX ADMIN — INSULIN LISPRO 5 UNITS: 100 INJECTION, SOLUTION INTRAVENOUS; SUBCUTANEOUS at 08:59

## 2017-02-10 RX ADMIN — ISOSORBIDE DINITRATE 20 MG: 20 TABLET ORAL at 21:42

## 2017-02-10 RX ADMIN — PRAVASTATIN SODIUM 80 MG: 80 TABLET ORAL at 21:46

## 2017-02-10 RX ADMIN — COLCHICINE 0.6 MG: 0.6 TABLET, FILM COATED ORAL at 08:59

## 2017-02-10 RX ADMIN — CARVEDILOL 25 MG: 25 TABLET, FILM COATED ORAL at 17:49

## 2017-02-10 RX ADMIN — MILRINONE LACTATE 0.5 MCG/KG/MIN: 200 INJECTION, SOLUTION INTRAVENOUS at 09:35

## 2017-02-10 RX ADMIN — ALLOPURINOL 100 MG: 100 TABLET ORAL at 09:03

## 2017-02-10 RX ADMIN — OXYCODONE HYDROCHLORIDE 15 MG: 15 TABLET ORAL at 21:46

## 2017-02-10 RX ADMIN — INSULIN GLARGINE 40 UNITS: 100 INJECTION, SOLUTION SUBCUTANEOUS at 21:54

## 2017-02-10 RX ADMIN — GABAPENTIN 600 MG: 300 CAPSULE ORAL at 14:51

## 2017-02-10 RX ADMIN — Medication 10 ML: at 22:03

## 2017-02-10 RX ADMIN — OXYCODONE HYDROCHLORIDE 15 MG: 15 TABLET ORAL at 14:55

## 2017-02-10 RX ADMIN — FUROSEMIDE 40 MG: 10 INJECTION, SOLUTION INTRAMUSCULAR; INTRAVENOUS at 09:03

## 2017-02-10 RX ADMIN — ISOSORBIDE DINITRATE 20 MG: 20 TABLET ORAL at 05:24

## 2017-02-10 RX ADMIN — CARVEDILOL 25 MG: 25 TABLET, FILM COATED ORAL at 09:04

## 2017-02-10 RX ADMIN — INSULIN LISPRO 8 UNITS: 100 INJECTION, SOLUTION INTRAVENOUS; SUBCUTANEOUS at 12:51

## 2017-02-10 RX ADMIN — HYDRALAZINE HYDROCHLORIDE 25 MG: 25 TABLET, FILM COATED ORAL at 14:51

## 2017-02-10 RX ADMIN — FAMOTIDINE 20 MG: 20 TABLET ORAL at 17:49

## 2017-02-10 NOTE — PROGRESS NOTES
[facility] CARDIOLOGY PROGRESS NOTE           2/10/2017 11:58 AM    Admit Date: 1/19/2017    Admit Diagnosis: NSVT (nonsustained ventricular tachycardia) ([facility])      Subjective:   No complaints this AM, no chest pain or shortness of breath    Interval History: (History of pertinent interval events obtained from nursing staff)  No events overnight    ROS:  GEN:  No fever or chills  Cardiovascular:  As noted above  Pulmonary:  As noted above  Neuro:  No new focal motor or sensory loss      Objective:     Vitals:    02/10/17 0059 02/10/17 0531 02/10/17 0800 02/10/17 0903   BP: 128/59 123/80 126/77 123/59   Pulse: 82 85 80 72   Resp: 18 18 18    Temp: 98.2 °F (36.8 °C) 98.4 °F (36.9 °C) 98.1 °F (36.7 °C)    SpO2: 98% 99% 99%    Weight:  (!) 188.7 kg (415 lb 14.4 oz)     Height:  5' 6\" (1.676 m)         Physical Exam:  General- morbidly obese, NAD  Neck- supple  CV- regular rate and rhythm, very distant S1, S2  Lung- clear bilaterally but decreased BS B/L  Abd- soft, nontender, obese  Ext- 2+ OSMAN, legs wrapped  Skin- warm and dry    Current Facility-Administered Medications   Medication Dose Route Frequency    mineral oil-hydrophil petrolat (AQUAPHOR) ointment   Topical DAILY    furosemide (LASIX) injection 40 mg  40 mg IntraVENous BID    milrinone (PRIMACOR) 20 MG/100 ML D5W infusion  0.5 mcg/kg/min IntraVENous CONTINUOUS    ondansetron (ZOFRAN) injection 4 mg  4 mg IntraVENous Q6H PRN    spironolactone (ALDACTONE) tablet 50 mg  50 mg Oral DAILY    amiodarone (CORDARONE) tablet 200 mg  200 mg Oral DAILY    polyethylene glycol (MIRALAX) packet 34 g  34 g Oral DAILY    hydrocortisone (CORTAID) 0.5 % cream   Topical BID    allopurinol (ZYLOPRIM) tablet 100 mg  100 mg Oral DAILY    apixaban (ELIQUIS) tablet 5 mg  5 mg Oral Q12H    bisacodyl (DULCOLAX) suppository 10 mg  10 mg Rectal DAILY    carvedilol (COREG) tablet 25 mg  25 mg Oral BID WITH MEALS    colchicine tablet 0.6 mg  0.6 mg Oral DAILY    docusate sodium (COLACE) capsule 100 mg  100 mg Oral BID    gabapentin (NEURONTIN) capsule 600 mg  600 mg Oral TID    hydrALAZINE (APRESOLINE) tablet 25 mg  25 mg Oral TID    insulin glargine (LANTUS) injection 50 Units  50 Units SubCUTAneous QHS    insulin lispro (HUMALOG) injection 15 Units  15 Units SubCUTAneous TID WITH MEALS    isosorbide dinitrate (ISORDIL) tablet 20 mg  20 mg Oral TID    nitroglycerin (NITROSTAT) tablet 0.4 mg  0.4 mg SubLINGual Q5MIN PRN    oxyCODONE IR (OXY-IR) immediate release tablet 15 mg  15 mg Oral Q4H PRN    polyethylene glycol (MIRALAX) packet 17 g  17 g Oral DAILY PRN    pravastatin (PRAVACHOL) tablet 80 mg  80 mg Oral QHS    famotidine (PEPCID) tablet 20 mg  20 mg Oral BID    sodium chloride (NS) flush 5-10 mL  5-10 mL IntraVENous Q8H    sodium chloride (NS) flush 5-10 mL  5-10 mL IntraVENous PRN    morphine injection 2 mg  2 mg IntraVENous Q4H PRN     Data Review:   Recent Results (from the past 24 hour(s))   GLUCOSE, POC    Collection Time: 02/09/17  1:24 PM   Result Value Ref Range    Glucose (POC) 126 (H) 65 - 100 mg/dL   GLUCOSE, POC    Collection Time: 02/09/17  3:49 PM   Result Value Ref Range    Glucose (POC) 139 (H) 65 - 100 mg/dL   GLUCOSE, POC    Collection Time: 02/09/17  9:42 PM   Result Value Ref Range    Glucose (POC) 160 (H) 65 - 100 mg/dL   GLUCOSE, POC    Collection Time: 02/10/17  1:05 AM   Result Value Ref Range    Glucose (POC) 136 (H) 65 - 100 mg/dL   MAGNESIUM    Collection Time: 02/10/17  5:25 AM   Result Value Ref Range    Magnesium 2.3 1.8 - 2.4 mg/dL   METABOLIC PANEL, BASIC    Collection Time: 02/10/17  5:25 AM   Result Value Ref Range    Sodium 137 136 - 145 mmol/L    Potassium 3.7 3.5 - 5.1 mmol/L    Chloride 92 (L) 98 - 107 mmol/L    CO2 37 (H) 21 - 32 mmol/L    Anion gap 8 7 - 16 mmol/L    Glucose 113 (H) 65 - 100 mg/dL    BUN 50 (H) 6 - 23 MG/DL    Creatinine 2.61 (H) 0.8 - 1.5 MG/DL    GFR est AA 33 (L) >60 ml/min/1.73m2 GFR est non-AA 28 (L) >60 ml/min/1.73m2    Calcium 8.8 8.3 - 10.4 MG/DL   GLUCOSE, POC    Collection Time: 02/10/17  6:38 AM   Result Value Ref Range    Glucose (POC) 142 (H) 65 - 100 mg/dL   GLUCOSE, POC    Collection Time: 02/10/17  8:41 AM   Result Value Ref Range    Glucose (POC) 131 (H) 65 - 100 mg/dL       EKG:  (EKG has been independently visualized by me with interpretation below)  Assessment:     Principal Problem:    Nonsustained ventricular tachycardia ([address] (1/19/2017)    Active Problems:    Obstructive sleep apnea (2/15/2010)      Nonischemic dilated cardiomyopathy ([address] (7/2/2013)      Overview: LVEF 10-15% on ECHO from 12/17/10      CKD (chronic kidney disease) stage 3, GFR 30-59 ml/min (8/13/2014)      AICD (automatic cardioverter/defibrillator) present (10/21/2015)      Acute on chronic systolic (congestive) heart failure ([address] (10/18/2016)      Atrial flutter ([address] (10/20/2016)      Obesity hypoventilation syndrome ([address] (10/24/2016)      Morbid obesity with BMI of 60.0-69.9, adult ([address] (11/28/2016)      Syncope (1/19/2017)      NSVT (nonsustained ventricular tachycardia) ([address] (1/19/2017)      Plan:   1. Nonsustained ventricular tachycardia, controlled: continue amiodarone, cont 200 mg daily  2. CKD (chronic kidney disease) stage 3, GFR 30-59 ml/min (8/13/2014): strict I and Os, continued on milrinone, creatinine mildly increased again today, may need to back off on diuresis, labs ordered for tomorrow AM and will reassess  3. Acute on chronic systolic (congestive) heart failure -improved volume overload, most likely component of cor pulmonale as well with morbid obesity --KELSIE--will continue to wear BiPAP, legs wrapped, continued on IV lasix and milrinone, continues to remain negative over 24 hours  4. Atrial flutter --stable, on eliquis  5. Edema, uncontrolled - legs wrapped, home diuretic  6. AICD: functioning normally, no shocks  7. Obesity hypoventilation, KELSIE: BiPAP   8.  Scrotal pain: appreciate urology input, continues to cause discomfort    Jeannine Roque MD  Cardiology/Electrophysiology

## 2017-02-10 NOTE — PROGRESS NOTES
Verbal bedside report received from Dalton Holland RN. Assumed care of patient. Primacor IV drip verified at bedside with outgoing RN.

## 2017-02-10 NOTE — PROGRESS NOTES
Verbal and bedside report received from [facility], [address] [place] [address].

## 2017-02-10 NOTE — PROGRESS NOTES
Problem: Heart Failure: Day 5  Goal: Activity/Safety  Outcome: Progressing Towards Goal  Currently patient is sitting up on side of bed. Red non-skid socks on feet, call light within reach, and bed rails up x2. Verbalized understanding to call for any assistance. Goal: Discharge Planning  Outcome: Progressing Towards Goal   following. Goal: Medications  Outcome: Progressing Towards Goal  Primacor drip infusing and Lasix 40mg IVP BID ordered. Goal: Respiratory  Outcome: Progressing Towards Goal  Denies any shortness of breath or pain at this time.

## 2017-02-11 LAB
ALBUMIN SERPL BCP-MCNC: 3.1 G/DL (ref 3.5–5)
ALBUMIN/GLOB SERPL: 0.7 {RATIO} (ref 1.2–3.5)
ALP SERPL-CCNC: 215 U/L (ref 50–136)
ALT SERPL-CCNC: 30 U/L (ref 12–65)
ANION GAP BLD CALC-SCNC: 7 MMOL/L (ref 7–16)
AST SERPL W P-5'-P-CCNC: 27 U/L (ref 15–37)
BILIRUB SERPL-MCNC: 1.3 MG/DL (ref 0.2–1.1)
BNP SERPL-MCNC: 520 PG/ML
BUN SERPL-MCNC: 55 MG/DL (ref 6–23)
CALCIUM SERPL-MCNC: 8.7 MG/DL (ref 8.3–10.4)
CHLORIDE SERPL-SCNC: 91 MMOL/L (ref 98–107)
CO2 SERPL-SCNC: 36 MMOL/L (ref 21–32)
CREAT SERPL-MCNC: 2.65 MG/DL (ref 0.8–1.5)
ERYTHROCYTE [DISTWIDTH] IN BLOOD BY AUTOMATED COUNT: 19.1 % (ref 11.9–14.6)
GLOBULIN SER CALC-MCNC: 4.5 G/DL (ref 2.3–3.5)
GLUCOSE BLD STRIP.AUTO-MCNC: 130 MG/DL (ref 65–100)
GLUCOSE BLD STRIP.AUTO-MCNC: 132 MG/DL (ref 65–100)
GLUCOSE BLD STRIP.AUTO-MCNC: 133 MG/DL (ref 65–100)
GLUCOSE BLD STRIP.AUTO-MCNC: 147 MG/DL (ref 65–100)
GLUCOSE SERPL-MCNC: 117 MG/DL (ref 65–100)
HCT VFR BLD AUTO: 32 % (ref 41.1–50.3)
HGB BLD-MCNC: 10 G/DL (ref 13.6–17.2)
MAGNESIUM SERPL-MCNC: 2.5 MG/DL (ref 1.8–2.4)
MAGNESIUM SERPL-MCNC: 2.7 MG/DL (ref 1.8–2.4)
MCH RBC QN AUTO: 24 PG (ref 26.1–32.9)
MCHC RBC AUTO-ENTMCNC: 31.3 G/DL (ref 31.4–35)
MCV RBC AUTO: 76.9 FL (ref 79.6–97.8)
PLATELET # BLD AUTO: 270 K/UL (ref 150–450)
PMV BLD AUTO: 9.7 FL (ref 10.8–14.1)
POTASSIUM SERPL-SCNC: 3.8 MMOL/L (ref 3.5–5.1)
PROT SERPL-MCNC: 7.6 G/DL (ref 6.3–8.2)
PSA SERPL-MCNC: 0.1 NG/ML
RBC # BLD AUTO: 4.16 M/UL (ref 4.23–5.67)
SODIUM SERPL-SCNC: 134 MMOL/L (ref 136–145)
WBC # BLD AUTO: 6.2 K/UL (ref 4.3–11.1)

## 2017-02-11 PROCEDURE — 83735 ASSAY OF MAGNESIUM: CPT | Performed by: INTERNAL MEDICINE

## 2017-02-11 PROCEDURE — 83880 ASSAY OF NATRIURETIC PEPTIDE: CPT | Performed by: INTERNAL MEDICINE

## 2017-02-11 PROCEDURE — 74011636637 HC RX REV CODE- 636/637: Performed by: NURSE PRACTITIONER

## 2017-02-11 PROCEDURE — 74011250636 HC RX REV CODE- 250/636: Performed by: INTERNAL MEDICINE

## 2017-02-11 PROCEDURE — 74011250637 HC RX REV CODE- 250/637: Performed by: INTERNAL MEDICINE

## 2017-02-11 PROCEDURE — 85027 COMPLETE CBC AUTOMATED: CPT | Performed by: INTERNAL MEDICINE

## 2017-02-11 PROCEDURE — 74011250637 HC RX REV CODE- 250/637: Performed by: NURSE PRACTITIONER

## 2017-02-11 PROCEDURE — 3331090002 HH PPS REVENUE DEBIT

## 2017-02-11 PROCEDURE — 80053 COMPREHEN METABOLIC PANEL: CPT | Performed by: INTERNAL MEDICINE

## 2017-02-11 PROCEDURE — 36592 COLLECT BLOOD FROM PICC: CPT

## 2017-02-11 PROCEDURE — 65660000000 HC RM CCU STEPDOWN

## 2017-02-11 PROCEDURE — 82962 GLUCOSE BLOOD TEST: CPT

## 2017-02-11 PROCEDURE — 84153 ASSAY OF PSA TOTAL: CPT | Performed by: INTERNAL MEDICINE

## 2017-02-11 PROCEDURE — 3331090001 HH PPS REVENUE CREDIT

## 2017-02-11 RX ORDER — FUROSEMIDE 10 MG/ML
40 INJECTION INTRAMUSCULAR; INTRAVENOUS ONCE
Status: COMPLETED | OUTPATIENT
Start: 2017-02-11 | End: 2017-02-11

## 2017-02-11 RX ADMIN — APIXABAN 5 MG: 5 TABLET, FILM COATED ORAL at 22:33

## 2017-02-11 RX ADMIN — OXYCODONE HYDROCHLORIDE 15 MG: 15 TABLET ORAL at 22:33

## 2017-02-11 RX ADMIN — Medication 10 ML: at 05:43

## 2017-02-11 RX ADMIN — ISOSORBIDE DINITRATE 20 MG: 20 TABLET ORAL at 05:41

## 2017-02-11 RX ADMIN — ALLOPURINOL 100 MG: 100 TABLET ORAL at 09:29

## 2017-02-11 RX ADMIN — AMIODARONE HYDROCHLORIDE 200 MG: 200 TABLET ORAL at 09:29

## 2017-02-11 RX ADMIN — CARVEDILOL 25 MG: 25 TABLET, FILM COATED ORAL at 17:47

## 2017-02-11 RX ADMIN — FAMOTIDINE 20 MG: 20 TABLET ORAL at 09:29

## 2017-02-11 RX ADMIN — Medication 10 ML: at 15:23

## 2017-02-11 RX ADMIN — MILRINONE LACTATE 0.5 MCG/KG/MIN: 200 INJECTION, SOLUTION INTRAVENOUS at 14:27

## 2017-02-11 RX ADMIN — MILRINONE LACTATE 0.5 MCG/KG/MIN: 200 INJECTION, SOLUTION INTRAVENOUS at 01:50

## 2017-02-11 RX ADMIN — FUROSEMIDE 40 MG: 10 INJECTION, SOLUTION INTRAMUSCULAR; INTRAVENOUS at 15:23

## 2017-02-11 RX ADMIN — HYDRALAZINE HYDROCHLORIDE 25 MG: 25 TABLET, FILM COATED ORAL at 22:33

## 2017-02-11 RX ADMIN — Medication 10 ML: at 22:36

## 2017-02-11 RX ADMIN — FUROSEMIDE 40 MG: 10 INJECTION, SOLUTION INTRAMUSCULAR; INTRAVENOUS at 22:35

## 2017-02-11 RX ADMIN — Medication 10 ML: at 11:28

## 2017-02-11 RX ADMIN — CARVEDILOL 25 MG: 25 TABLET, FILM COATED ORAL at 09:29

## 2017-02-11 RX ADMIN — MILRINONE LACTATE 0.5 MCG/KG/MIN: 200 INJECTION, SOLUTION INTRAVENOUS at 05:37

## 2017-02-11 RX ADMIN — ISOSORBIDE DINITRATE 20 MG: 20 TABLET ORAL at 15:22

## 2017-02-11 RX ADMIN — PETROLATUM: 42 OINTMENT TOPICAL at 10:05

## 2017-02-11 RX ADMIN — SPIRONOLACTONE 50 MG: 25 TABLET, FILM COATED ORAL at 09:29

## 2017-02-11 RX ADMIN — COLCHICINE 0.6 MG: 0.6 TABLET, FILM COATED ORAL at 09:29

## 2017-02-11 RX ADMIN — APIXABAN 5 MG: 5 TABLET, FILM COATED ORAL at 09:28

## 2017-02-11 RX ADMIN — ISOSORBIDE DINITRATE 20 MG: 20 TABLET ORAL at 22:33

## 2017-02-11 RX ADMIN — FAMOTIDINE 20 MG: 20 TABLET ORAL at 17:47

## 2017-02-11 RX ADMIN — MILRINONE LACTATE 0.5 MCG/KG/MIN: 200 INJECTION, SOLUTION INTRAVENOUS at 19:07

## 2017-02-11 RX ADMIN — HYDRALAZINE HYDROCHLORIDE 25 MG: 25 TABLET, FILM COATED ORAL at 15:22

## 2017-02-11 RX ADMIN — GABAPENTIN 600 MG: 300 CAPSULE ORAL at 15:22

## 2017-02-11 RX ADMIN — OXYCODONE HYDROCHLORIDE 15 MG: 15 TABLET ORAL at 11:25

## 2017-02-11 RX ADMIN — GABAPENTIN 600 MG: 300 CAPSULE ORAL at 05:41

## 2017-02-11 RX ADMIN — GABAPENTIN 600 MG: 300 CAPSULE ORAL at 22:33

## 2017-02-11 RX ADMIN — INSULIN GLARGINE 30 UNITS: 100 INJECTION, SOLUTION SUBCUTANEOUS at 22:35

## 2017-02-11 RX ADMIN — PRAVASTATIN SODIUM 80 MG: 80 TABLET ORAL at 22:33

## 2017-02-11 RX ADMIN — MILRINONE LACTATE 0.5 MCG/KG/MIN: 200 INJECTION, SOLUTION INTRAVENOUS at 22:54

## 2017-02-11 RX ADMIN — INSULIN LISPRO 5 UNITS: 100 INJECTION, SOLUTION INTRAVENOUS; SUBCUTANEOUS at 17:44

## 2017-02-11 RX ADMIN — INSULIN LISPRO 6 UNITS: 100 INJECTION, SOLUTION INTRAVENOUS; SUBCUTANEOUS at 12:46

## 2017-02-11 RX ADMIN — HYDRALAZINE HYDROCHLORIDE 25 MG: 25 TABLET, FILM COATED ORAL at 05:41

## 2017-02-11 NOTE — PROGRESS NOTES
Verbal bedside report given to Yana Keating oncoming RN. Patient's situation, background, assessment and recommendations provided. Opportunity for questions provided. Oncoming RN assumed care of patient. Primacor IV drip verified at bedside with oncoming RN.

## 2017-02-11 NOTE — PROGRESS NOTES
Bedside and Verbal shift change report given to self (oncoming nurse) by Ivanna Villatoro RN (offgoing nurse). Report included the following information SBAR, Kardex, MAR and Recent Results.

## 2017-02-11 NOTE — PROGRESS NOTES
Bedside and Verbal shift change report given to Jackson Conley RN (oncoming nurse) by self Amita valencia). Report included the following information SBAR, Kardex, MAR and Recent Results. Primacor visualized with oncoming RN. VSS throughout the night.  Printed and placed in chart

## 2017-02-11 NOTE — PROGRESS NOTES
[facility] CARDIOLOGY PROGRESS NOTE           2/11/2017 9:40 AM    Admit Date: 1/19/2017      Subjective:   No CP, still edematous and not happy with care today. EF 10-15%. On milrinone and IV lasix. ROS:  Cardiovascular:  As noted above    Objective:      Vitals:    02/10/17 2142 02/11/17 0116 02/11/17 0456 02/11/17 0745   BP: 118/70 104/69 122/65 136/79   Pulse: 81 87 86 85   Resp:  18 18 18   Temp:  97.6 °F (36.4 °C) 97.5 °F (36.4 °C) 97.3 °F (36.3 °C)   SpO2:  100% 100% 99%   Weight:   (!) 190.2 kg (419 lb 4.8 oz)    Height:           Physical Exam:  General-No Acute Distress  Neck- supple, no JVD  CV- regular rate and rhythm no MRG  Lung- clear bilaterally with dec BS in the bases  Abd- soft, nontender, nondistended  Ext- mod pitting edema bilaterally. Skin- warm and dry    Data Review:   Recent Labs      02/11/17   0405  02/10/17   0525  02/09/17   0600   NA   --   137  136   K   --   3.7  3.7   MG  2.5*  2.3  2.5*   BUN   --   50*  48*   CREA   --   2.61*  2.53*   GLU   --   113*  138*   WBC   --    --   6.1   HGB   --    --   10.4*   HCT   --    --   32.5*   PLT   --    --   267       Assessment/Plan:     Principal Problem:    Nonsustained ventricular tachycardia ([facility] (1/19/2017): on oral amio now, follow on tele, check labs with BMP and Mg    Active Problems:    Obstructive sleep apnea (2/15/2010)      Nonischemic dilated cardiomyopathy ([facility] (7/2/2013): continue IV diuresis and milrinone.   Check labs this AM      Overview: LVEF 10-15% on ECHO from 12/17/10      CKD (chronic kidney disease) stage 3, GFR 30-59 ml/min (8/13/2014): follow, labs today      AICD (automatic cardioverter/defibrillator) present (10/21/2015)      Acute on chronic systolic (congestive) heart failure ([facility] (10/18/2016): worsening      Atrial flutter ([facility] (10/20/2016): on meds, eliquis      Obesity hypoventilation syndrome ([facility] (10/24/2016)      Morbid obesity with BMI of 60.0-69.9, adult ([facility] (11/28/2016)      Syncope (1/19/2017)      NS[facility] ([facility]onsustained [facility] tachycardia) [facility] (1/19/2017)          Raymon Salinas DO  2/11/2017 9:40 AM

## 2017-02-11 NOTE — PROGRESS NOTES
Dressing change to pt left ankle with xeroform, 4x4 and wrapped in ace wrap. Pt tolerated dressing change.

## 2017-02-11 NOTE — PROGRESS NOTES
Pt . MD ordered Lantus 50 units. Pt states he wants only 40 units because his blood sugar was only 143.  Admin lantus 40 units per patient request.

## 2017-02-12 LAB
ANION GAP BLD CALC-SCNC: 8 MMOL/L (ref 7–16)
BNP SERPL-MCNC: 372 PG/ML
BUN SERPL-MCNC: 56 MG/DL (ref 6–23)
CALCIUM SERPL-MCNC: 8.6 MG/DL (ref 8.3–10.4)
CHLORIDE SERPL-SCNC: 93 MMOL/L (ref 98–107)
CO2 SERPL-SCNC: 35 MMOL/L (ref 21–32)
CREAT SERPL-MCNC: 2.86 MG/DL (ref 0.8–1.5)
GLUCOSE BLD STRIP.AUTO-MCNC: 104 MG/DL (ref 65–100)
GLUCOSE BLD STRIP.AUTO-MCNC: 127 MG/DL (ref 65–100)
GLUCOSE BLD STRIP.AUTO-MCNC: 130 MG/DL (ref 65–100)
GLUCOSE BLD STRIP.AUTO-MCNC: 132 MG/DL (ref 65–100)
GLUCOSE BLD STRIP.AUTO-MCNC: 137 MG/DL (ref 65–100)
GLUCOSE SERPL-MCNC: 123 MG/DL (ref 65–100)
MAGNESIUM SERPL-MCNC: 2.5 MG/DL (ref 1.8–2.4)
POTASSIUM SERPL-SCNC: 3.9 MMOL/L (ref 3.5–5.1)
SODIUM SERPL-SCNC: 136 MMOL/L (ref 136–145)

## 2017-02-12 PROCEDURE — 80048 BASIC METABOLIC PNL TOTAL CA: CPT | Performed by: INTERNAL MEDICINE

## 2017-02-12 PROCEDURE — 74011250637 HC RX REV CODE- 250/637: Performed by: INTERNAL MEDICINE

## 2017-02-12 PROCEDURE — 36592 COLLECT BLOOD FROM PICC: CPT

## 2017-02-12 PROCEDURE — 74011636637 HC RX REV CODE- 636/637: Performed by: NURSE PRACTITIONER

## 2017-02-12 PROCEDURE — 74011250637 HC RX REV CODE- 250/637: Performed by: NURSE PRACTITIONER

## 2017-02-12 PROCEDURE — 94640 AIRWAY INHALATION TREATMENT: CPT

## 2017-02-12 PROCEDURE — 94760 N-INVAS EAR/PLS OXIMETRY 1: CPT

## 2017-02-12 PROCEDURE — 77030018846 HC SOL IRR STRL H20 ICUM -A

## 2017-02-12 PROCEDURE — 3331090002 HH PPS REVENUE DEBIT

## 2017-02-12 PROCEDURE — 74011250636 HC RX REV CODE- 250/636: Performed by: INTERNAL MEDICINE

## 2017-02-12 PROCEDURE — 74011000250 HC RX REV CODE- 250: Performed by: INTERNAL MEDICINE

## 2017-02-12 PROCEDURE — 83880 ASSAY OF NATRIURETIC PEPTIDE: CPT | Performed by: EMERGENCY MEDICINE

## 2017-02-12 PROCEDURE — 82962 GLUCOSE BLOOD TEST: CPT

## 2017-02-12 PROCEDURE — 83735 ASSAY OF MAGNESIUM: CPT | Performed by: INTERNAL MEDICINE

## 2017-02-12 PROCEDURE — 3331090001 HH PPS REVENUE CREDIT

## 2017-02-12 PROCEDURE — 65660000000 HC RM CCU STEPDOWN

## 2017-02-12 RX ORDER — LEVALBUTEROL INHALATION SOLUTION 0.63 MG/3ML
0.63 SOLUTION RESPIRATORY (INHALATION)
Status: DISCONTINUED | OUTPATIENT
Start: 2017-02-12 | End: 2017-02-17

## 2017-02-12 RX ORDER — FUROSEMIDE 10 MG/ML
40 INJECTION INTRAMUSCULAR; INTRAVENOUS DAILY
Status: DISCONTINUED | OUTPATIENT
Start: 2017-02-13 | End: 2017-02-17

## 2017-02-12 RX ORDER — FAMOTIDINE 20 MG/1
20 TABLET, FILM COATED ORAL DAILY
Status: DISCONTINUED | OUTPATIENT
Start: 2017-02-13 | End: 2017-02-17

## 2017-02-12 RX ORDER — FUROSEMIDE 10 MG/ML
40 INJECTION INTRAMUSCULAR; INTRAVENOUS ONCE
Status: COMPLETED | OUTPATIENT
Start: 2017-02-12 | End: 2017-02-12

## 2017-02-12 RX ADMIN — FAMOTIDINE 20 MG: 20 TABLET ORAL at 10:03

## 2017-02-12 RX ADMIN — Medication 10 ML: at 17:40

## 2017-02-12 RX ADMIN — ISOSORBIDE DINITRATE 20 MG: 20 TABLET ORAL at 16:14

## 2017-02-12 RX ADMIN — MILRINONE LACTATE 0.5 MCG/KG/MIN: 200 INJECTION, SOLUTION INTRAVENOUS at 03:22

## 2017-02-12 RX ADMIN — GABAPENTIN 600 MG: 300 CAPSULE ORAL at 22:58

## 2017-02-12 RX ADMIN — APIXABAN 5 MG: 5 TABLET, FILM COATED ORAL at 22:58

## 2017-02-12 RX ADMIN — GABAPENTIN 600 MG: 300 CAPSULE ORAL at 16:14

## 2017-02-12 RX ADMIN — FUROSEMIDE 40 MG: 10 INJECTION, SOLUTION INTRAMUSCULAR; INTRAVENOUS at 11:00

## 2017-02-12 RX ADMIN — ALLOPURINOL 100 MG: 100 TABLET ORAL at 10:03

## 2017-02-12 RX ADMIN — POLYETHYLENE GLYCOL 3350 34 G: 17 POWDER, FOR SOLUTION ORAL at 10:03

## 2017-02-12 RX ADMIN — HYDRALAZINE HYDROCHLORIDE 25 MG: 25 TABLET, FILM COATED ORAL at 16:14

## 2017-02-12 RX ADMIN — HYDRALAZINE HYDROCHLORIDE 25 MG: 25 TABLET, FILM COATED ORAL at 22:58

## 2017-02-12 RX ADMIN — ISOSORBIDE DINITRATE 20 MG: 20 TABLET ORAL at 05:41

## 2017-02-12 RX ADMIN — APIXABAN 5 MG: 5 TABLET, FILM COATED ORAL at 10:03

## 2017-02-12 RX ADMIN — Medication 10 ML: at 23:03

## 2017-02-12 RX ADMIN — AMIODARONE HYDROCHLORIDE 200 MG: 200 TABLET ORAL at 10:03

## 2017-02-12 RX ADMIN — DOCUSATE SODIUM 100 MG: 100 CAPSULE, LIQUID FILLED ORAL at 17:40

## 2017-02-12 RX ADMIN — GABAPENTIN 600 MG: 300 CAPSULE ORAL at 05:41

## 2017-02-12 RX ADMIN — PETROLATUM: 42 OINTMENT TOPICAL at 10:11

## 2017-02-12 RX ADMIN — CARVEDILOL 25 MG: 25 TABLET, FILM COATED ORAL at 10:03

## 2017-02-12 RX ADMIN — OXYCODONE HYDROCHLORIDE 15 MG: 15 TABLET ORAL at 12:56

## 2017-02-12 RX ADMIN — ISOSORBIDE DINITRATE 20 MG: 20 TABLET ORAL at 22:58

## 2017-02-12 RX ADMIN — SPIRONOLACTONE 50 MG: 25 TABLET, FILM COATED ORAL at 10:03

## 2017-02-12 RX ADMIN — INSULIN LISPRO 7 UNITS: 100 INJECTION, SOLUTION INTRAVENOUS; SUBCUTANEOUS at 10:09

## 2017-02-12 RX ADMIN — HYDRALAZINE HYDROCHLORIDE 25 MG: 25 TABLET, FILM COATED ORAL at 05:41

## 2017-02-12 RX ADMIN — MILRINONE LACTATE 0.5 MCG/KG/MIN: 200 INJECTION, SOLUTION INTRAVENOUS at 16:10

## 2017-02-12 RX ADMIN — LEVALBUTEROL HYDROCHLORIDE 0.63 MG: 0.63 SOLUTION RESPIRATORY (INHALATION) at 21:33

## 2017-02-12 RX ADMIN — COLCHICINE 0.6 MG: 0.6 TABLET, FILM COATED ORAL at 10:03

## 2017-02-12 RX ADMIN — MILRINONE LACTATE 0.5 MCG/KG/MIN: 200 INJECTION, SOLUTION INTRAVENOUS at 23:35

## 2017-02-12 RX ADMIN — MILRINONE LACTATE 0.5 MCG/KG/MIN: 200 INJECTION, SOLUTION INTRAVENOUS at 19:51

## 2017-02-12 RX ADMIN — Medication 10 ML: at 05:43

## 2017-02-12 RX ADMIN — MILRINONE LACTATE 0.5 MCG/KG/MIN: 200 INJECTION, SOLUTION INTRAVENOUS at 07:03

## 2017-02-12 RX ADMIN — MILRINONE LACTATE 0.5 MCG/KG/MIN: 200 INJECTION, SOLUTION INTRAVENOUS at 11:21

## 2017-02-12 RX ADMIN — PRAVASTATIN SODIUM 80 MG: 80 TABLET ORAL at 22:58

## 2017-02-12 RX ADMIN — FAMOTIDINE 20 MG: 20 TABLET ORAL at 17:40

## 2017-02-12 RX ADMIN — CARVEDILOL 25 MG: 25 TABLET, FILM COATED ORAL at 16:14

## 2017-02-12 NOTE — PROGRESS NOTES
Bedside and Verbal shift change report given to self (oncoming nurse) by Esperanza Cole RN (offgoing nurse). Report included the following information SBAR, Kardex, MAR and Recent Results. Primacor visualized with offgoing RN.

## 2017-02-12 NOTE — PROGRESS NOTES
Bedside and Verbal shift change report given to Bruno Webber RN (oncoming nurse) by self Layman Mini nurse). Report included the following information SBAR, Kardex and MAR. Primacor visualized with oncoming RN. VSS throughout the night.  Printed and placed in chart

## 2017-02-12 NOTE — PROGRESS NOTES
[facility] CARDIOLOGY PROGRESS NOTE           2/12/2017 9:40 AM    Admit Date: 1/19/2017      Subjective:   No CP, still edematous and not happy with care today, upset about scrotal swelling and worsening Cr now. EF 10-15%. On milrinone and IV lasix. Still with anasarca. ROS:  Cardiovascular:  As noted above    Objective:      Vitals:    02/12/17 0541 02/12/17 0703 02/12/17 0709 02/12/17 0800   BP: 110/69 131/66  140/67   Pulse: 78 82  83   Resp:    20   Temp:    97.6 °F (36.4 °C)   SpO2:    96%   Weight:   (!) 179.3 kg (395 lb 3.2 oz)    Height:           Physical Exam:  General-No Acute Distress  Neck- supple, no JVD  CV- regular rate and rhythm no MRG  Lung- clear bilaterally with dec BS in the bases  Abd- soft, nontender, nondistended  Ext- mod pitting edema bilaterally. Skin- warm and dry    Data Review:   Recent Labs      02/12/17   0557  02/11/17   1107   NA  136  134*   K  3.9  3.8   MG  2.5*  2.7*   BUN  56*  55*   CREA  2.86*  2.65*   GLU  123*  117*   WBC   --   6.2   HGB   --   10.0*   HCT   --   32.0*   PLT   --   270       Assessment/Plan:     Principal Problem:    Nonsustained ventricular tachycardia ([address] [facility] (1/19/2017): on oral amio now, follow on tele, check labs with BMP and Mg    Active Problems:    Obstructive sleep apnea (2/15/2010): using CPAP      Nonischemic dilated cardiomyopathy ([facility] (7/2/2013): continue IV diuresis and milrinone. Check labs this AM, Cr worsening but still with severe scrotal swelling and edema. We will need to accept worsening Cr to help with HF and edema. Consult renal today.        Overview: LVEF 10-15% on ECHO from 12/17/10      CKD (chronic kidney disease) stage 3, GFR 30-59 ml/min (8/13/2014): follow, labs today, consult renal today      AICD (automatic cardioverter/defibrillator) present (10/21/2015)      Acute on chronic systolic (congestive) heart failure ([address] (10/18/2016): worsening, prognosis poor      Atrial flutter ([address] (10/20/2016): on meds, eliquis      Obesity hypoventilation syndrome ([facility] (10/24/2016)      Morbid obesity with BMI of 60.0-69.9, adult ([facility] (11/28/2016): weight loss is real issue      Syncope (1/19/2017)      NSVT (nonsustained ventricular tachycardia) ([facility] (1/19/2017)    Patient upset today about worsening SHF, edema, worsening Cr. Will ask renal for input today. Prognosis guarded.         Gisselle Connelly, DO  2/12/2017 10:00AM

## 2017-02-12 NOTE — PROGRESS NOTES
Problem: Pressure Ulcer - Risk of  Goal: *Prevention of pressure ulcer  Outcome: Progressing Towards Goal    02/11/17 2012   Read Only, Retired: Wound Treatment (non-mechanical)   Wound Offloading (Prevention Methods) Bed, pressure reduction mattress; Foam silicone   Wound Prevention and Protection Methods   Orientation of Wound Prevention Mid   Location of Wound Prevention Sacrum/Coccyx   Dressing Present  Yes; Intact, not due to be changed   Dressing Status Intact      Educated pt on the importance of alleviating pressure on sacrum to prevent pressure wounds. Educated pt to adjust weight every 2 hours on each side.  Pt verbalizes understanding and will alleviate pressure on bottom

## 2017-02-12 NOTE — PROGRESS NOTES
MD ordered Lantus 50 units. . Pt does not want to take the full dose ordered. Pt request 30 units of lantus tonight.

## 2017-02-12 NOTE — PROGRESS NOTES
Daily dressing change to pt left ankle with xeroform, 4x4 and wrapped in ACE wrap. Pt tolerated dressing change.

## 2017-02-12 NOTE — PROGRESS NOTES
Verbal bedside report received from MEKA Sarabia RN. Assumed care of patient. Primacor IV drip verified at bedside with outgoing RN.

## 2017-02-12 NOTE — CONSULTS
[facility] CONSULT NOTE    We were asked to see the patient at the request of Dr. Yanique Deras for RED on CKD. Admission Date:  1/19/2017    Admission Diagnosis:  NSVT (nonsustained ventricular tachycardia) ([facility])    History of Present Illness:    Patient is a 45year old super morbidly obese male with complex medical history seen for RED on CKD. In addition to BMI of 67, he has a history of CHF with EF of 10-15%, NIDCM, COPD, and DM with neuropathy among other medical problems seen for worsening Cr. He was seen by Nephrology in early January for RED on CKD at which time diuretics were held until kidney function improved enough to resume. During that time, his baseline Cr was around 1.4. However, recently, it seems his baseline has been closer to 2 which has slowly progressed to 2.86 today. He was admitted for this hospitalization after an episode of Vtach. Currently, he is on IV lasix and milrinone drip. He has significant edema and recent cardiology notes indicate that he needs the diuretics despite worsening Cr due to his heart failure. The patient currently denies chest pain or shortness of breath. He has some rather unrelated complaints, such as his edema worsening after having his legs wrapped. His daughter is at the bedside. He reports that he required dialysis in the past for two runs with RED and there are no records to confirm this, although it is possible that this was at [facility]. Past Medical History   Diagnosis Date    Acute systolic heart failure ([address] May, 2009     Also had transient acute renal failure secondary to poor perfusion.     AICD (automatic cardioverter/defibrillator) present 10/21/2015    Atypical chest pain 4/23/2010    Bronchitis     CAD (coronary artery disease)     Cardiomyopathy     Chest pain 10/21/2015    Chronic kidney disease      renal insufficiency    Chronic pain      back    Congestive heart failure (CHF) ([address] 10/21/2015    COPD     Diabetes ([address]      type 2 insulin reliant- BS average- 160's-180's    Diabetes mellitus type II, uncontrolled ([facility] 7/2/2013    GERD (gastroesophageal reflux disease)     Gout     Heart failure ([facility]      cardiomyopathy with ef 10-20%    Hypertension     Hyponatremia 12/20/2010    Ill-defined condition      gout, neuropathy, sciatica    Morbid obesity (HCC)     Nausea & vomiting 11/30/2015    Neuropathy     Obstructive sleep apnea 2/15/2010    Other unknown and unspecified cause of morbidity or mortality      Gout    Severe sepsis (HCC)     Unspecified sleep apnea      cpap      Past Surgical History   Procedure Laterality Date    Hx pacemaker       may 2010    Hx heart catheterization  [place] 10, 2008     Severe multivessel disease with severe LV dysfunction    Pr chest surgery procedure unlisted       thorocentesis      Current Facility-Administered Medications   Medication Dose Route Frequency    [START ON 2/13/2017] furosemide (LASIX) injection 40 mg  40 mg IntraVENous DAILY    mineral oil-hydrophil petrolat (AQUAPHOR) ointment   Topical DAILY    milrinone (PRIMACOR) 20 MG/100 ML D5W infusion  0.5 mcg/kg/min IntraVENous CONTINUOUS    ondansetron (ZOFRAN) injection 4 mg  4 mg IntraVENous Q6H PRN    spironolactone (ALDACTONE) tablet 50 mg  50 mg Oral DAILY    amiodarone (CORDARONE) tablet 200 mg  200 mg Oral DAILY    polyethylene glycol (MIRALAX) packet 34 g  34 g Oral DAILY    hydrocortisone (CORTAID) 0.5 % cream   Topical BID    allopurinol (ZYLOPRIM) tablet 100 mg  100 mg Oral DAILY    apixaban (ELIQUIS) tablet 5 mg  5 mg Oral Q12H    bisacodyl (DULCOLAX) suppository 10 mg  10 mg Rectal DAILY    carvedilol (COREG) tablet 25 mg  25 mg Oral BID WITH MEALS    colchicine tablet 0.6 mg  0.6 mg Oral DAILY    docusate sodium (COLACE) capsule 100 mg  100 mg Oral BID    gabapentin (NEURONTIN) capsule 600 mg  600 mg Oral TID    hydrALAZINE (APRESOLINE) tablet 25 mg  25 mg Oral TID    insulin glargine (LANTUS) injection 50 Units  50 Units SubCUTAneous QHS    insulin lispro (HUMALOG) injection 15 Units  15 Units SubCUTAneous TID WITH MEALS    isosorbide dinitrate (ISORDIL) tablet 20 mg  20 mg Oral TID    nitroglycerin (NITROSTAT) tablet 0.4 mg  0.4 mg SubLINGual Q5MIN PRN    oxyCODONE IR (OXY-IR) immediate release tablet 15 mg  15 mg Oral Q4H PRN    polyethylene glycol (MIRALAX) packet 17 g  17 g Oral DAILY PRN    pravastatin (PRAVACHOL) tablet 80 mg  80 mg Oral QHS    famotidine (PEPCID) tablet 20 mg  20 mg Oral BID    sodium chloride (NS) flush 5-10 mL  5-10 mL IntraVENous Q8H    sodium chloride (NS) flush 5-10 mL  5-10 mL IntraVENous PRN    morphine injection 2 mg  2 mg IntraVENous Q4H PRN     Allergies   Allergen Reactions    Dilaudid [Hydromorphone] Other (comments)     Hotflashes, patient states he's not allergic    Iodinated Contrast Media - Oral And Iv Dye Other (comments)     \"shuts my kidneys down\"    Penicillins Unknown (comments)     Pt states he is not allergic his mother just wouldn't allow him take it      Social History   Substance Use Topics    Smoking status: Former Smoker     Packs/day: 0.25     Years: 1.00     Quit date: 7/12/1984    Smokeless tobacco: Never Used      Comment: pt states that he only tried smoking as a kid     Alcohol use No      Family History   Problem Relation Age of Onset    Heart Disease Father     Stroke Father     Diabetes Sister     Stroke Sister     Diabetes Sister     Heart Disease Other         Review of Systems:  ROS as outlined in HPI. No CP or SOB.      Objective:  Vitals:    02/12/17 0541 02/12/17 0703 02/12/17 0709 02/12/17 0800   BP: 110/69 131/66  140/67   Pulse: 78 82  83   Resp:    20   Temp:    97.6 °F (36.4 °C)   SpO2:    96%   Weight:   (!) 179.3 kg (395 lb 3.2 oz)    Height:           Intake/Output Summary (Last 24 hours) at 02/12/17 1044  Last data filed at 02/12/17 0709   Gross per 24 hour   Intake           743.82 ml   Output 1775 ml   Net         -1031.18 ml     Wt Readings from Last 3 Encounters:   02/12/17 (!) 179.3 kg (395 lb 3.2 oz)   01/15/17 (!) 182.3 kg (402 lb)   01/13/17 (!) 179.2 kg (395 lb)       GEN - in no distress, alert and oriented. Morbidly obese. Neck - no JVD  CV - S1, S2, no rub  Lung - diminished. Some increased work of breathing. Chest wall - normal appearance  Abd - soft, nontender  Ext -  Edema, difficult to fully assess 2/2 body habitus  Neurologic - nonfocal  Genitourinary - bladder nonpalpable      Data Review:     Recent Labs      02/11/17   1107   WBC  6.2   HGB  10.0*   HCT  32.0*   PLT  270        Recent Labs      02/12/17   0557  02/11/17   1107  02/11/17   0405  02/10/17   0525   NA  136  134*   --   137   K  3.9  3.8   --   3.7   CL  93*  91*   --   92*   CO2  35*  36*   --   37*   BUN  56*  55*   --   50*   CREA  2.86*  2.65*   --   2.61*   CA  8.6  8.7   --   8.8   GLU  123*  117*   --   113*   MG  2.5*  2.7*  2.5*  2.3         Assessment/Plan:     Principal Problem:    Nonsustained ventricular tachycardia (HCC) (1/19/2017)    Active Problems:    Obstructive sleep apnea (2/15/2010)      Nonischemic dilated cardiomyopathy ([facility] (7/2/2013)      Overview: LVEF 10-15% on ECHO from 12/17/10      CKD (chronic kidney disease) stage 3, GFR 30-59 ml/min (8/13/2014)      AICD (automatic cardioverter/defibrillator) present (10/21/2015)      Acute on chronic systolic (congestive) heart failure ([facility] (10/18/2016)      Atrial flutter ([facility] (10/20/2016)      Obesity hypoventilation syndrome ([facility] (10/24/2016)      Morbid obesity with BMI of 60.0-69.9, adult ([facility] (11/28/2016)      Syncope (1/19/2017)      NSVT (nonsustained ventricular tachycardia) ([facility] (1/19/2017)      45year old year old male patient with morbid obesity (BMI=67), CHF, NIDCM, and multiple other medical problems admitted for Vtach on 1/19.  He has CKD which has been progressing with baseline now around 2 with worsening function and Cr up to 2.86 today.     -- RED on CKD3: Unable to hold diuretics per cardiology 2/2 heart failure and significant edema. This is a difficult case, complicated by very poor medical insight by the patient and his daughter. There are no urgent indications for dialysis at this time, but it is likely that his renal function will continue to deteriorate due to cardiorenal syndrome and need for diuretics. His EF of 10% in conjuction with multiple medical problems makes dialysis difficult as he will likely become too hypotensive or suffer further complications with HD. He has a poor prognosis, for which the family is either in denial or doesn't fully comprehend. The patient is upset by this information and asks for a heart and/ or kidney transplant. He then feels that his heart will get better if he loses weight and gets to a nursing home. The role of palliative care was introduced briefly and family encouraged to discuss long term options and goals of care. We will continue to monitor kidney function.    -- CHF: EF 10%  -- Obesity hypoventilation syndrome: on oxygen  -- morbid obesity    Susan Zhang

## 2017-02-13 ENCOUNTER — APPOINTMENT (OUTPATIENT)
Dept: GENERAL RADIOLOGY | Age: 52
DRG: 308 | End: 2017-02-13
Attending: INTERNAL MEDICINE
Payer: MEDICARE

## 2017-02-13 LAB
ALBUMIN SERPL BCP-MCNC: 2.9 G/DL (ref 3.5–5)
ALBUMIN/GLOB SERPL: 0.6 {RATIO} (ref 1.2–3.5)
ALP SERPL-CCNC: 205 U/L (ref 50–136)
ALT SERPL-CCNC: 26 U/L (ref 12–65)
ANION GAP BLD CALC-SCNC: 11 MMOL/L (ref 7–16)
AST SERPL W P-5'-P-CCNC: 26 U/L (ref 15–37)
BILIRUB SERPL-MCNC: 1.8 MG/DL (ref 0.2–1.1)
BNP SERPL-MCNC: 413 PG/ML
BUN SERPL-MCNC: 60 MG/DL (ref 6–23)
CALCIUM SERPL-MCNC: 8.7 MG/DL (ref 8.3–10.4)
CHLORIDE SERPL-SCNC: 92 MMOL/L (ref 98–107)
CO2 SERPL-SCNC: 33 MMOL/L (ref 21–32)
CREAT SERPL-MCNC: 2.86 MG/DL (ref 0.8–1.5)
GLOBULIN SER CALC-MCNC: 4.5 G/DL (ref 2.3–3.5)
GLUCOSE BLD STRIP.AUTO-MCNC: 127 MG/DL (ref 65–100)
GLUCOSE BLD STRIP.AUTO-MCNC: 129 MG/DL (ref 65–100)
GLUCOSE BLD STRIP.AUTO-MCNC: 136 MG/DL (ref 65–100)
GLUCOSE BLD STRIP.AUTO-MCNC: 166 MG/DL (ref 65–100)
GLUCOSE SERPL-MCNC: 127 MG/DL (ref 65–100)
MAGNESIUM SERPL-MCNC: 2.4 MG/DL (ref 1.8–2.4)
POTASSIUM SERPL-SCNC: 4 MMOL/L (ref 3.5–5.1)
PROT SERPL-MCNC: 7.4 G/DL (ref 6.3–8.2)
SODIUM SERPL-SCNC: 136 MMOL/L (ref 136–145)

## 2017-02-13 PROCEDURE — 74011000250 HC RX REV CODE- 250: Performed by: INTERNAL MEDICINE

## 2017-02-13 PROCEDURE — 83735 ASSAY OF MAGNESIUM: CPT | Performed by: INTERNAL MEDICINE

## 2017-02-13 PROCEDURE — 3331090001 HH PPS REVENUE CREDIT

## 2017-02-13 PROCEDURE — 74011250637 HC RX REV CODE- 250/637: Performed by: INTERNAL MEDICINE

## 2017-02-13 PROCEDURE — 80053 COMPREHEN METABOLIC PANEL: CPT | Performed by: INTERNAL MEDICINE

## 2017-02-13 PROCEDURE — 74011250636 HC RX REV CODE- 250/636: Performed by: INTERNAL MEDICINE

## 2017-02-13 PROCEDURE — 83880 ASSAY OF NATRIURETIC PEPTIDE: CPT | Performed by: INTERNAL MEDICINE

## 2017-02-13 PROCEDURE — 74011250637 HC RX REV CODE- 250/637: Performed by: NURSE PRACTITIONER

## 2017-02-13 PROCEDURE — 77010033678 HC OXYGEN DAILY

## 2017-02-13 PROCEDURE — 65660000000 HC RM CCU STEPDOWN

## 2017-02-13 PROCEDURE — 76450000000

## 2017-02-13 PROCEDURE — 94640 AIRWAY INHALATION TREATMENT: CPT

## 2017-02-13 PROCEDURE — 71010 XR CHEST PORT: CPT

## 2017-02-13 PROCEDURE — 82962 GLUCOSE BLOOD TEST: CPT

## 2017-02-13 PROCEDURE — 94760 N-INVAS EAR/PLS OXIMETRY 1: CPT

## 2017-02-13 PROCEDURE — 74011636637 HC RX REV CODE- 636/637: Performed by: NURSE PRACTITIONER

## 2017-02-13 PROCEDURE — 3331090002 HH PPS REVENUE DEBIT

## 2017-02-13 PROCEDURE — 36592 COLLECT BLOOD FROM PICC: CPT

## 2017-02-13 RX ADMIN — COLCHICINE 0.6 MG: 0.6 TABLET, FILM COATED ORAL at 08:11

## 2017-02-13 RX ADMIN — LEVALBUTEROL HYDROCHLORIDE 0.63 MG: 0.63 SOLUTION RESPIRATORY (INHALATION) at 05:49

## 2017-02-13 RX ADMIN — HYDRALAZINE HYDROCHLORIDE 25 MG: 25 TABLET, FILM COATED ORAL at 23:19

## 2017-02-13 RX ADMIN — DOCUSATE SODIUM 100 MG: 100 CAPSULE, LIQUID FILLED ORAL at 08:12

## 2017-02-13 RX ADMIN — ISOSORBIDE DINITRATE 20 MG: 20 TABLET ORAL at 14:42

## 2017-02-13 RX ADMIN — GABAPENTIN 600 MG: 300 CAPSULE ORAL at 14:42

## 2017-02-13 RX ADMIN — GABAPENTIN 600 MG: 300 CAPSULE ORAL at 06:06

## 2017-02-13 RX ADMIN — CARVEDILOL 25 MG: 25 TABLET, FILM COATED ORAL at 18:49

## 2017-02-13 RX ADMIN — APIXABAN 5 MG: 5 TABLET, FILM COATED ORAL at 08:12

## 2017-02-13 RX ADMIN — DOCUSATE SODIUM 100 MG: 100 CAPSULE, LIQUID FILLED ORAL at 18:49

## 2017-02-13 RX ADMIN — INSULIN GLARGINE 5 UNITS: 100 INJECTION, SOLUTION SUBCUTANEOUS at 23:17

## 2017-02-13 RX ADMIN — ISOSORBIDE DINITRATE 20 MG: 20 TABLET ORAL at 06:06

## 2017-02-13 RX ADMIN — MILRINONE LACTATE 0.5 MCG/KG/MIN: 200 INJECTION, SOLUTION INTRAVENOUS at 11:53

## 2017-02-13 RX ADMIN — MILRINONE LACTATE 0.5 MCG/KG/MIN: 200 INJECTION, SOLUTION INTRAVENOUS at 20:24

## 2017-02-13 RX ADMIN — HYDROCORTISONE: 5 CREAM TOPICAL at 09:00

## 2017-02-13 RX ADMIN — LEVALBUTEROL HYDROCHLORIDE 0.63 MG: 0.63 SOLUTION RESPIRATORY (INHALATION) at 10:51

## 2017-02-13 RX ADMIN — GABAPENTIN 600 MG: 300 CAPSULE ORAL at 23:19

## 2017-02-13 RX ADMIN — HYDRALAZINE HYDROCHLORIDE 25 MG: 25 TABLET, FILM COATED ORAL at 06:06

## 2017-02-13 RX ADMIN — CARVEDILOL 25 MG: 25 TABLET, FILM COATED ORAL at 08:12

## 2017-02-13 RX ADMIN — APIXABAN 5 MG: 5 TABLET, FILM COATED ORAL at 23:15

## 2017-02-13 RX ADMIN — Medication 10 ML: at 23:23

## 2017-02-13 RX ADMIN — PETROLATUM: 42 OINTMENT TOPICAL at 08:14

## 2017-02-13 RX ADMIN — MILRINONE LACTATE 0.5 MCG/KG/MIN: 200 INJECTION, SOLUTION INTRAVENOUS at 08:00

## 2017-02-13 RX ADMIN — FUROSEMIDE 40 MG: 10 INJECTION, SOLUTION INTRAMUSCULAR; INTRAVENOUS at 08:12

## 2017-02-13 RX ADMIN — ALLOPURINOL 100 MG: 100 TABLET ORAL at 08:12

## 2017-02-13 RX ADMIN — SPIRONOLACTONE 50 MG: 25 TABLET, FILM COATED ORAL at 08:13

## 2017-02-13 RX ADMIN — POLYETHYLENE GLYCOL 3350 34 G: 17 POWDER, FOR SOLUTION ORAL at 08:14

## 2017-02-13 RX ADMIN — HYDRALAZINE HYDROCHLORIDE 25 MG: 25 TABLET, FILM COATED ORAL at 14:42

## 2017-02-13 RX ADMIN — Medication 10 ML: at 14:00

## 2017-02-13 RX ADMIN — MILRINONE LACTATE 0.5 MCG/KG/MIN: 200 INJECTION, SOLUTION INTRAVENOUS at 04:15

## 2017-02-13 RX ADMIN — LEVALBUTEROL HYDROCHLORIDE 0.63 MG: 0.63 SOLUTION RESPIRATORY (INHALATION) at 22:39

## 2017-02-13 RX ADMIN — LEVALBUTEROL HYDROCHLORIDE 0.63 MG: 0.63 SOLUTION RESPIRATORY (INHALATION) at 14:09

## 2017-02-13 RX ADMIN — Medication 10 ML: at 06:17

## 2017-02-13 RX ADMIN — ISOSORBIDE DINITRATE 20 MG: 20 TABLET ORAL at 23:19

## 2017-02-13 RX ADMIN — FAMOTIDINE 20 MG: 20 TABLET ORAL at 08:12

## 2017-02-13 RX ADMIN — PRAVASTATIN SODIUM 80 MG: 80 TABLET ORAL at 23:19

## 2017-02-13 RX ADMIN — AMIODARONE HYDROCHLORIDE 200 MG: 200 TABLET ORAL at 08:12

## 2017-02-13 RX ADMIN — GUAIFENESIN 1200 MG: 600 TABLET, EXTENDED RELEASE ORAL at 14:42

## 2017-02-13 NOTE — PROGRESS NOTES
Changed patient's central line dressing. Skin dry, red/raw and pulling away from suture in two places. Informed Matthew Villarreal RN from PICC team about skin status and documented on flowsheets. Central line still infusing with no problems. 3/4 ports able to be flushed with blood return.

## 2017-02-13 NOTE — PROGRESS NOTES
Bedside and Verbal shift change report given to self (oncoming nurse) by Mini Portillo RN (offgoing nurse). Report included the following information SBAR, Kardex, MAR and Recent Results.

## 2017-02-13 NOTE — PROGRESS NOTES
Bedside and Verbal shift change report given to Polo Braswell RN (oncoming nurse) by self (offgoing nurse). Report included the following information SBAR, Kardex, MAR and Recent Results. Primacor gtt visualized with oncoming RN. VSS throughout the night.  Printed and placed in chart

## 2017-02-13 NOTE — PROGRESS NOTES
MD ordered Lantus 50 units tonight. . Pt states his blood sugar is too low to take any Lantus and does not want his BS to bottom out throughout the night. Educated pt on medication and peak levels. Pt verbalizes understanding. Initially, pt stays he only wants 1 unit of lantus but changed his mind and refused medication.

## 2017-02-13 NOTE — PROGRESS NOTES
Patient requesting to talk to Dr. Robert Antoine today. Spoke with Dr. Robert Antoine about request., Pulmonology not currently on patient's treatment team and will not be seeing him today.

## 2017-02-13 NOTE — PROGRESS NOTES
[facility]    Follow-Up on: RED/CKD 3    Frustrated with regards to his breathing. Says it was worse today. Got better with xopenex. Daughter at bedside    ROS:  Denies CP.  Edema and SOB stable    Current Facility-Administered Medications   Medication Dose Route Frequency    guaiFENesin SR (MUCINEX) tablet 1,200 mg  1,200 mg Oral BID    furosemide (LASIX) injection 40 mg  40 mg IntraVENous DAILY    famotidine (PEPCID) tablet 20 mg  20 mg Oral DAILY    levalbuterol (XOPENEX) nebulizer soln 0.63 mg/3 mL  0.63 mg Nebulization Q4H PRN    mineral oil-hydrophil petrolat (AQUAPHOR) ointment   Topical DAILY    milrinone (PRIMACOR) 20 MG/100 ML D5W infusion  0.5 mcg/kg/min IntraVENous CONTINUOUS    ondansetron (ZOFRAN) injection 4 mg  4 mg IntraVENous Q6H PRN    spironolactone (ALDACTONE) tablet 50 mg  50 mg Oral DAILY    amiodarone (CORDARONE) tablet 200 mg  200 mg Oral DAILY    polyethylene glycol (MIRALAX) packet 34 g  34 g Oral DAILY    hydrocortisone (CORTAID) 0.5 % cream   Topical BID    allopurinol (ZYLOPRIM) tablet 100 mg  100 mg Oral DAILY    apixaban (ELIQUIS) tablet 5 mg  5 mg Oral Q12H    bisacodyl (DULCOLAX) suppository 10 mg  10 mg Rectal DAILY    carvedilol (COREG) tablet 25 mg  25 mg Oral BID WITH MEALS    colchicine tablet 0.6 mg  0.6 mg Oral DAILY    docusate sodium (COLACE) capsule 100 mg  100 mg Oral BID    gabapentin (NEURONTIN) capsule 600 mg  600 mg Oral TID    hydrALAZINE (APRESOLINE) tablet 25 mg  25 mg Oral TID    insulin glargine (LANTUS) injection 50 Units  50 Units SubCUTAneous QHS    insulin lispro (HUMALOG) injection 15 Units  15 Units SubCUTAneous TID WITH MEALS    isosorbide dinitrate (ISORDIL) tablet 20 mg  20 mg Oral TID    nitroglycerin (NITROSTAT) tablet 0.4 mg  0.4 mg SubLINGual Q5MIN PRN    oxyCODONE IR (OXY-IR) immediate release tablet 15 mg  15 mg Oral Q4H PRN    polyethylene glycol (MIRALAX) packet 17 g  17 g Oral DAILY PRN    pravastatin (PRAVACHOL) tablet 80 mg  80 mg Oral QHS    sodium chloride (NS) flush 5-10 mL  5-10 mL IntraVENous Q8H    sodium chloride (NS) flush 5-10 mL  5-10 mL IntraVENous PRN    morphine injection 2 mg  2 mg IntraVENous Q4H PRN       Exam:  Vitals:    02/13/17 1103 02/13/17 1145 02/13/17 1153 02/13/17 1411   BP:  128/66 131/67    Pulse: 85 89 89    Resp: 16 16     Temp:  97.8 °F (36.6 °C)     SpO2: 97% 96%  96%   Weight:       Height:             Intake/Output Summary (Last 24 hours) at 02/13/17 1556  Last data filed at 02/13/17 1115   Gross per 24 hour   Intake           959.32 ml   Output             1175 ml   Net          -215.68 ml     PE:  GEN - alert, oriented- in no distress  CV - regular, no murmur, no rub  Lung - occ rhonci, no wheezes  Abd - soft, nontender  Ext - 1+ edema    Labs  Recent Labs      02/11/17   1107   WBC  6.2   HGB  10.0*   HCT  32.0*   PLT  270     Recent Labs      02/13/17   0435  02/12/17   0557  02/11/17   1107   NA  136  136  134*   K  4.0  3.9  3.8   CL  92*  93*  91*   CO2  33*  35*  36*   BUN  60*  56*  55*   CREA  2.86*  2.86*  2.65*   GLU  127*  123*  117*   CA  8.7  8.6  8.7   MG  2.4  2.5*  2.7*     No results for input(s): PH, PCO2, PO2, PCO2 in the last 72 hours.     Problem List:  Patient Active Problem List    Diagnosis Date Noted    Syncope 01/19/2017    Nonsustained ventricular tachycardia ([address] 01/19/2017    NSVT (nonsustained ventricular tachycardia) ([address] 01/19/2017    Constipation 01/08/2017    Morbid obesity with BMI of 60.0-69.9, adult ([facility] 11/28/2016    Hypoxemia 11/28/2016    Hypersomnia 11/28/2016    Obesity hypoventilation syndrome ([facility] 10/24/2016    Atrial flutter ([address] 10/20/2016    Acute on chronic systolic (congestive) heart failure (HCC) 10/18/2016    Nausea & vomiting 11/30/2015    Lactic acidosis 11/30/2015    Hypertension 11/30/2015    Chest pain 10/21/2015    AICD (automatic cardioverter/defibrillator) present 10/21/2015    Congestive heart failure (CHF) ([facility] 10/21/2015    Cardiomyopathy ([facility] 10/21/2015    Abdominal fluid collection 08/18/2014    Pleural effusion 08/13/2014    CKD (chronic kidney disease) stage 3, GFR 30-59 ml/min 08/13/2014    Chronic pancreatitis ([facility] 08/13/2014    Morbid obesity ([facility] 07/02/2013    Nonischemic dilated cardiomyopathy ([facility] 07/02/2013    Dyslipidemia 07/02/2013    Diabetes mellitus type II, uncontrolled ([facility] 07/02/2013    Noncompliance with medication regimen 07/02/2013    Chronic back pain 02/20/2010    Obstructive sleep apnea 02/15/2010       Issues Addressed By Nephrology:  RED on CKD 3- renal function stable today. Non oliguric. Pt has very poor insight with regards to his overall prognosis. Explained again that he is an extremely poor dialysis candidate and this would not make his heart better. Plan:  Continue current supportive care. Hopefully we can find a place of stability of renal function and clinical improvement. Explained to pt we will have to accept a higher Cr in favor of optimized volume status.

## 2017-02-13 NOTE — PROGRESS NOTES
[facility] CARDIOLOGY PROGRESS NOTE           2/13/2017 6:15 PM    Admit Date: 1/19/2017    Admit Diagnosis: NSVT (nonsustained ventricular tachycardia) ([facility])      Subjective:   Complaining of cough and congestion, more SOB, no chest pain    Interval History: (History of pertinent interval events obtained from nursing staff)    ROS:  GEN:  No fever or chills  Cardiovascular:  As noted above  Pulmonary:  As noted above  Neuro:  No new focal motor or sensory loss      Objective:     Vitals:    02/13/17 1145 02/13/17 1153 02/13/17 1411 02/13/17 1722   BP: 128/66 131/67  133/64   Pulse: 89 89  78   Resp: 16   17   Temp: 97.8 °F (36.6 °C)   97.4 °F (36.3 °C)   SpO2: 96%  96% 99%   Weight:       Height:           Physical Exam:  General-morbidly obese, NAD  Neck- supple, no JVD  CV- regular rate and rhythm, distant S1, S2  Lung- distant BS  Abd- soft, nontender, nondistended  Ext- 2+ edema, legs wrapped  Skin- warm and dry    Current Facility-Administered Medications   Medication Dose Route Frequency    guaiFENesin SR (MUCINEX) tablet 1,200 mg  1,200 mg Oral BID    furosemide (LASIX) injection 40 mg  40 mg IntraVENous DAILY    famotidine (PEPCID) tablet 20 mg  20 mg Oral DAILY    levalbuterol (XOPENEX) nebulizer soln 0.63 mg/3 mL  0.63 mg Nebulization Q4H PRN    mineral oil-hydrophil petrolat (AQUAPHOR) ointment   Topical DAILY    milrinone (PRIMACOR) 20 MG/100 ML D5W infusion  0.5 mcg/kg/min IntraVENous CONTINUOUS    ondansetron (ZOFRAN) injection 4 mg  4 mg IntraVENous Q6H PRN    spironolactone (ALDACTONE) tablet 50 mg  50 mg Oral DAILY    amiodarone (CORDARONE) tablet 200 mg  200 mg Oral DAILY    polyethylene glycol (MIRALAX) packet 34 g  34 g Oral DAILY    hydrocortisone (CORTAID) 0.5 % cream   Topical BID    allopurinol (ZYLOPRIM) tablet 100 mg  100 mg Oral DAILY    apixaban (ELIQUIS) tablet 5 mg  5 mg Oral Q12H    bisacodyl (DULCOLAX) suppository 10 mg  10 mg Rectal DAILY    carvedilol (COREG) tablet 25 mg  25 mg Oral BID WITH MEALS    colchicine tablet 0.6 mg  0.6 mg Oral DAILY    docusate sodium (COLACE) capsule 100 mg  100 mg Oral BID    gabapentin (NEURONTIN) capsule 600 mg  600 mg Oral TID    hydrALAZINE (APRESOLINE) tablet 25 mg  25 mg Oral TID    insulin glargine (LANTUS) injection 50 Units  50 Units SubCUTAneous QHS    insulin lispro (HUMALOG) injection 15 Units  15 Units SubCUTAneous TID WITH MEALS    isosorbide dinitrate (ISORDIL) tablet 20 mg  20 mg Oral TID    nitroglycerin (NITROSTAT) tablet 0.4 mg  0.4 mg SubLINGual Q5MIN PRN    oxyCODONE IR (OXY-IR) immediate release tablet 15 mg  15 mg Oral Q4H PRN    polyethylene glycol (MIRALAX) packet 17 g  17 g Oral DAILY PRN    pravastatin (PRAVACHOL) tablet 80 mg  80 mg Oral QHS    sodium chloride (NS) flush 5-10 mL  5-10 mL IntraVENous Q8H    sodium chloride (NS) flush 5-10 mL  5-10 mL IntraVENous PRN    morphine injection 2 mg  2 mg IntraVENous Q4H PRN     Data Review:   Recent Results (from the past 24 hour(s))   GLUCOSE, POC    Collection Time: 02/12/17  8:56 PM   Result Value Ref Range    Glucose (POC) 127 (H) 65 - 100 mg/dL   MAGNESIUM    Collection Time: 02/13/17  4:35 AM   Result Value Ref Range    Magnesium 2.4 1.8 - 2.4 mg/dL   METABOLIC PANEL, COMPREHENSIVE    Collection Time: 02/13/17  4:35 AM   Result Value Ref Range    Sodium 136 136 - 145 mmol/L    Potassium 4.0 3.5 - 5.1 mmol/L    Chloride 92 (L) 98 - 107 mmol/L    CO2 33 (H) 21 - 32 mmol/L    Anion gap 11 7 - 16 mmol/L    Glucose 127 (H) 65 - 100 mg/dL    BUN 60 (H) 6 - 23 MG/DL    Creatinine 2.86 (H) 0.8 - 1.5 MG/DL    GFR est AA 30 (L) >60 ml/min/1.73m2    GFR est non-AA 25 (L) >60 ml/min/1.73m2    Calcium 8.7 8.3 - 10.4 MG/DL    Bilirubin, total 1.8 (H) 0.2 - 1.1 MG/DL    ALT (SGPT) 26 12 - 65 U/L    AST (SGOT) 26 15 - 37 U/L    Alk.  phosphatase 205 (H) 50 - 136 U/L    Protein, total 7.4 6.3 - 8.2 g/dL    Albumin 2.9 (L) 3.5 - 5.0 g/dL    Globulin 4.5 (H) 2.3 - 3.5 g/dL    A-G Ratio 0.6 (L) 1.2 - 3.5     BNP    Collection Time: 02/13/17  4:35 AM   Result Value Ref Range     pg/mL   GLUCOSE, POC    Collection Time: 02/13/17  6:15 AM   Result Value Ref Range    Glucose (POC) 129 (H) 65 - 100 mg/dL   GLUCOSE, POC    Collection Time: 02/13/17 11:27 AM   Result Value Ref Range    Glucose (POC) 166 (H) 65 - 100 mg/dL   GLUCOSE, POC    Collection Time: 02/13/17  4:18 PM   Result Value Ref Range    Glucose (POC) 136 (H) 65 - 100 mg/dL       EKG:  (EKG has been independently visualized by me with interpretation below)  Assessment:     Principal Problem:    Nonsustained ventricular tachycardia ([address] (1/19/2017)    Active Problems:    Obstructive sleep apnea (2/15/2010)      Nonischemic dilated cardiomyopathy ([address] (7/2/2013)      Overview: LVEF 10-15% on ECHO from 12/17/10      CKD (chronic kidney disease) stage 3, GFR 30-59 ml/min (8/13/2014)      AICD (automatic cardioverter/defibrillator) present (10/21/2015)      Acute on chronic systolic (congestive) heart failure ([address] (10/18/2016)      Atrial flutter ([address] (10/20/2016)      Obesity hypoventilation syndrome ([address] (10/24/2016)      Morbid obesity with BMI of 60.0-69.9, adult ([address] (11/28/2016)      Syncope (1/19/2017)      NSVT (nonsustained ventricular tachycardia) ([address] (1/19/2017)      Plan:   1. NICM: continued on milrinone, IV diuresis, continues to struggle with scrotal swelling and anasarca, strict  I and Os, daily weights, appreciate nephrology consult  2. CKD: nephrology following, continue monitoring UOP, basic labs  3. AICD: functioning normally  4. NSVT: on amiodarone  5. Atrial flutter: resolved, cont eliquis  6. Morbid obesity: worsening overall medical conditions  7. Palliative care consult  8. Cough congestion: CXR today    Babatunde Roque MD  Cardiology/Electrophysiology

## 2017-02-13 NOTE — PROGRESS NOTES
Bedside shift change report received from Lalit Argueta, [address] [place] [address]. Report included the following information SBAR, Kardex, MAR and Recent Results.

## 2017-02-13 NOTE — PROGRESS NOTES
Pt states he is unable to breath. O2 sat 97%. Spoke with Willie Goss, respiratory therapist, to assess pt while paging MD. Spoke with Dr. Héctor Mccallum regarding pt request for \"xopenex\" breathing treatment.  New orders for breathing treatment per MD.

## 2017-02-14 LAB
ANION GAP BLD CALC-SCNC: 7 MMOL/L (ref 7–16)
BUN SERPL-MCNC: 64 MG/DL (ref 6–23)
CALCIUM SERPL-MCNC: 8.6 MG/DL (ref 8.3–10.4)
CHLORIDE SERPL-SCNC: 93 MMOL/L (ref 98–107)
CO2 SERPL-SCNC: 35 MMOL/L (ref 21–32)
CREAT SERPL-MCNC: 2.84 MG/DL (ref 0.8–1.5)
GLUCOSE BLD STRIP.AUTO-MCNC: 106 MG/DL (ref 65–100)
GLUCOSE BLD STRIP.AUTO-MCNC: 118 MG/DL (ref 65–100)
GLUCOSE BLD STRIP.AUTO-MCNC: 141 MG/DL (ref 65–100)
GLUCOSE BLD STRIP.AUTO-MCNC: 160 MG/DL (ref 65–100)
GLUCOSE SERPL-MCNC: 119 MG/DL (ref 65–100)
MAGNESIUM SERPL-MCNC: 2.5 MG/DL (ref 1.8–2.4)
POTASSIUM SERPL-SCNC: 3.9 MMOL/L (ref 3.5–5.1)
SODIUM SERPL-SCNC: 135 MMOL/L (ref 136–145)

## 2017-02-14 PROCEDURE — 74011250636 HC RX REV CODE- 250/636: Performed by: INTERNAL MEDICINE

## 2017-02-14 PROCEDURE — 77010033678 HC OXYGEN DAILY

## 2017-02-14 PROCEDURE — 74011250637 HC RX REV CODE- 250/637: Performed by: NURSE PRACTITIONER

## 2017-02-14 PROCEDURE — 36591 DRAW BLOOD OFF VENOUS DEVICE: CPT

## 2017-02-14 PROCEDURE — 74011250637 HC RX REV CODE- 250/637: Performed by: INTERNAL MEDICINE

## 2017-02-14 PROCEDURE — 82962 GLUCOSE BLOOD TEST: CPT

## 2017-02-14 PROCEDURE — 80048 BASIC METABOLIC PNL TOTAL CA: CPT | Performed by: INTERNAL MEDICINE

## 2017-02-14 PROCEDURE — 74011636637 HC RX REV CODE- 636/637: Performed by: NURSE PRACTITIONER

## 2017-02-14 PROCEDURE — 74011000258 HC RX REV CODE- 258: Performed by: INTERNAL MEDICINE

## 2017-02-14 PROCEDURE — 94640 AIRWAY INHALATION TREATMENT: CPT

## 2017-02-14 PROCEDURE — 3331090001 HH PPS REVENUE CREDIT

## 2017-02-14 PROCEDURE — 94760 N-INVAS EAR/PLS OXIMETRY 1: CPT

## 2017-02-14 PROCEDURE — 74011000250 HC RX REV CODE- 250: Performed by: INTERNAL MEDICINE

## 2017-02-14 PROCEDURE — 65660000000 HC RM CCU STEPDOWN

## 2017-02-14 PROCEDURE — 83735 ASSAY OF MAGNESIUM: CPT | Performed by: INTERNAL MEDICINE

## 2017-02-14 PROCEDURE — 3331090002 HH PPS REVENUE DEBIT

## 2017-02-14 RX ORDER — MILRINONE LACTATE 0.2 MG/ML
0.5 INJECTION, SOLUTION INTRAVENOUS CONTINUOUS
Status: DISCONTINUED | OUTPATIENT
Start: 2017-02-14 | End: 2017-02-19 | Stop reason: SDUPTHER

## 2017-02-14 RX ADMIN — CARVEDILOL 25 MG: 25 TABLET, FILM COATED ORAL at 08:27

## 2017-02-14 RX ADMIN — APIXABAN 5 MG: 5 TABLET, FILM COATED ORAL at 21:36

## 2017-02-14 RX ADMIN — Medication 10 ML: at 05:12

## 2017-02-14 RX ADMIN — CARVEDILOL 25 MG: 25 TABLET, FILM COATED ORAL at 17:24

## 2017-02-14 RX ADMIN — HYDRALAZINE HYDROCHLORIDE 25 MG: 25 TABLET, FILM COATED ORAL at 21:37

## 2017-02-14 RX ADMIN — HYDRALAZINE HYDROCHLORIDE 25 MG: 25 TABLET, FILM COATED ORAL at 05:14

## 2017-02-14 RX ADMIN — FAMOTIDINE 20 MG: 20 TABLET ORAL at 08:27

## 2017-02-14 RX ADMIN — LEVALBUTEROL HYDROCHLORIDE 0.63 MG: 0.63 SOLUTION RESPIRATORY (INHALATION) at 14:51

## 2017-02-14 RX ADMIN — GABAPENTIN 600 MG: 300 CAPSULE ORAL at 14:02

## 2017-02-14 RX ADMIN — DOCUSATE SODIUM 100 MG: 100 CAPSULE, LIQUID FILLED ORAL at 17:25

## 2017-02-14 RX ADMIN — INSULIN LISPRO 15 UNITS: 100 INJECTION, SOLUTION INTRAVENOUS; SUBCUTANEOUS at 12:39

## 2017-02-14 RX ADMIN — ALLOPURINOL 100 MG: 100 TABLET ORAL at 08:28

## 2017-02-14 RX ADMIN — MILRINONE LACTATE 0.5 MCG/KG/MIN: 200 INJECTION, SOLUTION INTRAVENOUS at 00:29

## 2017-02-14 RX ADMIN — APIXABAN 5 MG: 5 TABLET, FILM COATED ORAL at 08:27

## 2017-02-14 RX ADMIN — SPIRONOLACTONE 50 MG: 25 TABLET, FILM COATED ORAL at 08:31

## 2017-02-14 RX ADMIN — POLYETHYLENE GLYCOL 3350 34 G: 17 POWDER, FOR SOLUTION ORAL at 08:27

## 2017-02-14 RX ADMIN — AMIODARONE HYDROCHLORIDE 200 MG: 200 TABLET ORAL at 08:27

## 2017-02-14 RX ADMIN — FUROSEMIDE 40 MG: 10 INJECTION, SOLUTION INTRAMUSCULAR; INTRAVENOUS at 08:28

## 2017-02-14 RX ADMIN — GUAIFENESIN 1200 MG: 600 TABLET, EXTENDED RELEASE ORAL at 17:24

## 2017-02-14 RX ADMIN — ISOSORBIDE DINITRATE 20 MG: 20 TABLET ORAL at 14:03

## 2017-02-14 RX ADMIN — Medication 10 ML: at 14:00

## 2017-02-14 RX ADMIN — ISOSORBIDE DINITRATE 20 MG: 20 TABLET ORAL at 05:14

## 2017-02-14 RX ADMIN — GABAPENTIN 600 MG: 300 CAPSULE ORAL at 05:14

## 2017-02-14 RX ADMIN — PRAVASTATIN SODIUM 80 MG: 80 TABLET ORAL at 21:36

## 2017-02-14 RX ADMIN — GABAPENTIN 600 MG: 300 CAPSULE ORAL at 21:36

## 2017-02-14 RX ADMIN — MILRINONE LACTATE 0.5 MCG/KG/MIN: 200 INJECTION, SOLUTION INTRAVENOUS at 19:06

## 2017-02-14 RX ADMIN — COLCHICINE 0.6 MG: 0.6 TABLET, FILM COATED ORAL at 08:27

## 2017-02-14 RX ADMIN — MILRINONE LACTATE 0.5 MCG/KG/MIN: 200 INJECTION, SOLUTION INTRAVENOUS at 09:48

## 2017-02-14 RX ADMIN — DOCUSATE SODIUM 100 MG: 100 CAPSULE, LIQUID FILLED ORAL at 08:27

## 2017-02-14 RX ADMIN — PETROLATUM: 42 OINTMENT TOPICAL at 09:00

## 2017-02-14 RX ADMIN — INSULIN GLARGINE 3 UNITS: 100 INJECTION, SOLUTION SUBCUTANEOUS at 22:53

## 2017-02-14 RX ADMIN — Medication 10 ML: at 22:56

## 2017-02-14 RX ADMIN — DEXTROSE MONOHYDRATE 0.5 MCG/KG/MIN: 5 INJECTION, SOLUTION INTRAVENOUS at 04:52

## 2017-02-14 RX ADMIN — HYDRALAZINE HYDROCHLORIDE 25 MG: 25 TABLET, FILM COATED ORAL at 14:03

## 2017-02-14 RX ADMIN — GUAIFENESIN 1200 MG: 600 TABLET, EXTENDED RELEASE ORAL at 08:28

## 2017-02-14 RX ADMIN — MILRINONE LACTATE 0.5 MCG/KG/MIN: 200 INJECTION, SOLUTION INTRAVENOUS at 14:18

## 2017-02-14 RX ADMIN — ISOSORBIDE DINITRATE 20 MG: 20 TABLET ORAL at 21:37

## 2017-02-14 RX ADMIN — MILRINONE LACTATE 0.5 MCG/KG/MIN: 200 INJECTION, SOLUTION INTRAVENOUS at 23:49

## 2017-02-14 NOTE — PROGRESS NOTES
Referral made to [facility] for LTAC consideration. ADDENDUM:  Pt gave this writer permission to speak with his sister, Stacey Leyva ([phone] or [phone]). She and pt's mother live in [place]. Ms. Van Epley was questioning whether pt could be transferred to a hospital in [place] if she could identify a cardiologist there who would be willing to discuss this option. BLIANE informed that if she could identify and provide contact info for a cardiologist and hospital in [place], I would request that one of our cardiologists give the MD a call. BLAINE further stated that if this became a viable option, the accepting hospital would contact pt's insurance company to determine if insurance would approve a hospital [facility]. Additionally, BLAINE informed that insurance would not cover the cost of transport so the family would be responsible for the cost of same.

## 2017-02-14 NOTE — PROGRESS NOTES
Problem: Falls - Risk of  Goal: *Absence of falls  Outcome: Progressing Towards Goal  Patient progressing towards goal with no falls on current admission. Patient without confusion or sensory perception deficits. Patient has unsteady gait on ambulation. Personal belongings are within reach. Bed is in the low and locked position with side rails up x3. Red gripper socks to bilateral feet. Call light within reach and patient verbalizes understanding of use.

## 2017-02-14 NOTE — PROGRESS NOTES
Bedside and Verbal shift change report given to Russell Roman RN (oncoming nurse) by self Genetta Ebbing nurse). Report included the following information SBAR, Kardex, Intake/Output, MAR and Recent Results. Hourly vital signs while on Primacor gtt were stable; printed and placed on paper chart.

## 2017-02-14 NOTE — PROGRESS NOTES
OT note:  Pt politely declined treatment. Pt stated there was just too much going on right now with family members. Will re-attempt at a later date/time.   Kati Newton

## 2017-02-14 NOTE — PROGRESS NOTES
Bedside and Verbal shift change report given to self (oncoming nurse) by Lalit Mayen RN (offgoing nurse). Report included the following information SBAR, Kardex, ED Summary, Procedure Summary, MAR, Recent Results and Cardiac Rhythm SR with 1st degree HB.

## 2017-02-14 NOTE — PROGRESS NOTES
Patient to self place on home VPAP machine, patient statd that he was not ready to wear it yet. Family at bedside and stated they will place him on it after meds are given.

## 2017-02-14 NOTE — PROGRESS NOTES
Bedside shift change report given to [facility], RN. Report included the following information SBAR, Kardex, MAR and Recent Results. Primacor gtt rate verified at bedside.

## 2017-02-14 NOTE — CONSULTS
Palliative Care    Patient: Charles Chao MRN: [id number]  SSN: [id number]    YOB: 1965  Age: 45 y.o. Sex: male       Date of Request: 2/13/2017  Date of Consult:  2/14/2017  Reason for Consult:  goals of care  Requesting Physician: Dr Juam Cardenas      Assessment/Plan:     Principal Diagnosis:    Debility, Unspecified  R53.81    Additional Diagnoses:   · Dyspnea  R06.00  · Edema  R60.9  · Fatigue, Lethargy  R53.83  · Pain, general  R52  · Counseling, Encounter for Medical Advice  Z71.9  · Encounter for Palliative Care  Z51.5    Palliative Performance Scale (PPS):  PPS: 40    Medical Decision Making:   Reviewed and summarized notes from admission to present, as well as previous PC notes  Discussed case with appropriate providersBLAINE Russ  Reviewed laboratory and x-ray data from admission to present     Pt resting in bed, no distress noted. Daughter at bedside. Introduced role of PC and reviewed events of hospitalization. Pt was able to articulate what the doctors are telling him, however, does not seem to understand the seriousness of his condition. I had a very alize conversation with him, his daughter, and his mother via phone, and counseled them that pt is going to die at some point from this heart failure. They were all surprised to hear this. We spent some time discussing pt's goals. His immediate goal is to continue current efforts, and pray for miracle. He states he is not ready to die. A more intermediate goal is to make it to [place], where his family lives. He states he wants to be able to spend time with family if his time is short. His long term goal is a heart and kidney transplant- advised him that this is likely not possible. His mother mentioned Hospice support, but they do not want Primacor stopped, and Hospice would not continue this medication. We discussed his wishes regarding life support and resuscitation. Pt requests \"everything\" be done to extend his life.   He is unsure if he wants long term life support. Counseled that pt would still die at some point, despite \"everything\" being done. He voiced understanding. He would like to speak with his other children about the situation. Provided support to pt and daughter. Pt expressed is appreciation for the honest conversation. Discussed with Cooldodie Chapin may be an option if family wants to continue current efforts. Will continue to follow. Will discuss findings with members of the interdisciplinary team.      Thank you for this referral.   The Palliative Care team is available from 8 am to 4:30 pm Monday-Friday. Medical management during other hours is per the primary attending service. .    Subjective:     History obtained from:  Patient, Family, Care Provider and Chart    Chief Complaint: CHF  History of Present Illness:  Mr Klarissa Cheng is a 46 yo AA male with PMH of CAD, CHF, CKD, GERD, HTN, COPD, DM, and other conditions listed below, who presented to the ER from home on 1/19/2017 with c/o pre-syncopal episode while driving his car. He was able to pull over and did not lose consciousness. He also had c/o increasing LE edema, weight gain, and orthopnea for 1 month. In the ER, pt noted to have nonsustained ventricular tachycardia, and was admitted for further management. He has had a long hospital stay, complicated by worsening renal function and persistent volume overload. He has been seen by nephrology, and is not a dialysis candidate. He is currently on Primacor, and does not tolerate weaning. He reports ongoing dyspnea, edema, and scrotal pain from swelling. Advance Directive: No       Code Status:  Full Code            Health Care Power of : No - Patient does not have a [address]. Past Medical History   Diagnosis Date    Acute systolic heart failure ([facility] May, 2009     Also had transient acute renal failure secondary to poor perfusion.     AICD (automatic cardioverter/defibrillator) present 10/21/2015    Atypical chest pain 4/23/2010    Bronchitis     CAD (coronary artery disease)     Cardiomyopathy     Chest pain 10/21/2015    Chronic kidney disease      renal insufficiency    Chronic pain      back    Congestive heart failure (CHF) ([facility] 10/21/2015    COPD     Diabetes ([facility]      type 2 insulin reliant- BS average- 160's-180's    Diabetes mellitus type II, uncontrolled ([facility] 7/2/2013    GERD (gastroesophageal reflux disease)     Gout     Heart failure ([facility]      cardiomyopathy with ef 10-20%    Hypertension     Hyponatremia 12/20/2010    Ill-defined condition      gout, neuropathy, sciatica    Morbid obesity ([facility]     Nausea & vomiting 11/30/2015    Neuropathy     Obstructive sleep apnea 2/15/2010    Other unknown and unspecified cause of morbidity or mortality      Gout    Severe sepsis ([facility]     Unspecified sleep apnea      cpap      Past Surgical History   Procedure Laterality Date    Hx pacemaker       may 2010    Hx heart catheterization  Sandy 10, 2008     Severe multivessel disease with severe LV dysfunction    Pr chest surgery procedure unlisted       thorocentesis     Family History   Problem Relation Age of Onset    Heart Disease Father     Stroke Father     Diabetes Sister     Stroke Sister     Diabetes Sister     Heart Disease Other       Social History   Substance Use Topics    Smoking status: Former Smoker     Packs/day: 0.25     Years: 1.00     Quit date: 7/12/1984    Smokeless tobacco: Never Used      Comment: pt states that he only tried smoking as a kid     Alcohol use No     Prior to Admission medications    Medication Sig Start Date End Date Taking? Authorizing Provider   pioglitazone (ACTOS) 30 mg tablet Take  by mouth daily. Historical Provider   gabapentin (NEURONTIN) 600 mg tablet Take 600 mg by mouth three (3) times daily.     Historical Provider   bisacodyl (DULCOLAX) 10 mg suppository Insert 10 mg into rectum daily. 1/13/17   Mauri Marinelli NP   torsemide (DEMADEX) 100 mg tablet Take 1 Tab by mouth daily. 1/13/17   Mauri Marinelli NP   apixaban (ELIQUIS) 5 mg tablet Take 1 Tab by mouth every twelve (12) hours. 12/26/16   Ankita Murillo MD   oxyCODONE IR (OXY-IR) 15 mg immediate release tablet Take 15 mg by mouth every four (4) hours as needed for Pain. Historical Provider   carvedilol (COREG) 25 mg tablet Take 1 Tab by mouth two (2) times daily (with meals). 10/26/16   Lidia Mcfadden PA-C   hydrALAZINE (APRESOLINE) 25 mg tablet Take 1 Tab by mouth three (3) times daily. 10/26/16   Lidia Mcfadden PA-C   insulin glargine (LANTUS) 100 unit/mL injection 50 Units by SubCUTAneous route nightly. 10/26/16   Lidia Mcfadden PA-C   insulin lispro (HUMALOG) 100 unit/mL injection 15 Units by SubCUTAneous route three (3) times daily (with meals). 10/26/16   Lidia Mcfadden PA-C   isosorbide dinitrate (ISORDIL) 20 mg tablet Take 1 Tab by mouth three (3) times daily. 10/26/16   Lidia Mcfadden PA-C   ranitidine (ZANTAC) 150 mg tablet Take 150 mg by mouth nightly. Historical Provider   spironolactone (ALDACTONE) 25 mg tablet Take 1 Tab by mouth daily. 7/14/16   Ty Hayes MD   pravastatin (PRAVACHOL) 80 mg tablet Take 1 Tab by mouth nightly. 7/14/16   Ty Hayes MD   potassium chloride (K-DUR, KLOR-CON) 10 mEq tablet Take 1 Tab by mouth two (2) times a day. 5/18/16   Alexandrea Ramos MD   polyethylene glycol (MIRALAX) 17 gram packet Take 1 Packet by mouth daily as needed (constipation). 12/4/15   Jose Story MD   cpap machine kit 10 cm qhs    Historical Provider   OXYGEN-AIR DELIVERY SYSTEMS 2 lpm qhs    Historical Provider   allopurinol (ZYLOPRIM) 100 mg tablet Take 100 mg by mouth daily.     Rhiannon Mosqueda MD   glucose blood VI test strips (ASCENSIA AUTODISC VI, ONE TOUCH ULTRA TEST VI) strip Test strips for 30 day supply 8/22/14   Denzel Card MD   nitroglycerin (NITROSTAT) 0.4 mg SL tablet 1 Tab by SubLINGual route every five (5) minutes as needed for Chest Pain. 4/24/10   FATEMEH Muller   colchicine 0.6 mg tablet Take 0.6 mg by mouth every morning. 3/22/10   Rhiannon Mosqueda MD       Allergies   Allergen Reactions    Dilaudid [Hydromorphone] Other (comments)     Hotflashes, patient states he's not allergic    Iodinated Contrast Media - Oral And Iv Dye Other (comments)     \"shuts my kidneys down\"    Penicillins Unknown (comments)     Pt states he is not allergic his mother just wouldn't allow him take it        Review of Systems:  A comprehensive review of systems was negative except for:   Constitutional: Positive for fatigue  Respiratory: Positive for dyspnea   Cardiovascular: Positive for edema     Objective:     Visit Vitals    /69    Pulse 93    Temp 97.8 °F (36.6 °C)    Resp 18    Ht 5' 6\" (1.676 m)    Wt (!) 188.7 kg (415 lb 14.4 oz)    SpO2 90%    BMI 67.13 kg/m2        Physical Exam:    General:  Cooperative. Debilitated. No acute distress. Eyes:  Conjunctivae/corneas clear    Nose: Nares normal. Septum midline. O2 via NC    Neck: Supple, symmetrical, trachea midline   Lungs:   Decreased bilaterally, unlabored   Heart:  Regular rate and rhythm   Abdomen:   Soft, obese, non-tender, non-distended   Extremities: Normal, atraumatic, no cyanosis.  BLE edema   Skin: Skin color, texture, turgor normal.    Neurologic: Nonfocal   Psych: Alert and oriented      Assessment:     Hospital Problems  Date Reviewed: 1/6/2017          Codes Class Noted POA    Syncope ICD-10-CM: R55  ICD-9-CM: 780.2  1/19/2017 Unknown        * (Principal)Nonsustained ventricular tachycardia ([facility] ICD-10-CM: I47.2  ICD-9-CM: 427.1  1/19/2017 Unknown        NSVT (nonsustained ventricular tachycardia) (HCC) ICD-10-CM: I47.2  ICD-9-CM: 427.1  1/19/2017 Unknown        Morbid obesity with BMI of 60.0-69.9, adult ([facility] ICD-10-CM: E66.01, Z68.44  ICD-9-CM: 278.01, V85.44  11/28/2016 Yes        Obesity hypoventilation syndrome ([facility] ICD-10-CM: I02.3  ICD-9-CM: 278.03  10/24/2016 Yes        Atrial flutter ([facility] ICD-10-CM: I48.92  ICD-9-CM: 427.32  10/20/2016 Yes        Acute on chronic systolic (congestive) heart failure (HCC) ICD-10-CM: I50.23  ICD-9-CM: 428.23, 428.0  10/18/2016 Yes        AICD (automatic cardioverter/defibrillator) present ICD-10-CM: Z95.810  ICD-9-CM: V45.02  10/21/2015 Yes        CKD (chronic kidney disease) stage 3, GFR 30-59 ml/min (Chronic) ICD-10-CM: N18.3  ICD-9-CM: 585.3  8/13/2014 Yes        Nonischemic dilated cardiomyopathy ([facility] ICD-10-CM: I42.9  ICD-9-CM: 425.4  7/2/2013 Yes    Overview Signed 7/2/2013 11:47 PM by Sarah Srivastava     LVEF 10-15% on ECHO from 12/17/10             Obstructive sleep apnea (Chronic) ICD-10-CM: B66.52  ICD-9-CM: 327.23  2/15/2010 Yes              Signed By: Lenore Dixon NP     February 14, 2017

## 2017-02-14 NOTE — PROGRESS NOTES
[facility] CARDIOLOGY PROGRESS NOTE           2/14/2017 3:25 PM    Admit Date: 1/19/2017    Admit Diagnosis: NSVT (nonsustained ventricular tachycardia) (HCC)      Subjective:   Patient continues to show no signs of improvement. He refused PT/OT care. Objective:     Vitals:    02/14/17 0739 02/14/17 1202 02/14/17 1402 02/14/17 1454   BP: 116/69 122/55 107/72    Pulse: 93 90 87    Resp: 18 18     Temp: 97.8 °F (36.6 °C) 98 °F (36.7 °C)     SpO2: 90% 97%  100%   Weight:       Height:           Physical Exam:  General-Well Developed, Well Nourished, No Acute Distress, Alert & Oriented x 3, appropriate mood. Neck- supple, no JVD  CV- regular rate and rhythm no MRG  Lung- clear bilaterally  Abd- soft, nontender, nondistended  Ext- +2-3 edema bilaterally.   Skin- warm and dry    Current Facility-Administered Medications   Medication Dose Route Frequency    milrinone (PRIMACOR) 20 MG/100 ML D5W infusion  0.5 mcg/kg/min IntraVENous CONTINUOUS    guaiFENesin SR (MUCINEX) tablet 1,200 mg  1,200 mg Oral BID    furosemide (LASIX) injection 40 mg  40 mg IntraVENous DAILY    famotidine (PEPCID) tablet 20 mg  20 mg Oral DAILY    levalbuterol (XOPENEX) nebulizer soln 0.63 mg/3 mL  0.63 mg Nebulization Q4H PRN    mineral oil-hydrophil petrolat (AQUAPHOR) ointment   Topical DAILY    ondansetron (ZOFRAN) injection 4 mg  4 mg IntraVENous Q6H PRN    spironolactone (ALDACTONE) tablet 50 mg  50 mg Oral DAILY    amiodarone (CORDARONE) tablet 200 mg  200 mg Oral DAILY    polyethylene glycol (MIRALAX) packet 34 g  34 g Oral DAILY    hydrocortisone (CORTAID) 0.5 % cream   Topical BID    allopurinol (ZYLOPRIM) tablet 100 mg  100 mg Oral DAILY    apixaban (ELIQUIS) tablet 5 mg  5 mg Oral Q12H    bisacodyl (DULCOLAX) suppository 10 mg  10 mg Rectal DAILY    carvedilol (COREG) tablet 25 mg  25 mg Oral BID WITH MEALS    colchicine tablet 0.6 mg  0.6 mg Oral DAILY    docusate sodium (COLACE) capsule 100 mg  100 mg Oral BID    gabapentin (NEURONTIN) capsule 600 mg  600 mg Oral TID    hydrALAZINE (APRESOLINE) tablet 25 mg  25 mg Oral TID    insulin glargine (LANTUS) injection 50 Units  50 Units SubCUTAneous QHS    insulin lispro (HUMALOG) injection 15 Units  15 Units SubCUTAneous TID WITH MEALS    isosorbide dinitrate (ISORDIL) tablet 20 mg  20 mg Oral TID    nitroglycerin (NITROSTAT) tablet 0.4 mg  0.4 mg SubLINGual Q5MIN PRN    oxyCODONE IR (OXY-IR) immediate release tablet 15 mg  15 mg Oral Q4H PRN    polyethylene glycol (MIRALAX) packet 17 g  17 g Oral DAILY PRN    pravastatin (PRAVACHOL) tablet 80 mg  80 mg Oral QHS    sodium chloride (NS) flush 5-10 mL  5-10 mL IntraVENous Q8H    sodium chloride (NS) flush 5-10 mL  5-10 mL IntraVENous PRN    morphine injection 2 mg  2 mg IntraVENous Q4H PRN     Data Review:   Recent Results (from the past 24 hour(s))   GLUCOSE, POC    Collection Time: 02/13/17  4:18 PM   Result Value Ref Range    Glucose (POC) 136 (H) 65 - 100 mg/dL   GLUCOSE, POC    Collection Time: 02/13/17  8:34 PM   Result Value Ref Range    Glucose (POC) 127 (H) 65 - 100 mg/dL   MAGNESIUM    Collection Time: 02/14/17  4:58 AM   Result Value Ref Range    Magnesium 2.5 (H) 1.8 - 2.4 mg/dL   METABOLIC PANEL, BASIC    Collection Time: 02/14/17  4:58 AM   Result Value Ref Range    Sodium 135 (L) 136 - 145 mmol/L    Potassium 3.9 3.5 - 5.1 mmol/L    Chloride 93 (L) 98 - 107 mmol/L    CO2 35 (H) 21 - 32 mmol/L    Anion gap 7 7 - 16 mmol/L    Glucose 119 (H) 65 - 100 mg/dL    BUN 64 (H) 6 - 23 MG/DL    Creatinine 2.84 (H) 0.8 - 1.5 MG/DL    GFR est AA 30 (L) >60 ml/min/1.73m2    GFR est non-AA 25 (L) >60 ml/min/1.73m2    Calcium 8.6 8.3 - 10.4 MG/DL   GLUCOSE, POC    Collection Time: 02/14/17  6:22 AM   Result Value Ref Range    Glucose (POC) 141 (H) 65 - 100 mg/dL   GLUCOSE, POC    Collection Time: 02/14/17 11:47 AM   Result Value Ref Range    Glucose (POC) 160 (H) 65 - 100 mg/dL     Assessment:     Principal Problem:    Nonsustained ventricular tachycardia (HCC) (1/19/2017)    Active Problems:    Obstructive sleep apnea (2/15/2010)      Nonischemic dilated cardiomyopathy ([facility] (7/2/2013)      Overview: LVEF 10-15% on ECHO from 12/17/10      CKD (chronic kidney disease) stage 3, GFR 30-59 ml/min (8/13/2014)      AICD (automatic cardioverter/defibrillator) present (10/21/2015)      Acute on chronic systolic (congestive) heart failure ([address] (10/18/2016)      Atrial flutter ([address] (10/20/2016)      Obesity hypoventilation syndrome ([address] (10/24/2016)      Morbid obesity with BMI of 60.0-69.9, adult ([address] (11/28/2016)      Syncope (1/19/2017)      NSVT (nonsustained ventricular tachycardia) ([address] (1/19/2017)      Plan:   1. Nonsustained ventricular tachycardia, controlled: continue amiodarone, decreased to 200 mg daily  2. CKD (chronic kidney disease) - continued on milrinone and diuresis. Currently limited on diuresis secondary to renal function  3. Acute on chronic systolic (congestive) heart failure -volume overload, most likely component of cor pulmonale as well with morbid obesity --KELSIE--will continue to wear BiPAP, legs wrapped, continued on IV lasix and milrinone,   4. Atrial flutter --stable, on eliquis  5. Edema, uncontrolled - legs wrapped, iv lasix  6. AICD: functioning normall  7. Obesity hypoventilation, KELSIE: BiPAP   8. Scrotal edema: iv lasix  9. Prognosis - Will recheck ECHO to see if there is any improvement in EF. Patient and Family request a meeting with Dr. Alin Rodriguez and want him transferred for a cardiac transplant. Tye Hassan. Paula Jorge M.D., F.A.C.C, F.H.R.S.   Cardiology/Electrophysiology

## 2017-02-14 NOTE — PROGRESS NOTES
Bedside shift change report received from [facility]. Report included the following information SBAR, Kardex, MAR and Recent Results. Primacor gtt rate verified at bedside.

## 2017-02-14 NOTE — PROGRESS NOTES
PT Note:  Attempted PT, however, patient sitting on BSC. Will attempt another time/day as schedule permits.   A Alejandra Duffy, PT

## 2017-02-15 ENCOUNTER — APPOINTMENT (OUTPATIENT)
Dept: GENERAL RADIOLOGY | Age: 52
DRG: 308 | End: 2017-02-15
Attending: NURSE PRACTITIONER
Payer: MEDICARE

## 2017-02-15 LAB
GLUCOSE BLD STRIP.AUTO-MCNC: 115 MG/DL (ref 65–100)
GLUCOSE BLD STRIP.AUTO-MCNC: 136 MG/DL (ref 65–100)
GLUCOSE BLD STRIP.AUTO-MCNC: 164 MG/DL (ref 65–100)
GLUCOSE BLD STRIP.AUTO-MCNC: 168 MG/DL (ref 65–100)
MAGNESIUM SERPL-MCNC: 2.5 MG/DL (ref 1.8–2.4)

## 2017-02-15 PROCEDURE — 3331090002 HH PPS REVENUE DEBIT

## 2017-02-15 PROCEDURE — 65660000000 HC RM CCU STEPDOWN

## 2017-02-15 PROCEDURE — 94640 AIRWAY INHALATION TREATMENT: CPT

## 2017-02-15 PROCEDURE — 97164 PT RE-EVAL EST PLAN CARE: CPT

## 2017-02-15 PROCEDURE — 74011250636 HC RX REV CODE- 250/636: Performed by: INTERNAL MEDICINE

## 2017-02-15 PROCEDURE — 74011250637 HC RX REV CODE- 250/637: Performed by: INTERNAL MEDICINE

## 2017-02-15 PROCEDURE — 36591 DRAW BLOOD OFF VENOUS DEVICE: CPT

## 2017-02-15 PROCEDURE — 83735 ASSAY OF MAGNESIUM: CPT | Performed by: INTERNAL MEDICINE

## 2017-02-15 PROCEDURE — 74011000250 HC RX REV CODE- 250: Performed by: INTERNAL MEDICINE

## 2017-02-15 PROCEDURE — 74011250637 HC RX REV CODE- 250/637: Performed by: NURSE PRACTITIONER

## 2017-02-15 PROCEDURE — C8929 TTE W OR WO FOL WCON,DOPPLER: HCPCS

## 2017-02-15 PROCEDURE — 3331090001 HH PPS REVENUE CREDIT

## 2017-02-15 PROCEDURE — 94760 N-INVAS EAR/PLS OXIMETRY 1: CPT

## 2017-02-15 PROCEDURE — 97530 THERAPEUTIC ACTIVITIES: CPT

## 2017-02-15 PROCEDURE — 74011636637 HC RX REV CODE- 636/637: Performed by: NURSE PRACTITIONER

## 2017-02-15 PROCEDURE — 82962 GLUCOSE BLOOD TEST: CPT

## 2017-02-15 PROCEDURE — 74000 XR ABD (KUB): CPT

## 2017-02-15 RX ORDER — POLYETHYLENE GLYCOL 3350 17 G/17G
17 POWDER, FOR SOLUTION ORAL DAILY
Status: DISCONTINUED | OUTPATIENT
Start: 2017-02-17 | End: 2017-02-24 | Stop reason: HOSPADM

## 2017-02-15 RX ADMIN — Medication 10 ML: at 13:30

## 2017-02-15 RX ADMIN — MILRINONE LACTATE 0.5 MCG/KG/MIN: 200 INJECTION, SOLUTION INTRAVENOUS at 08:36

## 2017-02-15 RX ADMIN — APIXABAN 5 MG: 5 TABLET, FILM COATED ORAL at 22:38

## 2017-02-15 RX ADMIN — COLCHICINE 0.6 MG: 0.6 TABLET, FILM COATED ORAL at 09:17

## 2017-02-15 RX ADMIN — MILRINONE LACTATE 0.5 MCG/KG/MIN: 200 INJECTION, SOLUTION INTRAVENOUS at 20:49

## 2017-02-15 RX ADMIN — HYDRALAZINE HYDROCHLORIDE 25 MG: 25 TABLET, FILM COATED ORAL at 06:09

## 2017-02-15 RX ADMIN — HYDRALAZINE HYDROCHLORIDE 25 MG: 25 TABLET, FILM COATED ORAL at 22:38

## 2017-02-15 RX ADMIN — AMIODARONE HYDROCHLORIDE 200 MG: 200 TABLET ORAL at 09:17

## 2017-02-15 RX ADMIN — SPIRONOLACTONE 50 MG: 25 TABLET, FILM COATED ORAL at 09:16

## 2017-02-15 RX ADMIN — APIXABAN 5 MG: 5 TABLET, FILM COATED ORAL at 09:17

## 2017-02-15 RX ADMIN — PRAVASTATIN SODIUM 80 MG: 80 TABLET ORAL at 22:39

## 2017-02-15 RX ADMIN — POLYETHYLENE GLYCOL 3350 34 G: 17 POWDER, FOR SOLUTION ORAL at 09:18

## 2017-02-15 RX ADMIN — INSULIN LISPRO 15 UNITS: 100 INJECTION, SOLUTION INTRAVENOUS; SUBCUTANEOUS at 18:27

## 2017-02-15 RX ADMIN — DOCUSATE SODIUM 100 MG: 100 CAPSULE, LIQUID FILLED ORAL at 09:17

## 2017-02-15 RX ADMIN — CARVEDILOL 25 MG: 25 TABLET, FILM COATED ORAL at 09:17

## 2017-02-15 RX ADMIN — MILRINONE LACTATE 0.5 MCG/KG/MIN: 200 INJECTION, SOLUTION INTRAVENOUS at 03:25

## 2017-02-15 RX ADMIN — GABAPENTIN 600 MG: 300 CAPSULE ORAL at 22:38

## 2017-02-15 RX ADMIN — ISOSORBIDE DINITRATE 20 MG: 20 TABLET ORAL at 13:28

## 2017-02-15 RX ADMIN — FUROSEMIDE 40 MG: 10 INJECTION, SOLUTION INTRAMUSCULAR; INTRAVENOUS at 09:18

## 2017-02-15 RX ADMIN — LEVALBUTEROL HYDROCHLORIDE 0.63 MG: 0.63 SOLUTION RESPIRATORY (INHALATION) at 22:01

## 2017-02-15 RX ADMIN — ALLOPURINOL 100 MG: 100 TABLET ORAL at 09:17

## 2017-02-15 RX ADMIN — HYDROCORTISONE: 5 CREAM TOPICAL at 18:00

## 2017-02-15 RX ADMIN — GABAPENTIN 600 MG: 300 CAPSULE ORAL at 06:09

## 2017-02-15 RX ADMIN — GUAIFENESIN 1200 MG: 600 TABLET, EXTENDED RELEASE ORAL at 09:17

## 2017-02-15 RX ADMIN — ISOSORBIDE DINITRATE 20 MG: 20 TABLET ORAL at 22:38

## 2017-02-15 RX ADMIN — CARVEDILOL 25 MG: 25 TABLET, FILM COATED ORAL at 17:00

## 2017-02-15 RX ADMIN — PETROLATUM: 42 OINTMENT TOPICAL at 09:00

## 2017-02-15 RX ADMIN — Medication 10 ML: at 22:39

## 2017-02-15 RX ADMIN — HYDROCORTISONE: 5 CREAM TOPICAL at 09:00

## 2017-02-15 RX ADMIN — GABAPENTIN 600 MG: 300 CAPSULE ORAL at 13:28

## 2017-02-15 RX ADMIN — PERFLUTREN 1 ML: 6.52 INJECTION, SUSPENSION INTRAVENOUS at 11:36

## 2017-02-15 RX ADMIN — ISOSORBIDE DINITRATE 20 MG: 20 TABLET ORAL at 06:09

## 2017-02-15 RX ADMIN — GUAIFENESIN 1200 MG: 600 TABLET, EXTENDED RELEASE ORAL at 18:00

## 2017-02-15 RX ADMIN — MILRINONE LACTATE 0.5 MCG/KG/MIN: 200 INJECTION, SOLUTION INTRAVENOUS at 13:17

## 2017-02-15 RX ADMIN — FAMOTIDINE 20 MG: 20 TABLET ORAL at 09:17

## 2017-02-15 RX ADMIN — HYDRALAZINE HYDROCHLORIDE 25 MG: 25 TABLET, FILM COATED ORAL at 13:28

## 2017-02-15 RX ADMIN — DOCUSATE SODIUM 100 MG: 100 CAPSULE, LIQUID FILLED ORAL at 18:00

## 2017-02-15 NOTE — PROGRESS NOTES
Verbal bedside report received from [facility], RN. Assumed care of patient. Primacor IV drip verified at bedside with outgoing RN.

## 2017-02-15 NOTE — PROGRESS NOTES
[facility] CARDIOLOGY PROGRESS NOTE           2/15/2017 12:48 PM    Admit Date: 1/19/2017    Admit Diagnosis: NSVT (nonsustained ventricular tachycardia) ([facility])      Subjective:   No complaints this AM, no chest pain or shortness of breath    Interval History: (History of pertinent interval events obtained from nursing staff)  No events overnight    ROS:  GEN:  No fever or chills  Cardiovascular:  As noted above  Pulmonary:  As noted above  Neuro:  No new focal motor or sensory loss      Objective:     Vitals:    02/15/17 0502 02/15/17 0609 02/15/17 0710 02/15/17 0836   BP: 113/78 127/58 123/67 126/70   Pulse: 84 88 87 85   Resp: 18  16    Temp: 98.3 °F (36.8 °C)  98.2 °F (36.8 °C)    SpO2: 99%  92%    Weight: (!) 190.6 kg (420 lb 1.6 oz)      Height:           Physical Exam:  General- morbidly obese, NAD  Neck- supple, no JVD, right IJ central line in place  CV- regular rate and rhythm, distant S1, S2  Lung- decreased BS mild wheezes  Abd- soft, nontender, nondistended  Ext- extensive edema  Skin- warm and dry    Current Facility-Administered Medications   Medication Dose Route Frequency    milrinone (PRIMACOR) 20 MG/100 ML D5W infusion  0.5 mcg/kg/min IntraVENous CONTINUOUS    guaiFENesin SR (MUCINEX) tablet 1,200 mg  1,200 mg Oral BID    furosemide (LASIX) injection 40 mg  40 mg IntraVENous DAILY    famotidine (PEPCID) tablet 20 mg  20 mg Oral DAILY    levalbuterol (XOPENEX) nebulizer soln 0.63 mg/3 mL  0.63 mg Nebulization Q4H PRN    mineral oil-hydrophil petrolat (AQUAPHOR) ointment   Topical DAILY    ondansetron (ZOFRAN) injection 4 mg  4 mg IntraVENous Q6H PRN    spironolactone (ALDACTONE) tablet 50 mg  50 mg Oral DAILY    amiodarone (CORDARONE) tablet 200 mg  200 mg Oral DAILY    polyethylene glycol (MIRALAX) packet 34 g  34 g Oral DAILY    hydrocortisone (CORTAID) 0.5 % cream   Topical BID    allopurinol (ZYLOPRIM) tablet 100 mg  100 mg Oral DAILY    apixaban (ELIQUIS) tablet 5 mg  5 mg Oral Q12H    bisacodyl (DULCOLAX) suppository 10 mg  10 mg Rectal DAILY    carvedilol (COREG) tablet 25 mg  25 mg Oral BID WITH MEALS    colchicine tablet 0.6 mg  0.6 mg Oral DAILY    docusate sodium (COLACE) capsule 100 mg  100 mg Oral BID    gabapentin (NEURONTIN) capsule 600 mg  600 mg Oral TID    hydrALAZINE (APRESOLINE) tablet 25 mg  25 mg Oral TID    insulin glargine (LANTUS) injection 50 Units  50 Units SubCUTAneous QHS    insulin lispro (HUMALOG) injection 15 Units  15 Units SubCUTAneous TID WITH MEALS    isosorbide dinitrate (ISORDIL) tablet 20 mg  20 mg Oral TID    nitroglycerin (NITROSTAT) tablet 0.4 mg  0.4 mg SubLINGual Q5MIN PRN    oxyCODONE IR (OXY-IR) immediate release tablet 15 mg  15 mg Oral Q4H PRN    polyethylene glycol (MIRALAX) packet 17 g  17 g Oral DAILY PRN    pravastatin (PRAVACHOL) tablet 80 mg  80 mg Oral QHS    sodium chloride (NS) flush 5-10 mL  5-10 mL IntraVENous Q8H    sodium chloride (NS) flush 5-10 mL  5-10 mL IntraVENous PRN    morphine injection 2 mg  2 mg IntraVENous Q4H PRN     Data Review:   Recent Results (from the past 24 hour(s))   GLUCOSE, POC    Collection Time: 02/14/17  3:57 PM   Result Value Ref Range    Glucose (POC) 106 (H) 65 - 100 mg/dL   GLUCOSE, POC    Collection Time: 02/14/17 10:36 PM   Result Value Ref Range    Glucose (POC) 118 (H) 65 - 100 mg/dL   MAGNESIUM    Collection Time: 02/15/17  4:05 AM   Result Value Ref Range    Magnesium 2.5 (H) 1.8 - 2.4 mg/dL   GLUCOSE, POC    Collection Time: 02/15/17  7:03 AM   Result Value Ref Range    Glucose (POC) 115 (H) 65 - 100 mg/dL   GLUCOSE, POC    Collection Time: 02/15/17 12:00 PM   Result Value Ref Range    Glucose (POC) 136 (H) 65 - 100 mg/dL       EKG:  (EKG has been independently visualized by me with interpretation below)  Assessment:     Principal Problem:    Nonsustained ventricular tachycardia (HCC) (1/19/2017)    Active Problems:    Obstructive sleep apnea (2/15/2010) Nonischemic dilated cardiomyopathy ([facility] (7/2/2013)      Overview: LVEF 10-15% on ECHO from 12/17/10      CKD (chronic kidney disease) stage 3, GFR 30-59 ml/min (8/13/2014)      AICD (automatic cardioverter/defibrillator) present (10/21/2015)      Acute on chronic systolic (congestive) heart failure ([facility] (10/18/2016)      Atrial flutter ([facility] (10/20/2016)      Obesity hypoventilation syndrome ([facility] (10/24/2016)      Morbid obesity with BMI of 60.0-69.9, adult ([facility] (11/28/2016)      Syncope (1/19/2017)      NSVT (nonsustained ventricular tachycardia) ([facility] (1/19/2017)      Plan:   1. Nonsustained ventricular tachycardia, controlled: continue amiodarone, decreased to 200 mg daily  2. CKD (chronic kidney disease) - continued on milrinone and diuresis. Currently limited on diuresis secondary to renal function, appreciate nephrology recommendations  3. Acute on chronic systolic (congestive) heart failure -volume overload, most likely component of cor pulmonale as well with morbid obesity --KELSIE--will continue to wear BiPAP, legs wrapped, continued on IV lasix and milrinone, slower urine output recently, however, Pt reports he urinated on the bed this AM and this was not recorded   4. Atrial flutter --stable, on eliquis  5. Edema, uncontrolled - legs wrapped, iv lasix  6. AICD: functioning normall  7. Obesity hypoventilation, KELSIE: BiPAP   8. Scrotal edema: iv lasix  9. Prognosis - repeat echo pending. Patient and Family request a meeting with Dr. Cristiane Ordonez and want him transferred for a cardiac transplant or closer to home hospital up [address].  Mya FLOWER  Cardiology/Electrophysiology

## 2017-02-15 NOTE — PROGRESS NOTES
Problem: Mobility Impaired (Adult and Pediatric)  Goal: *Acute Goals and Plan of Care (Insert Text)  LTG:  (1.)Mr. Pollard will move from supine to sit and sit to supine , scoot up and down and roll side to side with modified independence within 7 day(s) with use of hospital bed controls  (2.)Mr. Pollard will transfer from bed to chair and chair to bed with Supervision to [facility]  Without use of an  assistive device  within 7 day(s). (3.)Mr. Pollard will ambulate with supervision  for 25 feet with out an assistive device within 7 day(s) with O2 as needed to support needed sat level.      ___________________________________________________________________________________________      PHYSICAL THERAPY: Re-evaluation and AM 2/15/2017  INPATIENT: Hospital Day: 28  Payor: CARE IMPROVEMENT PL[place] / Product Type: [employer] /      NAME/AGE/GENDER: Luzma Cabrera is a 45 y.o. male         PRIMARY DIAGNOSIS: NSVT (nonsustained ventricular tachycardia) ([facility] Nonsustained ventricular tachycardia ([address] Nonsustained ventricular tachycardia ([address]        ICD-10: Treatment Diagnosis:       · Generalized Muscle Weakness (M62.81)  · Difficulty in walking, Not elsewhere classified (R26.2)   Precaution/Allergies:  Dilaudid [hydromorphone]; Iodinated contrast media - oral and iv dye; and Penicillins       ASSESSMENT:      Mr. Sarah Hazel appears to be making slow progress in general mobility. He was sitting in a chair at bedside when therapy first attempted treatment and then he reports he transferred with the help of his daughter over to sit on the edge of his bed. He was sitting there supported by his BUEs at the start of therapy asking for assistance to de-tangle his lines. This was done and patient reports that he does a lot more activity than he has been given credit for. He tends to stay connected to others on his phone throughout the PT session.   He stood at the bedside pulling up to stand with 3 people spotting for safety. He then took 5-6 steps sideways and stood to stretch before returning to sitting on the edge. He needed max assist to get his feet back up on the bed but the only requires moderate assist to pull and scoot himself up in the bed. He also needs physical assistance to position his scrotum secondary to the swelling and inability for self positioning. He does move and use his legs well when in supine to assist with pushing and he has good BUE strength to assist himself. He expresses he has no interest in trying to use a r/walker for assistance since he did not use one before he came in. He reports his daughter is able to provide him assistance and he plans to do all he can to mobilize himself as he can tolerate it. Total time sitting up in chair and on edge of bed was ~ 1 hour    This section established at most recent assessment   PROBLEM LIST (Impairments causing functional limitations):  1. Decreased Strength  2. Decreased ADL/Functional Activities  3. Decreased Transfer Abilities  4. Decreased Ambulation Ability/Technique  5. Decreased Balance  6. Decreased Activity Tolerance  7. Increased Fatigue  8. Increased Shortness of Breath  9. Edema/Girth    INTERVENTIONS PLANNED: (Benefits and precautions of physical therapy have been discussed with the patient.)  1. Balance Exercise  2. Bed Mobility  3. Family Education  4. Gait Training  5. Home Exercise Program (HEP)  6. Therapeutic Activites  7. Therapeutic Exercise/Strengthening  8. Transfer Training  9. Group Therapy      TREATMENT PLAN: Frequency/Duration: 3 times a week for duration of hospital stay  Rehabilitation Potential For Stated Goals: GOOD      RECOMMENDED REHABILITATION/EQUIPMENT: (at time of discharge pending progress): Continue Skilled Therapy.                    HISTORY:   History of Present Injury/Illness (Reason for Referral):  Singh Barclay is a 45 y.o. male with known NICM with documented EF 10 % with [employer] single chamber ICD in place. He was just discharged on 1/13 with initial complaint of constipation and volume overload. He developed worsening renal failure necessitating hold his diuretics temporarily. He was discharged home on addition of demadex 100 mg daily and miralax for constipation. Really since mid December he has not felt well with worsening orthopnea, increasing abdominal girth, increased wt and lower ext edema. Today while driving in car he felt presyncopal and was able to pull to side of rode. He did not loose consciousness. He had repeat episode of presyncope prior to coming to ER. While in ER he was noted to have nonsustained ventricular tachycardia. He is not on antiarrhythmia agent. Interrogation of his device revealed normal operation but his VT zone is set at 158 bpm with noted VT in ER at approximately 145 bpm.   Past Medical History/Comorbidities:   Mr. García Branch  has a past medical history of Acute systolic heart failure [facility]) (May, 2009); AICD (automatic cardioverter/defibrillator) present (10/21/2015); Atypical chest pain (4/23/2010); Bronchitis; CAD (coronary artery disease); Cardiomyopathy; Chest pain (10/21/2015); Chronic kidney disease; Chronic pain; Congestive heart failure (CHF) ([address] (10/21/2015); COPD; Diabetes ([address] Diabetes mellitus type II, uncontrolled ([address] (7/2/2013); GERD (gastroesophageal reflux disease); Gout; Heart failure ([address] Hypertension; Hyponatremia (12/20/2010); Ill-defined condition; Morbid obesity ([address] Nausea & vomiting (11/30/2015); Neuropathy; Obstructive sleep apnea (2/15/2010); Other unknown and unspecified cause of morbidity or mortality; Severe sepsis ([address] and Unspecified sleep apnea. He also has no past medical history of Adverse effect of anesthesia; Aneurysm ([address] Coagulation disorder ([address] DEMENTIA; Difficult intubation; Malignant hyperthermia due to anesthesia; Neurological disorder; Other ill-defined conditions(799.89);  Pseudocholinesterase deficiency; or Psychiatric disorder. Mr. Gustavo Le  has a past surgical history that includes pacemaker; heart catheterization ([place] 10, 2008); and chest surgery procedure unlisted. Social History/Living Environment:   Home Environment:  (daughters house)  # Steps to Enter: 0  One/Two Story Residence: One story  Living Alone: No  Support Systems: Family member(s), Friends \ neighbors  Patient Expects to be Discharged to[de-identified] Private residence  Current DME Used/Available at Home: None  Tub or Shower Type: Tub/Shower combination  Prior Level of Function/Work/Activity:  Lives with daughter; typically independent with ambulation for short distances; drives. Personal Factors:          Sex:  male        Age:  45 y.o. Number of Personal Factors/Comorbidities that affect the Plan of Care:  CHF, DM, obesity, edema 3+: HIGH COMPLEXITY   EXAMINATION:   Most Recent Physical Functioning:   Gross Assessment:                  Posture:     Balance:  Sitting: Intact  Sitting - Static: Prop sitting (on edge of bed. Sits in chair with back support)  Sitting - Dynamic: Good (unsupported)  Standing: Impaired  Standing - Static: Good  Standing - Dynamic : Fair Bed Mobility:  Sit to Supine: Maximum assistance (x 3 people to assist with legs on bed and trunk direction)  Scooting: Moderate assistance (He uses rails to pull with his upper body to scoot up in bed)  Level of Assistance: Moderate assistance (He attemps to use  his legs and arms to help)  Wheelchair Mobility:     Transfers:  Sit to Stand: Contact guard assistance  Stand to Sit: Contact guard assistance  Stand Pivot Transfers: Contact guard assistance  Bed to Chair: Contact guard assistance  Level of Assistance: Contact guard assistance (Patient has his own method of moving and instructs others)  Gait:     Base of Support: Widened (He cannot have his legs together secondary to scrotal edema)  Speed/Mira: Slow; Other (comment) (labored)  Step Length: Left shortened;Right shortened  Gait Abnormalities: Decreased step clearance;Trunk sway increased; Antalgic  Distance (ft): 6 Feet (ft) (5-6 steps sideways to better position in bed)  Assistive Device: Other (comment) (Manual assist for him to pull up to stand)  Ambulation - Level of Assistance: Contact guard assistance       Body Structures Involved:  1. Lungs  2. Joints  3. Muscles Body Functions Affected:  1. Respiratory  2. Movement Related Activities and Participation Affected:  1. General Tasks and Demands  2. Mobility  3. Self Care  4. Community, Social and [place]   Number of elements that affect the Plan of Care: 4+: HIGH COMPLEXITY   CLINICAL PRESENTATION:   Presentation: Stable and uncomplicated: LOW COMPLEXITY   CLINICAL DECISION MAKIN [facility] Inpatient Short Form  How much difficulty does the patient currently have. .. Unable A Lot A Little None   1. Turning over in bed (including adjusting bedclothes, sheets and blankets)? [ ] 1   [ ] 2   [X] 3   [ ] 4   2. Sitting down on and standing up from a chair with arms ( e.g., wheelchair, bedside commode, etc.)   [ ] 1   [ ] 2   [X] 3   [ ] 4   3. Moving from lying on back to sitting on the side of the bed? [ ] 1   [ ] 2   [X] 3   [ ] 4   How much help from another person does the patient currently need. .. Total A Lot A Little None   4. Moving to and from a bed to a chair (including a wheelchair)? [ ] 1   [ ] 2   [X] 3   [ ] 4   5. Need to walk in hospital room? [ ] 1   [ ] 2   [X] 3   [ ] 4   6. Climbing 3-5 steps with a railing? [ ] 1   [ ] 2   [X] 3   [ ] 4   © , Trustees of [address] [place] [address] [address] [id number], under license to [employer]. All rights reserved    Score:  Initial: 18 Most Recent: X (Date: -- )     Interpretation of Tool:  Represents activities that are increasingly more difficult (i.e. Bed mobility, Transfers, Gait).        Score 24 23 22-20 19-15 14-10 9-7 6       Modifier CH CI CJ CK CL CM CN · Mobility - Walking and Moving Around:               - CURRENT STATUS:    CK - 40%-59% impaired, limited or restricted               - GOAL STATUS:           CJ - 20%-39% impaired, limited or restricted               - D/C STATUS:                       ---------------To be determined---------------  Payor: CARE IMPROVEMENT PLUS [place] / Product Type: Managed Care Medicare /       Medical Necessity:     · Patient is expected to demonstrate progress in strength, range of motion, balance and coordination to decrease assistance required with overall functional mobility, transfers, ambulation. · Patient demonstrates good rehab potential due to higher previous functional level. Reason for Services/Other Comments:  · Patient continues to require present interventions due to patient's inability to perform bed mobility, transfers, ambulation all safely and effectively at prior level of function of independence. Use of outcome tool(s) and clinical judgement create a POC that gives a: Clear prediction of patient's progress: LOW COMPLEXITY                 TREATMENT:      Pre-treatment Symptoms/Complaints:  Increased mobility in sitting and supine. Pain: Initial: . Pain Intensity 1: 5  Pain Location 1: Groin  Pain Orientation 1: Anterior, Distal, Inner, Lateral  Pain Intervention(s) 1: Repositioned, Meditation, Emotional support  Post Session:  Unchanged from start of therapy. Therapeutic Activity: (  30 minutes ):  Therapeutic activities including bed mobility, sitting tolerance/balace in chair and on edge of bed, ambulation 5-6 feet today without an assistive device , sit to stand at the bedside pulling up to stand and  scooting to improve mobility, strength and balance. Re-assessed patient to determine skilled need. Need is present but patient has his own method and preference for how and when he wants to move.           Date:  1/26/17 Date:  1/27/17 Date:  1/30/17 Date:  2/1/17 Date:  2/2/17 Date:  2/3/17   Activity/Exercise Parameters Parameters Parameters      Standing marching in place         Seated AP's 2 x 20 active        Seated LAQ's 2 x 20        Seated hip ABD/ADD         Supine hip abd/add         Shoulder flexion 2x 20        Elbow flexion 2x 20                          Seated hip flexion 2 x 20        Ambulation  Assist  Device                  Key:  A=active, AA=active assisted, B=bilaterally, R=right, L=left         Treatment/Session Assessment:    · Response to Treatment:  Increased mobility and activity with transfers, sitting and moving in bed. · Interdisciplinary Collaboration:  · Physical Therapist and Certified Nursing Assistant/Patient Care Technician  · After treatment position/precautions:  · Supine in bed, Bed/Chair-wheels locked, Bed in low position, Call light within reach and Family at bedside  · Compliance with Program/Exercises: Will assess as treatment progresses. · Recommendations/Intent for next treatment session: \"Next visit will focus on advancements to more challenging activities\".   Total Treatment Duration:  PT Patient Time In/Time Out  Time In: 1015  Time Out: [address] [facility] Melissa Claros

## 2017-02-15 NOTE — PROGRESS NOTES
Bedside shift change report given to [facility], RN. Report included the following information SBAR, Kardex, MAR and Recent Results. Primacor Gtt rate verified at bedside.

## 2017-02-15 NOTE — PROGRESS NOTES
Palliative Care Progress Note    Patient: Vin Lee MRN: [id number]  SSN: [id number]    YOB: 1965  Age: 45 y.o. Sex: male       Assessment/Plan:     Chief Complaint/Interval History: Abdominal distension and constipation     Principal Diagnosis:    · Constipation, Unspecified  K59.00    Additional Diagnoses:   · Debility, Unspecified  R53.81  · Edema  R60.9  · Fatigue, Lethargy  R53.83  · Frailty  R54  · Counseling, Encounter for Medical Advice  Z71.9  · Encounter for Palliative Care  Z51.5    Palliative Performance Scale (PPS)  PPS: 40    Medical Decision Making:   Reviewed and summarized notes over previous 24 hours. Discussed case with appropriate providers: primary RN; Johnella Angelucci, LISW  Reviewed laboratory and x-ray data. Patient sitting in chair, daughter is at the bedside. Patient immediately starts reporting issues with his bowels. He feels that although he has passed liquid stool everyday, he feels that stool remains in his GI tract. Patient has had GI workup on previous admission, but will obtain KUB to rule constipation out. Patient feels that his weight can be attributed to GI issues. He states that if he has no GI issues \"then I surrender\". He then verbalizes frustration \"how can I go from fishing with my grand kids to this\". Patient is coping poorly with his condition and prognosis. Validated his emotions, but did state that even if there are GI issues, he still has advanced heart failure. Patient and daughter state they \"do not think negative\" and those ideas are \"bouncing off of me\". They explain their belief that negative conversation brings about negative results. Provided emotional support. Will continue to follow. Will discuss findings with members of the interdisciplinary team.         More than 50% of this 35 minute visit was spent counseling and coordination of care as outlined above.     Subjective:     Review of Systems:  A comprehensive review of systems was negative except for:   Constitutional: Positive for fatigue. Gastrointestinal: Positive for constipation. Objective:     Visit Vitals    /70    Pulse 85    Temp 98.2 °F (36.8 °C)    Resp 16    Ht 5' 6\" (1.676 m)    Wt (!) 190.6 kg (420 lb 1.6 oz)    SpO2 92%    BMI 67.81 kg/m2       Physical Exam:    General:  Debilitated appearing. Cooperative. No acute distress. Eyes:  Conjunctivae/corneas clear    Nose: Nares normal. Septum midline. Neck: Supple, symmetrical, trachea midline, no JVD   Lungs:   Clear to auscultation bilaterally, unlabored   Heart:  Regular rate and rhythm, no murmur    Abdomen: Morbidly obese. Soft, non-tender. Normoactive bowel sounds. Extremities: Normal, atraumatic, no cyanosis. Legs wrapped. Skin: Skin color, texture, turgor normal. No rash. Neurologic: Nonfocal.   Psych: Alert and oriented.      Signed By: Massimo Gerard NP     February 15, 2017

## 2017-02-15 NOTE — PROGRESS NOTES
Bedside and Verbal shift change report given to self (oncoming nurse) by Reza Martinez (offgoing nurse). Report included the following information SBAR, Kardex, ED Summary, Procedure Summary, Intake/Output, MAR, Recent Results and Cardiac Rhythm SR 1st degree HB.

## 2017-02-15 NOTE — PROGRESS NOTES
Bedside and Verbal shift change report given to Roc Sheriff RN (oncoming nurse) by self Rubi Watkins nurse). Report included the following information SBAR, Kardex, ED Summary, Procedure Summary, Intake/Output, MAR, Recent Results and Cardiac Rhythm SR/ SR with 1st degree HB. Primacor gtt verified at bedside. Hourly vitals per protocol were stable; printed and placed on paper chart.

## 2017-02-16 ENCOUNTER — HOME CARE VISIT (OUTPATIENT)
Dept: HOME HEALTH SERVICES | Facility: HOME HEALTH | Age: 52
End: 2017-02-16
Payer: MEDICARE

## 2017-02-16 LAB
ANION GAP BLD CALC-SCNC: 10 MMOL/L (ref 7–16)
BUN SERPL-MCNC: 62 MG/DL (ref 6–23)
CALCIUM SERPL-MCNC: 8.3 MG/DL (ref 8.3–10.4)
CHLORIDE SERPL-SCNC: 94 MMOL/L (ref 98–107)
CO2 SERPL-SCNC: 32 MMOL/L (ref 21–32)
CREAT SERPL-MCNC: 2.39 MG/DL (ref 0.8–1.5)
GLUCOSE BLD STRIP.AUTO-MCNC: 119 MG/DL (ref 65–100)
GLUCOSE BLD STRIP.AUTO-MCNC: 121 MG/DL (ref 65–100)
GLUCOSE BLD STRIP.AUTO-MCNC: 137 MG/DL (ref 65–100)
GLUCOSE BLD STRIP.AUTO-MCNC: 171 MG/DL (ref 65–100)
GLUCOSE SERPL-MCNC: 121 MG/DL (ref 65–100)
MAGNESIUM SERPL-MCNC: 2.7 MG/DL (ref 1.8–2.4)
POTASSIUM SERPL-SCNC: 3.7 MMOL/L (ref 3.5–5.1)
SODIUM SERPL-SCNC: 136 MMOL/L (ref 136–145)

## 2017-02-16 PROCEDURE — 65660000000 HC RM CCU STEPDOWN

## 2017-02-16 PROCEDURE — 82962 GLUCOSE BLOOD TEST: CPT

## 2017-02-16 PROCEDURE — 74011250636 HC RX REV CODE- 250/636: Performed by: NURSE PRACTITIONER

## 2017-02-16 PROCEDURE — 74011250637 HC RX REV CODE- 250/637: Performed by: NURSE PRACTITIONER

## 2017-02-16 PROCEDURE — 74011250636 HC RX REV CODE- 250/636: Performed by: INTERNAL MEDICINE

## 2017-02-16 PROCEDURE — 94760 N-INVAS EAR/PLS OXIMETRY 1: CPT

## 2017-02-16 PROCEDURE — 3331090001 HH PPS REVENUE CREDIT

## 2017-02-16 PROCEDURE — 94640 AIRWAY INHALATION TREATMENT: CPT

## 2017-02-16 PROCEDURE — 74011250637 HC RX REV CODE- 250/637: Performed by: INTERNAL MEDICINE

## 2017-02-16 PROCEDURE — 83735 ASSAY OF MAGNESIUM: CPT | Performed by: INTERNAL MEDICINE

## 2017-02-16 PROCEDURE — 77010033678 HC OXYGEN DAILY

## 2017-02-16 PROCEDURE — 74011636637 HC RX REV CODE- 636/637: Performed by: NURSE PRACTITIONER

## 2017-02-16 PROCEDURE — 36592 COLLECT BLOOD FROM PICC: CPT

## 2017-02-16 PROCEDURE — 3331090002 HH PPS REVENUE DEBIT

## 2017-02-16 PROCEDURE — 80048 BASIC METABOLIC PNL TOTAL CA: CPT | Performed by: INTERNAL MEDICINE

## 2017-02-16 PROCEDURE — 74011000250 HC RX REV CODE- 250: Performed by: INTERNAL MEDICINE

## 2017-02-16 RX ORDER — INSULIN LISPRO 100 [IU]/ML
INJECTION, SOLUTION INTRAVENOUS; SUBCUTANEOUS
Status: DISCONTINUED | OUTPATIENT
Start: 2017-02-16 | End: 2017-02-24 | Stop reason: HOSPADM

## 2017-02-16 RX ADMIN — LEVALBUTEROL HYDROCHLORIDE 0.63 MG: 0.63 SOLUTION RESPIRATORY (INHALATION) at 05:56

## 2017-02-16 RX ADMIN — Medication 5 ML: at 14:35

## 2017-02-16 RX ADMIN — PRAVASTATIN SODIUM 80 MG: 80 TABLET ORAL at 22:58

## 2017-02-16 RX ADMIN — LEVALBUTEROL HYDROCHLORIDE 0.63 MG: 0.63 SOLUTION RESPIRATORY (INHALATION) at 15:48

## 2017-02-16 RX ADMIN — Medication 10 ML: at 05:05

## 2017-02-16 RX ADMIN — AMIODARONE HYDROCHLORIDE 200 MG: 200 TABLET ORAL at 09:46

## 2017-02-16 RX ADMIN — PETROLATUM: 42 OINTMENT TOPICAL at 09:54

## 2017-02-16 RX ADMIN — SPIRONOLACTONE 50 MG: 25 TABLET, FILM COATED ORAL at 09:46

## 2017-02-16 RX ADMIN — ISOSORBIDE DINITRATE 20 MG: 20 TABLET ORAL at 05:46

## 2017-02-16 RX ADMIN — COLCHICINE 0.6 MG: 0.6 TABLET, FILM COATED ORAL at 09:46

## 2017-02-16 RX ADMIN — MILRINONE LACTATE 0.5 MCG/KG/MIN: 200 INJECTION, SOLUTION INTRAVENOUS at 05:05

## 2017-02-16 RX ADMIN — DOCUSATE SODIUM 100 MG: 100 CAPSULE, LIQUID FILLED ORAL at 09:46

## 2017-02-16 RX ADMIN — MILRINONE LACTATE 0.5 MCG/KG/MIN: 200 INJECTION, SOLUTION INTRAVENOUS at 09:45

## 2017-02-16 RX ADMIN — GABAPENTIN 600 MG: 300 CAPSULE ORAL at 14:27

## 2017-02-16 RX ADMIN — HYDRALAZINE HYDROCHLORIDE 25 MG: 25 TABLET, FILM COATED ORAL at 14:28

## 2017-02-16 RX ADMIN — ISOSORBIDE DINITRATE 20 MG: 20 TABLET ORAL at 14:28

## 2017-02-16 RX ADMIN — ISOSORBIDE DINITRATE 20 MG: 20 TABLET ORAL at 22:59

## 2017-02-16 RX ADMIN — GUAIFENESIN 1200 MG: 600 TABLET, EXTENDED RELEASE ORAL at 09:46

## 2017-02-16 RX ADMIN — ALLOPURINOL 100 MG: 100 TABLET ORAL at 09:46

## 2017-02-16 RX ADMIN — CARVEDILOL 25 MG: 25 TABLET, FILM COATED ORAL at 09:46

## 2017-02-16 RX ADMIN — APIXABAN 5 MG: 5 TABLET, FILM COATED ORAL at 22:59

## 2017-02-16 RX ADMIN — Medication 2 MG: at 23:40

## 2017-02-16 RX ADMIN — GABAPENTIN 600 MG: 300 CAPSULE ORAL at 22:58

## 2017-02-16 RX ADMIN — Medication 10 ML: at 22:59

## 2017-02-16 RX ADMIN — MILRINONE LACTATE 0.5 MCG/KG/MIN: 200 INJECTION, SOLUTION INTRAVENOUS at 18:59

## 2017-02-16 RX ADMIN — MILRINONE LACTATE 0.5 MCG/KG/MIN: 200 INJECTION, SOLUTION INTRAVENOUS at 00:15

## 2017-02-16 RX ADMIN — MILRINONE LACTATE 0.5 MCG/KG/MIN: 200 INJECTION, SOLUTION INTRAVENOUS at 23:52

## 2017-02-16 RX ADMIN — FAMOTIDINE 20 MG: 20 TABLET ORAL at 09:46

## 2017-02-16 RX ADMIN — INSULIN LISPRO 15 UNITS: 100 INJECTION, SOLUTION INTRAVENOUS; SUBCUTANEOUS at 12:50

## 2017-02-16 RX ADMIN — HYDRALAZINE HYDROCHLORIDE 25 MG: 25 TABLET, FILM COATED ORAL at 05:46

## 2017-02-16 RX ADMIN — GABAPENTIN 600 MG: 300 CAPSULE ORAL at 05:46

## 2017-02-16 RX ADMIN — HYDRALAZINE HYDROCHLORIDE 25 MG: 25 TABLET, FILM COATED ORAL at 23:01

## 2017-02-16 RX ADMIN — MILRINONE LACTATE 0.5 MCG/KG/MIN: 200 INJECTION, SOLUTION INTRAVENOUS at 14:27

## 2017-02-16 RX ADMIN — FUROSEMIDE 40 MG: 10 INJECTION, SOLUTION INTRAMUSCULAR; INTRAVENOUS at 08:29

## 2017-02-16 RX ADMIN — APIXABAN 5 MG: 5 TABLET, FILM COATED ORAL at 09:46

## 2017-02-16 RX ADMIN — HYDROCORTISONE: 5 CREAM TOPICAL at 09:00

## 2017-02-16 RX ADMIN — GUAIFENESIN 1200 MG: 600 TABLET, EXTENDED RELEASE ORAL at 17:58

## 2017-02-16 RX ADMIN — CARVEDILOL 25 MG: 25 TABLET, FILM COATED ORAL at 17:58

## 2017-02-16 NOTE — PROGRESS NOTES
Problem: Nutrition Deficit  Goal: *Optimize nutritional status  Nutrition f/u day 28  Assessment  Diet order(s): CCHO, Cardiac  Food,Nutrition, and Pertinent History: Patient with generalized anasarca receiving 40 mg lasix. RD acknowledges discussion of need for HD, however, poor HD candidate. Palliative care consulted as patient has a poor overall prognosis. Labs are remarkable for Cr 2.39, K 3.7, glucose 121, POC glucose 119-171 mg/dl. Anthropometrics: Height: 5' 6\" (167.6 cm), Weight Source: Standing scale (comment), Weight: 189.2 kg (417 pounds), Body mass index is 67.2 kg/(m^2). BMI class of morbid obesity class III. Macronutrient Needs:  · EER: 8758-3305 kcal /day (20-25 kcal/kg I BW)  · EPR: 58-73 grams protein/day (0.8-1 grams/kg IBW)(GFR 37)  Intake/Comparative Standards: RD observed patient consumed 100% of breakfast this morning. Average intake for past 7 day(s)/10 recorded meal(s): 98%. This potentially meets ~100% of kcal and ~100% of protein needs      Intervention: Meals and snacks: Continue current diet.       Megan Bowen [address], [address] [place] [address], [address] [place] [address], [phone]

## 2017-02-16 NOTE — PROGRESS NOTES
Bedside and Verbal shift change report given to Lindsey Bell RN (oncoming nurse) by Kim Arce RN (offgoing nurse). Report included the following information SBAR, Kardex, Accordion and Recent Results.

## 2017-02-16 NOTE — PROGRESS NOTES
Verbal bedside report received from Mis Mcbride RN. Assumed care of patient. Primacor IV drip verified at bedside with outgoing RN.

## 2017-02-16 NOTE — PROGRESS NOTES
Problem: Falls - Risk of  Goal: *Absence of falls  Outcome: Progressing Towards Goal  Fall precautions in place. Red socks on. Instructed to only get OOB with assistance. Voiced understanding. Call light in reach. Goal: *Knowledge of fall prevention  Pt educated on fall prevention and to use call light for assistance. Pt verbalizes understanding and will use call light for assistance.          Problem: Pressure Ulcer - Risk of  Goal: *Prevention of pressure ulcer    02/15/17 2020   Read Only, Retired: Wound Treatment (non-mechanical)   Wound Offloading (Prevention Methods) Bed, pressure reduction mattress;Pillows;Repositioning;Turning   Wound Prevention and Protection Methods   Orientation of Wound Prevention Mid;Posterior   Location of Wound Prevention Sacrum/Coccyx   Dressing Present  No   Dressing Status Intact

## 2017-02-16 NOTE — PROGRESS NOTES
Palliative Care Progress Note    Patient: Fredi España MRN: [id number]  SSN: [id number]    YOB: 1965  Age: 45 y.o. Sex: male       Assessment/Plan:     Chief Complaint/Interval History: remains on milrinone; resting with CPAP; no family at bedside. Principal Diagnosis:    · Constipation, Unspecified  K59.00-last documented BM 2/14/2017; continue miralax daily    Additional Diagnoses:   · Debility, Unspecified  R53.81-ongoing, due to pt medical condition  · Edema  R60.9-ongoing  · Fatigue, Lethargy  R53.83  · Frailty  R54  · Counseling, Encounter for Medical Advice  Z71.9-no family present; noted cardiology note that family wishes pt to be transferred closer to home vs to a transplant center. · Encounter for Palliative Care  Z51.5    Palliative Performance Scale (PPS)  PPS: 40    Medical Decision Making:   Reviewed and summarized notes over previous 24 hours. Discussed case with appropriate providers: primary RN; JUD Suggs  Reviewed laboratory and x-ray data. Pt/family family has communicated to saperatec team that they do not wish to speak about negative things. It appears however, as if pt condition is not improving. I am unsure of the logistics of transferring pt per family request.  Will speak with SW.    Will discuss findings with members of the interdisciplinary team.       More than 50% of this 15 minute visit was spent counseling and coordination of care as outlined above. Subjective:     Review of Systems:  A comprehensive review of systems was not obtained as pt drowsy; he opened eyes, asked me to fix his covers, did not communicate further. Objective:     Visit Vitals    /56    Pulse 80    Temp 97.6 °F (36.4 °C)    Resp 16    Ht 5' 6\" (1.676 m)    Wt (!) 417 lb 1.6 oz (189.2 kg)    SpO2 92%    BMI 67.32 kg/m2       Physical Exam:    General:  Debilitated appearing. Cooperative. No acute distress. Morbidly obese.    Eyes:  Conjunctivae/corneas clear Nose: Nares normal. Septum midline. Neck: Supple, symmetrical, trachea midline, no JVD   Lungs:   Clear to auscultation bilaterally, unlabored   Heart:  S1 S2   Abdomen: Morbidly obese. Soft, non-tender. Normoactive bowel sounds. Extremities: Normal, atraumatic, no cyanosis. Skin: Skin color, texture, turgor normal. No rash.    Neurologic: Nonfocal.   Psych: drowsy     Signed By: Rene Khan NP     February 16, 2017

## 2017-02-16 NOTE — PROGRESS NOTES
Verbal bedside report given to monica Peng RN. Patient's situation, background, assessment and recommendations provided. Opportunity for questions provided. Oncoming RN assumed care of patient. Primacor IV drip verified at bedside with oncoming RN.

## 2017-02-16 NOTE — PROGRESS NOTES
[facility] CARDIOLOGY PROGRESS NOTE           2/16/2017 7:54 AM    Admit Date: 1/19/2017    Admit Diagnosis: NSVT (nonsustained ventricular tachycardia) (HCC)      Subjective:   Patient continues to have no significant improvement. Family remains upset about his prognosis. Objective:     Vitals:    02/16/17 0038 02/16/17 0106 02/16/17 0444 02/16/17 0557   BP:  129/74 130/83    Pulse:  79 79    Resp:  16 18    Temp:  98.4 °F (36.9 °C) 98.3 °F (36.8 °C)    SpO2: 98% 99% 96% 95%   Weight:   (!) 417 lb 1.6 oz (189.2 kg)    Height:           Physical Exam:  Bryant Dunnings, Well Nourished, No Acute Distress, Alert & Oriented x 3, appropriate mood. Neck- supple, no JVD  CV- regular rate and rhythm no MRG  Lung- clear bilaterally  Abd- soft, nontender, nondistended  Ext- no edema bilaterally.   Skin- warm and dry    Current Facility-Administered Medications   Medication Dose Route Frequency    [START ON 2/17/2017] polyethylene glycol (MIRALAX) packet 17 g  17 g Oral DAILY    milrinone (PRIMACOR) 20 MG/100 ML D5W infusion  0.5 mcg/kg/min IntraVENous CONTINUOUS    guaiFENesin SR (MUCINEX) tablet 1,200 mg  1,200 mg Oral BID    furosemide (LASIX) injection 40 mg  40 mg IntraVENous DAILY    famotidine (PEPCID) tablet 20 mg  20 mg Oral DAILY    levalbuterol (XOPENEX) nebulizer soln 0.63 mg/3 mL  0.63 mg Nebulization Q4H PRN    mineral oil-hydrophil petrolat (AQUAPHOR) ointment   Topical DAILY    ondansetron (ZOFRAN) injection 4 mg  4 mg IntraVENous Q6H PRN    spironolactone (ALDACTONE) tablet 50 mg  50 mg Oral DAILY    amiodarone (CORDARONE) tablet 200 mg  200 mg Oral DAILY    hydrocortisone (CORTAID) 0.5 % cream   Topical BID    allopurinol (ZYLOPRIM) tablet 100 mg  100 mg Oral DAILY    apixaban (ELIQUIS) tablet 5 mg  5 mg Oral Q12H    bisacodyl (DULCOLAX) suppository 10 mg  10 mg Rectal DAILY    carvedilol (COREG) tablet 25 mg  25 mg Oral BID WITH MEALS    colchicine tablet 0.6 mg 0.6 mg Oral DAILY    docusate sodium (COLACE) capsule 100 mg  100 mg Oral BID    gabapentin (NEURONTIN) capsule 600 mg  600 mg Oral TID    hydrALAZINE (APRESOLINE) tablet 25 mg  25 mg Oral TID    insulin glargine (LANTUS) injection 50 Units  50 Units SubCUTAneous QHS    insulin lispro (HUMALOG) injection 15 Units  15 Units SubCUTAneous TID WITH MEALS    isosorbide dinitrate (ISORDIL) tablet 20 mg  20 mg Oral TID    nitroglycerin (NITROSTAT) tablet 0.4 mg  0.4 mg SubLINGual Q5MIN PRN    oxyCODONE IR (OXY-IR) immediate release tablet 15 mg  15 mg Oral Q4H PRN    polyethylene glycol (MIRALAX) packet 17 g  17 g Oral DAILY PRN    pravastatin (PRAVACHOL) tablet 80 mg  80 mg Oral QHS    sodium chloride (NS) flush 5-10 mL  5-10 mL IntraVENous Q8H    sodium chloride (NS) flush 5-10 mL  5-10 mL IntraVENous PRN    morphine injection 2 mg  2 mg IntraVENous Q4H PRN     Data Review:   Recent Results (from the past 24 hour(s))   GLUCOSE, POC    Collection Time: 02/15/17 12:00 PM   Result Value Ref Range    Glucose (POC) 136 (H) 65 - 100 mg/dL   GLUCOSE, POC    Collection Time: 02/15/17  3:49 PM   Result Value Ref Range    Glucose (POC) 168 (H) 65 - 100 mg/dL   GLUCOSE, POC    Collection Time: 02/15/17  9:23 PM   Result Value Ref Range    Glucose (POC) 164 (H) 65 - 100 mg/dL   MAGNESIUM    Collection Time: 02/16/17  5:16 AM   Result Value Ref Range    Magnesium 2.7 (H) 1.8 - 2.4 mg/dL   GLUCOSE, POC    Collection Time: 02/16/17  6:26 AM   Result Value Ref Range    Glucose (POC) 119 (H) 65 - 100 mg/dL     Assessment:     Principal Problem:    Nonsustained ventricular tachycardia (HCC) (1/19/2017)    Active Problems:    Obstructive sleep apnea (2/15/2010)      Nonischemic dilated cardiomyopathy ([facility] (7/2/2013)      Overview: LVEF 10-15% on ECHO from 12/17/10      CKD (chronic kidney disease) stage 3, GFR 30-59 ml/min (8/13/2014)      AICD (automatic cardioverter/defibrillator) present (10/21/2015)      Acute on chronic systolic (congestive) heart failure ([facility] (10/18/2016)      [facility] (10/20/2016)      Obesity hypoventilation syndrome ([facility] (10/24/2016)      Morbid obesity with BMI of 60.0-69.9, adult ([facility] (11/28/2016)      Syncope (1/19/2017)      NSVT (nonsustained ventricular tachycardia) ([facility] (1/19/2017)      Plan:   1. Nonsustained ventricular tachycardia, controlled: continue amiodarone, decreased to 200 mg daily  2. CKD (chronic kidney disease) - continued on milrinone and diuresis. Currently limited on diuresis secondary to renal function, appreciate nephrology recommendations  3. Acute on chronic systolic (congestive) heart failure -volume overload, most likely component of cor pulmonale as well with morbid obesity --KELSIE--will continue to wear BiPAP, legs wrapped, continued on IV lasix and milrinone, slower urine output recently, however, Pt reports he urinated on the bed this AM and this was not recorded   4. Atrial flutter --stable, on eliquis  5. Edema, uncontrolled - legs wrapped, iv lasix  6. AICD: functioning normall  7. Obesity hypoventilation, KELSIE: BiPAP   8. Scrotal edema: iv lasix  9. Prognosis - repeat echo showed EF 10% with severe LV Dilation. Patient and Family request a meeting with Dr. Pito Jorge and want him transferred for a cardiac transplant or closer to home hospital up [facility]. Lukas Rojas M.D., F.A.C.C, F.H.R.S.   Cardiology/Electrophysiology

## 2017-02-17 LAB
GLUCOSE BLD STRIP.AUTO-MCNC: 122 MG/DL (ref 65–100)
GLUCOSE BLD STRIP.AUTO-MCNC: 141 MG/DL (ref 65–100)
GLUCOSE BLD STRIP.AUTO-MCNC: 168 MG/DL (ref 65–100)
GLUCOSE BLD STRIP.AUTO-MCNC: 179 MG/DL (ref 65–100)
MAGNESIUM SERPL-MCNC: 2.5 MG/DL (ref 1.8–2.4)

## 2017-02-17 PROCEDURE — 74011000250 HC RX REV CODE- 250: Performed by: INTERNAL MEDICINE

## 2017-02-17 PROCEDURE — 94640 AIRWAY INHALATION TREATMENT: CPT

## 2017-02-17 PROCEDURE — 74011250637 HC RX REV CODE- 250/637: Performed by: INTERNAL MEDICINE

## 2017-02-17 PROCEDURE — 74011250636 HC RX REV CODE- 250/636: Performed by: INTERNAL MEDICINE

## 2017-02-17 PROCEDURE — 74011250637 HC RX REV CODE- 250/637: Performed by: NURSE PRACTITIONER

## 2017-02-17 PROCEDURE — 77030012890

## 2017-02-17 PROCEDURE — 94760 N-INVAS EAR/PLS OXIMETRY 1: CPT

## 2017-02-17 PROCEDURE — 36592 COLLECT BLOOD FROM PICC: CPT

## 2017-02-17 PROCEDURE — 82962 GLUCOSE BLOOD TEST: CPT

## 2017-02-17 PROCEDURE — 77030021668 HC NEB PREFIL KT VYRM -A

## 2017-02-17 PROCEDURE — 77010033678 HC OXYGEN DAILY

## 2017-02-17 PROCEDURE — 65660000000 HC RM CCU STEPDOWN

## 2017-02-17 PROCEDURE — 74011636637 HC RX REV CODE- 636/637: Performed by: NURSE PRACTITIONER

## 2017-02-17 PROCEDURE — 3331090002 HH PPS REVENUE DEBIT

## 2017-02-17 PROCEDURE — 3331090001 HH PPS REVENUE CREDIT

## 2017-02-17 PROCEDURE — 74011636637 HC RX REV CODE- 636/637: Performed by: PHYSICIAN ASSISTANT

## 2017-02-17 PROCEDURE — 83735 ASSAY OF MAGNESIUM: CPT | Performed by: INTERNAL MEDICINE

## 2017-02-17 RX ORDER — LEVALBUTEROL INHALATION SOLUTION 0.63 MG/3ML
0.63 SOLUTION RESPIRATORY (INHALATION)
Status: DISCONTINUED | OUTPATIENT
Start: 2017-02-17 | End: 2017-02-24 | Stop reason: HOSPADM

## 2017-02-17 RX ORDER — FUROSEMIDE 10 MG/ML
40 INJECTION INTRAMUSCULAR; INTRAVENOUS 2 TIMES DAILY
Status: DISCONTINUED | OUTPATIENT
Start: 2017-02-17 | End: 2017-02-21

## 2017-02-17 RX ORDER — FAMOTIDINE 20 MG/1
20 TABLET, FILM COATED ORAL 2 TIMES DAILY
Status: DISCONTINUED | OUTPATIENT
Start: 2017-02-17 | End: 2017-02-24 | Stop reason: HOSPADM

## 2017-02-17 RX ADMIN — FAMOTIDINE 20 MG: 20 TABLET ORAL at 08:24

## 2017-02-17 RX ADMIN — HYDRALAZINE HYDROCHLORIDE 25 MG: 25 TABLET, FILM COATED ORAL at 05:40

## 2017-02-17 RX ADMIN — INSULIN LISPRO 3 UNITS: 100 INJECTION, SOLUTION INTRAVENOUS; SUBCUTANEOUS at 17:09

## 2017-02-17 RX ADMIN — GUAIFENESIN 1200 MG: 600 TABLET, EXTENDED RELEASE ORAL at 08:24

## 2017-02-17 RX ADMIN — MILRINONE LACTATE 0.5 MCG/KG/MIN: 200 INJECTION, SOLUTION INTRAVENOUS at 13:15

## 2017-02-17 RX ADMIN — OXYCODONE HYDROCHLORIDE 15 MG: 15 TABLET ORAL at 12:02

## 2017-02-17 RX ADMIN — FUROSEMIDE 40 MG: 10 INJECTION, SOLUTION INTRAMUSCULAR; INTRAVENOUS at 08:26

## 2017-02-17 RX ADMIN — AMIODARONE HYDROCHLORIDE 200 MG: 200 TABLET ORAL at 08:23

## 2017-02-17 RX ADMIN — INSULIN GLARGINE 2 UNITS: 100 INJECTION, SOLUTION SUBCUTANEOUS at 22:00

## 2017-02-17 RX ADMIN — GABAPENTIN 600 MG: 300 CAPSULE ORAL at 13:14

## 2017-02-17 RX ADMIN — Medication 10 ML: at 22:29

## 2017-02-17 RX ADMIN — INSULIN LISPRO 2 UNITS: 100 INJECTION, SOLUTION INTRAVENOUS; SUBCUTANEOUS at 11:49

## 2017-02-17 RX ADMIN — LEVALBUTEROL HYDROCHLORIDE 0.63 MG: 0.63 SOLUTION RESPIRATORY (INHALATION) at 14:47

## 2017-02-17 RX ADMIN — LEVALBUTEROL HYDROCHLORIDE 0.63 MG: 0.63 SOLUTION RESPIRATORY (INHALATION) at 19:24

## 2017-02-17 RX ADMIN — HYDRALAZINE HYDROCHLORIDE 25 MG: 25 TABLET, FILM COATED ORAL at 22:15

## 2017-02-17 RX ADMIN — GABAPENTIN 600 MG: 300 CAPSULE ORAL at 05:40

## 2017-02-17 RX ADMIN — ALLOPURINOL 100 MG: 100 TABLET ORAL at 08:23

## 2017-02-17 RX ADMIN — PETROLATUM: 42 OINTMENT TOPICAL at 08:29

## 2017-02-17 RX ADMIN — FUROSEMIDE 40 MG: 10 INJECTION, SOLUTION INTRAMUSCULAR; INTRAVENOUS at 17:00

## 2017-02-17 RX ADMIN — ISOSORBIDE DINITRATE 20 MG: 20 TABLET ORAL at 13:14

## 2017-02-17 RX ADMIN — CARVEDILOL 25 MG: 25 TABLET, FILM COATED ORAL at 17:00

## 2017-02-17 RX ADMIN — FAMOTIDINE 20 MG: 20 TABLET ORAL at 17:00

## 2017-02-17 RX ADMIN — MILRINONE LACTATE 0.5 MCG/KG/MIN: 200 INJECTION, SOLUTION INTRAVENOUS at 05:00

## 2017-02-17 RX ADMIN — HYDRALAZINE HYDROCHLORIDE 25 MG: 25 TABLET, FILM COATED ORAL at 13:14

## 2017-02-17 RX ADMIN — ISOSORBIDE DINITRATE 20 MG: 20 TABLET ORAL at 05:40

## 2017-02-17 RX ADMIN — COLCHICINE 0.6 MG: 0.6 TABLET, FILM COATED ORAL at 08:24

## 2017-02-17 RX ADMIN — SPIRONOLACTONE 50 MG: 25 TABLET, FILM COATED ORAL at 08:24

## 2017-02-17 RX ADMIN — APIXABAN 5 MG: 5 TABLET, FILM COATED ORAL at 08:24

## 2017-02-17 RX ADMIN — APIXABAN 5 MG: 5 TABLET, FILM COATED ORAL at 22:15

## 2017-02-17 RX ADMIN — MILRINONE LACTATE 0.5 MCG/KG/MIN: 200 INJECTION, SOLUTION INTRAVENOUS at 17:02

## 2017-02-17 RX ADMIN — CARVEDILOL 25 MG: 25 TABLET, FILM COATED ORAL at 08:24

## 2017-02-17 RX ADMIN — Medication 10 ML: at 12:08

## 2017-02-17 RX ADMIN — ISOSORBIDE DINITRATE 20 MG: 20 TABLET ORAL at 22:16

## 2017-02-17 RX ADMIN — GUAIFENESIN 1200 MG: 600 TABLET, EXTENDED RELEASE ORAL at 17:00

## 2017-02-17 RX ADMIN — PRAVASTATIN SODIUM 80 MG: 80 TABLET ORAL at 22:16

## 2017-02-17 RX ADMIN — MILRINONE LACTATE 0.5 MCG/KG/MIN: 200 INJECTION, SOLUTION INTRAVENOUS at 09:05

## 2017-02-17 RX ADMIN — MILRINONE LACTATE 0.5 MCG/KG/MIN: 200 INJECTION, SOLUTION INTRAVENOUS at 22:31

## 2017-02-17 RX ADMIN — GABAPENTIN 600 MG: 300 CAPSULE ORAL at 22:16

## 2017-02-17 NOTE — PROGRESS NOTES
Verbal bedside report given to Tabatha Ayala, oncoming RN. Patient's situation, background, assessment and recommendations provided. Opportunity for questions provided. Oncoming RN assumed care of patient. Primacor IV drip verified at bedside with oncoming RN.

## 2017-02-17 NOTE — PROGRESS NOTES
Rn liaison from [facility] contacted this writer to inform that [facility] declined pt t'tabatha to [facility]. Dr. Hobson Gist informed.

## 2017-02-17 NOTE — PROGRESS NOTES
Patient refused miralax and suppository today. He reports having incontinent diarrhea at times recently. Colace held. Patient bathed with Oralia Melara PCT and Lidia PCT  Lotion to legs-see MAR  Pitting edema to BLE continues andTed hose and sticky ace wraps to BLE. Pt assisted to chair. Scrotum and penis EXTREMELY edematous. Elevated with towel. Pt would like MD to assess today. Primacor continues infusing to R IJ.  Pt denies further needs at this time

## 2017-02-17 NOTE — PROGRESS NOTES
Palliative Care Progress Note    Patient: Dania Woo MRN: [id number]  SSN: [id number]    YOB: 1965  Age: 45 y.o. Sex: male       Assessment/Plan:     Chief Complaint/Interval History: remains on milrinone; resting with CPAP; no family at bedside. Principal Diagnosis:    · Constipation, Unspecified  K59.00- resolving; had BM yesterday; continue miralax daily    Additional Diagnoses:   · Debility, Unspecified  R53.81-ongoing, due to pt medical condition  · Edema  R60.9-ongoing  · Fatigue, Lethargy  R53.83  · Frailty  R54  · Counseling, Encounter for Medical Advice  Z71.9-no family present;   · Encounter for Palliative Care  Z51.5    Palliative Performance Scale (PPS)  PPS: 40    Medical Decision Making:   Reviewed and summarized notes over previous 24 hours. Discussed case with appropriate providers: primary RN; JUD Au  Reviewed laboratory and x-ray data. Awaiting approval for [facility]. We will follow along intermittently. Unfortunately pt goals do not appear to be particularly in line with his health status and I do not know if either of these variables will change. Will discuss findings with members of the interdisciplinary team.       More than 50% of this 15 minute visit was spent counseling and coordination of care as outlined above. Subjective:     Review of Systems:  A comprehensive review of systems was not obtained as pt drowsy, minimally responsive to me. Objective:     Visit Vitals    /69 (BP 1 Location: Right arm, BP Patient Position: At rest)    Pulse 85    Temp 98.6 °F (37 °C)    Resp 18    Ht 5' 6\" (1.676 m)    Wt (!) 420 lb 8 oz (190.7 kg)    SpO2 98%    BMI 67.87 kg/m2       Physical Exam:    General:  Debilitated appearing. Cooperative. No acute distress. Morbidly obese. Up in chair   Eyes:  Conjunctivae/corneas clear    Nose: Nares normal. Septum midline.    Neck: Supple, symmetrical, trachea midline, no JVD   Lungs:   Scattered wheezes; on cpap   Heart:  S1 S2   Abdomen: Morbidly obese. Soft, non-tender. Normoactive bowel sounds. Extremities: Normal, atraumatic, no cyanosis. Skin: Skin color, texture, turgor normal. No rash.    Neurologic: Nonfocal.   Psych: drowsy     Signed By: Devi Blackwell NP     February 17, 2017

## 2017-02-17 NOTE — PROGRESS NOTES
Bedside and Verbal shift change report given to ITZEL Medley (oncoming nurse) by Sierra Rick RN (offgoing nurse). Report included the following information SBAR, Kardex, Accordion and Recent Results.

## 2017-02-17 NOTE — PROGRESS NOTES
Reviewed notes prior to visiting patient. Reviewed notes with palliative care team.  Winnie identified and contact information on file. Patient appears content. [place] family at bedside . Patient was receptive to  visit and easily engaged in conversation and reflection.  provided supportive presence and used this visit to build rapport with patient. Patient stated that he wants to get better. Acknowledged that this is going to be extended journey. Reaffirmed the nearness of God and His support that will make his recovery possible. Patient benefited from addressing his  spiritual needs. Continued visits by chaplains is valuable in his healing process.   Signed by Miguelina Oneil MDIV, [facility]

## 2017-02-17 NOTE — PROGRESS NOTES
[facility] CARDIOLOGY PROGRESS NOTE           2/17/2017 2:47 PM    Admit Date: 1/19/2017    Admit Diagnosis: NSVT (nonsustained ventricular tachycardia) (HCC)      Subjective:   Patient BP has been stable. Edema remains    Objective:     Vitals:    02/17/17 0040 02/17/17 0502 02/17/17 0900 02/17/17 1254   BP: 144/68 122/83 148/69 (!) 132/105   Pulse: 80 80 85 82   Resp: 16 16 18 18   Temp: 97.8 °F (36.6 °C) 97.3 °F (36.3 °C) 98.6 °F (37 °C) 98.8 °F (37.1 °C)   SpO2: 93% 93% 98% 95%   Weight:  (!) 420 lb 8 oz (190.7 kg)     Height:           Physical Exam:  General-Well Developed, Well Nourished, No Acute Distress, Alert & Oriented x 3, appropriate mood. Neck- supple, no JVD  CV- regular rate and rhythm no MRG  Lung- clear bilaterally  Abd- soft, nontender, nondistended  Ext- +4 edema bilaterally.   Skin- warm and dry    Current Facility-Administered Medications   Medication Dose Route Frequency    insulin lispro (HUMALOG) injection   SubCUTAneous AC&HS    polyethylene glycol (MIRALAX) packet 17 g  17 g Oral DAILY    milrinone (PRIMACOR) 20 MG/100 ML D5W infusion  0.5 mcg/kg/min IntraVENous CONTINUOUS    guaiFENesin SR (MUCINEX) tablet 1,200 mg  1,200 mg Oral BID    furosemide (LASIX) injection 40 mg  40 mg IntraVENous DAILY    famotidine (PEPCID) tablet 20 mg  20 mg Oral DAILY    levalbuterol (XOPENEX) nebulizer soln 0.63 mg/3 mL  0.63 mg Nebulization Q4H PRN    mineral oil-hydrophil petrolat (AQUAPHOR) ointment   Topical DAILY    ondansetron (ZOFRAN) injection 4 mg  4 mg IntraVENous Q6H PRN    spironolactone (ALDACTONE) tablet 50 mg  50 mg Oral DAILY    amiodarone (CORDARONE) tablet 200 mg  200 mg Oral DAILY    hydrocortisone (CORTAID) 0.5 % cream   Topical BID    allopurinol (ZYLOPRIM) tablet 100 mg  100 mg Oral DAILY    apixaban (ELIQUIS) tablet 5 mg  5 mg Oral Q12H    bisacodyl (DULCOLAX) suppository 10 mg  10 mg Rectal DAILY    carvedilol (COREG) tablet 25 mg  25 mg Oral BID WITH MEALS    colchicine tablet 0.6 mg  0.6 mg Oral DAILY    docusate sodium (COLACE) capsule 100 mg  100 mg Oral BID    gabapentin (NEURONTIN) capsule 600 mg  600 mg Oral TID    hydrALAZINE (APRESOLINE) tablet 25 mg  25 mg Oral TID    insulin glargine (LANTUS) injection 50 Units  50 Units SubCUTAneous QHS    isosorbide dinitrate (ISORDIL) tablet 20 mg  20 mg Oral TID    nitroglycerin (NITROSTAT) tablet 0.4 mg  0.4 mg SubLINGual Q5MIN PRN    oxyCODONE IR (OXY-IR) immediate release tablet 15 mg  15 mg Oral Q4H PRN    polyethylene glycol (MIRALAX) packet 17 g  17 g Oral DAILY PRN    pravastatin (PRAVACHOL) tablet 80 mg  80 mg Oral QHS    sodium chloride (NS) flush 5-10 mL  5-10 mL IntraVENous Q8H    sodium chloride (NS) flush 5-10 mL  5-10 mL IntraVENous PRN    morphine injection 2 mg  2 mg IntraVENous Q4H PRN     Data Review:   Recent Results (from the past 24 hour(s))   GLUCOSE, POC    Collection Time: 02/16/17  3:52 PM   Result Value Ref Range    Glucose (POC) 137 (H) 65 - 100 mg/dL   GLUCOSE, POC    Collection Time: 02/16/17  9:56 PM   Result Value Ref Range    Glucose (POC) 121 (H) 65 - 100 mg/dL   GLUCOSE, POC    Collection Time: 02/17/17  4:50 AM   Result Value Ref Range    Glucose (POC) 122 (H) 65 - 100 mg/dL   MAGNESIUM    Collection Time: 02/17/17  5:55 AM   Result Value Ref Range    Magnesium 2.5 (H) 1.8 - 2.4 mg/dL   GLUCOSE, POC    Collection Time: 02/17/17 11:22 AM   Result Value Ref Range    Glucose (POC) 168 (H) 65 - 100 mg/dL     Assessment:     Principal Problem:    Nonsustained ventricular tachycardia (HCC) (1/19/2017)    Active Problems:    Obstructive sleep apnea (2/15/2010)      Nonischemic dilated cardiomyopathy ([facility] (7/2/2013)      Overview: LVEF 10-15% on ECHO from 12/17/10      CKD (chronic kidney disease) stage 3, GFR 30-59 ml/min (8/13/2014)      AICD (automatic cardioverter/defibrillator) present (10/21/2015)      Acute on chronic systolic (congestive) heart failure (HCC) (10/18/2016)      [facility] (10/20/2016)      Obesity hypoventilation syndrome ([facility] (10/24/2016)      Morbid obesity with BMI of 60.0-69.9, adult ([facility] (11/28/2016)      Syncope (1/19/2017)      NSVT (nonsustained ventricular tachycardia) ([facility] (1/19/2017)      Plan:   1. Nonsustained ventricular tachycardia, controlled: continue amiodarone, decreased to 200 mg daily  2. CKD (chronic kidney disease) - continued on milrinone and diuresis. Currently limited on diuresis secondary to renal function,  3. Acute on chronic systolic (congestive) heart failure -volume overload, most likely component of cor pulmonale as well with morbid obesity --KELSIE--will continue to wear BiPAP, legs wrapped, continued on IV lasix and milrinone  4. Atrial flutter --stable, on eliquis  5. Edema, uncontrolled - legs wrapped, iv lasix  6. AICD: functioning normall  7. Obesity hypoventilation, KELSIE: BiPAP   8. Scrotal edema: iv lasix  9. Prognosis - repeat echo showed EF 10% with severe LV Dilation. Patient and Family request a meeting with Dr. Cynthia De Oliveira and want him transferred for a cardiac transplant or closer to home hospital up [facility]. Rosangela Ernandez M.D., F.A.C.C, F.H.R.S.   Cardiology/Electrophysiology

## 2017-02-17 NOTE — PROGRESS NOTES
Attempted to place pt on home CPAP at this time. Pt did not tolerate: Pt began coughing and coughed up copious clear thick sputum after I removed the mask per his request.    Pt currently on 3L nasal cannula and verbalized he would contact Respiratory when ready or when needed. Will continue to monitor.      02/17/17 0031   Oxygen Therapy   O2 Sat (%) 95 %   Pulse via Oximetry 79 beats per minute   O2 Device Nasal cannula   O2 Flow Rate (L/min) 3 l/min

## 2017-02-17 NOTE — PROGRESS NOTES
Much time spent with pt discussing his diagnosis and condition. He stated that he realizes he cannot live well if he is off of the primacor. Discussed his family and RN allowed him time to talk and work out his knowledge of his illness and that he knows that without intervention from a higher power he is not going to be able to live long.   He still will talk at times as if he believes he will get better but there are times that he acknowledges that he is terminal.

## 2017-02-17 NOTE — PROGRESS NOTES
Problem: Falls - Risk of  Goal: *Absence of falls  Outcome: Progressing Towards Goal  Fall precautions in place. Red socks on. Instructed to only get OOB with assistance. Voiced understanding. Call light in reach. Goal: *Knowledge of fall prevention  Outcome: Progressing Towards Goal  Pt educated on fall prevention and to use call light for assistance. Pt verbalizes understanding and will use call light for assistance.          Problem: Pressure Ulcer - Risk of  Goal: *Prevention of pressure ulcer  Outcome: Progressing Towards Goal    02/16/17 1920   Read Only, Retired: Wound Treatment (non-mechanical)   Wound Offloading (Prevention Methods) Bed, pressure reduction mattress;Pillows;Repositioning;Turning   Wound Prevention and Protection Methods   Orientation of Wound Prevention Mid;Posterior   Location of Wound Prevention Sacrum/Coccyx   Dressing Present  No   Dressing Status Intact

## 2017-02-17 NOTE — PROGRESS NOTES
BLAINE spoke with Daniel Howard, RN liaison with [facility] . They have contacted [facility] again this morning and should have a determination by day's end.

## 2017-02-18 LAB
ANION GAP BLD CALC-SCNC: 9 MMOL/L (ref 7–16)
BUN SERPL-MCNC: 49 MG/DL (ref 6–23)
CALCIUM SERPL-MCNC: 8.3 MG/DL (ref 8.3–10.4)
CHLORIDE SERPL-SCNC: 97 MMOL/L (ref 98–107)
CO2 SERPL-SCNC: 32 MMOL/L (ref 21–32)
CREAT SERPL-MCNC: 1.84 MG/DL (ref 0.8–1.5)
GLUCOSE BLD STRIP.AUTO-MCNC: 132 MG/DL (ref 65–100)
GLUCOSE BLD STRIP.AUTO-MCNC: 146 MG/DL (ref 65–100)
GLUCOSE BLD STRIP.AUTO-MCNC: 154 MG/DL (ref 65–100)
GLUCOSE BLD STRIP.AUTO-MCNC: 156 MG/DL (ref 65–100)
GLUCOSE SERPL-MCNC: 127 MG/DL (ref 65–100)
POTASSIUM SERPL-SCNC: 3.7 MMOL/L (ref 3.5–5.1)
SODIUM SERPL-SCNC: 138 MMOL/L (ref 136–145)

## 2017-02-18 PROCEDURE — 74011636637 HC RX REV CODE- 636/637: Performed by: NURSE PRACTITIONER

## 2017-02-18 PROCEDURE — 36591 DRAW BLOOD OFF VENOUS DEVICE: CPT

## 2017-02-18 PROCEDURE — 74011250636 HC RX REV CODE- 250/636: Performed by: NURSE PRACTITIONER

## 2017-02-18 PROCEDURE — 74011250637 HC RX REV CODE- 250/637: Performed by: NURSE PRACTITIONER

## 2017-02-18 PROCEDURE — 74011250637 HC RX REV CODE- 250/637: Performed by: INTERNAL MEDICINE

## 2017-02-18 PROCEDURE — 74011250636 HC RX REV CODE- 250/636: Performed by: INTERNAL MEDICINE

## 2017-02-18 PROCEDURE — 82962 GLUCOSE BLOOD TEST: CPT

## 2017-02-18 PROCEDURE — 3331090001 HH PPS REVENUE CREDIT

## 2017-02-18 PROCEDURE — 65660000000 HC RM CCU STEPDOWN

## 2017-02-18 PROCEDURE — 3331090002 HH PPS REVENUE DEBIT

## 2017-02-18 PROCEDURE — 77010033678 HC OXYGEN DAILY

## 2017-02-18 PROCEDURE — 74011636637 HC RX REV CODE- 636/637: Performed by: PHYSICIAN ASSISTANT

## 2017-02-18 PROCEDURE — 94640 AIRWAY INHALATION TREATMENT: CPT

## 2017-02-18 PROCEDURE — 74011000250 HC RX REV CODE- 250: Performed by: INTERNAL MEDICINE

## 2017-02-18 PROCEDURE — 80048 BASIC METABOLIC PNL TOTAL CA: CPT | Performed by: INTERNAL MEDICINE

## 2017-02-18 PROCEDURE — 94760 N-INVAS EAR/PLS OXIMETRY 1: CPT

## 2017-02-18 RX ORDER — DIAPER,BRIEF,INFANT-TODD,DISP
EACH MISCELLANEOUS
Status: DISCONTINUED | OUTPATIENT
Start: 2017-02-18 | End: 2017-02-24 | Stop reason: HOSPADM

## 2017-02-18 RX ORDER — FACIAL-BODY WIPES
10 EACH TOPICAL DAILY PRN
Status: DISCONTINUED | OUTPATIENT
Start: 2017-02-18 | End: 2017-02-24 | Stop reason: HOSPADM

## 2017-02-18 RX ADMIN — MILRINONE LACTATE 0.5 MCG/KG/MIN: 200 INJECTION, SOLUTION INTRAVENOUS at 21:43

## 2017-02-18 RX ADMIN — COLCHICINE 0.6 MG: 0.6 TABLET, FILM COATED ORAL at 08:01

## 2017-02-18 RX ADMIN — LEVALBUTEROL HYDROCHLORIDE 0.63 MG: 0.63 SOLUTION RESPIRATORY (INHALATION) at 01:30

## 2017-02-18 RX ADMIN — ISOSORBIDE DINITRATE 20 MG: 20 TABLET ORAL at 12:39

## 2017-02-18 RX ADMIN — INSULIN LISPRO 3 UNITS: 100 INJECTION, SOLUTION INTRAVENOUS; SUBCUTANEOUS at 12:38

## 2017-02-18 RX ADMIN — AMIODARONE HYDROCHLORIDE 200 MG: 200 TABLET ORAL at 08:01

## 2017-02-18 RX ADMIN — FAMOTIDINE 20 MG: 20 TABLET ORAL at 17:08

## 2017-02-18 RX ADMIN — OXYCODONE HYDROCHLORIDE 15 MG: 15 TABLET ORAL at 21:36

## 2017-02-18 RX ADMIN — APIXABAN 5 MG: 5 TABLET, FILM COATED ORAL at 08:01

## 2017-02-18 RX ADMIN — HYDRALAZINE HYDROCHLORIDE 25 MG: 25 TABLET, FILM COATED ORAL at 12:38

## 2017-02-18 RX ADMIN — CARVEDILOL 25 MG: 25 TABLET, FILM COATED ORAL at 08:01

## 2017-02-18 RX ADMIN — MILRINONE LACTATE 0.5 MCG/KG/MIN: 200 INJECTION, SOLUTION INTRAVENOUS at 01:37

## 2017-02-18 RX ADMIN — ALLOPURINOL 100 MG: 100 TABLET ORAL at 08:01

## 2017-02-18 RX ADMIN — GUAIFENESIN 1200 MG: 600 TABLET, EXTENDED RELEASE ORAL at 17:07

## 2017-02-18 RX ADMIN — FAMOTIDINE 20 MG: 20 TABLET ORAL at 08:01

## 2017-02-18 RX ADMIN — ISOSORBIDE DINITRATE 20 MG: 20 TABLET ORAL at 21:36

## 2017-02-18 RX ADMIN — GABAPENTIN 600 MG: 300 CAPSULE ORAL at 05:46

## 2017-02-18 RX ADMIN — LEVALBUTEROL HYDROCHLORIDE 0.63 MG: 0.63 SOLUTION RESPIRATORY (INHALATION) at 13:28

## 2017-02-18 RX ADMIN — DOCUSATE SODIUM 100 MG: 100 CAPSULE, LIQUID FILLED ORAL at 17:08

## 2017-02-18 RX ADMIN — APIXABAN 5 MG: 5 TABLET, FILM COATED ORAL at 21:37

## 2017-02-18 RX ADMIN — PETROLATUM: 42 OINTMENT TOPICAL at 09:00

## 2017-02-18 RX ADMIN — PRAVASTATIN SODIUM 80 MG: 80 TABLET ORAL at 21:36

## 2017-02-18 RX ADMIN — HYDRALAZINE HYDROCHLORIDE 25 MG: 25 TABLET, FILM COATED ORAL at 21:36

## 2017-02-18 RX ADMIN — Medication 10 ML: at 21:37

## 2017-02-18 RX ADMIN — GUAIFENESIN 1200 MG: 600 TABLET, EXTENDED RELEASE ORAL at 08:01

## 2017-02-18 RX ADMIN — HYDRALAZINE HYDROCHLORIDE 25 MG: 25 TABLET, FILM COATED ORAL at 05:46

## 2017-02-18 RX ADMIN — MILRINONE LACTATE 0.5 MCG/KG/MIN: 200 INJECTION, SOLUTION INTRAVENOUS at 13:40

## 2017-02-18 RX ADMIN — DOCUSATE SODIUM 100 MG: 100 CAPSULE, LIQUID FILLED ORAL at 07:57

## 2017-02-18 RX ADMIN — FUROSEMIDE 40 MG: 10 INJECTION, SOLUTION INTRAMUSCULAR; INTRAVENOUS at 17:07

## 2017-02-18 RX ADMIN — INSULIN GLARGINE 50 UNITS: 100 INJECTION, SOLUTION SUBCUTANEOUS at 22:26

## 2017-02-18 RX ADMIN — MILRINONE LACTATE 0.5 MCG/KG/MIN: 200 INJECTION, SOLUTION INTRAVENOUS at 10:05

## 2017-02-18 RX ADMIN — FUROSEMIDE 40 MG: 10 INJECTION, SOLUTION INTRAMUSCULAR; INTRAVENOUS at 08:01

## 2017-02-18 RX ADMIN — GABAPENTIN 600 MG: 300 CAPSULE ORAL at 21:35

## 2017-02-18 RX ADMIN — POLYETHYLENE GLYCOL 3350 17 G: 17 POWDER, FOR SOLUTION ORAL at 07:57

## 2017-02-18 RX ADMIN — MILRINONE LACTATE 0.5 MCG/KG/MIN: 200 INJECTION, SOLUTION INTRAVENOUS at 17:53

## 2017-02-18 RX ADMIN — Medication 2 MG: at 01:31

## 2017-02-18 RX ADMIN — LEVALBUTEROL HYDROCHLORIDE 0.63 MG: 0.63 SOLUTION RESPIRATORY (INHALATION) at 07:21

## 2017-02-18 RX ADMIN — ISOSORBIDE DINITRATE 20 MG: 20 TABLET ORAL at 05:47

## 2017-02-18 RX ADMIN — LEVALBUTEROL HYDROCHLORIDE 0.63 MG: 0.63 SOLUTION RESPIRATORY (INHALATION) at 19:30

## 2017-02-18 RX ADMIN — SPIRONOLACTONE 50 MG: 25 TABLET, FILM COATED ORAL at 08:01

## 2017-02-18 RX ADMIN — Medication 10 ML: at 05:51

## 2017-02-18 RX ADMIN — OXYCODONE HYDROCHLORIDE 15 MG: 15 TABLET ORAL at 10:05

## 2017-02-18 RX ADMIN — GABAPENTIN 600 MG: 300 CAPSULE ORAL at 12:39

## 2017-02-18 RX ADMIN — CARVEDILOL 25 MG: 25 TABLET, FILM COATED ORAL at 17:08

## 2017-02-18 NOTE — PROGRESS NOTES
Bedside and Verbal shift change report given to ITZEL Jung (oncoming nurse) by Lawrence Gross RN (offgoing nurse). Report included the following information SBAR, Kardex, MAR and Recent Results.

## 2017-02-18 NOTE — PROGRESS NOTES
[facility] CARDIOLOGY PROGRESS NOTE           2/18/2017 2:47 PM    Admit Date: 1/19/2017    Admit Diagnosis: NSVT (nonsustained ventricular tachycardia) (Formerly [facility])      Subjective:   Patient BP has been stable. Edema remains. Able to diuresis. Objective:     Vitals:    02/18/17 0453 02/18/17 0546 02/18/17 0652 02/18/17 0721   BP: 146/80 154/75     Pulse: 87 85     Resp: 18      Temp: 97.4 °F (36.3 °C)      SpO2: 92%   96%   Weight:   (!) 415 lb 12.8 oz (188.6 kg)    Height:           Physical Exam:  Eleni Show, Well Nourished, No Acute Distress, Alert & Oriented x 3, appropriate mood. Neck- supple, no JVD  CV- regular rate and rhythm no MRG  Lung- clear bilaterally  Abd- soft, nontender, nondistended  Ext- +4 edema bilaterally.   Skin- warm and dry    Current Facility-Administered Medications   Medication Dose Route Frequency    furosemide (LASIX) injection 40 mg  40 mg IntraVENous BID    levalbuterol (XOPENEX) nebulizer soln 0.63 mg/3 mL  0.63 mg Nebulization Q6H RT    famotidine (PEPCID) tablet 20 mg  20 mg Oral BID    insulin lispro (HUMALOG) injection   SubCUTAneous AC&HS    polyethylene glycol (MIRALAX) packet 17 g  17 g Oral DAILY    milrinone (PRIMACOR) 20 MG/100 ML D5W infusion  0.5 mcg/kg/min IntraVENous CONTINUOUS    guaiFENesin SR (MUCINEX) tablet 1,200 mg  1,200 mg Oral BID    mineral oil-hydrophil petrolat (AQUAPHOR) ointment   Topical DAILY    ondansetron (ZOFRAN) injection 4 mg  4 mg IntraVENous Q6H PRN    spironolactone (ALDACTONE) tablet 50 mg  50 mg Oral DAILY    amiodarone (CORDARONE) tablet 200 mg  200 mg Oral DAILY    hydrocortisone (CORTAID) 0.5 % cream   Topical BID    allopurinol (ZYLOPRIM) tablet 100 mg  100 mg Oral DAILY    apixaban (ELIQUIS) tablet 5 mg  5 mg Oral Q12H    bisacodyl (DULCOLAX) suppository 10 mg  10 mg Rectal DAILY    carvedilol (COREG) tablet 25 mg  25 mg Oral BID WITH MEALS    colchicine tablet 0.6 mg  0.6 mg Oral DAILY    docusate sodium (COLACE) capsule 100 mg  100 mg Oral BID    gabapentin (NEURONTIN) capsule 600 mg  600 mg Oral TID    hydrALAZINE (APRESOLINE) tablet 25 mg  25 mg Oral TID    insulin glargine (LANTUS) injection 50 Units  50 Units SubCUTAneous QHS    isosorbide dinitrate (ISORDIL) tablet 20 mg  20 mg Oral TID    nitroglycerin (NITROSTAT) tablet 0.4 mg  0.4 mg SubLINGual Q5MIN PRN    oxyCODONE IR (OXY-IR) immediate release tablet 15 mg  15 mg Oral Q4H PRN    polyethylene glycol (MIRALAX) packet 17 g  17 g Oral DAILY PRN    pravastatin (PRAVACHOL) tablet 80 mg  80 mg Oral QHS    sodium chloride (NS) flush 5-10 mL  5-10 mL IntraVENous Q8H    sodium chloride (NS) flush 5-10 mL  5-10 mL IntraVENous PRN    morphine injection 2 mg  2 mg IntraVENous Q4H PRN     Data Review:   Recent Results (from the past 24 hour(s))   GLUCOSE, POC    Collection Time: 02/17/17 11:22 AM   Result Value Ref Range    Glucose (POC) 168 (H) 65 - 100 mg/dL   GLUCOSE, POC    Collection Time: 02/17/17  4:30 PM   Result Value Ref Range    Glucose (POC) 179 (H) 65 - 100 mg/dL   GLUCOSE, POC    Collection Time: 02/17/17  9:42 PM   Result Value Ref Range    Glucose (POC) 141 (H) 65 - 039 mg/dL   METABOLIC PANEL, BASIC    Collection Time: 02/18/17  5:45 AM   Result Value Ref Range    Sodium 138 136 - 145 mmol/L    Potassium 3.7 3.5 - 5.1 mmol/L    Chloride 97 (L) 98 - 107 mmol/L    CO2 32 21 - 32 mmol/L    Anion gap 9 7 - 16 mmol/L    Glucose 127 (H) 65 - 100 mg/dL    BUN 49 (H) 6 - 23 MG/DL    Creatinine 1.84 (H) 0.8 - 1.5 MG/DL    GFR est AA 50 (L) >60 ml/min/1.73m2    GFR est non-AA 41 (L) >60 ml/min/1.73m2    Calcium 8.3 8.3 - 10.4 MG/DL   GLUCOSE, POC    Collection Time: 02/18/17  7:21 AM   Result Value Ref Range    Glucose (POC) 146 (H) 65 - 100 mg/dL     Assessment:     Principal Problem:    Nonsustained ventricular tachycardia (HCC) (1/19/2017)    Active Problems:    Obstructive sleep apnea (2/15/2010)      Nonischemic dilated cardiomyopathy ([facility] (7/2/2013)      Overview: LVEF 10-15% on ECHO from 12/17/10      CKD (chronic kidney disease) stage 3, GFR 30-59 ml/min (8/13/2014)      AICD (automatic cardioverter/defibrillator) present (10/21/2015)      Acute on chronic systolic (congestive) heart failure ([facility] (10/18/2016)      Atrial flutter ([facility] (10/20/2016)      Obesity hypoventilation syndrome ([facility] (10/24/2016)      Morbid obesity with BMI of 60.0-69.9, adult ([facility] (11/28/2016)      Syncope (1/19/2017)      NSVT (nonsustained ventricular tachycardia) ([facility] (1/19/2017)      Plan:   1. Nonsustained ventricular tachycardia, controlled: continue amiodarone, decreased to 200 mg daily  2. CKD (chronic kidney disease) - continued on milrinone and diuresis. Currently limited on diuresis secondary to renal function,  3. Acute on chronic systolic (congestive) heart failure -volume overload, most likely component of cor pulmonale as well with morbid obesity --KELSIE--will continue to wear BiPAP, legs wrapped, continued on IV lasix and milrinone  4. Atrial flutter --stable, on eliquis  5. Edema, uncontrolled - legs wrapped, iv lasix  6. AICD: functioning normall  7. Obesity hypoventilation, KELSIE: BiPAP   8. Scrotal edema: iv lasix  9. Prognosis - repeat echo showed EF 10% with severe LV Dilation. Patient and Family request a meeting with Dr. Shonda Dee and want him transferred for a cardiac transplant or closer to home hospital up [facility]. Danni Callejas M.D., F.A.C.C, F.H.R.S.   Cardiology/Electrophysiology

## 2017-02-19 LAB
ANION GAP BLD CALC-SCNC: 8 MMOL/L (ref 7–16)
BUN SERPL-MCNC: 44 MG/DL (ref 6–23)
CALCIUM SERPL-MCNC: 7.9 MG/DL (ref 8.3–10.4)
CHLORIDE SERPL-SCNC: 97 MMOL/L (ref 98–107)
CO2 SERPL-SCNC: 33 MMOL/L (ref 21–32)
CREAT SERPL-MCNC: 1.79 MG/DL (ref 0.8–1.5)
GLUCOSE BLD STRIP.AUTO-MCNC: 117 MG/DL (ref 65–100)
GLUCOSE BLD STRIP.AUTO-MCNC: 138 MG/DL (ref 65–100)
GLUCOSE BLD STRIP.AUTO-MCNC: 149 MG/DL (ref 65–100)
GLUCOSE BLD STRIP.AUTO-MCNC: 177 MG/DL (ref 65–100)
GLUCOSE SERPL-MCNC: 139 MG/DL (ref 65–100)
POTASSIUM SERPL-SCNC: 3.6 MMOL/L (ref 3.5–5.1)
SODIUM SERPL-SCNC: 138 MMOL/L (ref 136–145)

## 2017-02-19 PROCEDURE — 3331090001 HH PPS REVENUE CREDIT

## 2017-02-19 PROCEDURE — 80048 BASIC METABOLIC PNL TOTAL CA: CPT | Performed by: INTERNAL MEDICINE

## 2017-02-19 PROCEDURE — 65660000000 HC RM CCU STEPDOWN

## 2017-02-19 PROCEDURE — 94760 N-INVAS EAR/PLS OXIMETRY 1: CPT

## 2017-02-19 PROCEDURE — 74011250637 HC RX REV CODE- 250/637: Performed by: INTERNAL MEDICINE

## 2017-02-19 PROCEDURE — 74011000250 HC RX REV CODE- 250: Performed by: INTERNAL MEDICINE

## 2017-02-19 PROCEDURE — 74011250637 HC RX REV CODE- 250/637: Performed by: NURSE PRACTITIONER

## 2017-02-19 PROCEDURE — 3331090002 HH PPS REVENUE DEBIT

## 2017-02-19 PROCEDURE — 74011636637 HC RX REV CODE- 636/637: Performed by: PHYSICIAN ASSISTANT

## 2017-02-19 PROCEDURE — 82962 GLUCOSE BLOOD TEST: CPT

## 2017-02-19 PROCEDURE — 77010033678 HC OXYGEN DAILY

## 2017-02-19 PROCEDURE — 94640 AIRWAY INHALATION TREATMENT: CPT

## 2017-02-19 PROCEDURE — 74011636637 HC RX REV CODE- 636/637: Performed by: NURSE PRACTITIONER

## 2017-02-19 PROCEDURE — 74011250636 HC RX REV CODE- 250/636: Performed by: INTERNAL MEDICINE

## 2017-02-19 PROCEDURE — 36415 COLL VENOUS BLD VENIPUNCTURE: CPT | Performed by: INTERNAL MEDICINE

## 2017-02-19 RX ADMIN — PETROLATUM: 42 OINTMENT TOPICAL at 09:00

## 2017-02-19 RX ADMIN — HYDRALAZINE HYDROCHLORIDE 25 MG: 25 TABLET, FILM COATED ORAL at 13:41

## 2017-02-19 RX ADMIN — DOCUSATE SODIUM 100 MG: 100 CAPSULE, LIQUID FILLED ORAL at 08:25

## 2017-02-19 RX ADMIN — Medication 10 ML: at 05:57

## 2017-02-19 RX ADMIN — GABAPENTIN 600 MG: 300 CAPSULE ORAL at 13:41

## 2017-02-19 RX ADMIN — CARVEDILOL 25 MG: 25 TABLET, FILM COATED ORAL at 08:25

## 2017-02-19 RX ADMIN — ISOSORBIDE DINITRATE 20 MG: 20 TABLET ORAL at 22:07

## 2017-02-19 RX ADMIN — OXYCODONE HYDROCHLORIDE 15 MG: 15 TABLET ORAL at 22:06

## 2017-02-19 RX ADMIN — COLCHICINE 0.6 MG: 0.6 TABLET, FILM COATED ORAL at 08:25

## 2017-02-19 RX ADMIN — MILRINONE LACTATE 0.5 MCG/KG/MIN: 200 INJECTION, SOLUTION INTRAVENOUS at 16:38

## 2017-02-19 RX ADMIN — Medication 10 ML: at 22:30

## 2017-02-19 RX ADMIN — MILRINONE LACTATE 0.5 MCG/KG/MIN: 200 INJECTION, SOLUTION INTRAVENOUS at 05:01

## 2017-02-19 RX ADMIN — CARVEDILOL 25 MG: 25 TABLET, FILM COATED ORAL at 17:22

## 2017-02-19 RX ADMIN — FAMOTIDINE 20 MG: 20 TABLET ORAL at 17:22

## 2017-02-19 RX ADMIN — GABAPENTIN 600 MG: 300 CAPSULE ORAL at 05:55

## 2017-02-19 RX ADMIN — GUAIFENESIN 1200 MG: 600 TABLET, EXTENDED RELEASE ORAL at 17:22

## 2017-02-19 RX ADMIN — SPIRONOLACTONE 50 MG: 25 TABLET, FILM COATED ORAL at 08:25

## 2017-02-19 RX ADMIN — PRAVASTATIN SODIUM 80 MG: 80 TABLET ORAL at 22:06

## 2017-02-19 RX ADMIN — ISOSORBIDE DINITRATE 20 MG: 20 TABLET ORAL at 05:55

## 2017-02-19 RX ADMIN — GABAPENTIN 600 MG: 300 CAPSULE ORAL at 22:07

## 2017-02-19 RX ADMIN — FUROSEMIDE 40 MG: 10 INJECTION, SOLUTION INTRAMUSCULAR; INTRAVENOUS at 17:22

## 2017-02-19 RX ADMIN — LEVALBUTEROL HYDROCHLORIDE 0.63 MG: 0.63 SOLUTION RESPIRATORY (INHALATION) at 14:04

## 2017-02-19 RX ADMIN — OXYCODONE HYDROCHLORIDE 15 MG: 15 TABLET ORAL at 11:02

## 2017-02-19 RX ADMIN — DOCUSATE SODIUM 100 MG: 100 CAPSULE, LIQUID FILLED ORAL at 17:22

## 2017-02-19 RX ADMIN — APIXABAN 5 MG: 5 TABLET, FILM COATED ORAL at 08:25

## 2017-02-19 RX ADMIN — MILRINONE LACTATE 0.5 MCG/KG/MIN: 200 INJECTION, SOLUTION INTRAVENOUS at 02:00

## 2017-02-19 RX ADMIN — MILRINONE LACTATE 0.5 MCG/KG/MIN: 200 INJECTION, SOLUTION INTRAVENOUS at 20:00

## 2017-02-19 RX ADMIN — MILRINONE LACTATE 0.5 MCG/KG/MIN: 200 INJECTION, SOLUTION INTRAVENOUS at 08:36

## 2017-02-19 RX ADMIN — MILRINONE LACTATE 0.5 MCG/KG/MIN: 200 INJECTION, SOLUTION INTRAVENOUS at 13:34

## 2017-02-19 RX ADMIN — ISOSORBIDE DINITRATE 20 MG: 20 TABLET ORAL at 13:41

## 2017-02-19 RX ADMIN — HYDRALAZINE HYDROCHLORIDE 25 MG: 25 TABLET, FILM COATED ORAL at 22:07

## 2017-02-19 RX ADMIN — AMIODARONE HYDROCHLORIDE 200 MG: 200 TABLET ORAL at 08:25

## 2017-02-19 RX ADMIN — APIXABAN 5 MG: 5 TABLET, FILM COATED ORAL at 22:06

## 2017-02-19 RX ADMIN — HYDRALAZINE HYDROCHLORIDE 25 MG: 25 TABLET, FILM COATED ORAL at 05:55

## 2017-02-19 RX ADMIN — GUAIFENESIN 1200 MG: 600 TABLET, EXTENDED RELEASE ORAL at 08:25

## 2017-02-19 RX ADMIN — INSULIN LISPRO 3 UNITS: 100 INJECTION, SOLUTION INTRAVENOUS; SUBCUTANEOUS at 17:22

## 2017-02-19 RX ADMIN — FUROSEMIDE 40 MG: 10 INJECTION, SOLUTION INTRAMUSCULAR; INTRAVENOUS at 08:25

## 2017-02-19 RX ADMIN — Medication 10 ML: at 14:00

## 2017-02-19 RX ADMIN — LEVALBUTEROL HYDROCHLORIDE 0.63 MG: 0.63 SOLUTION RESPIRATORY (INHALATION) at 01:11

## 2017-02-19 RX ADMIN — LEVALBUTEROL HYDROCHLORIDE 0.63 MG: 0.63 SOLUTION RESPIRATORY (INHALATION) at 21:52

## 2017-02-19 RX ADMIN — INSULIN GLARGINE 50 UNITS: 100 INJECTION, SOLUTION SUBCUTANEOUS at 22:30

## 2017-02-19 RX ADMIN — FAMOTIDINE 20 MG: 20 TABLET ORAL at 08:25

## 2017-02-19 RX ADMIN — POLYETHYLENE GLYCOL 3350 17 G: 17 POWDER, FOR SOLUTION ORAL at 08:25

## 2017-02-19 RX ADMIN — ALLOPURINOL 100 MG: 100 TABLET ORAL at 08:25

## 2017-02-19 RX ADMIN — LEVALBUTEROL HYDROCHLORIDE 0.63 MG: 0.63 SOLUTION RESPIRATORY (INHALATION) at 07:28

## 2017-02-19 NOTE — PROGRESS NOTES
[facility] CARDIOLOGY PROGRESS NOTE           2/19/2017 2:47 PM    Admit Date: 1/19/2017    Admit Diagnosis: NSVT (nonsustained ventricular tachycardia) (HCC)      Subjective:   Patient BP has been stable. Edema remains but is improving. Able to diuresis. Objective:     Vitals:    02/19/17 0111 02/19/17 0446 02/19/17 0544 02/19/17 0721   BP:  129/89  135/69   Pulse:  76  77   Resp:  18  17   Temp:  97.2 °F (36.2 °C)  97.5 °F (36.4 °C)   SpO2: 91% 99%  96%   Weight:   (!) 416 lb (188.7 kg)    Height:           Physical Exam:  Angelic Fort, Well Nourished, No Acute Distress, Alert & Oriented x 3, appropriate mood. Neck- supple, no JVD  CV- regular rate and rhythm no MRG  Lung- clear bilaterally  Abd- soft, nontender, nondistended  Ext- +4 edema bilaterally.   Skin- warm and dry    Current Facility-Administered Medications   Medication Dose Route Frequency    hydrocortisone (CORTAID) 0.5 % cream   Topical BID PRN    bisacodyl (DULCOLAX) suppository 10 mg  10 mg Rectal DAILY PRN    furosemide (LASIX) injection 40 mg  40 mg IntraVENous BID    levalbuterol (XOPENEX) nebulizer soln 0.63 mg/3 mL  0.63 mg Nebulization Q6H RT    famotidine (PEPCID) tablet 20 mg  20 mg Oral BID    insulin lispro (HUMALOG) injection   SubCUTAneous AC&HS    polyethylene glycol (MIRALAX) packet 17 g  17 g Oral DAILY    milrinone (PRIMACOR) 20 MG/100 ML D5W infusion  0.5 mcg/kg/min IntraVENous CONTINUOUS    guaiFENesin SR (MUCINEX) tablet 1,200 mg  1,200 mg Oral BID    mineral oil-hydrophil petrolat (AQUAPHOR) ointment   Topical DAILY    ondansetron (ZOFRAN) injection 4 mg  4 mg IntraVENous Q6H PRN    spironolactone (ALDACTONE) tablet 50 mg  50 mg Oral DAILY    amiodarone (CORDARONE) tablet 200 mg  200 mg Oral DAILY    allopurinol (ZYLOPRIM) tablet 100 mg  100 mg Oral DAILY    apixaban (ELIQUIS) tablet 5 mg  5 mg Oral Q12H    carvedilol (COREG) tablet 25 mg  25 mg Oral BID WITH MEALS    colchicine tablet 0.6 mg  0.6 mg Oral DAILY    docusate sodium (COLACE) capsule 100 mg  100 mg Oral BID    gabapentin (NEURONTIN) capsule 600 mg  600 mg Oral TID    hydrALAZINE (APRESOLINE) tablet 25 mg  25 mg Oral TID    insulin glargine (LANTUS) injection 50 Units  50 Units SubCUTAneous QHS    isosorbide dinitrate (ISORDIL) tablet 20 mg  20 mg Oral TID    nitroglycerin (NITROSTAT) tablet 0.4 mg  0.4 mg SubLINGual Q5MIN PRN    oxyCODONE IR (OXY-IR) immediate release tablet 15 mg  15 mg Oral Q4H PRN    polyethylene glycol (MIRALAX) packet 17 g  17 g Oral DAILY PRN    pravastatin (PRAVACHOL) tablet 80 mg  80 mg Oral QHS    sodium chloride (NS) flush 5-10 mL  5-10 mL IntraVENous Q8H    sodium chloride (NS) flush 5-10 mL  5-10 mL IntraVENous PRN    morphine injection 2 mg  2 mg IntraVENous Q4H PRN     Data Review:   Recent Results (from the past 24 hour(s))   GLUCOSE, POC    Collection Time: 02/18/17 11:55 AM   Result Value Ref Range    Glucose (POC) 156 (H) 65 - 100 mg/dL   GLUCOSE, POC    Collection Time: 02/18/17  5:12 PM   Result Value Ref Range    Glucose (POC) 132 (H) 65 - 100 mg/dL   GLUCOSE, POC    Collection Time: 02/18/17  9:59 PM   Result Value Ref Range    Glucose (POC) 154 (H) 65 - 100 mg/dL   GLUCOSE, POC    Collection Time: 02/19/17  6:25 AM   Result Value Ref Range    Glucose (POC) 117 (H) 65 - 100 mg/dL     Assessment:     Principal Problem:    Nonsustained ventricular tachycardia (HCC) (1/19/2017)    Active Problems:    Obstructive sleep apnea (2/15/2010)      Nonischemic dilated cardiomyopathy ([address] (7/2/2013)      Overview: LVEF 10-15% on ECHO from 12/17/10      CKD (chronic kidney disease) stage 3, GFR 30-59 ml/min (8/13/2014)      AICD (automatic cardioverter/defibrillator) present (10/21/2015)      Acute on chronic systolic (congestive) heart failure (HCC) (10/18/2016)      Atrial flutter ([address] (10/20/2016)      Obesity hypoventilation syndrome ([facility] (10/24/2016)      Morbid obesity with BMI of 60.0-69.9, adult ([facility] (11/28/2016)      Syncope (1/19/2017)      NSVT (nonsustained ventricular tachycardia) ([facility] (1/19/2017)      Plan:   1. Nonsustained ventricular tachycardia, controlled: continue amiodarone, decreased to 200 mg daily  2. CKD (chronic kidney disease) - continued on milrinone and diuresis. Currently limited on diuresis secondary to renal function,  3. Acute on chronic systolic (congestive) heart failure -volume overload, most likely component of cor pulmonale as well with morbid obesity --KELSIE--will continue to wear BiPAP, legs wrapped, continued on IV lasix and milrinone  4. Atrial flutter --stable, on eliquis  5. Edema, uncontrolled - legs wrapped, iv lasix  6. AICD: functioning normall  7. Obesity hypoventilation, KELSIE: BiPAP   8. Scrotal edema: iv lasix  9. Prognosis - repeat echo showed EF 10% with severe LV Dilation. Patient and Family request a meeting with Dr. Kylah Almaguer and want him transferred for a cardiac transplant or closer to [facility] hospital [facility] [place] [facility]. Cecy Zelaya M.D., F.A.C.C, F.H.R.S.   Cardiology/Electrophysiology

## 2017-02-19 NOTE — PROGRESS NOTES
Pt refuses to wear his home CPAP due to that fact that he thinks its broken. Sat was 91% on 4l nc, no distress noted. Will continue to monitor.

## 2017-02-19 NOTE — PROGRESS NOTES
Bedside and Verbal shift change report given to ITZEL Sierra (oncoming nurse) by Mele Lucero RN (offgoing nurse). Report included the following information SBAR, Kardex, MAR and Recent Results.

## 2017-02-20 LAB
ANION GAP BLD CALC-SCNC: 8 MMOL/L (ref 7–16)
BUN SERPL-MCNC: 43 MG/DL (ref 6–23)
CALCIUM SERPL-MCNC: 8.5 MG/DL (ref 8.3–10.4)
CHLORIDE SERPL-SCNC: 99 MMOL/L (ref 98–107)
CO2 SERPL-SCNC: 34 MMOL/L (ref 21–32)
CREAT SERPL-MCNC: 1.73 MG/DL (ref 0.8–1.5)
GLUCOSE BLD STRIP.AUTO-MCNC: 110 MG/DL (ref 65–100)
GLUCOSE BLD STRIP.AUTO-MCNC: 158 MG/DL (ref 65–100)
GLUCOSE BLD STRIP.AUTO-MCNC: 83 MG/DL (ref 65–100)
GLUCOSE BLD STRIP.AUTO-MCNC: 97 MG/DL (ref 65–100)
GLUCOSE SERPL-MCNC: 107 MG/DL (ref 65–100)
POTASSIUM SERPL-SCNC: 3.8 MMOL/L (ref 3.5–5.1)
SODIUM SERPL-SCNC: 141 MMOL/L (ref 136–145)

## 2017-02-20 PROCEDURE — 94660 CPAP INITIATION&MGMT: CPT

## 2017-02-20 PROCEDURE — 74011000250 HC RX REV CODE- 250: Performed by: INTERNAL MEDICINE

## 2017-02-20 PROCEDURE — 74011250637 HC RX REV CODE- 250/637: Performed by: INTERNAL MEDICINE

## 2017-02-20 PROCEDURE — 77010033678 HC OXYGEN DAILY

## 2017-02-20 PROCEDURE — 3331090002 HH PPS REVENUE DEBIT

## 2017-02-20 PROCEDURE — 97530 THERAPEUTIC ACTIVITIES: CPT

## 2017-02-20 PROCEDURE — 74011250637 HC RX REV CODE- 250/637: Performed by: NURSE PRACTITIONER

## 2017-02-20 PROCEDURE — 94640 AIRWAY INHALATION TREATMENT: CPT

## 2017-02-20 PROCEDURE — 97110 THERAPEUTIC EXERCISES: CPT

## 2017-02-20 PROCEDURE — 80048 BASIC METABOLIC PNL TOTAL CA: CPT | Performed by: INTERNAL MEDICINE

## 2017-02-20 PROCEDURE — 74011636637 HC RX REV CODE- 636/637: Performed by: PHYSICIAN ASSISTANT

## 2017-02-20 PROCEDURE — 36591 DRAW BLOOD OFF VENOUS DEVICE: CPT

## 2017-02-20 PROCEDURE — 94760 N-INVAS EAR/PLS OXIMETRY 1: CPT

## 2017-02-20 PROCEDURE — 74011250636 HC RX REV CODE- 250/636: Performed by: INTERNAL MEDICINE

## 2017-02-20 PROCEDURE — 3331090001 HH PPS REVENUE CREDIT

## 2017-02-20 PROCEDURE — 65660000000 HC RM CCU STEPDOWN

## 2017-02-20 PROCEDURE — 74011000258 HC RX REV CODE- 258: Performed by: INTERNAL MEDICINE

## 2017-02-20 PROCEDURE — 82962 GLUCOSE BLOOD TEST: CPT

## 2017-02-20 RX ADMIN — DOCUSATE SODIUM 100 MG: 100 CAPSULE, LIQUID FILLED ORAL at 08:24

## 2017-02-20 RX ADMIN — OXYCODONE HYDROCHLORIDE 15 MG: 15 TABLET ORAL at 11:10

## 2017-02-20 RX ADMIN — ISOSORBIDE DINITRATE 20 MG: 20 TABLET ORAL at 14:39

## 2017-02-20 RX ADMIN — AMIODARONE HYDROCHLORIDE 200 MG: 200 TABLET ORAL at 08:24

## 2017-02-20 RX ADMIN — CARVEDILOL 25 MG: 25 TABLET, FILM COATED ORAL at 17:22

## 2017-02-20 RX ADMIN — COLCHICINE 0.6 MG: 0.6 TABLET, FILM COATED ORAL at 08:24

## 2017-02-20 RX ADMIN — ISOSORBIDE DINITRATE 20 MG: 20 TABLET ORAL at 21:37

## 2017-02-20 RX ADMIN — SPIRONOLACTONE 50 MG: 25 TABLET, FILM COATED ORAL at 08:24

## 2017-02-20 RX ADMIN — Medication 5 ML: at 14:48

## 2017-02-20 RX ADMIN — LEVALBUTEROL HYDROCHLORIDE 0.63 MG: 0.63 SOLUTION RESPIRATORY (INHALATION) at 08:34

## 2017-02-20 RX ADMIN — DEXTROSE MONOHYDRATE 0.5 MCG/KG/MIN: 5 INJECTION, SOLUTION INTRAVENOUS at 01:50

## 2017-02-20 RX ADMIN — FUROSEMIDE 40 MG: 10 INJECTION, SOLUTION INTRAMUSCULAR; INTRAVENOUS at 08:25

## 2017-02-20 RX ADMIN — ISOSORBIDE DINITRATE 20 MG: 20 TABLET ORAL at 05:43

## 2017-02-20 RX ADMIN — LEVALBUTEROL HYDROCHLORIDE 0.63 MG: 0.63 SOLUTION RESPIRATORY (INHALATION) at 13:58

## 2017-02-20 RX ADMIN — FAMOTIDINE 20 MG: 20 TABLET ORAL at 08:24

## 2017-02-20 RX ADMIN — APIXABAN 5 MG: 5 TABLET, FILM COATED ORAL at 08:24

## 2017-02-20 RX ADMIN — HYDRALAZINE HYDROCHLORIDE 25 MG: 25 TABLET, FILM COATED ORAL at 05:43

## 2017-02-20 RX ADMIN — Medication 10 ML: at 21:32

## 2017-02-20 RX ADMIN — GUAIFENESIN 1200 MG: 600 TABLET, EXTENDED RELEASE ORAL at 08:25

## 2017-02-20 RX ADMIN — FAMOTIDINE 20 MG: 20 TABLET ORAL at 17:22

## 2017-02-20 RX ADMIN — LEVALBUTEROL HYDROCHLORIDE 0.63 MG: 0.63 SOLUTION RESPIRATORY (INHALATION) at 20:32

## 2017-02-20 RX ADMIN — CARVEDILOL 25 MG: 25 TABLET, FILM COATED ORAL at 08:24

## 2017-02-20 RX ADMIN — FUROSEMIDE 40 MG: 10 INJECTION, SOLUTION INTRAMUSCULAR; INTRAVENOUS at 17:23

## 2017-02-20 RX ADMIN — APIXABAN 5 MG: 5 TABLET, FILM COATED ORAL at 21:25

## 2017-02-20 RX ADMIN — SACUBITRIL AND VALSARTAN 1 TABLET: 24; 26 TABLET, FILM COATED ORAL at 21:27

## 2017-02-20 RX ADMIN — GUAIFENESIN 1200 MG: 600 TABLET, EXTENDED RELEASE ORAL at 17:23

## 2017-02-20 RX ADMIN — SACUBITRIL AND VALSARTAN 1 TABLET: 24; 26 TABLET, FILM COATED ORAL at 09:33

## 2017-02-20 RX ADMIN — OXYCODONE HYDROCHLORIDE 15 MG: 15 TABLET ORAL at 21:27

## 2017-02-20 RX ADMIN — ALLOPURINOL 100 MG: 100 TABLET ORAL at 08:24

## 2017-02-20 RX ADMIN — Medication 10 ML: at 05:46

## 2017-02-20 RX ADMIN — POLYETHYLENE GLYCOL 3350 17 G: 17 POWDER, FOR SOLUTION ORAL at 08:25

## 2017-02-20 RX ADMIN — DOCUSATE SODIUM 100 MG: 100 CAPSULE, LIQUID FILLED ORAL at 17:22

## 2017-02-20 RX ADMIN — LEVALBUTEROL HYDROCHLORIDE 0.63 MG: 0.63 SOLUTION RESPIRATORY (INHALATION) at 04:41

## 2017-02-20 RX ADMIN — INSULIN LISPRO 3 UNITS: 100 INJECTION, SOLUTION INTRAVENOUS; SUBCUTANEOUS at 12:24

## 2017-02-20 RX ADMIN — HYDRALAZINE HYDROCHLORIDE 25 MG: 25 TABLET, FILM COATED ORAL at 14:39

## 2017-02-20 RX ADMIN — GABAPENTIN 600 MG: 300 CAPSULE ORAL at 05:43

## 2017-02-20 RX ADMIN — GABAPENTIN 600 MG: 300 CAPSULE ORAL at 21:25

## 2017-02-20 RX ADMIN — PRAVASTATIN SODIUM 80 MG: 80 TABLET ORAL at 21:27

## 2017-02-20 RX ADMIN — GABAPENTIN 600 MG: 300 CAPSULE ORAL at 14:39

## 2017-02-20 RX ADMIN — HYDRALAZINE HYDROCHLORIDE 25 MG: 25 TABLET, FILM COATED ORAL at 21:26

## 2017-02-20 NOTE — PROGRESS NOTES
[facility] CARDIOLOGY PROGRESS NOTE           2/20/2017 8:24 AM    Admit Date: 1/19/2017      Subjective:   Feels ok. ROS:  GEN:  No fever or chills  Cardiovascular:  As noted above:no chest pain or palpitations. Pulmonary:  As noted above:SOB is about the same. Neuro:  No new focal motor or sensory loss    Objective:      Vitals:    02/20/17 0038 02/20/17 0441 02/20/17 0501 02/20/17 0548   BP: (!) 151/96  145/69    Pulse: 76  85    Resp: 16  16    Temp: 97.3 °F (36.3 °C)  97.3 °F (36.3 °C)    SpO2: 98% 96% 96%    Weight:    (!) 190.1 kg (419 lb)   Height:           Physical Exam:  General-appears in no distress sitting up in bed. Neck- supple, no JVD  CV- regular rate and rhythm no MRG  Lung- clear bilaterally  Abd- soft, nontender, nondistended  Ext- 3-4+ edema bilaterally with wraps. 4+scrotal edema. Skin- warm and dry  Psychiatric:  Normal mood and affect. Neurologic:  Alert and oriented X 3      Data Review:   Recent Labs      02/20/17   0540  02/19/17   1100   NA  141  138   K  3.8  3.6   BUN  43*  44*   CREA  1.73*  1.79*   GLU  107*  139*       TELEMETRY: paced. Assessment/Plan:     Principal Problem:    Nonsustained ventricular tachycardia ([facility] (1/19/2017): Stable on Amiodarone and Coreg. ICD in place. Active Problems:    Obstructive sleep apnea (2/15/2010)      Nonischemic dilated cardiomyopathy (HCC) (7/2/2013):Continue Coreg,Lasix,Hydralazine,and Aldactone. Try adding Entresto if BP and renal function allow. Consider hospice if patient stays in [address] [place] [address]. He is considering moving to [place] or [place] to be closer to family. Hopefully,stabilize his condition for discharge and allow patient to travel . Overview: LVEF 10-15% on ECHO from 12/17/10      CKD (chronic kidney disease) stage 3, GFR 30-59 ml/min (8/13/2014): Improved. Tolerating IV lasix BID. Try adding Entresto if BP and renal function allow.       AICD (automatic cardioverter/defibrillator) present (10/21/2015):stable. Acute on chronic systolic (congestive) heart failure ([facility] (10/18/2016): Unchanged. Try stopping Primacor. Try ENTRESTO if BP and renal failure allow. BNP and BMP in am.      Atrial flutter ([facility] (10/20/2016):stable on Eliquis ,Coreg, and Amiodarone. Ob[facility] hypoventilation syndrome ([facility] (10/24/2016)      Morbid obesity with BMI of 60.0-69.9, adult ([facility] (11/28/2016): Morbidity probably excludes any thought of future ?transplant candidacy. I discussed at length with patient and his daughter. Syncope (1/19/2017)      NSVT (nonsustained ventricular tachycardia) ([facility] (1/19/2017): Stable on Amiodarone. ICD is in place.                 Herberth Aguilar MD  2/20/2017 8:24 AM

## 2017-02-20 NOTE — PROGRESS NOTES
Verbal bedside report received from Jayne Harman RN. Assumed care of patient. Primacor IV drip verified at bedside with outgoing RN.

## 2017-02-20 NOTE — PROGRESS NOTES
Dr. Roper Reasons with [address] Cards met with pt today. Pt's goal is to relocate to [place] or [place] nearer family. Will attempt to dc Primacor gtt and stabilize him enough to travel. DC options beyond this are limited. Insurance declined [facility]. Pt could go to rehab but would have to be off Primacor gtt. Not a transplant candidate. Following.

## 2017-02-20 NOTE — PROGRESS NOTES
Spoke with Yuliana River from pharmacy to clarify the two separate orders of primacor. Yuliana River stated that the premixed bags were out so they would have to mix the bags themselves in pharmacy.

## 2017-02-20 NOTE — PROGRESS NOTES
Problem: Self Care Deficits Care Plan (Adult)  Goal: *Acute Goals and Plan of Care (Insert Text)  1. Patient will perform grooming and UE dressing with modified independence within 7 days with equipment as needed. 2. Patient will perform bathing with moderate assistance within 7 days with equipment as needed. 3. Patient will perform lower body dressing and toileting with moderate assistance within 7 days with equipment as needed. 4. Patient will perform toilet transfer with modified independence within 7 days with equipment as needed. 5. Pt will participate in B UE therapeutic exercises for 8 minutes with 3 rest breaks within 7 days. OCCUPATIONAL THERAPY: Daily Note, Treatment Day: 1st and AM 2/20/2017  INPATIENT: Hospital Day: 35  Payor: CARE IMPROVEMENT PLUS / [place] / Product Type: Managed Care Medicare /      NAME/AGE/GENDER: Sandro Maloney is a 45 y.o. male         PRIMARY DIAGNOSIS:  NSVT (nonsustained ventricular tachycardia) ([facility] [address] Nonsustained ventricular tachycardia (HCC) Nonsustained ventricular tachycardia (HCC)        ICD-10: Treatment Diagnosis:        · Generalized Muscle Weakness (M62.81)  · Low Back Pain (M54.5)   Precautions/Allergies:        Fall, Dilaudid [hydromorphone]; Iodinated contrast media - oral and iv dye; and Penicillins       ASSESSMENT:      Mr. Wayne Valente was admitted for the above diagnosis. Pt presents sitting edge of bed upon arrival. Pt was able to complete sit to stand transfer without assistance (SBA). Pt transferred over to special order chair with SBA as well. A sheet was placed under patient's scrotum for comfort. Pt was able to perform UE exercises (in grid below) to increase strength and activity tolerance to perform mobility and ADLs. Pt required brief rest breaks in between sets due to SOB. O2 sats checked and was WNL. Pt was left in chair with family in room. Will continue to benefit from skilled OT during stay.   This section established at most recent assessment   PROBLEM LIST (Impairments causing functional limitations):  1. Decreased Strength  2. Decreased ADL/Functional Activities  3. Decreased Transfer Abilities  4. Decreased Ambulation Ability/Technique  5. Decreased Balance  6. Increased Pain  7. Decreased Activity Tolerance  8. Decreased Work Simplification/Energy Conservation Techniques  9. Increased Fatigue  10. Increased Shortness of Breath  11. Decreased Flexibility/Joint Mobility  12. Edema/Girth  13. Decreased [place] with Home Exercise Program    INTERVENTIONS PLANNED: (Benefits and precautions of occupational therapy have been discussed with the patient.)  1. Activities of daily living training  2. Adaptive equipment training  3. Balance training  4. Clothing management  5. Cognitive training  6. Donning&doffing training  7. Group therapy  8. Hygiene training  9. Neuromuscular re-eduation  10. Prosthetic training  11. Re-evaluation  12. Sensory reintergration training  13. Theraputic activity  14. Theraputic exercise         TREATMENT PLAN: Frequency/Duration: Follow patient 2x's/week to address above goals. Rehabilitation Potential For Stated Goals: GOOD      RECOMMENDED REHABILITATION/EQUIPMENT: (at time of discharge pending progress): Continue Skilled Therapy. OCCUPATIONAL PROFILE AND HISTORY:   History of Present Injury/Illness (Reason for Referral):  Ceferino Espitia is a 45 y.o. male with known NICM with documented EF 10 % with [employer] single chamber ICD in place. He was just discharged on 1/13 with initial complaint of constipation and volume overload. He developed worsening renal failure necessitating hold his diuretics temporarily. He was discharged home on addition of demadex 100 mg daily and miralax for constipation. Really since mid December he has not felt well with worsening orthopnea, increasing abdominal girth, increased wt and lower ext edema.  Today while driving in car he felt presyncopal and was able to pull to side of rode. He did not loose consciousness. He had repeat episode of presyncope prior to coming to ER. While in ER he was noted to have nonsustained ventricular tachycardia. He is not on antiarrhythmia agent. Interrogation of his device revealed normal operation but his VT zone is set at 158 bpm with noted VT in ER at approximately 145 bpm.   Past Medical History/Comorbidities:   Mr. Storm Marrero  has a past medical history of Acute systolic heart failure [facility]) (May, 2009); AICD (automatic cardioverter/defibrillator) present (10/21/2015); Atypical chest pain (4/23/2010); Bronchitis; CAD (coronary artery disease); Cardiomyopathy; Chest pain (10/21/2015); Chronic kidney disease; Chronic pain; Congestive heart failure (CHF) ([address] (10/21/2015); COPD; Diabetes ([address] Diabetes mellitus type II, uncontrolled ([address] (7/2/2013); GERD (gastroesophageal reflux disease); Gout; Heart failure ([address] Hypertension; Hyponatremia (12/20/2010); Ill-defined condition; Morbid obesity ([address] Nausea & vomiting (11/30/2015); Neuropathy; Obstructive sleep apnea (2/15/2010); Other unknown and unspecified cause of morbidity or mortality; Severe sepsis ([address] and Unspecified sleep apnea. He also has no past medical history of Adverse effect of anesthesia; Aneurysm ([address] Coagulation disorder ([address] DEMENTIA; Difficult intubation; Malignant hyperthermia due to anesthesia; Neurological disorder; Other ill-defined conditions(799.89); Pseudocholinesterase deficiency; or Psychiatric disorder. Mr. Storm Marrero  has a past surgical history that includes pacemaker; heart catheterization ([address], 2008); and chest surgery procedure unlisted.   Social History/Living Environment:   Home Environment:  (daughters house)  # Steps to Enter: 0  One/Two Story Residence: One story  Living Alone: No  Support Systems: Family member(s), Friends \ neighbors  Patient Expects to be Discharged to[de-identified] Private residence  Current DME Used/Available at Home: None  Tub or Shower Type: Tub/Shower combination  Prior Level of Function/Work/Activity:  Patient independent with ADLs and patient driving prior to admit. Number of Personal Factors/Comorbidities that affect the Plan of Care  · Edema  · COPD  · Morbid Obesity  · Chronic Kidney disease  · Neuropathy: Extensive review of physical, cognitive, and psychosocial performance (3+):  HIGH COMPLEXITY   ASSESSMENT OF OCCUPATIONAL PERFORMANCE[de-identified]   Activities of Daily Living:           Basic ADLs (From Assessment) Complex ADLs (From Assessment)   Basic ADL  Feeding: Modified independent  Oral Facial Hygiene/Grooming: Setup  Bathing: Maximum assistance  Upper Body Dressing: Minimum assistance  Lower Body Dressing: Total assistance  Toileting: Total assistance Instrumental ADL  Meal Preparation: Total assistance  Homemaking: Total assistance  Medication Management: Setup  Financial Management: Setup   Grooming/Bathing/Dressing Activities of Daily Living     Cognitive Retraining  Safety/Judgement: Awareness of environment                       Bed/Mat Mobility  Sit to Stand: Stand-by asssistance          Most Recent Physical Functioning:   Gross Assessment:                  Posture:  Posture (WDL): Exceptions to WDL  Posture Assessment:  Forward head  Balance:  Sitting: Impaired  Sitting - Static: Good (unsupported)  Sitting - Dynamic: Fair (occasional)  Standing: Impaired  Standing - Static: Fair  Standing - Dynamic : Fair Bed Mobility:     Wheelchair Mobility:     Transfers:  Sit to Stand: Stand-by asssistance                    Patient Vitals for the past 6 hrs:   BP SpO2 O2 Flow Rate (L/min) Pulse   02/20/17 0835 - (!) 83 % 2 l/min -   02/20/17 1035 145/89 (!) 81 % - 81        Mental Status  Neurologic State: Alert, Appropriate for age  Orientation Level: Oriented X4  Cognition: Appropriate decision making, Follows commands  Perception: Appears intact  Perseveration: No perseveration noted  Safety/Judgement: Awareness of environment Physical Skills Involved:  1. Balance  2. Mobility  3. Strength  4. Endurance Cognitive Skills Affected (resulting in the inability to perform in a timely and safe manner): 1. none Psychosocial Skills Affected:  1. Interpersonal Interactions  2. Habits  3. Routines and Behaviors   Number of elements that affect the Plan of Care: 3-5:  MODERATE COMPLEXITY   CLINICAL DECISION MAKIN[address] [place] [address] AM-PAC 6 Clicks   Basic Mobility Inpatient Short Form  How much help from another person does the patient currently need. .. Total A Lot A Little None   1. Putting on and taking off regular lower body clothing? [X] 1   [ ] 2   [ ] 3   [ ] 4   2. Bathing (including washing, rinsing, drying)? [ ] 1   [X] 2   [ ] 3   [ ] 4   3. Toileting, which includes using toilet, bedpan or urinal?   [X] 1   [ ] 2   [ ] 3   [ ] 4   4. Putting on and taking off regular upper body clothing?   [ ] 1   [ ] 2   [X] 3   [ ] 4   5. Taking care of personal grooming such as brushing teeth? [ ] 1   [ ] 2   [X] 3   [ ] 4   6. Eating meals? [ ] 1   [ ] 2   [ ] 3   [X] 4   © , Trustees of [address] [place] [address], under license to [employer]. All rights reserved    Score:  Initial: CL Most Recent: X (Date: -- )     Interpretation of Tool:  Represents activities that are increasingly more difficult (i.e. Bed mobility, Transfers, Gait).        Score 24 23 22-20 19-15 14-10 9-7 6       Modifier CH CI CJ CK CL CM CN         · Self Care:               - CURRENT STATUS:    CL - 60%-79% impaired, limited or restricted               - GOAL STATUS:           CK - 40%-59% impaired, limited or restricted               - D/C STATUS:                       ---------------To be determined---------------  Payor: CARE IMPROVEMENT PLUS [place] / Product Type: [employer] Care Medicare /       Medical Necessity:     · Patient demonstrates good rehab potential due to higher previous functional level. Reason for Services/Other Comments:  · Patient continues to require skilled intervention due to patient's inability to take care of self. Use of outcome tool(s) and clinical judgement create a POC that gives a: MODERATE COMPLEXITY             TREATMENT:   (In addition to Assessment/Re-Assessment sessions the following treatments were rendered)      Pre-treatment Symptoms/Complaints:    Pain: Initial:   Pain Intensity 1: 0  Post Session:  8      Therapeutic Activity: (10 minutes): Therapeutic activities including Bed transfers and Chair transfers to improve mobility, strength and balance. Required no physical assistance to promote static and dynamic balance in standing. Therapeutic Exercise: (20 minutes):  Exercises per grid below to improve mobility, strength and activity tolerance. Required minimal visual, verbal and tactile cues to promote proper body alignment and promote proper body mechanics. Progressed resistance and repetitions as indicated. UE Exercises (with theraband) Date:  2/20/2017 Date:   Date:     Activity/Exercise Parameters Parameters Parameters   Shoulder Abd/Adduction 20 reps     Shoulder Flexion 10 reps     Elbow Flexion 20 reps     Punches 20 reps                              Braces/Orthotics/Lines/Etc:   · IV  · O2 Device: Nasal cannula  Treatment/Session Assessment:    · Response to Treatment:  Patient tolerated treatment well. · Interdisciplinary Collaboration:  · Certified Occupational Therapy Assistant and Registered Nurse  · After treatment position/precautions:  · Up in chair, Bed/Chair-wheels locked, Bed in low position, Call light within reach and Family at bedside  · Compliance with Program/Exercises: Will assess as treatment progresses. · Recommendations/Intent for next treatment session: \"Next visit will focus on advancements to more challenging activities and reduction in assistance provided\".   Total Treatment Duration:  OT Patient Time In/Time Out  Time In: 0940  Time Out: Robyn Perry

## 2017-02-20 NOTE — PROGRESS NOTES
Refused cpap. Stated that he continues to have periods of apnea even while on his home cpap. He is to call his home health company and have them check it out. In the meantime he is requesting to use one of the hospitals CPAP machines.

## 2017-02-20 NOTE — PROGRESS NOTES
Patient refuses to wear his home cpap at night, he states that he thinks there is something wrong with it. I have examined his cpap and it appears to be working appropriately, however I have told him that he will need to get in touch with his home health company to check it out. In the meantime a hospital machine has been ordered, and provided.

## 2017-02-21 LAB
ANION GAP BLD CALC-SCNC: 7 MMOL/L (ref 7–16)
BNP SERPL-MCNC: 332 PG/ML
BUN SERPL-MCNC: 42 MG/DL (ref 6–23)
CALCIUM SERPL-MCNC: 8.3 MG/DL (ref 8.3–10.4)
CHLORIDE SERPL-SCNC: 100 MMOL/L (ref 98–107)
CO2 SERPL-SCNC: 34 MMOL/L (ref 21–32)
CREAT SERPL-MCNC: 1.91 MG/DL (ref 0.8–1.5)
GLUCOSE BLD STRIP.AUTO-MCNC: 104 MG/DL (ref 65–100)
GLUCOSE BLD STRIP.AUTO-MCNC: 108 MG/DL (ref 65–100)
GLUCOSE BLD STRIP.AUTO-MCNC: 112 MG/DL (ref 65–100)
GLUCOSE BLD STRIP.AUTO-MCNC: 88 MG/DL (ref 65–100)
GLUCOSE SERPL-MCNC: 79 MG/DL (ref 65–100)
POTASSIUM SERPL-SCNC: 4 MMOL/L (ref 3.5–5.1)
SODIUM SERPL-SCNC: 141 MMOL/L (ref 136–145)

## 2017-02-21 PROCEDURE — 77010033678 HC OXYGEN DAILY

## 2017-02-21 PROCEDURE — 74011250637 HC RX REV CODE- 250/637: Performed by: INTERNAL MEDICINE

## 2017-02-21 PROCEDURE — 80048 BASIC METABOLIC PNL TOTAL CA: CPT | Performed by: INTERNAL MEDICINE

## 2017-02-21 PROCEDURE — 94760 N-INVAS EAR/PLS OXIMETRY 1: CPT

## 2017-02-21 PROCEDURE — 3331090002 HH PPS REVENUE DEBIT

## 2017-02-21 PROCEDURE — 97530 THERAPEUTIC ACTIVITIES: CPT

## 2017-02-21 PROCEDURE — 97150 GROUP THERAPEUTIC PROCEDURES: CPT

## 2017-02-21 PROCEDURE — 74011250637 HC RX REV CODE- 250/637: Performed by: NURSE PRACTITIONER

## 2017-02-21 PROCEDURE — 97110 THERAPEUTIC EXERCISES: CPT

## 2017-02-21 PROCEDURE — 82962 GLUCOSE BLOOD TEST: CPT

## 2017-02-21 PROCEDURE — 83880 ASSAY OF NATRIURETIC PEPTIDE: CPT | Performed by: INTERNAL MEDICINE

## 2017-02-21 PROCEDURE — 74011000250 HC RX REV CODE- 250: Performed by: INTERNAL MEDICINE

## 2017-02-21 PROCEDURE — 65660000000 HC RM CCU STEPDOWN

## 2017-02-21 PROCEDURE — 94640 AIRWAY INHALATION TREATMENT: CPT

## 2017-02-21 PROCEDURE — 3331090001 HH PPS REVENUE CREDIT

## 2017-02-21 RX ORDER — FUROSEMIDE 40 MG/1
40 TABLET ORAL
Status: DISCONTINUED | OUTPATIENT
Start: 2017-02-21 | End: 2017-02-24 | Stop reason: HOSPADM

## 2017-02-21 RX ADMIN — LEVALBUTEROL HYDROCHLORIDE 0.63 MG: 0.63 SOLUTION RESPIRATORY (INHALATION) at 19:13

## 2017-02-21 RX ADMIN — POLYETHYLENE GLYCOL 3350 17 G: 17 POWDER, FOR SOLUTION ORAL at 08:32

## 2017-02-21 RX ADMIN — LEVALBUTEROL HYDROCHLORIDE 0.63 MG: 0.63 SOLUTION RESPIRATORY (INHALATION) at 01:11

## 2017-02-21 RX ADMIN — Medication 5 ML: at 21:21

## 2017-02-21 RX ADMIN — GUAIFENESIN 1200 MG: 600 TABLET, EXTENDED RELEASE ORAL at 18:03

## 2017-02-21 RX ADMIN — GABAPENTIN 600 MG: 300 CAPSULE ORAL at 06:35

## 2017-02-21 RX ADMIN — OXYCODONE HYDROCHLORIDE 15 MG: 15 TABLET ORAL at 08:58

## 2017-02-21 RX ADMIN — LEVALBUTEROL HYDROCHLORIDE 0.63 MG: 0.63 SOLUTION RESPIRATORY (INHALATION) at 13:41

## 2017-02-21 RX ADMIN — ALLOPURINOL 100 MG: 100 TABLET ORAL at 08:31

## 2017-02-21 RX ADMIN — FAMOTIDINE 20 MG: 20 TABLET ORAL at 18:04

## 2017-02-21 RX ADMIN — GUAIFENESIN 1200 MG: 600 TABLET, EXTENDED RELEASE ORAL at 08:30

## 2017-02-21 RX ADMIN — HYDRALAZINE HYDROCHLORIDE 25 MG: 25 TABLET, FILM COATED ORAL at 06:36

## 2017-02-21 RX ADMIN — HYDRALAZINE HYDROCHLORIDE 25 MG: 25 TABLET, FILM COATED ORAL at 21:21

## 2017-02-21 RX ADMIN — OXYCODONE HYDROCHLORIDE 15 MG: 15 TABLET ORAL at 12:56

## 2017-02-21 RX ADMIN — HYDRALAZINE HYDROCHLORIDE 25 MG: 25 TABLET, FILM COATED ORAL at 15:23

## 2017-02-21 RX ADMIN — FAMOTIDINE 20 MG: 20 TABLET ORAL at 08:31

## 2017-02-21 RX ADMIN — SACUBITRIL AND VALSARTAN 1 TABLET: 24; 26 TABLET, FILM COATED ORAL at 21:21

## 2017-02-21 RX ADMIN — GABAPENTIN 600 MG: 300 CAPSULE ORAL at 15:22

## 2017-02-21 RX ADMIN — FUROSEMIDE 40 MG: 40 TABLET ORAL at 18:04

## 2017-02-21 RX ADMIN — COLCHICINE 0.6 MG: 0.6 TABLET, FILM COATED ORAL at 08:30

## 2017-02-21 RX ADMIN — PRAVASTATIN SODIUM 80 MG: 80 TABLET ORAL at 21:21

## 2017-02-21 RX ADMIN — FUROSEMIDE 40 MG: 40 TABLET ORAL at 08:31

## 2017-02-21 RX ADMIN — CARVEDILOL 25 MG: 25 TABLET, FILM COATED ORAL at 08:30

## 2017-02-21 RX ADMIN — APIXABAN 5 MG: 5 TABLET, FILM COATED ORAL at 21:21

## 2017-02-21 RX ADMIN — SPIRONOLACTONE 50 MG: 25 TABLET, FILM COATED ORAL at 08:30

## 2017-02-21 RX ADMIN — APIXABAN 5 MG: 5 TABLET, FILM COATED ORAL at 08:30

## 2017-02-21 RX ADMIN — AMIODARONE HYDROCHLORIDE 200 MG: 200 TABLET ORAL at 08:30

## 2017-02-21 RX ADMIN — ISOSORBIDE DINITRATE 20 MG: 20 TABLET ORAL at 21:20

## 2017-02-21 RX ADMIN — Medication 10 ML: at 06:38

## 2017-02-21 RX ADMIN — LEVALBUTEROL HYDROCHLORIDE 0.63 MG: 0.63 SOLUTION RESPIRATORY (INHALATION) at 07:33

## 2017-02-21 RX ADMIN — DOCUSATE SODIUM 100 MG: 100 CAPSULE, LIQUID FILLED ORAL at 08:30

## 2017-02-21 RX ADMIN — ISOSORBIDE DINITRATE 20 MG: 20 TABLET ORAL at 15:22

## 2017-02-21 RX ADMIN — Medication 10 ML: at 18:07

## 2017-02-21 RX ADMIN — SACUBITRIL AND VALSARTAN 1 TABLET: 24; 26 TABLET, FILM COATED ORAL at 08:58

## 2017-02-21 RX ADMIN — DOCUSATE SODIUM 100 MG: 100 CAPSULE, LIQUID FILLED ORAL at 18:04

## 2017-02-21 RX ADMIN — ISOSORBIDE DINITRATE 20 MG: 20 TABLET ORAL at 06:36

## 2017-02-21 RX ADMIN — GABAPENTIN 600 MG: 300 CAPSULE ORAL at 21:20

## 2017-02-21 RX ADMIN — CARVEDILOL 25 MG: 25 TABLET, FILM COATED ORAL at 18:03

## 2017-02-21 NOTE — PROGRESS NOTES
Bedside and Verbal shift change report given to HORTENCIA White RN (oncoming nurse) by MEKA Acuña RN (offgoing nurse).

## 2017-02-21 NOTE — PROGRESS NOTES
Problem: Breathing Pattern - Ineffective  Goal: *Absence of hypoxia  Outcome: Progressing Towards Goal  Weaned to 2L from 3L, patient tolerated change well

## 2017-02-21 NOTE — PROGRESS NOTES
Updated referral for rehab transmitted to [facility] now that pt is off Milrinone and iv Lasix. Possible dc end of the week, per Dr. Pearson Found, if pt remains stable. Awaiting word re bed availability. l

## 2017-02-21 NOTE — PROGRESS NOTES
Verbal bedside report given to Kiko Royal, oncoming RN. Patient's situation, background, assessment and recommendations provided. Opportunity for questions provided. Oncoming RN assumed care of patient.

## 2017-02-21 NOTE — PROGRESS NOTES
Problem: Mobility Impaired (Adult and Pediatric)  Goal: *Acute Goals and Plan of Care (Insert Text)  LTG:  (1.)Mr. Pollard will move from supine to sit and sit to supine , scoot up and down and roll side to side with modified independence within 7 day(s) with use of hospital bed controls  (2.)Mr. Pollard will transfer from bed to chair and chair to bed with Supervision to [facility]  Without use of an  assistive device  within 7 day(s). (3.)Mr. Pollard will ambulate with supervision  for 25 feet with out an assistive device within 7 day(s) with O2 as needed to support needed sat level.      ___________________________________________________________________________________________      PHYSICAL THERAPY: Daily Note, Treatment Day: 1st and AM 2/21/2017  INPATIENT: Hospital Day: 29  Payor: CARE IMPROVEMENT PLUS / [place] / Product Type: [employer] Care Medicare /      NAME/AGE/GENDER: Dania Woo is a 45 y.o. male         PRIMARY DIAGNOSIS: NSVT (nonsustained ventricular tachycardia) ([address] Nonsustained ventricular tachycardia ([address] Nonsustained ventricular tachycardia ([address]        ICD-10: Treatment Diagnosis:       · Generalized Muscle Weakness (M62.81)  · Difficulty in walking, Not elsewhere classified (R26.2)   Precaution/Allergies:  Dilaudid [hydromorphone]; Iodinated contrast media - oral and iv dye; and Penicillins       ASSESSMENT:      Mr. Yemi Solano was in a good mood today and was very agreeable to PT treatment and exercises. Patient appears to be making slow progress in general mobility as his swelling in his scrotum limits ability to increase distances. He was supine in bed on arrival and agreeable to get into chair. Patient uses the bed controls to transfer to sitting. Patient then stood and ambulated using rolling walker to chair. Patient was then assisted with positioning for comfort. Attempted to use sheet as a sling for him to be able to reposition his scrotum without assistance.   Patient then taken to therapy gym to perform LE exercises. Patient tolerated exercises well and would later participate in group exercises with OT. Patient seemed to participate better today. Will continue to treat and progress mobility as able. This section established at most recent assessment   PROBLEM LIST (Impairments causing functional limitations):  1. Decreased Strength  2. Decreased ADL/Functional Activities  3. Decreased Transfer Abilities  4. Decreased Ambulation Ability/Technique  5. Decreased Balance  6. Decreased Activity Tolerance  7. Increased Fatigue  8. Increased Shortness of Breath  9. Edema/Girth    INTERVENTIONS PLANNED: (Benefits and precautions of physical therapy have been discussed with the patient.)  1. Balance Exercise  2. Bed Mobility  3. Family Education  4. Gait Training  5. Home Exercise Program (HEP)  6. Therapeutic Activites  7. Therapeutic Exercise/Strengthening  8. Transfer Training  9. Group Therapy      TREATMENT PLAN: Frequency/Duration: 3 times a week for duration of hospital stay  Rehabilitation Potential For Stated Goals: GOOD      RECOMMENDED REHABILITATION/EQUIPMENT: (at time of discharge pending progress): Continue Skilled Therapy. HISTORY:   History of Present Injury/Illness (Reason for Referral):  Frederick Thibodeaux is a 45 y.o. male with known NICM with documented EF 10 % with [employer] single chamber ICD in place. He was just discharged on 1/13 with initial complaint of constipation and volume overload. He developed worsening renal failure necessitating hold his diuretics temporarily. He was discharged home on addition of demadex 100 mg daily and miralax for constipation. Really since mid December he has not felt well with worsening orthopnea, increasing abdominal girth, increased wt and lower ext edema. Today while driving in car he felt presyncopal and was able to pull to side of rode. He did not loose consciousness.  He had repeat episode of presyncope prior to coming to ER. While in ER he was noted to have nonsustained ventricular tachycardia. He is not on antiarrhythmia agent. Interrogation of his device revealed normal operation but his VT zone is set at 158 bpm with noted VT in ER at approximately 145 bpm.   Past Medical History/Comorbidities:   Mr. Storm Marrero  has a past medical history of Acute systolic heart failure [facility]) (May, 2009); AICD (automatic cardioverter/defibrillator) present (10/21/2015); Atypical chest pain (4/23/2010); Bronchitis; CAD (coronary artery disease); Cardiomyopathy; Chest pain (10/21/2015); Chronic kidney disease; Chronic pain; Congestive heart failure (CHF) ([address] (10/21/2015); COPD; Diabetes ([address] Diabetes mellitus type II, uncontrolled ([address] (7/2/2013); GERD (gastroesophageal reflux disease); Gout; Heart failure ([address] Hypertension; Hyponatremia (12/20/2010); Ill-defined condition; Morbid obesity ([address] Nausea & vomiting (11/30/2015); Neuropathy; Obstructive sleep apnea (2/15/2010); Other unknown and unspecified cause of morbidity or mortality; Severe sepsis ([address] and Unspecified sleep apnea. He also has no past medical history of Adverse effect of anesthesia; Aneurysm ([address] Coagulation disorder ([address] DEMENTIA; Difficult intubation; Malignant hyperthermia due to anesthesia; Neurological disorder; Other ill-defined conditions(799.89); Pseudocholinesterase deficiency; or Psychiatric disorder. Mr. Storm Marrero  has a past surgical history that includes pacemaker; heart catheterization ([place] 10, 2008); and chest surgery procedure unlisted.   Social History/Living Environment:   Home Environment:  (daughters house)  # Steps to Enter: 0  One/Two Story Residence: One story  Living Alone: No  Support Systems: Family member(s), Friends \ neighbors  Patient Expects to be Discharged to[de-identified] Private residence  Current DME Used/Available at Home: None  Tub or Shower Type: Tub/Shower combination  Prior Level of Function/Work/Activity:  Lives with daughter; typically independent with ambulation for short distances; drives. Personal Factors:          Sex:  male        Age:  45 y.o. Number of Personal Factors/Comorbidities that affect the Plan of Care:  CHF, DM, obesity, edema 3+: HIGH COMPLEXITY   EXAMINATION:   Most Recent Physical Functioning:   Gross Assessment:                  Posture:     Balance:  Sitting: Impaired  Sitting - Static: Good (unsupported)  Sitting - Dynamic: Fair (occasional)  Standing: Impaired  Standing - Static: Fair  Standing - Dynamic : Fair Bed Mobility:  Supine to Sit: Modified independent (uses controls on bed)  Scooting: Contact guard assistance  Wheelchair Mobility:     Transfers:  Sit to Stand: Contact guard assistance  Stand to Sit: Contact guard assistance  Bed to Chair: Contact guard assistance  Gait:     Base of Support: Widened  Speed/Mira: Slow  Step Length: Right shortened;Left shortened  Gait Abnormalities: Decreased step clearance;Trunk sway increased; Antalgic  Distance (ft): 6 Feet (ft)  Assistive Device: Other (comment) (bariatric rolling walker)  Ambulation - Level of Assistance: Contact guard assistance  Interventions: Safety awareness training;Verbal cues       Body Structures Involved:  1. Lungs  2. Joints  3. Muscles Body Functions Affected:  1. Respiratory  2. Movement Related Activities and Participation Affected:  1. General Tasks and Demands  2. Mobility  3. Self Care  4. Community, Social and [place]   Number of elements that affect the Plan of Care: 4+: HIGH COMPLEXITY   CLINICAL PRESENTATION:   Presentation: Stable and uncomplicated: LOW COMPLEXITY   CLINICAL DECISION MAKIN [facility] Mobility Inpatient Short Form  How much difficulty does the patient currently have. .. Unable A Lot A Little None   1. Turning over in bed (including adjusting bedclothes, sheets and blankets)? [ ] 1   [ ] 2   [X] 3   [ ] 4   2.   Sitting down on and standing up from a chair with arms ( e.g., wheelchair, bedside commode, etc.)   [ ] 1   [ ] 2   [X] 3   [ ] 4   3. Moving from lying on back to sitting on the side of the bed? [ ] 1   [ ] 2   [X] 3   [ ] 4   How much help from another person does the patient currently need. .. Total A Lot A Little None   4. Moving to and from a bed to a chair (including a wheelchair)? [ ] 1   [ ] 2   [X] 3   [ ] 4   5. Need to walk in hospital room? [ ] 1   [ ] 2   [X] 3   [ ] 4   6. Climbing 3-5 steps with a railing? [ ] 1   [ ] 2   [X] 3   [ ] 4   © 2007, Trustees of [address] [place] [address] [address] [id number], under license to [employer]. All rights reserved    Score:  Initial: 18 Most Recent: X (Date: -- )     Interpretation of Tool:  Represents activities that are increasingly more difficult (i.e. Bed mobility, Transfers, Gait). Score 24 23 22-20 19-15 14-10 9-7 6       Modifier CH CI CJ CK CL CM CN         · Mobility - Walking and Moving Around:               - CURRENT STATUS:    CK - 40%-59% impaired, limited or restricted               - GOAL STATUS:           CJ - 20%-39% impaired, limited or restricted               - D/C STATUS:                       ---------------To be determined---------------  Payor: CARE IMPROVEMENT PLUS [place] / Product Type: Managed Care Medicare /       Medical Necessity:     · Patient is expected to demonstrate progress in strength, range of motion, balance and coordination to decrease assistance required with overall functional mobility, transfers, ambulation. · Patient demonstrates good rehab potential due to higher previous functional level. Reason for Services/Other Comments:  · Patient continues to require present interventions due to patient's inability to perform bed mobility, transfers, ambulation all safely and effectively at prior level of function of independence.    Use of outcome tool(s) and clinical judgement create a POC that gives a: Clear prediction of patient's progress: LOW COMPLEXITY                 TREATMENT:      Pre-treatment Symptoms/Complaints:  \"I want to fight, I want to get better\"  Pain: Initial: . Pain Intensity 1: 4  Pain Intervention(s) 1: Ambulation/Increased Activity, Exercise  Post Session:  Unchanged from start of therapy. Therapeutic Activity: (  13 minutes ):  Therapeutic activities including bed mobility, sitting tolerance/balace in chair and on edge of bed, ambulation 6 feet today with RW, sit to stand at the bedside pulling up to stand and  scooting to improve mobility, strength and balance. Need is present but patient has his own method and preference for how and when he wants to move. Therapeutic Exercise: (  13 Minutes):  Exercises per grid below to improve mobility, strength, balance and coordination. Required minimal verbal and manual cues to promote proper body posture and promote proper body mechanics. Progressed range and repetitions as indicated. Date:  1/26/17 Date:  2/21/17 Date:   Date:   Date:   Date:     Activity/Exercise Parameters Parameters Parameters      Standing marching in place         Seated AP's 2 x 20 active 1 x 10 BA       Seated LAQ's 2 x 20 1 x 15 BA       Seated hip ABD/ADD  1 x 10 BA       Supine hip abd/add         Shoulder flexion 2x 20        Elbow flexion 2x 20                          Seated hip flexion 2 x 20 1 x 10 BA       Ambulation  Assist  Device                  Key:  A=active, AA=active assisted, B=bilaterally, R=right, L=left         Treatment/Session Assessment:    · Response to Treatment:  Increased mobility and activity with transfers, sitting and moving in bed. · Interdisciplinary Collaboration:  · Physical Therapist and Certified Nursing Assistant/Patient Care Technician  · After treatment position/precautions:  · Supine in bed, Bed/Chair-wheels locked, Bed in low position, Call light within reach and Family at bedside  · Compliance with Program/Exercises:  Will assess as treatment progresses. · Recommendations/Intent for next treatment session: \"Next visit will focus on advancements to more challenging activities\".   Total Treatment Duration:  PT Patient Time In/Time Out  Time In: 1044  Time Out: Linda 45, DPT

## 2017-02-21 NOTE — PROGRESS NOTES
Problem: Self Care Deficits Care Plan (Adult)  Goal: *Acute Goals and Plan of Care (Insert Text)  1. Patient will perform grooming and UE dressing with modified independence within 7 days with equipment as needed. 2. Patient will perform bathing with moderate assistance within 7 days with equipment as needed. 3. Patient will perform lower body dressing and toileting with moderate assistance within 7 days with equipment as needed. 4. Patient will perform toilet transfer with modified independence within 7 days with equipment as needed. 5. Pt will participate in B UE therapeutic exercises for 8 minutes with 3 rest breaks within 7 days. OCCUPATIONAL THERAPY: Daily Note, Treatment Day: 2nd, AM and PM 2/21/2017  INPATIENT: Hospital Day: 29  Payor: CARE IMPROVEMENT PLUS / [place] / Product Type: Managed Care Medicare /      NAME/AGE/GENDER: Michaela Cm is a 45 y.o. male         PRIMARY DIAGNOSIS:  NSVT (nonsustained ventricular tachycardia) ([address] Nonsustained ventricular tachycardia (HCC) Nonsustained ventricular tachycardia ([address]        ICD-10: Treatment Diagnosis:        · Generalized Muscle Weakness (M62.81)  · Low Back Pain (M54.5)   Precautions/Allergies:        Fall, Dilaudid [hydromorphone]; Iodinated contrast media - oral and iv dye; and Penicillins       ASSESSMENT:      Mr. Martha Henderson was admitted for the above diagnosis. Pt was brought to therapy gym to participate in group exercises (in grid below) to increase UE strength and activity tolerance to perform mobility and ADLs. Pt required visual, verbal, and tactile cueing to complete exercises. Pt tolerated session well. Returned to room by recliner. Once back to room, patient wanted to return to bed. Pt required SBA/CGA for sit to stand transfer. Pt then transferred to bed with CGA and required min assist with LEs to return to supine. Pt left with all needs in reach and daughter in room.  Will continue to benefit from skilled OT during stay. This section established at most recent assessment   PROBLEM LIST (Impairments causing functional limitations):  1. Decreased Strength  2. Decreased ADL/Functional Activities  3. Decreased Transfer Abilities  4. Decreased Ambulation Ability/Technique  5. Decreased Balance  6. Increased Pain  7. Decreased Activity Tolerance  8. Decreased Work Simplification/Energy Conservation Techniques  9. Increased Fatigue  10. Increased Shortness of Breath  11. Decreased Flexibility/Joint Mobility  12. Edema/Girth  13. Decreased [place] with Home Exercise Program    INTERVENTIONS PLANNED: (Benefits and precautions of occupational therapy have been discussed with the patient.)  1. Activities of daily living training  2. Adaptive equipment training  3. Balance training  4. Clothing management  5. Cognitive training  6. Donning&doffing training  7. Group therapy  8. Hygiene training  9. Neuromuscular re-eduation  10. Prosthetic training  11. Re-evaluation  12. Sensory reintergration training  13. Theraputic activity  14. Theraputic exercise         TREATMENT PLAN: Frequency/Duration: Follow patient 2x's/week to address above goals. Rehabilitation Potential For Stated Goals: GOOD      RECOMMENDED REHABILITATION/EQUIPMENT: (at time of discharge pending progress): Continue Skilled Therapy. OCCUPATIONAL PROFILE AND HISTORY:   History of Present Injury/Illness (Reason for Referral):  Vin Lee is a 45 y.o. male with known NICM with documented EF 10 % with [employer] single chamber ICD in place. He was just discharged on 1/13 with initial complaint of constipation and volume overload. He developed worsening renal failure necessitating hold his diuretics temporarily. He was discharged home on addition of demadex 100 mg daily and miralax for constipation. Really since mid December he has not felt well with worsening orthopnea, increasing abdominal girth, increased wt and lower ext edema.  Today while driving in car he felt presyncopal and was able to pull to side of rode. He did not loose consciousness. He had repeat episode of presyncope prior to coming to ER. While in ER he was noted to have nonsustained ventricular tachycardia. He is not on antiarrhythmia agent. Interrogation of his device revealed normal operation but his VT zone is set at 158 bpm with noted VT in ER at approximately 145 bpm.   Past Medical History/Comorbidities:   Mr. Errol Olszewski  has a past medical history of Acute systolic heart failure [facility]) (May, 2009); AICD (automatic cardioverter/defibrillator) present (10/21/2015); Atypical chest pain (4/23/2010); Bronchitis; CAD (coronary artery disease); Cardiomyopathy; Chest pain (10/21/2015); Chronic kidney disease; Chronic pain; Congestive heart failure (CHF) ([address] (10/21/2015); COPD; Diabetes ([address] Diabetes mellitus type II, uncontrolled ([address] (7/2/2013); GERD (gastroesophageal reflux disease); Gout; Heart failure ([address] Hypertension; Hyponatremia (12/20/2010); Ill-defined condition; Morbid obesity ([address] Nausea & vomiting (11/30/2015); Neuropathy; Obstructive sleep apnea (2/15/2010); Other unknown and unspecified cause of morbidity or mortality; Severe sepsis ([address] and Unspecified sleep apnea. He also has no past medical history of Adverse effect of anesthesia; Aneurysm ([address] Coagulation disorder ([address] DEMENTIA; Difficult intubation; Malignant hyperthermia due to anesthesia; Neurological disorder; Other ill-defined conditions(799.89); Pseudocholinesterase deficiency; or Psychiatric disorder. Mr. Errol Olszewski  has a past surgical history that includes pacemaker; heart catheterization ([place] [address], 2008); and chest surgery procedure unlisted.   Social History/Living Environment:   Home Environment:  ([facility] house)  # Steps to Enter: 0  One/Two Story Residence: One story  Living Alone: No  Support Systems: Family member(s), Friends \ neighbors  Patient Expects to be Discharged to[employer] residence  Current DME Used/Available at Home: None  Tub or Shower Type: Tub/Shower combination  Prior Level of Function/Work/Activity:  Patient independent with ADLs and patient driving prior to admit. Number of Personal Factors/Comorbidities that affect the Plan of Care  · Edema  · COPD  · Morbid Obesity  · Chronic Kidney disease  · Neuropathy: Extensive review of physical, cognitive, and psychosocial performance (3+):  HIGH COMPLEXITY   ASSESSMENT OF OCCUPATIONAL PERFORMANCE[de-identified]   Activities of Daily Living:           Basic ADLs (From Assessment) Complex ADLs (From Assessment)   Basic ADL  Feeding: Modified independent  Oral Facial Hygiene/Grooming: Setup  Bathing: Maximum assistance  Upper Body Dressing: Minimum assistance  Lower Body Dressing: Total assistance  Toileting: Total assistance Instrumental ADL  Meal Preparation: Total assistance  Homemaking: Total assistance  Medication Management: Setup  Financial Management: Setup   Grooming/Bathing/Dressing Activities of Daily Living     Cognitive Retraining  Safety/Judgement: Awareness of environment                       Bed/Mat Mobility  Supine to Sit: Modified independent (uses controls on bed)  Sit to Stand: Contact guard assistance  Bed to Chair: Contact guard assistance  Scooting: Contact guard assistance          Most Recent Physical Functioning:   Gross Assessment:                  Posture:  Posture (WDL): Exceptions to WDL  Posture Assessment:  Forward head  Balance:  Sitting: Impaired  Sitting - Static: Good (unsupported)  Sitting - Dynamic: Fair (occasional)  Standing: Impaired  Standing - Static: Fair  Standing - Dynamic : Fair Bed Mobility:  Supine to Sit: Modified independent (uses controls on bed)  Scooting: Contact guard assistance  Wheelchair Mobility:     Transfers:  Sit to Stand: Contact guard assistance  Stand to Sit: Contact guard assistance  Bed to Chair: Contact guard assistance                    Patient Vitals for the past 6 hrs: SpO2 O2 Flow Rate (L/min)   17 1341 100 % -   17 1346 100 % -        Mental Status  Neurologic State: Alert, Appropriate for age  Orientation Level: Oriented X4  Cognition: Follows commands  Perception: Appears intact  Perseveration: No perseveration noted  Safety/Judgement: Awareness of environment                               Physical Skills Involved:  1. Balance  2. Mobility  3. Strength  4. Endurance Cognitive Skills Affected (resulting in the inability to perform in a timely and safe manner): 1. none Psychosocial Skills Affected:  1. Interpersonal Interactions  2. Habits  3. Routines and Behaviors   Number of elements that affect the Plan of Care: 3-5:  MODERATE COMPLEXITY   CLINICAL DECISION MAKIN[address] [place] [address] AM-PAC 6 Clicks   Basic Mobility Inpatient Short Form  How much help from another person does the patient currently need. .. Total A Lot A Little None   1. Putting on and taking off regular lower body clothing? [X] 1   [ ] 2   [ ] 3   [ ] 4   2. Bathing (including washing, rinsing, drying)? [ ] 1   [X] 2   [ ] 3   [ ] 4   3. Toileting, which includes using toilet, bedpan or urinal?   [X] 1   [ ] 2   [ ] 3   [ ] 4   4. Putting on and taking off regular upper body clothing?   [ ] 1   [ ] 2   [X] 3   [ ] 4   5. Taking care of personal grooming such as brushing teeth? [ ] 1   [ ] 2   [X] 3   [ ] 4   6. Eating meals? [ ] 1   [ ] 2   [ ] 3   [X] 4   © , Trustees of [address] [place], [place] [id number], under license to [employer]. All rights reserved    Score:  Initial: CL Most Recent: X (Date: -- )     Interpretation of Tool:  Represents activities that are increasingly more difficult (i.e. Bed mobility, Transfers, Gait).        Score 24 23 22-20 19-15 14-10 9-7 6       Modifier CH CI CJ CK CL CM CN         · Self Care:               - CURRENT STATUS:    CL - 60%-79% impaired, limited or restricted               - GOAL STATUS:           CK - 40%-59% impaired, limited or restricted               - D/C STATUS:                       ---------------To be determined---------------  Payor: CARE IMPROVEMENT PLUS / [place] / Product Type: Managed Care Medicare /       Medical Necessity:     · Patient demonstrates good rehab potential due to higher previous functional level. Reason for Services/Other Comments:  · Patient continues to require skilled intervention due to patient's inability to take care of self. Use of outcome tool(s) and clinical judgement create a POC that gives a: MODERATE COMPLEXITY             TREATMENT:   (In addition to Assessment/Re-Assessment sessions the following treatments were rendered)      Pre-treatment Symptoms/Complaints:    Pain: Initial:   Pain Intensity 1:  (did not rate)  Pain Location 1: Back  Pain Intervention(s) 1: Repositioned  Post Session:  8      Therapeutic Activity: (15 minutes): Therapeutic activities including Bed transfers and Chair transfers to improve mobility, strength and balance. Required SBA/CGA to promote static and dynamic balance in standing. Group Therapeutic Exercise: (10 minutes): Exercises per grid below to improve mobility, strength and activity tolerance. Required minimal visual, verbal and tactile cues to promote proper body alignment and promote proper body mechanics. Progressed resistance and repetitions as indicated. UE Exercises (with theraband) Date:  2/20/2017 Date:  2/21/2017 Date:     Activity/Exercise Parameters Parameters Parameters   Shoulder Abd/Adduction 20 reps 20 reps    Shoulder Flexion 10 reps 15 reps    Elbow Flexion 20 reps 20 reps    Punches 20 reps 20 reps    Balloon Tap  10 reps    Ball Toss  10 reps                 Braces/Orthotics/Lines/Etc:   · IV  · O2 Device: Nasal cannula  Treatment/Session Assessment:    · Response to Treatment:  Patient tolerated treatment well.   · Interdisciplinary Collaboration:  · Certified Occupational Therapy Assistant and Registered Nurse  · After treatment position/precautions:  · Supine in bed, Bed/Chair-wheels locked, Bed in low position, Call light within reach and Family at bedside  · Compliance with Program/Exercises: Will assess as treatment progresses. · Recommendations/Intent for next treatment session: \"Next visit will focus on advancements to more challenging activities and reduction in assistance provided\".   Total Treatment Duration:  OT Patient Time In/Time Out  Time In: 5931 (1200)  Time Out: 4951 [phone])     Britney Jones

## 2017-02-22 ENCOUNTER — APPOINTMENT (OUTPATIENT)
Dept: GENERAL RADIOLOGY | Age: 52
DRG: 308 | End: 2017-02-22
Attending: INTERNAL MEDICINE
Payer: MEDICARE

## 2017-02-22 LAB
ANION GAP BLD CALC-SCNC: 4 MMOL/L (ref 7–16)
BNP SERPL-MCNC: 458 PG/ML
BUN SERPL-MCNC: 42 MG/DL (ref 6–23)
CALCIUM SERPL-MCNC: 8.2 MG/DL (ref 8.3–10.4)
CHLORIDE SERPL-SCNC: 101 MMOL/L (ref 98–107)
CO2 SERPL-SCNC: 35 MMOL/L (ref 21–32)
CREAT SERPL-MCNC: 1.76 MG/DL (ref 0.8–1.5)
GLUCOSE BLD STRIP.AUTO-MCNC: 121 MG/DL (ref 65–100)
GLUCOSE BLD STRIP.AUTO-MCNC: 128 MG/DL (ref 65–100)
GLUCOSE BLD STRIP.AUTO-MCNC: 138 MG/DL (ref 65–100)
GLUCOSE BLD STRIP.AUTO-MCNC: 94 MG/DL (ref 65–100)
GLUCOSE SERPL-MCNC: 78 MG/DL (ref 65–100)
POTASSIUM SERPL-SCNC: 4.2 MMOL/L (ref 3.5–5.1)
SODIUM SERPL-SCNC: 140 MMOL/L (ref 136–145)

## 2017-02-22 PROCEDURE — 83880 ASSAY OF NATRIURETIC PEPTIDE: CPT | Performed by: INTERNAL MEDICINE

## 2017-02-22 PROCEDURE — 65660000000 HC RM CCU STEPDOWN

## 2017-02-22 PROCEDURE — 74011636637 HC RX REV CODE- 636/637: Performed by: NURSE PRACTITIONER

## 2017-02-22 PROCEDURE — 3331090001 HH PPS REVENUE CREDIT

## 2017-02-22 PROCEDURE — 3331090002 HH PPS REVENUE DEBIT

## 2017-02-22 PROCEDURE — 74011000250 HC RX REV CODE- 250: Performed by: INTERNAL MEDICINE

## 2017-02-22 PROCEDURE — 74011250637 HC RX REV CODE- 250/637: Performed by: NURSE PRACTITIONER

## 2017-02-22 PROCEDURE — 74011250637 HC RX REV CODE- 250/637: Performed by: INTERNAL MEDICINE

## 2017-02-22 PROCEDURE — 94760 N-INVAS EAR/PLS OXIMETRY 1: CPT

## 2017-02-22 PROCEDURE — 94640 AIRWAY INHALATION TREATMENT: CPT

## 2017-02-22 PROCEDURE — 80048 BASIC METABOLIC PNL TOTAL CA: CPT | Performed by: INTERNAL MEDICINE

## 2017-02-22 PROCEDURE — 82962 GLUCOSE BLOOD TEST: CPT

## 2017-02-22 PROCEDURE — 77010033678 HC OXYGEN DAILY

## 2017-02-22 PROCEDURE — 71010 XR CHEST PORT: CPT

## 2017-02-22 PROCEDURE — 94660 CPAP INITIATION&MGMT: CPT

## 2017-02-22 RX ADMIN — OXYCODONE HYDROCHLORIDE 15 MG: 15 TABLET ORAL at 21:11

## 2017-02-22 RX ADMIN — CARVEDILOL 25 MG: 25 TABLET, FILM COATED ORAL at 17:24

## 2017-02-22 RX ADMIN — FAMOTIDINE 20 MG: 20 TABLET ORAL at 08:48

## 2017-02-22 RX ADMIN — GUAIFENESIN 1200 MG: 600 TABLET, EXTENDED RELEASE ORAL at 08:48

## 2017-02-22 RX ADMIN — ALLOPURINOL 100 MG: 100 TABLET ORAL at 08:48

## 2017-02-22 RX ADMIN — ISOSORBIDE DINITRATE 20 MG: 20 TABLET ORAL at 06:09

## 2017-02-22 RX ADMIN — LEVALBUTEROL HYDROCHLORIDE 0.63 MG: 0.63 SOLUTION RESPIRATORY (INHALATION) at 09:05

## 2017-02-22 RX ADMIN — APIXABAN 5 MG: 5 TABLET, FILM COATED ORAL at 21:12

## 2017-02-22 RX ADMIN — Medication 5 ML: at 21:51

## 2017-02-22 RX ADMIN — HYDRALAZINE HYDROCHLORIDE 25 MG: 25 TABLET, FILM COATED ORAL at 21:11

## 2017-02-22 RX ADMIN — FUROSEMIDE 40 MG: 40 TABLET ORAL at 15:51

## 2017-02-22 RX ADMIN — GABAPENTIN 600 MG: 300 CAPSULE ORAL at 21:11

## 2017-02-22 RX ADMIN — LEVALBUTEROL HYDROCHLORIDE 0.63 MG: 0.63 SOLUTION RESPIRATORY (INHALATION) at 19:30

## 2017-02-22 RX ADMIN — DOCUSATE SODIUM 100 MG: 100 CAPSULE, LIQUID FILLED ORAL at 08:48

## 2017-02-22 RX ADMIN — CARVEDILOL 25 MG: 25 TABLET, FILM COATED ORAL at 08:48

## 2017-02-22 RX ADMIN — APIXABAN 5 MG: 5 TABLET, FILM COATED ORAL at 08:48

## 2017-02-22 RX ADMIN — Medication 10 ML: at 14:00

## 2017-02-22 RX ADMIN — COLCHICINE 0.6 MG: 0.6 TABLET, FILM COATED ORAL at 08:47

## 2017-02-22 RX ADMIN — OXYCODONE HYDROCHLORIDE 15 MG: 15 TABLET ORAL at 01:26

## 2017-02-22 RX ADMIN — PETROLATUM: 42 OINTMENT TOPICAL at 08:57

## 2017-02-22 RX ADMIN — SACUBITRIL AND VALSARTAN 1 TABLET: 24; 26 TABLET, FILM COATED ORAL at 08:57

## 2017-02-22 RX ADMIN — HYDRALAZINE HYDROCHLORIDE 25 MG: 25 TABLET, FILM COATED ORAL at 06:08

## 2017-02-22 RX ADMIN — FAMOTIDINE 20 MG: 20 TABLET ORAL at 17:24

## 2017-02-22 RX ADMIN — SPIRONOLACTONE 50 MG: 25 TABLET, FILM COATED ORAL at 08:48

## 2017-02-22 RX ADMIN — GABAPENTIN 600 MG: 300 CAPSULE ORAL at 06:09

## 2017-02-22 RX ADMIN — INSULIN GLARGINE 15 UNITS: 100 INJECTION, SOLUTION SUBCUTANEOUS at 21:51

## 2017-02-22 RX ADMIN — POLYETHYLENE GLYCOL 3350 17 G: 17 POWDER, FOR SOLUTION ORAL at 08:48

## 2017-02-22 RX ADMIN — GABAPENTIN 600 MG: 300 CAPSULE ORAL at 14:06

## 2017-02-22 RX ADMIN — ISOSORBIDE DINITRATE 20 MG: 20 TABLET ORAL at 21:11

## 2017-02-22 RX ADMIN — FUROSEMIDE 40 MG: 40 TABLET ORAL at 06:09

## 2017-02-22 RX ADMIN — HYDRALAZINE HYDROCHLORIDE 25 MG: 25 TABLET, FILM COATED ORAL at 14:06

## 2017-02-22 RX ADMIN — AMIODARONE HYDROCHLORIDE 200 MG: 200 TABLET ORAL at 08:48

## 2017-02-22 RX ADMIN — SACUBITRIL AND VALSARTAN 1 TABLET: 24; 26 TABLET, FILM COATED ORAL at 21:12

## 2017-02-22 RX ADMIN — ISOSORBIDE DINITRATE 20 MG: 20 TABLET ORAL at 14:06

## 2017-02-22 RX ADMIN — GUAIFENESIN 1200 MG: 600 TABLET, EXTENDED RELEASE ORAL at 17:24

## 2017-02-22 RX ADMIN — Medication 5 ML: at 06:09

## 2017-02-22 RX ADMIN — PRAVASTATIN SODIUM 80 MG: 80 TABLET ORAL at 21:11

## 2017-02-22 RX ADMIN — LEVALBUTEROL HYDROCHLORIDE 0.63 MG: 0.63 SOLUTION RESPIRATORY (INHALATION) at 13:25

## 2017-02-22 RX ADMIN — OXYCODONE HYDROCHLORIDE 15 MG: 15 TABLET ORAL at 15:51

## 2017-02-22 NOTE — PROGRESS NOTES
Bedside and Verbal shift change report given to self (oncoming nurse) by Manolo Moulton RN (offgoing nurse). Report included the following information SBAR, Kardex, MAR and Recent Results.

## 2017-02-22 NOTE — PROGRESS NOTES
[facility] PROGRESS NOTE           2/22/2017 8:01 AM    Admit Date: 1/19/2017      Subjective:   Complains of dry cough    ROS:  GEN:  No fever or chills  Cardiovascular:  As noted above:no chest pain or palpitations. Pulmonary:  As noted above:SOB with exertion  Neuro:  No new focal motor or sensory loss    Objective:      Vitals:    02/21/17 2130 02/22/17 0028 02/22/17 0538 02/22/17 0700   BP:  125/79 141/89    Pulse:  65 67    Resp:  20 20    Temp:  97.7 °F (36.5 °C) 97.6 °F (36.4 °C)    SpO2: 98% 100% 100%    Weight:    (!) 190.1 kg (419 lb)   Height:           Physical Exam:  General-no distress  Neck- supple, no JVD  CV- regular rate and rhythm no MRG  Lung- rhonchi bilaterally  Abd- soft, nontender, morbidly obese  Ext- 2-3+ edema bilaterally. 3-4+scotral edema. Skin- warm and dry  Psychiatric:  Normal mood and affect. Neurologic:  Alert and oriented X 3      Data Review:   Recent Labs      02/21/17   0505  02/20/17   0540   NA  141  141   K  4.0  3.8   BUN  42*  43*   CREA  1.91*  1.73*   GLU  79  107*       TELEMETRY:  NSR    Assessment/Plan:     Principal Problem:    Nonsustained ventricular tachycardia ([address] (1/19/2017): Stable on Coreg and Amiodarone with ICD in place. Active Problems:    Obstructive sleep apnea (2/15/2010)      Nonischemic dilated cardiomyopathy (HCC) (7/2/2013):Continue Coreg,Hydralazine,Lasix,Aldactone,and Entresto (if renal failure allows). Overview: LVEF 10-15% on ECHO from 12/17/10      CKD (chronic kidney disease) stage 3, GFR 30-59 ml/min (8/13/2014): BMP pending. AICD (automatic cardioverter/defibrillator) present (10/21/2015)      Acute on chronic systolic (congestive) heart failure (HCC) (10/18/2016):Patient complains of cough. ?Worsening CHF. Check PCXR,BNP,and BMP. Continue Coreg,Hydralazine,Entresto(if renal fx permits),po Lasix,and Aldactone. Switch to iv Lasix if CHF worse. Atrial flutter ([address] (10/20/2016):Resolved. Continue Amiodarone ,Coreg,and Eliquis      Obesity hypoventilation syndrome ([facility] (10/24/2016)      Morbid obesity with BMI of 60.0-69.9, adult ([facility] (11/28/2016):Severe debilitated with near bedridden state. Ultimately ,patient needs hospice. Presently,he does not want to consider! Syncope (1/19/2017)      NSVT (nonsustained ventricular tachycardia) ([facility] (1/19/2017): Stable on Coreg & Amiodarone. ICD in place                Deanne Krishnamurthy MD  2/22/2017 8:01 AM

## 2017-02-22 NOTE — PROGRESS NOTES
Bedside and Verbal shift change report given to ITZEL Murillo (oncoming nurse) by self (offgoing nurse). Report included the following information SBAR, Kardex, MAR and Recent Results.

## 2017-02-22 NOTE — PROGRESS NOTES
OT Note:    OT attempted to see patient for therapy. Pt sitting on bedside commode at this time. Will re-attempt to see patient at a later date/time.     Thanks,  Britney Knott

## 2017-02-23 LAB
ANION GAP BLD CALC-SCNC: 7 MMOL/L (ref 7–16)
BUN SERPL-MCNC: 42 MG/DL (ref 6–23)
CALCIUM SERPL-MCNC: 8.2 MG/DL (ref 8.3–10.4)
CHLORIDE SERPL-SCNC: 102 MMOL/L (ref 98–107)
CO2 SERPL-SCNC: 32 MMOL/L (ref 21–32)
CREAT SERPL-MCNC: 1.81 MG/DL (ref 0.8–1.5)
GLUCOSE BLD STRIP.AUTO-MCNC: 112 MG/DL (ref 65–100)
GLUCOSE BLD STRIP.AUTO-MCNC: 127 MG/DL (ref 65–100)
GLUCOSE BLD STRIP.AUTO-MCNC: 132 MG/DL (ref 65–100)
GLUCOSE BLD STRIP.AUTO-MCNC: 93 MG/DL (ref 65–100)
GLUCOSE SERPL-MCNC: 90 MG/DL (ref 65–100)
POTASSIUM SERPL-SCNC: 4.3 MMOL/L (ref 3.5–5.1)
SODIUM SERPL-SCNC: 141 MMOL/L (ref 136–145)

## 2017-02-23 PROCEDURE — 94640 AIRWAY INHALATION TREATMENT: CPT

## 2017-02-23 PROCEDURE — 74011250637 HC RX REV CODE- 250/637: Performed by: INTERNAL MEDICINE

## 2017-02-23 PROCEDURE — 36591 DRAW BLOOD OFF VENOUS DEVICE: CPT

## 2017-02-23 PROCEDURE — 74011250637 HC RX REV CODE- 250/637: Performed by: NURSE PRACTITIONER

## 2017-02-23 PROCEDURE — 74011000250 HC RX REV CODE- 250: Performed by: INTERNAL MEDICINE

## 2017-02-23 PROCEDURE — 77010033678 HC OXYGEN DAILY

## 2017-02-23 PROCEDURE — 82962 GLUCOSE BLOOD TEST: CPT

## 2017-02-23 PROCEDURE — 97530 THERAPEUTIC ACTIVITIES: CPT

## 2017-02-23 PROCEDURE — 65660000000 HC RM CCU STEPDOWN

## 2017-02-23 PROCEDURE — 94660 CPAP INITIATION&MGMT: CPT

## 2017-02-23 PROCEDURE — 74011636637 HC RX REV CODE- 636/637: Performed by: NURSE PRACTITIONER

## 2017-02-23 PROCEDURE — 3331090001 HH PPS REVENUE CREDIT

## 2017-02-23 PROCEDURE — 80048 BASIC METABOLIC PNL TOTAL CA: CPT | Performed by: INTERNAL MEDICINE

## 2017-02-23 PROCEDURE — 94760 N-INVAS EAR/PLS OXIMETRY 1: CPT

## 2017-02-23 PROCEDURE — 3331090002 HH PPS REVENUE DEBIT

## 2017-02-23 RX ADMIN — ALLOPURINOL 100 MG: 100 TABLET ORAL at 08:20

## 2017-02-23 RX ADMIN — Medication 10 ML: at 05:07

## 2017-02-23 RX ADMIN — SACUBITRIL AND VALSARTAN 1 TABLET: 24; 26 TABLET, FILM COATED ORAL at 08:25

## 2017-02-23 RX ADMIN — CARVEDILOL 25 MG: 25 TABLET, FILM COATED ORAL at 17:42

## 2017-02-23 RX ADMIN — SPIRONOLACTONE 50 MG: 25 TABLET, FILM COATED ORAL at 08:19

## 2017-02-23 RX ADMIN — COLCHICINE 0.6 MG: 0.6 TABLET, FILM COATED ORAL at 08:19

## 2017-02-23 RX ADMIN — ISOSORBIDE DINITRATE 20 MG: 20 TABLET ORAL at 21:19

## 2017-02-23 RX ADMIN — OXYCODONE HYDROCHLORIDE 15 MG: 15 TABLET ORAL at 14:31

## 2017-02-23 RX ADMIN — FUROSEMIDE 40 MG: 40 TABLET ORAL at 17:42

## 2017-02-23 RX ADMIN — SACUBITRIL AND VALSARTAN 1 TABLET: 24; 26 TABLET, FILM COATED ORAL at 21:15

## 2017-02-23 RX ADMIN — HYDRALAZINE HYDROCHLORIDE 25 MG: 25 TABLET, FILM COATED ORAL at 21:18

## 2017-02-23 RX ADMIN — LEVALBUTEROL HYDROCHLORIDE 0.63 MG: 0.63 SOLUTION RESPIRATORY (INHALATION) at 01:33

## 2017-02-23 RX ADMIN — GUAIFENESIN 1200 MG: 600 TABLET, EXTENDED RELEASE ORAL at 17:42

## 2017-02-23 RX ADMIN — Medication 10 ML: at 15:04

## 2017-02-23 RX ADMIN — FUROSEMIDE 40 MG: 40 TABLET ORAL at 08:19

## 2017-02-23 RX ADMIN — GABAPENTIN 600 MG: 300 CAPSULE ORAL at 14:31

## 2017-02-23 RX ADMIN — ISOSORBIDE DINITRATE 20 MG: 20 TABLET ORAL at 05:07

## 2017-02-23 RX ADMIN — HYDRALAZINE HYDROCHLORIDE 25 MG: 25 TABLET, FILM COATED ORAL at 14:31

## 2017-02-23 RX ADMIN — CARVEDILOL 25 MG: 25 TABLET, FILM COATED ORAL at 08:19

## 2017-02-23 RX ADMIN — GABAPENTIN 600 MG: 300 CAPSULE ORAL at 21:17

## 2017-02-23 RX ADMIN — HYDRALAZINE HYDROCHLORIDE 25 MG: 25 TABLET, FILM COATED ORAL at 05:07

## 2017-02-23 RX ADMIN — APIXABAN 5 MG: 5 TABLET, FILM COATED ORAL at 21:17

## 2017-02-23 RX ADMIN — ISOSORBIDE DINITRATE 20 MG: 20 TABLET ORAL at 14:31

## 2017-02-23 RX ADMIN — LEVALBUTEROL HYDROCHLORIDE 0.63 MG: 0.63 SOLUTION RESPIRATORY (INHALATION) at 14:47

## 2017-02-23 RX ADMIN — LEVALBUTEROL HYDROCHLORIDE 0.63 MG: 0.63 SOLUTION RESPIRATORY (INHALATION) at 19:51

## 2017-02-23 RX ADMIN — GABAPENTIN 600 MG: 300 CAPSULE ORAL at 05:07

## 2017-02-23 RX ADMIN — FAMOTIDINE 20 MG: 20 TABLET ORAL at 08:19

## 2017-02-23 RX ADMIN — PRAVASTATIN SODIUM 80 MG: 80 TABLET ORAL at 21:20

## 2017-02-23 RX ADMIN — APIXABAN 5 MG: 5 TABLET, FILM COATED ORAL at 08:20

## 2017-02-23 RX ADMIN — GUAIFENESIN 1200 MG: 600 TABLET, EXTENDED RELEASE ORAL at 08:19

## 2017-02-23 RX ADMIN — LEVALBUTEROL HYDROCHLORIDE 0.63 MG: 0.63 SOLUTION RESPIRATORY (INHALATION) at 08:37

## 2017-02-23 RX ADMIN — FAMOTIDINE 20 MG: 20 TABLET ORAL at 17:42

## 2017-02-23 RX ADMIN — AMIODARONE HYDROCHLORIDE 200 MG: 200 TABLET ORAL at 08:19

## 2017-02-23 RX ADMIN — DOCUSATE SODIUM 100 MG: 100 CAPSULE, LIQUID FILLED ORAL at 08:19

## 2017-02-23 RX ADMIN — INSULIN GLARGINE 15 UNITS: 100 INJECTION, SOLUTION SUBCUTANEOUS at 23:35

## 2017-02-23 RX ADMIN — DOCUSATE SODIUM 100 MG: 100 CAPSULE, LIQUID FILLED ORAL at 17:42

## 2017-02-23 NOTE — DISCHARGE SUMMARY
[facility] Cardiology Discharge Summary     Patient ID:  Josiane Marquez  [id number]  74 y.o.  1965    Admit date: 1/19/2017    Discharge date:  2/24/2017    Admitting Physician: Beronica Salazar MD     Discharge Physician: FATEMEH Nevarez/Dr. Fracisco Camara    Admission Diagnoses: NSVT (nonsustained ventricular tachycardia) [facility])    Discharge Diagnoses:   Patient Active Problem List    Diagnosis Date Noted    Syncope 01/19/2017    Nonsustained ventricular tachycardia ([address] 01/19/2017    NSVT (nonsustained ventricular tachycardia) ([address] 01/19/2017    Constipation 01/08/2017    Morbid obesity with BMI of 60.0-69.9, adult ([address] 11/28/2016    Hypoxemia 11/28/2016    Hypersomnia 11/28/2016    Obesity hypoventilation syndrome ([address] 10/24/2016    Atrial flutter ([address] 10/20/2016    Acute on chronic systolic (congestive) heart failure (HCC) 10/18/2016    Nausea & vomiting 11/30/2015    Lactic acidosis 11/30/2015    Hypertension 11/30/2015    Chest pain 10/21/2015    AICD (automatic cardioverter/defibrillator) present 10/21/2015    Congestive heart failure (CHF) ([address] 10/21/2015    Cardiomyopathy ([address] 10/21/2015    Abdominal fluid collection 08/18/2014    Pleural effusion 08/13/2014    CKD (chronic kidney disease) stage 3, GFR 30-59 ml/min 08/13/2014    Chronic pancreatitis ([address] 08/13/2014    Morbid obesity ([address] 07/02/2013    Nonischemic dilated cardiomyopathy ([address] 07/02/2013    Dyslipidemia 07/02/2013    Diabetes mellitus type II, uncontrolled ([address] 07/02/2013    Noncompliance with medication regimen 07/02/2013    Chronic back pain 02/20/2010    Obstructive sleep apnea 02/15/2010       Cardiology Procedures this admission:  EchoCardiogram  Consults: Nephrology, Urology and [facility] Course: Patient was seen in the ER after collapsing in the hospital.  He denied loss of consciousness.   He had 2 near syncopal episodes earlier that morning and was able to pull off the side of the road. In the ER, he was noted to have NSVT and was volume overloaded on exam.  He was evaluated and subsequently admitted for further cardiac evaluation and treatment. He was started on IV amiodarone and IV lasix. His rhythm was stable with no further NSVT. He had little urine output and was transitioned to lasix drip. He was started on milrinone drip. He was transitioned to oral amiodarone. An echocardiogram was performed with report as follows:  -  Left ventricle: The ventricle was severely dilated. Systolic function was severely reduced. Ejection fraction was estimated in the range of 10% to 15%. This study was inadequate for the evaluation of regional wall motion. Wall thickness was mildly increased. -  Right ventricle: Systolic function was reduced. -  Left atrium: The atrium was markedly dilated. -  Right atrium: The atrium was moderately dilated. -  Inferior vena cava, hepatic veins: The inferior vena cava was moderately dilated. Respirophasic changes in dimension were absent. -  Mitral valve: There was mild regurgitation. The milrinone drip was stopped but he developed recurrent dyspnea and it was resumed. He was placed back on a lasix drip. He had severe scrotal edema and was placed in a sling. Urology was consulted with recommendations for continued elevation of scrotum until marked edema had improved. He was followed by nephrology as well. Multiple discussions were held regarding patient's poor prognosis. He refused hospice care on multiple occasions. PT and OT recommended rehab at discharge. He was reluctant to agree to rehab and wanted to go home. He and his daughter mentioned moving him to [place] or [place] to be closer to family. It was recommend that he be stabilized prior to long distance travel. He was weaned off milrinone and transitioned to oral diuretics.      The morning of 2/23/2017 patient was up feeling well without any complaints of shortness of breath. Patient's labs were stable with creatinine of 1.77. Patient was seen and examined by Dr. Nini Shah and determined stable and ready for discharge. Patient was instructed on the importance of medication compliance, low sodium diet, 2 liter per day fluid restriction and daily weights. For maximized medical therapy of congestive heart failure, patient will continue use of BB and ARB (Entresto). The patient will have close transitional care follow up with [address] [place] [address] [address] Henrry Hurd on March 3rd at 10:15am ([address] [facility]). DISPOSITION: The patient is being discharged to rehab facility in stable condition on a low saturated fat, low cholesterol and low salt diet. The patient is instructed to advance activities as tolerated to the limit of fatigue or shortness of breath. The patient is informed to monitor daily weights and maintain a 2 liter per day fluid restriction. The patient is instructed to call the office for any shortness of breath, weight gain, or increased peripheral edema. Discharge Exam:   Visit Vitals    /76    Pulse 65    Temp 97.3 °F (36.3 °C)    Resp 18    Ht 5' 6\" (1.676 m)    Wt (!) 187.3 kg (413 lb)    SpO2 100%    BMI 66.66 kg/m2       Patient has been seen by Dr. Nini Shah: see his progress note for exam details.     Recent Results (from the past 24 hour(s))   GLUCOSE, POC    Collection Time: 02/23/17 11:26 AM   Result Value Ref Range    Glucose (POC) 112 (H) 65 - 100 mg/dL   GLUCOSE, POC    Collection Time: 02/23/17  5:40 PM   Result Value Ref Range    Glucose (POC) 127 (H) 65 - 100 mg/dL   GLUCOSE, POC    Collection Time: 02/23/17 10:18 PM   Result Value Ref Range    Glucose (POC) 132 (H) 65 - 776 mg/dL   METABOLIC PANEL, BASIC    Collection Time: 02/24/17  5:11 AM   Result Value Ref Range    Sodium 142 136 - 145 mmol/L    Potassium 4.4 3.5 - 5.1 mmol/L    Chloride 103 98 - 107 mmol/L    CO2 33 (H) 21 - 32 mmol/L    Anion gap 6 (L) 7 - 16 mmol/L    Glucose 106 (H) 65 - 100 mg/dL    BUN 42 (H) 6 - 23 MG/DL    Creatinine 1.77 (H) 0.8 - 1.5 MG/DL    GFR est AA 52 (L) >60 ml/min/1.73m2    GFR est non-AA 43 (L) >60 ml/min/1.73m2    Calcium 8.8 8.3 - 10.4 MG/DL   GLUCOSE, POC    Collection Time: 02/24/17  5:51 AM   Result Value Ref Range    Glucose (POC) 115 (H) 65 - 100 mg/dL         Patient Instructions:   Current Discharge Medication List      START taking these medications    Details   sacubitril-valsartan (ENTRESTO) 24 mg/26 mg tablet Take 1 Tab by mouth every twelve (12) hours. Qty: 60 Tab, Refills: 6      amiodarone (CORDARONE) 200 mg tablet Take 1 Tab by mouth daily. Qty: 30 Tab, Refills: 6    Associated Diagnoses: NSVT (nonsustained ventricular tachycardia) ([facility])      furosemide (LASIX) 40 mg tablet Take 1 Tab by mouth Before breakfast and dinner. Qty: 60 Tab, Refills: 2         CONTINUE these medications which have CHANGED    Details   docusate sodium (COLACE) 100 mg capsule Take 1 Cap by mouth two (2) times a day. Qty: 60 Cap, Refills: 0         CONTINUE these medications which have NOT CHANGED    Details   gabapentin (NEURONTIN) 600 mg tablet Take 600 mg by mouth three (3) times daily. bisacodyl (DULCOLAX) 10 mg suppository Insert 10 mg into rectum daily. Qty: 1 Suppository, Refills: 2      apixaban (ELIQUIS) 5 mg tablet Take 1 Tab by mouth every twelve (12) hours. Qty: 60 Tab, Refills: 0      oxyCODONE IR (OXY-IR) 15 mg immediate release tablet Take 15 mg by mouth every four (4) hours as needed for Pain. carvedilol (COREG) 25 mg tablet Take 1 Tab by mouth two (2) times daily (with meals). Qty: 60 Tab, Refills: 3      hydrALAZINE (APRESOLINE) 25 mg tablet Take 1 Tab by mouth three (3) times daily. Qty: 90 Tab, Refills: 3      insulin glargine (LANTUS) 100 unit/mL injection 50 Units by SubCUTAneous route nightly.   Qty: 1 Vial, Refills: 0      insulin lispro (HUMALOG) 100 unit/mL injection 15 Units by SubCUTAneous route three (3) times daily (with meals). Qty: 1 Vial, Refills: 0      isosorbide dinitrate (ISORDIL) 20 mg tablet Take 1 Tab by mouth three (3) times daily. Qty: 90 Tab, Refills: 3      ranitidine (ZANTAC) 150 mg tablet Take 150 mg by mouth nightly. spironolactone (ALDACTONE) 25 mg tablet Take 1 Tab by mouth daily. Qty: 30 Tab, Refills: 11      pravastatin (PRAVACHOL) 80 mg tablet Take 1 Tab by mouth nightly. Qty: 30 Tab, Refills: 0      polyethylene glycol (MIRALAX) 17 gram packet Take 1 Packet by mouth daily as needed (constipation). Qty: 10 Packet, Refills: 5      cpap machine kit 10 cm qhs      OXYGEN-AIR DELIVERY SYSTEMS 2 lpm qhs      allopurinol (ZYLOPRIM) 100 mg tablet Take 100 mg by mouth daily. glucose blood VI test strips (ASCENSIA AUTODISC VI, ONE TOUCH ULTRA TEST VI) strip Test strips for 30 day supply  Qty: 120 strip, Refills: 0      nitroglycerin (NITROSTAT) 0.4 mg SL tablet 1 Tab by SubLINGual route every five (5) minutes as needed for Chest Pain. Qty: 4 Bottle, Refills: 6      colchicine 0.6 mg tablet Take 0.6 mg by mouth every morning.          STOP taking these medications       pioglitazone (ACTOS) 30 mg tablet Comments:   Reason for Stopping:         torsemide (DEMADEX) 100 mg tablet Comments:   Reason for Stopping:         potassium chloride (K-DUR, KLOR-CON) 10 mEq tablet Comments:   Reason for Stopping:                 Signed:  Aamir Radford PA-C  2/24/2017  7:19 AM

## 2017-02-23 NOTE — PROGRESS NOTES
Problem: Mobility Impaired (Adult and Pediatric)  Goal: *Acute Goals and Plan of Care (Insert Text)  LTG:  (1.)Mr. Pollard will move from supine to sit and sit to supine , scoot up and down and roll side to side with modified independence within 7 day(s) with use of hospital bed controls  (2.)Mr. Pollard will transfer from bed to chair and chair to bed with Supervision to [facility]  Without use of an  assistive device  within 7 day(s). (3.)Mr. Pollard will ambulate with supervision  for 25 feet with out an assistive device within 7 day(s) with O2 as needed to support needed sat level.      ___________________________________________________________________________________________      PHYSICAL THERAPY: Daily Note, Treatment Day: 2nd and AM 2/23/2017  INPATIENT: Hospital Day: 39  Payor: CARE IMPROVEMENT PLUS [place] / Product Type: [employer] Medicare /      NAME/AGE/GENDER: Leola Swartz is a 45 y.o. male         PRIMARY DIAGNOSIS: NSVT (nonsustained ventricular tachycardia) ([address] Nonsustained ventricular tachycardia ([address] Nonsustained ventricular tachycardia ([address]        ICD-10: Treatment Diagnosis:       · Generalized Muscle Weakness (M62.81)  · Difficulty in walking, Not elsewhere classified (R26.2)   Precaution/Allergies:  Dilaudid [hydromorphone]; Iodinated contrast media - oral and iv dye; and Penicillins       ASSESSMENT:      Mr. Duncan Watts was initially refusing therapy because he wanted to talk on the phone to get his affairs in order, but with encouragement, pt agreed to ambulate to chair only. Patient appears to be making slow progress in general mobility as he self-limits and also does have extreme swelling in his scrotum. He was supine in bed on arrival.  Patient uses the bed controls to transfer to sitting. Patient then stood and ambulated using bariatric rolling walker to chair. Max encouragement provided for pt to increase gait distance, but pt declined.  Patient was then assisted with positioning for comfort. Per pt request, scrotum elevated with towel and positioned for comfort. Patient in good spirits about going home and enjoying time with his family; currently declining rehab. Will need a bariatric rolling walker for home use (RN and SW notified). Will continue to treat and progress mobility as able. This section established at most recent assessment   PROBLEM LIST (Impairments causing functional limitations):  1. Decreased Strength  2. Decreased ADL/Functional Activities  3. Decreased Transfer Abilities  4. Decreased Ambulation Ability/Technique  5. Decreased Balance  6. Decreased Activity Tolerance  7. Increased Fatigue  8. Increased Shortness of Breath  9. Edema/Girth    INTERVENTIONS PLANNED: (Benefits and precautions of physical therapy have been discussed with the patient.)  1. Balance Exercise  2. Bed Mobility  3. Family Education  4. Gait Training  5. Home Exercise Program (HEP)  6. Therapeutic Activites  7. Therapeutic Exercise/Strengthening  8. Transfer Training  9. Group Therapy      TREATMENT PLAN: Frequency/Duration: 3 times a week for duration of hospital stay  Rehabilitation Potential For Stated Goals: GOOD      RECOMMENDED REHABILITATION/EQUIPMENT: (at time of discharge pending progress): Continue Skilled Therapy. HISTORY:   History of Present Injury/Illness (Reason for Referral):  Andreina Olea is a 45 y.o. male with known NICM with documented EF 10 % with [employer] single chamber ICD in place. He was just discharged on 1/13 with initial complaint of constipation and volume overload. He developed worsening renal failure necessitating hold his diuretics temporarily. He was discharged home on addition of demadex 100 mg daily and miralax for constipation. Really since mid December he has not felt well with worsening orthopnea, increasing abdominal girth, increased wt and lower ext edema.  Today while driving in car he felt presyncopal and was able to pull to side of rode. He did not loose consciousness. He had repeat episode of presyncope prior to coming to ER. While in ER he was noted to have nonsustained ventricular tachycardia. He is not on antiarrhythmia agent. Interrogation of his device revealed normal operation but his VT zone is set at 158 bpm with noted VT in ER at approximately 145 bpm.   Past Medical History/Comorbidities:   Mr. Isha Murry  has a past medical history of Acute systolic heart failure [facility]) (May, 2009); AICD (automatic cardioverter/defibrillator) present (10/21/2015); Atypical chest pain (4/23/2010); Bronchitis; CAD (coronary artery disease); Cardiomyopathy; Chest pain (10/21/2015); Chronic kidney disease; Chronic pain; Congestive heart failure (CHF) ([address] (10/21/2015); COPD; Diabetes ([address] Diabetes mellitus type II, uncontrolled ([address] (7/2/2013); GERD (gastroesophageal reflux disease); Gout; Heart failure ([address] Hypertension; Hyponatremia (12/20/2010); Ill-defined condition; Morbid obesity ([address] Nausea & vomiting (11/30/2015); Neuropathy; Obstructive sleep apnea (2/15/2010); Other unknown and unspecified cause of morbidity or mortality; Severe sepsis ([address] and Unspecified sleep apnea. He also has no past medical history of Adverse effect of anesthesia; Aneurysm ([address] Coagulation disorder ([address] DEMENTIA; Difficult intubation; Malignant hyperthermia due to anesthesia; Neurological disorder; Other ill-defined conditions(799.89); Pseudocholinesterase deficiency; or Psychiatric disorder. Mr. Isha Murry  has a past surgical history that includes pacemaker; heart catheterization ([place] 10, 2008); and chest surgery procedure unlisted.   Social History/Living Environment:   Home Environment:  (daughters house)  # Steps to Enter: 0  One/Two Story Residence: One story  Living Alone: No  Support Systems: Family member(s), Friends \ neighbors  Patient Expects to be Discharged to[de-identified] Private residence  Current DME Used/Available at Home: None  Tub or Shower Type: Tub/Shower combination  Prior Level of Function/Work/Activity:  Lives with daughter; typically independent with ambulation for short distances; drives. Personal Factors:          Sex:  male        Age:  45 y.o. Number of Personal Factors/Comorbidities that affect the Plan of Care:  CHF, DM, obesity, edema 3+: HIGH COMPLEXITY   EXAMINATION:   Most Recent Physical Functioning:   Gross Assessment:                  Posture:     Balance:  Sitting: Impaired  Sitting - Static: Good (unsupported)  Sitting - Dynamic: Fair (occasional)  Standing: Impaired  Standing - Static: Fair (with rolling walker)  Standing - Dynamic : Fair (with rolling walker) Bed Mobility:  Rolling: Modified independent  Supine to Sit: Modified independent  Sit to Supine:  (not tested)  Scooting: Supervision  Wheelchair Mobility:     Transfers:  Sit to Stand: Stand-by asssistance  Stand to Sit: Stand-by asssistance  Bed to Chair: Contact guard assistance  Interventions: Safety awareness training;Verbal cues  Gait:     Base of Support: Widened  Speed/Mira: Slow  Step Length: Right shortened;Left shortened  Gait Abnormalities: Decreased step clearance;Trunk sway increased  Distance (ft): 6 Feet (ft)  Assistive Device:  (bariatric rolling walker)  Ambulation - Level of Assistance: Contact guard assistance       Body Structures Involved:  1. Lungs  2. Joints  3. Muscles Body Functions Affected:  1. Respiratory  2. Movement Related Activities and Participation Affected:  1. General Tasks and Demands  2. Mobility  3. Self Care  4. Community, Social and [facility]   Number of elements that affect the Plan of Care: 4+: HIGH COMPLEXITY   CLINICAL PRESENTATION:   Presentation: Stable and uncomplicated: LOW COMPLEXITY   CLINICAL DECISION MAKIN [facility] Mobility Inpatient Short Form  How much difficulty does the patient currently have. .. Unable A Lot A Little None   1.   Turning over in bed (including adjusting bedclothes, sheets and blankets)? [ ] 1   [ ] 2   [X] 3   [ ] 4   2. Sitting down on and standing up from a chair with arms ( e.g., wheelchair, bedside commode, etc.)   [ ] 1   [ ] 2   [X] 3   [ ] 4   3. Moving from lying on back to sitting on the side of the bed? [ ] 1   [ ] 2   [X] 3   [ ] 4   How much help from another person does the patient currently need. .. Total A Lot A Little None   4. Moving to and from a bed to a chair (including a wheelchair)? [ ] 1   [ ] 2   [X] 3   [ ] 4   5. Need to walk in hospital room? [ ] 1   [ ] 2   [X] 3   [ ] 4   6. Climbing 3-5 steps with a railing? [ ] 1   [ ] 2   [X] 3   [ ] 4   © 2007, Trustees of [address] [place] [address], under license to [employer]. All rights reserved    Score:  Initial: 18 Most Recent: X (Date: -- )     Interpretation of Tool:  Represents activities that are increasingly more difficult (i.e. Bed mobility, Transfers, Gait). Score 24 23 22-20 19-15 14-10 9-7 6       Modifier CH CI CJ CK CL CM CN         · Mobility - Walking and Moving Around:               - CURRENT STATUS:    CK - 40%-59% impaired, limited or restricted               - GOAL STATUS:           CJ - 20%-39% impaired, limited or restricted               - D/C STATUS:                       ---------------To be determined---------------  Payor: CARE IMPROVEMENT PLUS [place] / Product Type: Managed Care Medicare /       Medical Necessity:     · Patient is expected to demonstrate progress in strength, range of motion, balance and coordination to decrease assistance required with overall functional mobility, transfers, ambulation. · Patient demonstrates good rehab potential due to higher previous functional level.   Reason for Services/Other Comments:  · Patient continues to require present interventions due to patient's inability to perform bed mobility, transfers, ambulation all safely and effectively at prior level of function of independence. Use of outcome tool(s) and clinical judgement create a POC that gives a: Clear prediction of patient's progress: LOW COMPLEXITY                 TREATMENT:      Pre-treatment Symptoms/Complaints: \"If I am going to die like they say I am, I want to be home with my family and not in a rehab facility. \"  Pain: Initial: . Pain Intensity 1: 0  Post Session:  Unchanged from start of therapy. Therapeutic Activity: (  30 minutes ):  Therapeutic activities including bed mobility, sitting tolerance/balace in chair and on edge of bed, ambulation 6 feet today with RW, sit to stand at the bedside pulling up to stand and  scooting to improve mobility, strength and balance. Need is present but patient has his own method and preference for how and when he wants to move. Pt declined therapeutic exercise secondary to scrotal pain. Date:  1/26/17 Date:  2/21/17 Date:   Date:   Date:   Date:     Activity/Exercise Parameters Parameters Parameters      Standing marching in place         Seated AP's 2 x 20 active 1 x 10 BA       Seated LAQ's 2 x 20 1 x 15 BA       Seated hip ABD/ADD  1 x 10 BA       Supine hip abd/add         Shoulder flexion 2x 20        Elbow flexion 2x 20                          Seated hip flexion 2 x 20 1 x 10 BA       Ambulation  Assist  Device                  Key:  A=active, AA=active assisted, B=bilaterally, R=right, L=left         Treatment/Session Assessment:    · Response to Treatment:  Increased mobility and activity with transfers, sitting and moving in bed. · Interdisciplinary Collaboration:  · Physical Therapist, Registered Nurse and   · After treatment position/precautions:  · Supine in bed, Bed/Chair-wheels locked, Bed in low position, Call light within reach and Family at bedside  · Compliance with Program/Exercises: Will assess as treatment progresses. · Recommendations/Intent for next treatment session:   \"Next visit will focus on advancements to more challenging activities\".   Total Treatment Duration:  PT Patient Time In/Time Out  Time In: 1131  Time Out: [address], [place], PT

## 2017-02-23 NOTE — PROGRESS NOTES
's visit requested. Mr. Storm Marrero was receiving care from staff upon my arrival. Waited outside room with patient's daughter, conveying care and concern.  follow-up is planned.      [place]  Board Certified

## 2017-02-23 NOTE — PROGRESS NOTES
Bedside and Verbal shift change report given to self (oncoming nurse) by Bree Cleary (offgoing nurse). Report included the following information SBAR, Kardex, MAR, Accordion and Recent Results.

## 2017-02-23 NOTE — PROGRESS NOTES
[facility] CARDIOLOGY PROGRESS NOTE           2/23/2017 8:01 AM    Admit Date: 1/19/2017      Subjective:   Moving around more, SORENSON less, coughing some now. No CP. Refusing to go home today. ROS:  GEN:  No fever or chills  Cardiovascular:  As noted above:no chest pain or palpitations. Pulmonary:  As noted above:SOB with exertion  Neuro:  No new focal motor or sensory loss    Objective:      Vitals:    02/23/17 0048 02/23/17 0134 02/23/17 0400 02/23/17 0510   BP: 120/84   111/69   Pulse: 62   63   Resp: 16   16   Temp: 96.8 °F (36 °C)   96 °F (35.6 °C)   SpO2: 100% 100% 99% 100%   Weight:    (!) 188.2 kg (414 lb 14.4 oz)   Height:           Physical Exam:  General-no distress  Neck- supple, no JVD  CV- regular rate and rhythm no MRG  Lung- rhonchi bilaterally  Abd- soft, nontender, morbidly obese  Ext- 2-3+ edema bilaterally. 3-4+scotral edema. Skin- warm and dry  Psychiatric:  Normal mood and affect. Neurologic:  Alert and oriented X 3      Data Review:   Recent Labs      02/23/17   0515  02/22/17   0900   NA  141  140   K  4.3  4.2   BUN  42*  42*   CREA  1.81*  1.76*   GLU  90  78       TELEMETRY:  NSR    Assessment/Plan:     Principal Problem:    Nonsustained ventricular tachycardia ([facility] [address] (1/19/2017): Stable on Coreg and Amiodarone with ICD in place. Active Problems:    Obstructive sleep apnea (2/15/2010)      Nonischemic dilated cardiomyopathy (HCC) (7/2/2013):Continue Coreg,Hydralazine,Lasix,Aldactone,and Entresto (if renal failure allows). Overview: LVEF 10-15% on ECHO from 12/17/10      CKD (chronic kidney disease) stage 3, GFR 30-59 ml/min (8/13/2014): Cr stable on oral diuretics. AICD (automatic cardioverter/defibrillator) present (10/21/2015)      Acute on chronic systolic (congestive) heart failure ([address] (10/18/2016):  Better now, off [employer]. On oral lasix. Home tomorrow I hope. Will need HH and close follow up.        Atrial flutter ([address] (10/20/2016):Resolved. Continue Amiodarone ,Coreg,and Eliquis      Obesity hypoventilation syndrome ([facility] [address] (10/24/2016)      Morbid obesity with BMI of 60.0-69.9, adult ([facility] [address] (11/28/2016):Severe debilitated with near bedridden state. Ultimately ,patient needs hospice. Presently,he does not want to consider! Syncope (1/19/2017)      NSVT ([facility]onsustained ventricular tachycardia) [facility] (1/19/2017): Stable on Coreg & Amiodarone. ICD in place    Dispo: home tomorrow with Marco Antonio Degroot.            Kelly Naranjo DO  2/23/2017 8:01 AM

## 2017-02-23 NOTE — PROGRESS NOTES
Follow-up visit with Mr. Yemi Solano and his daughter. Mr. Yemi Solano is anticipating discharge tomorrow. Offered pastoral support as he reflected on his future, family, and katheryn.      Lazaro Wilson  Board Certified

## 2017-02-23 NOTE — PROGRESS NOTES
Verbal bedside report given to oncoming RN, Perla List. Patient's situation, background, assessment and recommendations provided. Opportunity for questions provided. Oncoming RN assumed care of patient.

## 2017-02-24 VITALS
HEART RATE: 72 BPM | TEMPERATURE: 96.7 F | OXYGEN SATURATION: 98 % | BODY MASS INDEX: 50.62 KG/M2 | HEIGHT: 66 IN | WEIGHT: 315 LBS | DIASTOLIC BLOOD PRESSURE: 60 MMHG | SYSTOLIC BLOOD PRESSURE: 107 MMHG | RESPIRATION RATE: 19 BRPM

## 2017-02-24 LAB
ANION GAP BLD CALC-SCNC: 6 MMOL/L (ref 7–16)
BUN SERPL-MCNC: 42 MG/DL (ref 6–23)
CALCIUM SERPL-MCNC: 8.8 MG/DL (ref 8.3–10.4)
CHLORIDE SERPL-SCNC: 103 MMOL/L (ref 98–107)
CO2 SERPL-SCNC: 33 MMOL/L (ref 21–32)
CREAT SERPL-MCNC: 1.77 MG/DL (ref 0.8–1.5)
GLUCOSE BLD STRIP.AUTO-MCNC: 115 MG/DL (ref 65–100)
GLUCOSE SERPL-MCNC: 106 MG/DL (ref 65–100)
POTASSIUM SERPL-SCNC: 4.4 MMOL/L (ref 3.5–5.1)
SODIUM SERPL-SCNC: 142 MMOL/L (ref 136–145)

## 2017-02-24 PROCEDURE — 74011000250 HC RX REV CODE- 250: Performed by: INTERNAL MEDICINE

## 2017-02-24 PROCEDURE — 77010033678 HC OXYGEN DAILY

## 2017-02-24 PROCEDURE — 80048 BASIC METABOLIC PNL TOTAL CA: CPT | Performed by: PHYSICIAN ASSISTANT

## 2017-02-24 PROCEDURE — 82962 GLUCOSE BLOOD TEST: CPT

## 2017-02-24 PROCEDURE — 94760 N-INVAS EAR/PLS OXIMETRY 1: CPT

## 2017-02-24 PROCEDURE — 74011250637 HC RX REV CODE- 250/637: Performed by: INTERNAL MEDICINE

## 2017-02-24 PROCEDURE — 74011250637 HC RX REV CODE- 250/637: Performed by: NURSE PRACTITIONER

## 2017-02-24 PROCEDURE — 94660 CPAP INITIATION&MGMT: CPT

## 2017-02-24 PROCEDURE — 94640 AIRWAY INHALATION TREATMENT: CPT

## 2017-02-24 PROCEDURE — 36415 COLL VENOUS BLD VENIPUNCTURE: CPT | Performed by: PHYSICIAN ASSISTANT

## 2017-02-24 PROCEDURE — 36591 DRAW BLOOD OFF VENOUS DEVICE: CPT

## 2017-02-24 PROCEDURE — 3331090002 HH PPS REVENUE DEBIT

## 2017-02-24 PROCEDURE — 3331090001 HH PPS REVENUE CREDIT

## 2017-02-24 RX ORDER — AMIODARONE HYDROCHLORIDE 200 MG/1
200 TABLET ORAL DAILY
Qty: 30 TAB | Refills: 6 | Status: SHIPPED | OUTPATIENT
Start: 2017-02-24 | End: 2017-11-29 | Stop reason: SDUPTHER

## 2017-02-24 RX ORDER — FUROSEMIDE 40 MG/1
40 TABLET ORAL
Qty: 60 TAB | Refills: 2 | Status: SHIPPED | OUTPATIENT
Start: 2017-02-24 | End: 2017-03-29

## 2017-02-24 RX ORDER — DOCUSATE SODIUM 100 MG/1
100 CAPSULE, LIQUID FILLED ORAL 2 TIMES DAILY
Qty: 60 CAP | Refills: 0 | Status: SHIPPED | COMMUNITY
Start: 2017-02-24 | End: 2019-01-01

## 2017-02-24 RX ADMIN — ALLOPURINOL 100 MG: 100 TABLET ORAL at 09:06

## 2017-02-24 RX ADMIN — LEVALBUTEROL HYDROCHLORIDE 0.63 MG: 0.63 SOLUTION RESPIRATORY (INHALATION) at 08:57

## 2017-02-24 RX ADMIN — GABAPENTIN 600 MG: 300 CAPSULE ORAL at 05:01

## 2017-02-24 RX ADMIN — ISOSORBIDE DINITRATE 20 MG: 20 TABLET ORAL at 05:02

## 2017-02-24 RX ADMIN — FUROSEMIDE 40 MG: 40 TABLET ORAL at 09:05

## 2017-02-24 RX ADMIN — HYDRALAZINE HYDROCHLORIDE 25 MG: 25 TABLET, FILM COATED ORAL at 05:02

## 2017-02-24 RX ADMIN — DOCUSATE SODIUM 100 MG: 100 CAPSULE, LIQUID FILLED ORAL at 09:05

## 2017-02-24 RX ADMIN — SPIRONOLACTONE 50 MG: 25 TABLET, FILM COATED ORAL at 09:06

## 2017-02-24 RX ADMIN — OXYCODONE HYDROCHLORIDE 15 MG: 15 TABLET ORAL at 09:12

## 2017-02-24 RX ADMIN — FAMOTIDINE 20 MG: 20 TABLET ORAL at 09:06

## 2017-02-24 RX ADMIN — Medication 10 ML: at 05:04

## 2017-02-24 RX ADMIN — COLCHICINE 0.6 MG: 0.6 TABLET, FILM COATED ORAL at 09:06

## 2017-02-24 RX ADMIN — POLYETHYLENE GLYCOL 3350 17 G: 17 POWDER, FOR SOLUTION ORAL at 09:08

## 2017-02-24 RX ADMIN — CARVEDILOL 25 MG: 25 TABLET, FILM COATED ORAL at 09:06

## 2017-02-24 RX ADMIN — APIXABAN 5 MG: 5 TABLET, FILM COATED ORAL at 09:06

## 2017-02-24 RX ADMIN — LEVALBUTEROL HYDROCHLORIDE 0.63 MG: 0.63 SOLUTION RESPIRATORY (INHALATION) at 04:50

## 2017-02-24 RX ADMIN — SACUBITRIL AND VALSARTAN 1 TABLET: 24; 26 TABLET, FILM COATED ORAL at 09:05

## 2017-02-24 RX ADMIN — AMIODARONE HYDROCHLORIDE 200 MG: 200 TABLET ORAL at 09:06

## 2017-02-24 RX ADMIN — GUAIFENESIN 1200 MG: 600 TABLET, EXTENDED RELEASE ORAL at 09:05

## 2017-02-24 NOTE — PROGRESS NOTES
Bedside and Verbal shift change report given to self (oncoming nurse) by Tamanna Chance (offgoing nurse). Report included the following information SBAR, Kardex, MAR, Accordion and Recent Results.

## 2017-02-24 NOTE — PROGRESS NOTES
[facility] CARDIOLOGY PROGRESS NOTE           2/24/2017 8:01 AM    Admit Date: 1/19/2017      Subjective:   Moving around more, SORENSON less, coughing some now. No CP. To rehab today. ROS:  GEN:  No fever or chills  Cardiovascular:  As noted above:no chest pain or palpitations. Pulmonary:  As noted above:SOB with exertion  Neuro:  No new focal motor or sensory loss    Objective:      Vitals:    02/24/17 0005 02/24/17 0113 02/24/17 0433 02/24/17 0450   BP:  115/77 108/76    Pulse:  (!) 59 65    Resp:  18 18    Temp:  97 °F (36.1 °C) 97.3 °F (36.3 °C)    SpO2: 100% 100% 100% 100%   Weight:   (!) 187.3 kg (413 lb)    Height:           Physical Exam:  General-no distress  Neck- supple, no JVD  CV- regular rate and rhythm no MRG  Lung- rhonchi bilaterally  Abd- soft, nontender, morbidly obese  Ext- 2-3+ edema bilaterally. 3-4+scotral edema. Skin- warm and dry  Psychiatric:  Normal mood and affect. Neurologic:  Alert and oriented X 3      Data Review:   Recent Labs      02/24/17   0511  02/23/17   0515   NA  142  141   K  4.4  4.3   BUN  42*  42*   CREA  1.77*  1.81*   GLU  106*  90       TELEMETRY:  NSR    Assessment/Plan:     Principal Problem:    Nonsustained ventricular tachycardia ([facility] [address] (1/19/2017): Stable on Coreg and Amiodarone with ICD in place. Active Problems:    Obstructive sleep apnea (2/15/2010)      Nonischemic dilated cardiomyopathy (HCC) (7/2/2013):Continue Coreg,Hydralazine,Lasix,Aldactone,and Entresto (if renal failure allows). Overview: LVEF 10-15% on ECHO from 12/17/10      CKD (chronic kidney disease) stage 3, GFR 30-59 ml/min (8/13/2014): Cr stable on oral diuretics. AICD (automatic cardioverter/defibrillator) present (10/21/2015)      Acute on chronic systolic (congestive) heart failure ([address] (10/18/2016):  Better now, off [employer]. On oral lasix. Better      Atrial flutter ([address] (10/20/2016):Resolved. Continue Amiodarone ,Coreg,and Eliquis      [facility] (10/24/2016)      Morbid obesity with BMI of 60.0-69.9, adult ([facility] (11/28/2016):Severe debilitated with near bedridden state. Ultimately ,patient needs hospice. Presently,he does not want to consider! Syncope (1/19/2017)      NSVT (nonsustained ventricular tachycardia) ([facility] (1/19/2017): Stable on Coreg & Amiodarone. ICD in place    Dispo: to rehab today. Stable for d/c.   Compliance stressed today          Carri Ahuja DO  2/24/2017 8:02AM

## 2017-02-24 NOTE — DISCHARGE INSTRUCTIONS
Heart Failure: Care Instructions  Your Care Instructions    Heart failure occurs when your heart does not pump as much blood as the body needs. Failure does not mean that the heart has stopped pumping but rather that it is not pumping as well as it should. Over time, this causes fluid buildup in your lungs and other parts of your body. Fluid buildup can cause shortness of breath, fatigue, swollen ankles, and other problems. By taking medicines regularly, reducing sodium (salt) in your diet, checking your weight every day, and making lifestyle changes, you can feel better and live longer. Follow-up care is a key part of your treatment and safety. Be sure to make and go to all appointments, and call your doctor if you are having problems. It's also a good idea to know your test results and keep a list of the medicines you take. How can you care for yourself at home? Medicines  · Be safe with medicines. Take your medicines exactly as prescribed. Call your doctor if you think you are having a problem with your medicine. · Do not take any vitamins, over-the-counter medicine, or herbal products without talking to your doctor first. Corry Meiers not take ibuprofen (Advil or Motrin) and naproxen (Aleve) without talking to your doctor first. They could make your heart failure worse. · You may be taking some of the following medicine. ¨ Beta-blockers can slow heart rate, decrease blood pressure, and improve your condition. Taking a beta-blocker may lower your chance of needing to be hospitalized. ¨ Angiotensin-converting enzyme inhibitors (ACEIs) reduce the heart's workload, lower blood pressure, and reduce swelling. Taking an ACEI may lower your chance of needing to be hospitalized again. ¨ Angiotensin II receptor blockers (ARBs) work like ACEIs. Your doctor may prescribe them instead of ACEIs. ¨ Diuretics, also called water pills, reduce swelling.   ¨ Potassium supplements replace this important mineral, which is sometimes lost with diuretics. ¨ Aspirin and other blood thinners prevent blood clots, which can cause a stroke or heart attack. You will get more details on the specific medicines your doctor prescribes. Diet  · Your doctor may suggest that you limit sodium to 2,000 milligrams (mg) a day or less. That is less than 1 teaspoon of salt a day, including all the salt you eat in cooking or in packaged foods. People get most of their sodium from processed foods. Fast food and restaurant meals also tend to be very high in sodium. · Ask your doctor how much liquid you can drink each day. You may have to limit liquids. Weight  · Weigh yourself without clothing at the same time each day. Record your weight. Call your doctor if you gain more than 3 pounds in 2 to 3 days. A sudden weight gain may mean that your heart failure is getting worse. Activity level  · Start light exercise (if your doctor says it is okay). Even if you can only do a small amount, exercise will help you get stronger, have more energy, and manage your weight and your stress. Walking is an easy way to get exercise. Start out by walking a little more than you did before. Bit by bit, increase the amount you walk. · When you exercise, watch for signs that your heart is working too hard. You are pushing yourself too hard if you cannot talk while you are exercising. If you become short of breath or dizzy or have chest pain, stop, sit down, and rest.  · If you feel \"wiped out\" the day after you exercise, walk slower or for a shorter distance until you can work up to a better pace. · Get enough rest at night. Sleeping with 1 or 2 pillows under your upper body and head may help you breathe easier. Lifestyle changes  · Do not smoke. Smoking can make a heart condition worse. If you need help quitting, talk to your doctor about stop-smoking programs and medicines. These can increase your chances of quitting for good.  Quitting smoking may be the most important step you can take to protect your heart. · Limit alcohol to 2 drinks a day for men and 1 drink a day for women. Too much alcohol can cause health problems. · Avoid getting sick from colds and the flu. Get a pneumococcal vaccine shot. If you have had one before, ask your doctor whether you need another dose. Get a flu shot each year. If you must be around people with colds or the flu, wash your hands often. When should you call for help? Call 911 if you have symptoms of sudden heart failure such as:  · You have severe trouble breathing. · You cough up pink, foamy mucus. · You have a new irregular or rapid heartbeat. Call your doctor now or seek immediate medical care if:  · You have new or increased shortness of breath. · You are dizzy or lightheaded, or you feel like you may faint. · You have sudden weight gain, such as 3 pounds or more in 2 to 3 days. · You have increased swelling in your legs, ankles, or feet. · You are suddenly so tired or weak that you cannot do your usual activities. Watch closely for changes in your health, and be sure to contact your doctor if:  · You develop new symptoms. Where can you learn more? Go to http://armando-nirav.info/. Enter C691 in the search box to learn more about \"Heart Failure: Care Instructions. \"  Current as of: January 27, 2016  Content Version: 11.1  © 5462-8540 [employer]. Care instructions adapted under license by [employer] (which disclaims liability or warranty for this information). If you have questions about a medical condition or this instruction, always ask your healthcare professional. [place] [address] any warranty or liability for your use of this information. Avoiding Triggers With Heart Failure: Care Instructions  Your Care Instructions  Triggers are anything that make your heart failure flare up. A flare-up is also called \"sudden heart failure\" or \"acute heart failure. \" When you have a flare-up, fluid builds up in your lungs, and you have problems breathing. You might need to go to the hospital. By watching for changes in your condition and avoiding triggers, you can prevent heart failure flare-ups. Follow-up care is a key part of your treatment and safety. Be sure to make and go to all appointments, and call your doctor if you are having problems. It's also a good idea to know your test results and keep a list of the medicines you take. How can you care for yourself at home? Watch for changes in your weight and condition  · Weigh yourself without clothing at the same time each day. Record your weight. Call your doctor if you gain 3 pounds or more in 2 to 3 days. A sudden weight gain may mean that your heart failure is getting worse. · Keep a daily record of your symptoms. Write down any changes in how you feel, such as new shortness of breath, cough, or problems eating. Also record if your ankles are more swollen than usual and if you have to urinate in the night more often. Note anything that you ate or did that could have triggered these changes. Limit sodium  Sodium causes your body to hold on to water, making it harder for your heart to pump. People get most of their sodium from processed foods. Fast food and restaurant meals also tend to be very high in sodium. · Your doctor may suggest that you limit sodium to 2,000 milligrams (mg) a day or less. That is less than 1 teaspoon of salt a day, including all the salt you eat in cooking or in packaged foods. · Read food labels on cans and food packages. They tell you how much sodium you get in one serving. Check the serving size. If you eat more than one serving, you are getting more sodium. · Be aware that sodium can come in forms other than salt, including monosodium glutamate (MSG), sodium citrate, and sodium bicarbonate (baking soda). MSG is often added to Asian food. You can sometimes ask for food without MSG or salt.   · Slowly reducing salt will help you adjust to the taste. Take the salt shaker off the table. · Flavor your food with garlic, lemon juice, onion, vinegar, herbs, and spices instead of salt. Do not use soy sauce, steak sauce, onion salt, garlic salt, mustard, or ketchup on your food, unless it is labeled \"low-sodium\" or \"low-salt. \"  · Make your own salad dressings, sauces, and ketchup without adding salt. · Use fresh or frozen ingredients, instead of canned ones, whenever you can. Choose low-sodium canned goods. · Eat less processed food and food from restaurants, including fast food. Exercise as directed  Moderate, regular exercise is very good for your heart. It improves your blood flow and helps control your weight. But too much exercise can stress your heart and cause a heart failure flare-up. · Check with your doctor before you start an exercise program.  · Walking is an easy way to get exercise. Start out slowly. Gradually increase the length and pace of your walk. Swimming, riding a bike, and using a treadmill are also good forms of exercise. · When you exercise, watch for signs that your heart is working too hard. You are pushing yourself too hard if you cannot talk while you are exercising. If you become short of breath or dizzy or have chest pain, stop, sit down, and rest.  · Do not exercise when you do not feel well. Take medicines correctly  · Take your medicines exactly as prescribed. Call your doctor if you think you are having a problem with your medicine. · Make a list of all the medicines you take. Include those prescribed to you by other doctors and any over-the-counter medicines, vitamins, or supplements you take. Take this list with you when you go to any doctor. · Take your medicines at the same time every day. It may help you to post a list of all the medicines you take every day and what time of day you take them. · Make taking your medicine as simple as you can.  Plan times to take your medicines when you are doing other things, such as eating a meal or getting ready for bed. This will make it easier to remember to take your medicines. · Get organized. Use helpful tools, such as daily or weekly pill containers. When should you call for help? Call 911 if you have symptoms of sudden heart failure such as:  · You have severe trouble breathing. · You cough up pink, foamy mucus. · You have a new irregular or rapid heartbeat. Call your doctor now or seek immediate medical care if:  · You have new or increased shortness of breath. · You are dizzy or lightheaded, or you feel like you may faint. · You have sudden weight gain, such as 3 pounds or more in 2 to 3 days. · You have increased swelling in your legs, ankles, or feet. · You are suddenly so tired or weak that you cannot do your usual activities. Watch closely for changes in your health, and be sure to contact your doctor if you develop new symptoms. Where can you learn more? Go to http://armando-nirav.info/. Enter W768 in the search box to learn more about \"Avoiding Triggers With Heart Failure: Care Instructions. \"  Current as of: April 27, 2016  Content Version: 11.1  © 9133-9650 [employer]. Care instructions adapted under license by [employer] (which disclaims liability or warranty for this information). If you have questions about a medical condition or this instruction, always ask your healthcare professional. [place] [address] any warranty or liability for your use of this information. Limiting Sodium and Fluids With Heart Failure: Care Instructions  Your Care Instructions  Sodium causes your body to keep extra water, making it harder for your heart to pump. By limiting sodium, you will feel better and lower your risk of having to go to the hospital.  People get most of their sodium from processed foods.  Fast food and restaurant meals also tend to be very high in sodium. Your doctor may suggest that you limit sodium to 2,000 milligrams (mg) a day or less. That is less than 1 teaspoon of salt a day, including all the salt you eat in cooked or packaged foods. Usually, you have to limit the amount of liquids you drink only if your heart failure is severe. Limiting sodium alone often is enough to help your body get rid of extra fluids. However, your doctor may tell you to limit your fluid intake to a set amount each day. Follow-up care is a key part of your treatment and safety. Be sure to make and go to all appointments, and call your doctor if you are having problems. It's also a good idea to know your test results and keep a list of the medicines you take. How can you care for yourself at home? Read food labels  · Read food labels on cans and food packages. The labels tell you how much sodium is in each serving. Make sure that you look at the serving size. If you eat more than the serving size, you have eaten more sodium than is listed for one serving. · Food labels also tell you the Percent Daily Value. If the Percent Daily Value says 50%, it means that you will get at least 50% of all the sodium you need for the entire day in one serving. Choose products with low Percent Daily Values for sodium. · Be aware that sodium can come in forms other than salt, including monosodium glutamate (MSG), sodium citrate, and sodium bicarbonate (baking soda). MSG is often added to Asian food. You can sometimes ask for food without MSG or salt. Buy low-sodium foods  · Buy foods that are labeled \"unsalted\" (no salt added), \"sodium-free\" (less than 5 mg of sodium per serving), or \"low-sodium\" (less than 140 mg of sodium per serving). A food labeled \"light sodium\" has less than half of the full-sodium version of that food. Foods labeled \"reduced-sodium\" may still have too much sodium. · Buy fresh vegetables or plain, frozen vegetables.  Buy low-sodium versions of canned vegetables, soups, and other canned goods. Prepare low-sodium meals  · Use less salt each day when cooking. Reducing salt in this way will help you adjust to the taste. Do not add salt after cooking. Take the salt shaker off the table. · Flavor your food with garlic, lemon juice, onion, vinegar, herbs, and spices instead of salt. Do not use soy sauce, steak sauce, onion salt, garlic salt, mustard, or ketchup on your food. · Make your own salad dressings, sauces, and ketchup without adding salt. · Use less salt (or none) when recipes call for it. You can often use half the salt a recipe calls for without losing flavor. Other dishes like rice, pasta, and grains do not need added salt. · Rinse canned vegetables. This removes some--but not all--of the salt. · Avoid water that has a naturally high sodium content or that has been treated with water softeners, which add sodium. Call your local water company to find out the sodium content of your water supply. If you buy bottled water, read the label and choose a sodium-free brand. Avoid high-sodium foods, such as:  · Smoked, cured, salted, and canned meat, fish, and poultry. · Ham, fuller, hot dogs, and luncheon meats. · Regular, hard, and processed cheese and regular peanut butter. · Crackers with salted tops. · Frozen prepared meals. · Canned and dried soups, broths, and bouillon, unless labeled sodium-free or low-sodium. · Canned vegetables, unless labeled sodium-free or low-sodium. · Salted snack foods such as chips and pretzels. · [place] fries, pizza, tacos, and other fast foods. · Pickles, olives, ketchup, and other condiments, especially soy sauce, unless labeled sodium-free or low-sodium. If you cannot cook for yourself  · Have family members or friends help you, or have someone cook low-sodium meals. · Check with your local senior nutrition program to find out where meals are served and whether they offer a low-sodium option.  You can often find these programs through your local health department or hospital.  · Have meals delivered to your home. Most [facility] have a Meals on [employer]. These programs provide one hot meal a day for older adults, delivered to their homes. Ask whether these meals are low-sodium. Let them know that you are on a low-sodium diet. Limiting fluid intake  · Find a method that works for you. You might simply write down how much you drink every time you do. Some people keep a container filled with the amount of fluid allowed for that day. If they drink from a source other than the container, then they pour out that amount. · Measure your regular drinking glasses to find out how much fluid each one holds. Once you know this, you will not have to measure every time. · Besides water, milk, juices, and other drinks, some foods have a lot of fluid. Count any foods that will melt (such as ice cream or gelatin dessert) or liquid foods (such as soup) as part of your fluid intake for the day. Where can you learn more? Go to http://armando-nirav.info/. Enter A166 in the search box to learn more about \"Limiting Sodium and Fluids With Heart Failure: Care Instructions. \"  Current as of: January 27, 2016  Content Version: 11.1  © 7812-9006 [employer]. Care instructions adapted under license by [employer] (which disclaims liability or warranty for this information). If you have questions about a medical condition or this instruction, always ask your healthcare professional. [place] [address] any warranty or liability for your use of this information. DISCHARGE SUMMARY from Nurse    The following personal items are in your possession at time of discharge:    Dental Appliances: None     Home Medications: None  Jewelry: None  Clothing:  At bedside  Other Valuables: None     PATIENT INSTRUCTIONS:    After general anesthesia or intravenous sedation, for 24 hours or while taking prescription Narcotics:  · Limit your activities  · Do not drive and operate hazardous machinery  · Do not make important personal or business decisions  · Do  not drink alcoholic beverages  · If you have not urinated within 8 hours after discharge, please contact your surgeon on call. Report the following to your surgeon:  · Excessive pain, swelling, redness or odor of or around the surgical area  · Temperature over 100.5  · Nausea and vomiting lasting longer than 4 hours or if unable to take medications  · Any signs of decreased circulation or nerve impairment to extremity: change in color, persistent  numbness, tingling, coldness or increase pain  · Any questions    What to do at Home:  Recommended activity: Activity as tolerated    If you experience any of the following symptoms of unrelieved pain or increased shortness of breath, please follow up with [facility] at [phone]. *  Please give a list of your current medications to your Primary Care Provider. *  Please update this list whenever your medications are discontinued, doses are      changed, or new medications (including over-the-counter products) are added. *  Please carry medication information at all times in case of emergency situations. These are general instructions for a healthy lifestyle:    No smoking/ No tobacco products/ Avoid exposure to second hand smoke    Surgeon General's Warning:  Quitting smoking now greatly reduces serious risk to your health. Obesity, smoking, and sedentary lifestyle greatly increases your risk for illness    A healthy diet, regular physical exercise & weight monitoring are important for maintaining a healthy lifestyle    You may be retaining fluid if you have a history of heart failure or if you experience any of the following symptoms:  Weight gain of 3 pounds or more overnight or 5 pounds in a week, increased swelling in our hands or feet or shortness of breath while lying flat in bed.   Please call your doctor as soon as you notice any of these symptoms; do not wait until your next office visit. Recognize signs and symptoms of STROKE:    F-face looks uneven    A-arms unable to move or move unevenly    S-speech slurred or non-existent    T-time-call 911 as soon as signs and symptoms begin-DO NOT go       Back to bed or wait to see if you get better-TIME IS BRAIN. Warning Signs of HEART ATTACK     Call 911 if you have these symptoms:   Chest discomfort. Most heart attacks involve discomfort in the center of the chest that lasts more than a few minutes, or that goes away and comes back. It can feel like uncomfortable pressure, squeezing, fullness, or pain.  Discomfort in other areas of the upper body. Symptoms can include pain or discomfort in one or both arms, the back, neck, jaw, or stomach.  Shortness of breath with or without chest discomfort.  Other signs may include breaking out in a cold sweat, nausea, or lightheadedness. Don't wait more than five minutes to call 911 - MINUTES MATTER! Fast action can save your life. Calling 911 is almost always the fastest way to get lifesaving treatment. Emergency Medical Services staff can begin treatment when they arrive -- up to an hour sooner than if someone gets to the hospital by car. The discharge information has been reviewed with the patient. The patient verbalized understanding. Discharge medications reviewed with the patient and appropriate educational materials and side effects teaching were provided.

## 2017-02-24 NOTE — PROGRESS NOTES
TRANSFER - OUT REPORT:    Verbal report given to ITZEL Gallardo on [employer]  being transferred to [facility]. for routine progression of care       Report consisted of patients Situation, Background, Assessment and Recommendations(SBAR). Information from the following report(s) SBAR, Kardex, Procedure Summary, Intake/Output, MAR and Recent Results was reviewed with the receiving nurse. Opportunity for questions and clarification was provided.

## 2017-02-24 NOTE — PROGRESS NOTES
Right IJ Quad lumen catheter removed per protocol with tip intact. No active bleeding noted. Pt with no visible distress. Dry 4x4 gauze with tegaderm placed to site. No active bleeding, swelling, or hematoma noted. Pt set to be discharged to [facility].  4 attempts have been made to call report with no answer. Will try again at a later time.

## 2017-02-24 NOTE — PROGRESS NOTES
Verbal bedside report given to oncoming RNLeah. Patient's situation, background, assessment and recommendations provided. Opportunity for questions provided. Oncoming RN assumed care of patient.

## 2017-02-25 PROCEDURE — 3331090001 HH PPS REVENUE CREDIT

## 2017-02-25 PROCEDURE — 3331090002 HH PPS REVENUE DEBIT

## 2017-02-26 PROCEDURE — 3331090002 HH PPS REVENUE DEBIT

## 2017-02-26 PROCEDURE — 3331090001 HH PPS REVENUE CREDIT

## 2017-03-02 ENCOUNTER — HOME CARE VISIT (OUTPATIENT)
Dept: HOME HEALTH SERVICES | Facility: HOME HEALTH | Age: 52
End: 2017-03-02
Payer: MEDICARE

## 2017-03-04 NOTE — PROGRESS NOTES
Bedside and Verbal shift change report given to [id number] Chas Zapien (oncoming nurse) by self (offgoing nurse). Report included the following information SBAR, Kardex, MAR and Recent Results.  Verified lasix 10 mL/h and primacor at 0.5 Pt ambulates in fine with SBA. No abnormal symptoms. Steady gait, denies dizziness. Reports decrease in nausea.

## 2017-03-07 ENCOUNTER — HOME HEALTH ADMISSION (OUTPATIENT)
Dept: HOME HEALTH SERVICES | Facility: HOME HEALTH | Age: 52
End: 2017-03-07
Payer: MEDICARE

## 2017-03-11 ENCOUNTER — HOME CARE VISIT (OUTPATIENT)
Dept: SCHEDULING | Facility: HOME HEALTH | Age: 52
End: 2017-03-11
Payer: MEDICARE

## 2017-03-11 VITALS
HEIGHT: 66 IN | RESPIRATION RATE: 20 BRPM | TEMPERATURE: 97.8 F | BODY MASS INDEX: 48.37 KG/M2 | DIASTOLIC BLOOD PRESSURE: 80 MMHG | SYSTOLIC BLOOD PRESSURE: 120 MMHG | HEART RATE: 68 BPM | OXYGEN SATURATION: 98 % | WEIGHT: 301 LBS

## 2017-03-11 PROCEDURE — G0299 HHS/HOSPICE OF RN EA 15 MIN: HCPCS

## 2017-03-11 PROCEDURE — 400013 HH SOC

## 2017-03-13 ENCOUNTER — HOME CARE VISIT (OUTPATIENT)
Dept: SCHEDULING | Facility: HOME HEALTH | Age: 52
End: 2017-03-13
Payer: MEDICARE

## 2017-03-13 PROCEDURE — G0151 HHCP-SERV OF PT,EA 15 MIN: HCPCS

## 2017-03-14 ENCOUNTER — HOME CARE VISIT (OUTPATIENT)
Dept: SCHEDULING | Facility: HOME HEALTH | Age: 52
End: 2017-03-14
Payer: MEDICARE

## 2017-03-14 VITALS
DIASTOLIC BLOOD PRESSURE: 64 MMHG | HEART RATE: 68 BPM | SYSTOLIC BLOOD PRESSURE: 102 MMHG | OXYGEN SATURATION: 98 % | RESPIRATION RATE: 18 BRPM

## 2017-03-14 VITALS
TEMPERATURE: 98.6 F | DIASTOLIC BLOOD PRESSURE: 74 MMHG | OXYGEN SATURATION: 97 % | SYSTOLIC BLOOD PRESSURE: 110 MMHG | RESPIRATION RATE: 24 BRPM | HEART RATE: 68 BPM

## 2017-03-14 PROCEDURE — G0152 HHCP-SERV OF OT,EA 15 MIN: HCPCS

## 2017-03-14 PROCEDURE — G0299 HHS/HOSPICE OF RN EA 15 MIN: HCPCS

## 2017-03-15 VITALS
OXYGEN SATURATION: 98 % | HEART RATE: 65 BPM | DIASTOLIC BLOOD PRESSURE: 72 MMHG | SYSTOLIC BLOOD PRESSURE: 120 MMHG | TEMPERATURE: 97.2 F | RESPIRATION RATE: 22 BRPM

## 2017-03-16 ENCOUNTER — HOME CARE VISIT (OUTPATIENT)
Dept: SCHEDULING | Facility: HOME HEALTH | Age: 52
End: 2017-03-16
Payer: MEDICARE

## 2017-03-16 VITALS
HEART RATE: 79 BPM | SYSTOLIC BLOOD PRESSURE: 120 MMHG | TEMPERATURE: 97.6 F | RESPIRATION RATE: 20 BRPM | DIASTOLIC BLOOD PRESSURE: 80 MMHG | OXYGEN SATURATION: 95 %

## 2017-03-16 VITALS
DIASTOLIC BLOOD PRESSURE: 80 MMHG | SYSTOLIC BLOOD PRESSURE: 120 MMHG | TEMPERATURE: 96.8 F | HEART RATE: 57 BPM | OXYGEN SATURATION: 95 % | RESPIRATION RATE: 18 BRPM

## 2017-03-16 PROCEDURE — G0157 HHC PT ASSISTANT EA 15: HCPCS

## 2017-03-16 PROCEDURE — G0299 HHS/HOSPICE OF RN EA 15 MIN: HCPCS

## 2017-03-18 ENCOUNTER — APPOINTMENT (OUTPATIENT)
Dept: CT IMAGING | Age: 52
DRG: 682 | End: 2017-03-18
Attending: STUDENT IN AN ORGANIZED HEALTH CARE EDUCATION/TRAINING PROGRAM
Payer: MEDICARE

## 2017-03-18 ENCOUNTER — APPOINTMENT (OUTPATIENT)
Dept: GENERAL RADIOLOGY | Age: 52
DRG: 682 | End: 2017-03-18
Attending: STUDENT IN AN ORGANIZED HEALTH CARE EDUCATION/TRAINING PROGRAM
Payer: MEDICARE

## 2017-03-18 ENCOUNTER — HOSPITAL ENCOUNTER (INPATIENT)
Age: 52
LOS: 11 days | Discharge: HOME HEALTH | DRG: 682 | End: 2017-03-29
Attending: STUDENT IN AN ORGANIZED HEALTH CARE EDUCATION/TRAINING PROGRAM | Admitting: INTERNAL MEDICINE
Payer: MEDICARE

## 2017-03-18 ENCOUNTER — APPOINTMENT (OUTPATIENT)
Dept: ULTRASOUND IMAGING | Age: 52
DRG: 682 | End: 2017-03-18
Attending: PHYSICIAN ASSISTANT
Payer: MEDICARE

## 2017-03-18 ENCOUNTER — APPOINTMENT (OUTPATIENT)
Dept: GENERAL RADIOLOGY | Age: 52
DRG: 682 | End: 2017-03-18
Attending: INTERNAL MEDICINE
Payer: MEDICARE

## 2017-03-18 DIAGNOSIS — E66.01 MORBID OBESITY WITH BMI OF 60.0-69.9, ADULT (HCC): ICD-10-CM

## 2017-03-18 DIAGNOSIS — E66.2 OBESITY HYPOVENTILATION SYNDROME (HCC): ICD-10-CM

## 2017-03-18 DIAGNOSIS — Z95.810 AICD (AUTOMATIC CARDIOVERTER/DEFIBRILLATOR) PRESENT: ICD-10-CM

## 2017-03-18 DIAGNOSIS — E72.20 HYPERAMMONEMIA (HCC): ICD-10-CM

## 2017-03-18 DIAGNOSIS — G47.33 OBSTRUCTIVE SLEEP APNEA: Chronic | ICD-10-CM

## 2017-03-18 DIAGNOSIS — E66.01 MORBID OBESITY DUE TO EXCESS CALORIES (HCC): Chronic | ICD-10-CM

## 2017-03-18 DIAGNOSIS — I42.9 CARDIOMYOPATHY (HCC): Chronic | ICD-10-CM

## 2017-03-18 DIAGNOSIS — E87.5 ACUTE HYPERKALEMIA: ICD-10-CM

## 2017-03-18 DIAGNOSIS — G93.40 ACUTE ENCEPHALOPATHY: ICD-10-CM

## 2017-03-18 DIAGNOSIS — R41.82 ALTERED MENTAL STATUS, UNSPECIFIED ALTERED MENTAL STATUS TYPE: ICD-10-CM

## 2017-03-18 DIAGNOSIS — I95.9 HYPOTENSION, UNSPECIFIED HYPOTENSION TYPE: ICD-10-CM

## 2017-03-18 DIAGNOSIS — N17.9 ACUTE RENAL FAILURE, UNSPECIFIED ACUTE RENAL FAILURE TYPE (HCC): Primary | ICD-10-CM

## 2017-03-18 DIAGNOSIS — E87.3 METABOLIC ALKALOSIS: ICD-10-CM

## 2017-03-18 DIAGNOSIS — J96.21 ACUTE ON CHRONIC RESPIRATORY FAILURE WITH HYPOXIA AND HYPERCAPNIA (HCC): ICD-10-CM

## 2017-03-18 DIAGNOSIS — J96.22 ACUTE ON CHRONIC RESPIRATORY FAILURE WITH HYPOXIA AND HYPERCAPNIA (HCC): ICD-10-CM

## 2017-03-18 DIAGNOSIS — I50.9 CONGESTIVE HEART FAILURE, UNSPECIFIED CONGESTIVE HEART FAILURE CHRONICITY, UNSPECIFIED CONGESTIVE HEART FAILURE TYPE: ICD-10-CM

## 2017-03-18 LAB
ALBUMIN SERPL BCP-MCNC: 2.8 G/DL (ref 3.5–5)
ALBUMIN/GLOB SERPL: 0.7 {RATIO} (ref 1.2–3.5)
ALP SERPL-CCNC: 204 U/L (ref 50–136)
ALT SERPL-CCNC: 33 U/L (ref 12–65)
AMMONIA PLAS-SCNC: 51 UMOL/L (ref 11–32)
AMORPH CRY URNS QL MICRO: ABNORMAL
ANION GAP BLD CALC-SCNC: 12 MMOL/L (ref 7–16)
APAP SERPL-MCNC: <2 UG/ML (ref 10–30)
APPEARANCE UR: ABNORMAL
ARTERIAL PATENCY WRIST A: POSITIVE
ARTERIAL PATENCY WRIST A: POSITIVE
AST SERPL W P-5'-P-CCNC: 57 U/L (ref 15–37)
ATRIAL RATE: 59 BPM
BACTERIA SPEC CULT: ABNORMAL
BACTERIA SPEC CULT: ABNORMAL
BACTERIA URNS QL MICRO: 0 /HPF
BASE DEFICIT BLDA-SCNC: 7.2 MMOL/L (ref 0–2)
BASE DEFICIT BLDA-SCNC: 8 MMOL/L (ref 0–2)
BASOPHILS # BLD AUTO: 0 K/UL (ref 0–0.2)
BASOPHILS # BLD: 0 % (ref 0–2)
BDY SITE: ABNORMAL
BDY SITE: ABNORMAL
BILIRUB SERPL-MCNC: 1.1 MG/DL (ref 0.2–1.1)
BILIRUB UR QL: ABNORMAL
BNP SERPL-MCNC: 670 PG/ML
BUN SERPL-MCNC: 106 MG/DL (ref 6–23)
CALCIUM SERPL-MCNC: 7.9 MG/DL (ref 8.3–10.4)
CALCULATED P AXIS, ECG09: 70 DEGREES
CALCULATED R AXIS, ECG10: 151 DEGREES
CALCULATED T AXIS, ECG11: 66 DEGREES
CASTS URNS QL MICRO: ABNORMAL /LPF
CHLORIDE SERPL-SCNC: 103 MMOL/L (ref 98–107)
CO2 SERPL-SCNC: 22 MMOL/L (ref 21–32)
COHGB MFR BLD: 0.6 % (ref 0.5–1.5)
COHGB MFR BLD: 1.2 % (ref 0.5–1.5)
COLOR UR: ABNORMAL
CREAT SERPL-MCNC: 9.45 MG/DL (ref 0.8–1.5)
CRYSTALS URNS QL MICRO: ABNORMAL /LPF
DIAGNOSIS, 93000: NORMAL
DIASTOLIC BP, ECG02: NORMAL MMHG
DIFFERENTIAL METHOD BLD: ABNORMAL
DO-HGB BLD-MCNC: 2 % (ref 0–5)
DO-HGB BLD-MCNC: 8 % (ref 0–5)
EOSINOPHIL # BLD: 0.4 K/UL (ref 0–0.8)
EOSINOPHIL NFR BLD: 6 % (ref 0.5–7.8)
EPI CELLS #/AREA URNS HPF: ABNORMAL /HPF
ERYTHROCYTE [DISTWIDTH] IN BLOOD BY AUTOMATED COUNT: 19.8 % (ref 11.9–14.6)
ETHANOL SERPL-MCNC: <3 MG/DL
FIO2 ON VENT: 40 %
GAS FLOW.O2 O2 DELIVERY SYS: 3 L/MIN
GLOBULIN SER CALC-MCNC: 4.1 G/DL (ref 2.3–3.5)
GLUCOSE BLD STRIP.AUTO-MCNC: 68 MG/DL (ref 65–100)
GLUCOSE BLD STRIP.AUTO-MCNC: 68 MG/DL (ref 65–100)
GLUCOSE BLD STRIP.AUTO-MCNC: 84 MG/DL (ref 65–100)
GLUCOSE BLD STRIP.AUTO-MCNC: 94 MG/DL (ref 65–100)
GLUCOSE SERPL-MCNC: 81 MG/DL (ref 65–100)
GLUCOSE UR STRIP.AUTO-MCNC: NEGATIVE MG/DL
HCO3 BLDA-SCNC: 20 MMOL/L (ref 22–26)
HCO3 BLDA-SCNC: 21 MMOL/L (ref 22–26)
HCT VFR BLD AUTO: 31.9 % (ref 41.1–50.3)
HGB BLD-MCNC: 10.2 G/DL (ref 13.6–17.2)
HGB BLDMV-MCNC: 11.1 GM/DL (ref 11.7–15)
HGB BLDMV-MCNC: 11.2 GM/DL (ref 11.7–15)
HGB UR QL STRIP: ABNORMAL
IMM GRANULOCYTES # BLD: 0 K/UL (ref 0–0.5)
IMM GRANULOCYTES NFR BLD AUTO: 0.1 % (ref 0–5)
KETONES UR QL STRIP.AUTO: ABNORMAL MG/DL
LACTATE BLD-SCNC: 0.6 MMOL/L (ref 0.5–1.9)
LEUKOCYTE ESTERASE UR QL STRIP.AUTO: ABNORMAL
LYMPHOCYTES # BLD AUTO: 25 % (ref 13–44)
LYMPHOCYTES # BLD: 1.8 K/UL (ref 0.5–4.6)
MAGNESIUM SERPL-MCNC: 2.3 MG/DL (ref 1.8–2.4)
MCH RBC QN AUTO: 23.8 PG (ref 26.1–32.9)
MCHC RBC AUTO-ENTMCNC: 32 G/DL (ref 31.4–35)
MCV RBC AUTO: 74.5 FL (ref 79.6–97.8)
METHGB MFR BLD: 0.2 % (ref 0–1.5)
METHGB MFR BLD: 0.4 % (ref 0–1.5)
MONOCYTES # BLD: 1 K/UL (ref 0.1–1.3)
MONOCYTES NFR BLD AUTO: 14 % (ref 4–12)
NEUTS SEG # BLD: 3.9 K/UL (ref 1.7–8.2)
NEUTS SEG NFR BLD AUTO: 55 % (ref 43–78)
NITRITE UR QL STRIP.AUTO: POSITIVE
OSMOLALITY UR: 322 MOSM/KG H2O (ref 50–1400)
OTHER OBSERVATIONS,UCOM: ABNORMAL
OXYHGB MFR BLDA: 91.3 % (ref 94–97)
OXYHGB MFR BLDA: 96.7 % (ref 94–97)
P-R INTERVAL, ECG05: 272 MS
PCO2 BLDA: 52 MMHG (ref 35–45)
PCO2 BLDA: 53 MMHG (ref 35–45)
PH BLDA: 7.2 [PH] (ref 7.35–7.45)
PH BLDA: 7.21 [PH] (ref 7.35–7.45)
PH UR STRIP: 5 [PH] (ref 5–9)
PLATELET # BLD AUTO: 240 K/UL (ref 150–450)
PMV BLD AUTO: 10 FL (ref 10.8–14.1)
PO2 BLDA: 147 MMHG (ref 75–100)
PO2 BLDA: 78 MMHG (ref 75–100)
POTASSIUM SERPL-SCNC: 6 MMOL/L (ref 3.5–5.1)
POTASSIUM SERPL-SCNC: 6.3 MMOL/L (ref 3.5–5.1)
PROCALCITONIN SERPL-MCNC: 0.4 NG/ML
PROT SERPL-MCNC: 6.9 G/DL (ref 6.3–8.2)
PROT UR STRIP-MCNC: 100 MG/DL
Q-T INTERVAL, ECG07: 466 MS
QRS DURATION, ECG06: 88 MS
QTC CALCULATION (BEZET), ECG08: 461 MS
RBC # BLD AUTO: 4.28 M/UL (ref 4.23–5.67)
RBC #/AREA URNS HPF: ABNORMAL /HPF
RESP RATE: 16
SALICYLATES SERPL-MCNC: <1.7 MG/DL (ref 2.8–20)
SAO2 % BLD: 92 % (ref 92–98.5)
SAO2 % BLD: 98 % (ref 92–98.5)
SERVICE CMNT-IMP: ABNORMAL
SODIUM SERPL-SCNC: 137 MMOL/L (ref 136–145)
SODIUM UR-SCNC: 24 MMOL/L
SP GR UR REFRACTOMETRY: 1.02 (ref 1–1.02)
SYSTOLIC BP, ECG01: NORMAL MMHG
TROPONIN I BLD-MCNC: 0.01 NG/ML (ref 0–0.08)
TROPONIN I SERPL-MCNC: <0.02 NG/ML (ref 0.02–0.05)
TROPONIN I SERPL-MCNC: <0.02 NG/ML (ref 0.02–0.05)
TSH SERPL DL<=0.005 MIU/L-ACNC: 3.69 UIU/ML (ref 0.36–3.74)
UROBILINOGEN UR QL STRIP.AUTO: 1 EU/DL (ref 0.2–1)
VENTILATION MODE VENT: ABNORMAL
VENTILATION MODE VENT: ABNORMAL
VENTRICULAR RATE, ECG03: 59 BPM
WBC # BLD AUTO: 7.2 K/UL (ref 4.3–11.1)
WBC URNS QL MICRO: >100 /HPF

## 2017-03-18 PROCEDURE — 74011250636 HC RX REV CODE- 250/636: Performed by: INTERNAL MEDICINE

## 2017-03-18 PROCEDURE — 74011000258 HC RX REV CODE- 258: Performed by: INTERNAL MEDICINE

## 2017-03-18 PROCEDURE — 77030034850

## 2017-03-18 PROCEDURE — 80307 DRUG TEST PRSMV CHEM ANLYZR: CPT | Performed by: STUDENT IN AN ORGANIZED HEALTH CARE EDUCATION/TRAINING PROGRAM

## 2017-03-18 PROCEDURE — 82140 ASSAY OF AMMONIA: CPT | Performed by: STUDENT IN AN ORGANIZED HEALTH CARE EDUCATION/TRAINING PROGRAM

## 2017-03-18 PROCEDURE — 74011250637 HC RX REV CODE- 250/637: Performed by: STUDENT IN AN ORGANIZED HEALTH CARE EDUCATION/TRAINING PROGRAM

## 2017-03-18 PROCEDURE — 36415 COLL VENOUS BLD VENIPUNCTURE: CPT | Performed by: PHYSICIAN ASSISTANT

## 2017-03-18 PROCEDURE — 02HV33Z INSERTION OF INFUSION DEVICE INTO SUPERIOR VENA CAVA, PERCUTANEOUS APPROACH: ICD-10-PCS | Performed by: INTERNAL MEDICINE

## 2017-03-18 PROCEDURE — 36600 WITHDRAWAL OF ARTERIAL BLOOD: CPT

## 2017-03-18 PROCEDURE — 74011000250 HC RX REV CODE- 250: Performed by: STUDENT IN AN ORGANIZED HEALTH CARE EDUCATION/TRAINING PROGRAM

## 2017-03-18 PROCEDURE — 74011000302 HC RX REV CODE- 302: Performed by: INTERNAL MEDICINE

## 2017-03-18 PROCEDURE — 65610000001 HC ROOM ICU GENERAL

## 2017-03-18 PROCEDURE — 74011000250 HC RX REV CODE- 250: Performed by: INTERNAL MEDICINE

## 2017-03-18 PROCEDURE — L4396 STATIC OR DYNAMI AFO PRE CST: HCPCS

## 2017-03-18 PROCEDURE — 74011000250 HC RX REV CODE- 250: Performed by: PHYSICIAN ASSISTANT

## 2017-03-18 PROCEDURE — 87641 MR-STAPH DNA AMP PROBE: CPT | Performed by: INTERNAL MEDICINE

## 2017-03-18 PROCEDURE — 74011250636 HC RX REV CODE- 250/636: Performed by: PHYSICIAN ASSISTANT

## 2017-03-18 PROCEDURE — 81001 URINALYSIS AUTO W/SCOPE: CPT | Performed by: PHYSICIAN ASSISTANT

## 2017-03-18 PROCEDURE — 82962 GLUCOSE BLOOD TEST: CPT

## 2017-03-18 PROCEDURE — 84300 ASSAY OF URINE SODIUM: CPT | Performed by: PHYSICIAN ASSISTANT

## 2017-03-18 PROCEDURE — 94640 AIRWAY INHALATION TREATMENT: CPT

## 2017-03-18 PROCEDURE — 87040 BLOOD CULTURE FOR BACTERIA: CPT | Performed by: STUDENT IN AN ORGANIZED HEALTH CARE EDUCATION/TRAINING PROGRAM

## 2017-03-18 PROCEDURE — 71010 XR CHEST SNGL V: CPT

## 2017-03-18 PROCEDURE — 83735 ASSAY OF MAGNESIUM: CPT | Performed by: STUDENT IN AN ORGANIZED HEALTH CARE EDUCATION/TRAINING PROGRAM

## 2017-03-18 PROCEDURE — 84443 ASSAY THYROID STIM HORMONE: CPT | Performed by: STUDENT IN AN ORGANIZED HEALTH CARE EDUCATION/TRAINING PROGRAM

## 2017-03-18 PROCEDURE — 96374 THER/PROPH/DIAG INJ IV PUSH: CPT | Performed by: STUDENT IN AN ORGANIZED HEALTH CARE EDUCATION/TRAINING PROGRAM

## 2017-03-18 PROCEDURE — 74011250637 HC RX REV CODE- 250/637

## 2017-03-18 PROCEDURE — 93005 ELECTROCARDIOGRAM TRACING: CPT | Performed by: STUDENT IN AN ORGANIZED HEALTH CARE EDUCATION/TRAINING PROGRAM

## 2017-03-18 PROCEDURE — 71010 XR CHEST PORT: CPT

## 2017-03-18 PROCEDURE — 83605 ASSAY OF LACTIC ACID: CPT

## 2017-03-18 PROCEDURE — 99285 EMERGENCY DEPT VISIT HI MDM: CPT | Performed by: STUDENT IN AN ORGANIZED HEALTH CARE EDUCATION/TRAINING PROGRAM

## 2017-03-18 PROCEDURE — 74011250637 HC RX REV CODE- 250/637: Performed by: PHYSICIAN ASSISTANT

## 2017-03-18 PROCEDURE — C1751 CATH, INF, PER/CENT/MIDLINE: HCPCS

## 2017-03-18 PROCEDURE — 84145 PROCALCITONIN (PCT): CPT | Performed by: INTERNAL MEDICINE

## 2017-03-18 PROCEDURE — 85025 COMPLETE CBC W/AUTO DIFF WBC: CPT | Performed by: STUDENT IN AN ORGANIZED HEALTH CARE EDUCATION/TRAINING PROGRAM

## 2017-03-18 PROCEDURE — 94660 CPAP INITIATION&MGMT: CPT

## 2017-03-18 PROCEDURE — 74011250636 HC RX REV CODE- 250/636: Performed by: STUDENT IN AN ORGANIZED HEALTH CARE EDUCATION/TRAINING PROGRAM

## 2017-03-18 PROCEDURE — 74011000258 HC RX REV CODE- 258: Performed by: PHYSICIAN ASSISTANT

## 2017-03-18 PROCEDURE — 77030018719 HC DRSG PTCH ANTIMIC J&J -A

## 2017-03-18 PROCEDURE — 80053 COMPREHEN METABOLIC PANEL: CPT | Performed by: STUDENT IN AN ORGANIZED HEALTH CARE EDUCATION/TRAINING PROGRAM

## 2017-03-18 PROCEDURE — 96375 TX/PRO/DX INJ NEW DRUG ADDON: CPT | Performed by: STUDENT IN AN ORGANIZED HEALTH CARE EDUCATION/TRAINING PROGRAM

## 2017-03-18 PROCEDURE — 84132 ASSAY OF SERUM POTASSIUM: CPT | Performed by: PHYSICIAN ASSISTANT

## 2017-03-18 PROCEDURE — 84484 ASSAY OF TROPONIN QUANT: CPT

## 2017-03-18 PROCEDURE — 83880 ASSAY OF NATRIURETIC PEPTIDE: CPT | Performed by: STUDENT IN AN ORGANIZED HEALTH CARE EDUCATION/TRAINING PROGRAM

## 2017-03-18 PROCEDURE — 76770 US EXAM ABDO BACK WALL COMP: CPT

## 2017-03-18 PROCEDURE — 83935 ASSAY OF URINE OSMOLALITY: CPT | Performed by: PHYSICIAN ASSISTANT

## 2017-03-18 PROCEDURE — 86580 TB INTRADERMAL TEST: CPT | Performed by: INTERNAL MEDICINE

## 2017-03-18 PROCEDURE — 82803 BLOOD GASES ANY COMBINATION: CPT

## 2017-03-18 PROCEDURE — 84484 ASSAY OF TROPONIN QUANT: CPT | Performed by: PHYSICIAN ASSISTANT

## 2017-03-18 PROCEDURE — 36556 INSERT NON-TUNNEL CV CATH: CPT | Performed by: INTERNAL MEDICINE

## 2017-03-18 RX ORDER — SODIUM POLYSTYRENE SULFONATE 15 G/60ML
1 SUSPENSION ORAL; RECTAL
Status: COMPLETED | OUTPATIENT
Start: 2017-03-18 | End: 2017-03-18

## 2017-03-18 RX ORDER — SODIUM CHLORIDE 0.9 % (FLUSH) 0.9 %
5-10 SYRINGE (ML) INJECTION EVERY 8 HOURS
Status: DISCONTINUED | OUTPATIENT
Start: 2017-03-18 | End: 2017-03-29 | Stop reason: HOSPADM

## 2017-03-18 RX ORDER — MORPHINE SULFATE 2 MG/ML
2 INJECTION, SOLUTION INTRAMUSCULAR; INTRAVENOUS
Status: DISCONTINUED | OUTPATIENT
Start: 2017-03-18 | End: 2017-03-20

## 2017-03-18 RX ORDER — SODIUM BICARBONATE 1 MEQ/ML
50 SYRINGE (ML) INTRAVENOUS ONCE
Status: COMPLETED | OUTPATIENT
Start: 2017-03-18 | End: 2017-03-18

## 2017-03-18 RX ORDER — ONDANSETRON 2 MG/ML
4 INJECTION INTRAMUSCULAR; INTRAVENOUS
Status: COMPLETED | OUTPATIENT
Start: 2017-03-18 | End: 2017-03-18

## 2017-03-18 RX ORDER — DOPAMINE HYDROCHLORIDE 320 MG/100ML
5-20 INJECTION, SOLUTION INTRAVENOUS
Status: DISCONTINUED | OUTPATIENT
Start: 2017-03-18 | End: 2017-03-24

## 2017-03-18 RX ORDER — INSULIN LISPRO 100 [IU]/ML
INJECTION, SOLUTION INTRAVENOUS; SUBCUTANEOUS EVERY 6 HOURS
Status: DISCONTINUED | OUTPATIENT
Start: 2017-03-18 | End: 2017-03-29 | Stop reason: HOSPADM

## 2017-03-18 RX ORDER — LEVALBUTEROL INHALATION SOLUTION 0.63 MG/3ML
0.63 SOLUTION RESPIRATORY (INHALATION)
Status: DISCONTINUED | OUTPATIENT
Start: 2017-03-18 | End: 2017-03-29 | Stop reason: HOSPADM

## 2017-03-18 RX ORDER — SODIUM CHLORIDE 0.9 % (FLUSH) 0.9 %
5-10 SYRINGE (ML) INJECTION AS NEEDED
Status: DISCONTINUED | OUTPATIENT
Start: 2017-03-18 | End: 2017-03-29 | Stop reason: HOSPADM

## 2017-03-18 RX ORDER — ALBUTEROL SULFATE 0.83 MG/ML
5 SOLUTION RESPIRATORY (INHALATION)
Status: DISCONTINUED | OUTPATIENT
Start: 2017-03-18 | End: 2017-03-18 | Stop reason: DRUGHIGH

## 2017-03-18 RX ORDER — SODIUM CHLORIDE 9 MG/ML
75 INJECTION, SOLUTION INTRAVENOUS CONTINUOUS
Status: DISCONTINUED | OUTPATIENT
Start: 2017-03-18 | End: 2017-03-18

## 2017-03-18 RX ORDER — DEXTROSE 40 %
15 GEL (GRAM) ORAL AS NEEDED
Status: DISCONTINUED | OUTPATIENT
Start: 2017-03-18 | End: 2017-03-29 | Stop reason: HOSPADM

## 2017-03-18 RX ORDER — LEVALBUTEROL INHALATION SOLUTION 0.63 MG/3ML
0.63 SOLUTION RESPIRATORY (INHALATION)
Status: DISCONTINUED | OUTPATIENT
Start: 2017-03-18 | End: 2017-03-18

## 2017-03-18 RX ORDER — INSULIN GLARGINE 100 [IU]/ML
50 INJECTION, SOLUTION SUBCUTANEOUS
Status: DISCONTINUED | OUTPATIENT
Start: 2017-03-18 | End: 2017-03-18

## 2017-03-18 RX ORDER — ALBUTEROL SULFATE 0.83 MG/ML
5 SOLUTION RESPIRATORY (INHALATION)
Status: COMPLETED | OUTPATIENT
Start: 2017-03-18 | End: 2017-03-18

## 2017-03-18 RX ORDER — LACTULOSE 10 G/15ML
30 SOLUTION ORAL; RECTAL EVERY 6 HOURS
Status: DISCONTINUED | OUTPATIENT
Start: 2017-03-18 | End: 2017-03-18

## 2017-03-18 RX ORDER — DOBUTAMINE 12.5 MG/ML
2 INJECTION, SOLUTION INTRAVENOUS CONTINUOUS
Status: DISCONTINUED | OUTPATIENT
Start: 2017-03-18 | End: 2017-03-18

## 2017-03-18 RX ORDER — ISOSORBIDE DINITRATE 20 MG/1
20 TABLET ORAL 3 TIMES DAILY
Status: DISCONTINUED | OUTPATIENT
Start: 2017-03-18 | End: 2017-03-18

## 2017-03-18 RX ORDER — DEXTROSE 50 % IN WATER (D50W) INTRAVENOUS SYRINGE
50
Status: COMPLETED | OUTPATIENT
Start: 2017-03-18 | End: 2017-03-18

## 2017-03-18 RX ORDER — DEXTROSE 50 % IN WATER (D50W) INTRAVENOUS SYRINGE
25-50 AS NEEDED
Status: DISCONTINUED | OUTPATIENT
Start: 2017-03-18 | End: 2017-03-29 | Stop reason: HOSPADM

## 2017-03-18 RX ORDER — MORPHINE SULFATE 4 MG/ML
2 INJECTION, SOLUTION INTRAMUSCULAR; INTRAVENOUS
Status: COMPLETED | OUTPATIENT
Start: 2017-03-18 | End: 2017-03-18

## 2017-03-18 RX ORDER — DOBUTAMINE HYDROCHLORIDE 200 MG/100ML
2.5-1 INJECTION INTRAVENOUS
Status: DISCONTINUED | OUTPATIENT
Start: 2017-03-18 | End: 2017-03-24

## 2017-03-18 RX ORDER — HEPARIN SODIUM 5000 [USP'U]/ML
5000 INJECTION, SOLUTION INTRAVENOUS; SUBCUTANEOUS EVERY 8 HOURS
Status: DISCONTINUED | OUTPATIENT
Start: 2017-03-18 | End: 2017-03-29 | Stop reason: HOSPADM

## 2017-03-18 RX ORDER — PRAVASTATIN SODIUM 20 MG/1
80 TABLET ORAL
Status: DISCONTINUED | OUTPATIENT
Start: 2017-03-18 | End: 2017-03-18

## 2017-03-18 RX ORDER — DEXTROSE 50 % IN WATER (D50W) INTRAVENOUS SYRINGE
12.5 ONCE
Status: COMPLETED | OUTPATIENT
Start: 2017-03-18 | End: 2017-03-18

## 2017-03-18 RX ADMIN — DEXTROSE MONOHYDRATE 12.5 G: 25 INJECTION, SOLUTION INTRAVENOUS at 22:03

## 2017-03-18 RX ADMIN — Medication 10 ML: at 22:16

## 2017-03-18 RX ADMIN — SODIUM POLYSTYRENE SULFONATE 1 ENEMA: 15 SUSPENSION ORAL; RECTAL at 16:50

## 2017-03-18 RX ADMIN — CEFTRIAXONE 1 G: 1 INJECTION, POWDER, FOR SOLUTION INTRAMUSCULAR; INTRAVENOUS at 22:00

## 2017-03-18 RX ADMIN — DOBUTAMINE IN DEXTROSE 5 MCG/KG/MIN: 200 INJECTION, SOLUTION INTRAVENOUS at 17:14

## 2017-03-18 RX ADMIN — Medication 2 MG: at 18:38

## 2017-03-18 RX ADMIN — HEPARIN SODIUM 5000 UNITS: 5000 INJECTION, SOLUTION INTRAVENOUS; SUBCUTANEOUS at 16:49

## 2017-03-18 RX ADMIN — SODIUM BICARBONATE 50 MEQ: 84 INJECTION, SOLUTION INTRAVENOUS at 22:04

## 2017-03-18 RX ADMIN — LACTULOSE 30 G: 10 SOLUTION ORAL; RECTAL at 17:05

## 2017-03-18 RX ADMIN — DEXTROSE MONOHYDRATE 25 G: 25 INJECTION, SOLUTION INTRAVENOUS at 19:13

## 2017-03-18 RX ADMIN — TUBERCULIN PURIFIED PROTEIN DERIVATIVE 5 UNITS: 5 INJECTION, SOLUTION INTRADERMAL at 20:33

## 2017-03-18 RX ADMIN — DOBUTAMINE IN DEXTROSE 10 MCG/KG/MIN: 200 INJECTION, SOLUTION INTRAVENOUS at 23:33

## 2017-03-18 RX ADMIN — Medication 10 ML: at 16:50

## 2017-03-18 RX ADMIN — CALCIUM GLUCONATE 1 G: 94 INJECTION, SOLUTION INTRAVENOUS at 22:07

## 2017-03-18 RX ADMIN — DEXTROSE MONOHYDRATE 25 G: 25 INJECTION, SOLUTION INTRAVENOUS at 12:20

## 2017-03-18 RX ADMIN — CALCIUM GLUCONATE 1 G: 94 INJECTION, SOLUTION INTRAVENOUS at 16:47

## 2017-03-18 RX ADMIN — INSULIN HUMAN 10 UNITS: 100 INJECTION, SOLUTION PARENTERAL at 22:04

## 2017-03-18 RX ADMIN — SODIUM CHLORIDE 75 ML/HR: 900 INJECTION, SOLUTION INTRAVENOUS at 16:56

## 2017-03-18 RX ADMIN — DOPAMINE HYDROCHLORIDE 5 MCG/KG/MIN: 320 INJECTION, SOLUTION INTRAVENOUS at 23:50

## 2017-03-18 RX ADMIN — INSULIN HUMAN 10 UNITS: 100 INJECTION, SOLUTION PARENTERAL at 12:19

## 2017-03-18 RX ADMIN — ALBUTEROL SULFATE 5 MG: 2.5 SOLUTION RESPIRATORY (INHALATION) at 12:24

## 2017-03-18 RX ADMIN — ONDANSETRON 4 MG: 2 INJECTION INTRAMUSCULAR; INTRAVENOUS at 12:31

## 2017-03-18 RX ADMIN — MORPHINE SULFATE 2 MG: 4 INJECTION, SOLUTION INTRAMUSCULAR; INTRAVENOUS at 12:31

## 2017-03-18 NOTE — H&P
HOSPITALIST H&P  NAME:  Chai Kim   Age:  45 y.o.  :   1965   MRN:   [id number]  PCP: Daniel Osorio MD  Consulting MD:  Treatment Team: Attending Provider: Dre Wick DO; Primary Nurse: Deandra Gtz    HPI:     Patient is a 44 yo morbidly obese AAM with a history of CKD III, DM II, KELSIE, OHS, HTN, HLD, Systolic CHF EF 42% Oxygen dependent (3L) who presented to the ED vai with c/o somnolence, lethargy, altered mental status, recent fall, decreased UOP progressively worse x7 days. EMS states patient was seen and evaluated yesterday after cough or fall at home. Patient was helped off the floor, appeared altered but refused transport to the emergency department at that time. EMS reports hypotension in rooute today systolic BP 90, . Daughter at bedside relays most of history. Denies fever, chills, chest pain, palpitations, seizure. Admits to brown urine 2 days ago and none since, generalized weakness, uncontrollable jerking of upper extremities, mumbled incoherent speech with short periods of lucidness. Patient also admits to back pain. Denies NSAID use, ETOH use. ED gave lactulose, albuterol, D50W, Insulin, Morphine, zofran, for hyperkalemia. ED provider has asked our hospitalist service to evaluate and further manage the patient for Acute Renal Failure. ROS limited due to clinical condition, altered mental status. Past Medical History:   Diagnosis Date    Acute encephalopathy     Acute systolic heart failure ([facility] [address] May, 2009    Also had transient acute renal failure secondary to poor perfusion.     AICD (automatic cardioverter/defibrillator) present 10/21/2015    Atypical chest pain 2010    Bronchitis     CAD (coronary artery disease)     Cardiomyopathy     Chest pain 10/21/2015    Chronic kidney disease     renal insufficiency    Chronic pain     back    Congestive heart failure (CHF) ([address] 10/21/2015    COPD     Diabetes ([facility] [address]     type 2 insulin reliant- BS average- 160's-180's    Diabetes mellitus type II, uncontrolled ([facility] 2013    GERD (gastroesophageal reflux disease)     Gout     Heart failure ([facility]     cardiomyopathy with ef 10-20%    Hypertension     Hyponatremia 2010    Ill-defined condition     gout, neuropathy, sciatica    Morbid obesity (HCC)     Nausea & vomiting 2015    Neuropathy     Obstructive sleep apnea 2/15/2010    Other unknown and unspecified cause of morbidity or mortality     Gout    Severe sepsis ([facility]     Unspecified sleep apnea     cpap      Past Surgical History:   Procedure Laterality Date    CHEST SURGERY PROCEDURE UNLISTED      thorocentesis    HX HEART CATHETERIZATION  Sandy 10, 2008    Severe multivessel disease with severe LV dysfunction    HX PACEMAKER      may 2010      Prior to Admission Medications   Prescriptions Last Dose Informant Patient Reported? Taking? GUAIFENESIN/PSEUDOEPHEDRNE HCL (MUCINEX D PO)   Yes No   Sig: Take 400 mg by mouth daily as needed. OXYGEN-AIR DELIVERY SYSTEMS   Yes No   Si lpm qhs   acetaminophen (TYLENOL) 500 mg tablet   Yes No   Sig: Take  by mouth every six (6) hours as needed for Pain. allopurinol (ZYLOPRIM) 100 mg tablet   Yes No   Sig: Take 100 mg by mouth daily. amiodarone (CORDARONE) 200 mg tablet   No No   Sig: Take 1 Tab by mouth daily. apixaban (ELIQUIS) 5 mg tablet   No No   Sig: Take 1 Tab by mouth every twelve (12) hours. bisacodyl (DULCOLAX) 10 mg suppository   No No   Sig: Insert 10 mg into rectum daily. carvedilol (COREG) 25 mg tablet   No No   Sig: Take 1 Tab by mouth two (2) times daily (with meals). colchicine 0.6 mg tablet  Self Yes No   Sig: Take 0.6 mg by mouth every morning. cpap machine kit   Yes No   Sig: 10 cm qhs   docusate sodium (COLACE) 100 mg capsule   Yes No   Sig: Take 1 Cap by mouth two (2) times a day. furosemide (LASIX) 40 mg tablet   No No   Sig: Take 1 Tab by mouth Before breakfast and dinner. gabapentin (NEURONTIN) 600 mg tablet   Yes No   Sig: Take 600 mg by mouth three (3) times daily. glucose blood VI test strips (ASCENSIA AUTODISC VI, ONE TOUCH ULTRA TEST VI) strip   No No   Sig: Test strips for 30 day supply   hydrALAZINE (APRESOLINE) 25 mg tablet   No No   Sig: Take 1 Tab by mouth three (3) times daily. insulin glargine (LANTUS) 100 unit/mL injection   No No   Si Units by SubCUTAneous route nightly. insulin lispro (HUMALOG) 100 unit/mL injection   No No   Sig: 15 Units by SubCUTAneous route three (3) times daily (with meals). isosorbide dinitrate (ISORDIL) 20 mg tablet   No No   Sig: Take 1 Tab by mouth three (3) times daily. levalbuterol (XOPENEX) 0.63 mg/3 mL nebu   Yes No   Si.63 mg by Nebulization route. nitroglycerin (NITROSTAT) 0.4 mg SL tablet  Self No No   Si Tab by SubLINGual route every five (5) minutes as needed for Chest Pain. oxyCODONE IR (OXY-IR) 15 mg immediate release tablet   Yes No   Sig: Take 15 mg by mouth every four (4) hours as needed for Pain.   polyethylene glycol (MIRALAX) 17 gram packet   No No   Sig: Take 1 Packet by mouth daily as needed (constipation). pravastatin (PRAVACHOL) 80 mg tablet   No No   Sig: Take 1 Tab by mouth nightly. ranitidine (ZANTAC) 150 mg tablet   Yes No   Sig: Take 150 mg by mouth nightly. sacubitril-valsartan (ENTRESTO) 24 mg/26 mg tablet   No No   Sig: Take 1 Tab by mouth every twelve (12) hours. sennosides (SENNA) 8.6 mg cap   Yes No   Sig: Take 17.2 mg by mouth nightly. spironolactone (ALDACTONE) 25 mg tablet   No No   Sig: Take 1 Tab by mouth daily.       Facility-Administered Medications: None     Allergies   Allergen Reactions    Dilaudid [Hydromorphone] Other (comments)     Hotflashes, patient states he's not allergic    Iodinated Contrast Media - Oral And Iv Dye Other (comments)     \"shuts my kidneys down\"    Penicillins Unknown (comments)     Pt states he is not allergic his mother just wouldn't allow him take it      Social History   Substance Use Topics    Smoking status: Former Smoker     Packs/day: 0.25     Years: 1.00     Quit date: 1984    Smokeless tobacco: Never Used      Comment: pt states that he only tried smoking as a kid     Alcohol use No      Family History   Problem Relation Age of Onset    Heart Disease Father     Stroke Father     Diabetes Sister     Stroke Sister     Diabetes Sister     Heart Disease Other       Objective:     Visit Vitals    /59    Pulse 60    Temp 97.7 °F (36.5 °C)    Ht 5' 6\" (1.676 m)    Wt (!) 180.1 kg (397 lb)    SpO2 94%    BMI 64.08 kg/m2      Temp (24hrs), Av.7 °F (36.5 °C), Min:97.7 °F (36.5 °C), Max:97.7 °F (36.5 °C)    Oxygen Therapy  O2 Sat (%): 94 % (17 1229)  Pulse via Oximetry: 60 beats per minute (17 1229)  O2 Device: Room air (17 1229)  O2 Flow Rate (L/min): 2 l/min (17 1229)  Physical Exam:  General: Solmnolent. Arouses with physical stimuli Morbidly Obese. Eye: PERRLA, EOMI, erythematous conjunctiva. HENT: Normocephalic, atraumatic, Normal hearing, dry mucous membranes. Neck: Supple, Non-tender. Short neck. Respiratory: B/L Rhonci and decreased breath sounds at bases. O2 via NC in place. Respirations are non-labored. Cardiovascular: Normal rate, Regular rhythm, No murmur. Gastrointestinal: Soft, Non-tender, positive bowel sounds   Musculoskeletal: Global decreased strength, No tenderness. No cyanosis, clubbing. 1+ B/L LE edema, nonpitting. Integumentary: Warm, Dry, Pink, no rash. Neurologic: Oriented to person and place not to situation during short periods of lucidness, otherwiase speech is mumbled and incomprehensible even by daughter. +UE abnormal movements.      Data Review:   Recent Results (from the past 24 hour(s))   EKG, 12 LEAD, INITIAL    Collection Time: 17 10:48 AM   Result Value Ref Range    Systolic BP  mmHg    Diastolic BP  mmHg    Ventricular Rate 59 BPM Atrial Rate 59 BPM    P-R Interval 272 ms    QRS Duration 88 ms    Q-T Interval 466 ms    QTC Calculation (Bezet) 461 ms    Calculated P Axis 70 degrees    Calculated R Axis 151 degrees    Calculated T Axis 66 degrees    Diagnosis       !! AGE AND GENDER SPECIFIC ECG ANALYSIS !! Sinus bradycardia with 1st degree A-V block  Low voltage QRS  Anterolateral infarct , age undetermined  Abnormal ECG  No previous ECGs available     CULTURE, BLOOD    Collection Time: 03/18/17 11:06 AM   Result Value Ref Range    Special Requests: NECK  LEFT        Culture result: PENDING    METABOLIC PANEL, COMPREHENSIVE    Collection Time: 03/18/17 11:07 AM   Result Value Ref Range    Sodium 137 136 - 145 mmol/L    Potassium 6.0 (H) 3.5 - 5.1 mmol/L    Chloride 103 98 - 107 mmol/L    CO2 22 21 - 32 mmol/L    Anion gap 12 7 - 16 mmol/L    Glucose 81 65 - 100 mg/dL     (H) 6 - 23 MG/DL    Creatinine 9.45 (H) 0.8 - 1.5 MG/DL    GFR est AA 8 (L) >60 ml/min/1.73m2    GFR est non-AA 6 (L) >60 ml/min/1.73m2    Calcium 7.9 (L) 8.3 - 10.4 MG/DL    Bilirubin, total 1.1 0.2 - 1.1 MG/DL    ALT (SGPT) 33 12 - 65 U/L    AST (SGOT) 57 (H) 15 - 37 U/L    Alk.  phosphatase 204 (H) 50 - 136 U/L    Protein, total 6.9 6.3 - 8.2 g/dL    Albumin 2.8 (L) 3.5 - 5.0 g/dL    Globulin 4.1 (H) 2.3 - 3.5 g/dL    A-G Ratio 0.7 (L) 1.2 - 3.5     ETHYL ALCOHOL    Collection Time: 03/18/17 11:07 AM   Result Value Ref Range    ALCOHOL(ETHYL),SERUM <3 MG/DL   ACETAMINOPHEN    Collection Time: 03/18/17 11:07 AM   Result Value Ref Range    ACETAMINOPHEN <2 (L) 10.0 - 30.0 ug/mL   AMMONIA    Collection Time: 03/18/17 11:07 AM   Result Value Ref Range    Ammonia 51 (H) 11 - 32 UMOL/L   BNP    Collection Time: 03/18/17 11:07 AM   Result Value Ref Range     pg/mL   CBC WITH AUTOMATED DIFF    Collection Time: 03/18/17 11:07 AM   Result Value Ref Range    WBC 7.2 4.3 - 11.1 K/uL    RBC 4.28 4.23 - 5.67 M/uL    HGB 10.2 (L) 13.6 - 17.2 g/dL    HCT 31.9 (L) 41.1 - 50.3 %    MCV 74.5 (L) 79.6 - 97.8 FL    MCH 23.8 (L) 26.1 - 32.9 PG    MCHC 32.0 31.4 - 35.0 g/dL    RDW 19.8 (H) 11.9 - 14.6 %    PLATELET 620 383 - 758 K/uL    MPV 10.0 (L) 10.8 - 14.1 FL    DF AUTOMATED      NEUTROPHILS 55 43 - 78 %    LYMPHOCYTES 25 13 - 44 %    MONOCYTES 14 (H) 4.0 - 12.0 %    EOSINOPHILS 6 0.5 - 7.8 %    BASOPHILS 0 0.0 - 2.0 %    IMMATURE GRANULOCYTES 0.1 0.0 - 5.0 %    ABS. NEUTROPHILS 3.9 1.7 - 8.2 K/UL    ABS. LYMPHOCYTES 1.8 0.5 - 4.6 K/UL    ABS. MONOCYTES 1.0 0.1 - 1.3 K/UL    ABS. EOSINOPHILS 0.4 0.0 - 0.8 K/UL    ABS. BASOPHILS 0.0 0.0 - 0.2 K/UL    ABS. IMM. GRANS. 0.0 0.0 - 0.5 K/UL   MAGNESIUM    Collection Time: 03/18/17 11:07 AM   Result Value Ref Range    Magnesium 2.3 1.8 - 2.4 mg/dL   TSH 3RD GENERATION    Collection Time: 03/18/17 11:07 AM   Result Value Ref Range    TSH 3.690 0.358 - 9.124 uIU/mL   SALICYLATE    Collection Time: 03/18/17 11:07 AM   Result Value Ref Range    SALICYLATE <1.6 (L) 2.8 - 20.0 MG/DL   POC LACTIC ACID    Collection Time: 03/18/17 11:13 AM   Result Value Ref Range    Lactic Acid (POC) 0.6 0.5 - 1.9 mmol/L   POC TROPONIN-I    Collection Time: 03/18/17 11:28 AM   Result Value Ref Range    Troponin-I (POC) 0.01 0.0 - 0.08 ng/ml     Imaging /Procedures /Studies   Ct head pending verification of patient's current weight secondary to a 500 pound weight limit on CT table at this time. CXR pending    EKG: Sinus bradycardia with 1st degree A-V block  Low voltage QRS Anterolateral infarct , age undetermined Abnormal ECG. Assessment and Plan:     Acute kidney injury on CKD   - Nephrology consulted eval for dialysis  - Hold nephrotoxic medications. - Follow BUN and Cr.  Baseline Cr 1.7.   - IVF (caution with stage IV CHF)  - Renal US  - Urine electrolytes ordered    Hyperkalemia  - follow K  - insulin, albuterol given in ER  - Give Ca Gluconate, Kayexelate    Hyperammonemia likely from hepatic congestion.   - lactulose  - Follow LFTs    Acute Encephalopathy likely uremic and hepatic.   - stat abg  - admit to CCU; concern for decompensation  - Neurochecks. - judicious use of sedating meds   - encourage good sleep hygiene, day time activities and frequent re-orientations   - Treatment as above  - Ct head pending  - recent TSH wnl  - blood cultures pending    Nonischemic cardiomyopathy Chronic Systolic CHF EF 36-92% Stage IV with AICD  - CXR pending  - BNP wnl  - Monitor I&Os, daily weights    Hypotension - resolved and in setting of h/o Hypertension   -  Monitor and trend BP.   - hold home meds and consider restart BP meds once BP stable. DM II uncontrolled, hyperglycemia. - ACs qAC & qHS. - ISS. Lantus.   - Hold oral hypoglycemic medications. - Adjust medications as necessary based on each 24 hour insulin requirements. - Diabetic Diet. - A1C 9.0 2/1/17    Anticoagulated on Eliquis  - bleeding precuations    Morbid Obesity BMI >60  - diet, exercise and lifestyle changes advised. Chronic Anemia  - monitor hgb    HLD  - Continue Statin    KELSIE and OHS  - aware    Chronic Back pain  - aware    Will continue essential home medications. Further management depends on patient progress. GI & DVT prophylaxis: pepside, Eliquis  Code Status: Full Code confirmed by daughter. HCPOA not assigned. Discussed with Dr. Kathleen Chicas  Anticipated discharge: to ? when stable. Based on clinical findings at the time of admission, I anticipate this patient will require a stay of at least 2 midnights  Total Time spent: 55 minutes. Counseling & coordinating care dominated the encounter >55%. Counseled patient and family regarding dx, rx, tx. Reviewed prior records, labs, vitals, diagnostic tests, flow sheet, home medications.       Conchita Mayen PA-C, MPAS

## 2017-03-18 NOTE — ED PROVIDER NOTES
HPI Comments: 74-year-old obese male presents to emergency department via EMS with reports of altered mental status, weakness and recent falls. EMS states patient was seen and evaluated yesterday after cough or fall at home. Patient was helped off the floor but refused transport to the emergency department at that time. Patient's mental status is seemingly decreased per EMS report. Family at bedside states patient has been altered for several days and weekend as well. Patient arrives to the emergency department moaning on exam and visibly altered. However he is able to provide some details concerning his past medical history and current symptoms. EMS reports a history of diabetes, kidney disease and congestive heart failure. Hypotension in route with a systolic blood pressure of 90 per EMS report. BGL of 115, unable to obtain reliable history from patient secondary to mental status change. Patient is a 45 y.o. male presenting with altered mental status. The history is provided by the patient, the EMS personnel and a relative. No  was used. Altered mental status    This is a new problem. The current episode started more than 2 days ago. The problem has been gradually worsening. Associated symptoms include confusion, somnolence and weakness. Pertinent negatives include no seizures, no unresponsiveness, no agitation, no delusions, no hallucinations, no self-injury, no violence, no tingling and no numbness. Mental status baseline is normal.  His past medical history is significant for diabetes, hypertension and heart disease. His past medical history does not include seizures, liver disease, CVA, TIA, AIDS, COPD, depression, dementia, psychotropic medication treatment or head trauma. Past Medical History:   Diagnosis Date    Acute systolic heart failure ([address] May, 2009    Also had transient acute renal failure secondary to poor perfusion.     AICD (automatic cardioverter/defibrillator) present 10/21/2015    Atypical chest pain 4/23/2010    Bronchitis     CAD (coronary artery disease)     Cardiomyopathy     Chest pain 10/21/2015    Chronic kidney disease     renal insufficiency    Chronic pain     back    Congestive heart failure (CHF) ([facility] [address] 10/21/2015    COPD     Diabetes ([facility] [address]     type 2 insulin reliant- BS average- 160's-180's    Diabetes mellitus type II, uncontrolled ([facility] [address] 7/2/2013    GERD (gastroesophageal reflux disease)     Gout     Heart failure ([facility] [address]     cardiomyopathy with ef 10-20%    Hypertension     Hyponatremia 12/20/2010    Ill-defined condition     gout, neuropathy, sciatica    Morbid obesity ([facility] [facility]     Nausea & vomiting 11/30/2015    Neuropathy     Obstructive sleep apnea 2/15/2010    Other unknown and unspecified cause of morbidity or mortality     Gout    Severe sepsis ([facility] [address]     Unspecified sleep apnea     cpap       Past Surgical History:   Procedure Laterality Date    CHEST SURGERY PROCEDURE UNLISTED      thorocentesis    HX HEART CATHETERIZATION  [place] 10, 2008    Severe multivessel disease with severe LV dysfunction    HX PACEMAKER      may 2010         Family History:   Problem Relation Age of Onset    Heart Disease Father     Stroke Father     Diabetes Sister     Stroke Sister     Diabetes Sister     Heart Disease Other        Social History     Social History    Marital status:      Spouse name: N/A    Number of children: N/A    Years of education: N/A     Occupational History    Not on file. Social History Main Topics    Smoking status: Former Smoker     Packs/day: 0.25     Years: 1.00     Quit date: 7/12/1984    Smokeless tobacco: Never Used      Comment: pt states that he only tried smoking as a kid     Alcohol use No    Drug use: No    Sexual activity: Not on file     Other Topics Concern    Not on file     Social History Narrative         ALLERGIES: Dilaudid [hydromorphone];  Iodinated contrast media - oral and iv dye; and Penicillins    Review of Systems   Unable to perform ROS: Mental status change   Neurological: Positive for weakness. Negative for tingling, seizures and numbness. Psychiatric/Behavioral: Positive for confusion. Negative for agitation, hallucinations and self-injury. All other systems reviewed and are negative. Vitals:    03/18/17 1051   BP: (!) 158/110   Pulse: 60   Temp: 97.7 °F (36.5 °C)   SpO2: 96%   Weight: (!) 180.1 kg (397 lb)   Height: 5' 6\" (1.676 m)            Physical Exam   Constitutional: He is oriented to person, place, and time. He appears well-developed and well-nourished. No distress. Obese AA male, Alert to person only, mild distress, moaning on exam. Speaks in shortened sentences. HENT:   Head: Normocephalic and atraumatic. Right Ear: External ear normal.   Nose: Nose normal.   Eyes: Conjunctivae and EOM are normal. Pupils are equal, round, and reactive to light. Neck: Normal range of motion. Neck supple. No JVD present. No tracheal deviation present. Cardiovascular: Normal rate, regular rhythm, S1 normal, S2 normal, normal heart sounds and intact distal pulses. Exam reveals no gallop, no distant heart sounds and no friction rub. No murmur heard. Distant heart sounds. Pulmonary/Chest: Effort normal. No accessory muscle usage or stridor. No tachypnea and no bradypnea. No respiratory distress. He has decreased breath sounds in the right upper field, the right middle field, the right lower field, the left upper field, the left middle field and the left lower field. He has no wheezes. He has no rhonchi. He has no rales. He exhibits no tenderness. Diminished throughout. Abdominal: Soft. Normal appearance. He exhibits no distension and no mass. There is no hepatosplenomegaly, splenomegaly or hepatomegaly. There is no tenderness.  There is no rigidity, no rebound, no guarding, no CVA tenderness, no tenderness at McBurney's point and negative Snyder's sign. Musculoskeletal: Normal range of motion. He exhibits no edema, tenderness or deformity. Neurological: He is alert and oriented to person, place, and time. No cranial nerve deficit. Skin: Skin is warm and dry. No rash noted. He is not diaphoretic. Bilateral lower extremity pitting edema. ,  2+ in nature. Psychiatric: He has a normal mood and affect. His behavior is normal.   Nursing note and vitals reviewed. MDM  Number of Diagnoses or Management Options  Acute hyperkalemia: new and requires workup  Acute renal failure, unspecified acute renal failure type [facility]): new and requires workup  Altered mental status, unspecified altered mental status type: new and requires workup  Congestive heart failure, unspecified congestive heart failure chronicity, unspecified congestive heart failure type [facility]): established and worsening  Hyperammonemia ([facility] [address] new and requires workup  Diagnosis management comments: EKG shows a normal sinus rhythm, low voltage throughout. 59 beats a minute. No evidence of acute ischemic change or hyperkalemia visible on EKG. Markedly elevated creatinine level on initial lab draw with hyperkalemia at 6.0. Patient will be treated with insulin, dextrose and albuterol therapy. Discussed patient's case with on-call hospitalist who agrees to evaluate for admission. In addition I discussed the patient case with on-call nephrology who will evaluate the patient at this time. CT head pending verification of patient's current weight secondary to a 500 pound weight limit on CT table at this time.        Amount and/or Complexity of Data Reviewed  Clinical lab tests: ordered and reviewed  Tests in the radiology section of CPT®: ordered and reviewed  Tests in the medicine section of CPT®: ordered and reviewed  Obtain history from someone other than the patient: yes  Discuss the patient with other providers: yes  Independent visualization of images, tracings, or specimens: yes    Risk of Complications, Morbidity, and/or Mortality  Presenting problems: high  Diagnostic procedures: high  Management options: high    Patient Progress  Patient progress: stable    ED Course       Procedures

## 2017-03-18 NOTE — CONSULTS
[address]       Name:  Piotr Jaffe   MR#:  [id number]   :  1965   Account #:  [de-identified]   Date of Adm:  2017       ADMITTING DIAGNOSES   1. Altered mental status. 2. Acute kidney injury. HISTORY OF PRESENT ILLNESS: This is a 78-year-old male with a   history of nonischemic cardiomyopathy, ejection fraction 10%,   previously treated as an inpatient by [facility] most   recently as 2017. He has a history of inotropic dependant   cardiomyopathy and was discharged on 2017, after a   long hospital course requiring inotropic therapy. The patient   presented 2017 at [facility] Emergency Department. At the   time of exam, the patient was extremely somnolent and could not   provide a full history. The history was obtained from his   daughter who stated over the past several days, the patient had   worsening dizziness, as well as falls, which occurred on   2017, as well as worsening respiratory status. He was   taken to [facility] Emergency Department, there found have   profoundly elevated laboratories studies, most notably for   creatinine function was severely diminished with creatinine of   9.4, which was notably changed from discharge creatinine in   2017. The patient describes somnolence, as well as shortness   of breath. Denies fevers. PAST MEDICAL HISTORY    1. Cardiomyopathy, EF 10%. 2. Biotronik single-chamber defibrillator. 3. Chronic kidney disease. 4. Chronic pain. 5. Chronic obstructive pulmonary disease. 6. GERD. 7. Gout. PAST SURGICAL HISTORY   1.  Cardiac catheterization with a nonischemic cardiomyopathy by   history. Cardiac catheterization report is not available at the   time in the [facility] system. This is documented on previous   provider notes. 2. In  Biotronik single-chamber pacemaker for [place] Heart   Association class III heart failure.     FAMILY HISTORY: Father with history of heart disease. Father   with history of stroke. SOCIAL HISTORY: The patient denies tobacco abuse. REVIEW OF SYSTEMS: The patient unable to provide a review of   systems. PHYSICAL EXAMINATION   GENERAL: The patient is ill-appearing, somnolent. HEENT: Normocephalic, atraumatic. EYES: Pupils equal, round, and react to light. NECK: Supple. No jugular venous distention is appreciated;   however, neck is obese. CARDIOVASCULAR: Regular rate, very distant heart sounds. PULMONARY: Diminished sounds in bilateral bases. ABDOMEN: Soft, distended, considerable amount of adipose tissue   due to obesity. GENITOURINARY: There is profound scrotal swelling, as well as   swelling and erythema. MUSCULOSKELETAL: Adipose tissue versus edema. NEUROLOGIC: Deferred due to altered mental status. DIAGNOSTIC DATA: ECG shows sinus rhythm, first-degree AV block,   right superior axis deviation, low-voltage QRS. ASSESSMENT    1. Cardiomyopathy, end-stage, New York Heart Association class   IV symptoms, EF 10%. The patient previously inotropic dependence,   not a candidate for transplant or advanced heart failure   therapy. This was discussed previously. His prognosis is very   poor from a heart failure standpoint due to his noncompliance,   obesity in the past. At this time, advanced heart failure   therapies cannot be offered. Additional therapies with inotropes   could be possible. At this time, the patient is most likely   hypovolemic from history. Obtaining cardiac output and volume   status would be achievable by right heart catheterization;   however, at this time it does not appear with current goals of   care, that this would change outcomes and thus would not be   initiated unless other medical issues have been resolved. For   now, we will hold current beta-blocker medications due to   hypotension. The patient could be started on dobutamine for   improvement of blood pressure. Intravenous hydration could be   considered, but the patient has extremely poor left ventricular   reserve. 2. Acute kidney injury. The patient has profoundly elevated   creatinine from previous. By history, the patient has been taking   diuretics, and perhaps the patient is hypovolemic based on his   liver function numbers, unlikely cardiogenic shock contributing   to symptoms. Therefore, suspect hypovolemic state. Again, volume   status will be very hard to obtain on this patient due to his   size and lack of ability to obtain physical exam findings. BNP   would be unhelpful in the setting of acute renal failure as   well. In talking with the nephrology team, it does not appear the   patient is a candidate for hemodialysis therapy, and this does   not seem to be an endpoint. The patient will have evaluation by   Urology to evaluate for obstructive uropathy, which certainly   could be a possibility due to his considerable scrotal edema and   history of urinary incontinence in the past. This would be a   reversible cause of his acute kidney injury and additional heart   failure therapies could be discussed. 3. Obesity, morbid, limits the patient's candidacy   for additional therapies. 4. Hypotension. The patient is being evaluated by pulmonary and   critical care service. Infectious etiologies could be   considered. Procalcitonin could be checked to ensure this is not   sepsis from a urinary source in light of his considerable amount   of hygienic challenges. GOALS OF CARE: I discussed the patient's case with the patient's   daughter. At the time of the exam, the patient was somnolent and   we were discussing goals of care, as well as additional   therapies. At this time, we have told the patient's daughter that   his prognosis is very poor especially in light of his acute   kidney injury and that dialysis will not be offered.  Goals of   care will be reevaluated on a daily basis, and we discussed palliative care and comfort care with the patient and the family. In the event they decide for comfort care measures, ICD should   be deactivated to VVI pacing only and all tachy therapy should be   turned off. Thank you for this most interesting consult. We will follow along.          MD Barbra Sheppard / Alivia.Priyanka   D:  03/18/2017   16:36   T:  03/18/2017   17:11   Job #:  [id number]

## 2017-03-18 NOTE — ED TRIAGE NOTES
Transported by EMS. Reports hypotensive in 80's to low 100's. . On home oxygen at 3 liters. Patient able to answer questions appropriately but falls asleep quickly and frequently. EMS went out yesterday for a fall. EMS states his mental status is worse today. Did not go to hospital yesterday. Family reports kidney failure. Patient reports knee and back pain.

## 2017-03-18 NOTE — CONSULTS
CONSULT    Subjective:      Chilo Parham is a 45 y.o. male morbidly obese AAM with a history of CKD III, DM II, KELSIE, OHS, HTN, HLD, Systolic CHF EF 85% Oxygen dependent (3L) who presented to the ED [facility] with c/o somnolence, lethargy, altered mental status, recent fall, decreased UOP progressively worse x7 days. EMS states patient was seen and evaluated yesterday after cough or fall at home. Patient was helped off the floor, appeared altered but refused transport to the emergency department at that time. EMS reports hypotension in route today systolic BP 90, . Admits to brown urine 2 days ago and none since, generalized weakness, uncontrollable jerking of upper extremities, mumbled incoherent speech with short periods of lucidness. Cr found to be in the 9's. Urology consulted as his morbid obesity and edema have made hansen catheter placement impossible in this patient who is critically ill and needs close monitoring of I/O's. Past Medical History:   Diagnosis Date    Acute encephalopathy 6/14/3003    Acute systolic heart failure ([address] May, 2009    Also had transient acute renal failure secondary to poor perfusion.     AICD (automatic cardioverter/defibrillator) present 10/21/2015    Atypical chest pain 4/23/2010    Bronchitis     CAD (coronary artery disease)     Cardiomyopathy     Chest pain 10/21/2015    Chronic kidney disease     renal insufficiency    Chronic pain     back    Congestive heart failure (CHF) ([address] 10/21/2015    COPD     Diabetes ([address]     type 2 insulin reliant- BS average- 160's-180's    Diabetes mellitus type II, uncontrolled ([address] 7/2/2013    GERD (gastroesophageal reflux disease)     Gout     Heart failure ([address]     cardiomyopathy with ef 10-20%    Hypertension     Hyponatremia 12/20/2010    Ill-defined condition     gout, neuropathy, sciatica    Morbid obesity (HCC)     Nausea & vomiting 11/30/2015    Neuropathy     Obstructive sleep apnea 2/15/2010    Other unknown and unspecified cause of morbidity or mortality     Gout    Severe sepsis (HCC)     Unspecified sleep apnea     cpap     Past Surgical History:   Procedure Laterality Date    CHEST SURGERY PROCEDURE UNLISTED      thorocentesis    HX HEART CATHETERIZATION  Sandy 10, 2008    Severe multivessel disease with severe LV dysfunction    HX PACEMAKER      may 2010      Family History   Problem Relation Age of Onset    Heart Disease Father     Stroke Father     Diabetes Sister     Stroke Sister     Diabetes Sister     Heart Disease Other      Social History   Substance Use Topics    Smoking status: Former Smoker     Packs/day: 0.25     Years: 1.00     Quit date: 7/12/1984    Smokeless tobacco: Never Used      Comment: pt states that he only tried smoking as a kid     Alcohol use No     Allergies   Allergen Reactions    Dilaudid [Hydromorphone] Other (comments)     Hotflashes, patient states he's not allergic    Iodinated Contrast Media - Oral And Iv Dye Other (comments)     \"shuts my kidneys down\"    Penicillins Unknown (comments)     Pt states he is not allergic his mother just wouldn't allow him take it     Prior to Admission medications    Medication Sig Start Date End Date Taking? Authorizing Provider   GUAIFENESIN/PSEUDOEPHEDRNE HCL (MUCINEX D PO) Take 400 mg by mouth daily as needed. Historical Provider   levalbuterol (XOPENEX) 0.63 mg/3 mL nebu 0.63 mg by Nebulization route. Historical Provider   acetaminophen (TYLENOL) 500 mg tablet Take  by mouth every six (6) hours as needed for Pain. Historical Provider   sennosides (SENNA) 8.6 mg cap Take 17.2 mg by mouth nightly. Historical Provider   sacubitril-valsartan (ENTRESTO) 24 mg/26 mg tablet Take 1 Tab by mouth every twelve (12) hours. 2/24/17   FATEMEH Hightower   amiodarone (CORDARONE) 200 mg tablet Take 1 Tab by mouth daily.  2/24/17   FATEMEH Hightower   docusate sodium (COLACE) 100 mg capsule Take 1 Cap by mouth two (2) times a day. 2/24/17   FATEMEH Mueller   furosemide (LASIX) 40 mg tablet Take 1 Tab by mouth Before breakfast and dinner. 2/24/17   FATEMEH Mueller   gabapentin (NEURONTIN) 600 mg tablet Take 600 mg by mouth three (3) times daily. Historical Provider   bisacodyl (DULCOLAX) 10 mg suppository Insert 10 mg into rectum daily. 1/13/17   Vi Stanford NP   apixaban (ELIQUIS) 5 mg tablet Take 1 Tab by mouth every twelve (12) hours. 12/26/16   Bob Clement MD   oxyCODONE IR (OXY-IR) 15 mg immediate release tablet Take 15 mg by mouth every four (4) hours as needed for Pain. Historical Provider   carvedilol (COREG) 25 mg tablet Take 1 Tab by mouth two (2) times daily (with meals). 10/26/16   Lidia Mcfadden PA-C   hydrALAZINE (APRESOLINE) 25 mg tablet Take 1 Tab by mouth three (3) times daily. 10/26/16   Lidia Mcfadden PA-C   insulin glargine (LANTUS) 100 unit/mL injection 50 Units by SubCUTAneous route nightly. 10/26/16   Lidia Mcfadden PA-C   insulin lispro (HUMALOG) 100 unit/mL injection 15 Units by SubCUTAneous route three (3) times daily (with meals). 10/26/16   Lidia Mcfadden PA-C   isosorbide dinitrate (ISORDIL) 20 mg tablet Take 1 Tab by mouth three (3) times daily. 10/26/16   Lidia Mcfadden PA-C   ranitidine (ZANTAC) 150 mg tablet Take 150 mg by mouth nightly. Historical Provider   spironolactone (ALDACTONE) 25 mg tablet Take 1 Tab by mouth daily. 7/14/16   Norma Arreaga MD   pravastatin (PRAVACHOL) 80 mg tablet Take 1 Tab by mouth nightly. 7/14/16   Norma Arreaga MD   polyethylene glycol [facility]) 17 gram packet Take 1 Packet by mouth daily as needed (constipation). 12/4/15   Analisa Caballero MD   cpap machine kit 10 cm qhs    Historical Provider   OXYGEN-AIR DELIVERY SYSTEMS 2 lpm qhs    Historical Provider   allopurinol (ZYLOPRIM) 100 mg tablet Take 100 mg by mouth daily.     Phys Other, MD   glucose blood VI test strips (ASCENSIA AUTODISC VI, ONE TOUCH ULTRA TEST VI) strip Test strips for 30 day supply 14   Mikala Connolly MD   nitroglycerin (NITROSTAT) 0.4 mg SL tablet 1 Tab by SubLINGual route every five (5) minutes as needed for Chest Pain. 4/24/10   FATEMEH Garza   colchicine 0.6 mg tablet Take 0.6 mg by mouth every morning. 3/22/10   Rhiannon Mosqueda MD        Review of Systems:  Pertinent items are noted in HPI.      Objective:      Patient Vitals for the past 8 hrs:   BP Temp Pulse Resp SpO2 Height Weight   17 1541 103/90 - (!) 53 13 100 % - -   17 1522 - 96.6 °F (35.9 °C) - - - - -   17 1521 - - (!) 54 21 100 % - -   17 1520 (!) 82/57 - - (!) 38 100 % - -   17 1453 107/67 - - - - - -   17 1452 - - (!) 54 12 100 % - -   17 1425 - - - - 100 % - -   17 1410 - - - - 100 % - -   17 1351 - - (!) 57 - 97 % - -   17 1314 - - 60 - 95 % - -   17 1304 - - - - 95 % - -   17 1237 - - 60 - 100 % - -   17 1229 - - - - 94 % - -   17 1204 142/59 - (!) 58 - 97 % - -   17 1141 142/59 - - - 92 % - -   17 1116 142/58 - - - 95 % - -   17 1111 139/84 - - - 95 % - -   17 1051 (!) 158/110 97.7 °F (36.5 °C) 60 - 96 % 5' 6\" (1.676 m) (!) 397 lb (180.1 kg)   17 1048 (!) 158/110 - - - 95 % - -       Temp (24hrs), Av.2 °F (36.2 °C), Min:96.6 °F (35.9 °C), Max:97.7 °F (36.5 °C)      Physical Exam:  GENERAL: mild distress, appears stated age, morbidly obese, confused, on CPAP, LUNG: tachypeic, HEART: regular rate, ABDOMEN: obese with woody changes of the panniculus, : edematous foreskin with inability to immediately visualize glans penis even with pressure    Recent Results (from the past 12 hour(s))   EKG, 12 LEAD, INITIAL    Collection Time: 17 10:48 AM   Result Value Ref Range    Systolic BP  mmHg    Diastolic BP  mmHg    Ventricular Rate 59 BPM    Atrial Rate 59 BPM    P-R Interval 272 ms    QRS Duration 88 ms    Q-T Interval 466 ms    QTC Calculation (Bezet) 461 ms Calculated P Axis 70 degrees    Calculated R Axis 151 degrees    Calculated T Axis 66 degrees    Diagnosis       !! AGE AND GENDER SPECIFIC ECG ANALYSIS !! Sinus bradycardia with 1st degree A-V block  Low voltage QRS  Anterolateral infarct , age undetermined  Abnormal ECG  No previous ECGs available     CULTURE, BLOOD    Collection Time: 03/18/17 11:06 AM   Result Value Ref Range    Special Requests: NECK  LEFT        Culture result: PENDING    METABOLIC PANEL, COMPREHENSIVE    Collection Time: 03/18/17 11:07 AM   Result Value Ref Range    Sodium 137 136 - 145 mmol/L    Potassium 6.0 (H) 3.5 - 5.1 mmol/L    Chloride 103 98 - 107 mmol/L    CO2 22 21 - 32 mmol/L    Anion gap 12 7 - 16 mmol/L    Glucose 81 65 - 100 mg/dL     (H) 6 - 23 MG/DL    Creatinine 9.45 (H) 0.8 - 1.5 MG/DL    GFR est AA 8 (L) >60 ml/min/1.73m2    GFR est non-AA 6 (L) >60 ml/min/1.73m2    Calcium 7.9 (L) 8.3 - 10.4 MG/DL    Bilirubin, total 1.1 0.2 - 1.1 MG/DL    ALT (SGPT) 33 12 - 65 U/L    AST (SGOT) 57 (H) 15 - 37 U/L    Alk.  phosphatase 204 (H) 50 - 136 U/L    Protein, total 6.9 6.3 - 8.2 g/dL    Albumin 2.8 (L) 3.5 - 5.0 g/dL    Globulin 4.1 (H) 2.3 - 3.5 g/dL    A-G Ratio 0.7 (L) 1.2 - 3.5     ETHYL ALCOHOL    Collection Time: 03/18/17 11:07 AM   Result Value Ref Range    ALCOHOL(ETHYL),SERUM <3 MG/DL   ACETAMINOPHEN    Collection Time: 03/18/17 11:07 AM   Result Value Ref Range    ACETAMINOPHEN <2 (L) 10.0 - 30.0 ug/mL   AMMONIA    Collection Time: 03/18/17 11:07 AM   Result Value Ref Range    Ammonia 51 (H) 11 - 32 UMOL/L   BNP    Collection Time: 03/18/17 11:07 AM   Result Value Ref Range     pg/mL   CBC WITH AUTOMATED DIFF    Collection Time: 03/18/17 11:07 AM   Result Value Ref Range    WBC 7.2 4.3 - 11.1 K/uL    RBC 4.28 4.23 - 5.67 M/uL    HGB 10.2 (L) 13.6 - 17.2 g/dL    HCT 31.9 (L) 41.1 - 50.3 %    MCV 74.5 (L) 79.6 - 97.8 FL    MCH 23.8 (L) 26.1 - 32.9 PG    MCHC 32.0 31.4 - 35.0 g/dL    RDW 19.8 (H) 11.9 - 14.6 % PLATELET 184 049 - 916 K/uL    MPV 10.0 (L) 10.8 - 14.1 FL    DF AUTOMATED      NEUTROPHILS 55 43 - 78 %    LYMPHOCYTES 25 13 - 44 %    MONOCYTES 14 (H) 4.0 - 12.0 %    EOSINOPHILS 6 0.5 - 7.8 %    BASOPHILS 0 0.0 - 2.0 %    IMMATURE GRANULOCYTES 0.1 0.0 - 5.0 %    ABS. NEUTROPHILS 3.9 1.7 - 8.2 K/UL    ABS. LYMPHOCYTES 1.8 0.5 - 4.6 K/UL    ABS. MONOCYTES 1.0 0.1 - 1.3 K/UL    ABS. EOSINOPHILS 0.4 0.0 - 0.8 K/UL    ABS. BASOPHILS 0.0 0.0 - 0.2 K/UL    ABS. IMM. GRANS. 0.0 0.0 - 0.5 K/UL   MAGNESIUM    Collection Time: 03/18/17 11:07 AM   Result Value Ref Range    Magnesium 2.3 1.8 - 2.4 mg/dL   TSH 3RD GENERATION    Collection Time: 03/18/17 11:07 AM   Result Value Ref Range    TSH 3.690 0.358 - 1.516 uIU/mL   SALICYLATE    Collection Time: 03/18/17 11:07 AM   Result Value Ref Range    SALICYLATE <5.2 (L) 2.8 - 20.0 MG/DL   POC LACTIC ACID    Collection Time: 03/18/17 11:13 AM   Result Value Ref Range    Lactic Acid (POC) 0.6 0.5 - 1.9 mmol/L   POC TROPONIN-I    Collection Time: 03/18/17 11:28 AM   Result Value Ref Range    Troponin-I (POC) 0.01 0.0 - 0.08 ng/ml   CULTURE, BLOOD    Collection Time: 03/18/17 12:05 PM   Result Value Ref Range    Special Requests: NECK  RIGHT        Culture result: PENDING    BLOOD GAS, ARTERIAL    Collection Time: 03/18/17  1:50 PM   Result Value Ref Range    pH 7.21 (LL) 7.35 - 7.45      PCO2 53 (H) 35.0 - 45.0 mmHg    PO2 78 75.0 - 100.0 mmHg    BICARBONATE 21 (L) 22.0 - 26.0 mmol/L    BASE DEFICIT 7.2 (H) 0 - 2 mmol/L    TOTAL HEMOGLOBIN 11.1 (L) 11.7 - 15.0 GM/DL    O2 SAT 92 92.0 - 98.5 %    ARTERIAL O2 HGB 91.3 (L) 94.0 - 97.0 %    CARBOXYHEMOGLOBIN 0.6 0.5 - 1.5 %    METHEMOGLOBIN 0.2 0.0 - 1.5 %    DEOXYHEMOGLOBIN 8 (H) 0.0 - 5.0 %    SITE RR     ALLENS TEST POSITIVE      MODE NC     O2 FLOW 3.00 L/min    Respiratory comment: Todd Jimenez MD at 3 18 2017 2 01 01 PM. Read back.          Assessment:     ARF    Inability to place hansen catheter      Plan:     I used manual pressure beginning lightly and eventually applying more force to the edematous foreskin to decrease penile edema. I was then able to expose the glans penis with inward pressure in 4 quadrants around penis. As I exposed glans, patient's nurse was able to place hansen catheter using sterile technique. It may be removed when appropriate by primary service's clinical judgement. No urologic follow up needed.       Signed By: Obinna Khoury MD                         March 18, 2017

## 2017-03-18 NOTE — CONSULTS
ATTESTATION: I have seen and examined the patient and agree with the history, exam, and plan as per Zach Villanueva NP. End stage cardiomyopathy with morbid obesity who presents with RED, respiratory failure, and hyperkalemia. He is hypotensive and on dobutamine with SBP 60s. During his last hospitalization he was seen by my colleague Dr. Sunitha Dixon and she thought he was not a dialysis candidate given his CHF. I agree with Dr. Dimple Hackett assessment that dialysis is unlikely to provide benefit for either quality or quantity of life given his CHF and his morbid obesity. Moreover, he is too unstable with his hypotension to tolerate any UF. I agree with dobutamine to see if increasing his forward flow will help temporize his overload and he has received treatment for his hyperkalemia. I discussed this with his daughter. Will reassess in the Luz Omalley MD            [facility] CONSULT NOTE    We were asked to see the patient at the request of Dr. Bethanie Meléndez for RED on CKD. Admission Date:  3/18/2017    Admission Diagnosis:  Acute renal failure (ARF) (HCC)    History of Present Illness:    Patient is a 45year old male patient with morbid obesity, CHF with EF 10-15%, CAD, COPD, DM, and multiple other medical problems admitted today with RED on CKD after presenting with altered mental status, recent fall, and decreased urine output. Notes indicate that the patient summoned EMS yesterday for these symptoms, then refused to be brought to the hospital. He was recently here for a prolonged hospitalization (over a month) for heart failure with bouts of RED on CKD. At that time, the patient was told that he was not a dialysis candidate. He was seen by Palliative Care, but refused hospice multiple times per their notes and noted to have unrealistic goals of care.  He was discharged the end of February and returned today after days of altered mental status, somnolence, and daughter reported no urine in the past 2 days. On arrival, K=6, Cr= 9.45, VRA=477. CXR revealing pulmonary edema and he is requiring BiPAP. Currently, his SBP is in the 50s and he has been hypotensive at times. He is confused and falls asleep during exam multiple times. Past Medical History:   Diagnosis Date    Acute encephalopathy 6/95/5919    Acute systolic heart failure ([facility] [address] May, 2009    Also had transient acute renal failure secondary to poor perfusion.     AICD (automatic cardioverter/defibrillator) present 10/21/2015    Atypical chest pain 4/23/2010    Bronchitis     CAD (coronary artery disease)     Cardiomyopathy     Chest pain 10/21/2015    Chronic kidney disease     renal insufficiency    Chronic pain     back    Congestive heart failure (CHF) ([facility] [address] 10/21/2015    COPD     Diabetes ([facility] [address]     type 2 insulin reliant- BS average- 160's-180's    Diabetes mellitus type II, uncontrolled ([facility] [address] 7/2/2013    GERD (gastroesophageal reflux disease)     Gout     Heart failure ([facility] [address]     cardiomyopathy with ef 10-20%    Hypertension     Hyponatremia 12/20/2010    Ill-defined condition     gout, neuropathy, sciatica    Morbid obesity (HCC)     Nausea & vomiting 11/30/2015    Neuropathy     Obstructive sleep apnea 2/15/2010    Other unknown and unspecified cause of morbidity or mortality     Gout    Severe sepsis (HCC)     Unspecified sleep apnea     cpap      Past Surgical History:   Procedure Laterality Date    CHEST SURGERY PROCEDURE UNLISTED      thorocentesis    HX HEART CATHETERIZATION  [place] 10, 2008    Severe multivessel disease with severe LV dysfunction    HX PACEMAKER      may 2010      Current Facility-Administered Medications   Medication Dose Route Frequency    lactulose (CHRONULAC) 10 gram/15 mL solution 30 g  30 g Oral Q6H    insulin glargine (LANTUS) injection 50 Units  50 Units SubCUTAneous QHS    isosorbide dinitrate (ISORDIL) tablet 20 mg  20 mg Oral TID    levalbuterol (XOPENEX) nebulizer soln 0.63 mg/3 mL  0.63 mg Nebulization Q6H PRN    pravastatin (PRAVACHOL) tablet 80 mg  80 mg Oral QHS    sodium chloride (NS) flush 5-10 mL  5-10 mL IntraVENous Q8H    sodium chloride (NS) flush 5-10 mL  5-10 mL IntraVENous PRN    insulin lispro (HUMALOG) injection   SubCUTAneous Q6H    dextrose 40% (GLUTOSE) oral gel 1 Tube  15 g Oral PRN    glucagon (GLUCAGEN) injection 1 mg  1 mg IntraMUSCular PRN    dextrose (D50W) injection syrg 12.5-25 g  25-50 mL IntraVENous PRN    sodium polystyrene sulfonate (SPS) enema 1 Enema  1 Enema Rectal NOW    calcium gluconate 1 g in 0.9% sodium chloride (MBP/ADV) 110 mL MBP  1 g IntraVENous ONCE    0.9% sodium chloride infusion  75 mL/hr IntraVENous CONTINUOUS    heparin (porcine) injection 5,000 Units  5,000 Units SubCUTAneous Q8H    tuberculin injection 5 Units  5 Units IntraDERMal ONCE     Allergies   Allergen Reactions    Dilaudid [Hydromorphone] Other (comments)     Hotflashes, patient states he's not allergic    Iodinated Contrast Media - Oral And Iv Dye Other (comments)     \"shuts my kidneys down\"    Penicillins Unknown (comments)     Pt states he is not allergic his mother just wouldn't allow him take it      Social History   Substance Use Topics    Smoking status: Former Smoker     Packs/day: 0.25     Years: 1.00     Quit date: 7/12/1984    Smokeless tobacco: Never Used      Comment: pt states that he only tried smoking as a kid     Alcohol use No      Family History   Problem Relation Age of Onset    Heart Disease Father     Stroke Father     Diabetes Sister     Stroke Sister     Diabetes Sister     Heart Disease Other         Review of Systems:  ROS as outlined in HPI. No CP or SOB.      Objective:  Vitals:    03/18/17 1520 03/18/17 1521 03/18/17 1522 03/18/17 1541   BP: (!) 82/57   103/90   Pulse:  (!) 54  (!) 53   Resp: (!) 38 21  13   Temp:   96.6 °F (35.9 °C)    SpO2: 100% 100%  100%   Weight:       Height:         No intake or output data in the 24 hours ending 03/18/17 1619  Wt Readings from Last 3 Encounters:   03/18/17 (!) 180.1 kg (397 lb)   03/11/17 136.5 kg (301 lb)   02/24/17 (!) 187.3 kg (413 lb)       GEN - appears critically ill. Neck - large  CV - S1, S2, no rub  Lung - distant/ diminished. On bipap. Chest wall - normal appearance  Abd - soft, nontender. Obese. Ext - 2-3+ BLE edema. Neurologic - nonfocal +jerking movements       Data Review:     Recent Labs      03/18/17   1107   WBC  7.2   HGB  10.2*   HCT  31.9*   PLT  240        Recent Labs      03/18/17   1107   NA  137   K  6.0*   CL  103   CO2  22   BUN  106*   CREA  9.45*   CA  7.9*   GLU  81   MG  2.3         Assessment/Plan:     Active Problems:    Obstructive sleep apnea (2/15/2010)      Chronic back pain (2/20/2010)      Dyslipidemia (7/2/2013)      Diabetes mellitus type II, uncontrolled ([address] (7/2/2013)      Noncompliance with medication regimen (7/2/2013)      AICD (automatic cardioverter/defibrillator) present (10/21/2015)      Congestive heart failure (CHF) ([address] (10/21/2015)      Cardiomyopathy ([address] (10/21/2015)      Hypertension (11/30/2015)      Obesity hypoventilation syndrome ([address] (10/24/2016)      Morbid obesity with BMI of 60.0-69.9, adult ([address] (11/28/2016)      Acute encephalopathy (3/18/2017)      Hyperkalemia (3/18/2017)      Acute renal failure (ARF) ([address] (3/18/2017)      45year old male patient with PMH significant for morbid obesity, CHF with EF 10-15%, and CKD 2/2 cardiorenal syndrome among multiple other medical problems admitted with RED on CKD with uremic symptoms and reported anuria. Zuleta has since been placed with 225mL of cocacola colored urine reported by RN. -- RED on CKD: The patient is a not a dialysis candidate with his multiple medical problems and current significant hypotension. His heart failure is advanced and he is not a candidate for heart transplant.  This was discussed with the patient's daughter, who seemed upset and said that she needed to take a break. -- CHF  -- Morbid obesity  -- Hyperkalemia: treated with D50/ insulin. Kayexalate enema ordered by primary.      JAYY Jenkinsc

## 2017-03-18 NOTE — PROGRESS NOTES
TRANSFER - IN REPORT:    Verbal report received from [place] on Kirk Alba  being received from ED for routine progression of care      Report consisted of patients Situation, Background, Assessment and   Recommendations(SBAR). Information from the following report(s) SBAR, Kardex, ED Summary, Intake/Output, MAR, Accordion, Recent Results and Med Rec Status was reviewed with the receiving nurse. Opportunity for questions and clarification was provided. Assessment completed upon patients arrival to unit and care assumed.

## 2017-03-18 NOTE — PROGRESS NOTES
Primary Nurse Janet Cadet and Pihl Perea RN performed a dual skin assessment on this patient Impairment noted- Blister on right foot. Patient obese.   Josafat score is 15

## 2017-03-18 NOTE — IP AVS SNAPSHOT
[address] [facility] 
[phone] Patient: Andreina Olea MRN: [id number] OAT:[birth year] You are allergic to the following Allergen Reactions Dilaudid (Hydromorphone) Other (comments) Hotflashes, patient states he's not allergic Iodinated Contrast Media - Oral And Iv Dye Other (comments) \"shuts my kidneys down\" Penicillins Unknown (comments) Pt states he is not allergic his mother just wouldn't allow him take it Immunizations Administered for This Admission Name Date  
 TB Skin Test (PPD) Intradermal 3/18/2017 Recent Documentation Height Weight BMI Smoking Status 1.676 m 149.4 kg 53.15 kg/m2 Former Smoker Unresulted Labs Order Current Status PLEASE READ & DOCUMENT PPD TEST IN 24 HRS Preliminary result Emergency Contacts Name Discharge Info Relation Home Work Mobile Jeff Gonzalez  Other Relative [6] [phone] Blaise Ding  Child [2] [phone] MinisterioChe aranda  Daughter [21] [phone] About your hospitalization You were admitted on:  March 18, 2017 You last received care in the:  [facility] You were discharged on:  March 29, 2017 Unit phone number:  [phone] Why you were hospitalized Your primary diagnosis was:  Cardiogenic Shock (Hcc) Your diagnoses also included:  Obstructive Sleep Apnea, Morbid Obesity (Hcc), Diabetes Mellitus Type Ii, Uncontrolled (Hcc), Cardiomyopathy (Hcc), Aicd (Automatic Cardioverter/Defibrillator) Present, Obesity Hypoventilation Syndrome (Hcc), Morbid Obesity With Bmi Of 60.0-69.9, Adult (Hcc), Acute Encephalopathy, Hyperkalemia, Acute Renal Failure (Arf) (Hcc), Hypotension, Acute On Chronic Respiratory Failure With Hypoxia And Hypercapnia (Hcc), Metabolic Alkalosis Providers Seen During Your Hospitalizations Provider Role Specialty Primary office phone Nando Braswell DO Attending Provider Emergency Medicine [phone] Jasper Mcguire MD Attending Provider Internal Medicine [phone] Your Primary Care Physician (PCP) Primary Care Physician Office Phone Office Fax Kateryna Hammond [phone] Follow-up Information Follow up With Details Comments Contact Info Jorge Parker MD  Office to call patient with appt [address]86 [address] [address] [address] [facility] [address] 
[phone] Stepan Cabrera MD On 4/5/2017 1:00pm at the [place] location [address] [facility] [address] 
[phone] Kvng Mcdermott NP On 4/28/2017 10:45am at the [facility] location. Office to call if earlier appointment becomes available. [address] [facility],[address] [facility] [address] 
[phone] Your Appointments Wednesday April 05, 2017  1:00 PM EDT HOSPITAL FOLLOW-UP with Stepan Cabrera MD  
[facility] ([address] [place] [address]) 2 [facility] 
[address] [facility] 81  
[phone] Wednesday April 26, 2017 10:15 AM EDT Office Visit with Stepan Cabrera MD  
[facility] ([address] [place] [address]) [address] [place] [address]  
[phone] Current Discharge Medication List  
  
START taking these medications Dose & Instructions Dispensing Information Comments Morning Noon Evening Bedtime  
 bumetanide 1 mg tablet Commonly known as:  Allegra Peaches Your next dose is:  3/30 Dose:  1 mg Take 1 Tab by mouth daily. Quantity:  30 Tab Refills:  0 CONTINUE these medications which have CHANGED Dose & Instructions Dispensing Information Comments Morning Noon Evening Bedtime  
 carvedilol 3.125 mg tablet Commonly known as:  Deepak Overton What changed:   
- medication strength 
- how much to take Your next dose is:  3/29 Dose:  3.125 mg Take 1 Tab by mouth two (2) times daily (with meals). Quantity:  60 Tab Refills:  1 CONTINUE these medications which have NOT CHANGED Dose & Instructions Dispensing Information Comments Morning Noon Evening Bedtime  
 acetaminophen 500 mg tablet Commonly known as:  TYLENOL Take  by mouth every six (6) hours as needed for Pain. Refills:  0  
     
   
   
   
  
 allopurinol 100 mg tablet Commonly known as:  Gonzella Cotta Your next dose is:  3/30 Dose:  100 mg Take 100 mg by mouth daily. Refills:  0  
     
  
   
   
   
  
 amiodarone 200 mg tablet Commonly known as:  CORDARONE Your next dose is:  3/30 Dose:  200 mg Take 1 Tab by mouth daily. Quantity:  30 Tab Refills:  6  
     
  
   
   
   
  
 bisacodyl 10 mg suppository Commonly known as:  DULCOLAX Your next dose is:  3/30 Dose:  10 mg Insert 10 mg into rectum daily. Quantity:  1 Suppository Refills:  2  
     
  
   
   
   
  
 colchicine 0.6 mg tablet Your next dose is:  3/30 Dose:  0.6 mg Take 0.6 mg by mouth every morning. Refills:  0  
     
  
   
   
   
  
 cpap machine kit 10 cm qhs Refills:  0  
     
   
   
   
  
 docusate sodium 100 mg capsule Commonly known as:  Melvenia Clipper Your next dose is:  3/29 at 5pm  
   
 Dose:  100 mg Take 1 Cap by mouth two (2) times a day. Quantity:  60 Cap Refills:  0  
     
  
   
   
  
   
  
 gabapentin 600 mg tablet Commonly known as:  NEURONTIN Your next dose is:  3/29 Dose:  600 mg Take 600 mg by mouth three (3) times daily. Refills:  0  
     
  
   
2 pm  
   
   
  
  
 glucose blood VI test strips strip Commonly known as:  ASCENSIA AUTODISC VI, ONE TOUCH ULTRA TEST VI Test strips for 30 day supply Quantity:  120 strip Refills:  0  
     
   
   
   
  
 insulin glargine 100 unit/mL injection Commonly known as:  LANTUS Your next dose is:  3/29 Dose:  50 Units 50 Units by SubCUTAneous route nightly. Quantity:  1 Vial  
Refills:  0  
     
   
   
   
  
  
 insulin lispro 100 unit/mL injection Commonly known as:  HUMALOG Dose:  15 Units 15 Units by SubCUTAneous route three (3) times daily (with meals). Quantity:  1 Vial  
Refills:  0  
     
  
   
  
   
  
   
  
 levalbuterol 0.63 mg/3 mL Nebu Commonly known as:  Shazia Shiley Dose:  0.63 mg  
0.63 mg by Nebulization route. Refills:  0 MUCINEX D PO Dose:  400 mg Take 400 mg by mouth daily as needed. Refills:  0  
     
   
   
   
  
 nitroglycerin 0.4 mg SL tablet Commonly known as:  NITROSTAT Dose:  0.4 mg  
1 Tab by SubLINGual route every five (5) minutes as needed for Chest Pain. Quantity:  4 Bottle Refills:  6  
     
   
   
   
  
 oxyCODONE IR 15 mg immediate release tablet Commonly known as:  OXY-IR Dose:  15 mg Take 15 mg by mouth every four (4) hours as needed for Pain. Refills:  0 OXYGEN-AIR DELIVERY SYSTEMS  
   
 2 lpm qhs Refills:  0  
     
   
   
   
  
 polyethylene glycol 17 gram packet Commonly known as:  Amy Vlad Dose:  17 g Take 1 Packet by mouth daily as needed (constipation). Quantity:  10 Packet Refills:  5  
     
   
   
   
  
 pravastatin 80 mg tablet Commonly known as:  PRAVACHOL Dose:  80 mg Take 1 Tab by mouth nightly. Quantity:  30 Tab Refills:  0  
     
   
   
   
  
 raNITIdine 150 mg tablet Commonly known as:  ZANTAC Your next dose is:  3/29 Dose:  150 mg Take 150 mg by mouth nightly. Refills:  0 Senna 8.6 mg Cap Generic drug:  sennosides Your next dose is:  3/29 Dose:  17.2 mg Take 17.2 mg by mouth nightly. Refills:  0 STOP taking these medications apixaban 5 mg tablet Commonly known as:  ELIQUIS  
   
  
 furosemide 40 mg tablet Commonly known as:  LASIX  
   
  
 hydrALAZINE 25 mg tablet Commonly known as:  APRESOLINE  
   
  
 isosorbide dinitrate 20 mg tablet Commonly known as:  ISORDIL  
   
  
 sacubitril-valsartan 24-26 mg tablet Commonly known as:  ENTRESTO  
   
  
 spironolactone 25 mg tablet Commonly known as:  ALDACTONE Where to Get Your Medications Information on where to get these meds will be given to you by the nurse or doctor. ! Ask your nurse or doctor about these medications  
  bumetanide 1 mg tablet  
 carvedilol 3.125 mg tablet Discharge Instructions Learning About Saving Energy When You Have a Chronic Condition Introduction Everyday tasks can be tiring when you have COPD, heart failure, or another long-term (chronic) condition. You may feel at times that you've lost your ability to live your life. But learning to conserve, or save, your energy can help you be less tired. Conserving your energy means finding ways of doing daily activities with as little effort as possible. With some small changes in the way you do things, you can get your tasks done more easily. Some treatments are available that might help. Pulmonary rehabilitation can teach you ways to breathe easier. Cardiac rehabilitation can help make your heart stronger. You also may want to see an occupational or physical therapist. The therapist can give you more tips on building strength and moving with less effort. What can you do to conserve your energy? Planning · Make a list of what you have to do every day. Group the tasks by location. · Do all the chores in one part of your house around the same time. · Go out for errands or do chores at the time of day when you have the most energy. · Plan rest periods into your day. Getting things done · Sit down as often as you can when you get dressed, do chores, or cook. · Use a cart with wheels to roll items, such as laundry, from one room to another. · Push or slide boxes or other large items instead of lifting them. Reaching and bending · Put things you use the most on shelves that are at the level of your waist or shoulder. · Use long-handled grabbers or other tools to reach items on a high shelf or to  things off the floor. Use long-handled dusters when you clean the house. · Use a raised toilet seat to avoid bending too far to sit or stand up. Eating · Eat several small meals instead of three larger meals. · If you get too tired to eat much, try to choose healthy foods that have more calories. Have a yogurt-and-fruit smoothie for breakfast. Put avocado on a sandwich. Or add cheese or peanut butter to snacks. · If you don't feel very hungry, try to eat first and drink water or other fluids later, after a meal. This can help keep you from losing weight. Sip small amounts of fluids if you need to drink while you eat. Having sex · Choose the time of day when you have more energy. · A [id number] position for sex can be less tiring. Sometimes you may want to focus more on caressing. Watch closely for changes in your health, and be sure to contact your doctor if you have any problems. Where can you learn more? Go to http://armando-nirav.info/. Enter H190 in the search box to learn more about \"Learning About Saving Energy When You Have a Chronic Condition. \" Current as of: May 23, 2016 Content Version: 11.2 © 3654-3785 [employer]. Care instructions adapted under license by [employer] (which disclaims liability or warranty for this information).  If you have questions about a medical condition or this instruction, always ask your healthcare professional. [address] any warranty or liability for your use of this information. Heart Failure: Care Instructions Your Care Instructions Heart failure occurs when your heart does not pump as much blood as the body needs. Failure does not mean that the heart has stopped pumping but rather that it is not pumping as well as it should. Over time, this causes fluid buildup in your lungs and other parts of your body. Fluid buildup can cause shortness of breath, fatigue, swollen ankles, and other problems. By taking medicines regularly, reducing sodium (salt) in your diet, checking your weight every day, and making lifestyle changes, you can feel better and live longer. Follow-up care is a key part of your treatment and safety. Be sure to make and go to all appointments, and call your doctor if you are having problems. It's also a good idea to know your test results and keep a list of the medicines you take. How can you care for yourself at home? Medicines · Be safe with medicines. Take your medicines exactly as prescribed. Call your doctor if you think you are having a problem with your medicine. · Do not take any vitamins, over-the-counter medicine, or herbal products without talking to your doctor first. Tara Score not take ibuprofen (Advil or Motrin) and naproxen (Aleve) without talking to your doctor first. They could make your heart failure worse. · You may be taking some of the following medicine. ¨ Beta-blockers can slow heart rate, decrease blood pressure, and improve your condition. Taking a beta-blocker may lower your chance of needing to be hospitalized. ¨ Angiotensin-converting enzyme inhibitors (ACEIs) reduce the heart's workload, lower blood pressure, and reduce swelling. Taking an ACEI may lower your chance of needing to be hospitalized again. ¨ Angiotensin II receptor blockers (ARBs) work like ACEIs. Your doctor may prescribe them instead of ACEIs. ¨ Diuretics, also called water pills, reduce swelling. ¨ Potassium supplements replace this important mineral, which is sometimes lost with diuretics. ¨ Aspirin and other blood thinners prevent blood clots, which can cause a stroke or heart attack. You will get more details on the specific medicines your doctor prescribes. Diet · Your doctor may suggest that you limit sodium to 2,000 milligrams (mg) a day or less. That is less than 1 teaspoon of salt a day, including all the salt you eat in cooking or in packaged foods. People get most of their sodium from processed foods. Fast food and restaurant meals also tend to be very high in sodium. · Ask your doctor how much liquid you can drink each day. You may have to limit liquids. Weight · Weigh yourself without clothing at the same time each day. Record your weight. Call your doctor if you gain more than 3 pounds in 2 to 3 days. A sudden weight gain may mean that your heart failure is getting worse. Activity level · Start light exercise (if your doctor says it is okay). Even if you can only do a small amount, exercise will help you get stronger, have more energy, and manage your weight and your stress. Walking is an easy way to get exercise. Start out by walking a little more than you did before. Bit by bit, increase the amount you walk. · When you exercise, watch for signs that your heart is working too hard. You are pushing yourself too hard if you cannot talk while you are exercising. If you become short of breath or dizzy or have chest pain, stop, sit down, and rest. 
· If you feel \"wiped out\" the day after you exercise, walk slower or for a shorter distance until you can work up to a better pace. · Get enough rest at night. Sleeping with 1 or 2 pillows under your upper body and head may help you breathe easier. Lifestyle changes · Do not smoke. Smoking can make a heart condition worse. If you need help quitting, talk to your doctor about stop-smoking programs and medicines. These can increase your chances of quitting for good. Quitting smoking may be the most important step you can take to protect your heart. · Limit alcohol to 2 drinks a day for men and 1 drink a day for women. Too much alcohol can cause health problems. · Avoid getting sick from colds and the flu. Get a pneumococcal vaccine shot. If you have had one before, ask your doctor whether you need another dose. Get a flu shot each year. If you must be around people with colds or the flu, wash your hands often. When should you call for help? Call 911 if you have symptoms of sudden heart failure such as: 
· You have severe trouble breathing. · You cough up pink, foamy mucus. · You have a new irregular or rapid heartbeat. Call your doctor now or seek immediate medical care if: 
· You have new or increased shortness of breath. · You are dizzy or lightheaded, or you feel like you may faint. · You have sudden weight gain, such as 3 pounds or more in 2 to 3 days. · You have increased swelling in your legs, ankles, or feet. · You are suddenly so tired or weak that you cannot do your usual activities. Watch closely for changes in your health, and be sure to contact your doctor if: 
· You develop new symptoms. Where can you learn more? Go to http://armando-nirav.info/. Enter J282 in the search box to learn more about \"Heart Failure: Care Instructions. \" Current as of: January 27, 2016 Content Version: 11.2 © 1150-1456 Elderscan. Care instructions adapted under license by [employer] (which disclaims liability or warranty for this information). If you have questions about a medical condition or this instruction, always ask your healthcare professional. [address] [address] any warranty or liability for your use of this information. Advance Care Planning for Heart Failure: Care Instructions Your Care Instructions If you have heart failure, taking care of yourself will help you feel better and live longer. But the disease often gets worse over time. So it may be a good idea to plan for the future now while you are active and able to communicate your wishes. If you do this kind of planning, it does not mean that you are giving up. It's simply the best way to make sure that you get the care and treatment that you want. It can also make things much easier for your loved ones. What can you do to plan for the end of life? · Talk openly and honestly with your family and doctor. This is the best way to understand the decisions you will need to make as your health changes. Know that you can always change your mind. · Ask your doctor about common life-support treatments. These include tube feedings, breathing machines, and fluids given through a vein (IV). It may be easier to decide if you want them after you are sure you understand what they are. · Think about preparing written papers that state your wishes. These papers are called advance directives. If you do this, there will not be any confusion about your wishes. · If you are near the end of your life and you have a heart device such as a pacemaker, talk to your doctor about turning it off. Include this in your advance directive. · Ask a friend or family member to make decisions for you when you no longer can. Talk to this person about the kinds of treatments you want or don't want. Make sure this person understands your wishes. · You may decide to try life-supporting treatments for a limited time. This can help your doctor see if they will help. You may also decide that you want your doctor to do only certain things to keep you alive. It's important to be very clear about what you do and don't want. · Ask your doctor for an estimate of how long you may live. Your doctor may not always know. But he or she can tell you what usually happens with heart failure. Which papers should you prepare? Advance directives are legal papers that tell doctors how to care for you at the end of your life. They may include a living will and a durable power of . You don't need a  to write these papers. If you prepare these papers, be sure to give a copy to your doctor. It's also important to give a copy to a family member or close friend. · Think about a do-not-resuscitate order, or DNR. This order asks that no extra treatments be used to save your life if your heart stops. These treatments include electrical shock to restart your heart. They also include a machine to breathe for you and medicines to save your life. If you decide to have a DNR order, ask your doctor to write it. Then put it in your home where everyone can see it. · Think about a living will. It explains your wishes in case you are in a coma or cannot communicate. Living clarke tell doctors to use or not use treatments to keep you alive. You must have one or two witnesses or a notary in the room when you sign this form. · Think about naming a person to make decisions about your care if you are not able to. This is called a durable power of . Most people ask a close friend or family member. · Ask your doctor or your state health department about advance directives. They may have forms for each of these types of papers. · All of these papers are simple to change. Tell your doctor what you want to change. Ask him or her to make a note in your file. Then give your family updated copies. Where can you learn more? Go to http://armando-nirav.info/. Enter G308 in the search box to learn more about \"Advance Care Planning for Heart Failure: Care Instructions. \" Current as of: January 27, 2016 Content Version: 11.2 © 3046-9156 [employer].  Care instructions adapted under license by Bo[employer]media (which disclaims liability or warranty for this information). If you have questions about a medical condition or this instruction, always ask your healthcare professional. [address] any warranty or liability for your use of this information. DISCHARGE SUMMARY from Nurse The following personal items are in your possession at time of discharge: 
 
Dental Appliances: None Visual Aid: At bedside Home Medications: Sent home Jewelry: None Clothing: Sent home Other Valuables: None PATIENT INSTRUCTIONS: 
 
 
F-face looks uneven A-arms unable to move or move unevenly S-speech slurred or non-existent T-time-call 911 as soon as signs and symptoms begin-DO NOT go Back to bed or wait to see if you get better-TIME IS BRAIN. Warning Signs of HEART ATTACK Call 911 if you have these symptoms: 
? Chest discomfort. Most heart attacks involve discomfort in the center of the chest that lasts more than a few minutes, or that goes away and comes back. It can feel like uncomfortable pressure, squeezing, fullness, or pain. ? Discomfort in other areas of the upper body. Symptoms can include pain or discomfort in one or both arms, the back, neck, jaw, or stomach. ? Shortness of breath with or without chest discomfort. ? Other signs may include breaking out in a cold sweat, nausea, or lightheadedness. Don't wait more than five minutes to call [address]! Fast action can save your life. Calling 911 is almost always the fastest way to get lifesaving treatment. Emergency Medical Services staff can begin treatment when they arrive  up to an hour sooner than if someone gets to the hospital by car. The discharge information has been reviewed with the patient. The patient verbalized understanding.  
 
Discharge medications reviewed with the patient and appropriate educational materials and side effects teaching were provided. Discharge Orders None Access Scientific Announcement We are excited to announce that we are making your provider's discharge notes available to you in Access [employer]. You will see these notes when they are completed and signed by the physician that discharged you from your recent hospital stay. If you have any questions or concerns about any information you see in Access Scientific, please call the Health Information Department where you were seen or reach out to your Primary Care Provider for more information about your plan of care. Introducing [facility]! Maliha Sharma introduces Access Scientific patient portal. Now you can access parts of your medical record, email your doctor's office, and request medication refills online. 1. In your internet browser, go to [email or web address] 2. Click on the First Time User? Click Here link in the Sign In box. You will see the New Member Sign Up page. 3. Enter your Access Scientific Access Code exactly as it appears below. You will not need to use this code after youve completed the sign-up process. If you do not sign up before the expiration date, you must request a new code. · Access Scientific Access Code: [email or web address] Expires: 4/10/2017  4:06 PM 
 
4. Enter the last four digits of your Social Security Number (xxxx) and Date of Birth (mm/dd/yyyy) as indicated and click Submit. You will be taken to the next sign-up page. 5. Create a Access Scientific ID. This will be your Access Scientific login ID and cannot be changed, so think of one that is secure and easy to remember. 6. Create a Access [employer] password. You can change your password at any time. 7. Enter your Password Reset Question and Answer. This can be used at a later time if you forget your password. 8. Enter your e-mail address. You will receive e-mail notification when new information is available in [address] 9. Click Sign Up. You can now view and download portions of your medical record. 10. Click the Download Summary menu link to download a portable copy of your medical information. If you have questions, please visit the Frequently Asked Questions section of the [employer] website. Remember, [employer] is NOT to be used for urgent needs. For medical emergencies, dial 911. Now available from your iPhone and Android! General Information Please provide this summary of care documentation to your next provider. Patient Signature:  ____________________________________________________________ Date:  ____________________________________________________________  
  
Logan Piedra Provider Signature:  ____________________________________________________________ Date:  ____________________________________________________________

## 2017-03-19 LAB
ALBUMIN SERPL BCP-MCNC: 3 G/DL (ref 3.5–5)
ALBUMIN/GLOB SERPL: 0.7 {RATIO} (ref 1.2–3.5)
ALP SERPL-CCNC: 198 U/L (ref 50–136)
ALT SERPL-CCNC: 35 U/L (ref 12–65)
AMMONIA PLAS-SCNC: 24 UMOL/L (ref 11–32)
ANION GAP BLD CALC-SCNC: 14 MMOL/L (ref 7–16)
ARTERIAL PATENCY WRIST A: ABNORMAL
AST SERPL W P-5'-P-CCNC: 56 U/L (ref 15–37)
BASE DEFICIT BLDA-SCNC: 6.8 MMOL/L (ref 0–2)
BDY SITE: ABNORMAL
BILIRUB SERPL-MCNC: 1.2 MG/DL (ref 0.2–1.1)
BUN SERPL-MCNC: 110 MG/DL (ref 6–23)
CALCIUM SERPL-MCNC: 8.3 MG/DL (ref 8.3–10.4)
CHLORIDE SERPL-SCNC: 101 MMOL/L (ref 98–107)
CO2 SERPL-SCNC: 22 MMOL/L (ref 21–32)
COHGB MFR BLD: 1.5 % (ref 0.5–1.5)
CREAT SERPL-MCNC: 10.1 MG/DL (ref 0.8–1.5)
DO-HGB BLD-MCNC: 9 % (ref 0–5)
ERYTHROCYTE [DISTWIDTH] IN BLOOD BY AUTOMATED COUNT: 19.9 % (ref 11.9–14.6)
GLOBULIN SER CALC-MCNC: 4.1 G/DL (ref 2.3–3.5)
GLUCOSE BLD STRIP.AUTO-MCNC: 82 MG/DL (ref 65–100)
GLUCOSE BLD STRIP.AUTO-MCNC: 87 MG/DL (ref 65–100)
GLUCOSE BLD STRIP.AUTO-MCNC: 97 MG/DL (ref 65–100)
GLUCOSE BLD STRIP.AUTO-MCNC: 98 MG/DL (ref 65–100)
GLUCOSE BLD STRIP.AUTO-MCNC: 99 MG/DL (ref 65–100)
GLUCOSE SERPL-MCNC: 79 MG/DL (ref 65–100)
HCO3 BLDA-SCNC: 19 MMOL/L (ref 22–26)
HCT VFR BLD AUTO: 32.7 % (ref 41.1–50.3)
HGB BLD-MCNC: 10.6 G/DL (ref 13.6–17.2)
HGB BLDMV-MCNC: 11.7 GM/DL (ref 11.7–15)
MCH RBC QN AUTO: 24.2 PG (ref 26.1–32.9)
MCHC RBC AUTO-ENTMCNC: 32.4 G/DL (ref 31.4–35)
MCV RBC AUTO: 74.7 FL (ref 79.6–97.8)
METHGB MFR BLD: 0.4 % (ref 0–1.5)
OXYHGB MFR BLDA: 89.1 % (ref 94–97)
PCO2 BLDA: 37 MMHG (ref 35–45)
PH BLDA: 7.32 [PH] (ref 7.35–7.45)
PLATELET # BLD AUTO: 264 K/UL (ref 150–450)
PMV BLD AUTO: 10.3 FL (ref 10.8–14.1)
PO2 BLDA: 65 MMHG (ref 75–100)
POTASSIUM SERPL-SCNC: 5.6 MMOL/L (ref 3.5–5.1)
PROT SERPL-MCNC: 7.1 G/DL (ref 6.3–8.2)
RBC # BLD AUTO: 4.38 M/UL (ref 4.23–5.67)
RESP RATE: 22
SAO2 % BLD: 91 % (ref 92–98.5)
SERVICE CMNT-IMP: ABNORMAL
SODIUM SERPL-SCNC: 137 MMOL/L (ref 136–145)
VENTILATION MODE VENT: ABNORMAL
VT SETTING VENT: 493 ML
WBC # BLD AUTO: 7.5 K/UL (ref 4.3–11.1)

## 2017-03-19 PROCEDURE — 74011000250 HC RX REV CODE- 250: Performed by: INTERNAL MEDICINE

## 2017-03-19 PROCEDURE — 74011250636 HC RX REV CODE- 250/636: Performed by: INTERNAL MEDICINE

## 2017-03-19 PROCEDURE — 80053 COMPREHEN METABOLIC PANEL: CPT | Performed by: PHYSICIAN ASSISTANT

## 2017-03-19 PROCEDURE — 65610000001 HC ROOM ICU GENERAL

## 2017-03-19 PROCEDURE — 74011000258 HC RX REV CODE- 258: Performed by: INTERNAL MEDICINE

## 2017-03-19 PROCEDURE — 85027 COMPLETE CBC AUTOMATED: CPT | Performed by: PHYSICIAN ASSISTANT

## 2017-03-19 PROCEDURE — 82803 BLOOD GASES ANY COMBINATION: CPT

## 2017-03-19 PROCEDURE — 74011250637 HC RX REV CODE- 250/637: Performed by: INTERNAL MEDICINE

## 2017-03-19 PROCEDURE — 94660 CPAP INITIATION&MGMT: CPT

## 2017-03-19 PROCEDURE — 74011250636 HC RX REV CODE- 250/636: Performed by: PHYSICIAN ASSISTANT

## 2017-03-19 PROCEDURE — 82962 GLUCOSE BLOOD TEST: CPT

## 2017-03-19 PROCEDURE — 76450000000

## 2017-03-19 PROCEDURE — 36556 INSERT NON-TUNNEL CV CATH: CPT

## 2017-03-19 PROCEDURE — 36600 WITHDRAWAL OF ARTERIAL BLOOD: CPT

## 2017-03-19 PROCEDURE — 36592 COLLECT BLOOD FROM PICC: CPT

## 2017-03-19 PROCEDURE — 82140 ASSAY OF AMMONIA: CPT | Performed by: INTERNAL MEDICINE

## 2017-03-19 PROCEDURE — 87086 URINE CULTURE/COLONY COUNT: CPT | Performed by: INTERNAL MEDICINE

## 2017-03-19 PROCEDURE — 77010033678 HC OXYGEN DAILY

## 2017-03-19 RX ORDER — MUPIROCIN 20 MG/G
OINTMENT TOPICAL 2 TIMES DAILY
Status: COMPLETED | OUTPATIENT
Start: 2017-03-19 | End: 2017-03-23

## 2017-03-19 RX ADMIN — CEFTRIAXONE 1 G: 1 INJECTION, POWDER, FOR SOLUTION INTRAMUSCULAR; INTRAVENOUS at 21:46

## 2017-03-19 RX ADMIN — Medication 10 ML: at 05:41

## 2017-03-19 RX ADMIN — Medication 10 ML: at 14:20

## 2017-03-19 RX ADMIN — FUROSEMIDE 10 MG/HR: 10 INJECTION, SOLUTION INTRAMUSCULAR; INTRAVENOUS at 09:50

## 2017-03-19 RX ADMIN — DOBUTAMINE IN DEXTROSE 10 MCG/KG/MIN: 200 INJECTION, SOLUTION INTRAVENOUS at 13:51

## 2017-03-19 RX ADMIN — BUMETANIDE 1 MG/HR: 0.25 INJECTION INTRAMUSCULAR; INTRAVENOUS at 14:12

## 2017-03-19 RX ADMIN — MUPIROCIN: 20 OINTMENT TOPICAL at 08:41

## 2017-03-19 RX ADMIN — HEPARIN SODIUM 5000 UNITS: 5000 INJECTION, SOLUTION INTRAVENOUS; SUBCUTANEOUS at 23:54

## 2017-03-19 RX ADMIN — Medication 2 MG: at 08:55

## 2017-03-19 RX ADMIN — DOBUTAMINE IN DEXTROSE 10 MCG/KG/MIN: 200 INJECTION, SOLUTION INTRAVENOUS at 03:35

## 2017-03-19 RX ADMIN — DOBUTAMINE IN DEXTROSE 10 MCG/KG/MIN: 200 INJECTION, SOLUTION INTRAVENOUS at 08:55

## 2017-03-19 RX ADMIN — DOPAMINE HYDROCHLORIDE 7 MCG/KG/MIN: 320 INJECTION, SOLUTION INTRAVENOUS at 13:52

## 2017-03-19 RX ADMIN — HEPARIN SODIUM 5000 UNITS: 5000 INJECTION, SOLUTION INTRAVENOUS; SUBCUTANEOUS at 00:00

## 2017-03-19 RX ADMIN — HEPARIN SODIUM 5000 UNITS: 5000 INJECTION, SOLUTION INTRAVENOUS; SUBCUTANEOUS at 08:40

## 2017-03-19 RX ADMIN — Medication 10 ML: at 21:47

## 2017-03-19 RX ADMIN — MUPIROCIN: 20 OINTMENT TOPICAL at 17:56

## 2017-03-19 RX ADMIN — DOBUTAMINE IN DEXTROSE 10 MCG/KG/MIN: 200 INJECTION, SOLUTION INTRAVENOUS at 18:46

## 2017-03-19 RX ADMIN — FUROSEMIDE 100 MG: 10 INJECTION, SOLUTION INTRAMUSCULAR; INTRAVENOUS at 10:31

## 2017-03-19 RX ADMIN — HEPARIN SODIUM 5000 UNITS: 5000 INJECTION, SOLUTION INTRAVENOUS; SUBCUTANEOUS at 16:22

## 2017-03-19 NOTE — PROGRESS NOTES
Renal Progress Note    Admission Date: 3/18/2017   Subjective:      Confused, on bipap.     Objective:     Physical Exam:    Patient Vitals for the past 8 hrs:   BP Temp Pulse Resp SpO2 Weight   03/19/17 0615 97/61 - 75 (!) 33 99 % -   03/19/17 0600 (!) 104/93 - 71 14 99 % -   03/19/17 0545 94/47 - 72 28 98 % -   03/19/17 0541 - - - - - (!) 197.1 kg (434 lb 9.6 oz)   03/19/17 0530 93/58 - 71 12 99 % -   03/19/17 0515 (!) 82/51 - 71 13 99 % -   03/19/17 0500 107/56 - 70 15 99 % -   03/19/17 0445 (!) 72/63 - 69 18 99 % -   03/19/17 0430 91/50 - 69 11 98 % -   03/19/17 0415 (!) 83/51 - 69 12 99 % -   03/19/17 0400 (!) 84/41 - 70 17 97 % -   03/19/17 0345 - - 70 11 94 % -   03/19/17 0344 - - - - 97 % -   03/19/17 0335 - - 71 - - -   03/19/17 0330 - - 71 16 95 % -   03/19/17 0316 (!) 82/50 97.3 °F (36.3 °C) 71 22 96 % -   03/19/17 0315 (!) 82/50 - 72 27 97 % -   03/19/17 0300 (!) 84/50 - 74 11 100 % -   03/19/17 0245 (!) 86/52 - 73 17 99 % -   03/19/17 0230 - - 71 (!) 54 100 % -   03/19/17 0215 103/58 - 69 21 96 % -   03/19/17 0200 (!) 78/56 - 69 16 97 % -   03/19/17 0145 (!) 85/63 - 69 11 98 % -   03/19/17 0130 (!) 80/53 - 68 26 98 % -   03/19/17 0115 95/51 - 68 14 96 % -   03/19/17 0100 94/54 - 68 13 97 % -   03/19/17 0045 (!) 84/43 - - - - -   03/19/17 0030 (!) 69/46 - 66 12 99 % -      Gen: bipap  CV: S1, S2  Lungs: Coarse bilaterally, with some crackles  Extem: 2-3+ edema     Current Facility-Administered Medications   Medication Dose Route Frequency    mupirocin (BACTROBAN) 2 % ointment   Both Nostrils BID    levalbuterol (XOPENEX) nebulizer soln 0.63 mg/3 mL  0.63 mg Nebulization Q6H PRN    sodium chloride (NS) flush 5-10 mL  5-10 mL IntraVENous Q8H    sodium chloride (NS) flush 5-10 mL  5-10 mL IntraVENous PRN    insulin lispro (HUMALOG) injection   SubCUTAneous Q6H    dextrose 40% (GLUTOSE) oral gel 1 Tube  15 g Oral PRN    glucagon (GLUCAGEN) injection 1 mg  1 mg IntraMUSCular PRN    dextrose (D50W) injection syrg 12.5-25 g  25-50 mL IntraVENous PRN    heparin (porcine) injection 5,000 Units  5,000 Units SubCUTAneous Q8H    DOBUTamine (DOBUTREX) 500 mg/250 mL (2,000 mcg/mL) infusion  2.5-10 mcg/kg/min IntraVENous TITRATE    morphine injection 2 mg  2 mg IntraVENous Q4H PRN    cefTRIAXone (ROCEPHIN) 1 g in 0.9% sodium chloride (MBP/ADV) 50 mL  1 g IntraVENous Q24H    sodium chloride 0.9 % bolus infusion 500 mL  500 mL IntraVENous ONCE    DOPamine (INTROPIN) 800 mg/250 mL (3,200 mcg/mL) infusion  5-20 mcg/kg/min IntraVENous TITRATE            Data Review:     LABS:   Recent Results (from the past 12 hour(s))   GLUCOSE, POC    Collection Time: 03/18/17  8:38 PM   Result Value Ref Range    Glucose (POC) 94 65 - 100 mg/dL   POTASSIUM    Collection Time: 03/18/17  8:52 PM   Result Value Ref Range    Potassium 6.3 (HH) 3.5 - 5.1 mmol/L   TROPONIN I    Collection Time: 03/18/17 10:03 PM   Result Value Ref Range    Troponin-I, Qt. <0.02 (L) 0.02 - 0.05 NG/ML   GLUCOSE, POC    Collection Time: 03/18/17 11:04 PM   Result Value Ref Range    Glucose (POC) 84 65 - 100 mg/dL   CBC W/O DIFF    Collection Time: 03/19/17  3:15 AM   Result Value Ref Range    WBC 7.5 4.3 - 11.1 K/uL    RBC 4.38 4.23 - 5.67 M/uL    HGB 10.6 (L) 13.6 - 17.2 g/dL    HCT 32.7 (L) 41.1 - 50.3 %    MCV 74.7 (L) 79.6 - 97.8 FL    MCH 24.2 (L) 26.1 - 32.9 PG    MCHC 32.4 31.4 - 35.0 g/dL    RDW 19.9 (H) 11.9 - 14.6 %    PLATELET 186 500 - 752 K/uL    MPV 10.3 (L) 10.8 - 02.9 FL   METABOLIC PANEL, COMPREHENSIVE    Collection Time: 03/19/17  3:15 AM   Result Value Ref Range    Sodium 137 136 - 145 mmol/L    Potassium 5.6 (H) 3.5 - 5.1 mmol/L    Chloride 101 98 - 107 mmol/L    CO2 22 21 - 32 mmol/L    Anion gap 14 7 - 16 mmol/L    Glucose 79 65 - 100 mg/dL     (H) 6 - 23 MG/DL    Creatinine 10.10 (H) 0.8 - 1.5 MG/DL    GFR est AA 7 (L) >60 ml/min/1.73m2    GFR est non-AA 6 (L) >60 ml/min/1.73m2    Calcium 8.3 8.3 - 10.4 MG/DL    Bilirubin, total 1.2 (H) 0.2 - 1.1 MG/DL    ALT (SGPT) 35 12 - 65 U/L    AST (SGOT) 56 (H) 15 - 37 U/L    Alk. phosphatase 198 (H) 50 - 136 U/L    Protein, total 7.1 6.3 - 8.2 g/dL    Albumin 3.0 (L) 3.5 - 5.0 g/dL    Globulin 4.1 (H) 2.3 - 3.5 g/dL    A-G Ratio 0.7 (L) 1.2 - 3.5     GLUCOSE, POC    Collection Time: 03/19/17  3:15 AM   Result Value Ref Range    Glucose (POC) 82 65 - 100 mg/dL   BLOOD GAS, ARTERIAL    Collection Time: 03/19/17  3:35 AM   Result Value Ref Range    pH 7.32 (L) 7.35 - 7.45      PCO2 37 35.0 - 45.0 mmHg    PO2 65 (L) 75.0 - 100.0 mmHg    BICARBONATE 19 (L) 22.0 - 26.0 mmol/L    BASE DEFICIT 6.8 (H) 0 - 2 mmol/L    TOTAL HEMOGLOBIN 11.7 11.7 - 15.0 GM/DL    O2 SAT 91 (L) 92.0 - 98.5 %    ARTERIAL O2 HGB 89.1 (L) 94.0 - 97.0 %    CARBOXYHEMOGLOBIN 1.5 0.5 - 1.5 %    METHEMOGLOBIN 0.4 0.0 - 1.5 %    DEOXYHEMOGLOBIN 9 (H) 0.0 - 5.0 %    SITE LB     ALLENS TEST NA     MODE BIPAP 18 8     Tidal volume 493.0      RATE 22.0      Respiratory comment: Shauna RT at . Not read back. GLUCOSE, POC    Collection Time: 03/19/17  5:40 AM   Result Value Ref Range    Glucose (POC) 87 65 - 100 mg/dL         Plan: Active Problems:    Obstructive sleep apnea (2/15/2010)      Chronic back pain (2/20/2010)      Dyslipidemia (7/2/2013)      Diabetes mellitus type II, uncontrolled ([facility] (7/2/2013)      Noncompliance with medication regimen (7/2/2013)      AICD (automatic cardioverter/defibrillator) present (10/21/2015)      Congestive heart failure (CHF) ([facility] (10/21/2015)      Cardiomyopathy ([address] (10/21/2015)      Hypertension (11/30/2015)      Obesity hypoventilation syndrome ([facility] (10/24/2016)      Morbid obesity with BMI of 60.0-69.9, adult ([facility] (11/28/2016)      Acute encephalopathy (3/18/2017)      Hyperkalemia (3/18/2017)      Acute renal failure (ARF) ([facility] (3/18/2017)      Hypotension (3/18/2017)    CHF with EF of 10% anuric and hypotensive.  On iontropic support  intitially thought to be poor effective flow to kidneys thus the dopamine and dobuatamine. No urine over night. High CVP  Will add back lasix drip. Could be at the far right of the Starling curve. Discussed with family present that he is critically ill. And even if this works, his life expectancy is short.   If it doesn't work at all, then more comfort measures would be appropriate

## 2017-03-19 NOTE — PROGRESS NOTES
Bedside shift report received from Brackney and ITZEL Roberts. Pt's eyes are open, not verbalizing anything including name. No command following. Pt noted to have constant jerking motions. Temp 98.5F orally. HR 83, , O2 sats 96% on 2L NC. Dopamine @ 7, Dobutamine @ 10, Bumex @ 1. CVP >30. UOP for day shift: 10cc. Zuleta in place, small amt of urine present in tube and it appears bright red in color. Pt does not focus or track with his eyes. Pt daughter at bedside. Does not seem to grasp severity of issues pt is presenting with. Full assessment in flowsheet.

## 2017-03-19 NOTE — PROGRESS NOTES
Dr. Brigette Becerra paged about pt K 6.3 and MAP 56. Orders received to hold bolus and start Dopamine gtt. Orders also given for 1/2 amp D50, 10 units regular insulin, and 1 amp Ca gluconate for pt K level.

## 2017-03-19 NOTE — PROCEDURES
The pt. Was placed in 30 degree position- could not tolerate supine. The R neck was prepped and drapped  1% lidocaine was injected locally. With ultrasound guidance , the R ij was accessed with an 18 gauge needle. A quad lumen catheter was then placed using Seldinger technique. Good blood return.

## 2017-03-19 NOTE — PROGRESS NOTES
Chest x-ray for central line not yet read by radiologist, Dr Brigette Becerra looked at xray and okayed line to be used. Pressors moved to central line at this time.

## 2017-03-19 NOTE — H&P
Date of Surgery Update:  Sonja Higgins was seen and examined. History and physical has been reviewed. The patient has been examined.  There have been no significant clinical changes since the completion of the originally dated History and Physical.    Signed By: Sheree Daily MD     March 18, 2017 10:56 PM

## 2017-03-19 NOTE — PROGRESS NOTES
Spiritual Care Assessment/Progress Notes    Zaira Cash [id number]  [id number]    1965  45 y.o.  male    Patient Telephone Number: [phone] (home)   Roman Catholic Affiliation: [place]   Language: English   Extended Emergency Contact Information  Primary Emergency Contact: Fox Hernandez Phone: [phone]  Relation: Other Relative  Secondary Emergency Contact: Paula Quinonez  Address: [address] [place] [address] Drive [address] [place] [address] Phone: [phone]  Relation: Child   Patient Active Problem List    Diagnosis Date Noted    Acute encephalopathy 03/18/2017    Hyperkalemia 03/18/2017    Acute renal failure (ARF) ([address] 03/18/2017    Hypotension 03/18/2017    Syncope 01/19/2017    Nonsustained ventricular tachycardia ([address] 01/19/2017    NSVT (nonsustained ventricular tachycardia) ([address] 01/19/2017    Constipation 01/08/2017    Morbid obesity with BMI of 60.0-69.9, adult ([address] 11/28/2016    Hypoxemia 11/28/2016    Hypersomnia 11/28/2016    Obesity hypoventilation syndrome ([address] 10/24/2016    Atrial flutter ([address] 10/20/2016    Acute on chronic systolic (congestive) heart failure (HCC) 10/18/2016    Nausea & vomiting 11/30/2015    Hypertension 11/30/2015    Chest pain 10/21/2015    AICD (automatic cardioverter/defibrillator) present 10/21/2015    Congestive heart failure (CHF) ([address] 10/21/2015    Cardiomyopathy ([address] 10/21/2015    Abdominal fluid collection 08/18/2014    Pleural effusion 08/13/2014    CKD (chronic kidney disease) stage 3, GFR 30-59 ml/min 08/13/2014    Chronic pancreatitis ([address] 08/13/2014    Nonischemic dilated cardiomyopathy ([address] 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/2013    Dyslipidemia 07/02/2013    Diabetes mellitus type II, uncontrolled ([address] 07/02/2013    Noncompliance with medication regimen 07/02/2013    Chronic back pain 02/20/2010    Obstructive sleep apnea 02/15/2010        Date: 3/19/2017       Level of Congregation/Spiritual Activity:  [x]         Involved in katheryn tradition/spiritual practice    []         Not involved in katheryn tradition/spiritual practice  [x]         Spiritually oriented    []         Claims no spiritual orientation    []         seeking spiritual identity  [x]         Feels alienated from Restorationist practice/tradition  []         Feels angry about Restorationist practice/tradition  []         Spirituality/Restorationist tradition is a resource for coping at this time.   []         Not able to assess due to medical condition    Services Provided Today:  [x]         crisis intervention    []         reading Scriptures  [x]         spiritual assessment    []         prayer  []         empathic listening/emotional support  []         rites and rituals (cite in comments)  []         life review     []         Restorationist support  []         theological development   []         advocacy  []         ethical dialog     []         blessing  []         bereavement support    [x]         support to family  []         anticipatory grief support   []         help with AMD  []         spiritual guidance    []         meditation      Spiritual Care Needs  []         Emotional Support  []         Spiritual/Congregation Care  []         Loss/Adjustment  []         Advocacy/Referral                /Ethics  []         No needs expressed at               this time  []         Other: (note in               comments)  [address]  []         Follow up visits with               pt/family  []         Provide materials  []         Schedule sacraments  []         Contact Community               Clergy  []         Follow up as needed  []         Other: (note in               comments)     Comments:   Met family in ER  Continued care in critical unit  Albert Maldonado

## 2017-03-19 NOTE — PROGRESS NOTES
[facility] CARDIOLOGY PROGRESS NOTE           3/19/2017 6:33 AM    Admit Date: 3/18/2017      Subjective: An uric overnight. Patient complained of shortness of breath. Daughter in room sleeping in recliner. Central line placed last night    ROS:  Cardiovascular:  As noted above    Objective:      Vitals:    03/19/17 0541 03/19/17 0545 03/19/17 0600 03/19/17 0615   BP:  94/47 (!) 104/93 97/61   Pulse:  72 71 75   Resp:  28 14 (!) 33   Temp:       SpO2:  98% 99% 99%   Weight: (!) 197.1 kg (434 lb 9.6 oz)      Height:           Physical Exam:  General-comfortable in moderate respiratory distress  Neck- supple, obese  CV- regular rate and rhythm no MRG  Lung- clear bilaterally  Abd- soft, nontender, nondistended  Ext- no edema bilaterally. Skin- warm and dry    Data Review:   Recent Labs      03/19/17   0315  03/18/17   2203  03/18/17   2052  03/18/17   1640  03/18/17   1107   NA   --    --    --    --   137   K   --    --   6.3*   --   6.0*   MG   --    --    --    --   2.3   BUN   --    --    --    --   106*   CREA   --    --    --    --   9.45*   GLU   --    --    --    --   81   WBC  7.5   --    --    --   7.2   HGB  10.6*   --    --    --   10.2*   HCT  32.7*   --    --    --   31.9*   PLT  264   --    --    --   240   TROIQ   --   <0.02*   --   <0.02*   --        Assessment/Plan:     Active Problems:    Obstructive sleep apnea (2/15/2010)      Chronic back pain (2/20/2010)      Dyslipidemia (7/2/2013)      Diabetes mellitus type II, uncontrolled ([facility] (7/2/2013)      Noncompliance with medication regimen (7/2/2013)      AICD (automatic cardioverter/defibrillator) present (10/21/2015)      Congestive heart failure (CHF) ([facility] (10/21/2015) severe cardiomyopathy previous inotropic dependent now on dopamine and dobutamine. May have had overdiuresis from home of medications. Patient has presentation not consistent with cardiogenic shock due to relatively normal LFTs.       Cardiomyopathy ([facility] (10/21/2015)      Hypertension (11/30/2015)      Obesity hypoventilation syndrome ([facility] (10/24/2016)      Morbid obesity with BMI of 60.0-69.9, adult ([facility] (11/28/2016)      Acute encephalopathy (3/18/2017)      Hyperkalemia (3/18/2017)      Acute renal failure (ARF) ([facility] (3/18/2017) and uric renal failure no urine output overnight. Plan for CVP measurement from central venous catheter patient has severe cardiomyopathy and fluid. Plan to set obtain central venous pressure from catheter to determine volume status. Hypotension (3/18/2017) possibly hypovolemia intravenous volume repletion only with caution CVP to be measured. Goals of care I reiterated the patient's prognosis and the severity of his renal disease on top of severe end-stage cardiomyopathy. We discussed that without dialysis therapy comfort care plan should be instituted prior to worsening respiratory decompensation. Patient was inotropic dependent during a previous hospitalization and at that time felt to have a very poor prognosis, since then his condition is only worsened and advanced heart failure therapies renal replacement therapies are not an option due to his multiple comorbidities.       Molly Douglas MD  3/19/2017 6:33 AM

## 2017-03-19 NOTE — PROGRESS NOTES
Hospitalist Progress Note    3/19/2017  12:57 PM  Admit Date: 3/18/2017 10:42 AM   NAME: Sarah Parikh   :  1965   MRN:  [id number]   Attending: Alvino Logan MD  PCP:  Heather Smith MD  Treatment Team: Attending Provider: Alvino Logan MD; Consulting Provider: Zenaida Marte MD; Consulting Provider: Sherran Bence, MD; Consulting Provider: Dorita Pope MD; Consulting Provider: Loree Granados MD  SUBJECTIVE:       Mr. Chon Patel is a 44 yo AAM with PMH of sCHF EF 10% with ICD, CKD, DM2, KELSIE on CPAP, HTN, chronic hypoxic respiratory failure on home O2 3 L evaluated with acute metabolic encephalopathy. Found to have progressive renal failure with creatinine of >9 and hyperkalemia of 6.0 with hypercapneic respiratory failure requiring ICU care for BIPAP. Additionally has been seen by cardiology and placed on dopamine/dobutamine. Nephrology has evaluated and he is not a dialysis candidate. He is being diuresed with lasix drip as well without urination. Renal US no acute issues. Ammonia slightly elevated, too large for CT scanner for CT head and ahs intermittent delirium. CXR with pulm edema. UA positive, day 2 rocephin pending cx. Urology placed hansen due to difficulty.        3-19-17 family at bedside, patient agitated and delirious in restraints       Recent Results (from the past 24 hour(s))   BLOOD GAS, ARTERIAL    Collection Time: 17  1:50 PM   Result Value Ref Range    pH 7.21 (LL) 7.35 - 7.45      PCO2 53 (H) 35.0 - 45.0 mmHg    PO2 78 75.0 - 100.0 mmHg    BICARBONATE 21 (L) 22.0 - 26.0 mmol/L    BASE DEFICIT 7.2 (H) 0 - 2 mmol/L    TOTAL HEMOGLOBIN 11.1 (L) 11.7 - 15.0 GM/DL    O2 SAT 92 92.0 - 98.5 %    ARTERIAL O2 HGB 91.3 (L) 94.0 - 97.0 %    CARBOXYHEMOGLOBIN 0.6 0.5 - 1.5 %    METHEMOGLOBIN 0.2 0.0 - 1.5 %    DEOXYHEMOGLOBIN 8 (H) 0.0 - 5.0 %    SITE RR     ALLENS TEST POSITIVE      MODE NC     O2 FLOW 3.00 L/min    Respiratory commentHue Gotti MD at 3 2017 PM. Read back. MRSA SCREEN - PCR (NASAL)    Collection Time: 03/18/17  4:00 PM   Result Value Ref Range    Special Requests: NO SPECIAL REQUESTS      Culture result: (A)       MRSA target DNA is detected (presumptive positive for MRSA colonization). Culture result:        RESULTS VERIFIED, PHONED TO AND READ BACK BY  MISSY VAZQUEZ RN BY [address] AT [phone]     BLOOD GAS, ARTERIAL    Collection Time: 03/18/17  4:34 PM   Result Value Ref Range    pH 7.20 (LL) 7.35 - 7.45      PCO2 52 (H) 35.0 - 45.0 mmHg    PO2 147 (H) 75.0 - 100.0 mmHg    BICARBONATE 20 (L) 22.0 - 26.0 mmol/L    BASE DEFICIT 8.0 (H) 0 - 2 mmol/L    TOTAL HEMOGLOBIN 11.2 (L) 11.7 - 15.0 GM/DL    O2 SAT 98 92.0 - 98.5 %    ARTERIAL O2 HGB 96.7 94.0 - 97.0 %    CARBOXYHEMOGLOBIN 1.2 0.5 - 1.5 %    METHEMOGLOBIN 0.4 0.0 - 1.5 %    DEOXYHEMOGLOBIN 2 0.0 - 5.0 %    SITE LR     ALLENS TEST POSITIVE      MODE BIPAP 16 8     FIO2 40.0 %    RATE 16.0      Respiratory comment: Dr. Saloni GOMEZ at 3 18 2017 4 38 02 PM. Read back.     TROPONIN I    Collection Time: 03/18/17  4:40 PM   Result Value Ref Range    Troponin-I, Qt. <0.02 (L) 0.02 - 0.05 NG/ML   PROCALCITONIN    Collection Time: 03/18/17  4:40 PM   Result Value Ref Range    Procalcitonin 0.4 ng/mL   URINALYSIS W/ RFLX MICROSCOPIC    Collection Time: 03/18/17  4:55 PM   Result Value Ref Range    Color RED      Appearance TURBID      Specific gravity 1.017 1.001 - 1.023      pH (UA) 5.0 5.0 - 9.0      Protein 100 (A) NEG mg/dL    Glucose NEGATIVE  mg/dL    Ketone TRACE (A) NEG mg/dL    Bilirubin SMALL (A) NEG      Blood LARGE (A) NEG      Urobilinogen 1.0 0.2 - 1.0 EU/dL    Nitrites POSITIVE (A) NEG      Leukocyte Esterase MODERATE (A) NEG      WBC >100 0 /hpf    RBC 20-50 0 /hpf    Epithelial cells 0-3 0 /hpf    Bacteria 0 0 /hpf    Casts 3-5 0 /lpf    Crystals OCCASIONAL 0 /LPF    Amorphous Crystals 3+ (H) 0    Other observations RESULTS VERIFIED MANUALLY     OSMOLALITY, UR    Collection Time: 03/18/17  4:55 PM   Result Value Ref Range    Osmolality,urine 322 50 - 1400 MOSM/kg H2O   SODIUM, UR, RANDOM    Collection Time: 03/18/17  4:55 PM   Result Value Ref Range    Sodium urine, random 24 MMOL/L   GLUCOSE, POC    Collection Time: 03/18/17  5:55 PM   Result Value Ref Range    Glucose (POC) 68 65 - 100 mg/dL   GLUCOSE, POC    Collection Time: 03/18/17  7:00 PM   Result Value Ref Range    Glucose (POC) 68 65 - 100 mg/dL   GLUCOSE, POC    Collection Time: 03/18/17  8:38 PM   Result Value Ref Range    Glucose (POC) 94 65 - 100 mg/dL   POTASSIUM    Collection Time: 03/18/17  8:52 PM   Result Value Ref Range    Potassium 6.3 (HH) 3.5 - 5.1 mmol/L   TROPONIN I    Collection Time: 03/18/17 10:03 PM   Result Value Ref Range    Troponin-I, Qt. <0.02 (L) 0.02 - 0.05 NG/ML   GLUCOSE, POC    Collection Time: 03/18/17 11:04 PM   Result Value Ref Range    Glucose (POC) 84 65 - 100 mg/dL   CBC W/O DIFF    Collection Time: 03/19/17  3:15 AM   Result Value Ref Range    WBC 7.5 4.3 - 11.1 K/uL    RBC 4.38 4.23 - 5.67 M/uL    HGB 10.6 (L) 13.6 - 17.2 g/dL    HCT 32.7 (L) 41.1 - 50.3 %    MCV 74.7 (L) 79.6 - 97.8 FL    MCH 24.2 (L) 26.1 - 32.9 PG    MCHC 32.4 31.4 - 35.0 g/dL    RDW 19.9 (H) 11.9 - 14.6 %    PLATELET 013 864 - 216 K/uL    MPV 10.3 (L) 10.8 - 87.1 FL   METABOLIC PANEL, COMPREHENSIVE    Collection Time: 03/19/17  3:15 AM   Result Value Ref Range    Sodium 137 136 - 145 mmol/L    Potassium 5.6 (H) 3.5 - 5.1 mmol/L    Chloride 101 98 - 107 mmol/L    CO2 22 21 - 32 mmol/L    Anion gap 14 7 - 16 mmol/L    Glucose 79 65 - 100 mg/dL     (H) 6 - 23 MG/DL    Creatinine 10.10 (H) 0.8 - 1.5 MG/DL    GFR est AA 7 (L) >60 ml/min/1.73m2    GFR est non-AA 6 (L) >60 ml/min/1.73m2    Calcium 8.3 8.3 - 10.4 MG/DL    Bilirubin, total 1.2 (H) 0.2 - 1.1 MG/DL    ALT (SGPT) 35 12 - 65 U/L    AST (SGOT) 56 (H) 15 - 37 U/L    Alk.  phosphatase 198 (H) 50 - 136 U/L    Protein, total 7.1 6.3 - 8.2 g/dL    Albumin 3.0 (L) 3.5 - 5.0 g/dL    Globulin 4.1 (H) 2.3 - 3.5 g/dL    A-G Ratio 0.7 (L) 1.2 - 3.5     GLUCOSE, POC    Collection Time: 03/19/17  3:15 AM   Result Value Ref Range    Glucose (POC) 82 65 - 100 mg/dL   BLOOD GAS, ARTERIAL    Collection Time: 03/19/17  3:35 AM   Result Value Ref Range    pH 7.32 (L) 7.35 - 7.45      PCO2 37 35.0 - 45.0 mmHg    PO2 65 (L) 75.0 - 100.0 mmHg    BICARBONATE 19 (L) 22.0 - 26.0 mmol/L    BASE DEFICIT 6.8 (H) 0 - 2 mmol/L    TOTAL HEMOGLOBIN 11.7 11.7 - 15.0 GM/DL    O2 SAT 91 (L) 92.0 - 98.5 %    ARTERIAL O2 HGB 89.1 (L) 94.0 - 97.0 %    CARBOXYHEMOGLOBIN 1.5 0.5 - 1.5 %    METHEMOGLOBIN 0.4 0.0 - 1.5 %    DEOXYHEMOGLOBIN 9 (H) 0.0 - 5.0 %    SITE LB     ALLENS TEST NA     MODE BIPAP 18 8     Tidal volume 493.0      RATE 22.0      Respiratory comment: Hsauna RT at . Not read back.     GLUCOSE, POC    Collection Time: 03/19/17  5:40 AM   Result Value Ref Range    Glucose (POC) 87 65 - 100 mg/dL   GLUCOSE, POC    Collection Time: 03/19/17  8:44 AM   Result Value Ref Range    Glucose (POC) 97 65 - 100 mg/dL   GLUCOSE, POC    Collection Time: 03/19/17 11:17 AM   Result Value Ref Range    Glucose (POC) 99 65 - 100 mg/dL       Allergies   Allergen Reactions    Dilaudid [Hydromorphone] Other (comments)     Hotflashes, patient states he's not allergic    Iodinated Contrast Media - Oral And Iv Dye Other (comments)     \"shuts my kidneys down\"    Penicillins Unknown (comments)     Pt states he is not allergic his mother just wouldn't allow him take it     Current Facility-Administered Medications   Medication Dose Route Frequency Provider Last Rate Last Dose    mupirocin (BACTROBAN) 2 % ointment   Both Nostrils BID Kianna Tripp MD        furosemide (LASIX) 100 mg in 0.9% sodium chloride 100 mL infusion  10 mg/hr IntraVENous CONTINUOUS Cleopatra Nunez MD 10 mL/hr at 03/19/17 0950 10 mg/hr at 03/19/17 0950    lip protectant (BLISTEX) ointment   Topical PRN Kianna Tripp MD       [facility] levalbuterol (XOPENEX) nebulizer soln 0.63 mg/3 mL  0.63 mg Nebulization Q6H PRN FATEMEH Bernstein        sodium chloride (NS) flush 5-10 mL  5-10 mL IntraVENous Q8H FATEMEH Bernstein   10 mL at 17 0541    sodium chloride (NS) flush 5-10 mL  5-10 mL IntraVENous PRN FATEMEH Bernstein        insulin lispro (HUMALOG) injection   SubCUTAneous Q6H FATEMEH Bernstein   Stopped at 17 1800    dextrose 40% (GLUTOSE) oral gel 1 Tube  15 g Oral PRN FATEMEH Bernstein        glucagon (GLUCAGEN) injection 1 mg  1 mg IntraMUSCular PRN FATEMEH Bernstein        dextrose (D50W) injection syrg 12.5-25 g  25-50 mL IntraVENous PRN FATEMEH Bernstein   25 g at 17 1913    heparin (porcine) injection 5,000 Units  5,000 Units SubCUTAneous Q8H FATEMEH Bernstein   5,000 Units at 17 0840    DOBUTamine (DOBUTREX) 500 mg/250 mL (2,000 mcg/mL) infusion  2.5-10 mcg/kg/min IntraVENous TITRATE Sheng Kennedy MD 54 mL/hr at 17 0855 10 mcg/kg/min at 17 0855    morphine injection 2 mg  2 mg IntraVENous Q4H PRN Rm Johansen MD   2 mg at 17 0855    cefTRIAXone (ROCEPHIN) 1 g in 0.9% sodium chloride (MBP/ADV) 50 mL  1 g IntraVENous Q24H Rosie Miles  mL/hr at 17 2200 1 g at 17 2200    DOPamine (INTROPIN) 800 mg/250 mL (3,200 mcg/mL) infusion  5-20 mcg/kg/min IntraVENous TITRATE Edgard Dubose MD 16.9 mL/hr at 17 1131 5 mcg/kg/min at 17 1131           Review of Systems as noted above in HPI   PHYSICAL EXAM     Visit Vitals    /50    Pulse 80    Temp 98.5 °F (36.9 °C)    Resp (!) 84    Ht 5' 6\" (1.676 m)    Wt (!) 197.1 kg (434 lb 9.6 oz)    SpO2 97%    BMI 70.15 kg/m2      Temp (24hrs), Av.3 °F (36.3 °C), Min:96.6 °F (35.9 °C), Max:98.5 °F (36.9 °C)    Oxygen Therapy  O2 Sat (%): 97 % (17 1204)  Pulse via Oximetry: 79 beats per minute (17 1204)  O2 Device: BIPAP (17 0800)  O2 Flow Rate (L/min): 3 l/min (17 1304)  O2 Temperature: 88 °F (31.1 °C) (03/19/17 0009)  FIO2 (%): 35 % (03/19/17 0903)    Intake/Output Summary (Last 24 hours) at 03/19/17 1257  Last data filed at 03/19/17 0541   Gross per 24 hour   Intake           737.87 ml   Output              265 ml   Net           472.87 ml      General: No acute distress,agitated   Lungs:  CTAB, good effort anterior exam   Heart:  Regular rate and rhythm, no murmur, 2+ edema   Abdomen: Soft, nontender and nondistended, normal bowel sounds, obese  Extremities: Warm and dry         DIAGNOSTIC STUDIES      Labs and Studies from previous 24 hours have been personally reviewed by myself           Full Code    ASSESSMENT / Adele Albarado 169 Problems    Diagnosis Date Noted    Acute encephalopathy 03/18/2017    Hyperkalemia 03/18/2017    Acute renal failure (ARF) ([address] 03/18/2017    Hypotension 03/18/2017    Morbid obesity with BMI of 60.0-69.9, adult ([address] 11/28/2016    Obesity hypoventilation syndrome ([address] 10/24/2016    Hypertension 11/30/2015    Cardiomyopathy ([address] 10/21/2015    AICD (automatic cardioverter/defibrillator) present 10/21/2015    Congestive heart failure (CHF) ([address] 10/21/2015    Dyslipidemia 07/02/2013    Diabetes mellitus type II, uncontrolled ([address] 07/02/2013    Noncompliance with medication regimen 07/02/2013    Chronic back pain 02/20/2010    Obstructive sleep apnea 02/15/2010       -apprecaite consultants  -wean pressors as tolerant per cardio guidance   -diuresis per nephrology, no HD candidate   -speech consult   -repeat ammonia level, unable to CT head, continue UTI treatment and follow culture   -BIPAP as needed   Updated numerous family at bedside  FEN:NPO  DVT Prophylaxis: heparin  Disposition:pending  Time spent with patient:  Care plan addressed: daughters   Risk Assessment:  Signed By: Demetria Lennox, MD     March 19, 2017

## 2017-03-19 NOTE — PROGRESS NOTES
Dr. Yas Montenegro made aware of pt maxed on Dobutamine and MAP trending down. Orders received for 500cc bolus of NS over 30mins. Will continue to monitor.

## 2017-03-19 NOTE — PROGRESS NOTES
Bedside and Verbal shift change report given to Fabien by Thierry Mendez. Report included the following information SBAR, Kardex, ED Summary, Intake/Output, MAR, Accordion, Recent Results and Med Rec Status.

## 2017-03-20 ENCOUNTER — HOME CARE VISIT (OUTPATIENT)
Dept: HOME HEALTH SERVICES | Facility: HOME HEALTH | Age: 52
End: 2017-03-20
Payer: MEDICARE

## 2017-03-20 ENCOUNTER — APPOINTMENT (OUTPATIENT)
Dept: GENERAL RADIOLOGY | Age: 52
DRG: 682 | End: 2017-03-20
Attending: INTERNAL MEDICINE
Payer: MEDICARE

## 2017-03-20 PROBLEM — R57.0 CARDIOGENIC SHOCK (HCC): Status: ACTIVE | Noted: 2017-03-20

## 2017-03-20 LAB
ANION GAP BLD CALC-SCNC: 13 MMOL/L (ref 7–16)
BASOPHILS # BLD AUTO: 0 K/UL (ref 0–0.2)
BASOPHILS # BLD: 0 % (ref 0–2)
BUN SERPL-MCNC: 112 MG/DL (ref 6–23)
CALCIUM SERPL-MCNC: 8.2 MG/DL (ref 8.3–10.4)
CHLORIDE SERPL-SCNC: 102 MMOL/L (ref 98–107)
CO2 SERPL-SCNC: 21 MMOL/L (ref 21–32)
CREAT SERPL-MCNC: 11.1 MG/DL (ref 0.8–1.5)
DIFFERENTIAL METHOD BLD: ABNORMAL
EOSINOPHIL # BLD: 0.1 K/UL (ref 0–0.8)
EOSINOPHIL NFR BLD: 1 % (ref 0.5–7.8)
ERYTHROCYTE [DISTWIDTH] IN BLOOD BY AUTOMATED COUNT: 19.9 % (ref 11.9–14.6)
GLUCOSE BLD STRIP.AUTO-MCNC: 82 MG/DL (ref 65–100)
GLUCOSE BLD STRIP.AUTO-MCNC: 89 MG/DL (ref 65–100)
GLUCOSE SERPL-MCNC: 80 MG/DL (ref 65–100)
HCT VFR BLD AUTO: 31.2 % (ref 41.1–50.3)
HGB BLD-MCNC: 10 G/DL (ref 13.6–17.2)
IMM GRANULOCYTES # BLD: 0 K/UL (ref 0–0.5)
IMM GRANULOCYTES NFR BLD AUTO: 0.2 % (ref 0–5)
LYMPHOCYTES # BLD AUTO: 27 % (ref 13–44)
LYMPHOCYTES # BLD: 1.8 K/UL (ref 0.5–4.6)
MCH RBC QN AUTO: 23.5 PG (ref 26.1–32.9)
MCHC RBC AUTO-ENTMCNC: 32.1 G/DL (ref 31.4–35)
MCV RBC AUTO: 73.4 FL (ref 79.6–97.8)
MM INDURATION POC: NORMAL MM (ref 0–5)
MONOCYTES # BLD: 0.7 K/UL (ref 0.1–1.3)
MONOCYTES NFR BLD AUTO: 10 % (ref 4–12)
NEUTS SEG # BLD: 4.1 K/UL (ref 1.7–8.2)
NEUTS SEG NFR BLD AUTO: 62 % (ref 43–78)
PLATELET # BLD AUTO: 234 K/UL (ref 150–450)
PMV BLD AUTO: 9.9 FL (ref 10.8–14.1)
POTASSIUM SERPL-SCNC: 6.3 MMOL/L (ref 3.5–5.1)
PPD POC: NORMAL NEGATIVE
RBC # BLD AUTO: 4.25 M/UL (ref 4.23–5.67)
SODIUM SERPL-SCNC: 136 MMOL/L (ref 136–145)
WBC # BLD AUTO: 6.6 K/UL (ref 4.3–11.1)

## 2017-03-20 PROCEDURE — 74000 XR ABD (KUB): CPT

## 2017-03-20 PROCEDURE — 74011250636 HC RX REV CODE- 250/636: Performed by: PHYSICIAN ASSISTANT

## 2017-03-20 PROCEDURE — 74011250636 HC RX REV CODE- 250/636: Performed by: INTERNAL MEDICINE

## 2017-03-20 PROCEDURE — 77010033678 HC OXYGEN DAILY

## 2017-03-20 PROCEDURE — 74011000250 HC RX REV CODE- 250: Performed by: INTERNAL MEDICINE

## 2017-03-20 PROCEDURE — 82271 OCCULT BLOOD OTHER SOURCES: CPT | Performed by: INTERNAL MEDICINE

## 2017-03-20 PROCEDURE — 36592 COLLECT BLOOD FROM PICC: CPT

## 2017-03-20 PROCEDURE — 77030008771 HC TU NG SALEM SUMP -A

## 2017-03-20 PROCEDURE — 85025 COMPLETE CBC W/AUTO DIFF WBC: CPT | Performed by: INTERNAL MEDICINE

## 2017-03-20 PROCEDURE — 74011000258 HC RX REV CODE- 258: Performed by: INTERNAL MEDICINE

## 2017-03-20 PROCEDURE — 71010 XR CHEST SNGL V: CPT

## 2017-03-20 PROCEDURE — C1751 CATH, INF, PER/CENT/MIDLINE: HCPCS

## 2017-03-20 PROCEDURE — 94640 AIRWAY INHALATION TREATMENT: CPT

## 2017-03-20 PROCEDURE — 02HV33Z INSERTION OF INFUSION DEVICE INTO SUPERIOR VENA CAVA, PERCUTANEOUS APPROACH: ICD-10-PCS | Performed by: INTERNAL MEDICINE

## 2017-03-20 PROCEDURE — 77030018719 HC DRSG PTCH ANTIMIC J&J -A

## 2017-03-20 PROCEDURE — 65660000000 HC RM CCU STEPDOWN

## 2017-03-20 PROCEDURE — 82962 GLUCOSE BLOOD TEST: CPT

## 2017-03-20 PROCEDURE — 36556 INSERT NON-TUNNEL CV CATH: CPT | Performed by: INTERNAL MEDICINE

## 2017-03-20 PROCEDURE — 80048 BASIC METABOLIC PNL TOTAL CA: CPT | Performed by: INTERNAL MEDICINE

## 2017-03-20 PROCEDURE — 77030018798 HC PMP KT ENTRL FED COVD -A

## 2017-03-20 PROCEDURE — 84132 ASSAY OF SERUM POTASSIUM: CPT | Performed by: INTERNAL MEDICINE

## 2017-03-20 PROCEDURE — 71010 XR CHEST PORT: CPT

## 2017-03-20 PROCEDURE — 77030004950 HC CATH ENTRL NG COVD -A

## 2017-03-20 RX ORDER — HALOPERIDOL 5 MG/ML
2 INJECTION INTRAMUSCULAR
Status: DISCONTINUED | OUTPATIENT
Start: 2017-03-20 | End: 2017-03-27

## 2017-03-20 RX ORDER — LORAZEPAM 2 MG/ML
0.5 INJECTION INTRAMUSCULAR
Status: DISCONTINUED | OUTPATIENT
Start: 2017-03-20 | End: 2017-03-29 | Stop reason: HOSPADM

## 2017-03-20 RX ORDER — LEVALBUTEROL INHALATION SOLUTION 1.25 MG/3ML
1.25 SOLUTION RESPIRATORY (INHALATION) 3 TIMES DAILY
Status: DISCONTINUED | OUTPATIENT
Start: 2017-03-20 | End: 2017-03-20

## 2017-03-20 RX ORDER — HALOPERIDOL 5 MG/ML
2 INJECTION INTRAMUSCULAR
Status: DISCONTINUED | OUTPATIENT
Start: 2017-03-20 | End: 2017-03-20

## 2017-03-20 RX ORDER — DEXTROSE MONOHYDRATE AND SODIUM CHLORIDE 5; .45 G/100ML; G/100ML
50 INJECTION, SOLUTION INTRAVENOUS CONTINUOUS
Status: DISCONTINUED | OUTPATIENT
Start: 2017-03-20 | End: 2017-03-21

## 2017-03-20 RX ADMIN — Medication 10 ML: at 05:25

## 2017-03-20 RX ADMIN — DOBUTAMINE IN DEXTROSE 10 MCG/KG/MIN: 200 INJECTION, SOLUTION INTRAVENOUS at 06:33

## 2017-03-20 RX ADMIN — CEFTRIAXONE 2 G: 2 INJECTION, POWDER, FOR SOLUTION INTRAMUSCULAR; INTRAVENOUS at 19:41

## 2017-03-20 RX ADMIN — Medication 10 ML: at 14:42

## 2017-03-20 RX ADMIN — HEPARIN SODIUM 5000 UNITS: 5000 INJECTION, SOLUTION INTRAVENOUS; SUBCUTANEOUS at 15:18

## 2017-03-20 RX ADMIN — HEPARIN SODIUM 5000 UNITS: 5000 INJECTION, SOLUTION INTRAVENOUS; SUBCUTANEOUS at 08:29

## 2017-03-20 RX ADMIN — DOBUTAMINE IN DEXTROSE 10 MCG/KG/MIN: 200 INJECTION, SOLUTION INTRAVENOUS at 12:14

## 2017-03-20 RX ADMIN — MUPIROCIN: 20 OINTMENT TOPICAL at 08:31

## 2017-03-20 RX ADMIN — BUMETANIDE 1 MG/HR: 0.25 INJECTION INTRAMUSCULAR; INTRAVENOUS at 01:38

## 2017-03-20 RX ADMIN — BUMETANIDE 1 MG/HR: 0.25 INJECTION INTRAMUSCULAR; INTRAVENOUS at 15:15

## 2017-03-20 RX ADMIN — LEVALBUTEROL HYDROCHLORIDE 1.25 MG: 1.25 SOLUTION RESPIRATORY (INHALATION) at 14:50

## 2017-03-20 RX ADMIN — HALOPERIDOL LACTATE 2 MG: 5 INJECTION, SOLUTION INTRAMUSCULAR at 11:41

## 2017-03-20 RX ADMIN — DEXTROSE MONOHYDRATE AND SODIUM CHLORIDE 50 ML/HR: 5; .45 INJECTION, SOLUTION INTRAVENOUS at 08:30

## 2017-03-20 RX ADMIN — DOBUTAMINE IN DEXTROSE 10 MCG/KG/MIN: 200 INJECTION, SOLUTION INTRAVENOUS at 00:12

## 2017-03-20 RX ADMIN — DOPAMINE HYDROCHLORIDE 7 MCG/KG/MIN: 320 INJECTION, SOLUTION INTRAVENOUS at 00:11

## 2017-03-20 NOTE — PROGRESS NOTES
Bedside and Verbal shift change report given to Jennifer Garcia and Malinda PAGE BJU (oncoming nurse) by Swathi Saenz RN (offgoing nurse). Report included the following information SBAR, Kardex, Procedure Summary, Intake/Output, MAR, Accordion, Recent Results, Med Rec Status, Cardiac Rhythm SR/ 1st AV block and Alarm Parameters . Patient screaming incoherent words and family at bedside. Patient disoriented to place, situation, and time. Drips verified.

## 2017-03-20 NOTE — PROGRESS NOTES
Discussed patient transfer in detail with patient's family. Answered all questions and concerns. Family understands.

## 2017-03-20 NOTE — PROGRESS NOTES
Bedside report received from [place]. Pt being repositioned in bed, and pulled Dobbhoff while temporarily unrestrained. Tube replaced and new KUB ordered. Multiple family members at bedside.

## 2017-03-20 NOTE — CDMP QUERY
Please clarify if this patient is being treated/managed for:    HYPOVOLEMIC SHOCK in the setting of a 50yo with end stage CM, acute respiratory failure, RED, obesity, hypotension, CXR w/ pulmonary edema being managed with Dopamine gtt, Dobutamine gtt, CVC placement,   ICU admission . =>Other Explanation of clinical findings  =>Unable to Determine (no explanation of clinical findings)    The medical record reflects the following:    Risk Factors: 50yo, end stage CM, acute respiratory failure, RED, obesity    Clinical Indicators: hypotension, CXR w/ pulmonary edema,     Treatment: Dopamine gtt, Dobutamine gtt, CVC placement,   ICU admission     Please clarify and document your clinical opinion in the progress notes and discharge summary including the definitive and/or presumptive diagnosis, (suspected or probable), related to the above clinical findings. Please include clinical findings supporting your diagnosis.     Thank you,  Marnie Loera RN  [phone]

## 2017-03-20 NOTE — PROGRESS NOTES
SPEECH PATHOLOGY NOTE:    Patient is continually jerking in the bed and not able to follow commands. Family is confrontational.  MD entered to discuss plan of care with family. Agree that tube feeding is most appropriate at this time with re-consult for swallowing assessment if status improves.     Nieves Offer MS, SLP

## 2017-03-20 NOTE — PROGRESS NOTES
Requested by staff to discuss with family possible POA paperwork at home  Family does not remember patient ever completing POA  POA remains with legal next of kin    Wang Melgar III, PhD  Gianluca Jones  [phone]

## 2017-03-20 NOTE — CONSULTS
Palliative Care    Patient: Lary Nevarez MRN: [id number]  SSN: [id number]    YOB: 1965  Age: 45 y.o. Sex: male       Date of Request: 3/19/2017  Date of Consult:  3/20/2017  Reason for Consult:  goals of care  Requesting Physician: Dr Chacho Beaver; Dr Christian Syed      Assessment/Plan:     Principal Diagnosis:    Pain, general  R52    Additional Diagnoses:   · Agitation  R45.1  · Debility, Unspecified  R53.81  · Delirium  F05  · Counseling, Encounter for Medical Advice  Z71.9  · Encounter for Palliative Care  Z51.5  · Myoclonus     Palliative Performance Scale (PPS):  PPS: 40    Medical Decision Making:   Reviewed and summarized notes from admission to present  Discussed case with appropriate providers- ICU IDT; Reviewed laboratory and x-ray data from admission to present     Pt in bed, restless and agitated. Daughter and sister at bedside. Pt known to  from last prolonged hospitalization. At that time, pt and family were not accepting of the fact that pt was nearing the end of his life. His goals were to live as long as possible at that time. Spent some time with pt and family at bedside. Pt confused but very clearly stating he is done and ready to die. He states he is suffering. Pt's daughter and sister resistant to this at first, but began to acknowledge that he may be tired of fighting. Sister stated '\"We are accepting of the Lord's plan, whether that means he lives or dies. \"  Pt's mother and other children are coming to the hospital- will plan to meet with them to discuss plan of care. I advised that regardless of aggressiveness of care, pt's pain and suffering needed to be addressed. Will continue to follow. Patient seen and discussed with Nurse Practitioner. Agree with assessment/plan. Patient with ongoing myoclonic jerking, likely related to hypoxia, hypercarbia, azotemia. No plans for dialysis.    Discussed with family at bedside, they agree with providing medication for myoclonic mp, will provide lorazepam 0.5 mg q 6h prn. Will discuss findings with members of the interdisciplinary team.      Thank you for this referral.   The Palliative Care team is available from 8 am to 4:30 pm Monday-Friday. Medical management during other hours is per the primary attending service. .    Subjective:     History obtained from:  Patient, Family, Care Provider and Chart    Chief Complaint: CHF, renal failure, generalized pain  History of Present Illness:  Mr Gustavo Le is a 46 yo AA male with PMH of CAD, CHF, COPD, DM, GERD, KELSIE, morbid obesity, and debility, who presented to the ER from home on 3/18/2017 with c/o increasing lethargy, AMS, and decreased urine output for 7 days. Pt noted to be hypotensive. Family denied fever, chills, n/v/d. Work up revealed acute renal failure, elevated ammonia, and hypotension. Pt was admitted for further management. He has been on and off bipap since admission. His renal function continues to decline, and he is not a dialysis candidate. He is currently agitated and confused. Advance Directive: No       Code Status:  Full Code            Health Care Power of : No - Patient does not have a [facility]. Past Medical History:   Diagnosis Date    Acute encephalopathy 4/36/0226    Acute systolic heart failure ([address] May, 2009    Also had transient acute renal failure secondary to poor perfusion.     AICD (automatic cardioverter/defibrillator) present 10/21/2015    Atypical chest pain 4/23/2010    Bronchitis     CAD (coronary artery disease)     Cardiomyopathy     Chest pain 10/21/2015    Chronic kidney disease     renal insufficiency    Chronic pain     back    Congestive heart failure (CHF) ([address] 10/21/2015    COPD     Diabetes ([address]     type 2 insulin reliant- BS average- 160's-180's    Diabetes mellitus type II, uncontrolled ([address] 7/2/2013    GERD (gastroesophageal reflux disease)     Gout     [facility]     cardiomyopathy with ef 10-20%    Hypertension     Hyponatremia 12/20/2010    Ill-defined condition     gout, neuropathy, sciatica    Morbid obesity (HCC)     Nausea & vomiting 11/30/2015    Neuropathy     Obstructive sleep apnea 2/15/2010    Other unknown and unspecified cause of morbidity or mortality     Gout    Severe sepsis (HCC)     Unspecified sleep apnea     cpap      Past Surgical History:   Procedure Laterality Date    CHEST SURGERY PROCEDURE UNLISTED      thorocentesis    HX HEART CATHETERIZATION  Sandy 10, 2008    Severe multivessel disease with severe LV dysfunction    HX PACEMAKER      may 2010     Family History   Problem Relation Age of Onset    Heart Disease Father     Stroke Father     Diabetes Sister     Stroke Sister     Diabetes Sister     Heart Disease Other       Social History   Substance Use Topics    Smoking status: Former Smoker     Packs/day: 0.25     Years: 1.00     Quit date: 7/12/1984    Smokeless tobacco: Never Used      Comment: pt states that he only tried smoking as a kid     Alcohol use No     Prior to Admission medications    Medication Sig Start Date End Date Taking? Authorizing Provider   GUAIFENESIN/PSEUDOEPHEDRNE HCL (MUCINEX D PO) Take 400 mg by mouth daily as needed. Yes Historical Provider   levalbuterol (XOPENEX) 0.63 mg/3 mL nebu 0.63 mg by Nebulization route. Yes Historical Provider   acetaminophen (TYLENOL) 500 mg tablet Take  by mouth every six (6) hours as needed for Pain. Yes Historical Provider   sennosides (SENNA) 8.6 mg cap Take 17.2 mg by mouth nightly. Yes Historical Provider   sacubitril-valsartan (ENTRESTO) 24 mg/26 mg tablet Take 1 Tab by mouth every twelve (12) hours. 2/24/17  Yes FATEMEH Griffin   amiodarone (CORDARONE) 200 mg tablet Take 1 Tab by mouth daily. 2/24/17  Yes FATEMEH Griffin   docusate sodium (COLACE) 100 mg capsule Take 1 Cap by mouth two (2) times a day.  2/24/17  Yes Kelsey Yates, PA   furosemide (LASIX) 40 mg tablet Take 1 Tab by mouth Before breakfast and dinner. 2/24/17  Yes FATEMEH Griffin   gabapentin (NEURONTIN) 600 mg tablet Take 600 mg by mouth three (3) times daily. Yes Historical Provider   bisacodyl (DULCOLAX) 10 mg suppository Insert 10 mg into rectum daily. 1/13/17  Yes Rose Salgado, TAMANNA   apixaban (ELIQUIS) 5 mg tablet Take 1 Tab by mouth every twelve (12) hours. 12/26/16  Yes Fawn Vallejo MD   oxyCODONE IR (OXY-IR) 15 mg immediate release tablet Take 15 mg by mouth every four (4) hours as needed for Pain. Yes Historical Provider   carvedilol (COREG) 25 mg tablet Take 1 Tab by mouth two (2) times daily (with meals). 10/26/16  Yes Lidia Mcfadden PA-C   hydrALAZINE (APRESOLINE) 25 mg tablet Take 1 Tab by mouth three (3) times daily. 10/26/16  Yes Lidia Mcfadden PA-C   insulin glargine (LANTUS) 100 unit/mL injection 50 Units by SubCUTAneous route nightly. 10/26/16  Yes Lidia Mcfadden PA-C   insulin lispro (HUMALOG) 100 unit/mL injection 15 Units by SubCUTAneous route three (3) times daily (with meals). 10/26/16  Yes Lidia Mcfadden PA-C   isosorbide dinitrate (ISORDIL) 20 mg tablet Take 1 Tab by mouth three (3) times daily. 10/26/16  Yes Lidia Mcfadden PA-C   ranitidine (ZANTAC) 150 mg tablet Take 150 mg by mouth nightly. Yes Historical Provider   spironolactone (ALDACTONE) 25 mg tablet Take 1 Tab by mouth daily. 7/14/16  Yes Mildred Boone MD   pravastatin (PRAVACHOL) 80 mg tablet Take 1 Tab by mouth nightly. 7/14/16  Yes Mildred Boone MD   polyethylene glycol [facility]) 17 gram packet Take 1 Packet by mouth daily as needed (constipation). 12/4/15  Yes Robbi Keith MD   cpap machine kit 10 cm qhs   Yes Historical Provider   OXYGEN-AIR DELIVERY SYSTEMS 2 lpm qhs   Yes Historical Provider   allopurinol (ZYLOPRIM) 100 mg tablet Take 100 mg by mouth daily.    Yes Phys Other, MD   glucose blood VI test strips (ASCENSIA AUTODISC VI, ONE TOUCH ULTRA TEST VI) strip Test strips for 30 day supply 8/22/14  Yes Maddi Tejada MD   nitroglycerin (NITROSTAT) 0.4 mg SL tablet 1 Tab by SubLINGual route every five (5) minutes as needed for Chest Pain. 4/24/10  Yes FATEMEH King   colchicine 0.6 mg tablet Take 0.6 mg by mouth every morning. 3/22/10  Yes Rhiannon Mosqueda MD       Allergies   Allergen Reactions    Dilaudid [Hydromorphone] Other (comments)     Hotflashes, patient states he's not allergic    Iodinated Contrast Media - Oral And Iv Dye Other (comments)     \"shuts my kidneys down\"    Penicillins Unknown (comments)     Pt states he is not allergic his mother just wouldn't allow him take it        Review of Systems:  A comprehensive review of systems was negative except for:   Constitutional: Positive for fatigue  Respiratory: Positive for dyspnea   Musculoskeletal: Positive for generalized pain, back pain      Objective:     Visit Vitals    /69    Pulse 82    Temp 98.5 °F (36.9 °C)    Resp (!) 93    Ht 5' 6\" (1.676 m)    Wt (!) 197.1 kg (434 lb 9.6 oz)    SpO2 96%    BMI 70.15 kg/m2        Physical Exam:    General:  Agitated    Eyes:  Conjunctivae/corneas clear    Nose: Nares normal. Septum midline. Neck: Supple, symmetrical, trachea midline   Lungs:   Decreased bilaterally, unlabored   Heart:  Regular rate and rhythm   Abdomen:   Soft, non-tender, non-distended   Extremities: Normal, atraumatic, no cyanosis. Generalized edema.  Bilateral soft wrist restraints    Skin: Skin color, texture, turgor normal.   Neurologic: Myoclonus    Psych: Alert, confused       Assessment:     Hospital Problems  Date Reviewed: 1/6/2017          Codes Class Noted POA    * (Principal)Cardiogenic shock ([facility] ICD-10-CM: R57.0  ICD-9-CM: 785.51  3/20/2017 Yes        Acute encephalopathy ICD-10-CM: G93.40  ICD-9-CM: 348.30  3/18/2017 Unknown        Hyperkalemia ICD-10-CM: E87.5  ICD-9-CM: 276.7  3/18/2017 Unknown        Acute renal failure (ARF) (HCC) ICD-10-CM: N17.9  ICD-9-CM: 584.9 3/18/2017 Yes        Hypotension ICD-10-CM: I95.9  ICD-9-CM: 458.9  3/18/2017 Unknown        Morbid obesity with BMI of 60.0-69.9, adult ([facility] ICD-10-CM: E66.01, Z68.44  ICD-9-CM: 278.01, V85.44  11/28/2016 Yes        Obesity hypoventilation syndrome ([facility] ICD-10-CM: T38.2  ICD-9-CM: 278.03  10/24/2016 Yes        Hypertension ICD-10-CM: I10  ICD-9-CM: 401.9  11/30/2015 Yes        AICD (automatic cardioverter/defibrillator) present ICD-10-CM: Z95.810  ICD-9-CM: V45.02  10/21/2015 Yes        Congestive heart failure (CHF) ([facility] ICD-10-CM: I50.9  ICD-9-CM: 428.0  10/21/2015 Yes        Cardiomyopathy ([facility] (Chronic) ICD-10-CM: I42.9  ICD-9-CM: 425.4  10/21/2015 Yes        Dyslipidemia (Chronic) ICD-10-CM: E78.5  ICD-9-CM: 272.4  7/2/2013 Yes        Diabetes mellitus type II, uncontrolled ([facility] (Chronic) ICD-10-CM: E11.65  ICD-9-CM: 250.02  7/2/2013 Yes        Noncompliance with medication regimen (Chronic) ICD-10-CM: Z91.14  ICD-9-CM: V15.81  7/2/2013 Yes        Chronic back pain (Chronic) ICD-10-CM: M54.9, G89.29  ICD-9-CM: 724.5, 338.29  2/20/2010 Yes        Obstructive sleep apnea (Chronic) ICD-10-CM: G47.33  ICD-9-CM: 327.23  2/15/2010 Yes              Signed By: Canelo Isabel NP     March 20, 2017

## 2017-03-20 NOTE — PROGRESS NOTES
Obinna Amaya RN placed peripheral IV for dobutamine and bumex gtt (compatable drugs) while we await CVC.

## 2017-03-20 NOTE — PROGRESS NOTES
Pt pulled central line out with restraints on. Dr. Ephraim Aguero notified. Gtt on hold until new line is placed.

## 2017-03-20 NOTE — PROGRESS NOTES
Physical Therapy Note:    Participated in interdisciplinary rounds including David, Wound Care, Palliative Care NP, RN staff, MD, Respiratory therapy, and Nutrition. Patient at this time is agitated, therefore with decreased ability to participate in therapy safely. Will continue to participate in rounds to determine appropriateness of PT/OT.     Thank you,  SUHAS EdmondsonT

## 2017-03-20 NOTE — PROGRESS NOTES
Tried to place patient on BiPAP for overnight. Patient cussing and yelling. Patient saying \" I don't want to wear that damn mask\". [facility] RN aware.

## 2017-03-20 NOTE — PROGRESS NOTES
[facility] CARDIOLOGY PROGRESS NOTE           3/20/2017 8:07 AM    Admit Date: 3/18/2017      Subjective:   Patient states that he is frightened. He is restrained,agitated,and confused. ROS:  GEN:  No fever or chills  Cardiovascular:  As noted above:no chest pain. Pulmonary:  As noted above:no SOB. Neuro:  No new focal motor or sensory loss    Objective:      Vitals:    03/20/17 0615 03/20/17 0630 03/20/17 0645 03/20/17 0715   BP: (!) 158/109 (!) 153/108 160/73 (!) 122/94   Pulse: 82 83 83 87   Resp: 23 (!) 37 (!) 54 (!) 80   Temp:       SpO2: 97% 95% 93% 97%   Weight:       Height:           Physical Exam:  General-agitated. Neck- supple, no JVD  CV- regular rate and rhythm no MRG  Lung- clear bilaterally  Abd- soft, nontender, nondistended  Ext- 2-3+ edema bilaterally. Skin- warm and dry  Psychiatric:  Restrained,agitated,and confused. .  Neurologic:  Confused and agitated. Data Review:   Recent Labs      03/20/17   0400  03/19/17   0315   03/18/17   1107   NA  136  137   --   137   K  6.3*  5.6*   < >  6.0*   MG   --    --    --   2.3   BUN  112*  110*   --   106*   CREA  11.10*  10.10*   --   9.45*   GLU  80  79   --   81   WBC  6.6  7.5   --   7.2   HGB  10.0*  10.6*   --   10.2*   HCT  31.2*  32.7*   --   31.9*   PLT  234  264   --   240    < > = values in this interval not displayed. TELEMETRY:  NSR    Assessment/Plan:     Principal Problem:    Acute renal failure (ARF) ([facility] (3/18/2017): Worsening renal failure with myoclonic jerks and confusion. Patient appears terminal.I expressed this to he daughter and sister. They state that they are hoping for a miracle. I suggested no intubation ,chest compression,etc in the event of  An arrest.They did not comment. His sister requests trial of iv Fluids. I will try low rate D5 1/2 NS. Palliative care consult.     Active Problems:    Obstructive sleep apnea (2/15/2010)      Chronic back pain (2/20/2010)      Dyslipidemia (7/2/2013) Diabetes mellitus type II, uncontrolled ([facility] (7/2/2013)      Noncompliance with medication regimen (7/2/2013)      AICD (automatic cardioverter/defibrillator) present (10/21/2015)      Congestive heart failure (CHF) ([facility] (10/21/2015):End stage. Continue inotropic support. Cardiomyopathy ([facility] (10/21/2015):End stage. Hypertension (11/30/2015):resolved      Obesity hypoventilation syndrome ([facility] (10/24/2016)      Morbid obesity with BMI of 60.0-69.9, adult ([facility] (11/28/2016)      Acute encephalopathy (3/18/2017):worse with renal failure      Hyperkalemia (3/18/2017): Worse. Nephrology following. Hypotension (3/18/2017): Improved. Continue inotropic support                Zabrina Lara MD  3/20/2017 8:07 AM

## 2017-03-20 NOTE — PROGRESS NOTES
Hospitalist Progress Note    3/20/2017  4:12 PM  Admit Date: 3/18/2017 10:42 AM   NAME: eDmi Chao   :  1965   MRN:  [id number]   Attending: Anthony Montes MD  PCP:  Ji Shirley MD  Treatment Team: Attending Provider: Anthony Montes MD; Consulting Provider: Romulo Najera MD; Consulting Provider: Alona Rios MD; Consulting Provider: Conor Lopez MD; Consulting Provider: Wendy Hess MD; Care Manager: Fco Harris RN; Consulting Provider: Thai Moser NP  SUBJECTIVE:       Mr. Chantal Hernandez is a 44 yo AAM with PMH of sCHF EF 10% with ICD, CKD, DM2, KELSIE on CPAP, HTN, chronic hypoxic respiratory failure on home O2 at 3 L evaluated with acute metabolic encephalopathy. Found to have progressive renal failure with creatinine of >9 and hyperkalemia of 6.0 with hypercapneic respiratory failure requiring ICU care for BIPAP. Additionally has been seen by cardiology and placed on dopamine/dobutamine. Nephrology has evaluated and he is not a dialysis candidate. He is being diuresed with bumex drip without urination. Renal US no acute issues. Ammonia slightly elevated, too large for CT scanner for CT head and has intermittent delirium. CXR with pulm edema. UA positive, day 3 rocephin, negative cx. Urology placed hansen due to difficulty.  Family declines hospice, well known to palliative care       3-20-17 family at bedside, patient agitated and delirious in restraints , unable to participate in speech eval       Recent Results (from the past 24 hour(s))   GLUCOSE, POC    Collection Time: 17  5:33 PM   Result Value Ref Range    Glucose (POC) 98 65 - 100 mg/dL   GLUCOSE, POC    Collection Time: 17 11:55 PM   Result Value Ref Range    Glucose (POC) 89 65 - 100 mg/dL   CBC WITH AUTOMATED DIFF    Collection Time: 17  4:00 AM   Result Value Ref Range    WBC 6.6 4.3 - 11.1 K/uL    RBC 4.25 4.23 - 5.67 M/uL    HGB 10.0 (L) 13.6 - 17.2 g/dL    HCT 31.2 (L) 41.1 - 50.3 % MCV 73.4 (L) 79.6 - 97.8 FL    MCH 23.5 (L) 26.1 - 32.9 PG    MCHC 32.1 31.4 - 35.0 g/dL    RDW 19.9 (H) 11.9 - 14.6 %    PLATELET 580 230 - 516 K/uL    MPV 9.9 (L) 10.8 - 14.1 FL    DF AUTOMATED      NEUTROPHILS 62 43 - 78 %    LYMPHOCYTES 27 13 - 44 %    MONOCYTES 10 4.0 - 12.0 %    EOSINOPHILS 1 0.5 - 7.8 %    BASOPHILS 0 0.0 - 2.0 %    IMMATURE GRANULOCYTES 0.2 0.0 - 5.0 %    ABS. NEUTROPHILS 4.1 1.7 - 8.2 K/UL    ABS. LYMPHOCYTES 1.8 0.5 - 4.6 K/UL    ABS. MONOCYTES 0.7 0.1 - 1.3 K/UL    ABS. EOSINOPHILS 0.1 0.0 - 0.8 K/UL    ABS. BASOPHILS 0.0 0.0 - 0.2 K/UL    ABS. IMM.  GRANS. 0.0 0.0 - 0.5 K/UL   METABOLIC PANEL, BASIC    Collection Time: 03/20/17  4:00 AM   Result Value Ref Range    Sodium 136 136 - 145 mmol/L    Potassium 6.3 (HH) 3.5 - 5.1 mmol/L    Chloride 102 98 - 107 mmol/L    CO2 21 21 - 32 mmol/L    Anion gap 13 7 - 16 mmol/L    Glucose 80 65 - 100 mg/dL     (H) 6 - 23 MG/DL    Creatinine 11.10 (H) 0.8 - 1.5 MG/DL    GFR est AA 6 (L) >60 ml/min/1.73m2    GFR est non-AA 5 (L) >60 ml/min/1.73m2    Calcium 8.2 (L) 8.3 - 10.4 MG/DL   GLUCOSE, POC    Collection Time: 03/20/17  5:33 AM   Result Value Ref Range    Glucose (POC) 89 65 - 100 mg/dL   GLUCOSE, POC    Collection Time: 03/20/17 11:42 AM   Result Value Ref Range    Glucose (POC) 82 65 - 100 mg/dL       Allergies   Allergen Reactions    Dilaudid [Hydromorphone] Other (comments)     Hotflashes, patient states he's not allergic    Iodinated Contrast Media - Oral And Iv Dye Other (comments)     \"shuts my kidneys down\"    Penicillins Unknown (comments)     Pt states he is not allergic his mother just wouldn't allow him take it     Current Facility-Administered Medications   Medication Dose Route Frequency Provider Last Rate Last Dose    dextrose 5 % - 0.45% NaCl infusion  50 mL/hr IntraVENous CONTINUOUS Ky Rogers MD 50 mL/hr at 03/20/17 0830 50 mL/hr at 03/20/17 0830    cefTRIAXone (ROCEPHIN) 2 g in 0.9% sodium chloride (MBP/ADV) 50 mL  2 g IntraVENous Q24H Lexus Noe MD        haloperidol lactate (HALDOL) injection 2 mg  2 mg IntraVENous Q8H PRN MICKEY Horn MD        levalbuterol [facility]) nebulizer soln 1.25 mg/3 mL  1.25 mg Nebulization TID Lexus Noe MD   1.25 mg at 03/20/17 1450    LORazepam (ATIVAN) injection 0.5 mg  0.5 mg IntraVENous Q6H PRN MICKEY Horn MD        mupirocin (BACTROBAN) 2 % ointment   Both Nostrils BID Lexus Noe MD        lip protectant (BLISTEX) ointment   Topical PRN Lexus Noe MD        bumetanide (BUMEX) 12.5 mg in 0.9% sodium chloride 100 mL infusion  1 mg/hr IntraVENous CONTINUOUS Jacinta Joseph MD 8 mL/hr at 03/20/17 1515 1 mg/hr at 03/20/17 1515    levalbuterol (XOPENEX) nebulizer soln 0.63 mg/3 mL  0.63 mg Nebulization Q6H PRN Brenda Alegria, PA        sodium chloride (NS) flush 5-10 mL  5-10 mL IntraVENous Q8H Brenda Alegria, PA   10 mL at 03/20/17 1442    sodium chloride (NS) flush 5-10 mL  5-10 mL IntraVENous PRN Refgarfield Alegria, PA        insulin lispro (HUMALOG) injection   SubCUTAneous Q6H Reford FATEMEH Alegria   Stopped at 03/18/17 1800    dextrose 40% (GLUTOSE) oral gel 1 Tube  15 g Oral PRN Refgarfield Alegria, PA        glucagon (GLUCAGEN) injection 1 mg  1 mg IntraMUSCular PRN Jamesord Raji, PA        dextrose (D50W) injection syrg 12.5-25 g  25-50 mL IntraVENous PRN FATEMEH Lubin   25 g at 03/18/17 1913    heparin (porcine) injection 5,000 Units  5,000 Units SubCUTAneous Q8H Reford Raji, PA   5,000 Units at 03/20/17 1518    DOBUTamine (DOBUTREX) 500 mg/250 mL (2,000 mcg/mL) infusion  2.5-10 mcg/kg/min IntraVENous TITRATE Kobi Monterroso MD   Stopped at 03/20/17 1441    DOPamine (INTROPIN) 800 mg/250 mL (3,200 mcg/mL) infusion  5-20 mcg/kg/min IntraVENous TITRATE Brittany Villareal MD   Stopped at 03/20/17 5679           Review of Systems as noted above in HPI   PHYSICAL EXAM     Visit Vitals    BP (!) 183/92    Pulse 88    Temp 98.8 °F (37.1 °C)    Resp 18    Ht 5' 6\" (1.676 m)    Wt (!) 197.2 kg (434 lb 12.8 oz)    SpO2 99%    BMI 70.18 kg/m2      Temp (24hrs), Av.5 °F (36.9 °C), Min:98.4 °F (36.9 °C), Max:98.8 °F (37.1 °C)    Oxygen Therapy  O2 Sat (%): 99 % (17 1448)  Pulse via Oximetry: 85 beats per minute (17 1204)  O2 Device: Nasal cannula (17 1448)  O2 Flow Rate (L/min): 4 l/min (17 1448)  O2 Temperature: 88 °F (31.1 °C) (17 0009)  FIO2 (%): 36 % (17 1448)    Intake/Output Summary (Last 24 hours) at 17 1612  Last data filed at 17 1206   Gross per 24 hour   Intake          1982.65 ml   Output              575 ml   Net          1407.65 ml      General: No acute distress,agitated   Lungs:  CTAB, good effort anterior exam   Heart:  Regular rate and rhythm, no murmur, 2+ edema   Abdomen: Soft, nontender and nondistended, normal bowel sounds, obese  Extremities: Warm and dry         DIAGNOSTIC STUDIES      Labs and Studies from previous 24 hours have been personally reviewed by myself           Full Code    ASSESSMENT / Adele Albarado 169 Problems    Diagnosis Date Noted    Cardiogenic shock ([address] 2017    Acute encephalopathy 2017    Hyperkalemia 2017    Acute renal failure (ARF) ([address] 2017    Hypotension 2017    Morbid obesity with BMI of 60.0-69.9, adult ([address] 2016    Obesity hypoventilation syndrome ([address] 10/24/2016    Hypertension 2015    Cardiomyopathy ([address] 10/21/2015    AICD (automatic cardioverter/defibrillator) present 10/21/2015    Congestive heart failure (CHF) ([address] 10/21/2015    Dyslipidemia 2013    Diabetes mellitus type II, uncontrolled ([facility] 2013    Noncompliance with medication regimen 2013    Chronic back pain 2010    Obstructive sleep apnea 02/15/2010       -apprecaite consultants  -wean pressors as tolerant per cardio guidance   -diuresis per nephrology, not HD candidate, repeat potassium   -family highly concerned regarding nutritional status, place NGT and consult nutrition   - unable to complete CT head, continue UTI treatment and follow culture , add prn haldol for delirium  -BIPAP as needed   Updated numerous family at bedside  FEN:NPO, NGTD feeds   DVT Prophylaxis: heparin  Disposition:pending  Time spent with patient:  Care plan addressed: daughters   Risk Assessment:  Signed By: Brigette Miranda MD     March 20, 2017

## 2017-03-20 NOTE — PROGRESS NOTES
Staff consulted patient relations, Rhiannon Pearson to see family. Did call Drake Miller, with Risk management to make aware of Deshawn's concerns.

## 2017-03-20 NOTE — PROGRESS NOTES
Admit Date: 3/18/2017      Subjective:          Review of Systems  Obtunded, agitated    Objective:     Patient Vitals for the past 8 hrs:   BP Temp Pulse Resp SpO2   03/20/17 0830 141/69 - 82 (!) 93 96 %   03/20/17 0815 145/85 - 83 24 95 %   03/20/17 0800 135/63 - 85 (!) 39 93 %   03/20/17 0745 140/85 - 85 (!) 74 91 %   03/20/17 0730 112/70 - 85 27 94 %   03/20/17 0715 (!) 122/94 - 87 (!) 80 97 %   03/20/17 0645 160/73 - 83 (!) 54 93 %   03/20/17 0630 (!) 153/108 - 83 (!) 37 95 %   03/20/17 0615 (!) 158/109 - 82 23 97 %   03/20/17 0600 150/74 - 77 (!) 36 98 %   03/20/17 0545 150/71 - 82 15 99 %   03/20/17 0530 131/77 - 80 (!) 102 98 %   03/20/17 0515 111/73 - 78 18 96 %   03/20/17 0500 - - 75 (!) 32 93 %   03/20/17 0430 (!) 140/95 - 76 15 96 %   03/20/17 0415 125/60 - 76 (!) 65 96 %   03/20/17 0411 - - 76 - -   03/20/17 0400 (!) 131/98 98.5 °F (36.9 °C) 77 23 96 %   03/20/17 0345 136/72 - 76 22 92 %   03/20/17 0330 109/64 - 78 (!) 58 98 %   03/20/17 0205 113/64 - 84 21 (!) 88 %   03/20/17 0102 (!) 81/53 - 72 14 96 %            Physical Exam:   Myoclonus   Lungs: decreased BS  CV: RR,   Abdomen: distended  Ext: ++ edema          Data Review   Recent Results (from the past 8 hour(s))   CBC WITH AUTOMATED DIFF    Collection Time: 03/20/17  4:00 AM   Result Value Ref Range    WBC 6.6 4.3 - 11.1 K/uL    RBC 4.25 4.23 - 5.67 M/uL    HGB 10.0 (L) 13.6 - 17.2 g/dL    HCT 31.2 (L) 41.1 - 50.3 %    MCV 73.4 (L) 79.6 - 97.8 FL    MCH 23.5 (L) 26.1 - 32.9 PG    MCHC 32.1 31.4 - 35.0 g/dL    RDW 19.9 (H) 11.9 - 14.6 %    PLATELET 722 451 - 487 K/uL    MPV 9.9 (L) 10.8 - 14.1 FL    DF AUTOMATED      NEUTROPHILS 62 43 - 78 %    LYMPHOCYTES 27 13 - 44 %    MONOCYTES 10 4.0 - 12.0 %    EOSINOPHILS 1 0.5 - 7.8 %    BASOPHILS 0 0.0 - 2.0 %    IMMATURE GRANULOCYTES 0.2 0.0 - 5.0 %    ABS. NEUTROPHILS 4.1 1.7 - 8.2 K/UL    ABS. LYMPHOCYTES 1.8 0.5 - 4.6 K/UL    ABS. MONOCYTES 0.7 0.1 - 1.3 K/UL    ABS.  EOSINOPHILS 0.1 0.0 - 0.8 K/UL ABS. BASOPHILS 0.0 0.0 - 0.2 K/UL    ABS. IMM. GRANS. 0.0 0.0 - 0.5 K/UL   METABOLIC PANEL, BASIC    Collection Time: 03/20/17  4:00 AM   Result Value Ref Range    Sodium 136 136 - 145 mmol/L    Potassium 6.3 (HH) 3.5 - 5.1 mmol/L    Chloride 102 98 - 107 mmol/L    CO2 21 21 - 32 mmol/L    Anion gap 13 7 - 16 mmol/L    Glucose 80 65 - 100 mg/dL     (H) 6 - 23 MG/DL    Creatinine 11.10 (H) 0.8 - 1.5 MG/DL    GFR est AA 6 (L) >60 ml/min/1.73m2    GFR est non-AA 5 (L) >60 ml/min/1.73m2    Calcium 8.2 (L) 8.3 - 10.4 MG/DL   GLUCOSE, POC    Collection Time: 03/20/17  5:33 AM   Result Value Ref Range    Glucose (POC) 89 65 - 100 mg/dL           Assessment:     Principal Problem:    Acute renal failure (ARF) ([facility]) (3/18/2017)    Active Problems:    Obstructive sleep apnea (2/15/2010)      Chronic back pain (2/20/2010)      Dyslipidemia (7/2/2013)      Diabetes mellitus type II, uncontrolled ([facility] (7/2/2013)      Noncompliance with medication regimen (7/2/2013)      AICD (automatic cardioverter/defibrillator) present (10/21/2015)      Congestive heart failure (CHF) ([facility] (10/21/2015)      Cardiomyopathy ([facility] (10/21/2015)      Hypertension (11/30/2015)      Obesity hypoventilation syndrome ([facility] (10/24/2016)      Morbid obesity with BMI of 60.0-69.9, adult ([facility] (11/28/2016)      Acute encephalopathy (3/18/2017)      Hyperkalemia (3/18/2017)      Hypotension (3/18/2017)        Plan:     Renal F. Worse with hyperkalemia, oliguric  Prog. Poor.  Pt will not tolerate dialysis   Advice comfort care    David Zapata MD

## 2017-03-20 NOTE — PROGRESS NOTES
Ask to see pt's family as they had some questions. Mother, Angela Owens, [phone] at bedside with daughters Demi Chao [phone], and Jo Ann Vitale, [phone]. In addition, pt's sister and aunt are at bedside. Pt has 4 children, 2 that live here and 2 in [place]. They think that HCPOA may have been completed on previous hospitalization. After researching, there is no paperwork on file. Call to  to see if possibly done and Symone Cortes on tele. They are not aware of HCPOA completed, as well. CM explained to family that in [place], with no paperwork, adult children and parent are equal and would need to be on same page regarding treatment of patient. They all verbalized understanding. Also explained that pt could not complete paperwork unless he was oriented to person, place, time, and situation. They state he was this morning, explained that pt had to be at the time of paperwork completed. We also discussed that CM understood that when and if they were ready for hospice, we could do consult for support of pt and family. At this time pt remains full code. Did discuss that staff will continue to care for pt, but unfortunately the treatment and care may not change outcome for pt. They verbalized understanding. Offered encouragement and support. This is a family of katheryn and they voiced that they are leaning on God for guidance and strength. Did speak with pt's sister Cary Perales via phone per pt's mothers request. Explained same situation to her as she is in [place]. Family keeps referring back to [place] and [employer]. Told family that we have to go by [place] laws for HCPOA. Also, informed that currently pt was not able to do paperwork. Did call Lilo Pelayo, to come down and evaluate himself and see family. CM will continue to follow for any assist with this difficult situation.

## 2017-03-20 NOTE — H&P
Date of Surgery Update:  Elmo Rea was seen and examined. History and physical has been reviewed. The patient has been examined.  There have been no significant clinical changes since the completion of the originally dated History and Physical.    Signed By: Ramana Anne MD     March 20, 2017 6:01 AM

## 2017-03-20 NOTE — PROCEDURES
The pt. Had nearly pulled out his central line. There was blood retrun on distal port. The R neck area was prepped and drapped. 1% lidocaine was injected locally. The current central line was cut and a wire guide was inserted through the distal lumen. The old line was pulled out and a new quad lumen catheter was inserted over the line. with placement by Seldinger technique.   Good blood return

## 2017-03-20 NOTE — PROGRESS NOTES
Family was grumbling about care. Primary nurse responded immediately when I asked her to check on them.   Signed by chaplain Chon

## 2017-03-20 NOTE — PROGRESS NOTES
Problem: Nutrition Deficit  Goal: *Optimize nutritional status  Nutrition:  Nutrition Consult for TF Management. (Dr. Terry Carrington)  Assessment:  Anthropometrics:              Ht - 5'6\", wgt - 197.2 kg (ICU bed 3/20/17), BMI 70.2 c/w obesity class III - extreme obesity, edema - 2+ pitting. Macronutrient Needs:  Estimated calorie needs - 3313-2059 dixon/day (11-14 dixon/kg/day)   Estimated protein needs - 52-65 gm pro/day (0.8-1 gm pro/kgIBW/day) (GFR 6 ml/min)  Max CHO/day - 345 gm CHO/day (50% dixon/day)   Fluid/day - 1 liter/day (RED)  Intake/Comparative Standards: The patient is currently NPO and receiving a dextrose-containing IVF infusing at 50 ml/hr (204 dixon/day and 60 gm CHO/day) which meets 9% of his calorie and 0% of his protein needs. Pertinent Labs/Hemodynamic Parameters:              , creatinine 11.10, potassium 6.3; MAP - 88. Pertinent Medications/Drips:              Rocephin; Bumex and Dobutamine drips  GI Function/Activity:              Hypoactive bowel sounds, last bowel movement 3/19/17. Diet:              NPO. Food/Nutrition History:              45year old gentleman with a h/o end-stage cardiomyopathy and morbid obesity admitted with acute renal failure. He is inappropriate for a speech evaluation and it is felt that alternate nutrition (support) would be the most appropriate method to provide nutrition at this time. His skin is intact. Diagnosis (Nutrition): Inadequate energy intake related to AMS as evidenced by his inability to participate in a bedside swallow evaluation and requires TF for nutrition support. Intervention:  Meals and Snacks: NPO. Enteral Nutrition: Based on the POC and if a FT is placed and correct placement has been verified, start TF with Suplena @ 18 ml/hr with a 100 ml water flush every 12 hours via FT.  Increase TF as tolerated to the goal rate of 48 ml/hr -  2074 calories/day (100% calorie goal), 52 grams protein/day (100% protein goal), 237 grams CHO/day (does not exceed max CHO limit) and 1047 ml water/day (100% fluid goal). Coordination of Nutrition Care by a Nutrition Professional: AM ICU rounds, collaboration with Nitesh Barrera. Monitoring/Evaluation:  Monitor clinical course, hemodynamics, POC, GI function, TF tolerance, labs and adjust as indicated. Jan Garner  [phone]

## 2017-03-20 NOTE — INTERDISCIPLINARY ROUNDS
Interdisciplinary team rounds were held 3/20/2017 with the following team members:Nursing, Nurse Practitioner, Nutrition, Palliative Care, Pastoral Care, Patient Relations, Pharmacy, Physical Therapy, Physician, Physician's Assistant, Respiratory Therapy and Clinical Coordinator and the patient. Plan of care discussed. See clinical pathway and/or care plan for interventions and desired outcomes.

## 2017-03-20 NOTE — PROGRESS NOTES
TRANSFER - OUT REPORT:    Verbal report given to Winifred RN(name) on [employer]  being transferred to 312(unit) for routine progression of care       Report consisted of patients Situation, Background, Assessment and   Recommendations(SBAR). Information from the following report(s) SBAR, Kardex, ED Summary, Procedure Summary, Intake/Output, MAR, Accordion, Recent Results, Med Rec Status, Cardiac Rhythm SR and Alarm Parameters  was reviewed with the receiving nurse. Opportunity for questions and clarification was provided.       Patient transported with:   Monitor  O2 @ 4 liters

## 2017-03-21 PROBLEM — J96.21 ACUTE ON CHRONIC RESPIRATORY FAILURE WITH HYPOXIA AND HYPERCAPNIA (HCC): Status: ACTIVE | Noted: 2017-03-21

## 2017-03-21 PROBLEM — J96.22 ACUTE ON CHRONIC RESPIRATORY FAILURE WITH HYPOXIA AND HYPERCAPNIA (HCC): Status: ACTIVE | Noted: 2017-03-21

## 2017-03-21 LAB
ANION GAP BLD CALC-SCNC: 13 MMOL/L (ref 7–16)
ARTERIAL PATENCY WRIST A: ABNORMAL
ARTERIAL PATENCY WRIST A: ABNORMAL
BACTERIA SPEC CULT: NORMAL
BASE DEFICIT BLDA-SCNC: 4.1 MMOL/L (ref 0–2)
BASE DEFICIT BLDA-SCNC: 5.1 MMOL/L (ref 0–2)
BDY SITE: ABNORMAL
BDY SITE: ABNORMAL
BUN SERPL-MCNC: 116 MG/DL (ref 6–23)
CA-I BLD-SCNC: 1.02 MMOL/L (ref 1–1.3)
CALCIUM SERPL-MCNC: 8.2 MG/DL (ref 8.3–10.4)
CHLORIDE BLDA-SCNC: 101 MMOL/L (ref 98–106)
CHLORIDE SERPL-SCNC: 102 MMOL/L (ref 98–107)
CO2 SERPL-SCNC: 24 MMOL/L (ref 21–32)
COHGB MFR BLD: 0.9 % (ref 0.5–1.5)
COHGB MFR BLD: 2.2 % (ref 0.5–1.5)
CREAT SERPL-MCNC: 11.7 MG/DL (ref 0.8–1.5)
DO-HGB BLD-MCNC: 2 % (ref 0–5)
DO-HGB BLD-MCNC: 7 % (ref 0–5)
ERYTHROCYTE [DISTWIDTH] IN BLOOD BY AUTOMATED COUNT: 20 % (ref 11.9–14.6)
GAS FLOW.O2 O2 DELIVERY SYS: 4 L/MIN
GAS FLOW.O2 O2 DELIVERY SYS: 5 L/MIN
GASTROCULT GAST QL: NEGATIVE
GLUCOSE BLD STRIP.AUTO-MCNC: 106 MG/DL (ref 65–100)
GLUCOSE BLD STRIP.AUTO-MCNC: 110 MG/DL (ref 65–100)
GLUCOSE BLD STRIP.AUTO-MCNC: 84 MG/DL (ref 65–100)
GLUCOSE BLD STRIP.AUTO-MCNC: 94 MG/DL (ref 65–100)
GLUCOSE SERPL-MCNC: 88 MG/DL (ref 65–100)
HCO3 BLDA-SCNC: 22 MMOL/L (ref 22–26)
HCO3 BLDA-SCNC: 23 MMOL/L (ref 22–26)
HCT VFR BLD AUTO: 31.5 % (ref 41.1–50.3)
HGB BLD-MCNC: 10.3 G/DL (ref 13.6–17.2)
HGB BLDMV-MCNC: 10.9 GM/DL (ref 11.7–15)
HGB BLDMV-MCNC: 10.9 GM/DL (ref 11.7–15)
MCH RBC QN AUTO: 23.5 PG (ref 26.1–32.9)
MCHC RBC AUTO-ENTMCNC: 32.7 G/DL (ref 31.4–35)
MCV RBC AUTO: 71.8 FL (ref 79.6–97.8)
METHGB MFR BLD: 0.3 % (ref 0–1.5)
METHGB MFR BLD: 0.6 % (ref 0–1.5)
OXYHGB MFR BLDA: 91.7 % (ref 94–97)
OXYHGB MFR BLDA: 95.1 % (ref 94–97)
PCO2 BLDA: 49 MMHG (ref 35–45)
PCO2 BLDA: 49 MMHG (ref 35–45)
PEEP RESPIRATORY: 8 CM[H2O]
PEEP RESPIRATORY: 8 CM[H2O]
PH BLDA: 7.27 [PH] (ref 7.35–7.45)
PH BLDA: 7.28 [PH] (ref 7.35–7.45)
PH GAST: 4 [PH] (ref 1.5–3.5)
PLATELET # BLD AUTO: 268 K/UL (ref 150–450)
PMV BLD AUTO: 10.2 FL (ref 10.8–14.1)
PO2 BLDA: 107 MMHG (ref 75–100)
PO2 BLDA: 78 MMHG (ref 75–100)
POTASSIUM BLDA-SCNC: 5.79 MMOL/L (ref 3.5–5.3)
POTASSIUM SERPL-SCNC: 5.3 MMOL/L (ref 3.5–5.1)
POTASSIUM SERPL-SCNC: 5.8 MMOL/L (ref 3.5–5.1)
POTASSIUM SERPL-SCNC: 6.3 MMOL/L (ref 3.5–5.1)
RBC # BLD AUTO: 4.39 M/UL (ref 4.23–5.67)
RESP RATE: 14
RESP RATE: 22
SAO2 % BLD: 93 % (ref 92–98.5)
SAO2 % BLD: 98 % (ref 92–98.5)
SERVICE CMNT-IMP: ABNORMAL
SERVICE CMNT-IMP: ABNORMAL
SERVICE CMNT-IMP: NORMAL
SODIUM BLDA-SCNC: 135.9 MMOL/L (ref 135–148)
SODIUM SERPL-SCNC: 139 MMOL/L (ref 136–145)
VENTILATION MODE VENT: ABNORMAL
VENTILATION MODE VENT: ABNORMAL
WBC # BLD AUTO: 6 K/UL (ref 4.3–11.1)

## 2017-03-21 PROCEDURE — 84132 ASSAY OF SERUM POTASSIUM: CPT | Performed by: HOSPITALIST

## 2017-03-21 PROCEDURE — 65610000001 HC ROOM ICU GENERAL

## 2017-03-21 PROCEDURE — 82803 BLOOD GASES ANY COMBINATION: CPT

## 2017-03-21 PROCEDURE — 74011250636 HC RX REV CODE- 250/636: Performed by: HOSPITALIST

## 2017-03-21 PROCEDURE — 80048 BASIC METABOLIC PNL TOTAL CA: CPT | Performed by: INTERNAL MEDICINE

## 2017-03-21 PROCEDURE — 36600 WITHDRAWAL OF ARTERIAL BLOOD: CPT

## 2017-03-21 PROCEDURE — 74011250637 HC RX REV CODE- 250/637: Performed by: INTERNAL MEDICINE

## 2017-03-21 PROCEDURE — 94660 CPAP INITIATION&MGMT: CPT

## 2017-03-21 PROCEDURE — 74011250636 HC RX REV CODE- 250/636: Performed by: INTERNAL MEDICINE

## 2017-03-21 PROCEDURE — 74011000258 HC RX REV CODE- 258: Performed by: INTERNAL MEDICINE

## 2017-03-21 PROCEDURE — 94760 N-INVAS EAR/PLS OXIMETRY 1: CPT

## 2017-03-21 PROCEDURE — 80051 ELECTROLYTE PANEL: CPT

## 2017-03-21 PROCEDURE — 74011250636 HC RX REV CODE- 250/636: Performed by: PHYSICIAN ASSISTANT

## 2017-03-21 PROCEDURE — 74011000250 HC RX REV CODE- 250: Performed by: INTERNAL MEDICINE

## 2017-03-21 PROCEDURE — 85027 COMPLETE CBC AUTOMATED: CPT | Performed by: HOSPITALIST

## 2017-03-21 PROCEDURE — 99223 1ST HOSP IP/OBS HIGH 75: CPT | Performed by: INTERNAL MEDICINE

## 2017-03-21 PROCEDURE — 74011000250 HC RX REV CODE- 250: Performed by: HOSPITALIST

## 2017-03-21 PROCEDURE — 82962 GLUCOSE BLOOD TEST: CPT

## 2017-03-21 PROCEDURE — 36591 DRAW BLOOD OFF VENOUS DEVICE: CPT

## 2017-03-21 PROCEDURE — 77030013032 HC MSK BPAP/CPAP FISP -B

## 2017-03-21 PROCEDURE — 74011250637 HC RX REV CODE- 250/637: Performed by: HOSPITALIST

## 2017-03-21 PROCEDURE — 77010033711 HC HIGH FLOW OXYGEN

## 2017-03-21 PROCEDURE — 36415 COLL VENOUS BLD VENIPUNCTURE: CPT | Performed by: HOSPITALIST

## 2017-03-21 RX ORDER — SODIUM POLYSTYRENE SULFONATE 15 G/60ML
30 SUSPENSION ORAL; RECTAL EVERY 6 HOURS
Status: DISPENSED | OUTPATIENT
Start: 2017-03-21 | End: 2017-03-21

## 2017-03-21 RX ORDER — SODIUM BICARBONATE 1 MEQ/ML
50 SYRINGE (ML) INTRAVENOUS ONCE
Status: COMPLETED | OUTPATIENT
Start: 2017-03-21 | End: 2017-03-21

## 2017-03-21 RX ORDER — DEXTROSE 50 % IN WATER (D50W) INTRAVENOUS SYRINGE
25 ONCE
Status: COMPLETED | OUTPATIENT
Start: 2017-03-21 | End: 2017-03-21

## 2017-03-21 RX ORDER — SODIUM POLYSTYRENE SULFONATE 15 G/60ML
45 SUSPENSION ORAL; RECTAL
Status: COMPLETED | OUTPATIENT
Start: 2017-03-21 | End: 2017-03-21

## 2017-03-21 RX ORDER — ALBUTEROL SULFATE 0.83 MG/ML
2.5 SOLUTION RESPIRATORY (INHALATION)
Status: COMPLETED | OUTPATIENT
Start: 2017-03-21 | End: 2017-03-21

## 2017-03-21 RX ADMIN — MUPIROCIN: 20 OINTMENT TOPICAL at 01:51

## 2017-03-21 RX ADMIN — HEPARIN SODIUM 5000 UNITS: 5000 INJECTION, SOLUTION INTRAVENOUS; SUBCUTANEOUS at 10:49

## 2017-03-21 RX ADMIN — ALBUTEROL SULFATE 2.5 MG: 2.5 SOLUTION RESPIRATORY (INHALATION) at 10:44

## 2017-03-21 RX ADMIN — INSULIN HUMAN 10 UNITS: 100 INJECTION, SOLUTION PARENTERAL at 01:52

## 2017-03-21 RX ADMIN — BUMETANIDE 1 MG/HR: 0.25 INJECTION INTRAMUSCULAR; INTRAVENOUS at 16:48

## 2017-03-21 RX ADMIN — HEPARIN SODIUM 5000 UNITS: 5000 INJECTION, SOLUTION INTRAVENOUS; SUBCUTANEOUS at 18:19

## 2017-03-21 RX ADMIN — DOBUTAMINE IN DEXTROSE 2.5 MCG/KG/MIN: 200 INJECTION, SOLUTION INTRAVENOUS at 10:43

## 2017-03-21 RX ADMIN — DOPAMINE HYDROCHLORIDE 5 MCG/KG/MIN: 320 INJECTION, SOLUTION INTRAVENOUS at 12:23

## 2017-03-21 RX ADMIN — LORAZEPAM 0.5 MG: 2 INJECTION, SOLUTION INTRAMUSCULAR; INTRAVENOUS at 01:54

## 2017-03-21 RX ADMIN — MUPIROCIN: 20 OINTMENT TOPICAL at 10:52

## 2017-03-21 RX ADMIN — SODIUM BICARBONATE 50 MEQ: 84 INJECTION, SOLUTION INTRAVENOUS at 01:53

## 2017-03-21 RX ADMIN — DEXTROSE MONOHYDRATE 25 G: 25 INJECTION, SOLUTION INTRAVENOUS at 01:52

## 2017-03-21 RX ADMIN — DEXTROSE MONOHYDRATE: 5 INJECTION, SOLUTION INTRAVENOUS at 11:05

## 2017-03-21 RX ADMIN — Medication 10 ML: at 16:52

## 2017-03-21 RX ADMIN — DEXTROSE MONOHYDRATE AND SODIUM CHLORIDE 50 ML/HR: 5; .45 INJECTION, SOLUTION INTRAVENOUS at 05:30

## 2017-03-21 RX ADMIN — SODIUM POLYSTYRENE SULFONATE 45 G: 15 SUSPENSION ORAL; RECTAL at 01:53

## 2017-03-21 RX ADMIN — Medication 10 ML: at 21:18

## 2017-03-21 RX ADMIN — SODIUM POLYSTYRENE SULFONATE 30 G: 15 SUSPENSION ORAL; RECTAL at 16:51

## 2017-03-21 RX ADMIN — BUMETANIDE 1 MG/HR: 0.25 INJECTION INTRAMUSCULAR; INTRAVENOUS at 01:54

## 2017-03-21 RX ADMIN — MUPIROCIN: 20 OINTMENT TOPICAL at 18:09

## 2017-03-21 NOTE — PROGRESS NOTES
Pt. Resting in bed at this time. Family at bedside. At family request Dr. Emile Lara paged for family meeting.

## 2017-03-21 NOTE — PROGRESS NOTES
[facility] CARDIOLOGY PROGRESS NOTE           3/21/2017 8:50 AM    Admit Date: 3/18/2017      Subjective:   Responds to verbal stimuli    ROS:No possible due to AMS. GEN:  No fever or chills  Cardiovascular:  As noted above:  Pulmonary:  As noted above  Neuro:  No new focal motor or sensory loss    Objective:      Vitals:    03/21/17 0330 03/21/17 0430 03/21/17 0530 03/21/17 0553   BP: 127/78 164/86 (!) 159/92 154/61   Pulse: 76 82 83 80   Resp:   20    Temp:   97.4 °F (36.3 °C)    SpO2:   100%    Weight:   (!) 196.9 kg (434 lb)    Height:           Physical Exam:  General-CPAP in place. Neck- supple, no JVD  CV- regular rate and rhythm no MRG  Lung- clear bilaterally  Abd- soft, nontender, nondistended  Ext- 2-3+ edema bilaterally. Skin- warm and dry  Psychiatric: lethargic  Neurologic:  Lethargic. Data Review:   Recent Labs      03/21/17   0515  03/20/17   2345  03/20/17   0400  03/19/17   0315   03/18/17   1107   NA  139   --   136  137   --   137   K  5.8*  6.3*  6.3*  5.6*   < >  6.0*   MG   --    --    --    --    --   2.3   BUN  116*   --   112*  110*   --   106*   CREA  11.70*   --   11.10*  10.10*   --   9.45*   GLU  88   --   80  79   --   81   WBC   --    --   6.6  7.5   --   7.2   HGB   --    --   10.0*  10.6*   --   10.2*   HCT   --    --   31.2*  32.7*   --   31.9*   PLT   --    --   234  264   --   240    < > = values in this interval not displayed. TELEMETRY:  NSR. Assessment/Plan:     Principal Problem:    Cardiogenic shock ([facility] (3/20/2017):BP is stable off inotropic support. Active Problems:    Obstructive sleep apnea (2/15/2010)      Chronic back pain (2/20/2010)      Dyslipidemia (7/2/2013)      Diabetes mellitus type II, uncontrolled ([facility] (7/2/2013)      Noncompliance with medication regimen (7/2/2013)      AICD (automatic cardioverter/defibrillator) present (10/21/2015)      Congestive heart failure (CHF) ([facility]) (10/21/2015):Continue Bumex drip. Needs to to be a DNR. Family is not in support of no code status and terminal palliative care at this time? Cardiomyopathy ([facility] (10/21/2015):End stage. Hypertension (11/30/2015)      Obesity hypoventilation syndrome ([facility] (10/24/2016)      Morbid obesity with BMI of 60.0-69.9, adult ([facility] (11/28/2016)      Acute encephalopathy (3/18/2017)      Hyperkalemia (3/18/2017)      Acute renal failure (ARF) ([facility]) (3/18/2017):Creatinine remains>11. UOP is starting to . Hypotension (3/18/2017):Resolved. Off in otropes.                 Stepan Cabrera MD  3/21/2017 8:50 AM

## 2017-03-21 NOTE — PROGRESS NOTES
MD at bedside. Labwork drawn. TF drainage sent for occult study. Family at bedside, updated and aware.

## 2017-03-21 NOTE — PROGRESS NOTES
Pt does not answer questions or track movement. Pt minimally able to follow commands to squeeze hands. Dr. Desi Ramirez made aware of pt condition. VS remain stable at this time. Per report this is not new condition for pt. Will continue to monitor. Family at bedside.

## 2017-03-21 NOTE — PROGRESS NOTES
TRANSFER - IN REPORT:    Verbal report received from Fredy shane RN(name) on [employer]  being received from [address] [facility](unit) for routine progression of care      Report consisted of patients Situation, Background, Assessment and   Recommendations(SBAR). Information from the following report(s) SBAR, Kardex, Procedure Summary, Intake/Output, MAR, Recent Results and Cardiac Rhythm SR w/ 1degree HB was reviewed with the receiving nurse. Opportunity for questions and clarification was provided. Assessment completed upon patients arrival to unit and care assumed. Dual skin assessment performed with Nyra Brass. No skin breakdown to sacrum, alleyn in place. Abrasion to left and right heel, prevalon boots in place. Scrotum noted to be edematous.

## 2017-03-21 NOTE — PROGRESS NOTES
Spoke with Dr. Pito Jorge. Updated and aware of lab values. Will hold tube feeding for tonight. And tonight's dose of heparin. WIll place SCDs on pt for DVT prophylaxis. MD to place orders.

## 2017-03-21 NOTE — PROGRESS NOTES
Spoke with Dr. Vickie Raymond about lab work K+ and CBC ordered. Due to Kayexalate being given later because of clogged feeding tube, K+ should be drawn four hours after first dose. Second dose should not be given until results of potassium re-draw reported. Draw daily CBC along with potassium. Also, ok to start Tube Feeding per order from last PM that was held per night hospitalist orders.

## 2017-03-21 NOTE — PROGRESS NOTES
Patient transported to ICU 3101 on NRB without complication. Patient placed back on VPAP upon arrival.  Settings documented.

## 2017-03-21 NOTE — PROGRESS NOTES
Ativan not available in either pyxis. Called and spoke with Honorio Rodriguez [phone] in pharmacy. She states she will bring up a dose. Primary RN [facility] notified.

## 2017-03-21 NOTE — PROGRESS NOTES
TRANSFER - OUT REPORT:    Verbal report given to Lea Kwan RN on [employer]  being transferred to ICU for change in patient condition(BIPAP)       Report consisted of patients Situation, Background, Assessment and Recommendations(SBAR). Information from the following report(s) SBAR, Kardex, Procedure Summary, Intake/Output, MAR, Recent Results and Cardiac Rhythm SR w/ 1degree HB was reviewed with the receiving nurse. Opportunity for questions and clarification was provided. Clarification provided on Tube Feeding orders and K+ and CBC blood draws.

## 2017-03-21 NOTE — CONSULTS
CONSULT NOTE    Mirela Pollard    3/21/2017    Date of Admission:  3/18/2017    The patient's chart is reviewed and the patient is discussed with the staff. Subjective:     Patient is a 45 y.o.  male seen and evaluated at the request of Dr. Russ Rodriguez. Pt admitted on 3/18/17 with acute on chronic kidney disease and hyperkalemia with acute encephalopathy. Pt also has a history of ICM with EF <10%, chronic respiratory failure on 3L O2, HTN, HLD, OHS/KELSIE, and CKD. Pt was admitted to ICU and has been off and on BiPAP. Pt transferred out of ICU on 3/20. Pt required BiPAP secondary to lethargy and pH 7.28. Pt's pCO2 has remained around 48.9-49.4 on 18/8 with rate 22. We are being consulted secondary to respiratory failure. Pt lethargic and unable to participate in the interview. Pt's sister at bedside and she doesn't know anything. She reports that pt is a full code. There is a family meeting that is supposed to occur later today. Review of Systems  Review of systems not obtained due to patient factors.     Patient Active Problem List   Diagnosis Code    Obstructive sleep apnea G47.33    Chronic back pain M54.9, G89.29    Nonischemic dilated cardiomyopathy (HCC) I42.9    Dyslipidemia E78.5    Diabetes mellitus type II, uncontrolled ([facility]) E11.65    Noncompliance with medication regimen Z91.14    Pleural effusion J90    CKD (chronic kidney disease) stage 3, GFR 30-59 ml/min N18.3    Chronic pancreatitis ([facility]) K86.1    Abdominal fluid collection R18.8    Chest pain R07.9    AICD (automatic cardioverter/defibrillator) present Z95.810    Congestive heart failure (CHF) (HCC) I50.9    Cardiomyopathy (HCC) I42.9    Nausea & vomiting R11.2    Hypertension I10    Acute on chronic systolic (congestive) heart failure (HCC) I50.23    Atrial flutter (HCC) I48.92    Obesity hypoventilation syndrome (HCC) E66.2    Morbid obesity with BMI of 60.0-69.9, adult ([facility]) E66.01, Z68.44    Hypoxemia R09.02    Hypersomnia G47.10    Constipation K59.00    Syncope R55    Nonsustained ventricular tachycardia (HCC) I47.2    NSVT (nonsustained ventricular tachycardia) (HCC) I47.2    Acute encephalopathy G93.40    Hyperkalemia E87.5    Acute renal failure (ARF) (HCC) N17.9    Hypotension I95.9    Cardiogenic shock (HCC) R57.0    Acute on chronic respiratory failure with hypoxia and hypercapnia (HCC) J96.21, J96.22       [facility] [place] [employer]. Prior to Admission Medications   Prescriptions Last Dose Informant Patient Reported? Taking? GUAIFENESIN/PSEUDOEPHEDRNE HCL (MUCINEX D PO)   Yes Yes   Sig: Take 400 mg by mouth daily as needed. OXYGEN-AIR DELIVERY SYSTEMS   Yes Yes   Si lpm qhs   acetaminophen (TYLENOL) 500 mg tablet   Yes Yes   Sig: Take  by mouth every six (6) hours as needed for Pain. allopurinol (ZYLOPRIM) 100 mg tablet   Yes Yes   Sig: Take 100 mg by mouth daily. amiodarone (CORDARONE) 200 mg tablet   No Yes   Sig: Take 1 Tab by mouth daily. apixaban (ELIQUIS) 5 mg tablet   No Yes   Sig: Take 1 Tab by mouth every twelve (12) hours. bisacodyl (DULCOLAX) 10 mg suppository   No Yes   Sig: Insert 10 mg into rectum daily. carvedilol (COREG) 25 mg tablet   No Yes   Sig: Take 1 Tab by mouth two (2) times daily (with meals). colchicine 0.6 mg tablet  Self Yes Yes   Sig: Take 0.6 mg by mouth every morning. cpap machine kit   Yes Yes   Sig: 10 cm qhs   docusate sodium (COLACE) 100 mg capsule   Yes Yes   Sig: Take 1 Cap by mouth two (2) times a day. furosemide (LASIX) 40 mg tablet   No Yes   Sig: Take 1 Tab by mouth Before breakfast and dinner. gabapentin (NEURONTIN) 600 mg tablet   Yes Yes   Sig: Take 600 mg by mouth three (3) times daily.    glucose blood VI test strips (ASCENSIA AUTODISC VI, ONE TOUCH ULTRA TEST VI) strip   No Yes   Sig: Test strips for 30 day supply   hydrALAZINE (APRESOLINE) 25 mg tablet   No Yes   Sig: Take 1 Tab by mouth three (3) times daily. insulin glargine (LANTUS) 100 unit/mL injection   No Yes   Si Units by SubCUTAneous route nightly. insulin lispro (HUMALOG) 100 unit/mL injection   No Yes   Sig: 15 Units by SubCUTAneous route three (3) times daily (with meals). isosorbide dinitrate (ISORDIL) 20 mg tablet   No Yes   Sig: Take 1 Tab by mouth three (3) times daily. levalbuterol (XOPENEX) 0.63 mg/3 mL nebu   Yes Yes   Si.63 mg by Nebulization route. nitroglycerin (NITROSTAT) 0.4 mg SL tablet  Self No Yes   Si Tab by SubLINGual route every five (5) minutes as needed for Chest Pain. oxyCODONE IR (OXY-IR) 15 mg immediate release tablet   Yes Yes   Sig: Take 15 mg by mouth every four (4) hours as needed for Pain.   polyethylene glycol (MIRALAX) 17 gram packet   No Yes   Sig: Take 1 Packet by mouth daily as needed (constipation). pravastatin (PRAVACHOL) 80 mg tablet   No Yes   Sig: Take 1 Tab by mouth nightly. ranitidine (ZANTAC) 150 mg tablet   Yes Yes   Sig: Take 150 mg by mouth nightly. sacubitril-valsartan (ENTRESTO) 24 mg/26 mg tablet   No Yes   Sig: Take 1 Tab by mouth every twelve (12) hours. sennosides (SENNA) 8.6 mg cap   Yes Yes   Sig: Take 17.2 mg by mouth nightly. spironolactone (ALDACTONE) 25 mg tablet   No Yes   Sig: Take 1 Tab by mouth daily. Facility-Administered Medications: None       Past Medical History:   Diagnosis Date    Acute encephalopathy 5799    Acute systolic heart failure ([facility] May, 2009    Also had transient acute renal failure secondary to poor perfusion.     AICD (automatic cardioverter/defibrillator) present 10/21/2015    Atypical chest pain 2010    Bronchitis     CAD (coronary artery disease)     Cardiomyopathy     Chest pain 10/21/2015    Chronic kidney disease     renal insufficiency    Chronic pain     back    Congestive heart failure (CHF) ([facility] [address] 10/21/2015    COPD     Diabetes ([facility] [address]     type 2 insulin reliant- BS average- 160's-180's    Diabetes mellitus type II, uncontrolled ([facility] 7/2/2013    GERD (gastroesophageal reflux disease)     Gout     Heart failure ([facility]     cardiomyopathy with ef 10-20%    Hypertension     Hyponatremia 12/20/2010    Ill-defined condition     gout, neuropathy, sciatica    Morbid obesity (HCC)     Nausea & vomiting 11/30/2015    Neuropathy     Obstructive sleep apnea 2/15/2010    Other unknown and unspecified cause of morbidity or mortality     Gout    Severe sepsis ([facility]     Unspecified sleep apnea     cpap     Past Surgical History:   Procedure Laterality Date    CHEST SURGERY PROCEDURE UNLISTED      thorocentesis    HX HEART CATHETERIZATION  Sandy 10, 2008    Severe multivessel disease with severe LV dysfunction    HX PACEMAKER      may 2010     Social History     Social History    Marital status:      Spouse name: N/A    Number of children: N/A    Years of education: N/A     Occupational History    Not on file.      Social History Main Topics    Smoking status: Former Smoker     Packs/day: 0.25     Years: 1.00     Quit date: 7/12/1984    Smokeless tobacco: Never Used      Comment: pt states that he only tried smoking as a kid     Alcohol use No    Drug use: No    Sexual activity: Not on file     Other Topics Concern    Not on file     Social History Narrative     Family History   Problem Relation Age of Onset    Heart Disease Father     Stroke Father     Diabetes Sister     Stroke Sister     Diabetes Sister     Heart Disease Other      Allergies   Allergen Reactions    Dilaudid [Hydromorphone] Other (comments)     Hotflashes, patient states he's not allergic    Iodinated Contrast Media - Oral And Iv Dye Other (comments)     \"shuts my kidneys down\"    Penicillins Unknown (comments)     Pt states he is not allergic his mother just wouldn't allow him take it       Current Facility-Administered Medications   Medication Dose Route Frequency    sodium polystyrene (KAYEXALATE) 15 gram/60 mL oral suspension 30 g  30 g Oral Q6H    sodium bicarbonate (8.4%) 150 mEq in dextrose 5% 1,000 mL infusion   IntraVENous CONTINUOUS    cefTRIAXone (ROCEPHIN) 2 g in 0.9% sodium chloride (MBP/ADV) 50 mL  2 g IntraVENous Q24H    haloperidol lactate (HALDOL) injection 2 mg  2 mg IntraVENous Q8H PRN    LORazepam (ATIVAN) injection 0.5 mg  0.5 mg IntraVENous Q6H PRN    mupirocin (BACTROBAN) 2 % ointment   Both Nostrils BID    lip protectant (BLISTEX) ointment   Topical PRN    bumetanide (BUMEX) 12.5 mg in 0.9% sodium chloride 100 mL infusion  1 mg/hr IntraVENous CONTINUOUS    levalbuterol (XOPENEX) nebulizer soln 0.63 mg/3 mL  0.63 mg Nebulization Q6H PRN    sodium chloride (NS) flush 5-10 mL  5-10 mL IntraVENous Q8H    sodium chloride (NS) flush 5-10 mL  5-10 mL IntraVENous PRN    insulin lispro (HUMALOG) injection   SubCUTAneous Q6H    dextrose 40% (GLUTOSE) oral gel 1 Tube  15 g Oral PRN    glucagon (GLUCAGEN) injection 1 mg  1 mg IntraMUSCular PRN    dextrose (D50W) injection syrg 12.5-25 g  25-50 mL IntraVENous PRN    heparin (porcine) injection 5,000 Units  5,000 Units SubCUTAneous Q8H    DOBUTamine (DOBUTREX) 500 mg/250 mL (2,000 mcg/mL) infusion  2.5-10 mcg/kg/min IntraVENous TITRATE    DOPamine (INTROPIN) 800 mg/250 mL (3,200 mcg/mL) infusion  5-20 mcg/kg/min IntraVENous TITRATE         Objective:     Vitals:    03/21/17 0430 03/21/17 0530 03/21/17 0553 03/21/17 0856   BP: 164/86 (!) 159/92 154/61 139/79   Pulse: 82 83 80 80   Resp:  20  18   Temp:  97.4 °F (36.3 °C)  98.8 °F (37.1 °C)   SpO2:  100%  100%   Weight:  (!) 434 lb (196.9 kg)     Height:           PHYSICAL EXAM     Constitutional:  the patient is morbidly obese, on BiPAP  EENMT:  Sclera clear, pupils equal, oral mucosa moist  Respiratory: very distant breath sounds  Cardiovascular:  RRR without M,G,R  Gastrointestinal: soft and non-tender; with positive bowel sounds. Musculoskeletal: warm without cyanosis.  There is 2+ lower leg edema, ROOK boots in place. Skin:  no jaundice or rashes  Neurologic: no gross neuro deficits     Psychiatric:  lethargic    Chest X-ray:            Ventilator Settings  Mode FIO2 Rate Tidal Volume Pressure PEEP      36 %                 Peak airway pressure:     Minute ventilation: 13.3 l/min     ABG:   Recent Labs      03/21/17   1105  03/21/17 0923 03/19/17 0335   PH  7.27*  7.28*  7.32*   PCO2  49*  49*  37   PO2  78  107*  65*   HCO3  22  23  19*          Recent Labs      03/20/17   0400  03/19/17 0315   WBC  6.6  7.5   HGB  10.0*  10.6*   HCT  31.2*  32.7*   PLT  234  264     Recent Labs      03/21/17   0515  03/20/17   2345  03/20/17 0400  03/19/17 0315 03/18/17 2203   03/18/17   1640   NA  139   --   136  137   --    --    --    K  5.8*  6.3*  6.3*  5.6*   --    < >   --    CL  102   --   102  101   --    --    --    GLU  88   --   80  79   --    --    --    CO2  24   --   21  22   --    --    --    BUN  116*   --   112*  110*   --    --    --    CREA  11.70*   --   11.10*  10.10*   --    --    --    CA  8.2*   --   8.2*  8.3   --    --    --    TROIQ   --    --    --    --   <0.02*   --   <0.02*   ALB   --    --    --   3.0*   --    --    --    TBILI   --    --    --   1.2*   --    --    --    ALT   --    --    --   35   --    --    --    SGOT   --    --    --   56*   --    --    --     < > = values in this interval not displayed. Recent Labs      03/21/17   1105 03/21/17 0923 03/19/17 0335   PH  7.27*  7.28*  7.32*   PCO2  49*  49*  37   PO2  78  107*  65*   HCO3  22  23  19*     No results for input(s): LCAD, LAC in the last 72 hours. Assessment:  (Medical Decision Making)     Hospital Problems  Date Reviewed: 3/21/2017          Codes Class Noted POA    Acute on chronic respiratory failure with hypoxia and hypercapnia (HCC)-on BiPAP with plans to transfer to ICU.  Full code but family meeting pending ICD-10-CM: J96.21, J96.22  ICD-9-CM: 518.84, 786.09, 799.02  3/21/2017 Unknown * (Principal)Cardiogenic shock (HCC)-on dobutamine/dopamine ICD-10-CM: R57.0  ICD-9-CM: 785.51  3/20/2017 Yes        Acute encephalopathy-likely secondary to renal failure, hyperkalemia, and respiratory failure ICD-10-CM: G93.40  ICD-9-CM: 348.30  3/18/2017 Unknown        Hyperkalemia-kayexalate ordered ICD-10-CM: E87.5  ICD-9-CM: 276.7  3/18/2017 Unknown        Acute renal failure (ARF) ([facility])-nephrology following  ICD-10-CM: N17.9  ICD-9-CM: 584.9  3/18/2017 Yes        Hypotension ICD-10-CM: I95.9  ICD-9-CM: 458.9  3/18/2017 Unknown        Morbid obesity with BMI of 60.0-69.9, adult ([facility])-chronic ICD-10-CM: E66.01, Z68.44  ICD-9-CM: 278.01, V85.44  11/28/2016 Yes        Obesity hypoventilation syndrome ([facility] ICD-10-CM: I38.6  ICD-9-CM: 278.03  10/24/2016 Yes        Hypertension ICD-10-CM: I10  ICD-9-CM: 401.9  11/30/2015 Yes        AICD (automatic cardioverter/defibrillator) present ICD-10-CM: Z95.810  ICD-9-CM: V45.02  10/21/2015 Yes        Congestive heart failure (CHF) ([facility] ICD-10-CM: I50.9  ICD-9-CM: 428.0  10/21/2015 Yes        Cardiomyopathy ([facility] (Chronic) ICD-10-CM: I42.9  ICD-9-CM: 425.4  10/21/2015 Yes        Dyslipidemia (Chronic) ICD-10-CM: E78.5  ICD-9-CM: 272.4  7/2/2013 Yes        Diabetes mellitus type II, uncontrolled ([facility] (Chronic) ICD-10-CM: E11.65  ICD-9-CM: 250.02  7/2/2013 Yes        Noncompliance with medication regimen (Chronic) ICD-10-CM: Z91.14  ICD-9-CM: V15.81  7/2/2013 Yes        Chronic back pain (Chronic) ICD-10-CM: M54.9, G89.29  ICD-9-CM: 724.5, 338.29  2/20/2010 Yes        Obstructive sleep apnea (Chronic) ICD-10-CM: G47.33  ICD-9-CM: 327.23  2/15/2010 Yes              Plan:  (Medical Decision Making)     --on BiPAP, will likely need to adjust settings  --transfer to ICU per primary  --currently full code, family meeting pending  --on Rocephin  --continue nebs  --kayexalate ordered  --dobutamine/dopamine per primary  --continue Bicarb drip  --prognosis guarded    More than 50% of the time documented was spent in face-to-face contact with the patient and in the care of the patient on the floor/unit where the patient is located. Thank you very much for this referral.  We appreciate the opportunity to participate in this patient's care. Will follow along with above stated plan. FATEMEH Salcido  Lungs:  rhonchi  Heart:  RRR with no Murmur/Rubs/Gallops    Additional Comments: To move to icu,  Not a candidate for dialysis per renal    I have spoken with and examined the patient. I agree with the above assessment and plan as documented.     Analisa Caballero MD

## 2017-03-21 NOTE — PROGRESS NOTES
[facility] Progress Note    Follow-Up on:  RED/CKD    ROS:  Gen - no fever, no chills  CV - no chest pain, no palpitation  Lung - + shortness of breath, no cough  Abd - no tenderness, no nausea/vomiting, no diarrhea  Ext - + edema    Exam:  Vitals:    03/21/17 0553 03/21/17 0856 03/21/17 1257 03/21/17 1332   BP: 154/61 139/79 162/76    Pulse: 80 80 93    Resp:  18 20    Temp:  98.8 °F (37.1 °C) 97.4 °F (36.3 °C)    SpO2:  100% 94% 98%   Weight:       Height:             Intake/Output Summary (Last 24 hours) at 03/21/17 1545  Last data filed at 03/21/17 1210   Gross per 24 hour   Intake          1773.62 ml   Output             3150 ml   Net         -1376.38 ml       Wt Readings from Last 3 Encounters:   03/21/17 (!) 196.9 kg (434 lb)   03/11/17 136.5 kg (301 lb)   02/24/17 (!) 187.3 kg (413 lb)       GEN - Bipap  CV - regular, no murmur, no rub  Lung - clear bilaterally  Abd - soft, nontender  Ext - 2+ edema    Recent Labs      03/20/17   0400  03/19/17   0315   WBC  6.6  7.5   HGB  10.0*  10.6*   HCT  31.2*  32.7*   PLT  234  264        Recent Labs      03/21/17   0515  03/20/17   2345  03/20/17   0400  03/19/17   0315   NA  139   --   136  137   K  5.8*  6.3*  6.3*  5.6*   CL  102   --   102  101   CO2  24   --   21 22   BUN  116*   --   112*  110*   CREA  11.70*   --   11.10*  10.10*   CA  8.2*   --   8.2*  8.3   GLU  88   --   80  79       Assessment / Plan:  Principal Problem:    Cardiogenic shock ([facility] [address] (3/20/2017)    Active Problems:    Obstructive sleep apnea (2/15/2010)      Chronic back pain (2/20/2010)      Dyslipidemia (7/2/2013)      Diabetes mellitus type II, uncontrolled ([facility] (7/2/2013)      Noncompliance with medication regimen (7/2/2013)      AICD (automatic cardioverter/defibrillator) present (10/21/2015)      Congestive heart failure ([facility]F) ([facility] (10/21/2015)      Cardiomyopathy ([facility] (10/21/2015)      Hypertension (11/30/2015)      Obesity hypoventilation syndrome ([facility] (10/24/2016) Morbid obesity with BMI of 60.0-69.9, adult ([facility] [address] [facility] (11/28/2016)      Acute encephalopathy (3/18/2017)      Hyperkalemia (3/18/2017)      Acute renal failure (ARF) ([address] (3/18/2017)      Hypotension (3/18/2017)      Acute on chronic respiratory failure with hypoxia and hypercapnia ([facility] [address] (3/21/2017)        1. RED/CKD  - Cr up to 11.7  - Significant component of cardiorenal syndrome  - As discussed before, patient is a poor dialysis candidate, especially long-term with severe cardiomyopathy and morbid obesity  - Had long discussion with patient and family, he insist on trying dialysis despite knowing it can potentially do more harm than good  - He is poor candidate for femoral line due to morbid obesity, will ask IR to place IJ Trialysis catheter, if unable, may need to ask Vascular or Critical Care to place at bedside  2. Severe cardiomyopathy  3. Respiratory distress  4.  Morbid obesity

## 2017-03-21 NOTE — PROGRESS NOTES
Dobbhoff tube draining dark maroon appearing fluid of a gelatinous nature. Called and spoke with Dr. Emilie De La Vega MD to come see patient.

## 2017-03-21 NOTE — PROGRESS NOTES
TRANSFER - IN REPORT:    Verbal report received from Jhony(name) on [employer]  being received from ICU (unit) for routine progression of care      Report consisted of patients Situation, Background, Assessment and   Recommendations(SBAR). Information from the following report(s) SBAR, Kardex, Procedure Summary, Intake/Output, MAR and Recent Results was reviewed with the receiving nurse. Opportunity for questions and clarification was provided. Assessment completed upon patients arrival to unit and care assumed.

## 2017-03-21 NOTE — PROGRESS NOTES
Awaiting transfer to ICU. Waiting until after family meeting to proceed with intubation and/or  starting tube feeding per Dr. Traci Dias. RT making frequent rounds on pt. Will continue to monitor. Family at bedside. Pt awake and alert at this time. Pt agitated and states that he wants to remove BIPAP mask. Bilat wrist restraints remain in place per MD order. RT and nursing has explained to pt the importance of BIPAP. Pt does not voice understanding but allows mask to remain in place at this time.

## 2017-03-21 NOTE — PROGRESS NOTES
Bedside and Verbal shift change report given to Cara Urrutia. Report included the following information SBAR, Kardex, MAR, Accordion and Recent Results.

## 2017-03-21 NOTE — PROGRESS NOTES
Hospitalist Progress Note    3/21/2017  Admit Date: 3/18/2017 10:42 AM   NAME: Eduardo Hill   :  1965   DOS:              17  MRN:  [id number]   Attending: Nelida Ruiz MD  PCP:  Juan Perez MD  Treatment Team: Attending Provider: Osorio Smallwood MD; Consulting Provider: Amaya Malagon MD; Consulting Provider: Jed Parker MD; Consulting Provider: Lucy Griffin MD; Consulting Provider: Dre Lucero MD; Care Manager: Blanka Gotti RN; Consulting Provider: Canelo Isabel NP; Consulting Provider: Martha Dempsey MD    Full Code     SUBJECTIVE:   As previously documented: \" Mr. Fracisco Mina is a 44 yo male with PMHx of sCHF EF 10% with ICD, CKD, DM2, KELSIE on CPAP, HTN, chronic hypoxic respiratory failure on home O2 at 3 L evaluated with acute metabolic encephalopathy. Found to have progressive renal failure with creatinine of >9 and hyperkalemia of 6.0 with hypercapneic respiratory failure requiring ICU care for BIPAP. Additionally has been seen by cardiology and placed on dopamine/dobutamine. Nephrology has evaluated and he is not a dialysis candidate. He is being diuresed with bumex drip. Renal US no acute issues. Ammonia slightly elevated, too large for CT scanner for CT head and has intermittent delirium. CXR with pulm edema. UA positive ans started on IV Rocephin, although negative urine cx. Urology placed hansen due to difficulty. Family declines hospice, well known to palliative care\"            16  Mr. Rebecca Pollard  Continue to be in restrains with on/off agitation. Responds to verbal stimuli. Afebrile. Creatinine getting worse - 11.7, although started to make urine. PH:7.27. On Nc 4L. No obvious acute distress. Family meeting today with mother, sisters, aunt - Full code. Unable to review to to patient clinical status.    Allergies   Allergen Reactions    Dilaudid [Hydromorphone] Other (comments)     Hotflashes, patient states he's not allergic    Iodinated Contrast Media - Oral And Iv Dye Other (comments)     \"shuts my kidneys down\"    Penicillins Unknown (comments)     Pt states he is not allergic his mother just wouldn't allow him take it     Current Facility-Administered Medications   Medication Dose Route Frequency Provider Last Rate Last Dose    sodium polystyrene (KAYEXALATE) 15 gram/60 mL oral suspension 30 g  30 g Oral Q6H Marylee Alamin, MD   30 g at 03/21/17 1651    sodium bicarbonate (8.4%) 150 mEq in dextrose 5% 1,000 mL infusion   IntraVENous CONTINUOUS Marylee Alamin, MD 42 mL/hr at 03/21/17 1105      cefTRIAXone (ROCEPHIN) 2 g in 0.9% sodium chloride (MBP/ADV) 50 mL  2 g IntraVENous Q24H Shon Pino  mL/hr at 03/20/17 1941 2 g at 03/20/17 1941    haloperidol lactate (HALDOL) injection 2 mg  2 mg IntraVENous Q8H PRN MICKEY Su MD        LORazepam (ATIVAN) injection 0.5 mg  0.5 mg IntraVENous Q6H PRN Jose Hastings MD   0.5 mg at 03/21/17 0154    mupirocin (BACTROBAN) 2 % ointment   Both Nostrils BID Shon Pino MD        lip protectant (BLISTEX) ointment   Topical PRN Shon Pino MD        bumetanide (BUMEX) 12.5 mg in 0.9% sodium chloride 100 mL infusion  1 mg/hr IntraVENous CONTINUOUS Mely Amanda MD 8 mL/hr at 03/21/17 1648 1 mg/hr at 03/21/17 1648    levalbuterol (XOPENEX) nebulizer soln 0.63 mg/3 mL  0.63 mg Nebulization Q6H PRN FATEMEH Silveira        sodium chloride (NS) flush 5-10 mL  5-10 mL IntraVENous Q8H FATEMEH Silveira   10 mL at 03/21/17 1652    sodium chloride (NS) flush 5-10 mL  5-10 mL IntraVENous PRN FATEMEH Silveira        insulin lispro (HUMALOG) injection   SubCUTAneous Q6H FATEMEH Silveira   Stopped at 03/18/17 1800    dextrose 40% (GLUTOSE) oral gel 1 Tube  15 g Oral PRN FATEMEH Silveira        glucagon (GLUCAGEN) injection 1 mg  1 mg IntraMUSCular PRN FATEMEH Silveira        dextrose (D50W) injection syrg 12.5-25 g  25-50 mL IntraVENous PRN Eric Maple FATEMEH Sue   25 g at 17 1913    heparin (porcine) injection 5,000 Units  5,000 Units SubCUTAneous Q8H FATEMEH Arteaga   5,000 Units at 17 1049    DOBUTamine (DOBUTREX) 500 mg/250 mL (2,000 mcg/mL) infusion  2.5-10 mcg/kg/min IntraVENous TITRATE Haroon Dubois MD   Stopped at 17 1315    DOPamine (INTROPIN) 800 mg/250 mL (3,200 mcg/mL) infusion  5-20 mcg/kg/min IntraVENous TITRATE Analisa Caballero MD   Stopped at 17 1315           PHYSICAL EXAM     Visit Vitals    /76 (BP 1 Location: Left arm, BP Patient Position: At rest)    Pulse 93    Temp 97.4 °F (36.3 °C)    Resp 20    Ht 5' 6\" (1.676 m)    Wt (!) 196.9 kg (434 lb)    SpO2 98%    BMI 70.05 kg/m2      Temp (24hrs), Av.8 °F (36.6 °C), Min:97.4 °F (36.3 °C), Max:98.8 °F (37.1 °C)    Oxygen Therapy  O2 Sat (%): 98 % (17 1332)  Pulse via Oximetry: 78 beats per minute (17 1332)  O2 Device: BIPAP (17 1332)  O2 Flow Rate (L/min): 4 l/min (17 1332)  O2 Temperature: 88 °F (31.1 °C) (17 0009)  FIO2 (%): 36 % (17 1448)    Intake/Output Summary (Last 24 hours) at 17 1739  Last data filed at 17 1210   Gross per 24 hour   Intake          1773.62 ml   Output             3150 ml   Net         -1376.38 ml        Physical Exam:  General:         Lethargic. Agitation on/off. In soft restrains. Obese. NAD. HEENT:               NCAT. No obvious deformity. Nares normal. No drainage  Lungs:                  Decreased air entry b/l. No wheezing/rhonchi/rales On BIPAP  Cardiovascular:   RRR. No m/r/g. +2-3 pitting pedal edema b/l. +2 PT/DT pulses b/l. Abdomen:       S/nt/nd. Bowel sounds normal. .   Skin:         No rashes or lesions. Not Jaundiced  Neurologic:    Lethargic. Answer to some questions. Moves all extremities. Limited exam due to clinical status.    Psychiatric:        Unable to review due to clinica  status              DIAGNOSTIC STUDIES      Data Review:   Recent Results (from the past 24 hour(s))   GLUCOSE, POC    Collection Time: 03/20/17  7:14 PM   Result Value Ref Range    Glucose (POC) 89 65 - 100 mg/dL   POTASSIUM    Collection Time: 03/20/17 11:45 PM   Result Value Ref Range    Potassium 6.3 (HH) 3.5 - 5.1 mmol/L   OCCULT BLOOD, GASTRIC    Collection Time: 03/20/17 11:45 PM   Result Value Ref Range    OCCULT BLOOD,GASTRIC NEGATIVE  NEG      pH,GASTRIC 4.0 (H) 1.5 - 3.5     GLUCOSE, POC    Collection Time: 03/21/17 12:30 AM   Result Value Ref Range    Glucose (POC) 84 65 - 307 mg/dL   METABOLIC PANEL, BASIC    Collection Time: 03/21/17  5:15 AM   Result Value Ref Range    Sodium 139 136 - 145 mmol/L    Potassium 5.8 (H) 3.5 - 5.1 mmol/L    Chloride 102 98 - 107 mmol/L    CO2 24 21 - 32 mmol/L    Anion gap 13 7 - 16 mmol/L    Glucose 88 65 - 100 mg/dL     (H) 6 - 23 MG/DL    Creatinine 11.70 (H) 0.8 - 1.5 MG/DL    GFR est AA 6 (L) >60 ml/min/1.73m2    GFR est non-AA 5 (L) >60 ml/min/1.73m2    Calcium 8.2 (L) 8.3 - 10.4 MG/DL   GLUCOSE, POC    Collection Time: 03/21/17  5:40 AM   Result Value Ref Range    Glucose (POC) 94 65 - 100 mg/dL   BLOOD GAS, ARTERIAL    Collection Time: 03/21/17  9:23 AM   Result Value Ref Range    pH 7.28 (L) 7.35 - 7.45      PCO2 49 (H) 35.0 - 45.0 mmHg    PO2 107 (H) 75.0 - 100.0 mmHg    BICARBONATE 23 22.0 - 26.0 mmol/L    BASE DEFICIT 4.1 (H) 0 - 2 mmol/L    TOTAL HEMOGLOBIN 10.9 (L) 11.7 - 15.0 GM/DL    O2 SAT 98 92.0 - 98.5 %    ARTERIAL O2 HGB 95.1 94.0 - 97.0 %    CARBOXYHEMOGLOBIN 2.2 (H) 0.5 - 1.5 %    METHEMOGLOBIN 0.3 0.0 - 1.5 %    DEOXYHEMOGLOBIN 2 0.0 - 5.0 %    SITE RB     ALLENS TEST NA     MODE BIPAP     O2 FLOW 5.00 L/min    RATE 14.0      PEEP/CPAP 8.0      Respiratory comment: Dr. Reilly Cerda at 3 21 2017 9 29 32 AM. Read back.     BLOOD GAS & LYTES, ARTERIAL    Collection Time: 03/21/17 11:05 AM   Result Value Ref Range    pH 7.27 (L) 7.35 - 7.45      PCO2 49 (H) 35.0 - 45.0 mmHg    PO2 78 75.0 - 100.0 mmHg    BICARBONATE 22 22.0 - 26.0 mmol/L    BASE DEFICIT 5.1 (H) 0 - 2 mmol/L    TOTAL HEMOGLOBIN 10.9 (L) 11.7 - 15.0 GM/DL    O2 SAT 93 92.0 - 98.5 %    ARTERIAL O2 HGB 91.7 (L) 94.0 - 97.0 %    CARBOXYHEMOGLOBIN 0.9 0.5 - 1.5 %    METHEMOGLOBIN 0.6 0.0 - 1.5 %    DEOXYHEMOGLOBIN 7 (H) 0.0 - 5.0 %    Sodium 135.9 135 - 148 MMOL/L    POTASSIUM 5.79 (H) 3.5 - 5.3 MMOL/L    CHLORIDE 101 98 - 106      Calcium, ionized 1.02 1.0 - 1.3 mmol/L    SITE RB     ALLENS TEST NA     MODE BIPAP     O2 FLOW 4.00 L/min    RATE 22.0      PEEP/CPAP 8.0      Respiratory comment:  at 3 21 2017 11 11 07 AM. Read back. GLUCOSE, POC    Collection Time: 03/21/17  1:09 PM   Result Value Ref Range    Glucose (POC) 110 (H) 65 - 100 mg/dL     Imaging /Procedures /Studies:    CXR Results  (Last 48 hours)               03/20/17 0650  XR CHEST SNGL V Final result    Impression:  IMPRESSION:       Right-sided IJ line. No evidence of pneumothorax. Narrative:   Portable view of the chest        COMPARISON: March 20, 2017. CLINICAL HISTORY: Line placement. FINDINGS:       Right-sided IJ line tip is in the area of SVC. No evidence of pneumothorax. Left-sided cardiac pacer is stable. Bilateral groundglass densities, similar to   prior exam. Cardiac mediastinal contour and the surrounding bones are stable. 03/20/17 0454  XR CHEST PORT Final result    Impression:  IMPRESSION:       1. Previous right-sided IJ line has been removed. No evidence of pneumothorax. 2. Bilateral groundglass densities, suspicious for pulmonary edema. Narrative:  Portable chest xray         COMPARISON: March 18, 2017. CLINICAL HISTORY: Central line. FINDINGS:       Left-sided IJ line has been removed. No evidence of pneumothorax. The left-sided   cardiac pacer is stable. Lungs are underinflated. Bilateral groundglass   densities may be due to pulmonary edema. Cardiac mediastinal contour and the   surrounding bones are stable. Echocardiogram 2/14/2017:SUMMARY:  -  Left ventricle: The ventricle was severely dilated. Systolic function was  severely reduced. Ejection fraction was estimated in the range of 10 % to 15   %. This study was inadequate for the evaluation of regional wall motion. Wall  thickness was mildly increased. -  Right ventricle: Systolic function was reduced. -  Left atrium: The atrium was markedly dilated. -  Right atrium: The atrium was moderately dilated. -  Inferior vena cava, hepatic veins: The inferior vena cava was moderately  dilated. Respirophasic changes in dimension were absent. -  Mitral valve: There was mild regurgitation. US retroperitoneal: IMPRESSION  Impression: Unremarkable right kidney, nonvisualization of the left kidney,  aorta, or bladder    Labs and Studies from previous 24 hours have been personally reviewed by myself Vauth Jered    Diagnosis Date Noted    Acute on chronic respiratory failure with hypoxia and hypercapnia (HCC) 03/21/2017    Cardiogenic shock (HCC) 03/20/2017    Acute encephalopathy 03/18/2017    Hyperkalemia 03/18/2017    Acute renal failure (ARF) ([address] 03/18/2017    Hypotension 03/18/2017    Morbid obesity with BMI of 60.0-69.9, adult ([address] 11/28/2016    Obesity hypoventilation syndrome ([address] 10/24/2016    Hypertension 11/30/2015    Cardiomyopathy ([address] 10/21/2015    AICD (automatic cardioverter/defibrillator) present 10/21/2015    Congestive heart failure (CHF) ([address] 10/21/2015    Dyslipidemia 07/02/2013    Diabetes mellitus type II, uncontrolled ([address] 07/02/2013    Noncompliance with medication regimen 07/02/2013    Chronic back pain 02/20/2010    Obstructive sleep apnea 02/15/2010       Plan:    RED/CKD - Deteriorating renal function - acidotic - added bicarb ggt - per nephrology not a HD candidate. Likely cardiorenal syndrome.  Making some urine on bumex ggt    Severe cardiomyopathy - off Dobutamine/ Dopamine - management per cardiology     Acute hypoxic and respiratory : on BIPAP. Likely due to volume overload and morbid hypoventilation syndrome. Plans to transfer to ICU. Will consult pulmonary - appreciate the help    AMS - likely due to renal failure and respiratory failure. Urine culture negative - will stop IV Rocephin. Unable to get CT head due to morbid obesity. Hyperkalemia - Kayexalate, albuterol, IV calcium, IV Insulin/D50 - recheck in PM    Debility - discussed with multiple family members extensively for  45-60 minutes - Full CODE. Appreciate palliative care help. Nutrition - start TPN    Appreciate multiple consultants consultants    DVT Prophylaxis: Heparin SQ  CODE Status: full CODE  Plan of Care Discussed with: mother, daughter, sisters, aunts  Medical Risk: high  Disposition: transfer to ICU. Prognosis guarded.      Luciana Niño MD  03/21/17

## 2017-03-21 NOTE — PROGRESS NOTES
Bedside report received from Giuliano neal and Tamia Cisneros RN's. Dual head to toe skin and neuro assessment completed. Assumed care. Patient in bed awake with daughter at bedside. No questions or concerns voiced at this time. Neuro: Patient A&O X 4. Patient follows all commands. Patient Pupils PERRLA. Cardiac: NSR with 1st degree AV block. HR- 78. BP-158/63    Respiratory: Chest expansion symmetrical. RR- 32. 02 saturation 98% on BIPAP. Bilateral lung sounds diminished. Fluids/gtts: Bumex @ 8 ml/hr. Dobutamine 13.5 ml/hr. Dopamine @ 16.9 ml/hr. Sodium Bicarbonate 16.9 ml/hr. Lines: Quad Lumen Right Subclavian. Dobbhoff right nare.

## 2017-03-21 NOTE — PROGRESS NOTES
Spoke with Dr. Jelly Jama about KUB results. Orders received to start tube feeding at 15cc/hr at a continuous rate and check residuals q4.

## 2017-03-22 ENCOUNTER — APPOINTMENT (OUTPATIENT)
Dept: GENERAL RADIOLOGY | Age: 52
DRG: 682 | End: 2017-03-22
Attending: HOSPITALIST
Payer: MEDICARE

## 2017-03-22 ENCOUNTER — APPOINTMENT (OUTPATIENT)
Dept: INTERVENTIONAL RADIOLOGY/VASCULAR | Age: 52
DRG: 682 | End: 2017-03-22
Attending: INTERNAL MEDICINE
Payer: MEDICARE

## 2017-03-22 PROBLEM — E66.2 OBESITY HYPOVENTILATION SYNDROME (HCC): Chronic | Status: ACTIVE | Noted: 2017-03-18

## 2017-03-22 PROBLEM — G89.29 CHRONIC BACK PAIN: Chronic | Status: ACTIVE | Noted: 2017-03-18

## 2017-03-22 PROBLEM — I10 HYPERTENSION: Chronic | Status: ACTIVE | Noted: 2017-03-18

## 2017-03-22 PROBLEM — E87.5 HYPERKALEMIA: Status: RESOLVED | Noted: 2017-03-18 | Resolved: 2017-03-22

## 2017-03-22 PROBLEM — Z91.14 NONCOMPLIANCE WITH MEDICATION REGIMEN: Chronic | Status: ACTIVE | Noted: 2017-03-18

## 2017-03-22 PROBLEM — E78.5 DYSLIPIDEMIA: Chronic | Status: ACTIVE | Noted: 2017-03-18

## 2017-03-22 PROBLEM — E66.01 MORBID OBESITY WITH BMI OF 60.0-69.9, ADULT (HCC): Chronic | Status: ACTIVE | Noted: 2017-03-18

## 2017-03-22 PROBLEM — I50.9 CONGESTIVE HEART FAILURE (CHF) (HCC): Chronic | Status: ACTIVE | Noted: 2017-03-18

## 2017-03-22 PROBLEM — I42.9 CARDIOMYOPATHY (HCC): Chronic | Status: ACTIVE | Noted: 2017-03-18

## 2017-03-22 PROBLEM — M54.9 CHRONIC BACK PAIN: Chronic | Status: ACTIVE | Noted: 2017-03-18

## 2017-03-22 PROBLEM — G47.33 OBSTRUCTIVE SLEEP APNEA: Chronic | Status: ACTIVE | Noted: 2017-03-18

## 2017-03-22 PROBLEM — Z95.810 AICD (AUTOMATIC CARDIOVERTER/DEFIBRILLATOR) PRESENT: Chronic | Status: ACTIVE | Noted: 2017-03-18

## 2017-03-22 PROBLEM — I95.9 HYPOTENSION: Status: RESOLVED | Noted: 2017-03-18 | Resolved: 2017-03-22

## 2017-03-22 LAB
ALBUMIN SERPL BCP-MCNC: 2.9 G/DL (ref 3.5–5)
ALBUMIN/GLOB SERPL: 0.7 {RATIO} (ref 1.2–3.5)
ALP SERPL-CCNC: 160 U/L (ref 50–136)
ALT SERPL-CCNC: 34 U/L (ref 12–65)
ANION GAP BLD CALC-SCNC: 13 MMOL/L (ref 7–16)
ARTERIAL PATENCY WRIST A: POSITIVE
AST SERPL W P-5'-P-CCNC: 42 U/L (ref 15–37)
BASE DEFICIT BLDA-SCNC: 2.1 MMOL/L (ref 0–2)
BDY SITE: ABNORMAL
BILIRUB SERPL-MCNC: 1.4 MG/DL (ref 0.2–1.1)
BUN SERPL-MCNC: 109 MG/DL (ref 6–23)
CALCIUM SERPL-MCNC: 8.4 MG/DL (ref 8.3–10.4)
CHLORIDE SERPL-SCNC: 101 MMOL/L (ref 98–107)
CO2 SERPL-SCNC: 27 MMOL/L (ref 21–32)
COHGB MFR BLD: 2.1 % (ref 0.5–1.5)
CREAT SERPL-MCNC: 10.9 MG/DL (ref 0.8–1.5)
DO-HGB BLD-MCNC: 9 % (ref 0–5)
ERYTHROCYTE [DISTWIDTH] IN BLOOD BY AUTOMATED COUNT: 19.9 % (ref 11.9–14.6)
GAS FLOW.O2 O2 DELIVERY SYS: 4 L/MIN
GLOBULIN SER CALC-MCNC: 4.3 G/DL (ref 2.3–3.5)
GLUCOSE BLD STRIP.AUTO-MCNC: 103 MG/DL (ref 65–100)
GLUCOSE BLD STRIP.AUTO-MCNC: 121 MG/DL (ref 65–100)
GLUCOSE BLD STRIP.AUTO-MCNC: 122 MG/DL (ref 65–100)
GLUCOSE BLD STRIP.AUTO-MCNC: 125 MG/DL (ref 65–100)
GLUCOSE BLD STRIP.AUTO-MCNC: 125 MG/DL (ref 65–100)
GLUCOSE SERPL-MCNC: 117 MG/DL (ref 65–100)
HCO3 BLDA-SCNC: 24 MMOL/L (ref 22–26)
HCT VFR BLD AUTO: 31.8 % (ref 41.1–50.3)
HGB BLD-MCNC: 10.5 G/DL (ref 13.6–17.2)
HGB BLDMV-MCNC: 11.3 GM/DL (ref 11.7–15)
MAGNESIUM SERPL-MCNC: 1.9 MG/DL (ref 1.8–2.4)
MCH RBC QN AUTO: 23.7 PG (ref 26.1–32.9)
MCHC RBC AUTO-ENTMCNC: 33 G/DL (ref 31.4–35)
MCV RBC AUTO: 71.8 FL (ref 79.6–97.8)
METHGB MFR BLD: 0.2 % (ref 0–1.5)
MM INDURATION POC: NORMAL MM (ref 0–5)
OXYHGB MFR BLDA: 88.6 % (ref 94–97)
PCO2 BLDA: 49 MMHG (ref 35–45)
PH BLDA: 7.31 [PH] (ref 7.35–7.45)
PLATELET # BLD AUTO: 243 K/UL (ref 150–450)
PMV BLD AUTO: 9.5 FL (ref 10.8–14.1)
PO2 BLDA: 66 MMHG (ref 75–100)
POTASSIUM SERPL-SCNC: 4.6 MMOL/L (ref 3.5–5.1)
PPD POC: NEGATIVE NEGATIVE
PROT SERPL-MCNC: 7.2 G/DL (ref 6.3–8.2)
RBC # BLD AUTO: 4.43 M/UL (ref 4.23–5.67)
SAO2 % BLD: 91 % (ref 92–98.5)
SERVICE CMNT-IMP: ABNORMAL
SODIUM SERPL-SCNC: 141 MMOL/L (ref 136–145)
VENTILATION MODE VENT: ABNORMAL
WBC # BLD AUTO: 6 K/UL (ref 4.3–11.1)

## 2017-03-22 PROCEDURE — 99233 SBSQ HOSP IP/OBS HIGH 50: CPT | Performed by: INTERNAL MEDICINE

## 2017-03-22 PROCEDURE — 83735 ASSAY OF MAGNESIUM: CPT | Performed by: HOSPITALIST

## 2017-03-22 PROCEDURE — 74011250636 HC RX REV CODE- 250/636: Performed by: PHYSICIAN ASSISTANT

## 2017-03-22 PROCEDURE — 36600 WITHDRAWAL OF ARTERIAL BLOOD: CPT

## 2017-03-22 PROCEDURE — 82962 GLUCOSE BLOOD TEST: CPT

## 2017-03-22 PROCEDURE — 65610000001 HC ROOM ICU GENERAL

## 2017-03-22 PROCEDURE — 71010 XR CHEST PORT: CPT

## 2017-03-22 PROCEDURE — 85027 COMPLETE CBC AUTOMATED: CPT | Performed by: HOSPITALIST

## 2017-03-22 PROCEDURE — 77030020291 HC FLEXSEAL FMS BMS -C

## 2017-03-22 PROCEDURE — 77030018798 HC PMP KT ENTRL FED COVD -A

## 2017-03-22 PROCEDURE — 82803 BLOOD GASES ANY COMBINATION: CPT

## 2017-03-22 PROCEDURE — 74011000250 HC RX REV CODE- 250: Performed by: INTERNAL MEDICINE

## 2017-03-22 PROCEDURE — 94660 CPAP INITIATION&MGMT: CPT

## 2017-03-22 PROCEDURE — 80053 COMPREHEN METABOLIC PANEL: CPT | Performed by: HOSPITALIST

## 2017-03-22 PROCEDURE — 74011000258 HC RX REV CODE- 258: Performed by: INTERNAL MEDICINE

## 2017-03-22 PROCEDURE — 74011250637 HC RX REV CODE- 250/637: Performed by: HOSPITALIST

## 2017-03-22 RX ORDER — SODIUM POLYSTYRENE SULFONATE 15 G/60ML
30 SUSPENSION ORAL; RECTAL EVERY 6 HOURS
Status: COMPLETED | OUTPATIENT
Start: 2017-03-22 | End: 2017-03-22

## 2017-03-22 RX ADMIN — HEPARIN SODIUM 5000 UNITS: 5000 INJECTION, SOLUTION INTRAVENOUS; SUBCUTANEOUS at 08:29

## 2017-03-22 RX ADMIN — HEPARIN SODIUM 5000 UNITS: 5000 INJECTION, SOLUTION INTRAVENOUS; SUBCUTANEOUS at 23:38

## 2017-03-22 RX ADMIN — MUPIROCIN: 20 OINTMENT TOPICAL at 09:01

## 2017-03-22 RX ADMIN — Medication 10 ML: at 05:18

## 2017-03-22 RX ADMIN — SODIUM POLYSTYRENE SULFONATE 30 G: 15 SUSPENSION ORAL; RECTAL at 00:43

## 2017-03-22 RX ADMIN — HEPARIN SODIUM 5000 UNITS: 5000 INJECTION, SOLUTION INTRAVENOUS; SUBCUTANEOUS at 00:44

## 2017-03-22 RX ADMIN — Medication 10 ML: at 22:00

## 2017-03-22 RX ADMIN — Medication 10 ML: at 14:15

## 2017-03-22 RX ADMIN — MUPIROCIN: 20 OINTMENT TOPICAL at 17:47

## 2017-03-22 RX ADMIN — BUMETANIDE 1 MG/HR: 0.25 INJECTION INTRAMUSCULAR; INTRAVENOUS at 00:44

## 2017-03-22 RX ADMIN — BUMETANIDE 1 MG/HR: 0.25 INJECTION INTRAMUSCULAR; INTRAVENOUS at 13:32

## 2017-03-22 RX ADMIN — HEPARIN SODIUM 5000 UNITS: 5000 INJECTION, SOLUTION INTRAVENOUS; SUBCUTANEOUS at 16:52

## 2017-03-22 NOTE — PROGRESS NOTES
Eileen Bush, Palliative Care NP, states from previous admission it was found that the pt is legally . Therefore the wife is legally designated as the decision maker. Pt's sister and daughter refuse to provide contact information for the wife. Pt is unable to give contact information at this time. Pt states, \"we were  in August, I got admitted to the hospital and she abandoned me. I want my mom and daughter Edin Parr) to make decisions. \" Pt still in a vulnerable state due to high CO2 level and BUN.

## 2017-03-22 NOTE — PROGRESS NOTES
Upon entering pt's room, new visitor noted at bedside holding his hand. Pt informs this RN that the new visitor is his wife. Contact information obtained at this time and placed in chart.     Wife: Jose Maria Velez  Phone number: [phone]

## 2017-03-22 NOTE — PROGRESS NOTES
Problem: Nutrition Deficit  Goal: *Optimize nutritional status  Nutrition F/U:  Nutrition Consult for TF Management. (Dr. Reilly Cerda)  Assessment:  Weight 163.3 kg?????? (ICU bed 3/22/17), 196.6 kg (ICU bed 3/21/17), edema - 1+-2+. The patient was transferred to the ICU yesterday from telemetry. Noted increased uop yesterday while on Bumex drip. Labs are remarkable for decreased BUN, creatinine and potassium today. Bowel movement x 2 yesterday. He remains on BIPAP. Macronutrient Needs:  Estimated calorie needs - 8239-4942 dixon/day (11-14 dixon/kg/day)   Estimated protein needs - 52-65 gm pro/day (0.8-1 gm pro/kgIBW/day) (GFR 6 ml/min)  Max CHO/day - 345 gm CHO/day (50% dixon/day)   Fluid/day - 1 liter/day (RED)  Intake/Comparative Standards:  Current intake of TF (Suplena @ 15 ml/hr with a manual water flush 100 ml twice daily) provides 648 calories/day (30% calorie goal), 16 grams protein/day (31% protein goal), 74 grams CHO/day (does not exceed max CHO limit) and 464 ml water/day (44% fluid goal). Intervention:   Meals and Snacks: NPO. Enteral Nutrition: 1) Continue the use of Suplena while not being dialyzed. 2) Change feeding schedule to 18 ml/hr with a manual water flush 100 ml every 12 hours. 3) Increase TF as tolerated to the goal rate of 48 ml/hr - 648 calories/day (30% calorie goal), 16 grams protein/day (31% protein goal), 74 grams CHO/day (does not exceed max CHO limit) and 464 ml water/day (44% fluid goal). Coordination of Nutrition Care by a Nutrition Professional: AM ICU rounds, collaboration with Tamia Cisneros RN. Monitoring/Evaluation:  Monitor clinical course, POC, medication changes, GI function, TF tolerance, labs. Sam Douglas.  Ivonne Camargo  [phone]

## 2017-03-22 NOTE — PROGRESS NOTES
[facility] CARDIOLOGY PROGRESS NOTE           3/22/2017 7:52 AM    Admit Date: 3/18/2017      Subjective:   Gives the \"thumbs up sign: this am.    ROS:  GEN:  No fever or chills  Cardiovascular:  As noted above:no chest pain. Pulmonary:  As noted above:SOB is \"ok\". Neuro:  No new focal motor or sensory loss    Objective:      Vitals:    03/22/17 0530 03/22/17 0531 03/22/17 0600 03/22/17 0630   BP: 153/79  149/79 131/80   Pulse: 81  79 77   Resp: 15  (!) 36 11   Temp:       SpO2: (!) 86% 91% (!) 88% 94%   Weight:       Height:           Physical Exam:  General-alert on CPAP. Neck- supple, no JVD  CV- regular rate and rhythm no MRG  Lung- clear bilaterally  Abd- soft, nontender, nondistended  Ext- 2-+ edema bilaterally. Skin- warm and dry  Psychiatric:  Alert and calm  Neurologic:  Alert       Data Review:   Recent Labs      03/22/17   0412  03/21/17   1905  03/21/17   0515   NA  141   --   139   K  4.6  5.3*  5.8*   MG  1.9   --    --    BUN  109*   --   116*   CREA  10.90*   --   11.70*   GLU  117*   --   88   WBC  6.0  6.0   --    HGB  10.5*  10.3*   --    HCT  31.8*  31.5*   --    PLT  243  268   --        TELEMETRY:  NSR    Assessment/Plan:     Principal Problem:    Cardiogenic shock (HCC) (3/20/2017):BP has normalized. Tolerating Bumex drip. Active Problems:    Obstructive sleep apnea (3/18/2017)      Diabetes mellitus type II, uncontrolled ([facility] (3/18/2017)      AICD (automatic cardioverter/defibrillator) present (3/18/2017):no change      Cardiomyopathy ([address] (3/18/2017):End stage. Continue medical therapy. Obesity hypoventilation syndrome ([address] (3/18/2017)      Morbid obesity with BMI of 60.0-69.9, adult ([address] (3/18/2017)      Acute encephalopathy (3/18/2017):improved. Acute renal failure (ARF) ([facility]) (3/18/2017):Cardiorenal syndrome. Still making urine. Creatinine is drifting down.       Acute on chronic respiratory failure with hypoxia and hypercapnia ([facility]) (3/21/2017)                Zabrina Lara MD  3/22/2017 7:52 AM

## 2017-03-22 NOTE — PROGRESS NOTES
Interdisciplinary team rounds were held 3/22/2017 with the following team members:Care Management, Nursing, Nurse Practitioner, Nutrition, Palliative Care, Pastoral Care, Pharmacy, Physical Therapy, Physician, Respiratory Therapy and Clinical Coordinator and the patient. Plan of care discussed. See clinical pathway and/or care plan for interventions and desired outcomes.

## 2017-03-22 NOTE — PROGRESS NOTES
[facility]        Subjective: Acute on CKD,  Poorly responsive. Review of Systems -   Can not be obtained due to pt's condition. Objective:    Vitals:    03/22/17 0531 03/22/17 0600 03/22/17 0630 03/22/17 0814   BP:  149/79 131/80    Pulse:  79 77    Resp:  (!) 36 11    Temp:       SpO2: 91% (!) 88% 94% 93%   Weight:       Height:           PE  Gen: on BiPAP,  unresponsive  CV:reg rate  Chest: bilat breath sounds  Abd: sofr  Ext/Access: no line yet    . LAB  Recent Labs      03/22/17 0412 03/21/17   1905 03/20/17   0400   WBC  6.0  6.0  6.6   HGB  10.5*  10.3*  10.0*   HCT  31.8*  31.5*  31.2*   PLT  243  268  234     Recent Labs      03/22/17 0412 03/21/17   1905 03/21/17   0515   03/20/17   0400   NA  141   --   139   --   136   K  4.6  5.3*  5.8*   < >  6.3*   CL  101   --   102   --   102   CO2  27   --   24   --   21   GLU  117*   --   88   --   80   BUN  109*   --   116*   --   112*   CREA  10.90*   --   11.70*   --   11.10*   MG  1.9   --    --    --    --    CA  8.4   --   8.2*   --   8.2*   ALB  2.9*   --    --    --    --    TBILI  1.4*   --    --    --    --    ALT  34   --    --    --    --    SGOT  42*   --    --    --    --     < > = values in this interval not displayed.            Radiology    A/P:   Patient Active Problem List   Diagnosis Code    Obstructive sleep apnea G47.33    Chronic back pain M54.9, G89.29    Nonischemic dilated cardiomyopathy (HCC) I42.9    Dyslipidemia E78.5    Diabetes mellitus type II, uncontrolled ([facility] E11.65    Noncompliance with medication regimen Z91.14    Pleural effusion J90    CKD (chronic kidney disease) stage 3, GFR 30-59 ml/min N18.3    Chronic pancreatitis (HCC) K86.1    Abdominal fluid collection R18.8    Chest pain R07.9    AICD (automatic cardioverter/defibrillator) present Z95.810    Congestive heart failure (CHF) (HCC) I50.9    Cardiomyopathy (HCC) I42.9    Nausea & vomiting R11.2    Hypertension I10    Acute on chronic systolic (congestive) heart failure ([facility]) I50.23    Atrial flutter ([facility]) I48.92    Obesity hypoventilation syndrome ([facility]) E66.2    Morbid obesity with BMI of 60.0-69.9, adult ([facility]) E66.01, Z68.44    Hypoxemia R09.02    Hypersomnia G47.10    Constipation K59.00    Syncope R55    Nonsustained ventricular tachycardia ([facility]) I47.2    NSVT (nonsustained ventricular tachycardia) ([facility]) I47.2    Acute encephalopathy G93.40    Acute renal failure (ARF) ([facility]) N17.9    Cardiogenic shock ([facility]) R57.0    Acute on chronic respiratory failure with hypoxia and hypercapnia ([facility]) J96.21, J96.22       His urine output has picked up significantly on diuretics. Labs are slightly better. Awaiting dialysis cath placement. I will hold off on dialysis for today, given good urine output and slight improvement in labs. Prognosis remains guarded.         Aniket Camejo MD

## 2017-03-22 NOTE — PROGRESS NOTES
Palliative Care Progress Note    Patient: Chai Kim MRN: [id number]  SSN: [id number]    YOB: 1965  Age: 45 y.o. Sex: male       Assessment/Plan:     Chief Complaint/Interval History: On BiPAP, currently denies dyspnea     Principal Diagnosis:    · Fatigue, Lethargy  R53.83    Additional Diagnoses:   · Acute Respiratory Failure, Unspecified  J96.00  · Debility, Unspecified  R53.81  · Edema  R60.9  · Counseling, Encounter for Medical Advice  Z71.9  · Encounter for Palliative Care  Z51.5    Palliative Performance Scale (PPS)  PPS: 40    Medical Decision Making:   Reviewed and summarized notes over previous 48 hours. Discussed case with appropriate providers: ICU IDT; primary RN  Reviewed laboratory and x-ray data: CBC, CMP, ABG    Patient resting in bed, on BiPAP. Sister is at the bedside. Patient lethargic, but arousable. He denies pain. He denies dyspnea with BiPAP on. He is able to tell me that the cardiologist and nephrologist have been by today. But asks his sister to provide summary of their visits; patient then lethargic for remainder of visit. Patient's sister and family encouraged by increased in urine output and creatinine today. She states \"he's a fighter. He'll get back to the way he was before\". Validated her beliefs and hope. Counseled that his chronic conditions are progressive, and medically, we do not expect him to return to the way he was before. Hopefully, patient will become more alert and oriented, and be able to have goals of care discussions. Will continue to follow. Will discuss findings with members of the interdisciplinary team.         More than 50% of this 25 minute visit was spent counseling and coordination of care as outlined above. Subjective:     Review of Systems:  A comprehensive review of systems was negative except for:   Constitutional: Positive for fatigue. He denies pain. He denies dyspnea.      Objective:     Visit Vitals    /80    Pulse 77    Temp 97.7 °F (36.5 °C)    Resp 11    Ht 5' 6\" (1.676 m)    Wt (!) 196.9 kg (434 lb)    SpO2 99%    BMI 70.05 kg/m2       Physical Exam:    General:  Debilitated. Cooperative. No acute distress. Eyes:  Conjunctivae/corneas clear    Nose: Nares normal. Septum midline. O2 via BiPAP. Neck: Supple, symmetrical, trachea midline. Lungs:   Crackles anteriorly, unlabored. Heart:  Regular rate and rhythm. Abdomen:   Obese. Soft, non-tender, non-distended. Extremities: Normal, atraumatic, no cyanosis. Skin: Skin color, texture, turgor normal. No rash. Neurologic: Lethargic. Psych: Lethargic.      Signed By: Jeffrey Smallwood NP     March 22, 2017

## 2017-03-22 NOTE — PROGRESS NOTES
Bedside shift report received from Fabricio [facility]. Pt is resting in bed with family present. No signs of acute distress. Pt is A&Ox4 and following commands. Heart rhythm is SR with 1st degree AV block. Respirations are regular, pt on bipap, lung sounds are diminished. Dual skin assessment completed with Fabricio and Sadi Anaya, RNs. Skin intact with abrasion noted on left and right heels. Lines: Quad lumen right subclavian  Drips: bumex @ 8mL/hr, sodium bicarbonate @ 16. 9mL/hr  Drains: hansen, flexiseal    Will continue to monitor pt.

## 2017-03-22 NOTE — PROGRESS NOTES
Spiritual Care visit. Follow up visit. Patient did not respond.  left a tent card, and prayed for patient.     Visit by Carlene Garza M.Ed., Th.B. ,Staff

## 2017-03-22 NOTE — PROGRESS NOTES
Critical Care Daily Progress Note: 3/22/2017    Melissa Pollard   Admission Date: 3/18/2017         The patient's chart is reviewed and the patient is discussed with the staff. 45 y.o. Admitted by hospitalist service on 3/18/17 with acute on chronic kidney disease and hyperkalemia with acute encephalopathy. Pt also has a history of ICM with EF <10%, chronic respiratory failure on 3L O2, HTN, HLD, OHS/KELSIE, and CKD. Pt was admitted to ICU and has been off and on BiPAP. Pt transferred out of ICU on 3/20. He required BiPAP secondary to lethargy and pH 7.28. Pt's pCO2 has remained around 48.9-49.4 on 18/8 with rate 22. We are being consulted secondary to respiratory failure. Pt lethargic and unable to participate in the interview. Pt's sister at bedside and she doesn't know anything. She reports that pt is a full code. There is a family meeting that is supposed to occur later today. Subjective:     Lying in bed and remains on BIPAP. Asking when he \"can get the mask off\"? Family member a the bedside. Frequent jerking movements.     Current Facility-Administered Medications   Medication Dose Route Frequency    sodium bicarbonate (8.4%) 150 mEq in dextrose 5% 1,000 mL infusion   IntraVENous CONTINUOUS    haloperidol lactate (HALDOL) injection 2 mg  2 mg IntraVENous Q8H PRN    LORazepam (ATIVAN) injection 0.5 mg  0.5 mg IntraVENous Q6H PRN    mupirocin (BACTROBAN) 2 % ointment   Both Nostrils BID    lip protectant (BLISTEX) ointment   Topical PRN    bumetanide (BUMEX) 12.5 mg in 0.9% sodium chloride 100 mL infusion  1 mg/hr IntraVENous CONTINUOUS    levalbuterol (XOPENEX) nebulizer soln 0.63 mg/3 mL  0.63 mg Nebulization Q6H PRN    sodium chloride (NS) flush 5-10 mL  5-10 mL IntraVENous Q8H    sodium chloride (NS) flush 5-10 mL  5-10 mL IntraVENous PRN    insulin lispro (HUMALOG) injection   SubCUTAneous Q6H    dextrose 40% (GLUTOSE) oral gel 1 Tube  15 g Oral PRN    glucagon (GLUCAGEN) injection 1 mg  1 mg IntraMUSCular PRN    dextrose (D50W) injection syrg 12.5-25 g  25-50 mL IntraVENous PRN    heparin (porcine) injection 5,000 Units  5,000 Units SubCUTAneous Q8H    DOBUTamine (DOBUTREX) 500 mg/250 mL (2,000 mcg/mL) infusion  2.5-10 mcg/kg/min IntraVENous TITRATE    DOPamine (INTROPIN) 800 mg/250 mL (3,200 mcg/mL) infusion  5-20 mcg/kg/min IntraVENous TITRATE       Review of Systems  Constitutional:  negative for fever, chills, sweats  Cardiovascular:  Edema, negative for chest pain, palpitations, syncope  Gastrointestinal:  TFs, negative for dysphagia, reflux, vomiting, diarrhea, abdominal pain, or melena  Neurologic:  negative for focal weakness, numbness, headache      Objective:     Vitals:    03/22/17 0530 03/22/17 0531 03/22/17 0600 03/22/17 0630   BP: 153/79  149/79 131/80   Pulse: 81  79 77   Resp: 15  (!) 36 11   Temp:       SpO2: (!) 86% 91% (!) 88% 94%   Weight:       Height:           Intake and Output:   03/20 1901 - 03/22 0700  In: 2953.3 [I.V.:2273.3]  Out: 47822 [Urine:87436]       Physical Exam:          Constitutional:  the patient is morbidly obese and in no acute distress, BIPAP 36%, sat 94%  EENMT:  Sclera clear, pupils equal, oral mucosa moist  Respiratory: few anterior crackles, no wheezing  Cardiovascular:  RRR without M,G,R  Gastrointestinal: soft, obese and non-tender; with positive bowel sounds. Musculoskeletal: warm without cyanosis. There is bilateral 2-4+ lower leg edema.   Skin:  no jaundice or rashes, no wounds   Neurologic: no gross neuro deficits     Psychiatric:  alert and oriented x 2    LINES:  Right subclavian, FT, peripheral site, hansen    DRIPS:   Bumex, Bicarb    CXR: none today      LAB  Recent Labs      03/22/17   0552  03/22/17   0037  03/21/17   1812  03/21/17   1309  03/21/17   0540   GLUCPOC  125*  103*  106*  110*  94      Recent Labs      03/22/17   0412  03/21/17   1905  03/20/17   0400   WBC  6.0  6.0  6.6   HGB  10.5*  10.3* 10.0*   HCT  31.8*  31.5*  31.2*   PLT  243  268  234     Recent Labs      03/22/17   0412  03/21/17   1905  03/21/17   0515   03/20/17   0400   NA  141   --   139   --   136   K  4.6  5.3*  5.8*   < >  6.3*   CL  101   --   102   --   102   CO2  27   --   24   --   21   GLU  117*   --   88   --   80   BUN  109*   --   116*   --   112*   CREA  10.90*   --   11.70*   --   11.10*   MG  1.9   --    --    --    --    CA  8.4   --   8.2*   --   8.2*   ALB  2.9*   --    --    --    --    TBILI  1.4*   --    --    --    --    ALT  34   --    --    --    --    SGOT  42*   --    --    --    --     < > = values in this interval not displayed. Recent Labs      03/21/17   1105  03/21/17   0923   PH  7.27*  7.28*   PCO2  49*  49*   PO2  78  107*   HCO3  22  23     No results for input(s): LCAD, LAC in the last 72 hours.     Assessment:  (Medical Decision Making)     Hospital Problems  Date Reviewed: 3/22/2017          Codes Class Noted POA    Acute on chronic respiratory failure with hypoxia and hypercapnia (HCC) ICD-10-CM: J96.21, J96.22  ICD-9-CM: 518.84, 786.09, 799.02  3/21/2017 No    On BIPAP    * (Principal)Cardiogenic shock (HCC) ICD-10-CM: R57.0  ICD-9-CM: 785.51  3/20/2017 Yes    Hemodynamically stable    Obstructive sleep apnea (Chronic) ICD-10-CM: G47.33  ICD-9-CM: 327.23  3/18/2017 Yes    chronic    Diabetes mellitus type II, uncontrolled (HCC) (Chronic) ICD-10-CM: E11.65  ICD-9-CM: 250.02  3/18/2017 Yes    Chronic--ranges 106-125    AICD (automatic cardioverter/defibrillator) present (Chronic) ICD-10-CM: Z95.810  ICD-9-CM: V45.02  3/18/2017 Yes    chronic    Cardiomyopathy ([facility] [address] (Chronic) ICD-10-CM: I42.9  ICD-9-CM: 425.4  3/18/2017 Yes    chronic    Obesity hypoventilation syndrome (HCC) (Chronic) ICD-10-CM: P07.6  ICD-9-CM: 278.03  3/18/2017 Yes    chronic    Morbid obesity with BMI of 60.0-69.9, adult (HCC) (Chronic) ICD-10-CM: E66.01, Z68.44  ICD-9-CM: 278.01, V85.44  3/18/2017 Yes    chronic    Acute encephalopathy ICD-10-CM: G93.40  ICD-9-CM: 348.30  3/18/2017 Yes    Oriented x2    Acute renal failure (ARF) (HCC) ICD-10-CM: N17.9  ICD-9-CM: 584.9  3/18/2017 Yes    Per nephrology          Plan:  (Medical Decision Making)     --Nephrology following and creatinine 10.9--has not been flet to be a good dialysis candidate  --Bumex drip--10,4000ml urine output  --On Bicarb--stop? --Remains  A full code    More than 50% of the time documented was spent in face-to-face contact with the patient and in the care of the patient on the floor/unit where the patient is located. Sae Cooper NP       Lungs:  Distant, bipap rate set at 22 but his RR is much lower. Heart:  RRR with no Murmur/Rubs/Gallops    Additional Comments:  Patient with ongoing hypercarbia. No better after a day of bipap. Now making urine with bumex drip, but overall chances for recovery are extremely poor. Will continue bipap for now. I have spoken with and examined the patient. I agree with the above assessment and plan as documented.     Erik Sears MD

## 2017-03-22 NOTE — PROGRESS NOTES
Hospitalist Progress Note    3/22/2017  Admit Date: 3/18/2017 10:42 AM   NAME: Charles Chao   :  1965   DOS:              17  MRN:  [id number]   Attending: Singh Amor MD  PCP:  Arun Bishop MD  Treatment Team: Attending Provider: Celsa Salazar MD; Consulting Provider: Adrienne Muir MD; Consulting Provider: Елена Dai MD; Consulting Provider: Anibal Benoit MD; Consulting Provider: Rigoberto White MD; Care Manager: Herrera Law RN; Consulting Provider: Lenore Dixon NP; Consulting Provider: Juliocesar Morales MD    Full Code     SUBJECTIVE:   As previously documented: \" Mr. Zaid Lopez is a 46 yo male with PMHx of sCHF EF 10% with ICD, CKD, DM2, KELSIE on CPAP, HTN, chronic hypoxic respiratory failure on home O2 at 3 L evaluated with acute metabolic encephalopathy. Found to have progressive renal failure with creatinine of >9 and hyperkalemia of 6.0 with hypercapneic respiratory failure requiring ICU care for BIPAP. Additionally has been seen by cardiology and placed on dopamine/dobutamine. Nephrology has evaluated and he is not a dialysis candidate. He is being diuresed with bumex drip. Renal US no acute issues. Ammonia slightly elevated, too large for CT scanner for CT head and has intermittent delirium. CXR with pulm edema. UA positive ans started on IV Rocephin, although negative urine cx. Urology placed hansen due to difficulty. Family declines hospice, well known to palliative care\"            17  Mr. Charles Chao  Is off restraint and less agitated over night. Afebrile. Making urine - Cr today 10.9. Used BIPAP with 4L O2  blend in overnight. He is more awake today. Denies SOB, CP or abdominal pain.       10+ ROS reviewed and negative except for positive in HPI  Allergies   Allergen Reactions    Dilaudid [Hydromorphone] Other (comments)     Hotflashes, patient states he's not allergic    Iodinated Contrast Media - Oral And Iv Dye Other (comments) \"shuts my kidneys down\"    Penicillins Unknown (comments)     Pt states he is not allergic his mother just wouldn't allow him take it     Current Facility-Administered Medications   Medication Dose Route Frequency Provider Last Rate Last Dose    sodium bicarbonate (8.4%) 150 mEq in dextrose 5% 1,000 mL infusion   IntraVENous CONTINUOUS Matty Mcleod MD 42 mL/hr at 03/21/17 1105      haloperidol lactate (HALDOL) injection 2 mg  2 mg IntraVENous Q8H PRN MICKEY Esquivel MD        LORazepam (ATIVAN) injection 0.5 mg  0.5 mg IntraVENous Q6H PRN Christiano Aguilar MD   0.5 mg at 03/21/17 0154    mupirocin (BACTROBAN) 2 % ointment   Both Nostrils BID Rm Johansen MD        lip protectant (BLISTEX) ointment   Topical PRN Rm Johansen MD        bumetanide (BUMEX) 12.5 mg in 0.9% sodium chloride 100 mL infusion  1 mg/hr IntraVENous CONTINUOUS Ria Armas MD 8 mL/hr at 03/22/17 0044 1 mg/hr at 03/22/17 0044    levalbuterol (XOPENEX) nebulizer soln 0.63 mg/3 mL  0.63 mg Nebulization Q6H PRN FATEMEH Bernstein        sodium chloride (NS) flush 5-10 mL  5-10 mL IntraVENous Q8H FATEMEH Bernstein   10 mL at 03/22/17 0518    sodium chloride (NS) flush 5-10 mL  5-10 mL IntraVENous PRN FATEMEH Bernstein        insulin lispro (HUMALOG) injection   SubCUTAneous Q6H FATEMEH Bernstein   Stopped at 03/18/17 1800    dextrose 40% (GLUTOSE) oral gel 1 Tube  15 g Oral PRN FATEMEH Bernstein        glucagon (GLUCAGEN) injection 1 mg  1 mg IntraMUSCular PRN FATEMEH Bernstein        dextrose (D50W) injection syrg 12.5-25 g  25-50 mL IntraVENous PRN FATEMEH Bernstein   25 g at 03/18/17 1913    heparin (porcine) injection 5,000 Units  5,000 Units SubCUTAneous Q8H FATEMEH Bernstien   5,000 Units at 03/22/17 0044    DOBUTamine (DOBUTREX) 500 mg/250 mL (2,000 mcg/mL) infusion  2.5-10 mcg/kg/min IntraVENous TITRATE Sheng Kennedy MD   Stopped at 03/21/17 1315    DOPamine (INTROPIN) 800 mg/250 mL (3,200 mcg/mL) infusion  5-20 mcg/kg/min IntraVENous TITRATE Elizabeth Singh MD   Stopped at 17 1315           PHYSICAL EXAM     Visit Vitals    /80    Pulse 77    Temp 97.8 °F (36.6 °C)    Resp 11    Ht 5' 6\" (1.676 m)    Wt (!) 196.9 kg (434 lb)    SpO2 94%    BMI 70.05 kg/m2      Temp (24hrs), Av.3 °F (36.8 °C), Min:97.4 °F (36.3 °C), Max:98.9 °F (37.2 °C)    Oxygen Therapy  O2 Sat (%): 94 % (17)  Pulse via Oximetry: 77 beats per minute (17)  O2 Device: BIPAP (VPAP) (17)  O2 Flow Rate (L/min): 4 l/min (bled in) (17)  O2 Temperature: 88 °F (31.1 °C) (17 0009)  FIO2 (%): 36 % (17)    Intake/Output Summary (Last 24 hours) at 17 0728  Last data filed at 17 3049   Gross per 24 hour   Intake          1179.67 ml   Output            54905 ml   Net         -9220.33 ml        Physical Exam:  General:        More awake, although lethargic. Afebrile. Morbidly Obese. NAD. HEENT:               NCAT. No obvious deformity. Nares normal. No drainage  Lungs:                  Decreased air entry b/l. No wheezing/rhonchi/rales On BIPAP with 4L O2  Cardiovascular:   RRR. No m/r/g. +2-3 pitting pedal edema b/l. +2 PT/DT pulses b/l. Abdomen:       S/nt/nd. Bowel sounds normal.   Skin:         No rashes or lesions. Not Jaundiced  Neurologic:    More awake, although lethargic. Answer to some questions. Moves all extremities. No focal findings  Psychiatric:        Euthymic. Flat affect.              DIAGNOSTIC STUDIES      Data Review:   Recent Results (from the past 24 hour(s))   BLOOD GAS, ARTERIAL    Collection Time: 17  9:23 AM   Result Value Ref Range    pH 7.28 (L) 7.35 - 7.45      PCO2 49 (H) 35.0 - 45.0 mmHg    PO2 107 (H) 75.0 - 100.0 mmHg    BICARBONATE 23 22.0 - 26.0 mmol/L    BASE DEFICIT 4.1 (H) 0 - 2 mmol/L    TOTAL HEMOGLOBIN 10.9 (L) 11.7 - 15.0 GM/DL    O2 SAT 98 92.0 - 98.5 %    ARTERIAL O2 HGB 95.1 94.0 - 97.0 % CARBOXYHEMOGLOBIN 2.2 (H) 0.5 - 1.5 %    METHEMOGLOBIN 0.3 0.0 - 1.5 %    DEOXYHEMOGLOBIN 2 0.0 - 5.0 %    SITE RB     ALLENS TEST NA     MODE BIPAP     O2 FLOW 5.00 L/min    RATE 14.0      PEEP/CPAP 8.0      Respiratory comment: Dr. Bettie Dubose at 3 21 2017 9 29 32 AM. Read back. BLOOD GAS & LYTES, ARTERIAL    Collection Time: 03/21/17 11:05 AM   Result Value Ref Range    pH 7.27 (L) 7.35 - 7.45      PCO2 49 (H) 35.0 - 45.0 mmHg    PO2 78 75.0 - 100.0 mmHg    BICARBONATE 22 22.0 - 26.0 mmol/L    BASE DEFICIT 5.1 (H) 0 - 2 mmol/L    TOTAL HEMOGLOBIN 10.9 (L) 11.7 - 15.0 GM/DL    O2 SAT 93 92.0 - 98.5 %    ARTERIAL O2 HGB 91.7 (L) 94.0 - 97.0 %    CARBOXYHEMOGLOBIN 0.9 0.5 - 1.5 %    METHEMOGLOBIN 0.6 0.0 - 1.5 %    DEOXYHEMOGLOBIN 7 (H) 0.0 - 5.0 %    Sodium 135.9 135 - 148 MMOL/L    POTASSIUM 5.79 (H) 3.5 - 5.3 MMOL/L    CHLORIDE 101 98 - 106      Calcium, ionized 1.02 1.0 - 1.3 mmol/L    SITE RB     ALLENS TEST NA     MODE BIPAP     O2 FLOW 4.00 L/min    RATE 22.0      PEEP/CPAP 8.0      Respiratory comment:  at 3 21 2017 11 11 07 AM. Read back.     GLUCOSE, POC    Collection Time: 03/21/17  1:09 PM   Result Value Ref Range    Glucose (POC) 110 (H) 65 - 100 mg/dL   GLUCOSE, POC    Collection Time: 03/21/17  6:12 PM   Result Value Ref Range    Glucose (POC) 106 (H) 65 - 100 mg/dL   CBC W/O DIFF    Collection Time: 03/21/17  7:05 PM   Result Value Ref Range    WBC 6.0 4.3 - 11.1 K/uL    RBC 4.39 4.23 - 5.67 M/uL    HGB 10.3 (L) 13.6 - 17.2 g/dL    HCT 31.5 (L) 41.1 - 50.3 %    MCV 71.8 (L) 79.6 - 97.8 FL    MCH 23.5 (L) 26.1 - 32.9 PG    MCHC 32.7 31.4 - 35.0 g/dL    RDW 20.0 (H) 11.9 - 14.6 %    PLATELET 624 098 - 345 K/uL    MPV 10.2 (L) 10.8 - 14.1 FL   POTASSIUM    Collection Time: 03/21/17  7:05 PM   Result Value Ref Range    Potassium 5.3 (H) 3.5 - 5.1 mmol/L   PLEASE READ & DOCUMENT PPD TEST IN 72 HRS    Collection Time: 03/21/17  8:30 PM   Result Value Ref Range    PPD Negative Negative    mm Induration 0 mm mm   GLUCOSE, POC    Collection Time: 03/22/17 12:37 AM   Result Value Ref Range    Glucose (POC) 103 (H) 65 - 100 mg/dL   CBC W/O DIFF    Collection Time: 03/22/17  4:12 AM   Result Value Ref Range    WBC 6.0 4.3 - 11.1 K/uL    RBC 4.43 4.23 - 5.67 M/uL    HGB 10.5 (L) 13.6 - 17.2 g/dL    HCT 31.8 (L) 41.1 - 50.3 %    MCV 71.8 (L) 79.6 - 97.8 FL    MCH 23.7 (L) 26.1 - 32.9 PG    MCHC 33.0 31.4 - 35.0 g/dL    RDW 19.9 (H) 11.9 - 14.6 %    PLATELET 369 198 - 893 K/uL    MPV 9.5 (L) 10.8 - 50.5 FL   METABOLIC PANEL, COMPREHENSIVE    Collection Time: 03/22/17  4:12 AM   Result Value Ref Range    Sodium 141 136 - 145 mmol/L    Potassium 4.6 3.5 - 5.1 mmol/L    Chloride 101 98 - 107 mmol/L    CO2 27 21 - 32 mmol/L    Anion gap 13 7 - 16 mmol/L    Glucose 117 (H) 65 - 100 mg/dL     (H) 6 - 23 MG/DL    Creatinine 10.90 (H) 0.8 - 1.5 MG/DL    GFR est AA 6 (L) >60 ml/min/1.73m2    GFR est non-AA 5 (L) >60 ml/min/1.73m2    Calcium 8.4 8.3 - 10.4 MG/DL    Bilirubin, total 1.4 (H) 0.2 - 1.1 MG/DL    ALT (SGPT) 34 12 - 65 U/L    AST (SGOT) 42 (H) 15 - 37 U/L    Alk. phosphatase 160 (H) 50 - 136 U/L    Protein, total 7.2 6.3 - 8.2 g/dL    Albumin 2.9 (L) 3.5 - 5.0 g/dL    Globulin 4.3 (H) 2.3 - 3.5 g/dL    A-G Ratio 0.7 (L) 1.2 - 3.5     MAGNESIUM    Collection Time: 03/22/17  4:12 AM   Result Value Ref Range    Magnesium 1.9 1.8 - 2.4 mg/dL   GLUCOSE, POC    Collection Time: 03/22/17  5:52 AM   Result Value Ref Range    Glucose (POC) 125 (H) 65 - 100 mg/dL     Imaging /Procedures /Studies:    CXR Results  (Last 48 hours)               03/20/17 0650  XR CHEST SNGL V Final result    Impression:  IMPRESSION:       Right-sided IJ line. No evidence of pneumothorax. Narrative:   Portable view of the chest        COMPARISON: March 20, 2017. CLINICAL HISTORY: Line placement. FINDINGS:       Right-sided IJ line tip is in the area of SVC. No evidence of pneumothorax.    Left-sided cardiac pacer is stable. Bilateral groundglass densities, similar to   prior exam. Cardiac mediastinal contour and the surrounding bones are stable. 03/20/17 0454  XR CHEST PORT Final result    Impression:  IMPRESSION:       1. Previous right-sided IJ line has been removed. No evidence of pneumothorax. 2. Bilateral groundglass densities, suspicious for pulmonary edema. Narrative:  Portable chest xray         COMPARISON: March 18, 2017. CLINICAL HISTORY: Central line. FINDINGS:       Left-sided IJ line has been removed. No evidence of pneumothorax. The left-sided   cardiac pacer is stable. Lungs are underinflated. Bilateral groundglass   densities may be due to pulmonary edema. Cardiac mediastinal contour and the   surrounding bones are stable. Echocardiogram 2/14/2017:SUMMARY:  -  Left ventricle: The ventricle was severely dilated. Systolic function was  severely reduced. Ejection fraction was estimated in the range of 10 % to 15   %. This study was inadequate for the evaluation of regional wall motion. Wall  thickness was mildly increased. -  Right ventricle: Systolic function was reduced. -  Left atrium: The atrium was markedly dilated. -  Right atrium: The atrium was moderately dilated. -  Inferior vena cava, hepatic veins: The inferior vena cava was moderately  dilated. Respirophasic changes in dimension were absent. -  Mitral valve: There was mild regurgitation.     US retroperitoneal: IMPRESSION  Impression: Unremarkable right kidney, nonvisualization of the left kidney,  aorta, or bladder    Labs and Studies from previous 24 hours have been personally reviewed by myself Josh Problems    Diagnosis Date Noted    Acute on chronic respiratory failure with hypoxia and hypercapnia ([address] 03/21/2017    Cardiogenic shock ([address] 03/20/2017    Obstructive sleep apnea 03/18/2017    Diabetes mellitus type II, uncontrolled ([address] 03/18/2017    Cardiomyopathy ([facility] [address] 03/18/2017    AICD (automatic cardioverter/defibrillator) present 03/18/2017    Obesity hypoventilation syndrome ([facility] [address] 03/18/2017    Morbid obesity with BMI of 60.0-69.9, adult ([facility] [address] 03/18/2017    Acute encephalopathy 03/18/2017    Acute renal failure (ARF) ([facility] [address] 03/18/2017    Hypotension 03/18/2017       Plan:    RED/CKD - Cr better today at 10.9. Making urine. On bicarb ggt and bumex ggt - per nephrology contemplating short term HD. Likely cardiorenal syndrome. Severe cardiomyopathy - off Dobutamine/ Dopamine. Not on ACEi/ARBs due to ARF - management per cardiology     Acute hypoxic and respiratory : on BIPAP. Likely due to volume overload and morbid hypoventilation syndrome. check CXR and ABG this AM. Appreciate pulmonary help    AMS : improving. likely due to renal failure and respiratory failure. Urine culture negative - Rocephin stopped 3/21. Ryan Patel Unable to get CT head due to morbid obesity. Hyperkalemia - resolved    Debility -  Will need PT/OT when more awake. Ryan Patel Appreciate palliative care help. Nutrition - onTPN    Appreciate multiple consultants consultants    DVT Prophylaxis: Heparin SQ  CODE Status: full CODE  Plan of Care Discussed with: patient, daughter and sister at bedside. Care team  Medical Risk: high  Disposition: keep in ICU. Prognosis guarded.      Nicola Lake MD  03/22/17

## 2017-03-23 ENCOUNTER — HOME CARE VISIT (OUTPATIENT)
Dept: HOME HEALTH SERVICES | Facility: HOME HEALTH | Age: 52
End: 2017-03-23
Payer: MEDICARE

## 2017-03-23 ENCOUNTER — APPOINTMENT (OUTPATIENT)
Dept: GENERAL RADIOLOGY | Age: 52
DRG: 682 | End: 2017-03-23
Attending: HOSPITALIST
Payer: MEDICARE

## 2017-03-23 LAB
ANION GAP BLD CALC-SCNC: 10 MMOL/L (ref 7–16)
ARTERIAL PATENCY WRIST A: POSITIVE
BACTERIA SPEC CULT: NORMAL
BACTERIA SPEC CULT: NORMAL
BASE EXCESS BLDA CALC-SCNC: 10.4 MMOL/L (ref 0–3)
BDY SITE: ABNORMAL
BUN SERPL-MCNC: 105 MG/DL (ref 6–23)
CALCIUM SERPL-MCNC: 8.8 MG/DL (ref 8.3–10.4)
CHLORIDE SERPL-SCNC: 101 MMOL/L (ref 98–107)
CO2 SERPL-SCNC: 35 MMOL/L (ref 21–32)
COHGB MFR BLD: 1.4 % (ref 0.5–1.5)
CREAT SERPL-MCNC: 9.03 MG/DL (ref 0.8–1.5)
DO-HGB BLD-MCNC: 1 % (ref 0–5)
ERYTHROCYTE [DISTWIDTH] IN BLOOD BY AUTOMATED COUNT: 19.9 % (ref 11.9–14.6)
GLUCOSE BLD STRIP.AUTO-MCNC: 144 MG/DL (ref 65–100)
GLUCOSE BLD STRIP.AUTO-MCNC: 152 MG/DL (ref 65–100)
GLUCOSE SERPL-MCNC: 132 MG/DL (ref 65–100)
HCO3 BLDA-SCNC: 36 MMOL/L (ref 22–26)
HCT VFR BLD AUTO: 35 % (ref 41.1–50.3)
HGB BLD-MCNC: 11.4 G/DL (ref 13.6–17.2)
HGB BLDMV-MCNC: 12.3 GM/DL (ref 11.7–15)
MAGNESIUM SERPL-MCNC: 1.6 MG/DL (ref 1.8–2.4)
MCH RBC QN AUTO: 23.6 PG (ref 26.1–32.9)
MCHC RBC AUTO-ENTMCNC: 32.6 G/DL (ref 31.4–35)
MCV RBC AUTO: 72.5 FL (ref 79.6–97.8)
METHGB MFR BLD: 0.4 % (ref 0–1.5)
OXYHGB MFR BLDA: 96.9 % (ref 94–97)
PCO2 BLDA: 53 MMHG (ref 35–45)
PH BLDA: 7.45 [PH] (ref 7.35–7.45)
PLATELET # BLD AUTO: 268 K/UL (ref 150–450)
PMV BLD AUTO: 10 FL (ref 10.8–14.1)
PO2 BLDA: 138 MMHG (ref 75–100)
POTASSIUM SERPL-SCNC: 3.8 MMOL/L (ref 3.5–5.1)
RBC # BLD AUTO: 4.83 M/UL (ref 4.23–5.67)
SAO2 % BLD: 99 % (ref 92–98.5)
SERVICE CMNT-IMP: NORMAL
SERVICE CMNT-IMP: NORMAL
SODIUM SERPL-SCNC: 146 MMOL/L (ref 136–145)
VENTILATION MODE VENT: ABNORMAL
WBC # BLD AUTO: 6.9 K/UL (ref 4.3–11.1)

## 2017-03-23 PROCEDURE — 77010033678 HC OXYGEN DAILY

## 2017-03-23 PROCEDURE — 36592 COLLECT BLOOD FROM PICC: CPT

## 2017-03-23 PROCEDURE — 71010 XR CHEST PORT: CPT

## 2017-03-23 PROCEDURE — 99233 SBSQ HOSP IP/OBS HIGH 50: CPT | Performed by: INTERNAL MEDICINE

## 2017-03-23 PROCEDURE — 65610000001 HC ROOM ICU GENERAL

## 2017-03-23 PROCEDURE — 82803 BLOOD GASES ANY COMBINATION: CPT

## 2017-03-23 PROCEDURE — 82962 GLUCOSE BLOOD TEST: CPT

## 2017-03-23 PROCEDURE — 85027 COMPLETE CBC AUTOMATED: CPT | Performed by: HOSPITALIST

## 2017-03-23 PROCEDURE — 74011000250 HC RX REV CODE- 250: Performed by: INTERNAL MEDICINE

## 2017-03-23 PROCEDURE — 83735 ASSAY OF MAGNESIUM: CPT | Performed by: HOSPITALIST

## 2017-03-23 PROCEDURE — 74011000258 HC RX REV CODE- 258: Performed by: INTERNAL MEDICINE

## 2017-03-23 PROCEDURE — 74011250636 HC RX REV CODE- 250/636: Performed by: INTERNAL MEDICINE

## 2017-03-23 PROCEDURE — 94660 CPAP INITIATION&MGMT: CPT

## 2017-03-23 PROCEDURE — 36600 WITHDRAWAL OF ARTERIAL BLOOD: CPT

## 2017-03-23 PROCEDURE — 80048 BASIC METABOLIC PNL TOTAL CA: CPT | Performed by: HOSPITALIST

## 2017-03-23 PROCEDURE — 74011250636 HC RX REV CODE- 250/636: Performed by: PHYSICIAN ASSISTANT

## 2017-03-23 RX ORDER — MAGNESIUM SULFATE HEPTAHYDRATE 40 MG/ML
2 INJECTION, SOLUTION INTRAVENOUS ONCE
Status: COMPLETED | OUTPATIENT
Start: 2017-03-23 | End: 2017-03-24

## 2017-03-23 RX ADMIN — INSULIN LISPRO 2 UNITS: 100 INJECTION, SOLUTION INTRAVENOUS; SUBCUTANEOUS at 12:01

## 2017-03-23 RX ADMIN — Medication 10 ML: at 06:00

## 2017-03-23 RX ADMIN — BUMETANIDE 1 MG/HR: 0.25 INJECTION INTRAMUSCULAR; INTRAVENOUS at 02:01

## 2017-03-23 RX ADMIN — Medication 10 ML: at 15:01

## 2017-03-23 RX ADMIN — BUMETANIDE 1 MG/HR: 0.25 INJECTION INTRAMUSCULAR; INTRAVENOUS at 12:28

## 2017-03-23 RX ADMIN — MUPIROCIN: 20 OINTMENT TOPICAL at 08:45

## 2017-03-23 RX ADMIN — HEPARIN SODIUM 5000 UNITS: 5000 INJECTION, SOLUTION INTRAVENOUS; SUBCUTANEOUS at 16:31

## 2017-03-23 RX ADMIN — MUPIROCIN: 20 OINTMENT TOPICAL at 18:09

## 2017-03-23 RX ADMIN — MAGNESIUM SULFATE HEPTAHYDRATE 2 G: 40 INJECTION, SOLUTION INTRAVENOUS at 07:35

## 2017-03-23 RX ADMIN — HEPARIN SODIUM 5000 UNITS: 5000 INJECTION, SOLUTION INTRAVENOUS; SUBCUTANEOUS at 08:45

## 2017-03-23 NOTE — PROGRESS NOTES
Problem: Nutrition Deficit  Goal: *Optimize nutritional status  Nutrition F/U:  TF Management for the Hospitalists. Assessment:  Edema - 1+-2+. TF was advanced to his goal rate yesterday with fair-good GI tolerance - noted low gastric residuals, however his stools have become more loose than before he was on TF. He remains on a Bumex drip with excellent uop recorded. Labs are remarkable for , creatinine 9.03, potassium 3.8 and magnesium 1.6. Macronutrient Needs:  Estimated calorie needs - 4902-0827 dixon/day (11-14 dixon/kg/day)   Estimated protein needs - 52-65 gm pro/day (0.8-1 gm pro/kgIBW/day pre-HD) (GFR 8 ml/min)                                                  78-91 gm pro/day (1.2-1.4 gm pro/kgIBW/day - HD)  Max CHO/day - 345 gm CHO/day (50% dixon/day)   Fluid/day - 1 liter/day (RED)  Intake/Comparative Standards:  Current intake of TF (Suplena @ 48 ml/hr with 100 ml manual water flush twice daily) provides 2074 calories/day (96% calorie goal), 52 grams protein/day (100% protein goal - pre-HD), 237 grams CHO/day (does not exceed max CHO limit) and 1047 ml water/day (100% fluid goal). Intervention:   Meals and Snacks: NPO. Enteral Nutrition: Continue current TF and manual water flushes for now. If a HD catheter is placed and HD is started, change TF to Nepro at 48 ml/hr and continue same water flush - 2074 calories/day (96% calorie goal), 93 grams protein/day (>100% protein goal - HD), 192 grams CHO/day (does not exceed max CHO limit) and 1047 ml water/day (100% fluid goal). Coordination of Nutrition Care by a Nutrition Professional: AM ICU rounds, collaboration with Myah Rubio. Monitoring/Evaluation:  Monitor clinical course, POC, medication changes, GI function, TF tolerance, labs. Adali Giraldo.  Sherry Sear  [phone]

## 2017-03-23 NOTE — PROGRESS NOTES
Critical Care Daily Progress Note: 3/23/2017    Solomon Pollard   Admission Date: 3/18/2017         The patient's chart is reviewed and the patient is discussed with the staff. 45 y.o. Admitted by hospitalist service on 3/18/17 with acute on chronic kidney disease and hyperkalemia with acute encephalopathy. Pt also has a history of ICM with EF <10%, chronic respiratory failure on 3L O2, HTN, HLD, OHS/KELSIE, and CKD. Pt was admitted to ICU and has been off and on BiPAP. Pt transferred out of ICU on 3/20. He required BiPAP secondary to lethargy and pH 7.28. Pt's pCO2 has remained around 48.9-49.4 on 18/8 with rate 22. We are being consulted secondary to respiratory failure. Subjective:     Lying in bed and remains on BIPAP. Nodding responses and denies shortness of breath. Occasional jerking movements. Remains on Bumex drip and diuresing.     Current Facility-Administered Medications   Medication Dose Route Frequency    magnesium sulfate 2 g/50 ml IVPB (premix or compounded)  2 g IntraVENous ONCE    sodium bicarbonate (8.4%) 150 mEq in dextrose 5% 1,000 mL infusion   IntraVENous CONTINUOUS    haloperidol lactate (HALDOL) injection 2 mg  2 mg IntraVENous Q8H PRN    LORazepam (ATIVAN) injection 0.5 mg  0.5 mg IntraVENous Q6H PRN    mupirocin (BACTROBAN) 2 % ointment   Both Nostrils BID    lip protectant (BLISTEX) ointment   Topical PRN    bumetanide (BUMEX) 12.5 mg in 0.9% sodium chloride 100 mL infusion  1 mg/hr IntraVENous CONTINUOUS    levalbuterol (XOPENEX) nebulizer soln 0.63 mg/3 mL  0.63 mg Nebulization Q6H PRN    sodium chloride (NS) flush 5-10 mL  5-10 mL IntraVENous Q8H    sodium chloride (NS) flush 5-10 mL  5-10 mL IntraVENous PRN    insulin lispro (HUMALOG) injection   SubCUTAneous Q6H    dextrose 40% (GLUTOSE) oral gel 1 Tube  15 g Oral PRN    glucagon (GLUCAGEN) injection 1 mg  1 mg IntraMUSCular PRN    dextrose (D50W) injection syrg 12.5-25 g  25-50 mL IntraVENous PRN    heparin (porcine) injection 5,000 Units  5,000 Units SubCUTAneous Q8H    DOBUTamine (DOBUTREX) 500 mg/250 mL (2,000 mcg/mL) infusion  2.5-10 mcg/kg/min IntraVENous TITRATE    DOPamine (INTROPIN) 800 mg/250 mL (3,200 mcg/mL) infusion  5-20 mcg/kg/min IntraVENous TITRATE       Review of Systems  Constitutional:  negative for fever, chills, sweats  Cardiovascular:  Edema, negative for chest pain, palpitations, syncope  Gastrointestinal:  TFs, negative for dysphagia, reflux, vomiting, diarrhea, abdominal pain, or melena  Neurologic:  negative for focal weakness, numbness, headache      Objective:     Vitals:    03/23/17 0500 03/23/17 0530 03/23/17 0600 03/23/17 0601   BP: 112/54 104/57 110/58    Pulse: 77 77  79   Resp: 29 9  11   Temp:       SpO2: 99% 99%  95%   Weight:       Height:           Intake and Output:   03/21 1901 - 03/23 0700  In: 3131.5 [I.V.:2165.5]  Out: 20625 [Urine:20500; Drains:125]       Physical Exam:          Constitutional:  the patient is morbidly obese and in no acute distress, BIPAP 20/8, 45%, sat 95%  EENMT:  Sclera clear, pupils equal, oral mucosa moist  Respiratory: clear anterior, no wheezing  Cardiovascular:  RRR without M,G,R  Gastrointestinal: soft, obese and non-tender; with positive bowel sounds, rectal tube. Musculoskeletal: warm without cyanosis. There is bilateral 2-4+ lower leg edema.   Skin:  no jaundice or rashes, no wounds   Neurologic: no gross neuro deficits     Psychiatric:  alert and oriented x 2    LINES:  Right subclavian, FT, rectal tube, hansen    DRIPS:   Bumex, Bicarb    CXR: none today    CXR 3/22/17:        LAB  Recent Labs      03/22/17   2338  03/22/17   1731  03/22/17   1152  03/22/17   0552  03/22/17   0037   GLUCPOC  121*  122*  125*  125*  103*      Recent Labs      03/23/17   0400  03/22/17   0412  03/21/17   1905   WBC  6.9  6.0  6.0   HGB  11.4*  10.5*  10.3*   HCT  35.0*  31.8*  31.5*   PLT  268  243  268     Recent Labs      03/23/17   0403 03/22/17   0412  03/21/17   1905  03/21/17   0515   NA  146*  141   --   139   K  3.8  4.6  5.3*  5.8*   CL  101  101   --   102   CO2  35*  27   --   24   GLU  132*  117*   --   88   BUN  105*  109*   --   116*   CREA  9.03*  10.90*   --   11.70*   MG  1.6*  1.9   --    --    CA  8.8  8.4   --   8.2*   ALB   --   2.9*   --    --    TBILI   --   1.4*   --    --    ALT   --   34   --    --    SGOT   --   42*   --    --      Recent Labs      03/22/17   0805  03/21/17   1105  03/21/17   0923   PH  7.31*  7.27*  7.28*   PCO2  49*  49*  49*   PO2  66*  78  107*   HCO3  24  22  23     No results for input(s): LCAD, LAC in the last 72 hours.     Assessment:  (Medical Decision Making)     Hospital Problems  Date Reviewed: 3/23/2017          Codes Class Noted POA    Acute on chronic respiratory failure with hypoxia and hypercapnia (HCC) ICD-10-CM: J96.21, J96.22  ICD-9-CM: 518.84, 786.09, 799.02  3/21/2017 No    Remains on BIPAP    * (Principal)Cardiogenic shock (HCC) ICD-10-CM: R57.0  ICD-9-CM: 785.51  3/20/2017 Yes    Hemodynamically stable    Obstructive sleep apnea (Chronic) ICD-10-CM: G47.33  ICD-9-CM: 327.23  3/18/2017 Yes    chronic    Diabetes mellitus type II, uncontrolled (HCC) (Chronic) ICD-10-CM: E11.65  ICD-9-CM: 250.02  3/18/2017 Yes    Chronic--ranges 103-125    AICD (automatic cardioverter/defibrillator) present (Chronic) ICD-10-CM: Z95.810  ICD-9-CM: V45.02  3/18/2017 Yes    chronic    Cardiomyopathy ([address] (Chronic) ICD-10-CM: I42.9  ICD-9-CM: 425.4  3/18/2017 Yes    chronic    Obesity hypoventilation syndrome (HCC) (Chronic) ICD-10-CM: V38.8  ICD-9-CM: 278.03  3/18/2017 Yes    chronic    Morbid obesity with BMI of 60.0-69.9, adult (HCC) (Chronic) ICD-10-CM: E66.01, Z68.44  ICD-9-CM: 278.01, V85.44  3/18/2017 Yes    chronic    Acute encephalopathy ICD-10-CM: G93.40  ICD-9-CM: 348.30  3/18/2017 Yes    Oriented x2    Acute renal failure (ARF) ([facility] ICD-10-CM: N17.9  ICD-9-CM: 584.9  3/18/2017 Yes    Per nephrology          Plan:  (Medical Decision Making)     --Nephrology following and creatinine down to 9.03-planning catheter in IR  --Bumex drip--13,900 ml urine output last 24 hrs  --On Bicarb  --Palliative Care following--remains full code  --Continue current     More than 50% of the time documented was spent in face-to-face contact with the patient and in the care of the patient on the floor/unit where the patient is located. Christian Dickson, TAMANNA     Lungs:  Decreased BS  Heart:  RRR with no Murmur/Rubs/Gallops    Additional Comments:  Massive diuresis over past 2 days. Await ABG to see if weaning of BIPAP possible. Pt more awake and interactive. I have spoken with and examined the patient. I agree with the above assessment and plan as documented.     Remi Cornejo MD

## 2017-03-23 NOTE — PROGRESS NOTES
Renal Progress Note    Admission Date: 3/18/2017   Subjective:      Still on bipap    Objective:     Physical Exam:    Patient Vitals for the past 8 hrs:   BP Temp Pulse Resp SpO2 Weight   03/23/17 0803 - - - - 98 % -   03/23/17 0746 - - - - 98 % -   03/23/17 0735 110/63 98.4 °F (36.9 °C) 78 15 99 % -   03/23/17 0730 110/63 - 77 13 99 % -   03/23/17 0700 120/67 - 81 (!) 5 98 % -   03/23/17 0601 - - 79 11 95 % -   03/23/17 0600 110/58 - - - - -   03/23/17 0530 104/57 - 77 9 99 % -   03/23/17 0500 112/54 - 77 29 99 % -   03/23/17 0430 108/57 - 76 8 98 % -   03/23/17 0400 105/57 97.8 °F (36.6 °C) 76 (!) 6 97 % -   03/23/17 0330 132/76 - 76 (!) 7 98 % -   03/23/17 0300 142/85 - 78 8 98 % -   03/23/17 0230 144/74 - 80 20 97 % -   03/23/17 0200 135/75 - 77 13 99 % -   03/23/17 0130 137/75 - 77 20 98 % -   03/23/17 0100 140/69 - 77 16 98 % -      Gen: comfortable , NAD  CV: S1, S2  Lungs: Coarse bilaterally, with some crackles  Extem: 2+ edema      Current Facility-Administered Medications   Medication Dose Route Frequency    sodium bicarbonate (8.4%) 150 mEq in dextrose 5% 1,000 mL infusion   IntraVENous CONTINUOUS    haloperidol lactate (HALDOL) injection 2 mg  2 mg IntraVENous Q8H PRN    LORazepam (ATIVAN) injection 0.5 mg  0.5 mg IntraVENous Q6H PRN    mupirocin (BACTROBAN) 2 % ointment   Both Nostrils BID    lip protectant (BLISTEX) ointment   Topical PRN    bumetanide (BUMEX) 12.5 mg in 0.9% sodium chloride 100 mL infusion  1 mg/hr IntraVENous CONTINUOUS    levalbuterol (XOPENEX) nebulizer soln 0.63 mg/3 mL  0.63 mg Nebulization Q6H PRN    sodium chloride (NS) flush 5-10 mL  5-10 mL IntraVENous Q8H    sodium chloride (NS) flush 5-10 mL  5-10 mL IntraVENous PRN    insulin lispro (HUMALOG) injection   SubCUTAneous Q6H    dextrose 40% (GLUTOSE) oral gel 1 Tube  15 g Oral PRN    glucagon (GLUCAGEN) injection 1 mg  1 mg IntraMUSCular PRN    dextrose (D50W) injection syrg 12.5-25 g  25-50 mL IntraVENous PRN  heparin (porcine) injection 5,000 Units  5,000 Units SubCUTAneous Q8H    DOBUTamine (DOBUTREX) 500 mg/250 mL (2,000 mcg/mL) infusion  2.5-10 mcg/kg/min IntraVENous TITRATE    DOPamine (INTROPIN) 800 mg/250 mL (3,200 mcg/mL) infusion  5-20 mcg/kg/min IntraVENous TITRATE            Data Review:     LABS:   Recent Results (from the past 12 hour(s))   GLUCOSE, POC    Collection Time: 03/22/17 11:38 PM   Result Value Ref Range    Glucose (POC) 121 (H) 65 - 100 mg/dL   CBC W/O DIFF    Collection Time: 03/23/17  4:00 AM   Result Value Ref Range    WBC 6.9 4.3 - 11.1 K/uL    RBC 4.83 4.23 - 5.67 M/uL    HGB 11.4 (L) 13.6 - 17.2 g/dL    HCT 35.0 (L) 41.1 - 50.3 %    MCV 72.5 (L) 79.6 - 97.8 FL    MCH 23.6 (L) 26.1 - 32.9 PG    MCHC 32.6 31.4 - 35.0 g/dL    RDW 19.9 (H) 11.9 - 14.6 %    PLATELET 893 406 - 907 K/uL    MPV 10.0 (L) 10.8 - 41.1 FL   METABOLIC PANEL, BASIC    Collection Time: 03/23/17  4:00 AM   Result Value Ref Range    Sodium 146 (H) 136 - 145 mmol/L    Potassium 3.8 3.5 - 5.1 mmol/L    Chloride 101 98 - 107 mmol/L    CO2 35 (H) 21 - 32 mmol/L    Anion gap 10 7 - 16 mmol/L    Glucose 132 (H) 65 - 100 mg/dL     (H) 6 - 23 MG/DL    Creatinine 9.03 (H) 0.8 - 1.5 MG/DL    GFR est AA 8 (L) >60 ml/min/1.73m2    GFR est non-AA 7 (L) >60 ml/min/1.73m2    Calcium 8.8 8.3 - 10.4 MG/DL   MAGNESIUM    Collection Time: 03/23/17  4:00 AM   Result Value Ref Range    Magnesium 1.6 (L) 1.8 - 2.4 mg/dL   BLOOD GAS, ARTERIAL    Collection Time: 03/23/17  7:50 AM   Result Value Ref Range    pH 7.45 7.35 - 7.45      PCO2 53 (H) 35.0 - 45.0 mmHg    PO2 138 (H) 75.0 - 100.0 mmHg    BICARBONATE 36 (H) 22.0 - 26.0 mmol/L    BASE EXCESS 10.4 (H) 0 - 3 mmol/L    TOTAL HEMOGLOBIN 12.3 11.7 - 15.0 GM/DL    O2 SAT 99 (H) 92.0 - 98.5 %    ARTERIAL O2 HGB 96.9 94.0 - 97.0 %    CARBOXYHEMOGLOBIN 1.4 0.5 - 1.5 %    METHEMOGLOBIN 0.4 0.0 - 1.5 %    DEOXYHEMOGLOBIN 1 0.0 - 5.0 %    SITE LR     ALLENS TEST POSITIVE      MODE BIPAP 20 8          Plan:     Principal Problem:    Cardiogenic shock ([address] (3/20/2017)    Active Problems:    Obstructive sleep apnea (3/18/2017)      Diabetes mellitus type II, uncontrolled ([address] (3/18/2017)      AICD (automatic cardioverter/defibrillator) present (3/18/2017)      Cardiomyopathy ([address] (3/18/2017)      Obesity hypoventilation syndrome ([address] (3/18/2017)      Morbid obesity with BMI of 60.0-69.9, adult ([address] (3/18/2017)      Acute encephalopathy (3/18/2017)      Acute renal failure (ARF) ([address] (3/18/2017)      Acute on chronic respiratory failure with hypoxia and hypercapnia ([address] (3/21/2017)      RED with severe cardiomyopathy. Huge improvement in urine out. Creatinine is down. Will hold off dialysis catether.   I still think he is a terrible dialysis candidate but with signs of improvement, we can postpone

## 2017-03-23 NOTE — INTERDISCIPLINARY ROUNDS
Interdisciplinary team rounds were held 3/23/2017 with the following team members:Nursing, Nurse Practitioner, Palliative Care, Pastoral Care, Pharmacy, Physical Therapy, Physician, Respiratory Therapy and Clinical Coordinator and the patient. Plan of care discussed. See clinical pathway and/or care plan for interventions and desired outcomes.

## 2017-03-23 NOTE — PROGRESS NOTES
Hospitalist Progress Note    3/23/2017  Admit Date: 3/18/2017 10:42 AM   NAME: Sandro Maloney   :  1965   DOS:              17  MRN:  [id number]   Attending: Jared Lancaster MD  PCP:  Mustapha Lanier MD  Treatment Team: Attending Provider: Zach Goodwin MD; Consulting Provider: Willie Kessler MD; Consulting Provider: Hai Ragsdale MD; Consulting Provider: Ally Baca MD; Consulting Provider: Kayla Watson MD; Care Manager: Jeane Mcneill RN; Consulting Provider: Dick Pathak NP; Consulting Provider: Kelsey Vazquez MD    Full Code     SUBJECTIVE:   As previously documented: \" Mr. Wayne Valente is a 46 yo male with PMHx of sCHF EF 10% with ICD, CKD, DM2, KELSIE on CPAP, HTN, chronic hypoxic respiratory failure on home O2 at 3 L evaluated with acute metabolic encephalopathy. Found to have progressive renal failure with creatinine of >9 and hyperkalemia of 6.0 with hypercapneic respiratory failure requiring ICU care for BIPAP. Additionally has been seen by cardiology and placed on dopamine/dobutamine. Nephrology has evaluated and he is not a dialysis candidate. He is being diuresed with bumex drip. Renal US no acute issues. Ammonia slightly elevated, too large for CT scanner for CT head and has intermittent delirium. CXR with pulm edema. UA positive ans started on IV Rocephin, although negative urine cx. Urology placed hansen due to difficulty. Family declines hospice, well known to palliative care\"            17  Mr. Sandro Maloney didn't had any ep[isode of agitation. Afebrile. Oriented x3. On BIAP. Renal function improving - Cr:9.03 on bumex ggt. Denies SOB, CP or abdominal pain.        10+ ROS reviewed and negative except for positive in HPI  Allergies   Allergen Reactions    Dilaudid [Hydromorphone] Other (comments)     Hotflashes, patient states he's not allergic    Iodinated Contrast Media - Oral And Iv Dye Other (comments)     \"shuts my kidneys down\"    Penicillins Unknown (comments)     Pt states he is not allergic his mother just wouldn't allow him take it     Current Facility-Administered Medications   Medication Dose Route Frequency Provider Last Rate Last Dose    magnesium sulfate 2 g/50 ml IVPB (premix or compounded)  2 g IntraVENous ONCE Kiran Zambrano MD 50 mL/hr at 03/23/17 0735 2 g at 03/23/17 0735    sodium bicarbonate (8.4%) 150 mEq in dextrose 5% 1,000 mL infusion   IntraVENous CONTINUOUS Vasquez Meyers MD 42 mL/hr at 03/21/17 1105      haloperidol lactate (HALDOL) injection 2 mg  2 mg IntraVENous Q8H PRN MICKEY Rojas MD        LORazepam (ATIVAN) injection 0.5 mg  0.5 mg IntraVENous Q6H PRN Asia Chang MD   0.5 mg at 03/21/17 0154    mupirocin (BACTROBAN) 2 % ointment   Both Nostrils BID Ursula Holland MD        lip protectant (BLISTEX) ointment   Topical PRN Ursula Holland MD        bumetanide (BUMEX) 12.5 mg in 0.9% sodium chloride 100 mL infusion  1 mg/hr IntraVENous CONTINUOUS Ozzie Hurd MD 8 mL/hr at 03/23/17 0201 1 mg/hr at 03/23/17 0201    levalbuterol (XOPENEX) nebulizer soln 0.63 mg/3 mL  0.63 mg Nebulization Q6H PRN Nieves Session, PA        sodium chloride (NS) flush 5-10 mL  5-10 mL IntraVENous Q8H Nieves Session, PA   10 mL at 03/23/17 0600    sodium chloride (NS) flush 5-10 mL  5-10 mL IntraVENous PRN Nieves Session, PA        insulin lispro (HUMALOG) injection   SubCUTAneous Q6H Nieves Session, PA   Stopped at 03/18/17 1800    dextrose 40% (GLUTOSE) oral gel 1 Tube  15 g Oral PRN Nieves Session, PA        glucagon (GLUCAGEN) injection 1 mg  1 mg IntraMUSCular PRN Nieves Session, PA        dextrose (D50W) injection syrg 12.5-25 g  25-50 mL IntraVENous PRN Nieves Session, PA   25 g at 03/18/17 1913    heparin (porcine) injection 5,000 Units  5,000 Units SubCUTAneous Q8H FATEMEH Lee   5,000 Units at 03/22/17 6348    DOBUTamine (DOBUTREX) 500 mg/250 mL (2,000 mcg/mL) infusion  2.5-10 mcg/kg/min IntraVENous TITRATE Keyon Rodas MD   Stopped at 17 1315    DOPamine (INTROPIN) 800 mg/250 mL (3,200 mcg/mL) infusion  5-20 mcg/kg/min IntraVENous TITRATE Lucila Orozco MD   Stopped at 17 1315           PHYSICAL EXAM     Visit Vitals    /63    Pulse 78    Temp 97.8 °F (36.6 °C)    Resp 13    Ht 5' 6\" (1.676 m)    Wt (!) 163.3 kg (360 lb)    SpO2 99%    BMI 58.11 kg/m2      Temp (24hrs), Av.2 °F (36.8 °C), Min:97.7 °F (36.5 °C), Max:98.6 °F (37 °C)    Oxygen Therapy  O2 Sat (%): 99 % (17 0730)  Pulse via Oximetry: 78 beats per minute (17 0730)  O2 Device: BIPAP (17 0300)  O2 Flow Rate (L/min): 4 l/min (17 0814)  O2 Temperature: 88 °F (31.1 °C) (17 0009)  FIO2 (%): 45 % (17 0300)    Intake/Output Summary (Last 24 hours) at 17 0741  Last data filed at 17 0555   Gross per 24 hour   Intake          1951.83 ml   Output            72365 ml   Net        -51661.17 ml        Physical Exam:  General:        AAOx3 while on BIPAP. Afebrile. Morbidly Obese. NAD. HEENT:               NCAT. No obvious deformity. Nares normal. No drainage  Lungs:                  Decreased air entry b/l. No wheezing/rhonchi/rales On BIPAP with 4L O2  Cardiovascular:   RRR. No m/r/g. +2-3 pitting pedal edema b/l. +2 PT/DT pulses b/l. Abdomen:       S/nt/nd. Bowel sounds normal.   Skin:         No rashes or lesions. Not Jaundiced  Neurologic:    AAOX3. CN II-XII grossly WNL. Jamas Dubonnet Moves all extremities. No focal findings  Psychiatric:        Euthymic. Flat affect.              DIAGNOSTIC STUDIES      Data Review:   Recent Results (from the past 24 hour(s))   BLOOD GAS, ARTERIAL    Collection Time: 17  8:05 AM   Result Value Ref Range    pH 7.31 (L) 7.35 - 7.45      PCO2 49 (H) 35.0 - 45.0 mmHg    PO2 66 (L) 75.0 - 100.0 mmHg    BICARBONATE 24 22.0 - 26.0 mmol/L    BASE DEFICIT 2.1 (H) 0 - 2 mmol/L    TOTAL HEMOGLOBIN 11.3 (L) 11.7 - 15.0 GM/DL    O2 SAT 91 (L) 92.0 - 98.5 %    ARTERIAL O2 HGB 88.6 (L) 94.0 - 97.0 %    CARBOXYHEMOGLOBIN 2.1 (H) 0.5 - 1.5 %    METHEMOGLOBIN 0.2 0.0 - 1.5 %    DEOXYHEMOGLOBIN 9 (H) 0.0 - 5.0 %    SITE LR     ALLENS TEST POSITIVE      MODE BIPAP 18 8     O2 FLOW 4.00 L/min    Respiratory comment: Rosaline ROBBINS at 3 22 2017 8 13 26 AM. Read back. GLUCOSE, POC    Collection Time: 03/22/17 11:52 AM   Result Value Ref Range    Glucose (POC) 125 (H) 65 - 100 mg/dL   GLUCOSE, POC    Collection Time: 03/22/17  5:31 PM   Result Value Ref Range    Glucose (POC) 122 (H) 65 - 100 mg/dL   GLUCOSE, POC    Collection Time: 03/22/17 11:38 PM   Result Value Ref Range    Glucose (POC) 121 (H) 65 - 100 mg/dL   CBC W/O DIFF    Collection Time: 03/23/17  4:00 AM   Result Value Ref Range    WBC 6.9 4.3 - 11.1 K/uL    RBC 4.83 4.23 - 5.67 M/uL    HGB 11.4 (L) 13.6 - 17.2 g/dL    HCT 35.0 (L) 41.1 - 50.3 %    MCV 72.5 (L) 79.6 - 97.8 FL    MCH 23.6 (L) 26.1 - 32.9 PG    MCHC 32.6 31.4 - 35.0 g/dL    RDW 19.9 (H) 11.9 - 14.6 %    PLATELET 062 957 - 465 K/uL    MPV 10.0 (L) 10.8 - 92.3 FL   METABOLIC PANEL, BASIC    Collection Time: 03/23/17  4:00 AM   Result Value Ref Range    Sodium 146 (H) 136 - 145 mmol/L    Potassium 3.8 3.5 - 5.1 mmol/L    Chloride 101 98 - 107 mmol/L    CO2 35 (H) 21 - 32 mmol/L    Anion gap 10 7 - 16 mmol/L    Glucose 132 (H) 65 - 100 mg/dL     (H) 6 - 23 MG/DL    Creatinine 9.03 (H) 0.8 - 1.5 MG/DL    GFR est AA 8 (L) >60 ml/min/1.73m2    GFR est non-AA 7 (L) >60 ml/min/1.73m2    Calcium 8.8 8.3 - 10.4 MG/DL   MAGNESIUM    Collection Time: 03/23/17  4:00 AM   Result Value Ref Range    Magnesium 1.6 (L) 1.8 - 2.4 mg/dL     Imaging /Procedures /Studies:    CXR Results  (Last 48 hours)               03/20/17 0650  XR CHEST SNGL V Final result    Impression:  IMPRESSION:       Right-sided IJ line. No evidence of pneumothorax. Narrative:   Portable view of the chest        COMPARISON: March 20, 2017. CLINICAL HISTORY: Line placement. FINDINGS:       Right-sided IJ line tip is in the area of SVC. No evidence of pneumothorax. Left-sided cardiac pacer is stable. Bilateral groundglass densities, similar to   prior exam. Cardiac mediastinal contour and the surrounding bones are stable. 03/20/17 0454  XR CHEST PORT Final result    Impression:  IMPRESSION:       1. Previous right-sided IJ line has been removed. No evidence of pneumothorax. 2. Bilateral groundglass densities, suspicious for pulmonary edema. Narrative:  Portable chest xray         COMPARISON: March 18, 2017. CLINICAL HISTORY: Central line. FINDINGS:       Left-sided IJ line has been removed. No evidence of pneumothorax. The left-sided   cardiac pacer is stable. Lungs are underinflated. Bilateral groundglass   densities may be due to pulmonary edema. Cardiac mediastinal contour and the   surrounding bones are stable. Echocardiogram 2/14/2017:SUMMARY:  -  Left ventricle: The ventricle was severely dilated. Systolic function was  severely reduced. Ejection fraction was estimated in the range of 10 % to 15   %. This study was inadequate for the evaluation of regional wall motion. Wall  thickness was mildly increased. -  Right ventricle: Systolic function was reduced. -  Left atrium: The atrium was markedly dilated. -  Right atrium: The atrium was moderately dilated. -  Inferior vena cava, hepatic veins: The inferior vena cava was moderately  dilated. Respirophasic changes in dimension were absent. -  Mitral valve: There was mild regurgitation.     US retroperitoneal: IMPRESSION  Impression: Unremarkable right kidney, nonvisualization of the left kidney,  aorta, or bladder    Labs and Studies from previous 24 hours have been personally reviewed by myself Jaime Macdonald 96 Problems    Diagnosis Date Noted    Acute on chronic respiratory failure with hypoxia and hypercapnia (HCC) 03/21/2017    Cardiogenic shock (HCC) 03/20/2017    Obstructive sleep apnea 03/18/2017    Diabetes mellitus type II, uncontrolled ([facility] 03/18/2017    Cardiomyopathy ([facility] 03/18/2017    AICD (automatic cardioverter/defibrillator) present 03/18/2017    Obesity hypoventilation syndrome ([facility] 03/18/2017    Morbid obesity with BMI of 60.0-69.9, adult ([facility] 03/18/2017    Acute encephalopathy 03/18/2017    Acute renal failure (ARF) ([facility] 03/18/2017       Plan:    RED/CKD - Cr better today at 9.0. Good urine output. On bicarb ggt and bumex ggt - ABG pending this am. Likely cardiorenal syndrome. Appreciate nephrology help    Severe cardiomyopathy - off Dobutamine/ Dopamine. Not on ACEi/ARBs due to ARF - management per cardiology . Fluid restriction - compliance is a big issue with family providing fluids despite recommendations. Daily weight     Acute hypoxic and respiratory : on BIPAP. Likely due to volume overload and morbid hypoventilation syndrome. CXR and ABG this AM pending. Appreciate pulmonary help    AMS : improving while on BIPAP. likely due to renal failure and respiratory failure. Urine culture negative - Rocephin stopped 3/21. Marli Bustillos Unable to get CT head due to morbid obesity. Hyperkalemia - resolved    Debility -  Will need PT/OT when more awake. Marli Bustillos Appreciate palliative care help. Nutrition - on NG tube feedings - will try to give some oral feeding if will be off BIPAP    Appreciate multiple consultants consultants    DVT Prophylaxis: Heparin SQ  CODE Status: full CODE  Plan of Care Discussed with: patient, daughter and sister at bedside. Care team  Medical Risk: high  Disposition: keep in ICU. Prognosis guarded.      Shyla Landers MD  03/23/17

## 2017-03-23 NOTE — PROGRESS NOTES
Bedside and Verbal shift change report given to Jennifer Garcia (oncoming nurse) by Jessi Zambrano (offgoing nurse). Report included the following information SBAR, Kardex, Procedure Summary, Intake/Output, MAR, Accordion, Recent Results, Med Rec Status, Cardiac Rhythm SR 1st AVB and Alarm Parameters . Patient resting comfortably on the Bipap.

## 2017-03-23 NOTE — PROGRESS NOTES
Spiritual Care visit. Request by patient. Patient had requested a Consult for a [address] [place] [address] Nurse and [facility] both said he was sufficiently alert to do it. This  went to his room, which was full of his family and other relatives, and discussed it with him. Upon his decision,  assisted him with the completion of the document.  made copies, and had the original scanned into his [facility] Chart, then returned the original and the copies to the patient. Prayed with him and his family prior to leaving his room.     Visit by Madelin Le M.Ed., Th.B. ,Staff

## 2017-03-23 NOTE — PROGRESS NOTES
Bedside report received from [facility].   Pt turned and repositioned

## 2017-03-23 NOTE — PROGRESS NOTES
Palliative Care Progress Note    Patient: Luzma Cabrera MRN: [id number]  SSN: [id number]    YOB: 1965  Age: 45 y.o. Sex: male       Assessment/Plan:     Chief Complaint/Interval History:  Alert, remains on BIPAP     Principal Diagnosis:    · Fatigue, Lethargy  R53.83    Additional Diagnoses:   · Acute Respiratory Failure, Unspecified  J96.00  · Debility, Unspecified  R53.81  · Edema  R60.9  · Counseling, Encounter for Medical Advice  Z71.9  · Encounter for Palliative Care  Z51.5    Palliative Performance Scale (PPS)  PPS: 40    Medical Decision Making:   Reviewed and summarized notes over previous 24 hours. Discussed case with appropriate providers: ICU IDT; primary RN  Reviewed laboratory and x-ray data: CBC, CMP, ABG    Pt alert, resting in bed, remains on BIPAP. He denies dyspnea at present, but does report some discomfort from the rectal tube. Pt reports he is feeling much better, and is hopeful he will continue to improve. Sister at bedside encouraged as well, stating \"We never gave up on the miracle that God will provide. \"  Affirmed their beliefs and provided support. Assured pt of our ongoing care, but cautioned that he will eventually die from this process. Pt voiced understanding. He would like to complete HCPOA paperwork- will consult . He is currently oriented. Renal function improving daily, and he has had excellent diuresis/UOP over the last 2 days. Will continue to follow. Will discuss findings with members of the interdisciplinary team.         More than 50% of this 25 minute visit was spent counseling and coordination of care as outlined above. Subjective:     Review of Systems:  A comprehensive review of systems was negative except for:   Constitutional: Positive for fatigue.     GI: positive for discomfort from rectal tube     Objective:     Visit Vitals    /63 (BP 1 Location: Left arm, BP Patient Position: At rest)    Pulse 78    Temp 98.4 °F (36.9 °C)    Resp 15    Ht 5' 6\" (1.676 m)    Wt (!) 163.3 kg (360 lb)    SpO2 98%    BMI 58.11 kg/m2       Physical Exam:    General:  Debilitated. Cooperative. No acute distress. Eyes:  Conjunctivae/corneas clear    Nose: Nares normal. Septum midline. O2 via BiPAP. Neck: Supple, symmetrical, trachea midline. Lungs:   Decreased bilaterally, unlabored. Heart:  Regular rate and rhythm. Abdomen:   Obese. Soft, non-tender, non-distended. Extremities: Normal, atraumatic, no cyanosis. Skin: Skin color, texture, turgor normal. No rash.    Neurologic: Nonfocal    Psych: Alert and oriented      Signed By: Noralyn Cockayne, NP     March 23, 2017

## 2017-03-24 LAB
ANION GAP BLD CALC-SCNC: 10 MMOL/L (ref 7–16)
BUN SERPL-MCNC: 95 MG/DL (ref 6–23)
CALCIUM SERPL-MCNC: 9.3 MG/DL (ref 8.3–10.4)
CHLORIDE SERPL-SCNC: 99 MMOL/L (ref 98–107)
CO2 SERPL-SCNC: 40 MMOL/L (ref 21–32)
CREAT SERPL-MCNC: 7.19 MG/DL (ref 0.8–1.5)
ERYTHROCYTE [DISTWIDTH] IN BLOOD BY AUTOMATED COUNT: 20.4 % (ref 11.9–14.6)
GLUCOSE BLD STRIP.AUTO-MCNC: 115 MG/DL (ref 65–100)
GLUCOSE BLD STRIP.AUTO-MCNC: 136 MG/DL (ref 65–100)
GLUCOSE BLD STRIP.AUTO-MCNC: 151 MG/DL (ref 65–100)
GLUCOSE BLD STRIP.AUTO-MCNC: 170 MG/DL (ref 65–100)
GLUCOSE SERPL-MCNC: 161 MG/DL (ref 65–100)
HCT VFR BLD AUTO: 37.2 % (ref 41.1–50.3)
HEMOCCULT STL QL: NEGATIVE
HGB BLD-MCNC: 11.8 G/DL (ref 13.6–17.2)
MAGNESIUM SERPL-MCNC: 2 MG/DL (ref 1.8–2.4)
MCH RBC QN AUTO: 23.4 PG (ref 26.1–32.9)
MCHC RBC AUTO-ENTMCNC: 31.7 G/DL (ref 31.4–35)
MCV RBC AUTO: 73.7 FL (ref 79.6–97.8)
PLATELET # BLD AUTO: 251 K/UL (ref 150–450)
PMV BLD AUTO: 10.2 FL (ref 10.8–14.1)
POTASSIUM SERPL-SCNC: 3.4 MMOL/L (ref 3.5–5.1)
RBC # BLD AUTO: 5.05 M/UL (ref 4.23–5.67)
SODIUM SERPL-SCNC: 149 MMOL/L (ref 136–145)
WBC # BLD AUTO: 7.6 K/UL (ref 4.3–11.1)

## 2017-03-24 PROCEDURE — 97530 THERAPEUTIC ACTIVITIES: CPT

## 2017-03-24 PROCEDURE — 77010033711 HC HIGH FLOW OXYGEN

## 2017-03-24 PROCEDURE — 74011000250 HC RX REV CODE- 250: Performed by: INTERNAL MEDICINE

## 2017-03-24 PROCEDURE — 82272 OCCULT BLD FECES 1-3 TESTS: CPT | Performed by: HOSPITALIST

## 2017-03-24 PROCEDURE — 97162 PT EVAL MOD COMPLEX 30 MIN: CPT

## 2017-03-24 PROCEDURE — 83735 ASSAY OF MAGNESIUM: CPT | Performed by: HOSPITALIST

## 2017-03-24 PROCEDURE — 85027 COMPLETE CBC AUTOMATED: CPT | Performed by: HOSPITALIST

## 2017-03-24 PROCEDURE — 74011000258 HC RX REV CODE- 258: Performed by: INTERNAL MEDICINE

## 2017-03-24 PROCEDURE — 80048 BASIC METABOLIC PNL TOTAL CA: CPT | Performed by: HOSPITALIST

## 2017-03-24 PROCEDURE — 74011250636 HC RX REV CODE- 250/636: Performed by: PHYSICIAN ASSISTANT

## 2017-03-24 PROCEDURE — 97161 PT EVAL LOW COMPLEX 20 MIN: CPT

## 2017-03-24 PROCEDURE — 65660000000 HC RM CCU STEPDOWN

## 2017-03-24 PROCEDURE — 82962 GLUCOSE BLOOD TEST: CPT

## 2017-03-24 PROCEDURE — 99233 SBSQ HOSP IP/OBS HIGH 50: CPT | Performed by: INTERNAL MEDICINE

## 2017-03-24 RX ORDER — BUMETANIDE 0.25 MG/ML
1 INJECTION INTRAMUSCULAR; INTRAVENOUS EVERY 12 HOURS
Status: DISCONTINUED | OUTPATIENT
Start: 2017-03-24 | End: 2017-03-26

## 2017-03-24 RX ADMIN — Medication 5 ML: at 23:46

## 2017-03-24 RX ADMIN — HEPARIN SODIUM 5000 UNITS: 5000 INJECTION, SOLUTION INTRAVENOUS; SUBCUTANEOUS at 08:27

## 2017-03-24 RX ADMIN — BUMETANIDE 1 MG: 0.25 INJECTION, SOLUTION INTRAMUSCULAR; INTRAVENOUS at 20:58

## 2017-03-24 RX ADMIN — INSULIN LISPRO 2 UNITS: 100 INJECTION, SOLUTION INTRAVENOUS; SUBCUTANEOUS at 17:46

## 2017-03-24 RX ADMIN — HEPARIN SODIUM 5000 UNITS: 5000 INJECTION, SOLUTION INTRAVENOUS; SUBCUTANEOUS at 16:55

## 2017-03-24 RX ADMIN — INSULIN LISPRO 2 UNITS: 100 INJECTION, SOLUTION INTRAVENOUS; SUBCUTANEOUS at 06:00

## 2017-03-24 RX ADMIN — Medication 10 ML: at 06:00

## 2017-03-24 RX ADMIN — HEPARIN SODIUM 5000 UNITS: 5000 INJECTION, SOLUTION INTRAVENOUS; SUBCUTANEOUS at 00:33

## 2017-03-24 RX ADMIN — INSULIN LISPRO 2 UNITS: 100 INJECTION, SOLUTION INTRAVENOUS; SUBCUTANEOUS at 12:39

## 2017-03-24 RX ADMIN — Medication 10 ML: at 14:00

## 2017-03-24 RX ADMIN — BUMETANIDE 1 MG/HR: 0.25 INJECTION INTRAMUSCULAR; INTRAVENOUS at 01:58

## 2017-03-24 RX ADMIN — Medication 10 ML: at 00:33

## 2017-03-24 NOTE — PROGRESS NOTES
TRANSFER - IN REPORT:    Verbal report received from ITZEL Darby on Alpheus Rued  being received from 3101 for routine progression of care      Report consisted of patients Situation, Background, Assessment and   Recommendations(SBAR). Information from the following report(s) SBAR, Kardex, MAR, Recent Results and Cardiac Rhythm NSR wtih 1st degree heart block. was reviewed with the receiving nurse. Assessment completed upon patients arrival to unit and care assumed.

## 2017-03-24 NOTE — PROGRESS NOTES
Hospitalist Progress Note    3/24/2017  Admit Date: 3/18/2017 10:42 AM   NAME: Charmaine Rockwell   :  1965   DOS:              17  MRN:  [id number]   Attending: Talisha Heaton MD  PCP:  Jamarcus Lu MD  Treatment Team: Attending Provider: Amarjit Costa MD; Consulting Provider: Devona Blizzard, MD; Consulting Provider: Ingris Heredia MD; Consulting Provider: Darwin Galindo MD; Consulting Provider: Abdifatah Francis MD; Care Manager: Callie Ndiaye RN; Consulting Provider: Noralyn Cockayne, NP; Consulting Provider: Kervin De MD    Full Code     SUBJECTIVE:   As previously documented: \" Mr. Ayaka Phillips is a 44 yo male with PMHx of sCHF EF 10% with ICD, CKD, DM2, KELSIE on CPAP, HTN, chronic hypoxic respiratory failure on home O2 at 3 L evaluated with acute metabolic encephalopathy. Found to have progressive renal failure with creatinine of >9 and hyperkalemia of 6.0 with hypercapneic respiratory failure requiring ICU care for BIPAP. Additionally has been seen by cardiology and placed on dopamine/dobutamine. Nephrology has evaluated and he is not a dialysis candidate. He is being diuresed with bumex drip. Renal US no acute issues. Ammonia slightly elevated, too large for CT scanner for CT head and has intermittent delirium. CXR with pulm edema. UA positive ans started on IV Rocephin, although negative urine cx. Urology placed hansen due to difficulty. Family declines hospice, well known to palliative care\"            17  Mr. Charmaine Rockwell is feeling better. Able to be off BIPAP during the day. Bumex ggt stopped .  Renal function improving - cr:7.19     10+ ROS reviewed and negative except for positive in HPI  Allergies   Allergen Reactions    Dilaudid [Hydromorphone] Other (comments)     Hotflashes, patient states he's not allergic    Iodinated Contrast Media - Oral And Iv Dye Other (comments)     \"shuts my kidneys down\"    Penicillins Unknown (comments)     Pt states he is not allergic his mother just wouldn't allow him take it     Current Facility-Administered Medications   Medication Dose Route Frequency Provider Last Rate Last Dose    haloperidol lactate (HALDOL) injection 2 mg  2 mg IntraVENous Q8H PRN MICKEY Merritt MD        LORazepam (ATIVAN) injection 0.5 mg  0.5 mg IntraVENous Q6H PRN Jahaira Rocha MD   0.5 mg at 03/21/17 0154    lip protectant (BLISTEX) ointment   Topical PRN Rey Parmar MD        bumetanide (BUMEX) 12.5 mg in 0.9% sodium chloride 100 mL infusion  1 mg/hr IntraVENous CONTINUOUS Dwayne Villagomez MD 8 mL/hr at 03/24/17 0158 1 mg/hr at 03/24/17 0158    levalbuterol (XOPENEX) nebulizer soln 0.63 mg/3 mL  0.63 mg Nebulization Q6H PRN Sirena Ariza, PA        sodium chloride (NS) flush 5-10 mL  5-10 mL IntraVENous Q8H Sirena Ariza PA   10 mL at 03/24/17 0600    sodium chloride (NS) flush 5-10 mL  5-10 mL IntraVENous PRN Sirena Ariza, PA        insulin lispro (HUMALOG) injection   SubCUTAneous Q6H Sirena Ariza PA   2 Units at 03/24/17 0600    dextrose 40% (GLUTOSE) oral gel 1 Tube  15 g Oral PRN Sirena Ariza PA        glucagon (GLUCAGEN) injection 1 mg  1 mg IntraMUSCular PRN Sirena Ariza, PA        dextrose (D50W) injection syrg 12.5-25 g  25-50 mL IntraVENous PRN Sirena Ariza PA   25 g at 03/18/17 1913    heparin (porcine) injection 5,000 Units  5,000 Units SubCUTAneous Q8H Sirena To PA   5,000 Units at 03/24/17 0033    DOBUTamine (DOBUTREX) 500 mg/250 mL (2,000 mcg/mL) infusion  2.5-10 mcg/kg/min IntraVENous TITRATE Evangelist Santana MD   Stopped at 03/21/17 1315    DOPamine (INTROPIN) 800 mg/250 mL (3,200 mcg/mL) infusion  5-20 mcg/kg/min IntraVENous TITRATE Ramana Anne MD   Stopped at 03/21/17 1315           PHYSICAL EXAM     Visit Vitals    /63    Pulse 67    Temp 98.6 °F (37 °C)    Resp (!) 3    Ht 5' 6\" (1.676 m)    Wt (!) 163.3 kg (360 lb)    SpO2 96%    BMI 58.11 kg/m2      Temp (24hrs), Av.5 °F (36.9 °C), Min:98 °F (36.7 °C), Max:98.6 °F (37 °C)    Oxygen Therapy  O2 Sat (%): 96 % (17)  Pulse via Oximetry: 67 beats per minute (17)  O2 Device: BIPAP (17)  O2 Flow Rate (L/min): 40 l/min (17)  O2 Temperature: 80.6 °F (27 °C) (17)  FIO2 (%): 35 % (17)    Intake/Output Summary (Last 24 hours) at 17  Last data filed at 17   Gross per 24 hour   Intake           1252.4 ml   Output            94280 ml   Net          -9697.6 ml        Physical Exam:  General:        AAOx3 while on BIPAP. Afebrile. Morbidly Obese. NAD. HEENT:               NCAT. No obvious deformity. Nares normal. No drainage  Lungs:                  Decreased air entry b/l. No wheezing/rhonchi/rales On BIPAP with 4L O2  Cardiovascular:   RRR. No m/r/g. +2 pitting pedal edema b/l. +2 PT/DT pulses b/l. Abdomen:       S/nt/nd. Bowel sounds normal.   Skin:         No rashes or lesions. Not Jaundiced  Neurologic:    AAOX3. CN II-XII grossly WNL. Brenda Jesus Moves all extremities. No focal findings  Psychiatric:        Euthymic. Flat affect.              DIAGNOSTIC STUDIES      Data Review:   Recent Results (from the past 24 hour(s))   BLOOD GAS, ARTERIAL    Collection Time: 17  7:50 AM   Result Value Ref Range    pH 7.45 7.35 - 7.45      PCO2 53 (H) 35.0 - 45.0 mmHg    PO2 138 (H) 75.0 - 100.0 mmHg    BICARBONATE 36 (H) 22.0 - 26.0 mmol/L    BASE EXCESS 10.4 (H) 0 - 3 mmol/L    TOTAL HEMOGLOBIN 12.3 11.7 - 15.0 GM/DL    O2 SAT 99 (H) 92.0 - 98.5 %    ARTERIAL O2 HGB 96.9 94.0 - 97.0 %    CARBOXYHEMOGLOBIN 1.4 0.5 - 1.5 %    METHEMOGLOBIN 0.4 0.0 - 1.5 %    DEOXYHEMOGLOBIN 1 0.0 - 5.0 %    SITE LR     ALLENS TEST POSITIVE      MODE BIPAP 20 8    GLUCOSE, POC    Collection Time: 17 11:57 AM   Result Value Ref Range    Glucose (POC) 152 (H) 65 - 100 mg/dL   GLUCOSE, POC    Collection Time: 17  6:05 PM   Result Value Ref Range    Glucose (POC) 144 (H) 65 - 100 mg/dL   GLUCOSE, POC    Collection Time: 03/24/17 12:40 AM   Result Value Ref Range    Glucose (POC) 136 (H) 65 - 100 mg/dL   CBC W/O DIFF    Collection Time: 03/24/17  4:25 AM   Result Value Ref Range    WBC 7.6 4.3 - 11.1 K/uL    RBC 5.05 4.23 - 5.67 M/uL    HGB 11.8 (L) 13.6 - 17.2 g/dL    HCT 37.2 (L) 41.1 - 50.3 %    MCV 73.7 (L) 79.6 - 97.8 FL    MCH 23.4 (L) 26.1 - 32.9 PG    MCHC 31.7 31.4 - 35.0 g/dL    RDW 20.4 (H) 11.9 - 14.6 %    PLATELET 011 200 - 343 K/uL    MPV 10.2 (L) 10.8 - 60.4 FL   METABOLIC PANEL, BASIC    Collection Time: 03/24/17  4:25 AM   Result Value Ref Range    Sodium 149 (H) 136 - 145 mmol/L    Potassium 3.4 (L) 3.5 - 5.1 mmol/L    Chloride 99 98 - 107 mmol/L    CO2 40 (H) 21 - 32 mmol/L    Anion gap 10 7 - 16 mmol/L    Glucose 161 (H) 65 - 100 mg/dL    BUN 95 (H) 6 - 23 MG/DL    Creatinine 7.19 (H) 0.8 - 1.5 MG/DL    GFR est AA 10 (L) >60 ml/min/1.73m2    GFR est non-AA 9 (L) >60 ml/min/1.73m2    Calcium 9.3 8.3 - 10.4 MG/DL   MAGNESIUM    Collection Time: 03/24/17  4:25 AM   Result Value Ref Range    Magnesium 2.0 1.8 - 2.4 mg/dL     Imaging /Procedures /Studies:    CXR Results  (Last 48 hours)               03/20/17 0650  XR CHEST SNGL V Final result    Impression:  IMPRESSION:       Right-sided IJ line. No evidence of pneumothorax. Narrative:   Portable view of the chest        COMPARISON: March 20, 2017. CLINICAL HISTORY: Line placement. FINDINGS:       Right-sided IJ line tip is in the area of SVC. No evidence of pneumothorax. Left-sided cardiac pacer is stable. Bilateral groundglass densities, similar to   prior exam. Cardiac mediastinal contour and the surrounding bones are stable. 03/20/17 0454  XR CHEST PORT Final result    Impression:  IMPRESSION:       1. Previous right-sided IJ line has been removed. No evidence of pneumothorax. 2. Bilateral groundglass densities, suspicious for pulmonary edema.        Narrative:  Portable chest xray         COMPARISON: March 18, 2017. CLINICAL HISTORY: Central line. FINDINGS:       Left-sided IJ line has been removed. No evidence of pneumothorax. The left-sided   cardiac pacer is stable. Lungs are underinflated. Bilateral groundglass   densities may be due to pulmonary edema. Cardiac mediastinal contour and the   surrounding bones are stable. Echocardiogram 2/14/2017:SUMMARY:  -  Left ventricle: The ventricle was severely dilated. Systolic function was  severely reduced. Ejection fraction was estimated in the range of 10 % to 15   %. This study was inadequate for the evaluation of regional wall motion. Wall  thickness was mildly increased. -  Right ventricle: Systolic function was reduced. -  Left atrium: The atrium was markedly dilated. -  Right atrium: The atrium was moderately dilated. -  Inferior vena cava, hepatic veins: The inferior vena cava was moderately  dilated. Respirophasic changes in dimension were absent. -  Mitral valve: There was mild regurgitation. US retroperitoneal: IMPRESSION  Impression: Unremarkable right kidney, nonvisualization of the left kidney,  aorta, or bladder    Labs and Studies from previous 24 hours have been personally reviewed by myself [place] Problems    Diagnosis Date Noted    Acute on chronic respiratory failure with hypoxia and hypercapnia (HCC) 03/21/2017    Cardiogenic shock ([address] 03/20/2017    Obstructive sleep apnea 03/18/2017    Diabetes mellitus type II, uncontrolled ([address] 03/18/2017    Cardiomyopathy ([address] 03/18/2017    AICD (automatic cardioverter/defibrillator) present 03/18/2017    Obesity hypoventilation syndrome ([address] 03/18/2017    Morbid obesity with BMI of 60.0-69.9, adult ([address] 03/18/2017    Acute encephalopathy 03/18/2017    Acute renal failure (ARF) ([address] 03/18/2017       Plan:    RED/CKD - Cr better today at 7.19 Good urine output. Off bumex ggt  And started BID.  Likely cardiorenal syndrome. Appreciate nephrology help    Severe cardiomyopathy - off Dobutamine/ Dopamine. Not on ACEi/ARBs due to ARF - management per cardiology . Fluid restriction - compliance is a big issue with family providing fluids despite recommendations. Daily weight     Acute hypoxic and respiratory : improving on BIPAP when seeping and on HFNC when awake. Likely due to volume overload and morbid hypoventilation syndrome. Appreciate pulmonary help    AMS : resolved . Hyperkalemia - resolved    Debility -  Working with PT/OT when more awake. Brooke Hernández Appreciate palliative care help. Nutrition -  SP evaluation - will start PO feedings. Appreciate multiple consultants consultants    DVT Prophylaxis: Heparin SQ  CODE Status: full CODE  Plan of Care Discussed with: patient, daughter and sister at bedside. Care team  Medical Risk: high  Disposition: will transfer out of ICU today . Prognosis guarded.      Nelida Ruiz MD  03/24/17

## 2017-03-24 NOTE — PROGRESS NOTES
Called to pt room due to large stool around rectal tube and per daughter pt vomiting. Pt only coughed a tan colored secretions.   Pt cleaned of liquid tan colored stool with a foul odor

## 2017-03-24 NOTE — PROGRESS NOTES
Pt and family complaining of the alpesh wipes. Per pt they make him itch he cant use them. Tried to explain we can not use soap and water. Pt request lotion after getting lotion per family want something better than we have because he needs emoilents. Pt complaining of itching and wanted me to scratch his body. Explained he can do some of the scratching himself. His skin is just very dry.

## 2017-03-24 NOTE — PROGRESS NOTES
Problem: Mobility Impaired (Adult and Pediatric)  Goal: *Acute Goals and Plan of Care (Insert Text)  LTG:  (1.)Mr. Pollard will move from supine to sit and sit to supine , scoot up and down and roll side to side in bed with INDEPENDENT within 7 day(s). (2.)Mr. Pollard will transfer from bed to chair and chair to bed with MODIFIED INDEPENDENCE using the least restrictive device within 7 day(s). (3.)Mr. Pollard will ambulate with CONTACT GUARD ASSIST for 250 feet with the least restrictive device within 7 day(s). ________________________________________________________________________________________________      PHYSICAL THERAPY: INITIAL ASSESSMENT, PM 3/24/2017  INPATIENT: Hospital Day: 7  Payor: CARE IMPROVEMENT PLUS / [place] / Product Type: Managed Care Medicare /      NAME/AGE/GENDER: Demi Chao is a 45 y.o. male         PRIMARY DIAGNOSIS: Acute renal failure (ARF) ([address] Cardiogenic shock ([address] Cardiogenic shock ([address]        ICD-10: Treatment Diagnosis:       · Generalized Muscle Weakness (M62.81)  · Difficulty in walking, Not elsewhere classified (R26.2)  · Other abnormalities of gait and mobility (R26.89)   Precaution/Allergies:  Dilaudid [hydromorphone]; Iodinated contrast media - oral and iv dye; and Penicillins       ASSESSMENT:      Mr. Chantal Hernandez presents to physical therapy for above diagnoses. EF 10%. He is sidelying upon arrival and agreeable to PT assessment. At baseline, he is independent, has RW but did not use it, and lives with family. Wears 2-3 L O2 at home. RN in room to assist. He performs bed mobility with MIN A and demonstrates good sitting balance. BLE strength 4/5 and sensation intact. He is able to stand with light MIN/CGA with RW. Takes a few steps to transfer to chair for seated rest break. Moves well for having been in ICU bed for 6 days but fatigues easily. Family presents to room, supportive and encouraging. Pt AMB ~7' with RW, CGA, and chair follow.  Decreased SaO2 with mobility to 75-80% on 4L; improves to 90's with rest. Instructed in BLE exercises to perform as tolerated throughout the day. Seated comfortably in chair with needs met and BLE elevated. Mr. Navarro Cunningham is functioning well below baseline and will benefit from skilled PT intervention during acute care stay to address strength, balance, functional technique, DME use, and endurance. Pt and family discussing rehab at D/C, which would be beneficial for him. This section established at most recent assessment   PROBLEM LIST (Impairments causing functional limitations):  1. Decreased Strength  2. Decreased ADL/Functional Activities  3. Decreased Transfer Abilities  4. Decreased Ambulation Ability/Technique  5. Decreased Balance  6. Decreased Activity Tolerance  7. Increased Fatigue  8. Increased Shortness of Breath    INTERVENTIONS PLANNED: (Benefits and precautions of physical therapy have been discussed with the patient.)  1. Balance Exercise  2. Bed Mobility  3. Family Education  4. Gait Training  5. Therapeutic Activites  6. Therapeutic Exercise/Strengthening  7. Transfer Training      TREATMENT PLAN: Frequency/Duration: 3 times a week for duration of hospital stay  Rehabilitation Potential For Stated Goals: GOOD      RECOMMENDED REHABILITATION/EQUIPMENT: (at time of discharge pending progress): Continue Skilled Therapy and Rehab. HISTORY:   History of Present Injury/Illness (Reason for Referral):  Per H&P, \"Patient is a 46 yo morbidly obese AAM with a history of CKD III, DM II, KELSIE, OHS, HTN, HLD, Systolic CHF EF 74% Oxygen dependent (3L) who presented to the ED vai with c/o somnolence, lethargy, altered mental status, recent fall, decreased UOP progressively worse x7 days. EMS states patient was seen and evaluated yesterday after cough or fall at home. Patient was helped off the floor, appeared altered but refused transport to the emergency department at that time.  EMS reports hypotension in [facility] today systolic BP 90, . Daughter at bedside relays most of history. Denies fever, chills, chest pain, palpitations, seizure. Admits to brown urine 2 days ago and none since, generalized weakness, uncontrollable jerking of upper extremities, mumbled incoherent speech with short periods of lucidness. Patient also admits to back pain. Denies NSAID use, ETOH use. ED gave lactulose, albuterol, D50W, Insulin, Morphine, zofran, for hyperkalemia. ED provider has asked our hospitalist service to evaluate and further manage the patient for Acute Renal Failure. \"  Past Medical History/Comorbidities:   Mr. Silvino Beltrán  has a past medical history of Acute encephalopathy (3/18/2017); Acute systolic heart failure [facility]) (May, 2009); AICD (automatic cardioverter/defibrillator) present (10/21/2015); Atypical chest pain (4/23/2010); Bronchitis; CAD (coronary artery disease); Cardiomyopathy; Chest pain (10/21/2015); Chronic kidney disease; Chronic pain; Congestive heart failure (CHF) ([address] (10/21/2015); COPD; Diabetes ([address] Diabetes mellitus type II, uncontrolled ([address] (7/2/2013); GERD (gastroesophageal reflux disease); Gout; Heart failure ([address] Hypertension; Hyponatremia (12/20/2010); Ill-defined condition; Morbid obesity ([address] Nausea & vomiting (11/30/2015); Neuropathy; Obstructive sleep apnea (2/15/2010); Other unknown and unspecified cause of morbidity or mortality; Severe sepsis ([address] and Unspecified sleep apnea. He also has no past medical history of Adverse effect of anesthesia; Aneurysm ([address] Coagulation disorder ([address] DEMENTIA; Difficult intubation; Malignant hyperthermia due to anesthesia; Other ill-defined conditions(799.89); Pseudocholinesterase deficiency; or Psychiatric disorder. Mr. Silvino Beltrán  has a past surgical history that includes pacemaker; heart catheterization (Sandy 10, 2008); and chest surgery procedure unlisted.   Social History/Living Environment:   Home Environment: Apartment  # Steps to Enter: 0  One/Two Story Residence: One story  Living Alone: No  Support Systems: Child(reza), Family member(s)  Patient Expects to be Discharged to[de-identified] Rehabilitation facility  Current DME Used/Available at Home: peter Ornelas  Prior Level of Function/Work/Activity:  Independent at baseline. Ordered a RW but had not started using it. Number of Personal Factors/Comorbidities that affect the Plan of Care:  Obesity  Multiple co-morbidities    1-2: MODERATE COMPLEXITY   EXAMINATION:   Most Recent Physical Functioning:   Gross Assessment:  AROM: Within functional limits  Strength: Generally decreased, functional  Sensation: Intact               Posture:  Posture (WDL): Exceptions to WDL  Posture Assessment: Forward head, Rounded shoulders  Balance:  Sitting: Intact  Standing: Impaired  Standing - Static: Fair  Standing - Dynamic : Fair Bed Mobility:  Rolling: Stand-by asssistance  Supine to Sit: Minimum assistance  Scooting: Stand-by asssistance  Wheelchair Mobility:     Transfers:  Sit to Stand: Contact guard assistance  Stand to Sit: Contact guard assistance  Stand Pivot Transfers: Contact guard assistance  Bed to Chair: Contact guard assistance  Interventions: Safety awareness training; Tactile cues; Verbal cues  Duration: 20 Minutes  Gait:     Base of Support: Widened  Speed/Mira: Shuffled; Slow  Step Length: Right shortened;Left shortened  Gait Abnormalities: Decreased step clearance;Trunk sway increased  Distance (ft): 7 Feet (ft)  Assistive Device: Walker, rolling  Ambulation - Level of Assistance: Contact guard assistance  Interventions: Safety awareness training; Tactile cues; Verbal cues       Body Structures Involved:  1. Heart  2. Lungs  3. Metabolic  4. Endocrine Body Functions Affected:  1. Sensory/Pain  2. Cardio  3. Respiratory  4. Neuromusculoskeletal  5. Movement Related  6. Metobolic/Endocrine Activities and Participation Affected:  1. General Tasks and Demands  2. Mobility  3. Self Care  4.  Domestic Life  5. Community, Social and [place]   Number of elements that affect the Plan of Care: 4+: HIGH COMPLEXITY   CLINICAL PRESENTATION:   Presentation: Evolving clinical presentation with changing clinical characteristics: MODERATE COMPLEXITY   CLINICAL DECISION MAKIN [facility] Mobility Inpatient Short Form  How much difficulty does the patient currently have. .. Unable A Lot A Little None   1. Turning over in bed (including adjusting bedclothes, sheets and blankets)? [ ] 1   [ ] 2   [ ] 3   [X] 4   2. Sitting down on and standing up from a chair with arms ( e.g., wheelchair, bedside commode, etc.)   [ ] 1   [ ] 2   [X] 3   [ ] 4   3. Moving from lying on back to sitting on the side of the bed? [ ] 1   [ ] 2   [X] 3   [ ] 4   How much help from another person does the patient currently need. .. Total A Lot A Little None   4. Moving to and from a bed to a chair (including a wheelchair)? [ ] 1   [ ] 2   [X] 3   [ ] 4   5. Need to walk in hospital room? [ ] 1   [ ] 2   [X] 3   [ ] 4   6. Climbing 3-5 steps with a railing? [X] 1   [ ] 2   [ ] 3   [ ] 4   © , Trustees of [address] [place] [address] [address] [address], under license to [employer]. All rights reserved    Score:  Initial: 17 Most Recent: X (Date: 3/24/17 )     Interpretation of Tool:  Represents activities that are increasingly more difficult (i.e. Bed mobility, Transfers, Gait).        Score 24 23 22-20 19-15 14-10 9-7 6       Modifier CH CI CJ CK CL CM CN         · Mobility - Walking and Moving Around:               - CURRENT STATUS:    CK - 40%-59% impaired, limited or restricted               - GOAL STATUS:           CJ - 20%-39% impaired, limited or restricted               - D/C STATUS:                       ---------------To be determined---------------  Payor: CARE IMPROVEMENT PLUS [place] / Product Type: Managed Care Medicare /       Medical Necessity:     · Patient is expected to demonstrate progress in strength, balance, coordination, functional technique and endurance to increase independence with bed mobility, transfers, ambulation. Reason for Services/Other Comments:  · Patient continues to require present interventions due to patient's inability to tolerate baseline level of activity . Use of outcome tool(s) and clinical judgement create a POC that gives a: Questionable prediction of patient's progress: MODERATE COMPLEXITY                 TREATMENT:   (In addition to Assessment/Re-Assessment sessions the following treatments were rendered)   Pre-treatment Symptoms/Complaints:  \"I hate this tube in my bottom\"  Pain: Initial:   Pain Intensity 1: 0  Post Session:  0      Therapeutic Activity: (  20 Minutes ):  Therapeutic activities including Bed transfers, Chair transfers, Ambulation on level ground and sitting/standing balance to improve mobility, strength, balance and endurance. Required minimal Safety awareness training; Tactile cues; Verbal cues for safe walker managment and to promote good body mechanics. Braces/Orthotics/Lines/Etc:   · hansen catheter  · nasogastric tube  · colostomy  Treatment/Session Assessment:    · Response to Treatment:  Able to mobilize in room with CGA but fatigues  · Interdisciplinary Collaboration:  · Physical Therapist  · Registered Nurse  · After treatment position/precautions:  · Up in chair  · Bed/Chair-wheels locked  · Bed in low position  · Call light within reach  · RN notified  · Family at bedside  · Compliance with Program/Exercises: Will assess as treatment progresses. · Recommendations/Intent for next treatment session: \"Next visit will focus on advancements to more challenging activities and reduction in assistance provided\".   Total Treatment Duration:  PT Patient Time In/Time Out  Time In: 1405  Time Out: [facility]

## 2017-03-24 NOTE — PROGRESS NOTES
Problem: Nutrition Deficit  Goal: *Optimize nutritional status  Nutrition F/U:  Assessment:  Edema - 1+. The patient is alert and off BIPAP. He has diuresed >30 liters over the last 3 days. TF has been held and he was served a consistent CHO, cardiac breakfast. His sister felt that this was too heavy of a meal and requested softer items like broth, jello and pudding \"since he hasn't swallowed anything for a few days\". When he was served his lunch, she again felt that this meal was too heavy and again insisted on semi-liquid foods. Currently she is asking about who the endocrinologist is that is working on his case and wondering what his hemoglobin A1c level is. Macronutrient Needs:  Estimated calorie needs - 7642-2332 dixon/day (11-14 dixon/kg/day)   Estimated protein needs - 52-65 gm pro/day (0.8-1 gm pro/kgIBW/day pre-HD) (GFR 8 ml/min)   78-91 gm pro/day (1.2-1.4 gm pro/kgIBW/day - HD)  Intake/Comparative Standards:  Previous intake of TF (Suplena @ 48 ml/hr with 100 ml manual water flush twice daily) provides 2074 calories/day (96% calorie goal), 52 grams protein/day (100% protein goal - pre-HD), 237 grams CHO/day (does not exceed max CHO limit) and 1047 ml water/day (100% fluid goal). Minimal oral intake data is available. Intervention:   Meals and Snacks: Consistent CHO cardiac. Nutrition Education: The patient and his family have been educated multiple times since 2008. I provided the sister with information on sodium as well as list of low potassium fruits and vegetables (potassium 6.0 on admission). Although educated numerous times on various aspects of the components of his expected diet - consistent CHO diet, wgt loss and sodium, doubt that his meals after discharge will comply with these restrictions since his sister tends to do what she feels \"is best for him\". Coordination of Nutrition Care by a Nutrition Professional: AM ICU rounds, collaboration with Soumya Rodrigues RN.   Monitoring/Evaluation:  Monitor clinical course, POC, medication changes, GI function, labs. Sara Snell.  Chad Yohana  [phone]

## 2017-03-24 NOTE — PROGRESS NOTES
Palliative Care Progress Note    Patient: Kirk Alba MRN: [id number]  SSN: [id number]    YOB: 1965  Age: 45 y.o. Sex: male       Assessment/Plan:     Chief Complaint/Interval History:  Alert, on bipap. Reports ongoing pain from rectal tube       Principal Diagnosis:    · Fatigue, Lethargy  R53.83    Additional Diagnoses:   · Acute Respiratory Failure, Unspecified  J96.00  · Debility, Unspecified  R53.81  · Edema  R60.9  · Counseling, Encounter for Medical Advice  Z71.9  · Encounter for Palliative Care  Z51.5    Palliative Performance Scale (PPS)  PPS: 40    Medical Decision Making:   Reviewed and summarized notes over previous 24 hours. Discussed case with appropriate providers: ICU IDT; primary RN  Reviewed laboratory and x-ray data: CBC, CMP, ABG    Pt alert, resting in bed, on optiflow at present. Sister and daughter at bedside. They are thrilled with pt's continued improvement, and attribute this to a miracle from God. Affirmed their beliefs. Pt is hopeful that he will continue to improve and eventually make it back home. Goals are clearly established at this point. We will sign off. Please re-consult if further needs arise. Thank you for allowing us participate in Mr Pollard's care. Will discuss findings with members of the interdisciplinary team.         More than 50% of this 15 minute visit was spent counseling and coordination of care as outlined above. Subjective:     Review of Systems:  A comprehensive review of systems was negative except for:   Constitutional: Positive for fatigue. GI: positive for discomfort from rectal tube     Objective:     Visit Vitals    /82    Pulse 77    Temp 98 °F (36.7 °C)    Resp 30    Ht 5' 6\" (1.676 m)    Wt (!) 163.3 kg (360 lb)    SpO2 95%    BMI 58.11 kg/m2       Physical Exam:    General:  Debilitated. Cooperative. No acute distress. Eyes:  Conjunctivae/corneas clear    Nose: Nares normal. Septum midline.  Optiflow Neck: Supple, symmetrical, trachea midline. Lungs:   Decreased bilaterally, unlabored. Heart:  Regular rate and rhythm. Abdomen:   Obese. Soft, non-tender, non-distended. Extremities: Normal, atraumatic, no cyanosis. Skin: Skin color, texture, turgor normal. No rash.    Neurologic: Nonfocal    Psych: Alert and oriented      Signed By: Ky Chung NP     March 24, 2017

## 2017-03-24 NOTE — INTERDISCIPLINARY ROUNDS
Interdisciplinary team rounds were held 3/24/2017 with the following team members:Care Management, Nursing, Nurse Practitioner, Palliative Care, Pharmacy, Physical Therapy, Physician, Respiratory Therapy and Clinical Coordinator and the patient. Plan of care discussed. See clinical pathway and/or care plan for interventions and desired outcomes.

## 2017-03-24 NOTE — PROGRESS NOTES
[facility]        Subjective: Breathing is better    Review of Systems -   General ROS: negative for - chills, fatigue or fever  Respiratory ROS: no cough, shortness of breath, or wheezing  Cardiovascular ROS: no chest pain or dyspnea on exertion  Gastrointestinal ROS: no abdominal pain, change in bowel habits, or black or bloody stools  Genito-Urinary ROS: no dysuria, trouble voiding, or hematuria  Neurological ROS: no TIA or stroke symptoms        Objective:    Vitals:    03/24/17 0431 03/24/17 0501 03/24/17 0515 03/24/17 0531   BP: 101/53 102/73  111/63   Pulse: 74 73  67   Resp: 10 15  (!) 3   Temp:       SpO2: 96% 96% 96% 96%   Weight:       Height:           PE  Gen: in no acute distress  CV: reg rate  Chest: bilat breath sounds  Abd:  soft  Ext/Access: 1+ edema       . LAB  Recent Labs      03/24/17   0425  03/23/17   0400  03/22/17   0412   WBC  7.6  6.9  6.0   HGB  11.8*  11.4*  10.5*   HCT  37.2*  35.0*  31.8*   PLT  251  268  243     Recent Labs      03/24/17   0425  03/23/17   0400  03/22/17   0412   NA  149*  146*  141   K  3.4*  3.8  4.6   CL  99  101  101   CO2  40*  35*  27   GLU  161*  132*  117*   BUN  95*  105*  109*   CREA  7.19*  9.03*  10.90*   MG  2.0  1.6*  1.9   CA  9.3  8.8  8.4   ALB   --    --   2.9*   TBILI   --    --   1.4*   ALT   --    --   34   SGOT   --    --   42*           Radiology    A/P:   Patient Active Problem List   Diagnosis Code    Obstructive sleep apnea G47.33    Chronic back pain M54.9, G89.29    Nonischemic dilated cardiomyopathy (HCC) I42.9    Dyslipidemia E78.5    Diabetes mellitus type II, uncontrolled (HCC) E11.65    Noncompliance with medication regimen Z91.14    Pleural effusion J90    CKD (chronic kidney disease) stage 3, GFR 30-59 ml/min N18.3    Chronic pancreatitis (HCC) K86.1    Abdominal fluid collection R18.8    Chest pain R07.9    AICD (automatic cardioverter/defibrillator) present Z95.810    Congestive heart failure (CHF) ([facility]) I50.9    Cardiomyopathy (HCC) I42.9    Nausea & vomiting R11.2    Hypertension I10    Acute on chronic systolic (congestive) heart failure ([facility]) I50.23    Atrial flutter (HCC) I48.92    Obesity hypoventilation syndrome ([facility]) E66.2    Morbid obesity with BMI of 60.0-69.9, adult ([facility]) E66.01, Z68.44    Hypoxemia R09.02    Hypersomnia G47.10    Constipation K59.00    Syncope R55    Nonsustained ventricular tachycardia ([facility]) I47.2    NSVT (nonsustained ventricular tachycardia) ([facility]) I47.2    Acute encephalopathy G93.40    Acute renal failure (ARF) ([facility]) N17.9    Cardiogenic shock ([facility]) R57.0    Acute on chronic respiratory failure with hypoxia and hypercapnia ([facility]) J96.21, J96.22       Creatinine is coming down. Large diuresis over the past 2 days. Will stop bumex drip and go to BID.       Juliana Henderson MD

## 2017-03-24 NOTE — PROGRESS NOTES
Called to [t room. Family questioning when we are going to begin getting him OOB and letting him get in the chair, walk, eat and start removing these tubes. I explained today was the first day off of bipap and it is a process.   We need to take things slowly so his body can handle it and not work against us

## 2017-03-24 NOTE — PROGRESS NOTES
TRANSFER - OUT REPORT:    Verbal report given to Jerilyn ROBBINS(name) on Kirk Alba  being transferred to (unit) for routine progression of care       Report consisted of patients Situation, Background, Assessment and   Recommendations(SBAR). Information from the following report(s) SBAR, Kardex, ED Summary, Intake/Output, MAR, Recent Results and Cardiac Rhythm SR w/ 1st Degree AV Block was reviewed with the receiving nurse. Lines:   Quad Lumen 03/19/17 Right Subclavian (Active)   Central Line Being Utilized Yes 3/24/2017  7:30 AM   Criteria for Appropriate Use Limited/no vessel suitable for conventional peripheral access 3/24/2017  7:30 AM   Site Assessment Clean, dry, & intact 3/24/2017 11:45 AM   Infiltration Assessment 0 3/24/2017 11:45 AM   Affected Extremity/Extremities Color distal to insertion site pink (or appropriate for race) 3/24/2017 11:45 AM   Date of Last Dressing Change 03/21/17 3/24/2017 11:45 AM   Dressing Status Clean, dry, & intact 3/24/2017 11:45 AM   Dressing Type Tape;Transparent 3/24/2017  7:30 AM   Action Taken Other (comment) 3/23/2017  7:00 PM   Proximal Hub Color/Line Status White;Capped 3/24/2017  7:30 AM   Positive Blood Return (Medial Site) Yes 3/23/2017  7:00 PM   Medial 1 Hub Color/Line Status Gray;Capped 3/24/2017  7:30 AM   Positive Blood Return (Lateral Site) Yes 3/23/2017  7:00 PM   Medial 2 Hub Color/Line Status Blue; Infusing 3/24/2017  7:30 AM   Positive Blood Return (Site #3) Yes 3/23/2017  7:00 PM   Distal Hub Color/Line Status Brown;Capped 3/24/2017  7:30 AM   Positive Blood Return (Site #4) Yes 3/23/2017  7:00 PM   Alcohol Cap Used No 3/24/2017  7:30 AM        Opportunity for questions and clarification was provided.       Patient transported with:   O2 @ 4 liters

## 2017-03-24 NOTE — PROGRESS NOTES
Pt tube feeding disconnected. Pt given a complete bed bath and linen change. Mouth care, skin care, devin care, lotion , lip balm all placed on pt. Family and pt becoming impatient giving a list of task before we are able to even finish his bath. I explained to her I will gladly take care of everything but one thing at a time. Pt is feeling better and I have encouraged him to move his legs and arms that he does not need us to do this for him. Encouraged him to do mouth care instead of have some one come and get me every few min.  To do it for him

## 2017-03-25 LAB
ANION GAP BLD CALC-SCNC: 11 MMOL/L (ref 7–16)
BASOPHILS # BLD AUTO: 0 K/UL (ref 0–0.2)
BASOPHILS # BLD: 0 % (ref 0–2)
BUN SERPL-MCNC: 83 MG/DL (ref 6–23)
CALCIUM SERPL-MCNC: 9.5 MG/DL (ref 8.3–10.4)
CHLORIDE SERPL-SCNC: 94 MMOL/L (ref 98–107)
CK SERPL-CCNC: 496 U/L (ref 21–215)
CO2 SERPL-SCNC: 43 MMOL/L (ref 21–32)
CREAT SERPL-MCNC: 5.75 MG/DL (ref 0.8–1.5)
DIFFERENTIAL METHOD BLD: ABNORMAL
EOSINOPHIL # BLD: 0.4 K/UL (ref 0–0.8)
EOSINOPHIL NFR BLD: 5 % (ref 0.5–7.8)
ERYTHROCYTE [DISTWIDTH] IN BLOOD BY AUTOMATED COUNT: 21.2 % (ref 11.9–14.6)
FERRITIN SERPL-MCNC: 135 NG/ML (ref 8–388)
GLUCOSE BLD STRIP.AUTO-MCNC: 126 MG/DL (ref 65–100)
GLUCOSE BLD STRIP.AUTO-MCNC: 149 MG/DL (ref 65–100)
GLUCOSE BLD STRIP.AUTO-MCNC: 162 MG/DL (ref 65–100)
GLUCOSE SERPL-MCNC: 193 MG/DL (ref 65–100)
HCT VFR BLD AUTO: 41 % (ref 41.1–50.3)
HGB BLD-MCNC: 13 G/DL (ref 13.6–17.2)
IMM GRANULOCYTES # BLD: 0 K/UL (ref 0–0.5)
IMM GRANULOCYTES NFR BLD AUTO: 0.1 % (ref 0–5)
IRON SATN MFR SERPL: 20 %
IRON SERPL-MCNC: 61 UG/DL (ref 35–150)
LYMPHOCYTES # BLD AUTO: 36 % (ref 13–44)
LYMPHOCYTES # BLD: 3.3 K/UL (ref 0.5–4.6)
MCH RBC QN AUTO: 23.9 PG (ref 26.1–32.9)
MCHC RBC AUTO-ENTMCNC: 31.7 G/DL (ref 31.4–35)
MCV RBC AUTO: 75.5 FL (ref 79.6–97.8)
MONOCYTES # BLD: 0.7 K/UL (ref 0.1–1.3)
MONOCYTES NFR BLD AUTO: 8 % (ref 4–12)
NEUTS SEG # BLD: 4.6 K/UL (ref 1.7–8.2)
NEUTS SEG NFR BLD AUTO: 51 % (ref 43–78)
PLATELET # BLD AUTO: 229 K/UL (ref 150–450)
PMV BLD AUTO: ABNORMAL FL (ref 10.8–14.1)
POTASSIUM SERPL-SCNC: 3.8 MMOL/L (ref 3.5–5.1)
RBC # BLD AUTO: 5.43 M/UL (ref 4.23–5.67)
SODIUM SERPL-SCNC: 148 MMOL/L (ref 136–145)
TIBC SERPL-MCNC: 305 UG/DL (ref 250–450)
TROPONIN I SERPL-MCNC: 0.02 NG/ML (ref 0.02–0.05)
WBC # BLD AUTO: 9.1 K/UL (ref 4.3–11.1)

## 2017-03-25 PROCEDURE — 74011250637 HC RX REV CODE- 250/637

## 2017-03-25 PROCEDURE — 83540 ASSAY OF IRON: CPT | Performed by: HOSPITALIST

## 2017-03-25 PROCEDURE — 74011250636 HC RX REV CODE- 250/636: Performed by: PHYSICIAN ASSISTANT

## 2017-03-25 PROCEDURE — 94660 CPAP INITIATION&MGMT: CPT

## 2017-03-25 PROCEDURE — 84484 ASSAY OF TROPONIN QUANT: CPT | Performed by: INTERNAL MEDICINE

## 2017-03-25 PROCEDURE — 99232 SBSQ HOSP IP/OBS MODERATE 35: CPT | Performed by: INTERNAL MEDICINE

## 2017-03-25 PROCEDURE — 94760 N-INVAS EAR/PLS OXIMETRY 1: CPT

## 2017-03-25 PROCEDURE — 82728 ASSAY OF FERRITIN: CPT | Performed by: INTERNAL MEDICINE

## 2017-03-25 PROCEDURE — 80048 BASIC METABOLIC PNL TOTAL CA: CPT | Performed by: INTERNAL MEDICINE

## 2017-03-25 PROCEDURE — 65660000000 HC RM CCU STEPDOWN

## 2017-03-25 PROCEDURE — 74011000250 HC RX REV CODE- 250: Performed by: INTERNAL MEDICINE

## 2017-03-25 PROCEDURE — 93005 ELECTROCARDIOGRAM TRACING: CPT | Performed by: INTERNAL MEDICINE

## 2017-03-25 PROCEDURE — 85025 COMPLETE CBC W/AUTO DIFF WBC: CPT | Performed by: INTERNAL MEDICINE

## 2017-03-25 PROCEDURE — 82962 GLUCOSE BLOOD TEST: CPT

## 2017-03-25 PROCEDURE — 82550 ASSAY OF CK (CPK): CPT | Performed by: INTERNAL MEDICINE

## 2017-03-25 PROCEDURE — 77010033678 HC OXYGEN DAILY

## 2017-03-25 PROCEDURE — 74011250637 HC RX REV CODE- 250/637: Performed by: INTERNAL MEDICINE

## 2017-03-25 RX ORDER — NITROGLYCERIN 0.4 MG/1
0.4 TABLET SUBLINGUAL AS NEEDED
Status: DISCONTINUED | OUTPATIENT
Start: 2017-03-25 | End: 2017-03-29 | Stop reason: HOSPADM

## 2017-03-25 RX ORDER — NITROGLYCERIN 0.4 MG/1
TABLET SUBLINGUAL
Status: COMPLETED
Start: 2017-03-25 | End: 2017-03-25

## 2017-03-25 RX ORDER — FAMOTIDINE 20 MG/1
20 TABLET, FILM COATED ORAL
Status: DISCONTINUED | OUTPATIENT
Start: 2017-03-25 | End: 2017-03-25

## 2017-03-25 RX ORDER — FAMOTIDINE 20 MG/1
20 TABLET, FILM COATED ORAL
Status: DISCONTINUED | OUTPATIENT
Start: 2017-03-26 | End: 2017-03-29 | Stop reason: HOSPADM

## 2017-03-25 RX ADMIN — BUMETANIDE 1 MG: 0.25 INJECTION, SOLUTION INTRAMUSCULAR; INTRAVENOUS at 09:07

## 2017-03-25 RX ADMIN — BUMETANIDE 1 MG: 0.25 INJECTION, SOLUTION INTRAMUSCULAR; INTRAVENOUS at 21:42

## 2017-03-25 RX ADMIN — HEPARIN SODIUM 5000 UNITS: 5000 INJECTION, SOLUTION INTRAVENOUS; SUBCUTANEOUS at 09:07

## 2017-03-25 RX ADMIN — FAMOTIDINE 20 MG: 20 TABLET, FILM COATED ORAL at 20:28

## 2017-03-25 RX ADMIN — Medication 5 ML: at 05:51

## 2017-03-25 RX ADMIN — HEPARIN SODIUM 5000 UNITS: 5000 INJECTION, SOLUTION INTRAVENOUS; SUBCUTANEOUS at 16:00

## 2017-03-25 RX ADMIN — NITROGLYCERIN 0.4 MG: 0.4 TABLET SUBLINGUAL at 21:07

## 2017-03-25 RX ADMIN — HEPARIN SODIUM 5000 UNITS: 5000 INJECTION, SOLUTION INTRAVENOUS; SUBCUTANEOUS at 00:25

## 2017-03-25 RX ADMIN — Medication 5 ML: at 15:13

## 2017-03-25 RX ADMIN — INSULIN LISPRO 2 UNITS: 100 INJECTION, SOLUTION INTRAVENOUS; SUBCUTANEOUS at 11:50

## 2017-03-25 RX ADMIN — INSULIN LISPRO 2 UNITS: 100 INJECTION, SOLUTION INTRAVENOUS; SUBCUTANEOUS at 16:31

## 2017-03-25 RX ADMIN — Medication 10 ML: at 21:42

## 2017-03-25 NOTE — PROGRESS NOTES
Shift assessment completed for Mr. Pollard. Resting in bed, no s/s of distress. Alert and oriented x4. No complaints of pain or N/V. Resp even and unlabored, heart sounds normal. No complaints of SOB on 5L NC. Running NSR at 79bpm on tele monitor. Dual skin assessment with Leonel Ndiaye RN, skin dry & fragile. Excoriation & swelling to scrotum, dry & flaky heels, small healing wound to L back of heel, zinc paste. R quad lumen clean dry & intact. Zuleta patent & draining. Bed in low locked position; call light in reach. No further needs. Will monitor.

## 2017-03-25 NOTE — PROGRESS NOTES
Critical Care Daily Progress Note: 3/25/2017    Amelia Pollard   Admission Date: 3/18/2017         The patient's chart is reviewed and the patient is discussed with the staff. 45 y.o. Admitted by hospitalist service on 3/18/17 with acute on chronic kidney disease and hyperkalemia with acute encephalopathy. Pt also has a history of ICM with EF <10%, chronic respiratory failure on 3L O2, HTN, HLD, OHS/KELSIE, and CKD. Pt was admitted to ICU and has been off and on BiPAP. Pt transferred out of ICU on 3/20. He required BiPAP secondary to lethargy and pH 7.28. Pt's pCO2 has remained around 48.9-49.4 on 18/8 with rate 22. We are being consulted secondary to respiratory failure. Subjective:   Negative 4.1L yesterday with bumex, now on 1mg bid. BiPAP worn last night. Weight down from 397 to 344lbs?     Current Facility-Administered Medications   Medication Dose Route Frequency    bumetanide (BUMEX) injection 1 mg  1 mg IntraVENous Q12H    haloperidol lactate (HALDOL) injection 2 mg  2 mg IntraVENous Q8H PRN    LORazepam (ATIVAN) injection 0.5 mg  0.5 mg IntraVENous Q6H PRN    lip protectant (BLISTEX) ointment   Topical PRN    levalbuterol (XOPENEX) nebulizer soln 0.63 mg/3 mL  0.63 mg Nebulization Q6H PRN    sodium chloride (NS) flush 5-10 mL  5-10 mL IntraVENous Q8H    sodium chloride (NS) flush 5-10 mL  5-10 mL IntraVENous PRN    insulin lispro (HUMALOG) injection   SubCUTAneous Q6H    dextrose 40% (GLUTOSE) oral gel 1 Tube  15 g Oral PRN    glucagon (GLUCAGEN) injection 1 mg  1 mg IntraMUSCular PRN    dextrose (D50W) injection syrg 12.5-25 g  25-50 mL IntraVENous PRN    heparin (porcine) injection 5,000 Units  5,000 Units SubCUTAneous Q8H       Review of Systems  Constitutional:  negative for fever, chills, sweats  Cardiovascular:  Edema, negative for chest pain, palpitations, syncope  Gastrointestinal:  TFs, negative for dysphagia, reflux, vomiting, diarrhea, abdominal pain, or melena  Neurologic:  negative for focal weakness, numbness, headache      Objective:     Vitals:    03/25/17 0008 03/25/17 0024 03/25/17 0300 03/25/17 0802   BP:  116/75 131/80 106/76   Pulse:  86 81 77   Resp:  20 19 18   Temp:  98 °F (36.7 °C) 98.6 °F (37 °C) 98.7 °F (37.1 °C)   SpO2: 98% 99% 100% 100%   Weight:   344 lb 13 oz (156.4 kg)    Height:           Intake and Output:   03/23 1901 - 03/25 0700  In: 2102.4 [P.O.:1080; I.V.:209.4]  Out: 42479 [Urine:47482; Drains:25]  03/25 0701 - 03/25 1900  In: -   Out: 1250 [Urine:1250]    Physical Exam:          Constitutional:  the patient is morbidly obese and in no acute distress, Air-Vo40L, 36%, sat 96%  EENMT:  Sclera clear, pupils equal, oral mucosa moist  Respiratory: clear anterior, no wheezing  Cardiovascular:  RRR without M,G,R  Gastrointestinal: soft, obese and non-tender; with positive bowel sounds, rectal tube and FT. Musculoskeletal: warm without cyanosis. There is bilateral 1+ lower leg edema. Skin:  no jaundice or rashes, no wounds   Neurologic: no gross neuro deficits     Psychiatric:  alert and oriented x 3    LINES:  Right subclavian, FT, rectal tube, hansen          CXR 3/23/17:      CXR 3/22/17:        LAB  Recent Labs      03/25/17   0550  03/24/17   2344  03/24/17   1650  03/24/17   1209  03/24/17   0040   GLUCPOC  126*  115*  151*  170*  136*      Recent Labs      03/24/17   0425  03/23/17   0400   WBC  7.6  6.9   HGB  11.8*  11.4*   HCT  37.2*  35.0*   PLT  251  268     Recent Labs      03/24/17   0425  03/23/17   0400   NA  149*  146*   K  3.4*  3.8   CL  99  101   CO2  40*  35*   GLU  161*  132*   BUN  95*  105*   CREA  7.19*  9.03*   MG  2.0  1.6*   CA  9.3  8.8     Recent Labs      03/23/17   0750   PH  7.45   PCO2  53*   PO2  138*   HCO3  36*     No results for input(s): LCAD, LAC in the last 72 hours.     Assessment:  (Medical Decision Making)     Hospital Problems  Date Reviewed: 3/24/2017          Codes Class Noted POA    Acute on chronic respiratory failure with hypoxia and hypercapnia (HCC) ICD-10-CM: J96.21, J96.22  ICD-9-CM: 518.84, 786.09, 799.02  3/21/2017 No    Now on nasal cannula O2 with BiPAP at night    * (Principal)Cardiogenic shock (HCC) ICD-10-CM: R57.0  ICD-9-CM: 785.51  3/20/2017 Yes    Hemodynamically stable    Obstructive sleep apnea (Chronic) ICD-10-CM: G47.33  ICD-9-CM: 327.23  3/18/2017 Yes    Chronic-counseled regarding compliance    Diabetes mellitus type II, uncontrolled (HCC) (Chronic) ICD-10-CM: E11.65  ICD-9-CM: 250.02  3/18/2017 Yes    Chronic--ranges 121-152    AICD (automatic cardioverter/defibrillator) present (Chronic) ICD-10-CM: Z95.810  ICD-9-CM: V45.02  3/18/2017 Yes    chronic    Cardiomyopathy ([address] (Chronic) ICD-10-CM: I42.9  ICD-9-CM: 425.4  3/18/2017 Yes    chronic    Obesity hypoventilation syndrome (HCC) (Chronic) ICD-10-CM: E32.7  ICD-9-CM: 278.03  3/18/2017 Yes    On BiPAP    Morbid obesity with BMI of 60.0-69.9, adult (HCC) (Chronic) ICD-10-CM: E66.01, Z68.44  ICD-9-CM: 278.01, V85.44  3/18/2017 Yes    chronic    Acute encephalopathy ICD-10-CM: G93.40  ICD-9-CM: 348.30  3/18/2017 Yes    Oriented x2    Acute renal failure (ARF) (HCC) ICD-10-CM: N17.9  ICD-9-CM: 584.9  3/18/2017 Yes    Creatinine trending down with diuresis        Quad lumen subclavian since 3/19  Urinary catheter 3/18    Plan:  (Medical Decision Making)   --Continue bumex  --Spoke with patient about importance of nightly BiPAP for the rest of his life. He will need a portable oxygen concentrator at discharge  --Monitoring renal function. More than 50% of the time documented was spent in face-to-face contact with the patient and in the care of the patient on the floor/unit where the patient is located.     Roberta Christianson MD

## 2017-03-25 NOTE — PROGRESS NOTES
Progress Note    Patient: Sarah Parikh MRN: [id number]  SSN: [id number]    YOB: 1965  Age: 45 y.o. Sex: male      Admit Date: 3/18/2017    LOS: 7 days     Subjective:   As previously documented: \" Mr. Chon Patel is a 46 yo male with PMHx of sCHF EF 10% with ICD, CKD, DM2, KELSIE on CPAP, HTN, chronic hypoxic respiratory failure on home O2 at 3 L evaluated with acute metabolic encephalopathy. Found to have progressive renal failure with creatinine of >9 and hyperkalemia of 6.0 with hypercapneic respiratory failure requiring ICU care for BIPAP. Additionally has been seen by cardiology and placed on dopamine/dobutamine. Nephrology has evaluated and he is not a dialysis candidate. He is being diuresed with bumex drip. Renal US no acute issues. Ammonia slightly elevated, too large for CT scanner for CT head and has intermittent delirium. CXR with pulm edema. UA positive ans started on IV Rocephin, although negative urine cx. Urology placed hansen due to difficulty. Family declines hospice, well known to palliative care\"        03/24/17 Mr. Sarah Parikh is feeling better. Able to be off BIPAP during the day. Bumex ggt stopped . Renal function improving - cr:7.19    03/25/17: Patient is feeling better. Family at beside. Patient was on BiPAP all night and tolerated it well. He has a hansen catheter in place. He is alert, awake and oriented x3. Denies any CP, SOB or palpitations. No fevers, chills or diaphoresis. Labs for today are pending. Review of Systems:  Pertinent per HPI.     Medications:  Current Facility-Administered Medications   Medication Dose Route Frequency    bumetanide (BUMEX) injection 1 mg  1 mg IntraVENous Q12H    haloperidol lactate (HALDOL) injection 2 mg  2 mg IntraVENous Q8H PRN    LORazepam (ATIVAN) injection 0.5 mg  0.5 mg IntraVENous Q6H PRN    lip protectant (BLISTEX) ointment   Topical PRN    levalbuterol (XOPENEX) nebulizer soln 0.63 mg/3 mL  0.63 mg Nebulization Q6H PRN    sodium chloride (NS) flush 5-10 mL  5-10 mL IntraVENous Q8H    sodium chloride (NS) flush 5-10 mL  5-10 mL IntraVENous PRN    insulin lispro (HUMALOG) injection   SubCUTAneous Q6H    dextrose 40% (GLUTOSE) oral gel 1 Tube  15 g Oral PRN    glucagon (GLUCAGEN) injection 1 mg  1 mg IntraMUSCular PRN    dextrose (D50W) injection syrg 12.5-25 g  25-50 mL IntraVENous PRN    heparin (porcine) injection 5,000 Units  5,000 Units SubCUTAneous Q8H       Objective:     Vitals:    03/25/17 0008 03/25/17 0024 03/25/17 0300 03/25/17 0802   BP:  116/75 131/80 106/76   Pulse:  86 81 77   Resp:  20 19 18   Temp:  98 °F (36.7 °C) 98.6 °F (37 °C) 98.7 °F (37.1 °C)   SpO2: 98% 99% 100% 100%   Weight:   156.4 kg (344 lb 13 oz)    Height:            Physical Exam:   General: awake, alert, no apparent distress  Eyes: anicteric  Neck: Supple, trachea midline  Lungs: Clear to auscultation bilaterally. No rales, wheezes, or rhonchi. Heart: Regular rate and rhythm. No appreciable murmur. Abdomen: Soft, nontender, nondistended. Bowel sounds normal.  No rebound tenderness, guarding, or rigidity. Extremities:  No LE edema. Skin: Warm/dry. No rashes or lesions. Neurologic: CN II-XII grossly intact bilaterally. Psych: AOx3. Normal mood and affect.     Lab/Data Review:  Recent Results (from the past 24 hour(s))   GLUCOSE, POC    Collection Time: 03/24/17 12:09 PM   Result Value Ref Range    Glucose (POC) 170 (H) 65 - 100 mg/dL   OCCULT BLOOD, STOOL    Collection Time: 03/24/17 12:30 PM   Result Value Ref Range    Occult blood, stool NEGATIVE  NEG     GLUCOSE, POC    Collection Time: 03/24/17  4:50 PM   Result Value Ref Range    Glucose (POC) 151 (H) 65 - 100 mg/dL   GLUCOSE, POC    Collection Time: 03/24/17 11:44 PM   Result Value Ref Range    Glucose (POC) 115 (H) 65 - 100 mg/dL   GLUCOSE, POC    Collection Time: 03/25/17  5:50 AM   Result Value Ref Range    Glucose (POC) 126 (H) 65 - 100 mg/dL     I have reviewed new clinical data.    Assessment:     Principal Problem:    Cardiogenic shock ([facility] (3/20/2017)    Active Problems:    Obstructive sleep apnea (3/18/2017)      Diabetes mellitus type II, uncontrolled ([address] (3/18/2017)      AICD (automatic cardioverter/defibrillator) present (3/18/2017)      Cardiomyopathy ([facility] (3/18/2017)      Obesity hypoventilation syndrome ([facility] (3/18/2017)      Morbid obesity with BMI of 60.0-69.9, adult ([facility] (3/18/2017)      Acute encephalopathy (3/18/2017)      Acute renal failure (ARF) ([address] (3/18/2017)      Acute on chronic respiratory failure with hypoxia and hypercapnia ([facility] (3/21/2017)      Plan:   # Acute renal failure: possibly secondary to cardiorenal syndrome or consequence of cardiogenic shock.   -Slowly improving. Initially on Bumex drip now switched to IV BID.  -Continue to monitor BUN/Cr. Avoid nephrotoxic agents except Bumex which is medically necessary. # Cardiogenic shock. Patient has underlying severe cardiomyopathy, baseline EF < 10%  -Shock resolved. BP more stable, however, still in the low side  -He was initially on Dobutamine/Dopamine drip, now off.   -Continue management for his heart failure. Not on Entresto, ACEI or ARB due to renal failure and hypotension.  -Not on Coreg due to shock and currently still low BP  -He will need to be restarted on ASA and Statins. Spironolactone when renal function improves. # Acute on chronic hypoxic hypercarbic respiratory failure secondary to KELSIE and Obesity-Hypoventilation syndrome  -Continue BiPAP at night  -Continue breathing treatments    # Acute encephalopathy  -Resolved    # Hyperkalemia, likely secondary to renal failure  -improved    # Debility  -PT/OT evaluation    # Risk stratification  High due to high medical complexity and multiple and severe comorbidities. Long-term prognosis is guarded. DVT prophylaxis: Heparin SQ  Disposition: Pending PT/OT evaluation.  He might need skilled PT prior to discharge    Signed By: Jamshid Media Sharyle Cal, MD     March 25, 2017

## 2017-03-25 NOTE — PROGRESS NOTES
Visit with family in waiting area. They are very pleased with the progress of patient. Encouraged them.   Signed by chaplain Shi

## 2017-03-25 NOTE — PROGRESS NOTES
Admit Date: 3/18/2017      Subjective:          Review of Systems  Cardio-vascular: no chest pain,  SOB  GI: no N/V/D  : no dysuria, no hematuria    Objective:     Patient Vitals for the past 8 hrs:   BP Temp Pulse Resp SpO2 Weight   03/25/17 1153 118/68 97.5 °F (36.4 °C) 80 16 100 % -   03/25/17 1112 - - - - 97 % -   03/25/17 0802 106/76 98.7 °F (37.1 °C) 77 18 100 % -   03/25/17 0800 - - - - - 150.6 kg (332 lb)     03/25 0701 - 03/25 1900  In: 600 [P.O.:600]  Out: 1250 [Urine:1250]      Physical Exam:   Lungs: decreased BS  CV: RR,   Abdomen: soft, not tender, no rebound. Ext: trace  edema          Data Review   Recent Results (from the past 8 hour(s))   GLUCOSE, POC    Collection Time: 03/25/17  5:50 AM   Result Value Ref Range    Glucose (POC) 126 (H) 65 - 100 mg/dL   CBC WITH AUTOMATED DIFF    Collection Time: 03/25/17 10:12 AM   Result Value Ref Range    WBC 9.1 4.3 - 11.1 K/uL    RBC 5.43 4.23 - 5.67 M/uL    HGB 13.0 (L) 13.6 - 17.2 g/dL    HCT 41.0 (L) 41.1 - 50.3 %    MCV 75.5 (L) 79.6 - 97.8 FL    MCH 23.9 (L) 26.1 - 32.9 PG    MCHC 31.7 31.4 - 35.0 g/dL    RDW 21.2 (H) 11.9 - 14.6 %    PLATELET 343 236 - 103 K/uL    MPV Cannot be calulated 10.8 - 14.1 FL    DF AUTOMATED      NEUTROPHILS 51 43 - 78 %    LYMPHOCYTES 36 13 - 44 %    MONOCYTES 8 4.0 - 12.0 %    EOSINOPHILS 5 0.5 - 7.8 %    BASOPHILS 0 0.0 - 2.0 %    IMMATURE GRANULOCYTES 0.1 0.0 - 5.0 %    ABS. NEUTROPHILS 4.6 1.7 - 8.2 K/UL    ABS. LYMPHOCYTES 3.3 0.5 - 4.6 K/UL    ABS. MONOCYTES 0.7 0.1 - 1.3 K/UL    ABS. EOSINOPHILS 0.4 0.0 - 0.8 K/UL    ABS. BASOPHILS 0.0 0.0 - 0.2 K/UL    ABS. IMM.  GRANS. 0.0 0.0 - 0.5 K/UL   FERRITIN    Collection Time: 03/25/17 10:12 AM   Result Value Ref Range    Ferritin 135 8 - 388 NG/ML   TRANSFERRIN SATURATION    Collection Time: 03/25/17 10:12 AM   Result Value Ref Range    Iron 61 35 - 150 ug/dL    TIBC 305 250 - 450 ug/dL    Transferrin Saturation 20 (L) >24 %   METABOLIC PANEL, BASIC    Collection Time: 03/25/17 10:12 AM   Result Value Ref Range    Sodium 148 (H) 136 - 145 mmol/L    Potassium 3.8 3.5 - 5.1 mmol/L    Chloride 94 (L) 98 - 107 mmol/L    CO2 PENDING mmol/L    Anion gap PENDING mmol/L    Glucose 193 (H) 65 - 100 mg/dL    BUN 83 (H) 6 - 23 MG/DL    Creatinine 5.75 (H) 0.8 - 1.5 MG/DL    GFR est AA 13 (L) >60 ml/min/1.73m2    GFR est non-AA 11 (L) >60 ml/min/1.73m2    Calcium 9.5 8.3 - 10.4 MG/DL   GLUCOSE, POC    Collection Time: 03/25/17 11:31 AM   Result Value Ref Range    Glucose (POC) 162 (H) 65 - 100 mg/dL           Assessment:     Principal Problem:    Cardiogenic shock (HCC) (3/20/2017)    Active Problems:    Obstructive sleep apnea (3/18/2017)      Diabetes mellitus type II, uncontrolled ([address] (3/18/2017)      AICD (automatic cardioverter/defibrillator) present (3/18/2017)      Cardiomyopathy ([address] (3/18/2017)      Obesity hypoventilation syndrome ([address] (3/18/2017)      Morbid obesity with BMI of 60.0-69.9, adult ([address] (3/18/2017)      Acute encephalopathy (3/18/2017)      Acute renal failure (ARF) ([address] (3/18/2017)      Acute on chronic respiratory failure with hypoxia and hypercapnia ([address] (3/21/2017)        Plan:     Renal F. Better with good UOP  F/u    Ziggy Lind MD

## 2017-03-25 NOTE — PROGRESS NOTES
Shift assessment completed for Mr. Pollard. Resting in bed, no s/s of distress. Alert and oriented x4. Resp even and unlabored, heart sounds normal. No complaints of SOB on 5L NC, running NSR at 80bpm on tele monitor. Skin warm dry & intact, small healing wound to L heel. R quad lumen clean dry & intact. Bed in low locked position; call light in reach. Zuleta patent & draining. No further needs. Will monitor. Pt is complaining of 2/10 chest pain, stated that it feels more like acid reflux. Pt is asymptomatic otherwise of any cardiac issues.  Vitals are stable & HR & rhythm is normal. Lea FLOWER.

## 2017-03-25 NOTE — PROGRESS NOTES
Reassessment completed for Mr. Pollard. Sleeping in bed, no s/s of distress. Has been intermittently pulling off bipap, seems a little confused but easily reoriented. Posey alarm placed on bed for pt safety. Call light in reach. Will monitor.

## 2017-03-25 NOTE — PROGRESS NOTES
[facility] CARDIOLOGY PROGRESS NOTE           3/25/2017 10:45 AM    Admit Date: 3/18/2017    Admit Diagnosis: Acute renal failure (ARF) (HCC)      Subjective:   Patient reports feeling much better. Has had good diuresis. Objective:     Vitals:    03/25/17 0008 03/25/17 0024 03/25/17 0300 03/25/17 0802   BP:  116/75 131/80 106/76   Pulse:  86 81 77   Resp:  20 19 18   Temp:  98 °F (36.7 °C) 98.6 °F (37 °C) 98.7 °F (37.1 °C)   SpO2: 98% 99% 100% 100%   Weight:   344 lb 13 oz (156.4 kg)    Height:           Physical Exam:  General-Well Developed, Well Nourished, No Acute Distress, Alert & Oriented x 3, appropriate mood. Neck- supple, no JVD  CV- regular rate and rhythm no MRG  Lung- clear bilaterally  Abd- soft, nontender, nondistended  Ext- +2 edema bilaterally.   Skin- warm and dry    Current Facility-Administered Medications   Medication Dose Route Frequency    bumetanide (BUMEX) injection 1 mg  1 mg IntraVENous Q12H    haloperidol lactate (HALDOL) injection 2 mg  2 mg IntraVENous Q8H PRN    LORazepam (ATIVAN) injection 0.5 mg  0.5 mg IntraVENous Q6H PRN    lip protectant (BLISTEX) ointment   Topical PRN    levalbuterol (XOPENEX) nebulizer soln 0.63 mg/3 mL  0.63 mg Nebulization Q6H PRN    sodium chloride (NS) flush 5-10 mL  5-10 mL IntraVENous Q8H    sodium chloride (NS) flush 5-10 mL  5-10 mL IntraVENous PRN    insulin lispro (HUMALOG) injection   SubCUTAneous Q6H    dextrose 40% (GLUTOSE) oral gel 1 Tube  15 g Oral PRN    glucagon (GLUCAGEN) injection 1 mg  1 mg IntraMUSCular PRN    dextrose (D50W) injection syrg 12.5-25 g  25-50 mL IntraVENous PRN    heparin (porcine) injection 5,000 Units  5,000 Units SubCUTAneous Q8H     Data Review:   Recent Results (from the past 24 hour(s))   GLUCOSE, POC    Collection Time: 03/24/17 12:09 PM   Result Value Ref Range    Glucose (POC) 170 (H) 65 - 100 mg/dL   OCCULT BLOOD, STOOL    Collection Time: 03/24/17 12:30 PM   Result Value Ref Range Occult blood, stool NEGATIVE  NEG     GLUCOSE, POC    Collection Time: 03/24/17  4:50 PM   Result Value Ref Range    Glucose (POC) 151 (H) 65 - 100 mg/dL   GLUCOSE, POC    Collection Time: 03/24/17 11:44 PM   Result Value Ref Range    Glucose (POC) 115 (H) 65 - 100 mg/dL   GLUCOSE, POC    Collection Time: 03/25/17  5:50 AM   Result Value Ref Range    Glucose (POC) 126 (H) 65 - 100 mg/dL     Assessment:     Principal Problem:    Cardiogenic shock ([address] (3/20/2017)    Active Problems:    Obstructive sleep apnea (3/18/2017)      Diabetes mellitus type II, uncontrolled ([address] (3/18/2017)      AICD (automatic cardioverter/defibrillator) present (3/18/2017)      Cardiomyopathy ([address] (3/18/2017)      Obesity hypoventilation syndrome ([address] (3/18/2017)      Morbid obesity with BMI of 60.0-69.9, adult ([address] (3/18/2017)      Acute encephalopathy (3/18/2017)      Acute renal failure (ARF) ([address] (3/18/2017)      Acute on chronic respiratory failure with hypoxia and hypercapnia ([address] (3/21/2017)      Plan:   1. Chronic systolic heart failure - Off Pressors. On iv bumex. NO ACEI/ARB secondary to ARF. No Beta blocker secondary to acute CHF  2. Acute on Chronic Renal Failure - Followed by renal service    Payal Velasquez. Galindo Segovia M.D., F.A.C.C, F.H.R.S.   Cardiology/Electrophysiology

## 2017-03-25 NOTE — PROGRESS NOTES
Pt up in the bed alert and oriented x 3 resp even and unlabored pt voices no complaints at the present time, pt encouraged to call should need arise. Will continue to monitor.

## 2017-03-26 LAB
ATRIAL RATE: 83 BPM
CALCULATED P AXIS, ECG09: 50 DEGREES
CALCULATED R AXIS, ECG10: -175 DEGREES
CALCULATED T AXIS, ECG11: 41 DEGREES
DIAGNOSIS, 93000: NORMAL
GLUCOSE BLD STRIP.AUTO-MCNC: 101 MG/DL (ref 65–100)
GLUCOSE BLD STRIP.AUTO-MCNC: 115 MG/DL (ref 65–100)
GLUCOSE BLD STRIP.AUTO-MCNC: 147 MG/DL (ref 65–100)
GLUCOSE BLD STRIP.AUTO-MCNC: 167 MG/DL (ref 65–100)
GLUCOSE BLD STRIP.AUTO-MCNC: 179 MG/DL (ref 65–100)
P-R INTERVAL, ECG05: 232 MS
Q-T INTERVAL, ECG07: 434 MS
QRS DURATION, ECG06: 94 MS
QTC CALCULATION (BEZET), ECG08: 509 MS
TROPONIN I SERPL-MCNC: <0.02 NG/ML (ref 0.02–0.05)
VENTRICULAR RATE, ECG03: 83 BPM

## 2017-03-26 PROCEDURE — 74011250636 HC RX REV CODE- 250/636: Performed by: PHYSICIAN ASSISTANT

## 2017-03-26 PROCEDURE — 65270000029 HC RM PRIVATE

## 2017-03-26 PROCEDURE — 65660000000 HC RM CCU STEPDOWN

## 2017-03-26 PROCEDURE — 99232 SBSQ HOSP IP/OBS MODERATE 35: CPT | Performed by: INTERNAL MEDICINE

## 2017-03-26 PROCEDURE — 77030018846 HC SOL IRR STRL H20 ICUM -A

## 2017-03-26 PROCEDURE — 74011250637 HC RX REV CODE- 250/637: Performed by: INTERNAL MEDICINE

## 2017-03-26 PROCEDURE — 74011000250 HC RX REV CODE- 250: Performed by: INTERNAL MEDICINE

## 2017-03-26 PROCEDURE — 94660 CPAP INITIATION&MGMT: CPT

## 2017-03-26 PROCEDURE — 94760 N-INVAS EAR/PLS OXIMETRY 1: CPT

## 2017-03-26 PROCEDURE — 84484 ASSAY OF TROPONIN QUANT: CPT | Performed by: INTERNAL MEDICINE

## 2017-03-26 PROCEDURE — 77010033678 HC OXYGEN DAILY

## 2017-03-26 PROCEDURE — 74011250636 HC RX REV CODE- 250/636: Performed by: INTERNAL MEDICINE

## 2017-03-26 RX ORDER — POLYETHYLENE GLYCOL 3350 17 G/17G
17 POWDER, FOR SOLUTION ORAL DAILY PRN
Status: DISCONTINUED | OUTPATIENT
Start: 2017-03-26 | End: 2017-03-29 | Stop reason: HOSPADM

## 2017-03-26 RX ORDER — ACETAZOLAMIDE 500 MG/1
500 CAPSULE, EXTENDED RELEASE ORAL EVERY 12 HOURS
Status: DISCONTINUED | OUTPATIENT
Start: 2017-03-26 | End: 2017-03-26

## 2017-03-26 RX ADMIN — WATER 500 MG: 1 INJECTION INTRAMUSCULAR; INTRAVENOUS; SUBCUTANEOUS at 21:26

## 2017-03-26 RX ADMIN — HEPARIN SODIUM 5000 UNITS: 5000 INJECTION, SOLUTION INTRAVENOUS; SUBCUTANEOUS at 08:55

## 2017-03-26 RX ADMIN — INSULIN LISPRO 2 UNITS: 100 INJECTION, SOLUTION INTRAVENOUS; SUBCUTANEOUS at 11:32

## 2017-03-26 RX ADMIN — POLYETHYLENE GLYCOL 3350 17 G: 17 POWDER, FOR SOLUTION ORAL at 16:53

## 2017-03-26 RX ADMIN — HEPARIN SODIUM 5000 UNITS: 5000 INJECTION, SOLUTION INTRAVENOUS; SUBCUTANEOUS at 00:09

## 2017-03-26 RX ADMIN — HEPARIN SODIUM 5000 UNITS: 5000 INJECTION, SOLUTION INTRAVENOUS; SUBCUTANEOUS at 16:48

## 2017-03-26 RX ADMIN — WATER 500 MG: 1 INJECTION INTRAMUSCULAR; INTRAVENOUS; SUBCUTANEOUS at 08:55

## 2017-03-26 RX ADMIN — Medication 5 ML: at 05:58

## 2017-03-26 RX ADMIN — Medication 10 ML: at 16:48

## 2017-03-26 RX ADMIN — Medication 10 ML: at 21:27

## 2017-03-26 NOTE — PROGRESS NOTES
Attempted to place Zantac 150mg BID PRN medication order per MD's request, informed by pharmacy that pepcid 20mg BID will be substituted.

## 2017-03-26 NOTE — PROGRESS NOTES
Admit Date: 3/18/2017      Subjective:          Review of Systems  Cardio-vascular: no chest pain, no SOB  GI: no N/V/D  : no dysuria, no hematuria    Objective:     Patient Vitals for the past 8 hrs:   BP Temp Pulse Resp SpO2   03/26/17 1103 101/68 98.1 °F (36.7 °C) 80 18 98 %   03/26/17 0655 125/68 97.6 °F (36.4 °C) 78 18 100 %     03/26 0701 - 03/26 1900  In: 240 [P.O.:240]  Out: 1250 [Urine:1250]      Physical Exam:   Lungs: decreased BS  CV: RR,   Abdomen: soft, not tender, no rebound.   Ext: 1+ edema          Data Review   Recent Results (from the past 8 hour(s))   GLUCOSE, POC    Collection Time: 03/26/17  5:36 AM   Result Value Ref Range    Glucose (POC) 115 (H) 65 - 100 mg/dL   GLUCOSE, POC    Collection Time: 03/26/17 11:07 AM   Result Value Ref Range    Glucose (POC) 167 (H) 65 - 100 mg/dL           Assessment:     Principal Problem:    Cardiogenic shock ([address] (3/20/2017)    Active Problems:    Obstructive sleep apnea (3/18/2017)      Diabetes mellitus type II, uncontrolled ([address] (3/18/2017)      AICD (automatic cardioverter/defibrillator) present (3/18/2017)      Cardiomyopathy ([address] (3/18/2017)      Obesity hypoventilation syndrome ([address] (3/18/2017)      Morbid obesity with BMI of 60.0-69.9, adult ([address] (3/18/2017)      Acute encephalopathy (3/18/2017)      Acute renal failure (ARF) ([address] (3/18/2017)      Acute on chronic respiratory failure with hypoxia and hypercapnia ([address] (3/21/2017)        Plan:     Renal F. Better as yesterday  UOP ok  Recheck lab in am    Patrick Gee MD

## 2017-03-26 NOTE — PROGRESS NOTES
Critical Care Daily Progress Note: 3/26/2017    Bryn Pollard   Admission Date: 3/18/2017         The patient's chart is reviewed and the patient is discussed with the staff. 45 y.o. Admitted by hospitalist service on 3/18/17 with acute on chronic kidney disease and hyperkalemia with acute encephalopathy. Pt also has a history of ICM with EF <10%, chronic respiratory failure on 3L O2, HTN, HLD, OHS/KELSIE, and CKD. Pt was admitted to ICU and has been off and on BiPAP. Pt transferred out of ICU on 3/20. He required BiPAP secondary to lethargy and pH 7.28. Pt's pCO2 has remained around 48.9-49.4 on 18/8 with rate 22. We are being consulted secondary to respiratory failure. Subjective:   Negative 4.1L yesterday with bumex, now on 1mg bid.   Brought in home BiPAP    Current Facility-Administered Medications   Medication Dose Route Frequency    acetaZOLAMIDE (DIAMOX) 500 mg in sterile water (preservative free) 5 mL injection  500 mg IntraVENous Q12H    famotidine (PEPCID) tablet 20 mg  20 mg Oral BID PRN    nitroglycerin (NITROSTAT) tablet 0.4 mg  0.4 mg SubLINGual PRN    haloperidol lactate (HALDOL) injection 2 mg  2 mg IntraVENous Q8H PRN    LORazepam (ATIVAN) injection 0.5 mg  0.5 mg IntraVENous Q6H PRN    lip protectant (BLISTEX) ointment   Topical PRN    levalbuterol (XOPENEX) nebulizer soln 0.63 mg/3 mL  0.63 mg Nebulization Q6H PRN    sodium chloride (NS) flush 5-10 mL  5-10 mL IntraVENous Q8H    sodium chloride (NS) flush 5-10 mL  5-10 mL IntraVENous PRN    insulin lispro (HUMALOG) injection   SubCUTAneous Q6H    dextrose 40% (GLUTOSE) oral gel 1 Tube  15 g Oral PRN    glucagon (GLUCAGEN) injection 1 mg  1 mg IntraMUSCular PRN    dextrose (D50W) injection syrg 12.5-25 g  25-50 mL IntraVENous PRN    heparin (porcine) injection 5,000 Units  5,000 Units SubCUTAneous Q8H       Review of Systems  Constitutional:  negative for fever, chills, sweats  Cardiovascular:  Edema, negative for chest pain, palpitations, syncope  Gastrointestinal:  TFs, negative for dysphagia, reflux, vomiting, diarrhea, abdominal pain, or melena  Neurologic:  negative for focal weakness, numbness, headache      Objective:     Vitals:    03/25/17 2300 03/26/17 0000 03/26/17 0330 03/26/17 0655   BP: 158/65  114/69 125/68   Pulse: 72  83 78   Resp: 16  19 18   Temp: 98.5 °F (36.9 °C)  98 °F (36.7 °C) 97.6 °F (36.4 °C)   SpO2: 100%  100% 100%   Weight:  344 lb (156 kg)     Height:           Intake and Output:   03/24 1901 - 03/26 0700  In: 1320 [P.O.:1320]  Out: 5100 [Urine:5100]       Physical Exam:          Constitutional:  the patient is morbidly obese and in no acute distress, Air-Vo40L, 36%, sat 96%  EENMT:  Sclera clear, pupils equal, oral mucosa moist  Respiratory: clear anterior, no wheezing  Cardiovascular:  RRR without M,G,R  Gastrointestinal: soft, obese and non-tender; with positive bowel sounds, rectal tube and FT. Musculoskeletal: warm without cyanosis. There is bilateral trace lower leg edema. Skin:  no jaundice or rashes, no wounds      LINES:  Right subclavian, FT, rectal tube, hansen    CXR 3/23/17:          LAB  Recent Labs      03/26/17   0536  03/25/17   2355  03/25/17   1541  03/25/17   1131  03/25/17   0550   GLUCPOC  115*  101*  149*  162*  126*      Recent Labs      03/25/17   1012  03/24/17   0425   WBC  9.1  7.6   HGB  13.0*  11.8*   HCT  41.0*  37.2*   PLT  229  251     Recent Labs      03/26/17   0217  03/25/17   2015  03/25/17   1012  03/24/17   0425   NA   --    --   148*  149*   K   --    --   3.8  3.4*   CL   --    --   94*  99   CO2   --    --   43*  40*   GLU   --    --   193*  161*   BUN   --    --   83*  95*   CREA   --    --   5.75*  7.19*   MG   --    --    --   2.0   CA   --    --   9.5  9.3   TROIQ  <0.02*  0.02   --    --      No results for input(s): PH, PCO2, PO2, HCO3 in the last 72 hours. No results for input(s): LCAD, LAC in the last 72 hours.     Assessment:  (Medical Decision Making)     Hospital Problems  Date Reviewed: 3/24/2017          Codes Class Noted POA    Acute on chronic respiratory failure with hypoxia and hypercapnia (HCC) ICD-10-CM: J96.21, J96.22  ICD-9-CM: 518.84, 786.09, 799.02  3/21/2017 No    Now on nasal cannula O2. Use home BiPAP tonight    * (Principal)Cardiogenic shock ([facility] ICD-10-CM: R57.0  ICD-9-CM: 785.51  3/20/2017 Yes    Hemodynamically stable    Obstructive sleep apnea (Chronic) ICD-10-CM: G47.33  ICD-9-CM: 327.23  3/18/2017 Yes    Chronic-counseled regarding compliance    Diabetes mellitus type II, uncontrolled (HCC) (Chronic) ICD-10-CM: E11.65  ICD-9-CM: 250.02  3/18/2017 Yes    Chronic--ranges 121-152    AICD (automatic cardioverter/defibrillator) present (Chronic) ICD-10-CM: Z95.810  ICD-9-CM: V45.02  3/18/2017 Yes    chronic    Cardiomyopathy ([facility] (Chronic) ICD-10-CM: I42.9  ICD-9-CM: 425.4  3/18/2017 Yes    chronic    Obesity hypoventilation syndrome (HCC) (Chronic) ICD-10-CM: Y80.7  ICD-9-CM: 278.03  3/18/2017 Yes    On BiPAP    Morbid obesity with BMI of 60.0-69.9, adult (HCC) (Chronic) ICD-10-CM: E66.01, Z68.44  ICD-9-CM: 278.01, V85.44  3/18/2017 Yes    chronic    Acute encephalopathy ICD-10-CM: G93.40  ICD-9-CM: 348.30  3/18/2017 Yes    Oriented x2    Acute renal failure (ARF) (HCC) ICD-10-CM: N17.9  ICD-9-CM: 584.9  3/18/2017 Yes    Creatinine trending down with diuresis      Metabolic alkalosis:  With bicarb >40 due to combined diuresis and chronic respiratory acidosis. Will change diuretic to avoid worsened apneas. Quad lumen subclavian since 3/19  Urinary catheter 3/18    Plan:  (Medical Decision Making)     More than 50% of the time documented was spent in face-to-face contact with the patient and in the care of the patient on the floor/unit where the patient is located.     --change bumex to diamox given bicarb >40  --f/u labs  --try home BiPAP tonight    Marlyn Villanueva MD

## 2017-03-26 NOTE — PROGRESS NOTES
Pt continuing to complain of pain, keeps stating \"It's right there\" and rubbing L side of chest. Pepcid 20mg tab has been given per pt request. Vitals remain stable. Labs are currently in process. Will monitor.

## 2017-03-26 NOTE — PROGRESS NOTES
Reassessment completed for Mr. Pollard. Sleeping in bed, no s/s of distress. On bipap. Call light in reach. Will monitor.

## 2017-03-26 NOTE — PROGRESS NOTES
Patient said, \" I am a miracle. God is good [id number] me. \"  Reflected with patient about his journey and how God has provided for him. Patient sitting in chair and is at peace. Encouraged with presence and prayer. Family was not present. Addressing spiritual concerns of patient is vital to his total healing.   Signed by chaplain Kolby

## 2017-03-26 NOTE — PROGRESS NOTES
Updated Dr. Mo Frames that pt remains stable, pain is easing off. Elevated CK reported as well. Ordered EKG & troponin to be checked in 6 hours.

## 2017-03-26 NOTE — PROGRESS NOTES
[facility] CARDIOLOGY PROGRESS NOTE           3/26/2017 10:45 AM    Admit Date: 3/18/2017    Admit Diagnosis: Acute renal failure (ARF) (HCC)      Subjective:   Patient reports feeling much better. Has had good diuresis everyday    Objective:     Vitals:    03/25/17 2300 03/26/17 0000 03/26/17 0330 03/26/17 0655   BP: 158/65  114/69 125/68   Pulse: 72  83 78   Resp: 16 19 18   Temp: 98.5 °F (36.9 °C)  98 °F (36.7 °C) 97.6 °F (36.4 °C)   SpO2: 100%  100% 100%   Weight:  344 lb (156 kg)     Height:           Physical Exam:  General-Well Developed, Well Nourished, No Acute Distress, Alert & Oriented x 3, appropriate mood. Neck- supple, no JVD  CV- regular rate and rhythm no MRG  Lung- clear bilaterally  Abd- soft, nontender, nondistended  Ext- +2 edema bilaterally.   Skin- warm and dry    Current Facility-Administered Medications   Medication Dose Route Frequency    acetaZOLAMIDE (DIAMOX) 500 mg in sterile water (preservative free) 5 mL injection  500 mg IntraVENous Q12H    famotidine (PEPCID) tablet 20 mg  20 mg Oral BID PRN    nitroglycerin (NITROSTAT) tablet 0.4 mg  0.4 mg SubLINGual PRN    haloperidol lactate (HALDOL) injection 2 mg  2 mg IntraVENous Q8H PRN    LORazepam (ATIVAN) injection 0.5 mg  0.5 mg IntraVENous Q6H PRN    lip protectant (BLISTEX) ointment   Topical PRN    levalbuterol (XOPENEX) nebulizer soln 0.63 mg/3 mL  0.63 mg Nebulization Q6H PRN    sodium chloride (NS) flush 5-10 mL  5-10 mL IntraVENous Q8H    sodium chloride (NS) flush 5-10 mL  5-10 mL IntraVENous PRN    insulin lispro (HUMALOG) injection   SubCUTAneous Q6H    dextrose 40% (GLUTOSE) oral gel 1 Tube  15 g Oral PRN    glucagon (GLUCAGEN) injection 1 mg  1 mg IntraMUSCular PRN    dextrose (D50W) injection syrg 12.5-25 g  25-50 mL IntraVENous PRN    heparin (porcine) injection 5,000 Units  5,000 Units SubCUTAneous Q8H     Data Review:   Recent Results (from the past 24 hour(s))   GLUCOSE, POC    Collection Time: 03/25/17 11:31 AM   Result Value Ref Range    Glucose (POC) 162 (H) 65 - 100 mg/dL   GLUCOSE, POC    Collection Time: 03/25/17  3:41 PM   Result Value Ref Range    Glucose (POC) 149 (H) 65 - 100 mg/dL   TROPONIN I    Collection Time: 03/25/17  8:15 PM   Result Value Ref Range    Troponin-I, Qt. 0.02 0.02 - 0.05 NG/ML   CK    Collection Time: 03/25/17  8:15 PM   Result Value Ref Range     (H) 21 - 215 U/L   EKG, 12 LEAD, SUBSEQUENT    Collection Time: 03/25/17 10:03 PM   Result Value Ref Range    Ventricular Rate 83 BPM    Atrial Rate 83 BPM    P-R Interval 232 ms    QRS Duration 94 ms    Q-T Interval 434 ms    QTC Calculation (Bezet) 509 ms    Calculated P Axis 50 degrees    Calculated R Axis -175 degrees    Calculated T Axis 41 degrees    Diagnosis       Sinus rhythm with 1st degree A-V block  Right superior axis deviation  Low voltage QRS  Cannot rule out Anterior infarct (cited on or before 22-JAN-2017)  Prolonged QT  Abnormal ECG  When compared with ECG of 18-MAR-2017 10:48,  Questionable change in initial forces of Lateral leads  Nonspecific T wave abnormality no longer evident in Lateral leads  QT has lengthened     GLUCOSE, POC    Collection Time: 03/25/17 11:55 PM   Result Value Ref Range    Glucose (POC) 101 (H) 65 - 100 mg/dL   TROPONIN I    Collection Time: 03/26/17  2:17 AM   Result Value Ref Range    Troponin-I, Qt. <0.02 (L) 0.02 - 0.05 NG/ML   GLUCOSE, POC    Collection Time: 03/26/17  5:36 AM   Result Value Ref Range    Glucose (POC) 115 (H) 65 - 100 mg/dL     Assessment:     Principal Problem:    Cardiogenic shock (HCC) (3/20/2017)    Active Problems:    Obstructive sleep apnea (3/18/2017)      Diabetes mellitus type II, uncontrolled ([facility] (3/18/2017)      AICD (automatic cardioverter/defibrillator) present (3/18/2017)      Cardiomyopathy ([facility] (3/18/2017)      Obesity hypoventilation syndrome ([facility] (3/18/2017)      Morbid obesity with BMI of 60.0-69.9, adult ([address] (3/18/2017)      Acute encephalopathy (3/18/2017)      Acute renal failure (ARF) ([facility] (3/18/2017)      Acute on chronic respiratory failure with hypoxia and hypercapnia ([facility] (3/21/2017)      Plan:   1. Chronic systolic heart failure - Off Pressors. On iv Diuretics. NO ACEI/ARB secondary to ARF. No Beta blocker secondary to acute CHF  2. Acute on Chronic Renal Failure - Followed by renal service    Rainer Bassett. Cruz Valero M.D., F.A.C.C, F.H.R.S.   Cardiology/Electrophysiology

## 2017-03-26 NOTE — PROGRESS NOTES
Informed Dr. Stacy Smith (hospitalist) of pt complaints of chest pain. Ordered STAT troponin & cardiac enzymes, as well as anti-reflux medication. Lab was informed of stat draw. Will continue to monitor closely.

## 2017-03-26 NOTE — PROGRESS NOTES
Progress Note    Patient: Reynaldo Reid MRN: [id number]  SSN: [id number]    YOB: 1965  Age: 45 y.o. Sex: male      Admit Date: 3/18/2017    LOS: 8 days     Subjective:   As previously documented: \" Mr. Silvino Beltrán is a 44 yo male with PMHx of sCHF EF 10% with ICD, CKD, DM2, KELSIE on CPAP, HTN, chronic hypoxic respiratory failure on home O2 at 3 L evaluated with acute metabolic encephalopathy. Found to have progressive renal failure with creatinine of >9 and hyperkalemia of 6.0 with hypercapneic respiratory failure requiring ICU care for BIPAP. Additionally has been seen by cardiology and placed on dopamine/dobutamine. Nephrology has evaluated and he is not a dialysis candidate. He is being diuresed with bumex drip. Renal US no acute issues. Ammonia slightly elevated, too large for CT scanner for CT head and has intermittent delirium. CXR with pulm edema. UA positive ans started on IV Rocephin, although negative urine cx. Urology placed hansen due to difficulty. Family declines hospice, well known to palliative care\"         03/24/17: Feeling better. Able to be off BIPAP during the day. Bumex ggt stopped . Renal function improving - cr:7.19     03/25/17: Patient is feeling better. Family at beside. Patient was on BiPAP all night and tolerated it well. He has a hansen catheter in place. Cr 5.75    03/26/17: Patient seen and examined at bedside. Patient is feeling well. He is ambulating. Tolerating BiPAP at night. He wants the hansen out to be taken out. He is alert, awake and oriented x3. Denies any CP, SOB or palpitations. No fevers, chills or diaphoresis. Review of Systems:  Pertinent per HPI.     Medications:  Current Facility-Administered Medications   Medication Dose Route Frequency    acetaZOLAMIDE (DIAMOX) 500 mg in sterile water (preservative free) 5 mL injection  500 mg IntraVENous Q12H    polyethylene glycol (MIRALAX) packet 17 g  17 g Oral DAILY PRN    famotidine (PEPCID) tablet 20 mg  20 mg Oral BID PRN    nitroglycerin (NITROSTAT) tablet 0.4 mg  0.4 mg SubLINGual PRN    haloperidol lactate (HALDOL) injection 2 mg  2 mg IntraVENous Q8H PRN    LORazepam (ATIVAN) injection 0.5 mg  0.5 mg IntraVENous Q6H PRN    lip protectant (BLISTEX) ointment   Topical PRN    levalbuterol (XOPENEX) nebulizer soln 0.63 mg/3 mL  0.63 mg Nebulization Q6H PRN    sodium chloride (NS) flush 5-10 mL  5-10 mL IntraVENous Q8H    sodium chloride (NS) flush 5-10 mL  5-10 mL IntraVENous PRN    insulin lispro (HUMALOG) injection   SubCUTAneous Q6H    dextrose 40% (GLUTOSE) oral gel 1 Tube  15 g Oral PRN    glucagon (GLUCAGEN) injection 1 mg  1 mg IntraMUSCular PRN    dextrose (D50W) injection syrg 12.5-25 g  25-50 mL IntraVENous PRN    heparin (porcine) injection 5,000 Units  5,000 Units SubCUTAneous Q8H       Objective:     Vitals:    03/26/17 0000 03/26/17 0330 03/26/17 0655 03/26/17 1103   BP:  114/69 125/68 101/68   Pulse:  83 78 80   Resp:  19 18 18   Temp:  98 °F (36.7 °C) 97.6 °F (36.4 °C) 98.1 °F (36.7 °C)   SpO2:  100% 100% 98%   Weight: 156 kg (344 lb)      Height:            Physical Exam:   General: awake, alert, no apparent distress. Morbidly obese  Eyes: anicteric. PERRL  Neck: Supple, trachea midline  Lungs: Bibasilar rales. No wheezes, or rhonchi. Heart: Regular rate and rhythm. No appreciable murmur. Abdomen: Soft, nontender, nondistended. Bowel sounds normal. No rebound tenderness, guarding, or rigidity. Genitourinary: Zuleta catheter in place. Extremities: Trace LE edema bilaterally  Skin: Warm/dry. No rashes or lesions. Neurologic: nonfocal  Psych: AOx3. Normal mood and affect.     Lab/Data Review:  Recent Results (from the past 24 hour(s))   GLUCOSE, POC    Collection Time: 03/25/17  3:41 PM   Result Value Ref Range    Glucose (POC) 149 (H) 65 - 100 mg/dL   TROPONIN I    Collection Time: 03/25/17  8:15 PM   Result Value Ref Range    Troponin-I, Qt. 0.02 0.02 - 0.05 NG/ML   CK    Collection Time: 03/25/17  8:15 PM   Result Value Ref Range     (H) 21 - 215 U/L   EKG, 12 LEAD, SUBSEQUENT    Collection Time: 03/25/17 10:03 PM   Result Value Ref Range    Ventricular Rate 83 BPM    Atrial Rate 83 BPM    P-R Interval 232 ms    QRS Duration 94 ms    Q-T Interval 434 ms    QTC Calculation (Bezet) 509 ms    Calculated P Axis 50 degrees    Calculated R Axis -175 degrees    Calculated T Axis 41 degrees    Diagnosis       Sinus rhythm with 1st degree A-V block  Right superior axis deviation  Low voltage QRS  Cannot rule out Anterior infarct (cited on or before 22-JAN-2017)  Prolonged QT  Abnormal ECG  When compared with ECG of 18-MAR-2017 10:48,  Questionable change in initial forces of Lateral leads  Nonspecific T wave abnormality no longer evident in Lateral leads  QT has lengthened     GLUCOSE, POC    Collection Time: 03/25/17 11:55 PM   Result Value Ref Range    Glucose (POC) 101 (H) 65 - 100 mg/dL   TROPONIN I    Collection Time: 03/26/17  2:17 AM   Result Value Ref Range    Troponin-I, Qt. <0.02 (L) 0.02 - 0.05 NG/ML   GLUCOSE, POC    Collection Time: 03/26/17  5:36 AM   Result Value Ref Range    Glucose (POC) 115 (H) 65 - 100 mg/dL   GLUCOSE, POC    Collection Time: 03/26/17 11:07 AM   Result Value Ref Range    Glucose (POC) 167 (H) 65 - 100 mg/dL     I have reviewed new clinical data.     Assessment:     Principal Problem:    Cardiogenic shock ([address] (3/20/2017)    Active Problems:    Obstructive sleep apnea (3/18/2017)      Diabetes mellitus type II, uncontrolled ([address] (3/18/2017)      AICD (automatic cardioverter/defibrillator) present (3/18/2017)      Cardiomyopathy ([address] (3/18/2017)      Obesity hypoventilation syndrome ([address] (3/18/2017)      Morbid obesity with BMI of 60.0-69.9, adult ([address] (3/18/2017)      Acute encephalopathy (3/18/2017)      Acute renal failure (ARF) ([address] (3/18/2017)      Acute on chronic respiratory failure with hypoxia and hypercapnia ([address] (3/21/2017)      Plan:     # Acute renal failure: possibly secondary to cardiorenal syndrome or consequence of cardiogenic shock.   -Slowly improving. Initially on Bumex drip now on scheduled dose IV BID.  -As per discussion with pulmonology Dr. Jose Carlos Cornejo, will switch diuretic to Diamox due to elevated Bicarb.  -Continue to monitor BUN/Cr. Will recheck BMP in am  -Will remove hansen catheter. Continue strict I & Os.  - Avoid nephrotoxic agents.      # Cardiogenic shock. Patient has underlying severe cardiomyopathy, baseline EF < 10%  -Shock resolved. BP more stable, however, still in the low side  -He was initially on Dobutamine/Dopamine drip, now off.   -Continue management for his heart failure. Not on Entresto, ACEI or ARB due to renal failure and hypotension.  -Not on Coreg due to shock, acute CHF and currently still low BP  -He will need to be restarted on ASA and Statins. Spironolactone when renal function improves.     # Acute on chronic hypoxic hypercarbic respiratory failure secondary to KELSIE and Obesity-Hypoventilation syndrome  -Continue BiPAP at night. Patient brought his BiPAP from home which will be restarted tonight.  -Continue breathing treatments     # Acute encephalopathy  -Resolved     # Hyperkalemia, likely secondary to renal failure  -improved     # Debility  -PT/OT evaluation. Patient might need PUNEET at discharge    # Constipation  -Start Miralax PRN     # DVT prophylaxis: Heparin SQ    # Disposition: Pending PT/OT evaluation.  He might need skilled PT prior to discharge    # Risk stratification  Moderate  Long-term prognosis is guarded.         Signed By: Andres Neff MD     March 26, 2017

## 2017-03-27 PROBLEM — E87.3 METABOLIC ALKALOSIS: Status: ACTIVE | Noted: 2017-03-27

## 2017-03-27 LAB
ANION GAP BLD CALC-SCNC: 10 MMOL/L (ref 7–16)
BUN SERPL-MCNC: 74 MG/DL (ref 6–23)
CALCIUM SERPL-MCNC: 8.5 MG/DL (ref 8.3–10.4)
CHLORIDE SERPL-SCNC: 96 MMOL/L (ref 98–107)
CO2 SERPL-SCNC: 38 MMOL/L (ref 21–32)
CREAT SERPL-MCNC: 4.29 MG/DL (ref 0.8–1.5)
GLUCOSE BLD STRIP.AUTO-MCNC: 134 MG/DL (ref 65–100)
GLUCOSE BLD STRIP.AUTO-MCNC: 135 MG/DL (ref 65–100)
GLUCOSE BLD STRIP.AUTO-MCNC: 148 MG/DL (ref 65–100)
GLUCOSE BLD STRIP.AUTO-MCNC: 163 MG/DL (ref 65–100)
GLUCOSE SERPL-MCNC: 127 MG/DL (ref 65–100)
MAGNESIUM SERPL-MCNC: 1.6 MG/DL (ref 1.8–2.4)
POTASSIUM SERPL-SCNC: 3.1 MMOL/L (ref 3.5–5.1)
SODIUM SERPL-SCNC: 144 MMOL/L (ref 136–145)

## 2017-03-27 PROCEDURE — 74011000250 HC RX REV CODE- 250: Performed by: INTERNAL MEDICINE

## 2017-03-27 PROCEDURE — 83735 ASSAY OF MAGNESIUM: CPT | Performed by: INTERNAL MEDICINE

## 2017-03-27 PROCEDURE — 74011250636 HC RX REV CODE- 250/636: Performed by: PHYSICIAN ASSISTANT

## 2017-03-27 PROCEDURE — 82962 GLUCOSE BLOOD TEST: CPT

## 2017-03-27 PROCEDURE — 74011250636 HC RX REV CODE- 250/636: Performed by: INTERNAL MEDICINE

## 2017-03-27 PROCEDURE — 99232 SBSQ HOSP IP/OBS MODERATE 35: CPT | Performed by: INTERNAL MEDICINE

## 2017-03-27 PROCEDURE — 80048 BASIC METABOLIC PNL TOTAL CA: CPT | Performed by: INTERNAL MEDICINE

## 2017-03-27 PROCEDURE — 97530 THERAPEUTIC ACTIVITIES: CPT

## 2017-03-27 PROCEDURE — 65270000029 HC RM PRIVATE

## 2017-03-27 PROCEDURE — 74011250637 HC RX REV CODE- 250/637: Performed by: INTERNAL MEDICINE

## 2017-03-27 PROCEDURE — 97110 THERAPEUTIC EXERCISES: CPT

## 2017-03-27 RX ADMIN — HEPARIN SODIUM 5000 UNITS: 5000 INJECTION, SOLUTION INTRAVENOUS; SUBCUTANEOUS at 16:44

## 2017-03-27 RX ADMIN — WATER 500 MG: 1 INJECTION INTRAMUSCULAR; INTRAVENOUS; SUBCUTANEOUS at 22:29

## 2017-03-27 RX ADMIN — Medication 5 ML: at 15:23

## 2017-03-27 RX ADMIN — INSULIN LISPRO 2 UNITS: 100 INJECTION, SOLUTION INTRAVENOUS; SUBCUTANEOUS at 16:43

## 2017-03-27 RX ADMIN — HEPARIN SODIUM 5000 UNITS: 5000 INJECTION, SOLUTION INTRAVENOUS; SUBCUTANEOUS at 00:33

## 2017-03-27 RX ADMIN — POLYETHYLENE GLYCOL 3350 17 G: 17 POWDER, FOR SOLUTION ORAL at 15:23

## 2017-03-27 RX ADMIN — Medication 10 ML: at 22:29

## 2017-03-27 RX ADMIN — WATER 500 MG: 1 INJECTION INTRAMUSCULAR; INTRAVENOUS; SUBCUTANEOUS at 09:46

## 2017-03-27 RX ADMIN — HEPARIN SODIUM 5000 UNITS: 5000 INJECTION, SOLUTION INTRAVENOUS; SUBCUTANEOUS at 09:46

## 2017-03-27 RX ADMIN — Medication 10 ML: at 06:18

## 2017-03-27 NOTE — PROGRESS NOTES
[address] Nephrology Progress Note    Follow-Up on: RED/CKD    ROS:  Gen - no fever, no chills  CV - no chest pain, no palpitation  Lung - shortness of breath better, no cough  Abd - no tenderness, no nausea/vomiting, no diarrhea  Ext - edema better    Exam:  Vitals:    03/27/17 0032 03/27/17 0328 03/27/17 0741 03/27/17 1100   BP: 106/66  100/58 93/51   Pulse: 85  80 76   Resp:   18 18   Temp: 97.9 °F (36.6 °C)  98 °F (36.7 °C) 97.9 °F (36.6 °C)   SpO2: 94%  99% 99%   Weight:  156.4 kg (344 lb 12.8 oz)     Height:             Intake/Output Summary (Last 24 hours) at 03/27/17 1225  Last data filed at 03/27/17 0800   Gross per 24 hour   Intake              360 ml   Output             3650 ml   Net            -3290 ml       Wt Readings from Last 3 Encounters:   03/27/17 156.4 kg (344 lb 12.8 oz)   03/11/17 136.5 kg (301 lb)   02/24/17 (!) 187.3 kg (413 lb)       GEN - in no distress  CV - regular, no murmur, no rub  Lung - clear bilaterally  Abd - soft, nontender  Ext - trace edema    Recent Labs      03/25/17   1012   WBC  9.1   HGB  13.0*   HCT  41.0*   PLT  229        Recent Labs      03/27/17   0625  03/25/17   1012   NA  144  148*   K  3.1*  3.8   CL  96*  94*   CO2  38*  43*   BUN  74*  83*   CREA  4.29*  5.75*   CA  8.5  9.5   GLU  127*  193*   MG  1.6*   --        Assessment / Plan:  Principal Problem:    Cardiogenic shock (HCC) (3/20/2017)    Active Problems:    Obstructive sleep apnea (3/18/2017)      Diabetes mellitus type II, uncontrolled ([address] (3/18/2017)      AICD (automatic cardioverter/defibrillator) present (3/18/2017)      Cardiomyopathy ([facility] (3/18/2017)      Obesity hypoventilation syndrome ([facility] (3/18/2017)      Morbid obesity with BMI of 60.0-69.9, adult ([facility] (3/18/2017)      Acute encephalopathy (3/18/2017)      Acute renal failure (ARF) ([facility] (3/18/2017)      Acute on chronic respiratory failure with hypoxia and hypercapnia (HCC) (0/44/3274)      Metabolic alkalosis (2/21/8476)        1. RED/CKD  - Component of cardiorenal syndrome  - Somehow, he was able avoid dialysis last week when Cr > 11.    - His renal function turned around and now continues to improve  - Has lost about 90 lbs the last week  2. Severe cardiomyopathy  3. Respiratory distress  4. Morbid obesity  5.  Metabolic alkalosis - improved

## 2017-03-27 NOTE — PROGRESS NOTES
Problem: Mobility Impaired (Adult and Pediatric)  Goal: *Acute Goals and Plan of Care (Insert Text)  LTG:  (1.)Mr. Pollard will move from supine to sit and sit to supine , scoot up and down and roll side to side in bed with INDEPENDENT within 7 day(s). (2.)Mr. Pollard will transfer from bed to chair and chair to bed with MODIFIED INDEPENDENCE using the least restrictive device within 7 day(s). (3.)Mr. Pollard will ambulate with CONTACT GUARD ASSIST for 250 feet with the least restrictive device within 7 day(s). ________________________________________________________________________________________________      PHYSICAL THERAPY: Daily Note, Treatment Day: 1st and AM 3/27/2017  INPATIENT: Hospital Day: 10  Payor: CARE IMPROVEMENT PLUS / [place] / Product Type: [employer] Care Medicare /      NAME/AGE/GENDER: Sonja Higgins is a 45 y.o. male         PRIMARY DIAGNOSIS: Acute renal failure (ARF) ([address] Cardiogenic shock ([facility] Cardiogenic shock ([facility]        ICD-10: Treatment Diagnosis:       · Generalized Muscle Weakness (M62.81)  · Difficulty in walking, Not elsewhere classified (R26.2)  · Other abnormalities of gait and mobility (R26.89)   Precaution/Allergies:  Dilaudid [hydromorphone]; Iodinated contrast media - oral and iv dye; and Penicillins       ASSESSMENT:      Mr. Jesus Okeefe presents just getting out of the bathroom. He is moving around the room fairly well furniture walking. He sat and rested then was able to walk about 80 feet with the walker and 2L O2. He reported his left knee tending to buckle so we headed back to the room. Her performed seated exercises in the chair upon return as below. Good progress. This section established at most recent assessment   PROBLEM LIST (Impairments causing functional limitations):  1. Decreased Strength  2. Decreased ADL/Functional Activities  3. Decreased Transfer Abilities  4. Decreased Ambulation Ability/Technique  5.  Decreased Balance  6. Decreased Activity Tolerance  7. Increased Fatigue  8. Increased Shortness of Breath    INTERVENTIONS PLANNED: (Benefits and precautions of physical therapy have been discussed with the patient.)  1. Balance Exercise  2. Bed Mobility  3. Family Education  4. Gait Training  5. Therapeutic Activites  6. Therapeutic Exercise/Strengthening  7. Transfer Training      TREATMENT PLAN: Frequency/Duration: 3 times a week for duration of hospital stay  Rehabilitation Potential For Stated Goals: GOOD      RECOMMENDED REHABILITATION/EQUIPMENT: (at time of discharge pending progress): Continue Skilled Therapy and Rehab. HISTORY:   History of Present Injury/Illness (Reason for Referral):  Per H&P, \"Patient is a 46 yo morbidly obese AAM with a history of CKD III, DM II, KELSIE, OHS, HTN, HLD, Systolic CHF EF 42% Oxygen dependent (3L) who presented to the ED vai with c/o somnolence, lethargy, altered mental status, recent fall, decreased UOP progressively worse x7 days. EMS states patient was seen and evaluated yesterday after cough or fall at home. Patient was helped off the floor, appeared altered but refused transport to the emergency department at that time. [facility] systolic BP 90, . Daughter at bedside relays most of history. Denies fever, chills, chest pain, palpitations, seizure. Admits to brown urine 2 days ago and none since, generalized weakness, uncontrollable jerking of upper extremities, mumbled incoherent speech with short periods of lucidness. Patient also admits to back pain. Denies NSAID use, ETOH use. ED gave lactulose, albuterol, D50W, Insulin, Morphine, zofran, for hyperkalemia. ED provider has asked our hospitalist service to evaluate and further manage the patient for Acute Renal Failure. \"  Past Medical History/Comorbidities:   Mr. Steve Starr  has a past medical history of Acute encephalopathy (3/18/2017);  Acute systolic heart failure ([facility] (May, 2009); AICD (automatic cardioverter/defibrillator) present (10/21/2015); Atypical chest pain (4/23/2010); Bronchitis; CAD (coronary artery disease); Cardiomyopathy; Chest pain (10/21/2015); Chronic kidney disease; Chronic pain; Congestive heart failure (CHF) ([facility] (10/21/2015); COPD; Diabetes ([facility] Diabetes mellitus type II, uncontrolled ([address] (7/2/2013); GERD (gastroesophageal reflux disease); Gout; Heart failure ([address] Hypertension; Hyponatremia (12/20/2010); Ill-defined condition; Morbid obesity ([address] Nausea & vomiting (11/30/2015); Neuropathy; Obstructive sleep apnea (2/15/2010); Other unknown and unspecified cause of morbidity or mortality; Severe sepsis ([address] and Unspecified sleep apnea. He also has no past medical history of Adverse effect of anesthesia; Aneurysm ([address] Coagulation disorder ([address] DEMENTIA; Difficult intubation; Malignant hyperthermia due to anesthesia; Other ill-defined conditions(799.89); Pseudocholinesterase deficiency; or Psychiatric disorder. Mr. Carmen Bunch  has a past surgical history that includes pacemaker; heart catheterization ([place] 10, 2008); and chest surgery procedure unlisted. Social History/Living Environment:   Home Environment: Apartment  # Steps to Enter: 0  One/Two Story Residence: One story  Living Alone: No  Support Systems: Child(reza), Family member(s)  Patient Expects to be Discharged to[de-identified] Rehabilitation facility  Current DME Used/Available at Home: peter Duran  Prior Level of Function/Work/Activity:  Independent at baseline. Ordered a RW but had not started using it.        Number of Personal Factors/Comorbidities that affect the Plan of Care:  Obesity  Multiple co-morbidities    1-2: MODERATE COMPLEXITY   EXAMINATION:   Most Recent Physical Functioning:   Gross Assessment:                  Posture:     Balance:    Bed Mobility:     Wheelchair Mobility:     Transfers:  Sit to Stand: Contact guard assistance  Stand to Sit: Stand-by asssistance  Gait:     Base of Support: Widened  Speed/Mira: Shuffled; Slow  Step Length: Left shortened;Right shortened  Gait Abnormalities: Decreased step clearance;Shuffling gait; Steppage gait  Distance (ft): 80 Feet (ft)  Assistive Device: Walker, rolling  Ambulation - Level of Assistance: Stand-by asssistance;Contact guard assistance       Body Structures Involved:  1. Heart  2. Lungs  3. Metabolic  4. Endocrine Body Functions Affected:  1. Sensory/Pain  2. Cardio  3. Respiratory  4. Neuromusculoskeletal  5. Movement Related  6. Metobolic/Endocrine Activities and Participation Affected:  1. General Tasks and Demands  2. Mobility  3. Self Care  4. Domestic Life  5. Community, Social and [place]   Number of elements that affect the Plan of Care: 4+: HIGH COMPLEXITY   CLINICAL PRESENTATION:   Presentation: Evolving clinical presentation with changing clinical characteristics: MODERATE COMPLEXITY   CLINICAL DECISION MAKIN [facility] Mobility Inpatient Short Form  How much difficulty does the patient currently have. .. Unable A Lot A Little None   1. Turning over in bed (including adjusting bedclothes, sheets and blankets)? [ ] 1   [ ] 2   [ ] 3   [X] 4   2. Sitting down on and standing up from a chair with arms ( e.g., wheelchair, bedside commode, etc.)   [ ] 1   [ ] 2   [X] 3   [ ] 4   3. Moving from lying on back to sitting on the side of the bed? [ ] 1   [ ] 2   [X] 3   [ ] 4   How much help from another person does the patient currently need. .. Total A Lot A Little None   4. Moving to and from a bed to a chair (including a wheelchair)? [ ] 1   [ ] 2   [X] 3   [ ] 4   5. Need to walk in hospital room? [ ] 1   [ ] 2   [X] 3   [ ] 4   6. Climbing 3-5 steps with a railing? [X] 1   [ ] 2   [ ] 3   [ ] 4   © , Trustees of [address] [place] [address] [address] [id number], under license to [employer].  All rights reserved    Score:  Initial: 17 Most Recent: X (Date: 3/24/17 )     Interpretation of Tool:  Represents activities that are increasingly more difficult (i.e. Bed mobility, Transfers, Gait). Score 24 23 22-20 19-15 14-10 9-7 6       Modifier CH CI CJ CK CL CM CN         · Mobility - Walking and Moving Around:               - CURRENT STATUS:    CK - 40%-59% impaired, limited or restricted               - GOAL STATUS:           CJ - 20%-39% impaired, limited or restricted               - D/C STATUS:                       ---------------To be determined---------------  Payor: CARE IMPROVEMENT PLUS / [place]ARE [place]PR[place] / Product Type: Managed Care Medicare /       Medical Necessity:     · Patient is expected to demonstrate progress in strength, balance, coordination, functional technique and endurance to increase independence with bed mobility, transfers, ambulation. Reason for Services/Other Comments:  · Patient continues to require present interventions due to patient's inability to tolerate baseline level of activity . Use of outcome tool(s) and clinical judgement create a POC that gives a: Questionable prediction of patient's progress: MODERATE COMPLEXITY                 TREATMENT:   (In addition to Assessment/Re-Assessment sessions the following treatments were rendered)   Pre-treatment Symptoms/Complaints:  \" ok\"  Pain: Initial:   Pain Intensity 1: 0  Post Session:  0      Therapeutic Activity: (    15 minutes): Therapeutic activities including Chair transfers and Ambulation on level ground to improve mobility, strength, balance and endurance. Required minimal   to promote static and dynamic balance in standing with use of the walker. Therapeutic Exercise: (10 Minutes):  Exercises per grid below to improve mobility and strength. Required minimal visual and verbal cues to promote proper body mechanics. Progressed complexity of movement as indicated.          Date:  3/27/17 Date:   Date:     Activity/Exercise Parameters Parameters Parameters   Seated TKE 10x B     Seated HS curls 10x B     Seated marching 20x B     Seated heel/toe raises 20x B     Seated hip abd 20x B                           Braces/Orthotics/Lines/Etc:   · 2L O2  Treatment/Session Assessment:    · Response to Treatment:  Able to mobilize in room with CGA but fatigues  · Interdisciplinary Collaboration:  · Physical Therapy Assistant and Registered Nurse  · After treatment position/precautions:  · Up in chair, Bed/Chair-wheels locked, Bed in low position, Call light within reach, RN notified and Family at bedside  · Compliance with Program/Exercises: Will assess as treatment progresses. · Recommendations/Intent for next treatment session: \"Next visit will focus on advancements to more challenging activities and reduction in assistance provided\".   Total Treatment Duration:  PT Patient Time In/Time Out  Time In: 0900  Time Out: 0930     Makenna Armas PTA

## 2017-03-27 NOTE — PROGRESS NOTES
[facility] CARDIOLOGY PROGRESS NOTE           3/27/2017 9:21 AM    Admit Date: 3/18/2017      Subjective:   Feels better. ROS:  GEN:  No fever or chills  Cardiovascular:  As noted above:no chest pain  Pulmonary:  As noted above:SOB improved. Neuro:  No new focal motor or sensory loss    Objective:      Vitals:    03/26/17 2024 03/27/17 0032 03/27/17 0328 03/27/17 0741   BP: 97/61 106/66  100/58   Pulse: 83 85  80   Resp:    18   Temp: 97.5 °F (36.4 °C) 97.9 °F (36.6 °C)  98 °F (36.7 °C)   SpO2: 99% 94%  99%   Weight:   156.4 kg (344 lb 12.8 oz)    Height:           Physical Exam:  General-feels better. in no distress  Neck- supple, no JVD  CV- regular rate and rhythm no MRG  Lung- clear bilaterally  Abd- soft, nontender, nondistended  Ext- trace. edema bilaterally. Drastically improved. Skin- warm and dry  Psychiatric:  Normal mood and affect. Neurologic:  Alert and oriented X 3      Data Review:   Recent Labs      03/27/17   0625  03/25/17   1012   NA  144  148*   K  3.1*  3.8   MG  1.6*   --    BUN  74*  83*   CREA  4.29*  5.75*   GLU  127*  193*   WBC   --   9.1   HGB   --   13.0*   HCT   --   41.0*   PLT   --   229       TELEMETRY:  N/A    Assessment/Plan:     Principal Problem:    Cardiogenic shock (HCC) (3/20/2017):resolved. Active Problems:    Obstructive sleep apnea (3/18/2017)      Diabetes mellitus type II, uncontrolled ([facility] (3/18/2017)      AICD (automatic cardioverter/defibrillator) present (3/18/2017):stable and unchanged. Cardiomyopathy ([facility] (3/18/2017):End stage. Ambulating in halls with walker. d/c to home vs rehab at time of discharge. Consider social service consult to help with d/c plans. Hopefully restart Coreg if BP allows. Not and ACEI/ARB/Entresto candidate due to hypotension and renal failure. Probably,will have to resume Bumex,or Demadex,or Lasix in outpatient setting pending fluid accumulation.       Obesity hypoventilation syndrome ([facility] (3/18/2017)      Morbid obesity with BMI of 60.0-69.9, adult ([facility] (3/18/2017)      Acute encephalopathy (3/18/2017):resolved      Acute renal failure (ARF) ([facility] (3/18/2017): Improving.       Acute on chronic respiratory failure with hypoxia and hypercapnia (HCC) (9/50/9849):[id number]      Metabolic alkalosis (0/99/8933)                Julia Man MD  3/27/2017 9:21 AM

## 2017-03-27 NOTE — PROGRESS NOTES
Critical Care Daily Progress Note: 3/27/2017    Vern Pollard   Admission Date: 3/18/2017         The patient's chart is reviewed and the patient is discussed with the staff. 45 y.o. Admitted by hospitalist service on 3/18/17 with acute on chronic kidney disease and hyperkalemia with acute encephalopathy. Pt also has a history of ICM with EF <10%, chronic respiratory failure on 3L O2, HTN, HLD, OHS/KELSIE, and CKD. Pt was admitted to ICU and has been off and on BiPAP. Pt transferred out of ICU on 3/20. He required BiPAP secondary to lethargy and pH 7.28. Pt's pCO2 has remained around 48.9-49.4 on 18/8 with rate 22. We are being consulted secondary to respiratory failure. Subjective: Wore home BiPAP last night. Requesting hansen to be removed.     Current Facility-Administered Medications   Medication Dose Route Frequency    acetaZOLAMIDE (DIAMOX) 500 mg in sterile water (preservative free) 5 mL injection  500 mg IntraVENous Q12H    polyethylene glycol (MIRALAX) packet 17 g  17 g Oral DAILY PRN    famotidine (PEPCID) tablet 20 mg  20 mg Oral BID PRN    nitroglycerin (NITROSTAT) tablet 0.4 mg  0.4 mg SubLINGual PRN    haloperidol lactate (HALDOL) injection 2 mg  2 mg IntraVENous Q8H PRN    LORazepam (ATIVAN) injection 0.5 mg  0.5 mg IntraVENous Q6H PRN    lip protectant (BLISTEX) ointment   Topical PRN    levalbuterol (XOPENEX) nebulizer soln 0.63 mg/3 mL  0.63 mg Nebulization Q6H PRN    sodium chloride (NS) flush 5-10 mL  5-10 mL IntraVENous Q8H    sodium chloride (NS) flush 5-10 mL  5-10 mL IntraVENous PRN    insulin lispro (HUMALOG) injection   SubCUTAneous Q6H    dextrose 40% (GLUTOSE) oral gel 1 Tube  15 g Oral PRN    glucagon (GLUCAGEN) injection 1 mg  1 mg IntraMUSCular PRN    dextrose (D50W) injection syrg 12.5-25 g  25-50 mL IntraVENous PRN    heparin (porcine) injection 5,000 Units  5,000 Units SubCUTAneous Q8H       Review of Systems  Constitutional:  negative for fever, chills, sweats  Cardiovascular: trace edema, negative for chest pain, palpitations, syncope  Gastrointestinal:  negative for dysphagia, reflux, vomiting, diarrhea, abdominal pain, or melena  Neurologic:  negative for focal weakness, numbness, headache      Objective:     Vitals:    03/26/17 1502 03/26/17 2024 03/27/17 0032 03/27/17 0328   BP: 94/53 97/61 106/66    Pulse: 81 83 85    Resp: 18      Temp: 97.8 °F (36.6 °C) 97.5 °F (36.4 °C) 97.9 °F (36.6 °C)    SpO2: 99% 99% 94%    Weight:    344 lb 12.8 oz (156.4 kg)   Height:           Intake and Output:   03/25 1901 - 03/27 0700  In: 480 [P.O.:480]  Out: 5400 [Urine:5400]       Physical Exam:          Constitutional:  the patient is morbidly obese and in no acute distress, NC 3L, sat 94%  EENMT:  Sclera clear, pupils equal, oral mucosa moist  Respiratory: clear anterior, no wheezing  Cardiovascular:  RRR without M,G,R  Gastrointestinal: soft, obese and non-tender; with positive bowel sounds, rectal tube and FT. Musculoskeletal: warm without cyanosis. There is bilateral trace lower leg edema. Skin:  no jaundice or rashes, no wounds      LINES:  Right subclavian 3/19, hansen 3/18    CXR 3/23/17:          LAB  Recent Labs      03/27/17   0538  03/27/17   0026  03/26/17   2049  03/26/17   1647  03/26/17   1107   GLUCPOC  134*  135*  179*  147*  167*      Recent Labs      03/25/17   1012   WBC  9.1   HGB  13.0*   HCT  41.0*   PLT  229     Recent Labs      03/26/17   0217  03/25/17   2015  03/25/17   1012   NA   --    --   148*   K   --    --   3.8   CL   --    --   94*   CO2   --    --   43*   GLU   --    --   193*   BUN   --    --   83*   CREA   --    --   5.75*   CA   --    --   9.5   TROIQ  <0.02*  0.02   --      No results for input(s): PH, PCO2, PO2, HCO3 in the last 72 hours. No results for input(s): LCAD, LAC in the last 72 hours.     Assessment:  (Medical Decision Making)     Hospital Problems  Date Reviewed: 3/27/2017          Codes Class Noted POA Metabolic alkalosis LILY-91-UJ: E87.3  ICD-9-CM: 276.3  3/27/2017 Unknown    Recent Bicarb 43--BMP pending    Acute on chronic respiratory failure with hypoxia and hypercapnia (HCC) ICD-10-CM: J96.21, J96.22  ICD-9-CM: 518.84, 786.09, 799.02  3/21/2017 No    On NC    * (Principal)Cardiogenic shock ([facility] [address] ICD-10-CM: R57.0  ICD-9-CM: 785.51  3/20/2017 Yes    resolved    Obstructive sleep apnea (Chronic) ICD-10-CM: G47.33  ICD-9-CM: 327.23  3/18/2017 Yes    Chronic--home BIPAP    Diabetes mellitus type II, uncontrolled (HCC) (Chronic) ICD-10-CM: E11.65  ICD-9-CM: 250.02  3/18/2017 Yes    Chronic--ranges 134-179    AICD (automatic cardioverter/defibrillator) present (Chronic) ICD-10-CM: Z95.810  ICD-9-CM: V45.02  3/18/2017 Yes    chronic    Cardiomyopathy ([facility] [address] (Chronic) ICD-10-CM: I42.9  ICD-9-CM: 425.4  3/18/2017 Yes    chronic    Obesity hypoventilation syndrome (HCC) (Chronic) ICD-10-CM: G05.3  ICD-9-CM: 278.03  3/18/2017 Yes    chronic    Morbid obesity with BMI of 60.0-69.9, adult (HCC) (Chronic) ICD-10-CM: E66.01, Z68.44  ICD-9-CM: 278.01, V85.44  3/18/2017 Yes    chronic    Acute encephalopathy ICD-10-CM: G93.40  ICD-9-CM: 348.30  3/18/2017 Yes    resolved    Acute renal failure (ARF) (HCC) ICD-10-CM: N17.9  ICD-9-CM: 584.9  3/18/2017 Yes    Per nephrology            Plan:  (Medical Decision Making)     --Diamox q12h  --Urine out put 4400ml last 24 hours  --BMP pending  --Check CHRISTINE on home BiPAP tonight  --Remove hansen and continue strict I&Os--stressed to family accurate recording    More than 50% of the time documented was spent in face-to-face contact with the patient and in the care of the patient on the floor/unit where the patient is located. Raza You NP         Lungs:  Distant but clear. Heart:  RRR with no Murmur/Rubs/Gallops    Additional Comments:    Patient diuresing well. Agree with checking CHRISTINE on home bipap. I have spoken with and examined the patient.  I agree with the above assessment and plan as documented.     Kelsey Vigil MD

## 2017-03-27 NOTE — PROGRESS NOTES
Patient resting quietly in bed respirations are even and unlabored with no sign of distress noted at this time.

## 2017-03-28 LAB
ANION GAP BLD CALC-SCNC: 11 MMOL/L (ref 7–16)
BUN SERPL-MCNC: 70 MG/DL (ref 6–23)
CALCIUM SERPL-MCNC: 9.1 MG/DL (ref 8.3–10.4)
CHLORIDE SERPL-SCNC: 100 MMOL/L (ref 98–107)
CO2 SERPL-SCNC: 33 MMOL/L (ref 21–32)
CREAT SERPL-MCNC: 3.37 MG/DL (ref 0.8–1.5)
GLUCOSE BLD STRIP.AUTO-MCNC: 114 MG/DL (ref 65–100)
GLUCOSE BLD STRIP.AUTO-MCNC: 123 MG/DL (ref 65–100)
GLUCOSE BLD STRIP.AUTO-MCNC: 126 MG/DL (ref 65–100)
GLUCOSE BLD STRIP.AUTO-MCNC: 208 MG/DL (ref 65–100)
GLUCOSE SERPL-MCNC: 118 MG/DL (ref 65–100)
POTASSIUM SERPL-SCNC: 3 MMOL/L (ref 3.5–5.1)
SODIUM SERPL-SCNC: 144 MMOL/L (ref 136–145)

## 2017-03-28 PROCEDURE — 65270000029 HC RM PRIVATE

## 2017-03-28 PROCEDURE — 74011250637 HC RX REV CODE- 250/637: Performed by: INTERNAL MEDICINE

## 2017-03-28 PROCEDURE — 74011250636 HC RX REV CODE- 250/636: Performed by: PHYSICIAN ASSISTANT

## 2017-03-28 PROCEDURE — 97110 THERAPEUTIC EXERCISES: CPT

## 2017-03-28 PROCEDURE — 74011250637 HC RX REV CODE- 250/637: Performed by: NURSE PRACTITIONER

## 2017-03-28 PROCEDURE — 99232 SBSQ HOSP IP/OBS MODERATE 35: CPT | Performed by: INTERNAL MEDICINE

## 2017-03-28 PROCEDURE — 74011250636 HC RX REV CODE- 250/636: Performed by: INTERNAL MEDICINE

## 2017-03-28 PROCEDURE — 80048 BASIC METABOLIC PNL TOTAL CA: CPT | Performed by: INTERNAL MEDICINE

## 2017-03-28 PROCEDURE — 82962 GLUCOSE BLOOD TEST: CPT

## 2017-03-28 PROCEDURE — 74011000250 HC RX REV CODE- 250: Performed by: INTERNAL MEDICINE

## 2017-03-28 PROCEDURE — 94762 N-INVAS EAR/PLS OXIMTRY CONT: CPT

## 2017-03-28 RX ORDER — POTASSIUM CHLORIDE 20 MEQ/1
40 TABLET, EXTENDED RELEASE ORAL 2 TIMES DAILY
Status: DISCONTINUED | OUTPATIENT
Start: 2017-03-28 | End: 2017-03-29 | Stop reason: HOSPADM

## 2017-03-28 RX ORDER — CARVEDILOL 3.12 MG/1
3.12 TABLET ORAL 2 TIMES DAILY WITH MEALS
Status: DISCONTINUED | OUTPATIENT
Start: 2017-03-28 | End: 2017-03-29 | Stop reason: HOSPADM

## 2017-03-28 RX ADMIN — POTASSIUM CHLORIDE 40 MEQ: 20 TABLET, EXTENDED RELEASE ORAL at 17:01

## 2017-03-28 RX ADMIN — HEPARIN SODIUM 5000 UNITS: 5000 INJECTION, SOLUTION INTRAVENOUS; SUBCUTANEOUS at 00:27

## 2017-03-28 RX ADMIN — HEPARIN SODIUM 5000 UNITS: 5000 INJECTION, SOLUTION INTRAVENOUS; SUBCUTANEOUS at 17:01

## 2017-03-28 RX ADMIN — CARVEDILOL 3.12 MG: 3.12 TABLET, FILM COATED ORAL at 17:01

## 2017-03-28 RX ADMIN — HEPARIN SODIUM 5000 UNITS: 5000 INJECTION, SOLUTION INTRAVENOUS; SUBCUTANEOUS at 08:19

## 2017-03-28 RX ADMIN — Medication 10 ML: at 22:20

## 2017-03-28 RX ADMIN — WATER 500 MG: 1 INJECTION INTRAMUSCULAR; INTRAVENOUS; SUBCUTANEOUS at 10:01

## 2017-03-28 RX ADMIN — Medication 10 ML: at 17:03

## 2017-03-28 RX ADMIN — WATER 500 MG: 1 INJECTION INTRAMUSCULAR; INTRAVENOUS; SUBCUTANEOUS at 22:21

## 2017-03-28 RX ADMIN — POLYETHYLENE GLYCOL 3350 17 G: 17 POWDER, FOR SOLUTION ORAL at 08:16

## 2017-03-28 RX ADMIN — INSULIN LISPRO 4 UNITS: 100 INJECTION, SOLUTION INTRAVENOUS; SUBCUTANEOUS at 11:59

## 2017-03-28 RX ADMIN — Medication 10 ML: at 06:20

## 2017-03-28 RX ADMIN — POTASSIUM CHLORIDE 40 MEQ: 20 TABLET, EXTENDED RELEASE ORAL at 08:27

## 2017-03-28 NOTE — PROGRESS NOTES
Mr. Olguin Lipdmitriy found ambulating in room without assistance. Sitting in chair at this time after ambulating. Alert, oriented in all spheres. Remains on 3 lpm NC without dyspnea. Lung sounds diminished bilaterally with fine crackles heard posteriorly. Non-productive cough. Trace amount of edema noted to bilateral legs. Without further needs. Call light in lap and door open.

## 2017-03-28 NOTE — PROGRESS NOTES
Critical Care Daily Progress Note: 3/28/2017    Marco Pollard   Admission Date: 3/18/2017         The patient's chart is reviewed and the patient is discussed with the staff. 45 y.o. Admitted by hospitalist service on 3/18/17 with acute on chronic kidney disease and hyperkalemia with acute encephalopathy. Pt also has a history of ICM with EF <10%, chronic respiratory failure on 3L O2, HTN, HLD, OHS/KELSIE, and CKD. Pt was admitted to ICU and has been off and on BiPAP. Pt transferred out of ICU on 3/20. He required BiPAP secondary to lethargy and pH 7.28. Pt's pCO2 has remained around 48.9-49.4 on 18/8 with rate 22. We are being consulted secondary to respiratory failure. Was diuresed over 30L, was weaned off BIPAP and moved to the floor. CO2 increased 43,  Lasix was stopped and Diamox added. Subjective: Wore home BiPAP with 2 L bleed in O2 last night and CHRISTINE done. \"I had a terrible night\".   Frequent voiding and wet the bed often spilling the urinal.      Current Facility-Administered Medications   Medication Dose Route Frequency    acetaZOLAMIDE (DIAMOX) 500 mg in sterile water (preservative free) 5 mL injection  500 mg IntraVENous Q12H    polyethylene glycol (MIRALAX) packet 17 g  17 g Oral DAILY PRN    famotidine (PEPCID) tablet 20 mg  20 mg Oral BID PRN    nitroglycerin (NITROSTAT) tablet 0.4 mg  0.4 mg SubLINGual PRN    LORazepam (ATIVAN) injection 0.5 mg  0.5 mg IntraVENous Q6H PRN    lip protectant (BLISTEX) ointment   Topical PRN    levalbuterol (XOPENEX) nebulizer soln 0.63 mg/3 mL  0.63 mg Nebulization Q6H PRN    sodium chloride (NS) flush 5-10 mL  5-10 mL IntraVENous Q8H    sodium chloride (NS) flush 5-10 mL  5-10 mL IntraVENous PRN    insulin lispro (HUMALOG) injection   SubCUTAneous Q6H    dextrose 40% (GLUTOSE) oral gel 1 Tube  15 g Oral PRN    glucagon (GLUCAGEN) injection 1 mg  1 mg IntraMUSCular PRN    dextrose (D50W) injection syrg 12.5-25 g  25-50 mL IntraVENous PRN    heparin (porcine) injection 5,000 Units  5,000 Units SubCUTAneous Q8H       Review of Systems  Constitutional:  negative for fever, chills, sweats  Cardiovascular: trace edema, negative for chest pain, palpitations, syncope  Gastrointestinal:  negative for dysphagia, reflux, vomiting, diarrhea, abdominal pain, or melena  Neurologic:  negative for focal weakness, numbness, headache      Objective:     Vitals:    03/27/17 2011 03/28/17 0009 03/28/17 0400 03/28/17 0431   BP: 119/70 122/54 124/66    Pulse: 83 82 87    Resp:   14    Temp: 98 °F (36.7 °C) 98 °F (36.7 °C) 97.9 °F (36.6 °C)    SpO2: 99% 98% 97%    Weight:    149.3 kg (329 lb 3.2 oz)   Height:           Intake and Output:   03/26 1901 - 03/28 0700  In: 600 [P.O.:600]  Out: 4625 [Urine:4625]       Physical Exam:          Constitutional:  the patient is morbidly obese and in no acute distress, NC 3L, sat 97%  EENMT:  Sclera clear, pupils equal, oral mucosa moist  Respiratory: clear anterior, no wheezing  Cardiovascular:  RRR without M,G,R  Gastrointestinal: soft, obese and non-tender; with positive bowel sounds, rectal tube and FT. Musculoskeletal: warm without cyanosis. There is bilateral trace lower leg edema.   Skin:  no jaundice or rashes, no wounds      LINES:  Right subclavian 3/19    CXR 3/23/17:          LAB  Recent Labs      03/28/17   0549  03/28/17   0032  03/27/17   1620  03/27/17   1116  03/27/17   0538   GLUCPOC  114*  126*  163*  148*  134*      Recent Labs      03/25/17   1012   WBC  9.1   HGB  13.0*   HCT  41.0*   PLT  229     Recent Labs      03/28/17   0600  03/27/17   0625  03/26/17   0217  03/25/17   2015  03/25/17   1012   NA  144  144   --    --   148*   K  3.0*  3.1*   --    --   3.8   CL  100  96*   --    --   94*   CO2  33*  38*   --    --   43*   GLU  118*  127*   --    --   193*   BUN  70*  74*   --    --   83*   CREA  3.37*  4.29*   --    --   5.75*   MG   --   1.6*   --    --    --    CA  9.1  8.5   --    -- 9. 5   TROIQ   --    --   <0.02*  0.02   --      No results for input(s): PH, PCO2, PO2, HCO3 in the last 72 hours. No results for input(s): LCAD, LAC in the last 72 hours.     Assessment:  (Medical Decision Making)     Hospital Problems  Date Reviewed: 3/28/2017          Codes Class Noted POA    Metabolic alkalosis JAYESH-11-EG: E87.3  ICD-9-CM: 276.3  3/27/2017 Unknown    Bicarb down to 33    Acute on chronic respiratory failure with hypoxia and hypercapnia (HCC) ICD-10-CM: J96.21, J96.22  ICD-9-CM: 518.84, 786.09, 799.02  3/21/2017 No    On NC    * (Principal)Cardiogenic shock (HCC) ICD-10-CM: R57.0  ICD-9-CM: 785.51  3/20/2017 Yes    resolved    Obstructive sleep apnea (Chronic) ICD-10-CM: G47.33  ICD-9-CM: 327.23  3/18/2017 Yes    Chronic--home BIPAP    Diabetes mellitus type II, uncontrolled (HCC) (Chronic) ICD-10-CM: E11.65  ICD-9-CM: 250.02  3/18/2017 Yes    Chronic--ranges 114-163    AICD (automatic cardioverter/defibrillator) present (Chronic) ICD-10-CM: Z95.810  ICD-9-CM: V45.02  3/18/2017 Yes    chronic    Cardiomyopathy ([facility] [address] (Chronic) ICD-10-CM: I42.9  ICD-9-CM: 425.4  3/18/2017 Yes    chronic    Obesity hypoventilation syndrome (HCC) (Chronic) ICD-10-CM: F60.0  ICD-9-CM: 278.03  3/18/2017 Yes    chronic    Morbid obesity with BMI of 60.0-69.9, adult (HCC) (Chronic) ICD-10-CM: E66.01, Z68.44  ICD-9-CM: 278.01, V85.44  3/18/2017 Yes    chronic    Acute encephalopathy ICD-10-CM: G93.40  ICD-9-CM: 348.30  3/18/2017 Yes    resolved    Acute renal failure (ARF) (HCC) ICD-10-CM: N17.9  ICD-9-CM: 584.9  3/18/2017 Yes    Per nephrology            Plan:  (Medical Decision Making)     --Diamox r02k--KM2 down to 33--consider stopping and resuming PO Lasix  --Urine output 1875 ml last 24 hours--multiple spilled urinals--inaccurate   --CHRISTINE on home BiPAP with 2L bleed in O2 with desat 30 minutes--would continue due to multiple interruptions   --Supplement K+   --Continue strict I&Os--stressed to family accurate recording    More than 50% of the time documented was spent in face-to-face contact with the patient and in the care of the patient on the floor/unit where the patient is located. Jo Ann Lundberg, TAMANNA     Lungs: CTA b/l No wheezing. Heart S1 and S2 audible, no murmers or rubs appreciated  Other     Did fine on his own home BIPAP last night with oxygen -- He normally uses both of theses at home  Would clarify with nephrology and cardiology regarding diuretics for him. Would stop diamox if ok with them  Respiratory status is currently optimized, so will sign off, nothing else to offer for now. I have spoken with and examined the patient. I have reviewed the history, examination, assessment, and plan and agree with the above. Tamica Bautista MD      This note was signed electronically. Errors are unfortunately her likely due to dictation software.

## 2017-03-28 NOTE — PROGRESS NOTES
[facility]        Subjective: Breathing is better    Review of Systems -   General ROS: negative for - chills, fatigue or fever  Respiratory ROS: no cough, shortness of breath, or wheezing  Cardiovascular ROS: no chest pain or dyspnea on exertion  Gastrointestinal ROS: no abdominal pain, change in bowel habits, or black or bloody stools  Genito-Urinary ROS: no dysuria, trouble voiding, or hematuria  Neurological ROS: no TIA or stroke symptoms        Objective:    Vitals:    03/28/17 0009 03/28/17 0400 03/28/17 0431 03/28/17 0808   BP: 122/54 124/66  108/58   Pulse: 82 87  72   Resp:  14  16   Temp: 98 °F (36.7 °C) 97.9 °F (36.6 °C)  98.1 °F (36.7 °C)   SpO2: 98% 97%  100%   Weight:   149.3 kg (329 lb 3.2 oz)    Height:           PE  Gen: in no acute distress  CV: reg rate  Chest: bilat breath sounds  Abd:  soft  Ext/Access: no edema       . LAB  No results for input(s): WBC, HGB, HCT, PLT, INR, HGBEXT, HCTEXT, PLTEXT, HGBEXT, HCTEXT, PLTEXT in the last 72 hours.     No lab exists for component: INREXT, INREXT  Recent Labs      03/28/17   0600  03/27/17   0625  03/26/17   0217  03/25/17 2015   NA  144  144   --    --    K  3.0*  3.1*   --    --    CL  100  96*   --    --    CO2  33*  38*   --    --    GLU  118*  127*   --    --    BUN  70*  74*   --    --    CREA  3.37*  4.29*   --    --    MG   --   1.6*   --    --    CA  9.1  8.5   --    --    TROIQ   --    --   <0.02*  0.02           Radiology    A/P:   Patient Active Problem List   Diagnosis Code    Obstructive sleep apnea G47.33    Chronic back pain M54.9, G89.29    Nonischemic dilated cardiomyopathy (HCC) I42.9    Dyslipidemia E78.5    Diabetes mellitus type II, uncontrolled (HCC) E11.65    Noncompliance with medication regimen Z91.14    Pleural effusion J90    CKD (chronic kidney disease) stage 3, GFR 30-59 ml/min N18.3    Chronic pancreatitis (HCC) K86.1    Abdominal fluid collection R18.8    Chest pain R07.9    AICD (automatic cardioverter/defibrillator) present Z95.810    Congestive heart failure (CHF) ([facility]) I50.9    Cardiomyopathy (HCC) I42.9    Nausea & vomiting R11.2    Hypertension I10    Acute on chronic systolic (congestive) heart failure ([facility]) I50.23    Atrial flutter (HCC) I48.92    Obesity hypoventilation syndrome ([facility]) E66.2    Morbid obesity with BMI of 60.0-69.9, adult ([facility]) E66.01, Z68.44    Hypoxemia R09.02    Hypersomnia G47.10    Constipation K59.00    Syncope R55    Nonsustained ventricular tachycardia ([facility]) I47.2    NSVT (nonsustained ventricular tachycardia) ([facility]) I47.2    Acute encephalopathy G93.40    Acute renal failure (ARF) ([facility]) N17.9    Cardiogenic shock ([facility]) R57.0    Acute on chronic respiratory failure with hypoxia and hypercapnia ([facility]) J96.21, T12.08    Metabolic alkalosis H69.0       Creatinine is coming down. OK to be discharged in the next several days.      Fernando Champion MD

## 2017-03-28 NOTE — PROGRESS NOTES
Progress Note    Patient: Meliza Kaiser MRN: [id number]  SSN: [id number]    YOB: 1965  Age: 45 y.o. Sex: male      Admit Date: 3/18/2017    LOS: 9 days     Subjective:   As previously documented: \" Mr. Carmen Bunch is a 46 yo male with PMHx of sCHF EF 10% with ICD, CKD, DM2, KELSIE on CPAP, HTN, chronic hypoxic respiratory failure on home O2 at 3 L evaluated with acute metabolic encephalopathy. Found to have progressive renal failure with creatinine of >9 and hyperkalemia of 6.0 with hypercapneic respiratory failure requiring ICU care for BIPAP. Additionally has been seen by cardiology and placed on dopamine/dobutamine. Nephrology has evaluated and he is not a dialysis candidate. He is being diuresed with bumex drip. Renal US no acute issues. Ammonia slightly elevated, too large for CT scanner for CT head and has intermittent delirium. CXR with pulm edema. UA positive ans started on IV Rocephin, although negative urine cx. Urology placed hansen due to difficulty. Family declines hospice, well known to palliative care\"         03/24/17: Feeling better. Able to be off BIPAP during the day. Bumex ggt stopped . Renal function improving - cr:7.19     03/25/17: Patient is feeling better. Family at beside. Patient was on BiPAP all night and tolerated it well. He has a hansen catheter in place. Cr 5.75    03/26/17: Patient is feeling well. He is ambulating. Tolerating BiPAP at night. He wants the hansen out to be taken out.    03/27/17: Patient seen and examined at bedside. Patient is more tolerant to exercise. He has no complaints today. Cr is 4.29  He is alert, awake and oriented x3. Denies any CP, SOB or palpitations. No fevers, chills or diaphoresis. Review of Systems:  Pertinent per HPI.     Medications:  Current Facility-Administered Medications   Medication Dose Route Frequency    acetaZOLAMIDE (DIAMOX) 500 mg in sterile water (preservative free) 5 mL injection  500 mg IntraVENous Q12H    polyethylene glycol (MIRALAX) packet 17 g  17 g Oral DAILY PRN    famotidine (PEPCID) tablet 20 mg  20 mg Oral BID PRN    nitroglycerin (NITROSTAT) tablet 0.4 mg  0.4 mg SubLINGual PRN    LORazepam (ATIVAN) injection 0.5 mg  0.5 mg IntraVENous Q6H PRN    lip protectant (BLISTEX) ointment   Topical PRN    levalbuterol (XOPENEX) nebulizer soln 0.63 mg/3 mL  0.63 mg Nebulization Q6H PRN    sodium chloride (NS) flush 5-10 mL  5-10 mL IntraVENous Q8H    sodium chloride (NS) flush 5-10 mL  5-10 mL IntraVENous PRN    insulin lispro (HUMALOG) injection   SubCUTAneous Q6H    dextrose 40% (GLUTOSE) oral gel 1 Tube  15 g Oral PRN    glucagon (GLUCAGEN) injection 1 mg  1 mg IntraMUSCular PRN    dextrose (D50W) injection syrg 12.5-25 g  25-50 mL IntraVENous PRN    heparin (porcine) injection 5,000 Units  5,000 Units SubCUTAneous Q8H       Objective:     Vitals:    03/27/17 0741 03/27/17 1100 03/27/17 1530 03/27/17 2011   BP: 100/58 93/51 117/53 119/70   Pulse: 80 76 85 83   Resp: 18 18 16    Temp: 98 °F (36.7 °C) 97.9 °F (36.6 °C) 97.5 °F (36.4 °C) 98 °F (36.7 °C)   SpO2: 99% 99% 99% 99%   Weight:       Height:            Physical Exam:   General: awake, alert, no apparent distress. Morbidly obese  Eyes: anicteric. PERRL  Neck: Supple, trachea midline  Lungs: Bibasilar rales. No wheezes, or rhonchi. Heart: Regular rate and rhythm. No appreciable murmur. Abdomen: Soft, nontender, nondistended. Bowel sounds normal. No rebound tenderness, guarding, or rigidity. Genitourinary: Zuleta catheter in place. Extremities: Trace LE edema bilaterally  Skin: Warm/dry. No rashes or lesions. Neurologic: nonfocal  Psych: AOx3. Normal mood and affect.     Lab/Data Review:  Recent Results (from the past 24 hour(s))   GLUCOSE, POC    Collection Time: 03/26/17  8:49 PM   Result Value Ref Range    Glucose (POC) 179 (H) 65 - 100 mg/dL   GLUCOSE, POC    Collection Time: 03/27/17 12:26 AM   Result Value Ref Range    Glucose (POC) 135 (H) 65 - 100 mg/dL   GLUCOSE, POC    Collection Time: 03/27/17  5:38 AM   Result Value Ref Range    Glucose (POC) 134 (H) 65 - 859 mg/dL   METABOLIC PANEL, BASIC    Collection Time: 03/27/17  6:25 AM   Result Value Ref Range    Sodium 144 136 - 145 mmol/L    Potassium 3.1 (L) 3.5 - 5.1 mmol/L    Chloride 96 (L) 98 - 107 mmol/L    CO2 38 (H) 21 - 32 mmol/L    Anion gap 10 7 - 16 mmol/L    Glucose 127 (H) 65 - 100 mg/dL    BUN 74 (H) 6 - 23 MG/DL    Creatinine 4.29 (H) 0.8 - 1.5 MG/DL    GFR est AA 19 (L) >60 ml/min/1.73m2    GFR est non-AA 16 (L) >60 ml/min/1.73m2    Calcium 8.5 8.3 - 10.4 MG/DL   MAGNESIUM    Collection Time: 03/27/17  6:25 AM   Result Value Ref Range    Magnesium 1.6 (L) 1.8 - 2.4 mg/dL   GLUCOSE, POC    Collection Time: 03/27/17 11:16 AM   Result Value Ref Range    Glucose (POC) 148 (H) 65 - 100 mg/dL   GLUCOSE, POC    Collection Time: 03/27/17  4:20 PM   Result Value Ref Range    Glucose (POC) 163 (H) 65 - 100 mg/dL     I have reviewed new clinical data. Assessment:     Principal Problem:    Cardiogenic shock ([address] (3/20/2017)    Active Problems:    Obstructive sleep apnea (3/18/2017)      Diabetes mellitus type II, uncontrolled ([address] (3/18/2017)      AICD (automatic cardioverter/defibrillator) present (3/18/2017)      Cardiomyopathy ([address] (3/18/2017)      Obesity hypoventilation syndrome ([address] (3/18/2017)      Morbid obesity with BMI of 60.0-69.9, adult ([address] (3/18/2017)      Acute encephalopathy (3/18/2017)      Acute renal failure (ARF) ([address] (3/18/2017)      Acute on chronic respiratory failure with hypoxia and hypercapnia (HCC) (5/46/9580)      Metabolic alkalosis (5/55/8510)      Plan:     # Acute renal failure: possibly secondary to cardiorenal syndrome or consequence of cardiogenic shock.   -Slowly improving. Initially on Bumex drip, then IV BID now switched to Diamox.  -As per discussion with pulmonology Dr. Abhilash Sadler, switched diuretic to Diamox due to elevated Bicarb.  -Continue to monitor BUN/Cr. Will recheck BMP in am  -Will remove hansen catheter if ok with pulmonology. Continue strict I & Os.  - Avoid nephrotoxic agents.      # Cardiogenic shock. Patient has underlying severe cardiomyopathy, baseline EF < 10%  -Shock resolved. BP more stable, however, still in the low side  -He was initially on Dobutamine/Dopamine drip, now off.   -Continue management for his heart failure. Not on Entresto, ACEI or ARB due to renal failure and hypotension.  -Not on Coreg due to shock, acute CHF and currently still low BP  -He will need to be restarted on ASA and Statins. Spironolactone when renal function improves.     # Acute on chronic hypoxic hypercarbic respiratory failure secondary to KELSIE and Obesity-Hypoventilation syndrome  -Continue BiPAP at night. Patient brought his BiPAP from home which will be restarted tonight.  -Continue breathing treatments     # Acute encephalopathy  -Resolved     # Hyperkalemia, likely secondary to renal failure  -improved     # Debility  -PT/OT evaluation.  Patient might need PUNEET at discharge    # Constipation  -Start Miralax PRN     # DVT prophylaxis:   Heparin SQ    # Disposition: Possible SNF for PUNEET prior to discharge    # Risk stratification  Moderate  Long-term prognosis is guarded.         Signed By: Kate Willams MD     March 27, 2017

## 2017-03-28 NOTE — PROGRESS NOTES
Mr. Olguin Lipoma sitting up in chair eating dinner tray. Has had uneventful day. Worked with therapy and ambulates in room freely. Remains on 3 lpm NC without dyspnea. Without further needs at this time. Call light in lap and door open.

## 2017-03-28 NOTE — PROGRESS NOTES
Progress Note    Patient: Lary Nevarez MRN: [id number]  SSN: [id number]    YOB: 1965  Age: 45 y.o. Sex: male      Admit Date: 3/18/2017    LOS: 10 days     Subjective:   As previously documented: \" Mr. Navarro Cunningham is a 44 yo male with PMHx of sCHF EF 10% with ICD, CKD, DM2, KELSIE on CPAP, HTN, chronic hypoxic respiratory failure on home O2 at 3 L evaluated with acute metabolic encephalopathy. Found to have progressive renal failure with creatinine of >9 and hyperkalemia of 6.0 with hypercapneic respiratory failure requiring ICU care for BIPAP. Additionally has been seen by cardiology and placed on dopamine/dobutamine. Nephrology has evaluated and he is not a dialysis candidate. He is being diuresed with bumex drip. Renal US no acute issues. Ammonia slightly elevated, too large for CT scanner for CT head and has intermittent delirium. CXR with pulm edema. UA positive ans started on IV Rocephin, although negative urine cx. Urology placed hansen due to difficulty. Family declines hospice, well known to palliative care\"         03/24/17: Feeling better. Able to be off BIPAP during the day. Bumex ggt stopped . Renal function improving - cr:7.19  03/25/17: Patient is feeling better. Family at beside. Patient was on BiPAP all night and tolerated it well. He has a hansen catheter in place. Cr 5.75  03/26/17: Patient is feeling well. He is ambulating. Tolerating BiPAP at night. 03/27/17: Patient is more tolerant to exercise. He has no complaints today. Cr is 4.29. Started using his home BiPAP.   03/28/17: Patient seen and examined at bedside. Patient is feeling well. He wants to go home. He is ambulatory with assistance. He is using his home BiPAP. His Cr is 3.3. He is alert, awake and oriented x3. Denies any CP, SOB or palpitations. No fevers, chills or diaphoresis. Review of Systems:  Pertinent per HPI.     Medications:  Current Facility-Administered Medications   Medication Dose Route Frequency    potassium chloride (K-DUR, KLOR-CON) SR tablet 40 mEq  40 mEq Oral BID    carvedilol (COREG) tablet 3.125 mg  3.125 mg Oral BID WITH MEALS    acetaZOLAMIDE (DIAMOX) 500 mg in sterile water (preservative free) 5 mL injection  500 mg IntraVENous Q12H    polyethylene glycol (MIRALAX) packet 17 g  17 g Oral DAILY PRN    famotidine (PEPCID) tablet 20 mg  20 mg Oral BID PRN    nitroglycerin (NITROSTAT) tablet 0.4 mg  0.4 mg SubLINGual PRN    LORazepam (ATIVAN) injection 0.5 mg  0.5 mg IntraVENous Q6H PRN    lip protectant (BLISTEX) ointment   Topical PRN    levalbuterol (XOPENEX) nebulizer soln 0.63 mg/3 mL  0.63 mg Nebulization Q6H PRN    sodium chloride (NS) flush 5-10 mL  5-10 mL IntraVENous Q8H    sodium chloride (NS) flush 5-10 mL  5-10 mL IntraVENous PRN    insulin lispro (HUMALOG) injection   SubCUTAneous Q6H    dextrose 40% (GLUTOSE) oral gel 1 Tube  15 g Oral PRN    glucagon (GLUCAGEN) injection 1 mg  1 mg IntraMUSCular PRN    dextrose (D50W) injection syrg 12.5-25 g  25-50 mL IntraVENous PRN    heparin (porcine) injection 5,000 Units  5,000 Units SubCUTAneous Q8H       Objective:     Vitals:    03/28/17 0431 03/28/17 0808 03/28/17 1156 03/28/17 1510   BP:  108/58 119/78 120/70   Pulse:  72 77 77   Resp:  16 17 14   Temp:  98.1 °F (36.7 °C) 98 °F (36.7 °C) 98.1 °F (36.7 °C)   SpO2:  100% 100% 100%   Weight: 149.3 kg (329 lb 3.2 oz)      Height:            Physical Exam:   General: awake, alert, no apparent distress. Morbidly obese  Eyes: anicteric. PERRL  Neck: Supple, trachea midline  Lungs: Bibasilar rales. No wheezes, or rhonchi. Heart: Regular rate and rhythm. No appreciable murmur. Abdomen: Soft, nontender, nondistended. Bowel sounds normal. No rebound tenderness, guarding, or rigidity. Genitourinary: Zuleta catheter in place. Extremities: Trace LE edema bilaterally  Skin: Warm/dry. No rashes or lesions. Neurologic: nonfocal  Psych: AOx3. Normal mood and affect.     Lab/Data Review:  Recent Results (from the past 24 hour(s))   GLUCOSE, POC    Collection Time: 03/28/17 12:32 AM   Result Value Ref Range    Glucose (POC) 126 (H) 65 - 100 mg/dL   GLUCOSE, POC    Collection Time: 03/28/17  5:49 AM   Result Value Ref Range    Glucose (POC) 114 (H) 65 - 606 mg/dL   METABOLIC PANEL, BASIC    Collection Time: 03/28/17  6:00 AM   Result Value Ref Range    Sodium 144 136 - 145 mmol/L    Potassium 3.0 (L) 3.5 - 5.1 mmol/L    Chloride 100 98 - 107 mmol/L    CO2 33 (H) 21 - 32 mmol/L    Anion gap 11 7 - 16 mmol/L    Glucose 118 (H) 65 - 100 mg/dL    BUN 70 (H) 6 - 23 MG/DL    Creatinine 3.37 (H) 0.8 - 1.5 MG/DL    GFR est AA 25 (L) >60 ml/min/1.73m2    GFR est non-AA 21 (L) >60 ml/min/1.73m2    Calcium 9.1 8.3 - 10.4 MG/DL   GLUCOSE, POC    Collection Time: 03/28/17 11:17 AM   Result Value Ref Range    Glucose (POC) 208 (H) 65 - 100 mg/dL   GLUCOSE, POC    Collection Time: 03/28/17  5:04 PM   Result Value Ref Range    Glucose (POC) 123 (H) 65 - 100 mg/dL     I have reviewed new clinical data. Assessment:     Principal Problem:    Cardiogenic shock ([address] (3/20/2017)    Active Problems:    Obstructive sleep apnea (3/18/2017)      Diabetes mellitus type II, uncontrolled ([address] (3/18/2017)      AICD (automatic cardioverter/defibrillator) present (3/18/2017)      Cardiomyopathy ([address] (3/18/2017)      Obesity hypoventilation syndrome ([address] (3/18/2017)      Morbid obesity with BMI of 60.0-69.9, adult ([address] (3/18/2017)      Acute encephalopathy (3/18/2017)      Acute renal failure (ARF) ([address] (3/18/2017)      Acute on chronic respiratory failure with hypoxia and hypercapnia (HCC) (6/27/4767)      Metabolic alkalosis (7/87/5830)      Plan:     # Acute renal failure: possibly secondary to cardiorenal syndrome or consequence of cardiogenic shock.   -Slowly improving. Initially on Bumex drip, then IV BID, switched to Diamox due to elevated Bicarb.   -Will need to switch back to lasix or bumex prior to discharge.   -Continue to monitor BUN/Cr. Will recheck BMP in am  -Zuleta catheter removed on 3/27. Continue strict I & Os.  - Avoid nephrotoxic agents.      # Cardiogenic shock. Patient has underlying severe cardiomyopathy, baseline EF < 10%  -Shock resolved. BP more stable, however, still in the low side  -He was initially on Dobutamine/Dopamine drip, now off.   -Continue management for his heart failure. Not on Entresto, ACEI or ARB due to renal failure and hypotension.  -Not on Coreg due to shock, acute CHF and currently still low BP. Restarted low dose Coreg.  -He will need to be restarted on ASA and Statins. Spironolactone when renal function improves.     # Acute on chronic hypoxic hypercarbic respiratory failure secondary to KESLIE and Obesity-Hypoventilation syndrome  -Continue BiPAP at night. Patient brought his BiPAP from home which he is tolerating well.  -Continue breathing treatments     # Acute encephalopathy  -Resolved     # Hyperkalemia, likely secondary to renal failure  -improved     # [place] PT at discharge    # Constipation  -Miralax PRN     # DVT prophylaxis:   Heparin SQ    # Disposition:   Home with home health PT at discharge. Pending clearance by cardiology. His heart failure medications need to be optimized prior to discharge and make sure he is tolerating them. As per nephrology and pulmonology, he is ok to be discharged.     # Risk stratification  Moderate  Long-term prognosis is guarded.         Signed By: Caren Otero MD     March 28, 2017

## 2017-03-28 NOTE — PROGRESS NOTES
Problem: Mobility Impaired (Adult and Pediatric)  Goal: *Acute Goals and Plan of Care (Insert Text)  LTG:  (1.)Mr. Pollard will move from supine to sit and sit to supine , scoot up and down and roll side to side in bed with INDEPENDENT within 7 day(s). (2.)Mr. Pollard will transfer from bed to chair and chair to bed with MODIFIED INDEPENDENCE using the least restrictive device within 7 day(s). (3.)Mr. Pollard will ambulate with CONTACT GUARD ASSIST for 250 feet with the least restrictive device within 7 day(s). ________________________________________________________________________________________________      PHYSICAL THERAPY: Daily Note, Treatment Day: 2nd and AM 3/28/2017  INPATIENT: Hospital Day: 11  Payor: CARE IMPROVEMENT PLUS / [place] / Product Type: Managed Care Medicare /      NAME/AGE/GENDER: Jessie Santos is a 45 y.o. male         PRIMARY DIAGNOSIS: Acute renal failure (ARF) ([address] Cardiogenic shock ([address] Cardiogenic shock ([facility]        ICD-10: Treatment Diagnosis:       · Generalized Muscle Weakness (M62.81)  · Difficulty in walking, Not elsewhere classified (R26.2)  · Other abnormalities of gait and mobility (R26.89)   Precaution/Allergies:  Dilaudid [hydromorphone]; Iodinated contrast media - oral and iv dye; and Penicillins       ASSESSMENT:      Mr. Shabana Separs presents in chair and willing to walk. He walked on 2L and increased his distance. He took several standing rest breaks and walked very slow. He reports his knee is better than yesterday. sats 95%  He performed seated exercises below upon return. Steady progress. This section established at most recent assessment   PROBLEM LIST (Impairments causing functional limitations):  1. Decreased Strength  2. Decreased ADL/Functional Activities  3. Decreased Transfer Abilities  4. Decreased Ambulation Ability/Technique  5. Decreased Balance  6. Decreased Activity Tolerance  7. Increased Fatigue  8.  Increased Shortness of Breath    INTERVENTIONS PLANNED: (Benefits and precautions of physical therapy have been discussed with the patient.)  1. Balance Exercise  2. Bed Mobility  3. Family Education  4. Gait Training  5. Therapeutic Activites  6. Therapeutic Exercise/Strengthening  7. Transfer Training      TREATMENT PLAN: Frequency/Duration: 3 times a week for duration of hospital stay  Rehabilitation Potential For Stated Goals: GOOD      RECOMMENDED REHABILITATION/EQUIPMENT: (at time of discharge pending progress): Continue Skilled Therapy and Rehab. HISTORY:   History of Present Injury/Illness (Reason for Referral):  Per H&P, \"Patient is a 46 yo morbidly obese AAM with a history of CKD III, DM II, KELSIE, OHS, HTN, HLD, Systolic CHF EF 23% Oxygen dependent (3L) who presented to the ED vai with c/o somnolence, lethargy, altered mental status, recent fall, decreased UOP progressively worse x7 days. EMS states patient was seen and evaluated yesterday after cough or fall at home. Patient was helped off the floor, appeared altered but refused transport to the emergency department at that time. EMS reports hypotension in rooute today systolic BP 90, . Daughter at bedside relays most of history. Denies fever, chills, chest pain, palpitations, seizure. Admits to brown urine 2 days ago and none since, generalized weakness, uncontrollable jerking of upper extremities, mumbled incoherent speech with short periods of lucidness. Patient also admits to back pain. Denies NSAID use, ETOH use. ED gave lactulose, albuterol, D50W, Insulin, Morphine, zofran, for hyperkalemia. ED provider has asked our hospitalist service to evaluate and further manage the patient for Acute Renal Failure. \"  Past Medical History/Comorbidities:   Mr. Lyndsay Kruse  has a past medical history of Acute encephalopathy (3/18/2017); Acute systolic heart failure [facility]) (May, 2009); AICD (automatic cardioverter/defibrillator) present (10/21/2015);  Atypical chest pain (4/23/2010); Bronchitis; CAD (coronary artery disease); Cardiomyopathy; Chest pain (10/21/2015); Chronic kidney disease; Chronic pain; Congestive heart failure (CHF) ([facility] (10/21/2015); COPD; Diabetes ([facility] [facility] Diabetes mellitus type II, uncontrolled ([facility] [facility] (7/2/2013); GERD (gastroesophageal reflux disease); Gout; Heart failure ([facility] [address] [facility] Hypertension; Hyponatremia (12/20/2010); Ill-defined condition; Morbid obesity ([address] Nausea & vomiting (11/30/2015); Neuropathy; Obstructive sleep apnea (2/15/2010); Other unknown and unspecified cause of morbidity or mortality; Severe sepsis ([facility] [address] and Unspecified sleep apnea. He also has no past medical history of Adverse effect of anesthesia; Aneurysm ([facility] [address] Coagulation disorder ([facility] [address] [facility] DEMENTIA; Difficult intubation; Malignant hyperthermia due to anesthesia; Other ill-defined conditions(799.89); Pseudocholinesterase deficiency; or Psychiatric disorder. Mr. Yemi Solano  has a past surgical history that includes pacemaker; heart catheterization ([place] 10, 2008); and chest surgery procedure unlisted. Social History/Living Environment:   Home Environment: Apartment  # Steps to Enter: 0  One/Two Story Residence: One story  Living Alone: No  Support Systems: Child(reza), Family member(s)  Patient Expects to be Discharged to[de-identified] Rehabilitation facility  Current DME Used/Available at Home: peter Gannon  Prior Level of Function/Work/Activity:  Independent at baseline. Ordered a RW but had not started using it. Number of Personal Factors/Comorbidities that affect the Plan of Care:  Obesity  Multiple co-morbidities    1-2: MODERATE COMPLEXITY   EXAMINATION:   Most Recent Physical Functioning:   Gross Assessment:                  Posture:     Balance:    Bed Mobility:     Wheelchair Mobility:     Transfers:  Sit to Stand: Contact guard assistance  Stand to Sit: Contact guard assistance  Gait:     Speed/Mira: Shuffled; Slow  Distance (ft): 80 Feet (ft) (x2)  Assistive Device: Erica Evans rolling  Ambulation - Level of Assistance: Contact guard assistance       Body Structures Involved:  1. Heart  2. Lungs  3. Metabolic  4. Endocrine Body Functions Affected:  1. Sensory/Pain  2. Cardio  3. Respiratory  4. Neuromusculoskeletal  5. Movement Related  6. Metobolic/Endocrine Activities and Participation Affected:  1. General Tasks and Demands  2. Mobility  3. Self Care  4. Domestic Life  5. Community, Social and [place]   Number of elements that affect the Plan of Care: 4+: HIGH COMPLEXITY   CLINICAL PRESENTATION:   Presentation: Evolving clinical presentation with changing clinical characteristics: MODERATE COMPLEXITY   CLINICAL DECISION MAKIN [facility] Mobility Inpatient Short Form  How much difficulty does the patient currently have. .. Unable A Lot A Little None   1. Turning over in bed (including adjusting bedclothes, sheets and blankets)? [ ] 1   [ ] 2   [ ] 3   [X] 4   2. Sitting down on and standing up from a chair with arms ( e.g., wheelchair, bedside commode, etc.)   [ ] 1   [ ] 2   [X] 3   [ ] 4   3. Moving from lying on back to sitting on the side of the bed? [ ] 1   [ ] 2   [X] 3   [ ] 4   How much help from another person does the patient currently need. .. Total A Lot A Little None   4. Moving to and from a bed to a chair (including a wheelchair)? [ ] 1   [ ] 2   [X] 3   [ ] 4   5. Need to walk in hospital room? [ ] 1   [ ] 2   [X] 3   [ ] 4   6. Climbing 3-5 steps with a railing? [X] 1   [ ] 2   [ ] 3   [ ] 4   © , Trustees of [address] [place] [address] [address] [address], under license to [employer]. All rights reserved    Score:  Initial: 17 Most Recent: X (Date: 3/24/17 )     Interpretation of Tool:  Represents activities that are increasingly more difficult (i.e. Bed mobility, Transfers, Gait).        Score 24 23 22-20 19-15 14-10 9-7 6       Modifier CH CI CJ CK CL CM CN         · Mobility - Walking and Moving Around:               - CURRENT STATUS:    CK - 40%-59% impaired, limited or restricted               - GOAL STATUS:           CJ - 20%-39% impaired, limited or restricted               - D/C STATUS:                       ---------------To be determined---------------  Payor: CARE IMPROVEMENT PLUS / [place] CARE IMPROV[place] / Product Type: Managed Care Medicare /       Medical Necessity:     · Patient is expected to demonstrate progress in strength, balance, coordination, functional technique and endurance to increase independence with bed mobility, transfers, ambulation. Reason for Services/Other Comments:  · Patient continues to require present interventions due to patient's inability to tolerate baseline level of activity . Use of outcome tool(s) and clinical judgement create a POC that gives a: Questionable prediction of patient's progress: MODERATE COMPLEXITY                 TREATMENT:   (In addition to Assessment/Re-Assessment sessions the following treatments were rendered)   Pre-treatment Symptoms/Complaints:  \" ok\"  Pain: Initial:   Pain Intensity 1: 0  Post Session:  0        Therapeutic Exercise: (25 Minutes):  Exercises per grid below to improve mobility and strength. Required minimal visual and verbal cues to promote proper body mechanics. Progressed repetitions, complexity of movement and gait distance as indicated.          Date:  3/27/17 Date:  3/28/17 Date:     Activity/Exercise Parameters Parameters Parameters   Seated TKE 10x B 20x B    Seated HS curls 10x B 20x B    Seated marching 20x B 20x B    Seated heel/toe raises 20x B 20x B    Seated hip abd 20x B 20x B    ambulation  100 ft x2                    Braces/Orthotics/Lines/Etc:   · 2L O2  Treatment/Session Assessment:    · Response to Treatment:  Tolerated well with good sats on 2L  · Interdisciplinary Collaboration:  · Physical Therapy Assistant and Registered Nurse  · After treatment position/precautions:  · Up in chair, Bed/Chair-wheels locked, Bed in low position, Call light within reach, RN notified and Family at bedside  · Compliance with Program/Exercises: Will assess as treatment progresses. · Recommendations/Intent for next treatment session: \"Next visit will focus on advancements to more challenging activities and reduction in assistance provided\".   Total Treatment Duration:  PT Patient Time In/Time Out  Time In: 0850  Time Out: 0920     Dixie Barroso PTA

## 2017-03-28 NOTE — PROGRESS NOTES
[facility] CARDIOLOGY PROGRESS NOTE           3/28/2017 11:37 AM    Admit Date: 3/18/2017      Subjective:   He is resting without complaints. Known non-ischemic CM with severe reduction in LV EF at 10%. ICD in place. Admitted with acute/chronic systolic CHF. Known severe CKD. ROS:  Cardiovascular:  As noted above    Objective:      Vitals:    03/28/17 0009 03/28/17 0400 03/28/17 0431 03/28/17 0808   BP: 122/54 124/66  108/58   Pulse: 82 87  72   Resp:  14  16   Temp: 98 °F (36.7 °C) 97.9 °F (36.6 °C)  98.1 °F (36.7 °C)   SpO2: 98% 97%  100%   Weight:   149.3 kg (329 lb 3.2 oz)    Height:           Physical Exam:  General-No Acute Distress  Neck- supple, mild JVD  CV- regular rate and rhythm no MRG  Lung- clear bilaterally  Abd- soft, nontender, nondistended  Ext- no edema bilaterally. Skin- warm and dry    Data Review:   Recent Labs      03/28/17   0600  03/27/17   0625  03/26/17   0217  03/25/17 2015   NA  144  144   --    --    K  3.0*  3.1*   --    --    MG   --   1.6*   --    --    BUN  70*  74*   --    --    CREA  3.37*  4.29*   --    --    GLU  118*  127*   --    --    TROIQ   --    --   <0.02*  0.02       Assessment/Plan:     Principal Problem:    Cardiogenic shock ([facility] [address] (3/20/2017)  - resolved. Active Problems:    Obstructive sleep apnea (3/18/2017)      Diabetes mellitus type II, uncontrolled ([address] (3/18/2017)      AICD (automatic cardioverter/defibrillator) present (3/18/2017)      Cardiomyopathy ([facility] [address] (3/18/2017) - severe, chronic systolic CHF. Obesity hypoventilation syndrome ([address] (3/18/2017)      Morbid obesity with BMI of 60.0-69.9, adult ([facility] [address] (3/18/2017)      Acute encephalopathy (3/18/2017)      Acute renal failure (ARF) ([facility] [facility] (3/18/2017)      Acute on chronic respiratory failure with hypoxia and hypercapnia (HCC) (4/38/4283)      Metabolic alkalosis (7/89/0375)      Comment:  Will  Add Coreg 3.125 mg BID.  If tolerated, try to add Demadex 40 mg tomorrow.           Newton Cabrera MD  3/28/2017 11:37 AM

## 2017-03-29 VITALS
OXYGEN SATURATION: 94 % | BODY MASS INDEX: 50.62 KG/M2 | DIASTOLIC BLOOD PRESSURE: 77 MMHG | WEIGHT: 315 LBS | HEIGHT: 66 IN | HEART RATE: 82 BPM | RESPIRATION RATE: 16 BRPM | TEMPERATURE: 97.7 F | SYSTOLIC BLOOD PRESSURE: 104 MMHG

## 2017-03-29 LAB
ANION GAP BLD CALC-SCNC: 10 MMOL/L (ref 7–16)
BUN SERPL-MCNC: 60 MG/DL (ref 6–23)
CALCIUM SERPL-MCNC: 8.9 MG/DL (ref 8.3–10.4)
CHLORIDE SERPL-SCNC: 103 MMOL/L (ref 98–107)
CO2 SERPL-SCNC: 30 MMOL/L (ref 21–32)
CREAT SERPL-MCNC: 2.76 MG/DL (ref 0.8–1.5)
GLUCOSE BLD STRIP.AUTO-MCNC: 150 MG/DL (ref 65–100)
GLUCOSE BLD STRIP.AUTO-MCNC: 151 MG/DL (ref 65–100)
GLUCOSE BLD STRIP.AUTO-MCNC: 155 MG/DL (ref 65–100)
GLUCOSE SERPL-MCNC: 170 MG/DL (ref 65–100)
MAGNESIUM SERPL-MCNC: 2 MG/DL (ref 1.8–2.4)
POTASSIUM SERPL-SCNC: 3.7 MMOL/L (ref 3.5–5.1)
SODIUM SERPL-SCNC: 143 MMOL/L (ref 136–145)

## 2017-03-29 PROCEDURE — 80048 BASIC METABOLIC PNL TOTAL CA: CPT | Performed by: INTERNAL MEDICINE

## 2017-03-29 PROCEDURE — 82962 GLUCOSE BLOOD TEST: CPT

## 2017-03-29 PROCEDURE — 94660 CPAP INITIATION&MGMT: CPT

## 2017-03-29 PROCEDURE — 97110 THERAPEUTIC EXERCISES: CPT

## 2017-03-29 PROCEDURE — 74011250636 HC RX REV CODE- 250/636: Performed by: INTERNAL MEDICINE

## 2017-03-29 PROCEDURE — 74011250637 HC RX REV CODE- 250/637: Performed by: INTERNAL MEDICINE

## 2017-03-29 PROCEDURE — 74011250636 HC RX REV CODE- 250/636: Performed by: PHYSICIAN ASSISTANT

## 2017-03-29 PROCEDURE — 83735 ASSAY OF MAGNESIUM: CPT | Performed by: INTERNAL MEDICINE

## 2017-03-29 PROCEDURE — 77030014007 HC SPNG HEMSTAT J&J -B

## 2017-03-29 PROCEDURE — 97530 THERAPEUTIC ACTIVITIES: CPT

## 2017-03-29 PROCEDURE — 74011000250 HC RX REV CODE- 250: Performed by: INTERNAL MEDICINE

## 2017-03-29 PROCEDURE — 74011250637 HC RX REV CODE- 250/637: Performed by: NURSE PRACTITIONER

## 2017-03-29 RX ORDER — CARVEDILOL 3.12 MG/1
3.12 TABLET ORAL 2 TIMES DAILY WITH MEALS
Qty: 60 TAB | Refills: 1 | Status: SHIPPED | OUTPATIENT
Start: 2017-03-29 | End: 2017-06-20 | Stop reason: CLARIF

## 2017-03-29 RX ORDER — BUMETANIDE 1 MG/1
1 TABLET ORAL DAILY
Qty: 30 TAB | Refills: 0 | Status: SHIPPED | OUTPATIENT
Start: 2017-03-29 | End: 2017-04-21 | Stop reason: SDUPTHER

## 2017-03-29 RX ADMIN — HEPARIN SODIUM 5000 UNITS: 5000 INJECTION, SOLUTION INTRAVENOUS; SUBCUTANEOUS at 09:45

## 2017-03-29 RX ADMIN — INSULIN LISPRO 2 UNITS: 100 INJECTION, SOLUTION INTRAVENOUS; SUBCUTANEOUS at 06:00

## 2017-03-29 RX ADMIN — INSULIN LISPRO 2 UNITS: 100 INJECTION, SOLUTION INTRAVENOUS; SUBCUTANEOUS at 00:58

## 2017-03-29 RX ADMIN — CARVEDILOL 3.12 MG: 3.12 TABLET, FILM COATED ORAL at 09:45

## 2017-03-29 RX ADMIN — HEPARIN SODIUM 5000 UNITS: 5000 INJECTION, SOLUTION INTRAVENOUS; SUBCUTANEOUS at 00:57

## 2017-03-29 RX ADMIN — WATER 500 MG: 1 INJECTION INTRAMUSCULAR; INTRAVENOUS; SUBCUTANEOUS at 09:45

## 2017-03-29 RX ADMIN — Medication 5 ML: at 06:01

## 2017-03-29 RX ADMIN — POTASSIUM CHLORIDE 40 MEQ: 20 TABLET, EXTENDED RELEASE ORAL at 09:45

## 2017-03-29 NOTE — PROGRESS NOTES
Patient qualified for O2 with exertion. [facility]. Orders faxed. Portable O2 tank to be delivered prior to discharge. Case Management will remain available to assist as needed.

## 2017-03-29 NOTE — PROGRESS NOTES
[facility] Progress Note    Follow-Up on: RED/CKD    ROS:  Gen - no fever, no chills  CV - no chest pain, no palpitation  Lung - shortness of breath better, no cough  Abd - no tenderness, no nausea/vomiting, no diarrhea  Ext - edema better    Exam:  Vitals:    03/29/17 0229 03/29/17 0316 03/29/17 0500 03/29/17 0755   BP:  117/69  123/61   Pulse:  79  95   Resp:  20  15   Temp:  97.4 °F (36.3 °C)  97.8 °F (36.6 °C)   SpO2: 98% 97%  96%   Weight:   149.4 kg (329 lb 4.8 oz)    Height:             Intake/Output Summary (Last 24 hours) at 03/29/17 1022  Last data filed at 03/28/17 1754   Gross per 24 hour   Intake              710 ml   Output                0 ml   Net              710 ml       Wt Readings from Last 3 Encounters:   03/29/17 149.4 kg (329 lb 4.8 oz)   03/11/17 136.5 kg (301 lb)   02/24/17 (!) 187.3 kg (413 lb)       GEN - in no distress  CV - regular, no murmur, no rub  Lung - clear bilaterally  Abd - soft, nontender  Ext - trace edema    No results for input(s): WBC, HGB, HCT, PLT, INR, HGBEXT, HCTEXT, PLTEXT, HGBEXT, HCTEXT, PLTEXT in the last 72 hours.     No lab exists for component: Randi Snowden     Recent Labs      03/29/17   0747  03/28/17   0600  03/27/17   0625   NA  143  144  144   K  3.7  3.0*  3.1*   CL  103  100  96*   CO2  30  33*  38*   BUN  60*  70*  74*   CREA  2.76*  3.37*  4.29*   CA  8.9  9.1  8.5   GLU  170*  118*  127*   MG  2.0   --   1.6*       Assessment / Plan:  Principal Problem:    Cardiogenic shock (HCC) (3/20/2017)    Active Problems:    Obstructive sleep apnea (3/18/2017)      Diabetes mellitus type II, uncontrolled ([address] (3/18/2017)      AICD (automatic cardioverter/defibrillator) present (3/18/2017)      Cardiomyopathy ([address] (3/18/2017)      Obesity hypoventilation syndrome ([address] (3/18/2017)      Morbid obesity with BMI of 60.0-69.9, adult ([address] (3/18/2017)      Acute encephalopathy (3/18/2017)      Acute renal failure (ARF) ([address] (3/18/2017)      Acute on chronic respiratory failure with hypoxia and hypercapnia (HCC) (2/84/9359)      Metabolic alkalosis (4/02/5669)        1. RED/CKD  - Component of cardiorenal syndrome  - Somehow, he was able avoid dialysis last week when Cr > 11. He has done remarkably well the last week  - His renal function turned around and now continues to improve  2. Severe cardiomyopathy  3. Respiratory distress  4. Morbid obesity  5. Metabolic alkalosis - improved  Okay for discharge from our standpoint with follow-up in the office. Will sign off. Thank you.

## 2017-03-29 NOTE — PROGRESS NOTES
Oxygen Qualifier       Room air: SpO2 with O2 and liter flow   Resting SpO2  96% na   Ambulating SpO2  87% 89% on 1L  93% on 2L       Completed by:    Juanita Saldivar, RT

## 2017-03-29 NOTE — PROGRESS NOTES
[facility] - INPATIENT  Face to Face Encounter    Patients Name: Cole Anderson    YOB: 1965    Ordering Physician: Fabrizio Hurtado MD    Primary Diagnosis: Acute renal failure (ARF) [facility])    Date of Face to Face:   3/29/2017                                  Face to Face Encounter findings are related to primary reason for home care:   yes. 1. I certify that the patient needs intermittent care as follows: skilled nursing care:  skilled observation/assessment, patient education, complex care plan management, therapeutic drug monitoring and rehabilitative nursing  physical therapy: strengthening, transfer training, gait/stair training, balance training and pt/caregiver education  occupational therapy:  ADL safety (ie. cooking, bathing, dressing), ROM and pt/caregiver education    2. I certify that this patient is homebound, that is: 1) patient requires the use of a walker device, special transportation, or assistance of another to leave the home; or 2) patient's condition makes leaving the home medically contraindicated; and 3) patient has a normal inability to leave the home and leaving the home requires considerable and taxing effort. Patient may leave the home for infrequent and short duration for medical reasons, and occasional absences for non-medical reasons. Homebound status is due to the following functional limitations: Patient with increased shortness of breath and elevated heart rate with ambulation greater than 20 feet limiting patient's ability to ambulate safely within the community. Patient with strength deficits limiting the performance of all ADL's without caregiver assistance or the use of an assistive device. Patient with poor safety awareness and is at risk for falls without assistance of another person and the use of an assistive device.   Patient with poor ambulation endurance limiting their safe ability to ascend/descend the required number of steps to leave the home.    3. I certify that this patient is under my care and that I, or a nurse practitioner or  [id number], or clinical nurse specialist, or certified nurse midwife, working with me, had a Face-to-Face Encounter that meets the physician Face-to-Face Encounter requirements. The following are the clinical findings from the [address] [place] [address] encounter that support the need for skilled services and is a summary of the encounter: Medical Record    See discharge summary and summary of the patient's illness      Vicente Swan RN  3/29/2017      THE FOLLOWING TO BE COMPLETED BY THE COMMUNITY PHYSICIAN:    I concur with the findings described above from the F encounter that this patient is homebound and in need of a skilled service.     Certifying Physician: _____________________________________      Printed Certifying Physician Name: _____________________________________    Date: _________________

## 2017-03-29 NOTE — PROGRESS NOTES
[facility] CARDIOLOGY PROGRESS NOTE           3/29/2017 9:27 AM    Admit Date: 3/18/2017      Subjective:   Wants to go home with his son. Denies complaints. ROS:  GEN:  No fever or chills  Cardiovascular:  As noted above:no CP. Pulmonary:  As noted above:SOB improved. Neuro:  No new focal motor or sensory loss    Objective:      Vitals:    03/29/17 0229 03/29/17 0316 03/29/17 0500 03/29/17 0755   BP:  117/69  123/61   Pulse:  79  95   Resp:  20  15   Temp:  97.4 °F (36.3 °C)  97.8 °F (36.6 °C)   SpO2: 98% 97%  96%   Weight:   149.4 kg (329 lb 4.8 oz)    Height:           Physical Exam:  General-no distress  Neck- supple, no JVD  CV- regular rate and rhythm no MRG  Lung- clear bilaterally  Abd- soft, nontender, nondistended  Ext- trace to 1+ edema bilaterally. Skin- warm and dry  Psychiatric:  Normal mood and affect. Neurologic:  Alert and oriented X 3      Data Review:   Recent Labs      03/29/17   0747  03/28/17   0600  03/27/17   0625   NA  143  144  144   K  3.7  3.0*  3.1*   MG  2.0   --   1.6*   BUN  60*  70*  74*   CREA  2.76*  3.37*  4.29*   GLU  170*  118*  127*       TELEMETRY:  NSR    Assessment/Plan:     Principal Problem:    Cardiogenic shock (HCC) (3/20/2017):resolved. He is anticipating discharge soon. I will sign off and plan on seeing the patient in outpatient setting. I had a long discussion with the patient and his family regarding early recognition of worsening CHF symptoms and treatment. They voiced understanding. Active Problems:    Obstructive sleep apnea (3/18/2017)      Diabetes mellitus type II, uncontrolled ([address] (3/18/2017)      AICD (automatic cardioverter/defibrillator) present (3/18/2017)      Cardiomyopathy ([address] (3/18/2017):Endstage. Refuses hospice. Obesity hypoventilation syndrome ([address] (3/18/2017)      Morbid obesity with BMI of 60.0-69.9, adult ([address] (3/18/2017)      Acute encephalopathy (3/18/2017):resolved.       Acute renal failure (ARF) (HCC) (3/18/2017):improving daily.       Acute on chronic respiratory failure with hypoxia and hypercapnia (HCC) (1/93/3750)      Metabolic alkalosis (0/48/3353)                Erlin Johnson MD  3/29/2017 9:27 AM

## 2017-03-29 NOTE — PROGRESS NOTES
Discharge instructions and prescriptions provided and explained to the pt. Med side effect sheet reviewed. Opportunity for questions provided. Answered all medication questions. Pt waiting on oxygen and walker to be delivered. Instructed to call once ready to leave.

## 2017-03-29 NOTE — PROGRESS NOTES
Patient is discharging today. He refuses short-term rehab at this time. Patient has been living with his adult daughter in her home. He agrees to be followed by home health at discharge for RN, PT, and OT services. Patient states he would like [facility] to provide these services. Patient requests an oxygen conserving device for his home O2. Spoke with [employer], his home O2 and CPAP supplier, and they only have an order for nocturnal O2 at this time. Patient has O2 on at 3 Lpm at this time and states, \"I really need this oxygen all the time. \" Three part home O2 qualifier study requested. If patient qualifies for continuous O2, we will send updated orders to [employer]. In addition, patient requests an electric \"scooter\" wheelchair for home use. Explained that this is not a DME item that we normally order on the hospital side, but his PCP's office could assist him with this request. In the meantime, we have ordered a Rollator rolling walker with a basket and seat so that he can rest as needed while ambulating. [employer] will supply the Rollator. Patient voices satisfaction with this plan. No other discharge needs noted at this time. Case Management will remain available to assist as needed.     Care Management Interventions  Transition of Care Consult (CM Consult): Discharge Planning, [address]: Yes  Discharge Durable Medical Equipment: Yes (Roling walker - [place]  Physical Therapy Consult: Yes  Occupational Therapy Consult: Yes  Speech Therapy Consult: No  Current Support Network: Relative's Home  Confirm Follow Up Transport: Family  Plan discussed with Pt/Family/Caregiver: Yes  Freedom of Choice Offered: Yes  Discharge Location  Discharge Placement: Home with home health

## 2017-03-29 NOTE — PROGRESS NOTES
Mr. Zabrina Sutton found getting out of bed. Ambulates without assistance. States he and his family are upset because no one emptied his bedside commode all night and his room smells foul. Commode found with large amount of loose stool. Emptied and cleaned at this time; room deodorizer provided per request. Patient states he \"feels so dirty\" and wants a \"shower right now. \" Allowed to get in shower at this time. Ambulating without difficulty. Linens changed as well. Lung sounds diminished in all fields. Nonproductive moist cough. Remains on 3 lpm NC without dyspnea. Without further needs at this time. Will monitor closely.

## 2017-03-29 NOTE — PROGRESS NOTES
Patient's oxygen has been delivered to room. They are waiting for daughter to arrive to take him home.

## 2017-03-29 NOTE — DISCHARGE SUMMARY
Hospitalist Discharge Summary     Patient ID:  Sonja Higgins  [id number]  1 y.o.  1965  Admit date: 3/18/2017 10:42 AM  Discharge date and time: 3/29/2017  Attending: Lynne Orellana MD  PCP:  Darlene Arora MD  Treatment Team: Attending Provider: Lynne Orellana MD; Consulting Provider: Олег Moore MD; Consulting Provider: Hillary Alba MD; Care Manager: Lynsey Tobin RN    Principal Diagnosis Cardiogenic shock [facility])   Principal Problem:    Cardiogenic shock ([address] (3/20/2017)    Active Problems:    Obstructive sleep apnea (3/18/2017)      Diabetes mellitus type II, uncontrolled ([address] (3/18/2017)      AICD (automatic cardioverter/defibrillator) present (3/18/2017)      Cardiomyopathy ([address] (3/18/2017)      Obesity hypoventilation syndrome ([address] (3/18/2017)      Morbid obesity with BMI of 60.0-69.9, adult ([address] (3/18/2017)      Acute encephalopathy (3/18/2017)      Acute renal failure (ARF) ([address] (3/18/2017)      Acute on chronic respiratory failure with hypoxia and hypercapnia ([address] (2/17/3836)      Metabolic alkalosis (9/02/3461)     HPI: Patient is a 44 yo morbidly obese AAM with a history of CKD III, DM II, KELSIE, OHS, HTN, HLD, Systolic CHF EF 19% Oxygen dependent (3L) who presented to the ED vai with c/o somnolence, lethargy, altered mental status, recent fall, decreased UOP progressively worse x7 days. EMS states patient was seen and evaluated yesterday after cough or fall at home. Patient was helped off the floor, appeared altered but refused transport to the emergency department at that time. EMS reports hypotension in rooute today systolic BP 90, . Daughter at bedside relays most of history. Denies fever, chills, chest pain, palpitations, seizure. Admits to brown urine 2 days ago and none since, generalized weakness, uncontrollable jerking of upper extremities, mumbled incoherent speech with short periods of lucidness. Patient also admits to back pain.  Denies NSAID use, ETOH use. ED gave lactulose, albuterol, D50W, Insulin, Morphine, zofran, for hyperkalemia. ED provider has asked our hospitalist service to evaluate and further manage the patient for Acute Renal Failure       Hospital Course:  Please refer to the admission H&P for details of presentation. In summary, the patient presented with acute metabolic encephalopathy. Found to have progressive renal failure with creatinine of >9 and hyperkalemia of 6.0 with hypercapneic respiratory failure requiring ICU care for BIPAP. Echo with severe cardiomyopathy EF of 10-15%, seen by cardiology and placed on dopamine/dobutamine while in icu. Antihypertensives stopped during admission. Nephrology has evaluated and he is not a dialysis candidate. Pt started on bumex drip with improvement in Scr levels. Started on coreg by cardio. Stopped lasix and aldactone, will give bumex 1 mg daily tabs. Poor overall prognosis however pt/family do not wish to pursue hospice care. Will need close follow up as outpatient. Labs: Results:       Chemistry Recent Labs      03/29/17   0747  03/28/17   0600  03/27/17   0625   GLU  170*  118*  127*   NA  143  144  144   K  3.7  3.0*  3.1*   CL  103  100  96*   CO2  30  33*  38*   BUN  60*  70*  74*   CREA  2.76*  3.37*  4.29*   CA  8.9  9.1  8.5   AGAP  10  11  10      CBC w/Diff No results for input(s): WBC, RBC, HGB, HCT, PLT, GRANS, LYMPH, EOS, HGBEXT, HCTEXT, PLTEXT in the last 72 hours. Cardiac Enzymes No results for input(s): CPK, CKND1, IKE in the last 72 hours. No lab exists for component: CKRMB, TROIP   Coagulation No results for input(s): PTP, INR, APTT in the last 72 hours.     No lab exists for component: INREXT    Lipid Panel Lab Results   Component Value Date/Time    Cholesterol, total 189 07/03/2013 06:35 AM    HDL Cholesterol 60 07/03/2013 06:35 AM    LDL, calculated 108.4 07/03/2013 06:35 AM    VLDL, calculated 20.6 07/03/2013 06:35 AM    Triglyceride 103 07/03/2013 06:35 AM    CHOL/HDL Ratio 3.2 07/03/2013 06:35 AM      BNP No results for input(s): BNPP in the last 72 hours. Liver Enzymes No results for input(s): TP, ALB, TBIL, AP, SGOT, GPT in the last 72 hours. No lab exists for component: DBIL   Thyroid Studies Lab Results   Component Value Date/Time    TSH 3.690 03/18/2017 11:07 AM            Discharge Exam:  Visit Vitals    /61 (BP 1 Location: Right arm, BP Patient Position: At rest)    Pulse 95    Temp 97.8 °F (36.6 °C)    Resp 15    Ht 5' 6\" (1.676 m)    Wt 149.4 kg (329 lb 4.8 oz)    SpO2 96%    BMI 53.15 kg/m2     General appearance: alert, cooperative, no distress, appears stated age  Lungs: clear to auscultation bilaterally  Heart: regular rate and rhythm, S1, S2 normal, no murmur, click, rub or gallop  Abdomen: soft, non-tender. Bowel sounds normal. obese  Extremities: no cyanosis or edema  Neurologic: Grossly normal    Disposition: home  Discharge Condition: stable  Patient Instructions:   Current Discharge Medication List      START taking these medications    Details   bumetanide (BUMEX) 1 mg tablet Take 1 Tab by mouth daily. Qty: 30 Tab, Refills: 0         CONTINUE these medications which have CHANGED    Details   carvedilol (COREG) 3.125 mg tablet Take 1 Tab by mouth two (2) times daily (with meals). Qty: 60 Tab, Refills: 1         CONTINUE these medications which have NOT CHANGED    Details   GUAIFENESIN/PSEUDOEPHEDRNE HCL (MUCINEX D PO) Take 400 mg by mouth daily as needed. levalbuterol (XOPENEX) 0.63 mg/3 mL nebu 0.63 mg by Nebulization route. acetaminophen (TYLENOL) 500 mg tablet Take  by mouth every six (6) hours as needed for Pain.      sennosides (SENNA) 8.6 mg cap Take 17.2 mg by mouth nightly. amiodarone (CORDARONE) 200 mg tablet Take 1 Tab by mouth daily.   Qty: 30 Tab, Refills: 6    Associated Diagnoses: NSVT (nonsustained ventricular tachycardia) (HCC)      docusate sodium (COLACE) 100 mg capsule Take 1 Cap by mouth two (2) times a day. Qty: 60 Cap, Refills: 0      gabapentin (NEURONTIN) 600 mg tablet Take 600 mg by mouth three (3) times daily. bisacodyl (DULCOLAX) 10 mg suppository Insert 10 mg into rectum daily. Qty: 1 Suppository, Refills: 2      oxyCODONE IR (OXY-IR) 15 mg immediate release tablet Take 15 mg by mouth every four (4) hours as needed for Pain. insulin glargine (LANTUS) 100 unit/mL injection 50 Units by SubCUTAneous route nightly. Qty: 1 Vial, Refills: 0      insulin lispro (HUMALOG) 100 unit/mL injection 15 Units by SubCUTAneous route three (3) times daily (with meals). Qty: 1 Vial, Refills: 0      ranitidine (ZANTAC) 150 mg tablet Take 150 mg by mouth nightly. pravastatin (PRAVACHOL) 80 mg tablet Take 1 Tab by mouth nightly. Qty: 30 Tab, Refills: 0      polyethylene glycol (MIRALAX) 17 gram packet Take 1 Packet by mouth daily as needed (constipation). Qty: 10 Packet, Refills: 5      cpap machine kit 10 cm qhs      OXYGEN-AIR DELIVERY SYSTEMS 2 lpm qhs      allopurinol (ZYLOPRIM) 100 mg tablet Take 100 mg by mouth daily. glucose blood VI test strips (ASCENSIA AUTODISC VI, ONE TOUCH ULTRA TEST VI) strip Test strips for 30 day supply  Qty: 120 strip, Refills: 0      nitroglycerin (NITROSTAT) 0.4 mg SL tablet 1 Tab by SubLINGual route every five (5) minutes as needed for Chest Pain. Qty: 4 Bottle, Refills: 6      colchicine 0.6 mg tablet Take 0.6 mg by mouth every morning.          STOP taking these medications       sacubitril-valsartan (ENTRESTO) 24 mg/26 mg tablet Comments:   Reason for Stopping:         furosemide (LASIX) 40 mg tablet Comments:   Reason for Stopping:         apixaban (ELIQUIS) 5 mg tablet Comments:   Reason for Stopping:         hydrALAZINE (APRESOLINE) 25 mg tablet Comments:   Reason for Stopping:         isosorbide dinitrate (ISORDIL) 20 mg tablet Comments:   Reason for Stopping:         spironolactone (ALDACTONE) 25 mg tablet Comments:   Reason for Stopping: Activity: Activity as tolerated  Diet: Cardiac Diet  Wound Care: None needed    Follow-up  ·   With pcp, cardio, nephro  Time spent to discharge patient 35 minutes  Signed:  Kylee Coronado MD  3/29/2017  11:07 AM

## 2017-03-29 NOTE — PROGRESS NOTES
Problem: Mobility Impaired (Adult and Pediatric)  Goal: *Acute Goals and Plan of Care (Insert Text)  LTG:  (1.)Mr. Pollard will move from supine to sit and sit to supine , scoot up and down and roll side to side in bed with INDEPENDENT within 7 day(s). (2.)Mr. Pollard will transfer from bed to chair and chair to bed with MODIFIED INDEPENDENCE using the least restrictive device within 7 day(s). (3.)Mr. Pollard will ambulate with CONTACT GUARD ASSIST for 250 feet with the least restrictive device within 7 day(s). ________________________________________________________________________________________________      PHYSICAL THERAPY: Daily Note, Treatment Day: 3rd and AM 3/29/2017  INPATIENT: Hospital Day: 12  Payor: CARE IMPROVEMENT PLUS [place] / Product Type: [employer] /      NAME/AGE/GENDER: Fredi España is a 45 y.o. male         PRIMARY DIAGNOSIS: Acute renal failure (ARF) ([address] Cardiogenic shock ([address] Cardiogenic shock [address] [address] [facility]        ICD-10: Treatment Diagnosis:       · Generalized Muscle Weakness (M62.81)  · Difficulty in walking, Not elsewhere classified (R26.2)  · Other abnormalities of gait and mobility (R26.89)   Precaution/Allergies:  Dilaudid [hydromorphone]; Iodinated contrast media - oral and iv dye; and Penicillins       ASSESSMENT:      Mr. Peyton Alonzo presents in chair and willing to walk. David Pike in bed stating he was getting up to EOB. It was several minutes before he actually sat up. When his name was called because it looked like he was not participating he became a little angry and stated he was getting up. He sat to EOB and again took his time before moving to the next activity. He walked on 2L and increased his distance. He would walk a few feet then stop a minute x 250' with rolling. sats remained =/> 92%. He sat on EOB and performed bilateral LE ex as listed below. Steady progress.       This section established at most recent assessment   PROBLEM LIST (Impairments causing functional limitations):  1. Decreased Strength  2. Decreased ADL/Functional Activities  3. Decreased Transfer Abilities  4. Decreased Ambulation Ability/Technique  5. Decreased Balance  6. Decreased Activity Tolerance  7. Increased Fatigue  8. Increased Shortness of Breath    INTERVENTIONS PLANNED: (Benefits and precautions of physical therapy have been discussed with the patient.)  1. Balance Exercise  2. Bed Mobility  3. Family Education  4. Gait Training  5. Therapeutic Activites  6. Therapeutic Exercise/Strengthening  7. Transfer Training      TREATMENT PLAN: Frequency/Duration: 3 times a week for duration of hospital stay  Rehabilitation Potential For Stated Goals: GOOD      RECOMMENDED REHABILITATION/EQUIPMENT: (at time of discharge pending progress): Continue Skilled Therapy and Rehab. HISTORY:   History of Present Injury/Illness (Reason for Referral):  Per H&P, \"Patient is a 44 yo morbidly obese AAM with a history of CKD III, DM II, KELSIE, OHS, HTN, HLD, Systolic CHF EF 34% Oxygen dependent (3L) who presented to the ED vai with c/o somnolence, lethargy, altered mental status, recent fall, decreased UOP progressively worse x7 days. EMS states patient was seen and evaluated yesterday after cough or fall at home. Patient was helped off the floor, appeared altered but refused transport to the emergency department at that time. EMS reports hypotension in rooute today systolic BP 90, . Daughter at bedside relays most of history. Denies fever, chills, chest pain, palpitations, seizure. Admits to brown urine 2 days ago and none since, generalized weakness, uncontrollable jerking of upper extremities, mumbled incoherent speech with short periods of lucidness. Patient also admits to back pain. Denies NSAID use, ETOH use. ED gave lactulose, albuterol, D50W, Insulin, Morphine, zofran, for hyperkalemia.  ED provider has asked our hospitalist service to evaluate and further manage the patient for Acute Renal Failure. \"  Past Medical History/Comorbidities:   Mr. Freda Dent  has a past medical history of Acute encephalopathy (3/18/2017); Acute systolic heart failure [facility]) (May, 2009); AICD (automatic cardioverter/defibrillator) present (10/21/2015); Atypical chest pain (4/23/2010); Bronchitis; CAD (coronary artery disease); Cardiomyopathy; Chest pain (10/21/2015); Chronic kidney disease; Chronic pain; Congestive heart failure (CHF) ([address] (10/21/2015); COPD; Diabetes ([address] Diabetes mellitus type II, uncontrolled ([address] (7/2/2013); GERD (gastroesophageal reflux disease); Gout; Heart failure ([address] Hypertension; Hyponatremia (12/20/2010); Ill-defined condition; Morbid obesity ([address] Nausea & vomiting (11/30/2015); Neuropathy; Obstructive sleep apnea (2/15/2010); Other unknown and unspecified cause of morbidity or mortality; Severe sepsis ([address] and Unspecified sleep apnea. He also has no past medical history of Adverse effect of anesthesia; Aneurysm ([address] Coagulation disorder ([address] DEMENTIA; Difficult intubation; Malignant hyperthermia due to anesthesia; Other ill-defined conditions(799.89); Pseudocholinesterase deficiency; or Psychiatric disorder. Mr. Freda Dent  has a past surgical history that includes pacemaker; heart catheterization ([place] 10, 2008); and chest surgery procedure unlisted. Social History/Living Environment:   Home Environment: Apartment  # Steps to Enter: 0  One/Two Story Residence: One story  Living Alone: No  Support Systems: Child(reza), Family member(s)  Patient Expects to be Discharged to[de-identified] Rehabilitation facility  Current DME Used/Available at Home: peter Nettles  Prior Level of Function/Work/Activity:  Independent at baseline. Ordered a RW but had not started using it.        Number of Personal Factors/Comorbidities that affect the Plan of Care:  Obesity  Multiple co-morbidities    1-2: MODERATE COMPLEXITY   EXAMINATION:   Most Recent Physical Functioning:   Gross Assessment: Posture:  Posture (WDL): Exceptions to WDL  Posture Assessment: Forward head, Rounded shoulders  Balance:  Sitting: Intact  Standing: Impaired  Standing - Static: Good (-)  Standing - Dynamic : Fair Bed Mobility:  Supine to Sit: Stand-by asssistance  Sit to Supine: Stand-by asssistance  Wheelchair Mobility:     Transfers:  Sit to Stand: Stand-by asssistance  Stand to Sit: Stand-by asssistance  Gait:     Base of Support: Widened  Speed/Mira: Shuffled  Step Length: Left shortened;Right shortened  Gait Abnormalities: Decreased step clearance  Distance (ft): 250 Feet (ft)  Assistive Device: Walker, rolling  Ambulation - Level of Assistance: Contact guard assistance  Interventions: Safety awareness training       Body Structures Involved:  1. Heart  2. Lungs  3. Metabolic  4. Endocrine Body Functions Affected:  1. Sensory/Pain  2. Cardio  3. Respiratory  4. Neuromusculoskeletal  5. Movement Related  6. Metobolic/Endocrine Activities and Participation Affected:  1. General Tasks and Demands  2. Mobility  3. Self Care  4. Domestic Life  5. Community, Social and [place]   Number of elements that affect the Plan of Care: 4+: HIGH COMPLEXITY   CLINICAL PRESENTATION:   Presentation: Evolving clinical presentation with changing clinical characteristics: MODERATE COMPLEXITY   CLINICAL DECISION MAKIN [facility] Short Form  How much difficulty does the patient currently have. .. Unable A Lot A Little None   1. Turning over in bed (including adjusting bedclothes, sheets and blankets)? [ ] 1   [ ] 2   [ ] 3   [X] 4   2. Sitting down on and standing up from a chair with arms ( e.g., wheelchair, bedside commode, etc.)   [ ] 1   [ ] 2   [X] 3   [ ] 4   3. Moving from lying on back to sitting on the side of the bed? [ ] 1   [ ] 2   [X] 3   [ ] 4   How much help from another person does the patient currently need. .. Total A Lot A Little None   4.   Moving to and from a bed to a chair (including a wheelchair)? [ ] 1   [ ] 2   [X] 3   [ ] 4   5. Need to walk in hospital room? [ ] 1   [ ] 2   [X] 3   [ ] 4   6. Climbing 3-5 steps with a railing? [X] 1   [ ] 2   [ ] 3   [ ] 4   © 2007, Trustees of [address] [place] [address] [address] [address], under license to [employer]. All rights reserved    Score:  Initial: 17 Most Recent: X (Date: 3/24/17 )     Interpretation of Tool:  Represents activities that are increasingly more difficult (i.e. Bed mobility, Transfers, Gait). Score 24 23 22-20 19-15 14-10 9-7 6       Modifier CH CI CJ CK CL CM CN         · Mobility - Walking and Moving Around:               - CURRENT STATUS:    CK - 40%-59% impaired, limited or restricted               - GOAL STATUS:           CJ - 20%-39% impaired, limited or restricted               - D/C STATUS:                       ---------------To be determined---------------  Payor: CARE IMPROVEMENT PLUS [place] / Product Type: Managed Care Medicare /       Medical Necessity:     · Patient is expected to demonstrate progress in strength, balance, coordination, functional technique and endurance to increase independence with bed mobility, transfers, ambulation. Reason for Services/Other Comments:  · Patient continues to require present interventions due to patient's inability to tolerate baseline level of activity . Use of outcome tool(s) and clinical judgement create a POC that gives a: Questionable prediction of patient's progress: MODERATE COMPLEXITY                 TREATMENT:   (In addition to Assessment/Re-Assessment sessions the following treatments were rendered)   Pre-treatment Symptoms/Complaints:  Right knee and sciatica. Pain: Initial:      Post Session:  0    Therapeutic Activity: (    15 min): Therapeutic activities including bed mobility and gt on level surface to improve mobility, strength and balance.   Required min Safety awareness training to promote dynamic balance in standing. Therapeutic Exercise: ( 10 min):  Exercises per grid below to improve mobility and strength. Required minimal visual and verbal cues to promote proper body mechanics. Progressed repetitions, complexity of movement and gait distance as indicated. Date:  3/27/17 Date:  3/28/17 Date:  3/29/17   Activity/Exercise Parameters Parameters Parameters   Seated TKE 10x B 20x B X 15 B   Seated HS curls 10x B 20x B X 15 B   Seated marching 20x B 20x B X 15 B   Seated heel/toe raises 20x B 20x B X 15 B   Seated hip abd 20x B 20x B X 15 B   ambulation  100 ft x2                    Braces/Orthotics/Lines/Etc:   · 2L O2  Treatment/Session Assessment:    · Response to Treatment:  Tolerated well with good sats on 2L  · Interdisciplinary Collaboration:  · Physical Therapy Assistant and Registered Nurse  · After treatment position/precautions:  · Supine in bed, Bed/Chair-wheels locked, Bed in low position, Call light within reach, RN notified and Family at bedside  · Compliance with Program/Exercises: Will assess as treatment progresses. · Recommendations/Intent for next treatment session: \"Next visit will focus on advancements to more challenging activities and reduction in assistance provided\".   Total Treatment Duration:  PT Patient Time In/Time Out  Time In: 0956  Time Out: 99 Alice Hayden, PTA

## 2017-03-29 NOTE — PROGRESS NOTES
Physical assessment completed, pt alert and oriented, with no s/s of pain or distress, call light within reach.

## 2017-03-29 NOTE — DISCHARGE INSTRUCTIONS
Learning About Saving Energy When You Have a Chronic Condition  Introduction  Everyday tasks can be tiring when you have COPD, heart failure, or another long-term (chronic) condition. You may feel at times that you've lost your ability to live your life. But learning to conserve, or save, your energy can help you be less tired. Conserving your energy means finding ways of doing daily activities with as little effort as possible. With some small changes in the way you do things, you can get your tasks done more easily. Some treatments are available that might help. Pulmonary rehabilitation can teach you ways to breathe easier. Cardiac rehabilitation can help make your heart stronger. You also may want to see an occupational or physical therapist. The therapist can give you more tips on building strength and moving with less effort. What can you do to conserve your energy? Planning  · Make a list of what you have to do every day. Group the tasks by location. · Do all the chores in one part of your house around the same time. · Go out for errands or do chores at the time of day when you have the most energy. · Plan rest periods into your day. Getting things done  · Sit down as often as you can when you get dressed, do chores, or cook. · Use a cart with wheels to roll items, such as laundry, from one room to another. · Push or slide boxes or other large items instead of lifting them. Reaching and bending  · Put things you use the most on shelves that are at the level of your waist or shoulder. · Use long-handled grabbers or other tools to reach items on a high shelf or to  things off the floor. Use long-handled dusters when you clean the house. · Use a raised toilet seat to avoid bending too far to sit or stand up. Eating  · Eat several small meals instead of three larger meals. · If you get too tired to eat much, try to choose healthy foods that have more calories.  Have a yogurt-and-fruit smoothie for breakfast. Put avocado on a sandwich. Or add cheese or peanut butter to snacks. · If you don't feel very hungry, try to eat first and drink water or other fluids later, after a meal. This can help keep you from losing weight. Sip small amounts of fluids if you need to drink while you eat. Having sex  · Choose the time of day when you have more energy. · A jrhy-nj-fiyi position for sex can be less tiring. Sometimes you may want to focus more on caressing. Watch closely for changes in your health, and be sure to contact your doctor if you have any problems. Where can you learn more? Go to http://armandoFashionAde.com ([employer])nirav.info/. Enter H190 in the search box to learn more about \"Learning About Saving Energy When You Have a Chronic Condition. \"  Current as of: May 23, 2016  Content Version: 11.2  © 4258-0883 [employer]. Care instructions adapted under license by [employer] (which disclaims liability or warranty for this information). If you have questions about a medical condition or this instruction, always ask your healthcare professional. [place] [address] any warranty or liability for your use of this information. Heart Failure: Care Instructions  Your Care Instructions    Heart failure occurs when your heart does not pump as much blood as the body needs. Failure does not mean that the heart has stopped pumping but rather that it is not pumping as well as it should. Over time, this causes fluid buildup in your lungs and other parts of your body. Fluid buildup can cause shortness of breath, fatigue, swollen ankles, and other problems. By taking medicines regularly, reducing sodium (salt) in your diet, checking your weight every day, and making lifestyle changes, you can feel better and live longer. Follow-up care is a key part of your treatment and safety.  Be sure to make and go to all appointments, and call your doctor if you are having problems. It's also a good idea to know your test results and keep a list of the medicines you take. How can you care for yourself at home? Medicines  · Be safe with medicines. Take your medicines exactly as prescribed. Call your doctor if you think you are having a problem with your medicine. · Do not take any vitamins, over-the-counter medicine, or herbal products without talking to your doctor first. Leopoldo Moloney not take ibuprofen (Advil or Motrin) and naproxen (Aleve) without talking to your doctor first. They could make your heart failure worse. · You may be taking some of the following medicine. ¨ Beta-blockers can slow heart rate, decrease blood pressure, and improve your condition. Taking a beta-blocker may lower your chance of needing to be hospitalized. ¨ Angiotensin-converting enzyme inhibitors (ACEIs) reduce the heart's workload, lower blood pressure, and reduce swelling. Taking an ACEI may lower your chance of needing to be hospitalized again. ¨ Angiotensin II receptor blockers (ARBs) work like ACEIs. Your doctor may prescribe them instead of ACEIs. ¨ Diuretics, also called water pills, reduce swelling. ¨ Potassium supplements replace this important mineral, which is sometimes lost with diuretics. ¨ Aspirin and other blood thinners prevent blood clots, which can cause a stroke or heart attack. You will get more details on the specific medicines your doctor prescribes. Diet  · Your doctor may suggest that you limit sodium to 2,000 milligrams (mg) a day or less. That is less than 1 teaspoon of salt a day, including all the salt you eat in cooking or in packaged foods. People get most of their sodium from processed foods. Fast food and restaurant meals also tend to be very high in sodium. · Ask your doctor how much liquid you can drink each day. You may have to limit liquids. Weight  · Weigh yourself without clothing at the same time each day. Record your weight.  Call your doctor if you gain more than 3 pounds in 2 to 3 days. A sudden weight gain may mean that your heart failure is getting worse. Activity level  · Start light exercise (if your doctor says it is okay). Even if you can only do a small amount, exercise will help you get stronger, have more energy, and manage your weight and your stress. Walking is an easy way to get exercise. Start out by walking a little more than you did before. Bit by bit, increase the amount you walk. · When you exercise, watch for signs that your heart is working too hard. You are pushing yourself too hard if you cannot talk while you are exercising. If you become short of breath or dizzy or have chest pain, stop, sit down, and rest.  · If you feel \"wiped out\" the day after you exercise, walk slower or for a shorter distance until you can work up to a better pace. · Get enough rest at night. Sleeping with 1 or 2 pillows under your upper body and head may help you breathe easier. Lifestyle changes  · Do not smoke. Smoking can make a heart condition worse. If you need help quitting, talk to your doctor about stop-smoking programs and medicines. These can increase your chances of quitting for good. Quitting smoking may be the most important step you can take to protect your heart. · Limit alcohol to 2 drinks a day for men and 1 drink a day for women. Too much alcohol can cause health problems. · Avoid getting sick from colds and the flu. Get a pneumococcal vaccine shot. If you have had one before, ask your doctor whether you need another dose. Get a flu shot each year. If you must be around people with colds or the flu, wash your hands often. When should you call for help? Call 911 if you have symptoms of sudden heart failure such as:  · You have severe trouble breathing. · You cough up pink, foamy mucus. · You have a new irregular or rapid heartbeat. Call your doctor now or seek immediate medical care if:  · You have new or increased shortness of breath.   · You are dizzy or lightheaded, or you feel like you may faint. · You have sudden weight gain, such as 3 pounds or more in 2 to 3 days. · You have increased swelling in your legs, ankles, or feet. · You are suddenly so tired or weak that you cannot do your usual activities. Watch closely for changes in your health, and be sure to contact your doctor if:  · You develop new symptoms. Where can you learn more? Go to http://armando-nirav.info/. Enter V212 in the search box to learn more about \"Heart Failure: Care Instructions. \"  Current as of: January 27, 2016  Content Version: 11.2  © 7695-0486 [employer]. Care instructions adapted under license by [employer] (which disclaims liability or warranty for this information). If you have questions about a medical condition or this instruction, always ask your healthcare professional. [place] [address] any warranty or liability for your use of this information. Advance Care Planning for Heart Failure: Care Instructions  Your Care Instructions  If you have heart failure, taking care of yourself will help you feel better and live longer. But the disease often gets worse over time. So it may be a good idea to plan for the future now while you are active and able to communicate your wishes. If you do this kind of planning, it does not mean that you are giving up. It's simply the best way to make sure that you get the care and treatment that you want. It can also make things much easier for your loved ones. What can you do to plan for the end of life? · Talk openly and honestly with your family and doctor. This is the best way to understand the decisions you will need to make as your health changes. Know that you can always change your mind. · Ask your doctor about common life-support treatments. These include tube feedings, breathing machines, and fluids given through a vein (IV).  It may be easier to decide if you want them after you are sure you understand what they are. · Think about preparing written papers that state your wishes. These papers are called advance directives. If you do this, there will not be any confusion about your wishes. · If you are near the end of your life and you have a heart device such as a pacemaker, talk to your doctor about turning it off. Include this in your advance directive. · Ask a friend or family member to make decisions for you when you no longer can. Talk to this person about the kinds of treatments you want or don't want. Make sure this person understands your wishes. · You may decide to try life-supporting treatments for a limited time. This can help your doctor see if they will help. You may also decide that you want your doctor to do only certain things to keep you alive. It's important to be very clear about what you do and don't want. · Ask your doctor for an estimate of how long you may live. Your doctor may not always know. But he or she can tell you what usually happens with heart failure. Which papers should you prepare? Advance directives are legal papers that tell doctors how to care for you at the end of your life. They may include a living will and a durable power of . You don't need a  to write these papers. If you prepare these papers, be sure to give a copy to your doctor. It's also important to give a copy to a family member or close friend. · Think about a do-not-resuscitate order, or DNR. This order asks that no extra treatments be used to save your life if your heart stops. These treatments include electrical shock to restart your heart. They also include a machine to breathe for you and medicines to save your life. If you decide to have a DNR order, ask your doctor to write it. Then put it in your home where everyone can see it. · Think about a living will. It explains your wishes in case you are in a coma or cannot communicate.  Living clarke tell doctors to use or not use treatments to keep you alive. You must have one or two witnesses or a notary in the room when you sign this form. · Think about naming a person to make decisions about your care if you are not able to. This is called a durable power of . Most people ask a close friend or family member. · Ask your doctor or your state health department about advance directives. They may have forms for each of these types of papers. · All of these papers are simple to change. Tell your doctor what you want to change. Ask him or her to make a note in your file. Then give your family updated copies. Where can you learn more? Go to http://armando-nirav.info/. Enter U111 in the search box to learn more about \"Advance Care Planning for Heart Failure: Care Instructions. \"  Current as of: January 27, 2016  Content Version: 11.2  © 3360-2160 [employer]. Care instructions adapted under license by [employer] (which disclaims liability or warranty for this information). If you have questions about a medical condition or this instruction, always ask your healthcare professional. [place] [address] any warranty or liability for your use of this information. DISCHARGE SUMMARY from Nurse    The following personal items are in your possession at time of discharge:    Dental Appliances: None  Visual Aid: At bedside     Home Medications: Sent home  Jewelry: None  Clothing: Sent home  Other Valuables: None             PATIENT INSTRUCTIONS:    After general anesthesia or intravenous sedation, for 24 hours or while taking prescription Narcotics:  · Limit your activities  · Do not drive and operate hazardous machinery  · Do not make important personal or business decisions  · Do  not drink alcoholic beverages  · If you have not urinated within 8 hours after discharge, please contact your surgeon on call.     Report the following to your surgeon:  · Excessive pain, swelling, redness or odor of or around the surgical area  · Temperature over 100.5  · Nausea and vomiting lasting longer than 4 hours or if unable to take medications  · Any signs of decreased circulation or nerve impairment to extremity: change in color, persistent  numbness, tingling, coldness or increase pain  · Any questions        What to do at Home:  Recommended activity: Activity as tolerated,       *  Please give a list of your current medications to your Primary Care Provider. *  Please update this list whenever your medications are discontinued, doses are      changed, or new medications (including over-the-counter products) are added. *  Please carry medication information at all times in case of emergency situations. These are general instructions for a healthy lifestyle:    No smoking/ No tobacco products/ Avoid exposure to second hand smoke    Surgeon General's Warning:  Quitting smoking now greatly reduces serious risk to your health. Obesity, smoking, and sedentary lifestyle greatly increases your risk for illness    A healthy diet, regular physical exercise & weight monitoring are important for maintaining a healthy lifestyle    You may be retaining fluid if you have a history of heart failure or if you experience any of the following symptoms:  Weight gain of 3 pounds or more overnight or 5 pounds in a week, increased swelling in our hands or feet or shortness of breath while lying flat in bed. Please call your doctor as soon as you notice any of these symptoms; do not wait until your next office visit. Recognize signs and symptoms of STROKE:    F-face looks uneven    A-arms unable to move or move unevenly    S-speech slurred or non-existent    T-time-call 911 as soon as signs and symptoms begin-DO NOT go       Back to bed or wait to see if you get better-TIME IS BRAIN.     Warning Signs of HEART ATTACK     Call 911 if you have these symptoms:   Chest discomfort. Most heart attacks involve discomfort in the center of the chest that lasts more than a few minutes, or that goes away and comes back. It can feel like uncomfortable pressure, squeezing, fullness, or pain.  Discomfort in other areas of the upper body. Symptoms can include pain or discomfort in one or both arms, the back, neck, jaw, or stomach.  Shortness of breath with or without chest discomfort.  Other signs may include breaking out in a cold sweat, nausea, or lightheadedness. Don't wait more than five minutes to call 911 - MINUTES MATTER! Fast action can save your life. Calling 911 is almost always the fastest way to get lifesaving treatment. Emergency Medical Services staff can begin treatment when they arrive -- up to an hour sooner than if someone gets to the hospital by car. The discharge information has been reviewed with the patient. The patient verbalized understanding. Discharge medications reviewed with the patient and appropriate educational materials and side effects teaching were provided.

## 2017-03-30 ENCOUNTER — TELEPHONE (OUTPATIENT)
Dept: HOME HEALTH SERVICES | Facility: HOME HEALTH | Age: 52
End: 2017-03-30

## 2017-03-30 NOTE — TELEPHONE ENCOUNTER
I spoke with Mr. Gustavo Le on the phone today. He was discharged yesterday after being readmitted for cardiogenic shock. I reviewed his discharge medications. He has his new diuretic and just started it today. He understands that his carvedilol has been reduced to 3.125 twice a day. He said he was getting ready to go to the pain management doctor. He verified he still had my cell phone number. He said to call back any time.   3/30/17  1300

## 2017-03-31 ENCOUNTER — HOME CARE VISIT (OUTPATIENT)
Dept: SCHEDULING | Facility: HOME HEALTH | Age: 52
End: 2017-03-31
Payer: MEDICARE

## 2017-03-31 PROCEDURE — G0299 HHS/HOSPICE OF RN EA 15 MIN: HCPCS

## 2017-04-03 VITALS
TEMPERATURE: 97.9 F | OXYGEN SATURATION: 95 % | HEART RATE: 82 BPM | DIASTOLIC BLOOD PRESSURE: 100 MMHG | SYSTOLIC BLOOD PRESSURE: 138 MMHG | RESPIRATION RATE: 22 BRPM

## 2017-04-04 ENCOUNTER — HOME CARE VISIT (OUTPATIENT)
Dept: SCHEDULING | Facility: HOME HEALTH | Age: 52
End: 2017-04-04
Payer: MEDICARE

## 2017-04-04 PROCEDURE — G0299 HHS/HOSPICE OF RN EA 15 MIN: HCPCS

## 2017-04-04 PROCEDURE — G0151 HHCP-SERV OF PT,EA 15 MIN: HCPCS

## 2017-04-05 VITALS
OXYGEN SATURATION: 99 % | DIASTOLIC BLOOD PRESSURE: 84 MMHG | TEMPERATURE: 97.5 F | HEART RATE: 80 BPM | RESPIRATION RATE: 20 BRPM | SYSTOLIC BLOOD PRESSURE: 108 MMHG

## 2017-04-07 ENCOUNTER — HOME CARE VISIT (OUTPATIENT)
Dept: HOME HEALTH SERVICES | Facility: HOME HEALTH | Age: 52
End: 2017-04-07
Payer: MEDICARE

## 2017-04-07 VITALS
TEMPERATURE: 97.2 F | HEART RATE: 78 BPM | RESPIRATION RATE: 20 BRPM | DIASTOLIC BLOOD PRESSURE: 82 MMHG | SYSTOLIC BLOOD PRESSURE: 126 MMHG | OXYGEN SATURATION: 98 %

## 2017-04-11 ENCOUNTER — HOME CARE VISIT (OUTPATIENT)
Dept: HOME HEALTH SERVICES | Facility: HOME HEALTH | Age: 52
End: 2017-04-11
Payer: MEDICARE

## 2017-04-15 ENCOUNTER — HOME CARE VISIT (OUTPATIENT)
Dept: HOME HEALTH SERVICES | Facility: HOME HEALTH | Age: 52
End: 2017-04-15
Payer: MEDICARE

## 2017-06-20 ENCOUNTER — HOSPITAL ENCOUNTER (INPATIENT)
Age: 52
LOS: 8 days | Discharge: HOME HEALTH | DRG: 291 | End: 2017-06-28
Attending: EMERGENCY MEDICINE | Admitting: INTERNAL MEDICINE
Payer: MEDICARE

## 2017-06-20 ENCOUNTER — APPOINTMENT (OUTPATIENT)
Dept: GENERAL RADIOLOGY | Age: 52
DRG: 291 | End: 2017-06-20
Attending: EMERGENCY MEDICINE
Payer: MEDICARE

## 2017-06-20 DIAGNOSIS — I50.9 ACUTE ON CHRONIC CONGESTIVE HEART FAILURE, UNSPECIFIED CONGESTIVE HEART FAILURE TYPE: Primary | ICD-10-CM

## 2017-06-20 DIAGNOSIS — R06.02 SOB (SHORTNESS OF BREATH): ICD-10-CM

## 2017-06-20 PROBLEM — I50.23 ACUTE ON CHRONIC SYSTOLIC HEART FAILURE (HCC): Status: ACTIVE | Noted: 2017-06-20

## 2017-06-20 LAB
ALBUMIN SERPL BCP-MCNC: 2.8 G/DL (ref 3.5–5)
ALBUMIN/GLOB SERPL: 0.5 {RATIO} (ref 1.2–3.5)
ALP SERPL-CCNC: 228 U/L (ref 50–136)
ALT SERPL-CCNC: 22 U/L (ref 12–65)
ANION GAP BLD CALC-SCNC: 11 MMOL/L (ref 7–16)
AST SERPL W P-5'-P-CCNC: 30 U/L (ref 15–37)
BASOPHILS # BLD AUTO: 0 K/UL (ref 0–0.2)
BASOPHILS # BLD: 0 % (ref 0–2)
BILIRUB SERPL-MCNC: 2.9 MG/DL (ref 0.2–1.1)
BNP SERPL-MCNC: 894 PG/ML
BUN SERPL-MCNC: 50 MG/DL (ref 6–23)
CALCIUM SERPL-MCNC: 8.8 MG/DL (ref 8.3–10.4)
CHLORIDE SERPL-SCNC: 95 MMOL/L (ref 98–107)
CO2 SERPL-SCNC: 34 MMOL/L (ref 21–32)
CREAT SERPL-MCNC: 2.66 MG/DL (ref 0.8–1.5)
DIFFERENTIAL METHOD BLD: ABNORMAL
EOSINOPHIL # BLD: 0.3 K/UL (ref 0–0.8)
EOSINOPHIL NFR BLD: 4 % (ref 0.5–7.8)
ERYTHROCYTE [DISTWIDTH] IN BLOOD BY AUTOMATED COUNT: 21 % (ref 11.9–14.6)
GLOBULIN SER CALC-MCNC: 5.2 G/DL (ref 2.3–3.5)
GLUCOSE SERPL-MCNC: 85 MG/DL (ref 65–100)
HCT VFR BLD AUTO: 32.1 % (ref 41.1–50.3)
HGB BLD-MCNC: 10.1 G/DL (ref 13.6–17.2)
IMM GRANULOCYTES # BLD: 0 K/UL (ref 0–0.5)
IMM GRANULOCYTES NFR BLD AUTO: 0.1 % (ref 0–5)
LYMPHOCYTES # BLD AUTO: 28 % (ref 13–44)
LYMPHOCYTES # BLD: 1.9 K/UL (ref 0.5–4.6)
MCH RBC QN AUTO: 24.1 PG (ref 26.1–32.9)
MCHC RBC AUTO-ENTMCNC: 31.5 G/DL (ref 31.4–35)
MCV RBC AUTO: 76.6 FL (ref 79.6–97.8)
MONOCYTES # BLD: 0.7 K/UL (ref 0.1–1.3)
MONOCYTES NFR BLD AUTO: 10 % (ref 4–12)
NEUTS SEG # BLD: 3.9 K/UL (ref 1.7–8.2)
NEUTS SEG NFR BLD AUTO: 58 % (ref 43–78)
PLATELET # BLD AUTO: 269 K/UL (ref 150–450)
PMV BLD AUTO: 9.5 FL (ref 10.8–14.1)
POTASSIUM SERPL-SCNC: 2.8 MMOL/L (ref 3.5–5.1)
PROT SERPL-MCNC: 8 G/DL (ref 6.3–8.2)
RBC # BLD AUTO: 4.19 M/UL (ref 4.23–5.67)
SODIUM SERPL-SCNC: 140 MMOL/L (ref 136–145)
TROPONIN I SERPL-MCNC: <0.02 NG/ML (ref 0.02–0.05)
WBC # BLD AUTO: 6.8 K/UL (ref 4.3–11.1)

## 2017-06-20 PROCEDURE — 83880 ASSAY OF NATRIURETIC PEPTIDE: CPT | Performed by: EMERGENCY MEDICINE

## 2017-06-20 PROCEDURE — 93005 ELECTROCARDIOGRAM TRACING: CPT | Performed by: EMERGENCY MEDICINE

## 2017-06-20 PROCEDURE — 74011000258 HC RX REV CODE- 258: Performed by: NURSE PRACTITIONER

## 2017-06-20 PROCEDURE — 96374 THER/PROPH/DIAG INJ IV PUSH: CPT | Performed by: EMERGENCY MEDICINE

## 2017-06-20 PROCEDURE — 65660000000 HC RM CCU STEPDOWN

## 2017-06-20 PROCEDURE — 87641 MR-STAPH DNA AMP PROBE: CPT | Performed by: INTERNAL MEDICINE

## 2017-06-20 PROCEDURE — 74011000250 HC RX REV CODE- 250: Performed by: NURSE PRACTITIONER

## 2017-06-20 PROCEDURE — 71020 XR CHEST PA LAT: CPT

## 2017-06-20 PROCEDURE — 74011250637 HC RX REV CODE- 250/637: Performed by: EMERGENCY MEDICINE

## 2017-06-20 PROCEDURE — 80053 COMPREHEN METABOLIC PANEL: CPT | Performed by: EMERGENCY MEDICINE

## 2017-06-20 PROCEDURE — 84484 ASSAY OF TROPONIN QUANT: CPT | Performed by: EMERGENCY MEDICINE

## 2017-06-20 PROCEDURE — 74011000250 HC RX REV CODE- 250: Performed by: EMERGENCY MEDICINE

## 2017-06-20 PROCEDURE — 99284 EMERGENCY DEPT VISIT MOD MDM: CPT | Performed by: EMERGENCY MEDICINE

## 2017-06-20 PROCEDURE — 85025 COMPLETE CBC W/AUTO DIFF WBC: CPT | Performed by: EMERGENCY MEDICINE

## 2017-06-20 RX ORDER — INSULIN LISPRO 100 [IU]/ML
INJECTION, SOLUTION INTRAVENOUS; SUBCUTANEOUS
Status: DISCONTINUED | OUTPATIENT
Start: 2017-06-21 | End: 2017-06-28 | Stop reason: HOSPADM

## 2017-06-20 RX ORDER — ALLOPURINOL 100 MG/1
100 TABLET ORAL DAILY
Status: DISCONTINUED | OUTPATIENT
Start: 2017-06-21 | End: 2017-06-28 | Stop reason: HOSPADM

## 2017-06-20 RX ORDER — BUMETANIDE 2 MG/1
2 TABLET ORAL 2 TIMES DAILY
COMMUNITY
End: 2017-06-28

## 2017-06-20 RX ORDER — SENNOSIDES 8.6 MG/1
2 TABLET ORAL
Status: DISCONTINUED | OUTPATIENT
Start: 2017-06-21 | End: 2017-06-28 | Stop reason: HOSPADM

## 2017-06-20 RX ORDER — AMIODARONE HYDROCHLORIDE 200 MG/1
200 TABLET ORAL DAILY
Status: DISCONTINUED | OUTPATIENT
Start: 2017-06-21 | End: 2017-06-28 | Stop reason: HOSPADM

## 2017-06-20 RX ORDER — POTASSIUM CHLORIDE 20 MEQ/1
40 TABLET, EXTENDED RELEASE ORAL 2 TIMES DAILY
Status: DISCONTINUED | OUTPATIENT
Start: 2017-06-21 | End: 2017-06-28 | Stop reason: HOSPADM

## 2017-06-20 RX ORDER — SODIUM CHLORIDE 0.9 % (FLUSH) 0.9 %
5-10 SYRINGE (ML) INJECTION EVERY 8 HOURS
Status: DISCONTINUED | OUTPATIENT
Start: 2017-06-21 | End: 2017-06-28 | Stop reason: HOSPADM

## 2017-06-20 RX ORDER — CARVEDILOL 6.25 MG/1
6.25 TABLET ORAL 2 TIMES DAILY WITH MEALS
COMMUNITY
End: 2017-07-24 | Stop reason: SDUPTHER

## 2017-06-20 RX ORDER — FAMOTIDINE 20 MG/1
20 TABLET, FILM COATED ORAL 2 TIMES DAILY
Status: DISCONTINUED | OUTPATIENT
Start: 2017-06-21 | End: 2017-06-23

## 2017-06-20 RX ORDER — OXYCODONE HYDROCHLORIDE 5 MG/1
10 TABLET ORAL
Status: DISCONTINUED | OUTPATIENT
Start: 2017-06-20 | End: 2017-06-28 | Stop reason: HOSPADM

## 2017-06-20 RX ORDER — TAMSULOSIN HYDROCHLORIDE 0.4 MG/1
0.4 CAPSULE ORAL DAILY
COMMUNITY
End: 2017-11-29

## 2017-06-20 RX ORDER — GABAPENTIN 300 MG/1
300 CAPSULE ORAL 3 TIMES DAILY
Status: DISCONTINUED | OUTPATIENT
Start: 2017-06-21 | End: 2017-06-28 | Stop reason: HOSPADM

## 2017-06-20 RX ORDER — METOLAZONE 2.5 MG/1
TABLET ORAL
COMMUNITY
End: 2017-07-24 | Stop reason: SDUPTHER

## 2017-06-20 RX ORDER — POTASSIUM CHLORIDE 14.9 MG/ML
20 INJECTION INTRAVENOUS ONCE
Status: COMPLETED | OUTPATIENT
Start: 2017-06-21 | End: 2017-06-21

## 2017-06-20 RX ORDER — SODIUM CHLORIDE 0.9 % (FLUSH) 0.9 %
5-10 SYRINGE (ML) INJECTION EVERY 8 HOURS
Status: DISCONTINUED | OUTPATIENT
Start: 2017-06-20 | End: 2017-06-20

## 2017-06-20 RX ORDER — TAMSULOSIN HYDROCHLORIDE 0.4 MG/1
0.4 CAPSULE ORAL DAILY
Status: DISCONTINUED | OUTPATIENT
Start: 2017-06-21 | End: 2017-06-28 | Stop reason: HOSPADM

## 2017-06-20 RX ORDER — POTASSIUM CHLORIDE 20 MEQ/1
20 TABLET, EXTENDED RELEASE ORAL
Status: COMPLETED | OUTPATIENT
Start: 2017-06-20 | End: 2017-06-20

## 2017-06-20 RX ORDER — SODIUM CHLORIDE 0.9 % (FLUSH) 0.9 %
5-10 SYRINGE (ML) INJECTION AS NEEDED
Status: DISCONTINUED | OUTPATIENT
Start: 2017-06-20 | End: 2017-06-28 | Stop reason: HOSPADM

## 2017-06-20 RX ORDER — OXYCODONE HYDROCHLORIDE 5 MG/1
10 TABLET ORAL
COMMUNITY
End: 2018-01-04

## 2017-06-20 RX ORDER — ONDANSETRON 2 MG/ML
4 INJECTION INTRAMUSCULAR; INTRAVENOUS
Status: DISCONTINUED | OUTPATIENT
Start: 2017-06-20 | End: 2017-06-28 | Stop reason: HOSPADM

## 2017-06-20 RX ORDER — NITROGLYCERIN 0.4 MG/1
0.4 TABLET SUBLINGUAL
Status: DISCONTINUED | OUTPATIENT
Start: 2017-06-20 | End: 2017-06-28 | Stop reason: HOSPADM

## 2017-06-20 RX ORDER — INSULIN GLARGINE 100 [IU]/ML
25 INJECTION, SOLUTION SUBCUTANEOUS
Status: DISCONTINUED | OUTPATIENT
Start: 2017-06-21 | End: 2017-06-28 | Stop reason: HOSPADM

## 2017-06-20 RX ORDER — DOCUSATE SODIUM 100 MG/1
100 CAPSULE, LIQUID FILLED ORAL 2 TIMES DAILY
Status: DISCONTINUED | OUTPATIENT
Start: 2017-06-21 | End: 2017-06-28 | Stop reason: HOSPADM

## 2017-06-20 RX ORDER — CARVEDILOL 6.25 MG/1
6.25 TABLET ORAL 2 TIMES DAILY WITH MEALS
Status: DISCONTINUED | OUTPATIENT
Start: 2017-06-21 | End: 2017-06-28 | Stop reason: HOSPADM

## 2017-06-20 RX ORDER — HEPARIN SODIUM 5000 [USP'U]/ML
5000 INJECTION, SOLUTION INTRAVENOUS; SUBCUTANEOUS EVERY 8 HOURS
Status: DISCONTINUED | OUTPATIENT
Start: 2017-06-21 | End: 2017-06-28 | Stop reason: HOSPADM

## 2017-06-20 RX ORDER — BUMETANIDE 0.25 MG/ML
0.5 INJECTION INTRAMUSCULAR; INTRAVENOUS ONCE
Status: COMPLETED | OUTPATIENT
Start: 2017-06-20 | End: 2017-06-20

## 2017-06-20 RX ORDER — SODIUM CHLORIDE 0.9 % (FLUSH) 0.9 %
5-10 SYRINGE (ML) INJECTION AS NEEDED
Status: DISCONTINUED | OUTPATIENT
Start: 2017-06-20 | End: 2017-06-20

## 2017-06-20 RX ADMIN — BUMETANIDE 0.5 MG: 0.25 INJECTION INTRAMUSCULAR; INTRAVENOUS at 19:46

## 2017-06-20 RX ADMIN — POTASSIUM CHLORIDE 20 MEQ: 20 TABLET, EXTENDED RELEASE ORAL at 19:36

## 2017-06-20 RX ADMIN — BUMETANIDE 2 MG/HR: 0.25 INJECTION INTRAMUSCULAR; INTRAVENOUS at 23:41

## 2017-06-20 NOTE — ED PROVIDER NOTES
HPI Comments: Patient is followed by Dr. Robyn Johnson with cardiology. Has congestive heart failure and high blood pressure. Has chronic kidney disease and diabetes. Has COPD. Has an AICD in place. Presents with 85 pound weight gain since January per patient. He was admitted 2 weeks ago to another facility for diuresis with shortness of breath and congestive heart failure. States 7 days but does not feel it was long enough. Shortness of breath is worsening in the spread of edema up both legs. Patient is a 45 y.o. male presenting with shortness of breath. The history is provided by the patient. No  was used. Shortness of Breath   This is a new problem. The average episode lasts 1 week. The problem occurs continuously. The current episode started more than 1 week ago. The problem has been gradually worsening. Associated symptoms include cough, sputum production, orthopnea, chest pain (intermittent not today) and leg swelling. Pertinent negatives include no fever, no headaches, no rhinorrhea, no sore throat, no neck pain, no wheezing, no vomiting, no abdominal pain, no rash and no leg pain. He has tried nothing for the symptoms. He has had prior hospitalizations. Associated medical issues include COPD and heart failure. Past Medical History:   Diagnosis Date    Acute encephalopathy 7/68/1056    Acute systolic heart failure ([address] May, 2009    Also had transient acute renal failure secondary to poor perfusion.     AICD (automatic cardioverter/defibrillator) present 10/21/2015    Atypical chest pain 4/23/2010    Bronchitis     CAD (coronary artery disease)     Cardiomyopathy     Chest pain 10/21/2015    Chronic kidney disease     renal insufficiency    Chronic pain     back    Congestive heart failure (CHF) ([address] 10/21/2015    COPD     Diabetes ([address]     type 2 insulin reliant- BS average- 160's-180's    Diabetes mellitus type II, uncontrolled ([address] 7/2/2013    GERD (gastroesophageal reflux disease)     Gout     Heart failure ([facility]     cardiomyopathy with ef 10-20%    Hypertension     Hyponatremia 12/20/2010    Ill-defined condition     gout, neuropathy, sciatica    Morbid obesity ([facility]     Nausea & vomiting 11/30/2015    Neuropathy     Obstructive sleep apnea 2/15/2010    Other unknown and unspecified cause of morbidity or mortality     Gout    Severe sepsis (HCC)     Unspecified sleep apnea     cpap       Past Surgical History:   Procedure Laterality Date    CHEST SURGERY PROCEDURE UNLISTED      thorocentesis    HX HEART CATHETERIZATION  [place] 10, 2008    Severe multivessel disease with severe LV dysfunction    HX PACEMAKER      may 2010         Family History:   Problem Relation Age of Onset    Heart Disease Father     Stroke Father     Diabetes Sister     Stroke Sister     Diabetes Sister     Heart Disease Other        Social History     Social History    Marital status:      Spouse name: N/A    Number of children: N/A    Years of education: N/A     Occupational History    Not on file. Social History Main Topics    Smoking status: Former Smoker     Packs/day: 0.25     Years: 1.00     Quit date: 7/12/1984    Smokeless tobacco: Never Used      Comment: pt states that he only tried smoking as a kid     Alcohol use No    Drug use: No    Sexual activity: Not on file     Other Topics Concern    Not on file     Social History Narrative         ALLERGIES: Dilaudid [hydromorphone]; Iodinated contrast- oral and iv dye; and Penicillins    Review of Systems   Constitutional: Negative for chills and fever. HENT: Negative for rhinorrhea and sore throat. Eyes: Negative for pain and redness. Respiratory: Positive for cough, sputum production and shortness of breath. Negative for chest tightness and wheezing. Cardiovascular: Positive for chest pain (intermittent not today), orthopnea and leg swelling.    Gastrointestinal: Negative for abdominal pain, diarrhea, nausea and vomiting. Genitourinary: Negative for dysuria and hematuria. Musculoskeletal: Negative for back pain, gait problem, neck pain and neck stiffness. Skin: Negative for color change and rash. Neurological: Negative for weakness, numbness and headaches. Vitals:    06/20/17 1552   BP: 134/82   Pulse: 73   Resp: 18   Temp: 98.7 °F (37.1 °C)   SpO2: 91%   Weight: (!) 186.4 kg (411 lb)   Height: 5' 6\" (1.676 m)            Physical Exam   Constitutional: He is oriented to person, place, and time. He appears well-developed and well-nourished. HENT:   Head: Normocephalic and atraumatic. Neck: Normal range of motion. Neck supple. Cardiovascular: Normal rate and regular rhythm. Pulmonary/Chest: Effort normal and breath sounds normal. He has no wheezes. Abdominal: Soft. Bowel sounds are normal. There is no tenderness. Swelling     Musculoskeletal: Normal range of motion. He exhibits edema. Neurological: He is alert and oriented to person, place, and time. Skin: Skin is warm and dry. Nursing note and vitals reviewed. MDM  Number of Diagnoses or Management Options  Diagnosis management comments: chf with sob. Will admit for diuresis. Amount and/or Complexity of Data Reviewed  Clinical lab tests: ordered and reviewed  Tests in the radiology section of CPT®: ordered and reviewed  Tests in the medicine section of CPT®: ordered and reviewed    Patient Progress  Patient progress: stable    ED Course       Procedures    EKG: normal sinus rhythm, nonspecific ST and T waves changes. Rate 74. XR CHEST PA LAT (Final result) Result time: 06/20/17 16:38:07     Final result by Jarad Zhu MD (06/20/17 16:38:07)     Impression:     IMPRESSION: Very limited exam secondary to underpenetration likely related to  the patient's body habitus. Mild CHF is a consideration.     Narrative:     PA AND LATERAL CHEST X-RAY.     Clinical Indication: CHF, shortness of breath    Comparison: Chest x-ray dated 3/23/2017    Findings: 2 views of the chest submitted demonstrate stable enlargement of the  cardiac silhouette with an AICD in place. The examination is technically limited  by suboptimal penetration. Mild pulmonary edema could be considered. There is no  definite pleural effusion.               Results Include:    Recent Results (from the past 24 hour(s))   EKG, 12 LEAD, INITIAL    Collection Time: 06/20/17  4:05 PM   Result Value Ref Range    Ventricular Rate 74 BPM    Atrial Rate 74 BPM    P-R Interval 262 ms    QRS Duration 90 ms    Q-T Interval 364 ms    QTC Calculation (Bezet) 404 ms    Calculated P Axis 50 degrees    Calculated R Axis 166 degrees    Calculated T Axis 97 degrees    Diagnosis       Sinus rhythm with 1st degree A-V block  Possible Right ventricular hypertrophy  Anterolateral infarct (cited on or before 22-JAN-2017)  Abnormal ECG  When compared with ECG of 25-MAR-2017 22:03,  Questionable change in initial forces of Lateral leads  Nonspecific T wave abnormality, worse in Inferior leads  Nonspecific T wave abnormality now evident in Anterolateral leads  QT has shortened     CBC WITH AUTOMATED DIFF    Collection Time: 06/20/17  6:30 PM   Result Value Ref Range    WBC 6.8 4.3 - 11.1 K/uL    RBC 4.19 (L) 4.23 - 5.67 M/uL    HGB 10.1 (L) 13.6 - 17.2 g/dL    HCT 32.1 (L) 41.1 - 50.3 %    MCV 76.6 (L) 79.6 - 97.8 FL    MCH 24.1 (L) 26.1 - 32.9 PG    MCHC 31.5 31.4 - 35.0 g/dL    RDW 21.0 (H) 11.9 - 14.6 %    PLATELET 818 953 - 160 K/uL    MPV 9.5 (L) 10.8 - 14.1 FL    DF AUTOMATED      NEUTROPHILS 58 43 - 78 %    LYMPHOCYTES 28 13 - 44 %    MONOCYTES 10 4.0 - 12.0 %    EOSINOPHILS 4 0.5 - 7.8 %    BASOPHILS 0 0.0 - 2.0 %    IMMATURE GRANULOCYTES 0.1 0.0 - 5.0 %    ABS. NEUTROPHILS 3.9 1.7 - 8.2 K/UL    ABS. LYMPHOCYTES 1.9 0.5 - 4.6 K/UL    ABS. MONOCYTES 0.7 0.1 - 1.3 K/UL    ABS. EOSINOPHILS 0.3 0.0 - 0.8 K/UL    ABS.  BASOPHILS 0.0 0.0 - 0.2 K/UL ABS. IMM. GRANS. 0.0 0.0 - 0.5 K/UL   METABOLIC PANEL, COMPREHENSIVE    Collection Time: 06/20/17  6:30 PM   Result Value Ref Range    Sodium 140 136 - 145 mmol/L    Potassium 2.8 (LL) 3.5 - 5.1 mmol/L    Chloride 95 (L) 98 - 107 mmol/L    CO2 34 (H) 21 - 32 mmol/L    Anion gap 11 7 - 16 mmol/L    Glucose 85 65 - 100 mg/dL    BUN 50 (H) 6 - 23 MG/DL    Creatinine 2.66 (H) 0.8 - 1.5 MG/DL    GFR est AA 33 (L) >60 ml/min/1.73m2    GFR est non-AA 27 (L) >60 ml/min/1.73m2    Calcium 8.8 8.3 - 10.4 MG/DL    Bilirubin, total 2.9 (H) 0.2 - 1.1 MG/DL    ALT (SGPT) 22 12 - 65 U/L    AST (SGOT) 30 15 - 37 U/L    Alk.  phosphatase 228 (H) 50 - 136 U/L    Protein, total 8.0 6.3 - 8.2 g/dL    Albumin 2.8 (L) 3.5 - 5.0 g/dL    Globulin 5.2 (H) 2.3 - 3.5 g/dL    A-G Ratio 0.5 (L) 1.2 - 3.5     BNP    Collection Time: 06/20/17  6:30 PM   Result Value Ref Range     pg/mL   TROPONIN I    Collection Time: 06/20/17  6:30 PM   Result Value Ref Range    Troponin-I, Qt. <0.02 (L) 0.02 - 0.05 NG/ML

## 2017-06-20 NOTE — IP AVS SNAPSHOT
Current Discharge Medication List  
  
START taking these medications Dose & Instructions Dispensing Information Comments Morning Noon Evening Bedtime  
 hydrALAZINE 10 mg tablet Commonly known as:  APRESOLINE Your last dose was:  6/28/2017 Dose:  10 mg Take 1 Tab by mouth three (3) times daily. Quantity:  90 Tab Refills:  6  
     
  
   
  
   
   
  
  
 isosorbide dinitrate 20 mg tablet Commonly known as:  ISORDIL Your last dose was:  6/28/2017 Dose:  20 mg Take 1 Tab by mouth three (3) times daily. Quantity:  90 Tab Refills:  6  
     
  
   
  
   
   
  
  
 spironolactone 50 mg tablet Commonly known as:  ALDACTONE Your last dose was:  6/28/2017 Dose:  50 mg Take 1 Tab by mouth daily. Quantity:  30 Tab Refills:  6 CONTINUE these medications which have CHANGED Dose & Instructions Dispensing Information Comments Morning Noon Evening Bedtime  
 bumetanide 1 mg tablet Commonly known as:  Jessica Alba What changed:   
- medication strength 
- how much to take Dose:  1 mg Take 1 Tab by mouth two (2) times a day. Quantity:  60 Tab Refills:  6 CONTINUE these medications which have NOT CHANGED Dose & Instructions Dispensing Information Comments Morning Noon Evening Bedtime  
 allopurinol 100 mg tablet Commonly known as:  Lavella Garcia Your last dose was:  6/28/2017 Dose:  100 mg Take 100 mg by mouth two (2) times a day. Refills:  0  
     
  
   
   
   
  
  
 amiodarone 200 mg tablet Commonly known as:  CORDARONE Your last dose was:  6/28/2017 Dose:  200 mg Take 1 Tab by mouth daily. Quantity:  30 Tab Refills:  6  
     
  
   
   
   
  
 colchicine 0.6 mg tablet Your last dose was:  6/28/2017 Dose:  0.6 mg Take 0.6 mg by mouth every morning. Refills:  0 COREG 6.25 mg tablet Generic drug:  carvedilol Your last dose was:  6/28/2017 Dose:  6.25 mg Take 6.25 mg by mouth two (2) times daily (with meals). Refills:  0  
     
  
   
   
  
   
  
 cpap machine kit Your last dose was: As ordered 10 cm qhs Refills:  0  
     
   
   
   
  
  
 docusate sodium 100 mg capsule Commonly known as:  Gabriel Bucker Your last dose was:  6/28/2017 Dose:  100 mg Take 1 Cap by mouth two (2) times a day. Quantity:  60 Cap Refills:  0  
     
  
   
   
   
  
  
 FLOMAX 0.4 mg capsule Generic drug:  tamsulosin Your last dose was:  6/28/2017 Dose:  0.4 mg Take 0.4 mg by mouth daily. Refills:  0  
     
  
   
   
   
  
 gabapentin 600 mg tablet Commonly known as:  NEURONTIN Your last dose was:  6/28/2017 Dose:  300 mg Take 300 mg by mouth three (3) times daily. Indications: Pt. takes 1 to 2 capsules every 8 hours Refills:  0  
     
  
   
  
   
   
  
  
 glucose blood VI test strips strip Commonly known as:  ASCENSIA AUTODISC VI, ONE TOUCH ULTRA TEST VI Your last dose was: As ordered Test strips for 30 day supply Quantity:  120 strip Refills:  0  
     
   
   
   
  
 hydrOXYzine HCl 25 mg tablet Commonly known as:  ATARAX Your last dose was:  6/28/2017 Take  by mouth two (2) times a day. Refills:  0  
     
  
   
   
   
  
  
 insulin glargine 100 unit/mL injection Commonly known as:  LANTUS Your last dose was:  6/27/2017 Dose:  50 Units 50 Units by SubCUTAneous route nightly. Quantity:  1 Vial  
Refills:  0  
     
   
   
   
  
  
 metOLazone 2.5 mg tablet Commonly known as:  Carlos Blade Your last dose was: As scheduled Take  by mouth every Monday, Wednesday, Friday. Refills:  0  
     
   
   
   
  
 nitroglycerin 0.4 mg SL tablet Commonly known as:  NITROSTAT Your last dose was: As needed  Dose:  0.4 mg  
 1 Tab by SubLINGual route every five (5) minutes as needed for Chest Pain. Quantity:  4 Bottle Refills:  6 OXYGEN-AIR DELIVERY SYSTEMS Your last dose was: As needed 2 lpm qhs Refills:  0  
     
   
   
   
  
  
 polyethylene glycol 17 gram packet Commonly known as:  Clora Oddi Your last dose was: As needed Dose:  17 g Take 1 Packet by mouth daily as needed (constipation). Quantity:  10 Packet Refills:  5  
     
   
   
   
  
 pravastatin 80 mg tablet Commonly known as:  PRAVACHOL Your last dose was:  6/27/2017 Dose:  80 mg Take 1 Tab by mouth nightly. Quantity:  30 Tab Refills:  0  
     
   
   
   
  
  
 raNITIdine 150 mg tablet Commonly known as:  ZANTAC Your last dose was:  6/27/2017 Dose:  150 mg Take 150 mg by mouth nightly. Refills:  0  
     
   
   
   
  
  
 ROXICODONE 5 mg immediate release tablet Generic drug:  oxyCODONE IR Your last dose was: As needed Dose:  10 mg Take 10 mg by mouth every six (6) hours as needed for Pain. Refills:  0 Senna 8.6 mg Cap Generic drug:  sennosides Your last dose was:  6/27/2017 Dose:  17.2 mg Take 17.2 mg by mouth nightly. Refills:  0  
     
   
   
   
  
  
 VITAMIN C 500 mg tablet Generic drug:  ascorbic acid (vitamin C) Your last dose was:  6/28/2017 Dose:  500 mg Take 500 mg by mouth daily. Indications: Pt. takes 2 tablets daily Refills:  0 Where to Get Your Medications Information on where to get these meds will be given to you by the nurse or doctor. ! Ask your nurse or doctor about these medications  
  bumetanide 1 mg tablet  
 hydrALAZINE 10 mg tablet  
 isosorbide dinitrate 20 mg tablet  
 spironolactone 50 mg tablet

## 2017-06-20 NOTE — IP AVS SNAPSHOT
[address] 
[phone] Patient: Danielle Yuong MRN: [id number] OSN:[id number] You are allergic to the following Allergen Reactions Dilaudid (Hydromorphone) Other (comments) Hotflashes, patient states he's not allergic Iodinated Contrast- Oral And Iv Dye Other (comments) \"shuts my kidneys down\" Penicillins Unknown (comments) Pt states he is not allergic his mother just wouldn't allow him take it Recent Documentation Height Weight BMI Smoking Status 1.778 m (!) 164.7 kg 52.09 kg/m2 Former Smoker Emergency Contacts Name Discharge Info Relation Home Work Mobile Anastasia Ku  Spouse [3]   [phone] Renard Gar  Child [2] [phone] Che Roche  Daughter [21] [phone] About your hospitalization You were admitted on:  June 20, 2017 You last received care in the:  [facility] 3 TELEMETRY You were discharged on:  June 28, 2017 Unit phone number:  [phone] Why you were hospitalized Your primary diagnosis was:  Not on File Your diagnoses also included:  Acute On Chronic Systolic (Congestive) Heart Failure (Hcc), Acute Renal Failure (Arf) (Hcc), Aicd (Automatic Cardioverter/Defibrillator) Present, Cardiomyopathy (Hcc), Abdominal Fluid Collection, Diabetes Mellitus Type Ii, Uncontrolled (Hcc), Hypertension, Morbid Obesity With Bmi Of 60.0-69.9, Adult (Hcc), Obesity Hypoventilation Syndrome (Hcc), Acute On Chronic Systolic Heart Failure (Hcc) Providers Seen During Your Hospitalizations Provider Role Specialty Primary office phone Yessi Shepard MD Attending Provider Emergency Medicine [phone] Bernice Hickey MD Attending Provider Cardiology [phone] Your Primary Care Physician (PCP) Primary Care Physician Office Phone Office Fax Ashanti Oates [phone] Follow-up Information Follow up With Details Comments Contact Info Steve Bradley MD  July 7 @ 2:15 in [place] office  [address] [facility] [address] 
[phone] Dev Melara MD In 1 week [facility] follow up, Please call office for appointment [address] [place] [address] [address] [facility] [address] 
[phone] [address], physical therapy [address] [facility] [address] [place] [address] 
[phone] Your Appointments Friday July 07, 2017  2:15 PM EDT TRANSITIONAL CARE MANAGEMENT with Steve Bradley MD  
[facility] ([address] [place] [address]) [address] [place]  
[address] [facility] 81  
[phone] Tuesday July 11, 2017 10:20 AM EDT SHORT with As[address] [facility]) [facility]  
[phone] Friday August 04, 2017  2:30 PM EDT  
OFFICE DEVICE CHECKS with [facility] 39 [facility] ([address] [place] [address]) [address] [place]  
[address] [place] [facility]  
[phone] This upcoming device check will take place IN OUR OFFICE. Please arrive 15 minutes early to review any necessary paperwork requirements. If you have any further questions or need to change this appointment, we are happy to help and can be reached at [phone]. Current Discharge Medication List  
  
START taking these medications Dose & Instructions Dispensing Information Comments Morning Noon Evening Bedtime  
 hydrALAZINE 10 mg tablet Commonly known as:  APRESOLINE Your last dose was:  6/28/2017 Dose:  10 mg Take 1 Tab by mouth three (3) times daily. Quantity:  90 Tab Refills:  6  
     
  
   
  
   
   
  
  
 isosorbide dinitrate 20 mg tablet Commonly known as:  ISORDIL Your last dose was:  6/28/2017 Dose:  20 mg Take 1 Tab by mouth three (3) times daily. Quantity:  90 Tab Refills:  6  
     
  
   
  
   
   
  
  
 spironolactone 50 mg tablet Commonly known as:  ALDACTONE Your last dose was:  6/28/2017 Dose:  50 mg Take 1 Tab by mouth daily. Quantity:  30 Tab Refills:  6 CONTINUE these medications which have CHANGED Dose & Instructions Dispensing Information Comments Morning Noon Evening Bedtime  
 bumetanide 1 mg tablet Commonly known as:  Jessie Foxender What changed:   
- medication strength 
- how much to take Dose:  1 mg Take 1 Tab by mouth two (2) times a day. Quantity:  60 Tab Refills:  6 CONTINUE these medications which have NOT CHANGED Dose & Instructions Dispensing Information Comments Morning Noon Evening Bedtime  
 allopurinol 100 mg tablet Commonly known as:  Solomon Lyons Your last dose was:  6/28/2017 Dose:  100 mg Take 100 mg by mouth two (2) times a day. Refills:  0  
     
  
   
   
   
  
  
 amiodarone 200 mg tablet Commonly known as:  CORDARONE Your last dose was:  6/28/2017 Dose:  200 mg Take 1 Tab by mouth daily. Quantity:  30 Tab Refills:  6  
     
  
   
   
   
  
 colchicine 0.6 mg tablet Your last dose was:  6/28/2017 Dose:  0.6 mg Take 0.6 mg by mouth every morning. Refills:  0 COREG 6.25 mg tablet Generic drug:  carvedilol Your last dose was:  6/28/2017 Dose:  6.25 mg Take 6.25 mg by mouth two (2) times daily (with meals). Refills:  0  
     
  
   
   
  
   
  
 cpap machine kit Your last dose was: As ordered 10 cm qhs Refills:  0  
     
   
   
   
  
  
 docusate sodium 100 mg capsule Commonly known as:  Lore Moan Your last dose was:  6/28/2017  Dose:  100 mg  
 Take 1 Cap by mouth two (2) times a day. Quantity:  60 Cap Refills:  0  
     
  
   
   
   
  
  
 FLOMAX 0.4 mg capsule Generic drug:  tamsulosin Your last dose was:  6/28/2017 Dose:  0.4 mg Take 0.4 mg by mouth daily. Refills:  0  
     
  
   
   
   
  
 gabapentin 600 mg tablet Commonly known as:  NEURONTIN Your last dose was:  6/28/2017 Dose:  300 mg Take 300 mg by mouth three (3) times daily. Indications: Pt. takes 1 to 2 capsules every 8 hours Refills:  0  
     
  
   
  
   
   
  
  
 glucose blood VI test strips strip Commonly known as:  ASCENSIA AUTODISC VI, ONE TOUCH ULTRA TEST VI Your last dose was: As ordered Test strips for 30 day supply Quantity:  120 strip Refills:  0  
     
   
   
   
  
 hydrOXYzine HCl 25 mg tablet Commonly known as:  ATARAX Your last dose was:  6/28/2017 Take  by mouth two (2) times a day. Refills:  0  
     
  
   
   
   
  
  
 insulin glargine 100 unit/mL injection Commonly known as:  LANTUS Your last dose was:  6/27/2017 Dose:  50 Units 50 Units by SubCUTAneous route nightly. Quantity:  1 Vial  
Refills:  0  
     
   
   
   
  
  
 metOLazone 2.5 mg tablet Commonly known as:  Darien Lynn Your last dose was: As scheduled Take  by mouth every Monday, Wednesday, Friday. Refills:  0  
     
   
   
   
  
 nitroglycerin 0.4 mg SL tablet Commonly known as:  NITROSTAT Your last dose was: As needed Dose:  0.4 mg  
1 Tab by SubLINGual route every five (5) minutes as needed for Chest Pain. Quantity:  4 Bottle Refills:  6 OXYGEN-AIR DELIVERY SYSTEMS Your last dose was: As needed 2 lpm qhs Refills:  0  
     
   
   
   
  
  
 polyethylene glycol 17 gram packet Commonly known as:  Anna Marrow Your last dose was: As needed Dose:  17 g Take 1 Packet by mouth daily as needed (constipation). Quantity:  10 Packet Refills:  5  
     
   
   
   
  
 pravastatin 80 mg tablet Commonly known as:  PRAVACHOL Your last dose was:  6/27/2017 Dose:  80 mg Take 1 Tab by mouth nightly. Quantity:  30 Tab Refills:  0  
     
   
   
   
  
  
 raNITIdine 150 mg tablet Commonly known as:  ZANTAC Your last dose was:  6/27/2017 Dose:  150 mg Take 150 mg by mouth nightly. Refills:  0  
     
   
   
   
  
  
 ROXICODONE 5 mg immediate release tablet Generic drug:  oxyCODONE IR Your last dose was: As needed Dose:  10 mg Take 10 mg by mouth every six (6) hours as needed for Pain. Refills:  0 Senna 8.6 mg Cap Generic drug:  sennosides Your last dose was:  6/27/2017 Dose:  17.2 mg Take 17.2 mg by mouth nightly. Refills:  0  
     
   
   
   
  
  
 VITAMIN C 500 mg tablet Generic drug:  ascorbic acid (vitamin C) Your last dose was:  6/28/2017 Dose:  500 mg Take 500 mg by mouth daily. Indications: Pt. takes 2 tablets daily Refills:  0 Where to Get Your Medications Information on where to get these meds will be given to you by the nurse or doctor. ! Ask your nurse or doctor about these medications  
  bumetanide 1 mg tablet  
 hydrALAZINE 10 mg tablet  
 isosorbide dinitrate 20 mg tablet  
 spironolactone 50 mg tablet Discharge Instructions DISCHARGE SUMMARY from Nurse The following personal items are in your possession at time of discharge: 
 
Dental Appliances: None Home Medications: None Jewelry: None Clothing: At bedside Other Valuables: Cell Phone, Money clip PATIENT INSTRUCTIONS: 
 
 
Recognize signs and symptoms of STROKE: 
 
 F-face looks uneven A-arms unable to move or move unevenly S-speech slurred or non-existent T-time-call 911 as soon as signs and symptoms begin-DO NOT go Back to bed or wait to see if you get better-TIME IS BRAIN. Warning Signs of HEART ATTACK Call 911 if you have these symptoms: 
? Chest discomfort. Most heart attacks involve discomfort in the center of the chest that lasts more than a few minutes, or that goes away and comes back. It can feel like uncomfortable pressure, squeezing, fullness, or pain. ? Discomfort in other areas of the upper body. Symptoms can include pain or discomfort in one or both arms, the back, neck, jaw, or stomach. ? Shortness of breath with or without chest discomfort. ? Other signs may include breaking out in a cold sweat, nausea, or lightheadedness. Don't wait more than five minutes to call [address]! Fast action can save your life. Calling 911 is almost always the fastest way to get lifesaving treatment. Emergency Medical Services staff can begin treatment when they arrive  up to an hour sooner than if someone gets to the hospital by car. The discharge information has been reviewed with the patient. The patient verbalized understanding. Discharge medications reviewed with the patient and appropriate educational materials and side effects teaching were provided. Heart Failure: Care Instructions Your Care Instructions Heart failure occurs when your heart does not pump as much blood as the body needs. Failure does not mean that the heart has stopped pumping but rather that it is not pumping as well as it should. Over time, this causes fluid buildup in your lungs and other parts of your body. Fluid buildup can cause shortness of breath, fatigue, swollen ankles, and other problems.  By taking medicines regularly, reducing sodium (salt) in your diet, checking your weight every day, and making lifestyle changes, you can feel better and live longer. Follow-up care is a key part of your treatment and safety. Be sure to make and go to all appointments, and call your doctor if you are having problems. It's also a good idea to know your test results and keep a list of the medicines you take. How can you care for yourself at home? Medicines · Be safe with medicines. Take your medicines exactly as prescribed. Call your doctor if you think you are having a problem with your medicine. · Do not take any vitamins, over-the-counter medicine, or herbal products without talking to your doctor first. Zuleika Berrios not take ibuprofen (Advil or Motrin) and naproxen (Aleve) without talking to your doctor first. They could make your heart failure worse. · You may be taking some of the following medicine. ¨ Beta-blockers can slow heart rate, decrease blood pressure, and improve your condition. Taking a beta-blocker may lower your chance of needing to be hospitalized. ¨ Angiotensin-converting enzyme inhibitors (ACEIs) reduce the heart's workload, lower blood pressure, and reduce swelling. Taking an ACEI may lower your chance of needing to be hospitalized again. ¨ Angiotensin II receptor blockers (ARBs) work like ACEIs. Your doctor may prescribe them instead of ACEIs. ¨ Diuretics, also called water pills, reduce swelling. ¨ Potassium supplements replace this important mineral, which is sometimes lost with diuretics. ¨ Aspirin and other blood thinners prevent blood clots, which can cause a stroke or heart attack. You will get more details on the specific medicines your doctor prescribes. Diet · Your doctor may suggest that you limit sodium to 2,000 milligrams (mg) a day or less. That is less than 1 teaspoon of salt a day, including all the salt you eat in cooking or in packaged foods. People get most of their sodium from processed foods. Fast food and restaurant meals also tend to be very high in sodium. · Ask your doctor how much liquid you can drink each day. You may have to limit liquids. Weight · Weigh yourself without clothing at the same time each day. Record your weight. Call your doctor if you have a sudden weight gain, such as more than 2 to 3 pounds in a day or 5 pounds in a week. (Your doctor may suggest a different range of weight gain.) A sudden weight gain may mean that your heart failure is getting worse. Activity level · Start light exercise (if your doctor says it is okay). Even if you can only do a small amount, exercise will help you get stronger, have more energy, and manage your weight and your stress. Walking is an easy way to get exercise. Start out by walking a little more than you did before. Bit by bit, increase the amount you walk. · When you exercise, watch for signs that your heart is working too hard. You are pushing yourself too hard if you cannot talk while you are exercising. If you become short of breath or dizzy or have chest pain, stop, sit down, and rest. 
· If you feel \"wiped out\" the day after you exercise, walk slower or for a shorter distance until you can work up to a better pace. · Get enough rest at night. Sleeping with 1 or 2 pillows under your upper body and head may help you breathe easier. Lifestyle changes · Do not smoke. Smoking can make a heart condition worse. If you need help quitting, talk to your doctor about stop-smoking programs and medicines. These can increase your chances of quitting for good. Quitting smoking may be the most important step you can take to protect your heart. · Limit alcohol to 2 drinks a day for men and 1 drink a day for women. Too much alcohol can cause health problems. · Avoid getting sick from colds and the flu. Get a pneumococcal vaccine shot. If you have had one before, ask your doctor whether you need another dose. Get a flu shot each year. If you must be around people with colds or the flu, wash your hands often. When should you call for help? Call 911 if you have symptoms of sudden heart failure such as: 
· You have severe trouble breathing. · You cough up pink, foamy mucus. · You have a new irregular or rapid heartbeat. Call your doctor now or seek immediate medical care if: 
· You have new or increased shortness of breath. · You are dizzy or lightheaded, or you feel like you may faint. · You have sudden weight gain, such as more than 2 to 3 pounds in a day or 5 pounds in a week. (Your doctor may suggest a different range of weight gain.) · You have increased swelling in your legs, ankles, or feet. · You are suddenly so tired or weak that you cannot do your usual activities. Watch closely for changes in your health, and be sure to contact your doctor if: 
· You develop new symptoms. Where can you learn more? Go to http://armando-nirav.info/. Enter Q099 in the search box to learn more about \"Heart Failure: Care Instructions. \" Current as of: April 3, 2017 Content Version: 11.3 © 5834-1833 GupShup. Care instructions adapted under license by Prov[employer] (which disclaims liability or warranty for this information). If you have questions about a medical condition or this instruction, always ask your healthcare professional. [address] any warranty or liability for your use of this information. Avoiding Triggers With Heart Failure: Care Instructions Your Care Instructions Triggers are anything that make your heart failure flare up. A flare-up is also called \"sudden heart failure\" or \"acute heart failure. \" When you have a flare-up, fluid builds up in your lungs, and you have problems breathing. You might need to go to the hospital. By watching for changes in your condition and avoiding triggers, you can prevent heart failure flare-ups. Follow-up care is a key part of your treatment and safety.  Be sure to make and go to all appointments, and call your doctor if you are having problems. It's also a good idea to know your test results and keep a list of the medicines you take. How can you care for yourself at home? Watch for changes in your weight and condition · Weigh yourself without clothing at the same time each day. Record your weight. Call your doctor if you have sudden weight gain, such as more than 2 to 3 pounds in a day or 5 pounds in a week. (Your doctor may suggest a different range of weight gain.) A sudden weight gain may mean that your heart failure is getting worse. · Keep a daily record of your symptoms. Write down any changes in how you feel, such as new shortness of breath, cough, or problems eating. Also record if your ankles are more swollen than usual and if you feel more tired than usual. Note anything that you ate or did that could have triggered these changes. Limit sodium Sodium causes your body to hold on to extra water. This may cause your heart failure symptoms to get worse. People get most of their sodium from processed foods. Fast food and restaurant meals also tend to be very high in sodium. · Your doctor may suggest that you limit sodium to 2,000 milligrams (mg) a day or less. That is less than 1 teaspoon of salt a day, including all the salt you eat in cooking or in packaged foods. · Read food labels on cans and food packages. They tell you how much sodium you get in one serving. Check the serving size. If you eat more than one serving, you are getting more sodium. · Be aware that sodium can come in forms other than salt, including monosodium glutamate (MSG), sodium citrate, and sodium bicarbonate (baking soda). MSG is often added to Asian food. You can sometimes ask for food without MSG or salt. · Slowly reducing salt will help you adjust to the taste. Take the salt shaker off the table.  
· Flavor your food with garlic, lemon juice, onion, vinegar, herbs, and spices instead of salt. Do not use soy sauce, steak sauce, onion salt, garlic salt, mustard, or ketchup on your food, unless it is labeled \"low-sodium\" or \"low-salt. \" 
· Make your own salad dressings, sauces, and ketchup without adding salt. · Use fresh or frozen ingredients, instead of canned ones, whenever you can. Choose low-sodium canned goods. · Eat less processed food and food from restaurants, including fast food. Exercise as directed Moderate, regular exercise is very good for your heart. It improves your blood flow and helps control your weight. But too much exercise can stress your heart and cause a heart failure flare-up. · Check with your doctor before you start an exercise program. 
· Walking is an easy way to get exercise. Start out slowly. Gradually increase the length and pace of your walk. Swimming, riding a bike, and using a treadmill are also good forms of exercise. · When you exercise, watch for signs that your heart is working too hard. You are pushing yourself too hard if you cannot talk while you are exercising. If you become short of breath or dizzy or have chest pain, stop, sit down, and rest. 
· Do not exercise when you do not feel well. Take medicines correctly · Take your medicines exactly as prescribed. Call your doctor if you think you are having a problem with your medicine. · Make a list of all the medicines you take. Include those prescribed to you by other doctors and any over-the-counter medicines, vitamins, or supplements you take. Take this list with you when you go to any doctor. · Take your medicines at the same time every day. It may help you to post a list of all the medicines you take every day and what time of day you take them. · Make taking your medicine as simple as you can. Plan times to take your medicines when you are doing other things, such as eating a meal or getting ready for bed. This will make it easier to remember to take your medicines. · Get organized. Use helpful tools, such as daily or weekly pill containers. When should you call for help? Call 911 if you have symptoms of sudden heart failure such as: 
· You have severe trouble breathing. · You cough up pink, foamy mucus. · You have a new irregular or rapid heartbeat. Call your doctor now or seek immediate medical care if: 
· You have new or increased shortness of breath. · You are dizzy or lightheaded, or you feel like you may faint. · You have sudden weight gain, such as more than 2 to 3 pounds in a day or 5 pounds in a week. (Your doctor may suggest a different range of weight gain.) · You have increased swelling in your legs, ankles, or feet. · You are suddenly so tired or weak that you cannot do your usual activities. Watch closely for changes in your health, and be sure to contact your doctor if you develop new symptoms. Where can you learn more? Go to http://armando-nirav.info/. Enter S215 in the search box to learn more about \"Avoiding Triggers With Heart Failure: Care Instructions. \" Current as of: February 23, 2017 Content Version: 11.3 © 7794-5055 Reffpedia. Care instructions adapted under license by ReVera (which disclaims liability or warranty for this information). If you have questions about a medical condition or this instruction, always ask your healthcare professional. [place] [address] any warranty or liability for your use of this information. Discharge Orders Procedure Order Date Status Priority Quantity Spec Type Associated Dx METABOLIC PANEL, BASIC 25/50/46 5428 Future Routine 1 Blood [employer] Announcement We are excited to announce that we are making your provider's discharge notes available to you in [employer].   You will see these notes when they are completed and signed by the physician that discharged you from your recent hospital stay. If you have any questions or concerns about any information you see in SwingShot, please call the Health Information Department where you were seen or reach out to your Primary Care Provider for more information about your plan of care. Introducing [facility]! [employer] introduces SwingShot patient portal. Now you can access parts of your medical record, email your doctor's office, and request medication refills online. 1. In your internet browser, go to [email or web address] 2. Click on the First Time User? Click Here link in the Sign In box. You will see the New Member Sign Up page. 3. Enter your SwingShot Access Code exactly as it appears below. You will not need to use this code after youve completed the sign-up process. If you do not sign up before the expiration date, you must request a new code. · SwingShot Access Code: [email or web address] Expires: 7/11/2017  2:46 PM 
 
4. Enter the last four digits of your Social Security Number (xxxx) and Date of Birth (mm/dd/yyyy) as indicated and click Submit. You will be taken to the next sign-up page. 5. Create a SwingShot ID. This will be your SwingShot login ID and cannot be changed, so think of one that is secure and easy to remember. 6. Create a SwingShot password. You can change your password at any time. 7. Enter your Password Reset Question and Answer. This can be used at a later time if you forget your password. 8. Enter your e-mail address. You will receive e-mail notification when new information is available in [address] 9. Click Sign Up. You can now view and download portions of your medical record. 10. Click the Download Summary menu link to download a portable copy of your medical information. If you have questions, please visit the Frequently Asked Questions section of the SwingShot website. Remember, SwingShot is NOT to be used for urgent needs. For medical emergencies, dial 911. Now available from your iPhone and Android! General Information Please provide this summary of care documentation to your next provider. Patient Signature:  ____________________________________________________________ Date:  ____________________________________________________________  
  
[facility] Provider Signature:  ____________________________________________________________ Date:  ____________________________________________________________

## 2017-06-20 NOTE — ED TRIAGE NOTES
Pt states that he was sent by PMD for increased SOB and fluid overload. States that he needs to be admitted and have the fluid removed.

## 2017-06-21 LAB
ANION GAP BLD CALC-SCNC: 10 MMOL/L (ref 7–16)
ATRIAL RATE: 74 BPM
BACTERIA SPEC CULT: NORMAL
BUN SERPL-MCNC: 51 MG/DL (ref 6–23)
CALCIUM SERPL-MCNC: 9.1 MG/DL (ref 8.3–10.4)
CALCULATED P AXIS, ECG09: 50 DEGREES
CALCULATED R AXIS, ECG10: 166 DEGREES
CALCULATED T AXIS, ECG11: 97 DEGREES
CHLORIDE SERPL-SCNC: 96 MMOL/L (ref 98–107)
CO2 SERPL-SCNC: 34 MMOL/L (ref 21–32)
CREAT SERPL-MCNC: 2.78 MG/DL (ref 0.8–1.5)
DIAGNOSIS, 93000: NORMAL
ERYTHROCYTE [DISTWIDTH] IN BLOOD BY AUTOMATED COUNT: 20.6 % (ref 11.9–14.6)
GLUCOSE BLD STRIP.AUTO-MCNC: 116 MG/DL (ref 65–100)
GLUCOSE BLD STRIP.AUTO-MCNC: 147 MG/DL (ref 65–100)
GLUCOSE BLD STRIP.AUTO-MCNC: 160 MG/DL (ref 65–100)
GLUCOSE BLD STRIP.AUTO-MCNC: 163 MG/DL (ref 65–100)
GLUCOSE BLD STRIP.AUTO-MCNC: 175 MG/DL (ref 65–100)
GLUCOSE SERPL-MCNC: 139 MG/DL (ref 65–100)
HCT VFR BLD AUTO: 31.6 % (ref 41.1–50.3)
HGB BLD-MCNC: 9.7 G/DL (ref 13.6–17.2)
MAGNESIUM SERPL-MCNC: 2 MG/DL (ref 1.8–2.4)
MCH RBC QN AUTO: 23.8 PG (ref 26.1–32.9)
MCHC RBC AUTO-ENTMCNC: 30.7 G/DL (ref 31.4–35)
MCV RBC AUTO: 77.5 FL (ref 79.6–97.8)
P-R INTERVAL, ECG05: 262 MS
PLATELET # BLD AUTO: 267 K/UL (ref 150–450)
PMV BLD AUTO: 9.7 FL (ref 10.8–14.1)
POTASSIUM SERPL-SCNC: 3.1 MMOL/L (ref 3.5–5.1)
Q-T INTERVAL, ECG07: 364 MS
QRS DURATION, ECG06: 90 MS
QTC CALCULATION (BEZET), ECG08: 404 MS
RBC # BLD AUTO: 4.08 M/UL (ref 4.23–5.67)
SERVICE CMNT-IMP: NORMAL
SODIUM SERPL-SCNC: 140 MMOL/L (ref 136–145)
VENTRICULAR RATE, ECG03: 74 BPM
WBC # BLD AUTO: 6.9 K/UL (ref 4.3–11.1)

## 2017-06-21 PROCEDURE — 74011250636 HC RX REV CODE- 250/636: Performed by: NURSE PRACTITIONER

## 2017-06-21 PROCEDURE — 74011250637 HC RX REV CODE- 250/637: Performed by: NURSE PRACTITIONER

## 2017-06-21 PROCEDURE — 94660 CPAP INITIATION&MGMT: CPT

## 2017-06-21 PROCEDURE — 74011250637 HC RX REV CODE- 250/637: Performed by: INTERNAL MEDICINE

## 2017-06-21 PROCEDURE — 80048 BASIC METABOLIC PNL TOTAL CA: CPT | Performed by: NURSE PRACTITIONER

## 2017-06-21 PROCEDURE — 85027 COMPLETE CBC AUTOMATED: CPT | Performed by: NURSE PRACTITIONER

## 2017-06-21 PROCEDURE — 74011000258 HC RX REV CODE- 258: Performed by: NURSE PRACTITIONER

## 2017-06-21 PROCEDURE — 36415 COLL VENOUS BLD VENIPUNCTURE: CPT | Performed by: NURSE PRACTITIONER

## 2017-06-21 PROCEDURE — 83735 ASSAY OF MAGNESIUM: CPT | Performed by: NURSE PRACTITIONER

## 2017-06-21 PROCEDURE — 74011636637 HC RX REV CODE- 636/637: Performed by: NURSE PRACTITIONER

## 2017-06-21 PROCEDURE — 77030013032 HC MSK BPAP/CPAP FISP -B

## 2017-06-21 PROCEDURE — 65660000000 HC RM CCU STEPDOWN

## 2017-06-21 PROCEDURE — 82962 GLUCOSE BLOOD TEST: CPT

## 2017-06-21 PROCEDURE — 94760 N-INVAS EAR/PLS OXIMETRY 1: CPT

## 2017-06-21 PROCEDURE — 74011000250 HC RX REV CODE- 250: Performed by: NURSE PRACTITIONER

## 2017-06-21 RX ORDER — SPIRONOLACTONE 25 MG/1
50 TABLET ORAL DAILY
Status: DISCONTINUED | OUTPATIENT
Start: 2017-06-21 | End: 2017-06-28 | Stop reason: HOSPADM

## 2017-06-21 RX ORDER — METOLAZONE 5 MG/1
10 TABLET ORAL DAILY
Status: DISCONTINUED | OUTPATIENT
Start: 2017-06-21 | End: 2017-06-23

## 2017-06-21 RX ADMIN — SENNOSIDES 17.2 MG: 8.6 TABLET, FILM COATED ORAL at 21:59

## 2017-06-21 RX ADMIN — INSULIN LISPRO 2 UNITS: 100 INJECTION, SOLUTION INTRAVENOUS; SUBCUTANEOUS at 07:30

## 2017-06-21 RX ADMIN — OXYCODONE HYDROCHLORIDE 10 MG: 5 TABLET ORAL at 00:27

## 2017-06-21 RX ADMIN — BUMETANIDE 2 MG/HR: 0.25 INJECTION INTRAMUSCULAR; INTRAVENOUS at 16:53

## 2017-06-21 RX ADMIN — POTASSIUM CHLORIDE 40 MEQ: 20 TABLET, EXTENDED RELEASE ORAL at 08:23

## 2017-06-21 RX ADMIN — GABAPENTIN 300 MG: 300 CAPSULE ORAL at 21:59

## 2017-06-21 RX ADMIN — CARVEDILOL 6.25 MG: 6.25 TABLET, FILM COATED ORAL at 16:54

## 2017-06-21 RX ADMIN — ALLOPURINOL 100 MG: 100 TABLET ORAL at 08:23

## 2017-06-21 RX ADMIN — POTASSIUM CHLORIDE 40 MEQ: 20 TABLET, EXTENDED RELEASE ORAL at 17:53

## 2017-06-21 RX ADMIN — HEPARIN SODIUM 5000 UNITS: 5000 INJECTION, SOLUTION INTRAVENOUS; SUBCUTANEOUS at 21:58

## 2017-06-21 RX ADMIN — INSULIN LISPRO 2 UNITS: 100 INJECTION, SOLUTION INTRAVENOUS; SUBCUTANEOUS at 16:30

## 2017-06-21 RX ADMIN — GABAPENTIN 300 MG: 300 CAPSULE ORAL at 16:54

## 2017-06-21 RX ADMIN — Medication 10 ML: at 22:06

## 2017-06-21 RX ADMIN — OXYCODONE HYDROCHLORIDE 10 MG: 5 TABLET ORAL at 21:59

## 2017-06-21 RX ADMIN — BUMETANIDE 2 MG/HR: 0.25 INJECTION INTRAMUSCULAR; INTRAVENOUS at 04:09

## 2017-06-21 RX ADMIN — GABAPENTIN 300 MG: 300 CAPSULE ORAL at 08:24

## 2017-06-21 RX ADMIN — SPIRONOLACTONE 50 MG: 25 TABLET, FILM COATED ORAL at 08:28

## 2017-06-21 RX ADMIN — FAMOTIDINE 20 MG: 20 TABLET ORAL at 17:53

## 2017-06-21 RX ADMIN — BUMETANIDE 2 MG/HR: 0.25 INJECTION INTRAMUSCULAR; INTRAVENOUS at 10:38

## 2017-06-21 RX ADMIN — CARVEDILOL 6.25 MG: 6.25 TABLET, FILM COATED ORAL at 08:24

## 2017-06-21 RX ADMIN — TAMSULOSIN HYDROCHLORIDE 0.4 MG: 0.4 CAPSULE ORAL at 08:23

## 2017-06-21 RX ADMIN — HEPARIN SODIUM 5000 UNITS: 5000 INJECTION, SOLUTION INTRAVENOUS; SUBCUTANEOUS at 05:30

## 2017-06-21 RX ADMIN — FAMOTIDINE 20 MG: 20 TABLET ORAL at 08:23

## 2017-06-21 RX ADMIN — HEPARIN SODIUM 5000 UNITS: 5000 INJECTION, SOLUTION INTRAVENOUS; SUBCUTANEOUS at 13:57

## 2017-06-21 RX ADMIN — INSULIN LISPRO 2 UNITS: 100 INJECTION, SOLUTION INTRAVENOUS; SUBCUTANEOUS at 11:30

## 2017-06-21 RX ADMIN — GABAPENTIN 300 MG: 300 CAPSULE ORAL at 00:28

## 2017-06-21 RX ADMIN — INSULIN GLARGINE 25 UNITS: 100 INJECTION, SOLUTION SUBCUTANEOUS at 22:12

## 2017-06-21 RX ADMIN — AMIODARONE HYDROCHLORIDE 200 MG: 200 TABLET ORAL at 08:23

## 2017-06-21 RX ADMIN — POTASSIUM CHLORIDE 20 MEQ: 14.9 INJECTION, SOLUTION INTRAVENOUS at 00:29

## 2017-06-21 RX ADMIN — DOCUSATE SODIUM 100 MG: 100 CAPSULE, LIQUID FILLED ORAL at 17:53

## 2017-06-21 RX ADMIN — Medication 5 ML: at 00:29

## 2017-06-21 RX ADMIN — DOCUSATE SODIUM 100 MG: 100 CAPSULE, LIQUID FILLED ORAL at 08:23

## 2017-06-21 RX ADMIN — METOLAZONE 10 MG: 5 TABLET ORAL at 11:42

## 2017-06-21 NOTE — PROGRESS NOTES
CHF Note: CHF Booklet provided to wife while  in bathroom. States she will review and provide to patient.  Will F/U in am.

## 2017-06-21 NOTE — PROGRESS NOTES
Called and spoke with Dedra in lab regarding am labs not having been drawn. Stated she would send someone.

## 2017-06-21 NOTE — PROGRESS NOTES
TRANSFER - IN REPORT:    Verbal report received from Carlitos RN on [employer] being received from ER for routine progression of care      Report consisted of patients Situation, Background, Assessment and Recommendations(SBAR). Information from the following report(s) SBAR, ED Summary, Intake/Output, MAR and Recent Results was reviewed with the receiving nurse. Opportunity for questions and clarification was provided. Assessment completed upon patients arrival to unit and care assumed.

## 2017-06-21 NOTE — PROGRESS NOTES
Skin assessment. Scabs on bilat heels from previous sores as stated by patient. Sacrum is dry and intact. Patient bilat abdomen and thighs have striae. Patient has BUE cuts and scabs. Penis is enlarged and swollen. Scrotum is slightly enlarged. Excoriation noted under pannus.

## 2017-06-21 NOTE — PROGRESS NOTES
[facility] CARDIOLOGY HISTORY AND PHYSICAL    6/20/2017 9:54 PM    Admit Date: 6/20/2017    Admit Diagnosis: There are no admission diagnoses documented for this encounter. Subjective:   46 yo M with severe end stage nonischemic CM, class 4 CHF, LV EF 10%. He was recently treated at [facility] for CHF 2 weeks ago, but states they did not get the fluid off. He was in [facility]  several months ago with CHF, cardiogenic shock, ARF. Renal function has improved but he has progressive abdominal and leg swelling despite Bumex 2 mg BID. He had no cp. Allergies   Allergen Reactions    Dilaudid [Hydromorphone] Other (comments)     Hotflashes, patient states he's not allergic    Iodinated Contrast- Oral And Iv Dye Other (comments)     \"shuts my kidneys down\"    Penicillins Unknown (comments)     Pt states he is not allergic his mother just wouldn't allow him take it       Past Medical History:   Diagnosis Date    Acute encephalopathy 8/17/2598    Acute systolic heart failure ([address] May, 2009    Also had transient acute renal failure secondary to poor perfusion.     AICD (automatic cardioverter/defibrillator) present 10/21/2015    Atypical chest pain 4/23/2010    Bronchitis     CAD (coronary artery disease)     Cardiomyopathy     Chest pain 10/21/2015    Chronic kidney disease     renal insufficiency    Chronic pain     back    Congestive heart failure (CHF) ([address] 10/21/2015    COPD     Diabetes ([address]     type 2 insulin reliant- BS average- 160's-180's    Diabetes mellitus type II, uncontrolled ([address] 7/2/2013    GERD (gastroesophageal reflux disease)     Gout     Heart failure ([address]     cardiomyopathy with ef 10-20%    Hypertension     Hyponatremia 12/20/2010    Ill-defined condition     gout, neuropathy, sciatica    Morbid obesity (HCC)     Nausea & vomiting 11/30/2015    Neuropathy     Obstructive sleep apnea 2/15/2010    Other unknown and unspecified cause of morbidity or mortality     Gout    Severe sepsis [facility]     Unspecified sleep apnea     cpap       Family History: unknown. Review of Systems:  Constitutional- no recent fever, chills, abnormal weight loss  Eyes- no recent visual changes  Ears- no recent hearing loss  Cardiac- see HPI  Pulmonary- no wheezing or sputum production  Gastrointestinal- no diarrhea or constipation  Genitourinary- no hematuria or dysuria  Neurologic- no history of CVA or seizure disorder  Musculoskeletal- no arthritis or myalgia  Vascular- no claudication or nonhealing ulcers  Dermatologic- no rash or abnormal hair loss    Objective:     Vitals:    06/20/17 1938 06/20/17 1939 06/20/17 1940 06/20/17 2000   BP: 118/70  109/64 133/66   Pulse:  75 70 82   Resp:       Temp:       SpO2:  95% 91% 94%   Weight:       Height:           Physical Exam:  Neuro:  Cranial nerves 2-12 intact. Skin: warm and dry  HEENT:  NC/AT, conjunctiva noninjected, sclera anicteric, oropharynx clear  Neck: supple without adenopathy or thyromegaly  CV: regular rate and rhythm, no murmurs, rubs, or gallops  Lungs: clear bilaterally  Abdomen: soft, nontender, nondistended, normal bowel sounds  Extremities: No cyanosis or edema, distal pulses 2+ and symmetric bilaterally  Psychiatric: alert and oriented x 3    EKG: SR, low voltage, PRWP    Prior to Admission medications    Medication Sig Start Date End Date Taking? Authorizing Provider   PARoxetine (PAXIL) 20 mg tablet Take  by mouth daily. Historical Provider   hydrOXYzine HCl (ATARAX) 25 mg tablet Take  by mouth two (2) times a day. Historical Provider   ascorbic acid, vitamin C, (VITAMIN C) 500 mg tablet Take 500 mg by mouth daily. Indications: Pt. takes 2 tablets daily    Historical Provider   bumetanide (BUMEX) 1 mg tablet Take 1 Tab by mouth daily. 4/21/17   Sera Robin MD   guaiFENesin-codeine (ROBITUSSIN AC) 100-10 mg/5 mL solution Take 5 mL by mouth three (3) times daily as needed for Cough. Max Daily Amount: 15 mL.  Can be sedating 4/21/17   Jennifer Shields NP   guaiFENesin (MUCINEX) 1,200 mg Ta12 ER tablet Take 1,200 mg by mouth as needed. Nicolas Parada MD   carvedilol (COREG) 3.125 mg tablet Take 1 Tab by mouth two (2) times daily (with meals). 3/29/17   Janis Mckeon MD   sennosides (SENNA) 8.6 mg cap Take 17.2 mg by mouth nightly. Historical Provider   amiodarone (CORDARONE) 200 mg tablet Take 1 Tab by mouth daily. 2/24/17   FATEMEH Burdick   docusate sodium (COLACE) 100 mg capsule Take 1 Cap by mouth two (2) times a day. 2/24/17   FATEMEH Burdick   gabapentin (NEURONTIN) 600 mg tablet Take 300 mg by mouth daily. Indications: Pt. takes 1 to 2 capsules every 8 hours    Historical Provider   oxyCODONE IR (OXY-IR) 15 mg immediate release tablet Take 10 mg by mouth every four (4) hours as needed for Pain. Historical Provider   insulin glargine (LANTUS) 100 unit/mL injection 50 Units by SubCUTAneous route nightly. 10/26/16   Lidia Mcfadden PA-C   insulin lispro (HUMALOG) 100 unit/mL injection 15 Units by SubCUTAneous route three (3) times daily (with meals). 10/26/16   Lidia Mcfadden PA-C   ranitidine (ZANTAC) 150 mg tablet Take 150 mg by mouth nightly. Historical Provider   pravastatin (PRAVACHOL) 80 mg tablet Take 1 Tab by mouth nightly. 7/14/16   Ruperto Collins MD   polyethylene glycol [facility]) 17 gram packet Take 1 Packet by mouth daily as needed (constipation). 12/4/15   Aidee Rodriguez MD   cpap machine kit 10 cm qhs    Historical Provider   OXYGEN-AIR DELIVERY SYSTEMS 2 lpm qhs    Historical Provider   allopurinol (ZYLOPRIM) 100 mg tablet Take 100 mg by mouth daily. Rhiannon Mosqueda MD   glucose blood VI test strips (ASCENSIA AUTODISC VI, ONE TOUCH ULTRA TEST VI) strip Test strips for 30 day supply 8/22/14   Elfego Hutchison MD   nitroglycerin (NITROSTAT) 0.4 mg SL tablet 1 Tab by SubLINGual route every five (5) minutes as needed for Chest Pain.  4/24/10   FATEMEH Herrmann   colchicine 0.6 mg tablet Take 0.6 mg by mouth every morning. 3/22/10   Rhiannon Mosqueda MD        Data Review:   Recent Labs      06/20/17   1830   NA  140   K  2.8*   BUN  50*   CREA  2.66*   GLU  85   WBC  6.8   HGB  10.1*   HCT  32.1*   PLT  269       Assessment and Plan: Active Problems:    Diabetes mellitus type II, uncontrolled ([address] (3/18/2017)      Abdominal fluid collection (8/18/2014)       AICD (automatic cardioverter/defibrillator) present (3/18/2017)      Cardiomyopathy ([address] (3/18/2017)      Hypertension (3/18/2017)      Acute on chronic systolic (congestive) heart failure ([address] (10/18/2016) - worsening. Obesity hypoventilation syndrome ([address] (3/18/2017)      Morbid obesity with BMI of 60.0-69.9, adult ([address] (3/18/2017)      Acute renal failure (ARF) ([address] (3/18/2017)      Hypokalemia. Admit for end stage systolic CHF, anasarca. Start Bumex drip. Monitor renal function. Replete K. Poor long term prognosis.           Nichol Jo MD   [facility] Cardiology  Pager [phone]

## 2017-06-21 NOTE — PROGRESS NOTES
Bedside and verbal report received from [facility], [facility].

## 2017-06-21 NOTE — ED NOTES
TRANSFER - OUT REPORT:    Verbal report given to Marco Antonio Wynn RN (name) on Charleen Castrejon  being transferred to [facility] (unit) for routine progression of care       Report consisted of patients Situation, Background, Assessment and   Recommendations(SBAR). Information from the following report(s) SBAR, Kardex, ED Summary, [facility] and Cardiac Rhythm NSR w/ 1st degree AV block was reviewed with the receiving nurse. Lines:   Peripheral IV 06/20/17 Right Antecubital (Active)   Site Assessment Clean, dry, & intact 6/20/2017  8:20 PM   Phlebitis Assessment 0 6/20/2017  8:20 PM   Infiltration Assessment 0 6/20/2017  8:20 PM   Dressing Status Clean, dry, & intact 6/20/2017  8:20 PM   Dressing Type Tape;Transparent 6/20/2017  8:20 PM        Opportunity for questions and clarification was provided.       Patient transported with:   Monitor

## 2017-06-21 NOTE — PROGRESS NOTES
[facility] CARDIOLOGY PROGRESS NOTE           6/21/2017 8:09 AM    Admit Date: 6/20/2017      Subjective:   No cp or inc sob    ROS:  Cardiovascular:  As noted above    Objective:      Vitals:    06/20/17 2240 06/20/17 2319 06/21/17 0507 06/21/17 0535   BP: 135/87 130/76  150/88   Pulse: 73 79  75   Resp: 15 18  18   Temp: 98.7 °F (37.1 °C) 98.5 °F (36.9 °C)  97.6 °F (36.4 °C)   SpO2: 91% 95%  96%   Weight:  (!) 184.3 kg (406 lb 3.2 oz) (!) 183.3 kg (404 lb)    Height:  5' 10\" (1.778 m)         Physical Exam:  General-No Acute Distress  Neck- supple, no JVD  CV- soft hss  Lung- clear bilaterally  Abd- soft, nontender, nondistended  Ext- + edema bilaterally. Skin- warm and dry    Data Review:   Recent Labs      06/20/17   1830   NA  140   K  2.8*   BUN  50*   CREA  2.66*   GLU  85   WBC  6.8   HGB  10.1*   HCT  32.1*   PLT  269   TROIQ  <0.02*       Assessment/Plan:     Active Problems:    Diabetes mellitus type II, uncontrolled ([address] (3/18/2017)      Abdominal fluid collection (8/18/2014)      Overview: Perisplenic and splenic, likely related to pancreatitis      AICD (automatic cardioverter/defibrillator) present (3/18/2017)      Cardiomyopathy ([address] (3/18/2017)      Hypertension (3/18/2017)      Acute on chronic systolic (congestive) heart failure ([address] (10/18/2016)      Obesity hypoventilation syndrome ([address] (3/18/2017)      Morbid obesity with BMI of 60.0-69.9, adult ([address] (3/18/2017)      Acute renal failure (ARF) ([address] (3/18/2017)      Acute on chronic systolic heart failure ([address] (6/20/2017)      ///  Cont. Current rx for end stage hf. Add spironolactone.     Brady Garvin MD  6/21/2017 8:09 AM

## 2017-06-21 NOTE — CONSULTS
Nephrology consult    Admission Date:  6/20/2017    Admission Diagnosis  Acute on chronic systolic heart failure [facility])    We are asked by Dr. Marco Antonio jules    History of Present Illness:thisis a MO, endstage nonischemic cardiomyopathy male who has had decompensated CHF for months and rotating from [facility] to [address] [place] [address] trying to get euvolemic. He has an EF<10% and suprisingly he is able to maintain a systolic UP>983 mmhg. He has cardio-renal syndrome. I have been consulted to assist in his plethora of issues. Past Medical History:   Diagnosis Date    Acute encephalopathy 8/11/7345    Acute systolic heart failure ([address] May, 2009    Also had transient acute renal failure secondary to poor perfusion.     AICD (automatic cardioverter/defibrillator) present 10/21/2015    Atypical chest pain 4/23/2010    Bronchitis     CAD (coronary artery disease)     Cardiomyopathy     Chest pain 10/21/2015    Chronic kidney disease     renal insufficiency    Chronic pain     back    Congestive heart failure (CHF) ([address] 10/21/2015    COPD     Diabetes ([address]     type 2 insulin reliant- BS average- 160's-180's    Diabetes mellitus type II, uncontrolled ([address] 7/2/2013    GERD (gastroesophageal reflux disease)     Gout     Heart failure ([address]     cardiomyopathy with ef 10-20%    Hypertension     Hyponatremia 12/20/2010    Ill-defined condition     gout, neuropathy, sciatica    Morbid obesity (HCC)     Nausea & vomiting 11/30/2015    Neuropathy     Obstructive sleep apnea 2/15/2010    Other unknown and unspecified cause of morbidity or mortality     Gout    Severe sepsis ([address]     Unspecified sleep apnea     cpap      Past Surgical History:   Procedure Laterality Date    CHEST SURGERY PROCEDURE UNLISTED      thorocentesis    HX HEART CATHETERIZATION  [place] 10, 2008    Severe multivessel disease with severe LV dysfunction    HX PACEMAKER      may 2010      Current Facility-Administered Medications   Medication Dose Route Frequency  spironolactone (ALDACTONE) tablet 50 mg  50 mg Oral DAILY    metOLazone (ZAROXOLYN) tablet 10 mg  10 mg Oral DAILY    amiodarone (CORDARONE) tablet 200 mg  200 mg Oral DAILY    allopurinol (ZYLOPRIM) tablet 100 mg  100 mg Oral DAILY    carvedilol (COREG) tablet 6.25 mg  6.25 mg Oral BID WITH MEALS    docusate sodium (COLACE) capsule 100 mg  100 mg Oral BID    gabapentin (NEURONTIN) capsule 300 mg  300 mg Oral TID    insulin glargine (LANTUS) injection 25 Units  25 Units SubCUTAneous QHS    oxyCODONE IR (ROXICODONE) tablet 10 mg  10 mg Oral Q6H PRN    famotidine (PEPCID) tablet 20 mg  20 mg Oral BID    senna (SENOKOT) tablet 17.2 mg  2 Tab Oral QHS    tamsulosin (FLOMAX) capsule 0.4 mg  0.4 mg Oral DAILY    bumetanide (BUMEX) 12.5 mg in 0.9% sodium chloride 100 mL infusion  2 mg/hr IntraVENous CONTINUOUS    sodium chloride (NS) flush 5-10 mL  5-10 mL IntraVENous Q8H    sodium chloride (NS) flush 5-10 mL  5-10 mL IntraVENous PRN    nitroglycerin (NITROSTAT) tablet 0.4 mg  0.4 mg SubLINGual Q5MIN PRN    ondansetron (ZOFRAN) injection 4 mg  4 mg IntraVENous Q4H PRN    heparin (porcine) injection 5,000 Units  5,000 Units SubCUTAneous Q8H    potassium chloride (K-DUR, KLOR-CON) SR tablet 40 mEq  40 mEq Oral BID    insulin lispro (HUMALOG) injection   SubCUTAneous AC&HS     Allergies   Allergen Reactions    Dilaudid [Hydromorphone] Other (comments)     Hotflashes, patient states he's not allergic    Iodinated Contrast- Oral And Iv Dye Other (comments)     \"shuts my kidneys down\"    Penicillins Unknown (comments)     Pt states he is not allergic his mother just wouldn't allow him take it      Social History   Substance Use Topics    Smoking status: Former Smoker     Packs/day: 0.25     Years: 1.00     Quit date: 7/12/1984    Smokeless tobacco: Never Used      Comment: pt states that he only tried smoking as a kid     Alcohol use No      Family History   Problem Relation Age of Onset    Heart Disease Father     Stroke Father     Diabetes Sister     Stroke Sister     Diabetes Sister     Heart Disease Other         Review of Systems  Gen - no fever, no chills, appetite okay  HEENT - no sore throat, no decreased vision, no hearing loss  Neck - no neck mass  CV - no chest pain, no palpitation, no orthopnea  Lung - no shortness of breath, no cough, no hemoptysis  Abd - no tenderness, no nausea/vomiting, no bloody stool  Ext - no edema, no clubbing, no cyanosis  Musculoskeletal - no joint pain, no back pain  Neurologic - no headaches, no dizziness, no seizures  Psychiatric - no anxiety, no depression  Skin - no rashes, no pupura  Genitourinary - no decreased urine output, no hematuria, no foamy urine    Objective:     Vitals:    06/21/17 0507 06/21/17 0535 06/21/17 0823 06/21/17 0830   BP:  150/88 (!) 140/99 132/77   Pulse:  75 79 81   Resp:  18  20   Temp:  97.6 °F (36.4 °C)  97.6 °F (36.4 °C)   SpO2:  96%  98%   Weight: (!) 183.3 kg (404 lb)      Height:           Intake/Output Summary (Last 24 hours) at 06/21/17 1151  Last data filed at 06/21/17 1038   Gross per 24 hour   Intake              880 ml   Output             1950 ml   Net            -1070 ml       Physical Exam  GEN :in no distress, alert and oriented, weak falls asleep intermittently in the interview, appears much older than stated age  HEENT: anicteric sclerae, eomi. Oropharynx without lesions. Mucous membranes are moist.  Neck - supple without JVD, no thyromegaly. No lymphadenopathy.   CV - regular rate and rhythm, no murmur, no rub  Lung - bilaterally decreased breath sounds, lungs expand symmetrically  Chest wall - normal appearance  Abd - soft, nontender, bowel sounds present, no hepatosplenomegaly  Ext - no clubbing, no cyanosis, 3+  Neurologic - nonfocal  Genitourinary - bladder nonpalpable  Skin - no rashes, no purpura, no ecchymoses  Psychiatric: N/A      Data Review:   Recent Labs      06/21/17   0809  06/20/17   1830   WBC  6.9 6. 8   HGB  9.7*  10.1*   HCT  31.6*  32.1*   PLT  267  269     Recent Labs      06/21/17   0809  06/20/17   1830   NA  140  140   K  3.1*  2.8*   CL  96*  95*   CO2  34*  34*   BUN  51*  50*   CREA  2.78*  2.66*   GLU  139*  85   CA  9.1  8.8   MG  2.0   --      No results for input(s): PH, PCO2, PO2, PCO2 in the last 72 hours. Problem List:     Patient Active Problem List    Diagnosis Date Noted    Acute on chronic systolic heart failure ([facility] 40/97/7248    Metabolic alkalosis 31/40/2882    Acute on chronic respiratory failure with hypoxia and hypercapnia (HCC) 03/21/2017    Cardiogenic shock ([facility] 03/20/2017    Obstructive sleep apnea 03/18/2017    Chronic back pain 03/18/2017    Dyslipidemia 03/18/2017    Diabetes mellitus type II, uncontrolled ([address] 03/18/2017    Noncompliance with medication regimen 03/18/2017    AICD (automatic cardioverter/defibrillator) present 03/18/2017    Congestive heart failure (CHF) ([facility] 03/18/2017    Cardiomyopathy ([facility] 03/18/2017    Hypertension 03/18/2017    Obesity hypoventilation syndrome ([facility] 03/18/2017    Morbid obesity with BMI of 60.0-69.9, adult ([facility] 03/18/2017    Acute encephalopathy 03/18/2017    Acute renal failure (ARF) ([facility] 03/18/2017    Syncope 01/19/2017    Nonsustained ventricular tachycardia ([facility] 01/19/2017    NSVT (nonsustained ventricular tachycardia) ([facility] 01/19/2017    Constipation 01/08/2017    Hypoxemia 11/28/2016    Hypersomnia 11/28/2016    Atrial flutter ([facility] 10/20/2016    Acute on chronic systolic (congestive) heart failure (HCC) 10/18/2016    Nausea & vomiting 11/30/2015    Chest pain 10/21/2015    Abdominal fluid collection 08/18/2014    Pleural effusion 08/13/2014    CKD (chronic kidney disease) stage 3, GFR 30-59 ml/min 08/13/2014    Chronic pancreatitis ([facility] 08/13/2014    Nonischemic dilated cardiomyopathy ([facility] 07/02/2013       Impression:    Plan:   1. NI-CHF  2. Debility  3. Cardio renal syndrome  4. Pulmonary edema  Add zaroxolyn. Usually end stage cardiomyopathy pt's are hemodynamically unstable with SBP~70-90 mmHg rendering them not a candidate for HD but because of his SBP >140 I think he has some wiggle room and would be able to tolerate UF. I recommend HD and TDC. He and his wife will think about this and let us know. Thank you Kelby Mckeon for allowing me to evaluate this pt.

## 2017-06-22 LAB
ANION GAP BLD CALC-SCNC: 11 MMOL/L (ref 7–16)
BUN SERPL-MCNC: 51 MG/DL (ref 6–23)
CALCIUM SERPL-MCNC: 9 MG/DL (ref 8.3–10.4)
CHLORIDE SERPL-SCNC: 96 MMOL/L (ref 98–107)
CO2 SERPL-SCNC: 33 MMOL/L (ref 21–32)
CREAT SERPL-MCNC: 2.7 MG/DL (ref 0.8–1.5)
GLUCOSE BLD STRIP.AUTO-MCNC: 125 MG/DL (ref 65–100)
GLUCOSE BLD STRIP.AUTO-MCNC: 140 MG/DL (ref 65–100)
GLUCOSE BLD STRIP.AUTO-MCNC: 146 MG/DL (ref 65–100)
GLUCOSE BLD STRIP.AUTO-MCNC: 160 MG/DL (ref 65–100)
GLUCOSE SERPL-MCNC: 121 MG/DL (ref 65–100)
MAGNESIUM SERPL-MCNC: 2 MG/DL (ref 1.8–2.4)
POTASSIUM SERPL-SCNC: 3.6 MMOL/L (ref 3.5–5.1)
SODIUM SERPL-SCNC: 140 MMOL/L (ref 136–145)

## 2017-06-22 PROCEDURE — 83735 ASSAY OF MAGNESIUM: CPT | Performed by: NURSE PRACTITIONER

## 2017-06-22 PROCEDURE — 97110 THERAPEUTIC EXERCISES: CPT

## 2017-06-22 PROCEDURE — 65660000000 HC RM CCU STEPDOWN

## 2017-06-22 PROCEDURE — 36415 COLL VENOUS BLD VENIPUNCTURE: CPT | Performed by: NURSE PRACTITIONER

## 2017-06-22 PROCEDURE — 82962 GLUCOSE BLOOD TEST: CPT

## 2017-06-22 PROCEDURE — 97161 PT EVAL LOW COMPLEX 20 MIN: CPT

## 2017-06-22 PROCEDURE — 74011636637 HC RX REV CODE- 636/637: Performed by: NURSE PRACTITIONER

## 2017-06-22 PROCEDURE — 74011250636 HC RX REV CODE- 250/636: Performed by: NURSE PRACTITIONER

## 2017-06-22 PROCEDURE — 74011000250 HC RX REV CODE- 250: Performed by: INTERNAL MEDICINE

## 2017-06-22 PROCEDURE — 74011250637 HC RX REV CODE- 250/637: Performed by: INTERNAL MEDICINE

## 2017-06-22 PROCEDURE — 74011250637 HC RX REV CODE- 250/637: Performed by: NURSE PRACTITIONER

## 2017-06-22 PROCEDURE — 80048 BASIC METABOLIC PNL TOTAL CA: CPT | Performed by: NURSE PRACTITIONER

## 2017-06-22 PROCEDURE — 74011000258 HC RX REV CODE- 258: Performed by: INTERNAL MEDICINE

## 2017-06-22 PROCEDURE — 94760 N-INVAS EAR/PLS OXIMETRY 1: CPT

## 2017-06-22 RX ADMIN — TAMSULOSIN HYDROCHLORIDE 0.4 MG: 0.4 CAPSULE ORAL at 09:17

## 2017-06-22 RX ADMIN — DOCUSATE SODIUM 100 MG: 100 CAPSULE, LIQUID FILLED ORAL at 09:17

## 2017-06-22 RX ADMIN — CARVEDILOL 6.25 MG: 6.25 TABLET, FILM COATED ORAL at 09:17

## 2017-06-22 RX ADMIN — METOLAZONE 10 MG: 5 TABLET ORAL at 09:17

## 2017-06-22 RX ADMIN — POTASSIUM CHLORIDE 40 MEQ: 20 TABLET, EXTENDED RELEASE ORAL at 17:13

## 2017-06-22 RX ADMIN — BUMETANIDE 1 MG/HR: 0.25 INJECTION INTRAMUSCULAR; INTRAVENOUS at 09:48

## 2017-06-22 RX ADMIN — INSULIN LISPRO 2 UNITS: 100 INJECTION, SOLUTION INTRAVENOUS; SUBCUTANEOUS at 17:14

## 2017-06-22 RX ADMIN — GABAPENTIN 300 MG: 300 CAPSULE ORAL at 09:17

## 2017-06-22 RX ADMIN — DOCUSATE SODIUM 100 MG: 100 CAPSULE, LIQUID FILLED ORAL at 17:13

## 2017-06-22 RX ADMIN — HEPARIN SODIUM 5000 UNITS: 5000 INJECTION, SOLUTION INTRAVENOUS; SUBCUTANEOUS at 21:11

## 2017-06-22 RX ADMIN — HEPARIN SODIUM 5000 UNITS: 5000 INJECTION, SOLUTION INTRAVENOUS; SUBCUTANEOUS at 06:02

## 2017-06-22 RX ADMIN — CARVEDILOL 6.25 MG: 6.25 TABLET, FILM COATED ORAL at 17:13

## 2017-06-22 RX ADMIN — SENNOSIDES 17.2 MG: 8.6 TABLET, FILM COATED ORAL at 21:11

## 2017-06-22 RX ADMIN — POTASSIUM CHLORIDE 40 MEQ: 20 TABLET, EXTENDED RELEASE ORAL at 09:17

## 2017-06-22 RX ADMIN — GABAPENTIN 300 MG: 300 CAPSULE ORAL at 21:11

## 2017-06-22 RX ADMIN — FAMOTIDINE 20 MG: 20 TABLET ORAL at 17:13

## 2017-06-22 RX ADMIN — FAMOTIDINE 20 MG: 20 TABLET ORAL at 09:17

## 2017-06-22 RX ADMIN — BUMETANIDE 1 MG/HR: 0.25 INJECTION INTRAMUSCULAR; INTRAVENOUS at 21:12

## 2017-06-22 RX ADMIN — Medication 10 ML: at 13:40

## 2017-06-22 RX ADMIN — ALLOPURINOL 100 MG: 100 TABLET ORAL at 09:16

## 2017-06-22 RX ADMIN — HEPARIN SODIUM 5000 UNITS: 5000 INJECTION, SOLUTION INTRAVENOUS; SUBCUTANEOUS at 13:40

## 2017-06-22 RX ADMIN — SPIRONOLACTONE 50 MG: 25 TABLET, FILM COATED ORAL at 09:16

## 2017-06-22 RX ADMIN — GABAPENTIN 300 MG: 300 CAPSULE ORAL at 17:13

## 2017-06-22 RX ADMIN — OXYCODONE HYDROCHLORIDE 10 MG: 5 TABLET ORAL at 21:22

## 2017-06-22 RX ADMIN — AMIODARONE HYDROCHLORIDE 200 MG: 200 TABLET ORAL at 09:17

## 2017-06-22 NOTE — PROGRESS NOTES
Problem: Mobility Impaired (Adult and Pediatric)  Goal: *Acute Goals and Plan of Care (Insert Text)  STG:  (1.)Mr. Pollard will move from supine to sit and sit to supine , scoot up and down and roll side to side with CONTACT GUARD ASSIST within 3 day(s). (2.)Mr. Pollard will transfer from bed to chair and chair to bed with STAND BY ASSIST using the least restrictive device within 3 day(s). (3.)Mr. Pollard will ambulate with CONTACT GUARD ASSIST for 100 feet with the least restrictive device within 3 day(s). LTG:  (1.)Mr. Pollard will move from supine to sit and sit to supine , scoot up and down and roll side to side in bed with INDEPENDENT within 7 day(s). (2.)Mr. Pollard will transfer from bed to chair and chair to bed with MODIFIED INDEPENDENCE using the least restrictive device within 7 day(s). (3.)Mr. Pollard will ambulate with SUPERVISION for 250+ feet with the least restrictive device within 7 day(s). ________________________________________________________________________________________________      PHYSICAL THERAPY: INITIAL ASSESSMENT, TREATMENT DAY: DAY OF ASSESSMENT, PM 6/22/2017  INPATIENT: Hospital Day: 3  Payor: CARE IMPROVEMENT PLUS [place] / Product Type: [employer] Medicare /      NAME/AGE/GENDER: Jean Le is a 45 y.o. male         PRIMARY DIAGNOSIS: Acute on chronic systolic heart failure (HCC) <principal problem not specified> <principal problem not specified>        ICD-10: Treatment Diagnosis:       · Generalized Muscle Weakness (M62.81)  · Difficulty in walking, Not elsewhere classified (R26.2)   Precaution/Allergies:  Dilaudid [hydromorphone]; Iodinated contrast- oral and iv dye; and Penicillins       ASSESSMENT:      Mr. Giorgi De Souza is supine in bed upon contact and agreeable to PT evaluation with wife present. Pt reports living in 1 story home with his wife with 0 steps to enter.  He reports being independent with gait until approximately 1 month ago and has recently had to use a walker to get around. He reports requiring assistance from his wife with ADLs at times and wife states she mostly just assists with set up. Pt reports 1 fall in past 6 months due to loss of balance when walking. Pt complains of generalized soreness and muscle aches this afternoon. Pt requires supplemental O2 sat baseline. Pt is Vicky with supine to sit EOB and CGA with sit to stand. Pt ambulates 20 ft in room with CGA while pushing IV pole. Pt returned to bed and performed exercises for LE strengthening and mobility while sitting EOB. Pt returned to supine with Vicky to B LE and performed exercises while supine requiring VC and TC for technique. Pt declined performing R LE hip abduction due to fatigue with exercises. Pt would benefit from HHPT upon discharge from acute setting. Blayne Fuentes will benefit from skilled PT (medically necessary) to address decreased strength, decreased balance, decreased functional tolerance, decreased cardiopulmonary endurance affecting participation in basic ADLs and functional tasks. This section established at most recent assessment   PROBLEM LIST (Impairments causing functional limitations):  1. Decreased Strength  2. Decreased ADL/Functional Activities  3. Decreased Transfer Abilities  4. Decreased Ambulation Ability/Technique  5. Decreased Balance  6. Increased Pain  7. Decreased Activity Tolerance  8. Increased Fatigue  9. Increased Shortness of Breath    INTERVENTIONS PLANNED: (Benefits and precautions of physical therapy have been discussed with the patient.)  1. Balance Exercise  2. Bed Mobility  3. Family Education  4. Gait Training  5. Home Exercise Program (HEP)  6. Neuromuscular Re-education/Strengthening  7. Range of Motion (ROM)  8. Therapeutic Activites  9. Therapeutic Exercise/Strengthening  10. Transfer Training  11.  Group Therapy      TREATMENT PLAN: Frequency/Duration: 3 times a week for duration of hospital stay  Rehabilitation Potential For Stated Goals: GOOD      RECOMMENDED REHABILITATION/EQUIPMENT: (at time of discharge pending progress): Continue Skilled Therapy and Home Health: Physical Therapy. HISTORY:   History of Present Injury/Illness (Reason for Referral):  See H&P below  Patient is followed by Dr. Rosalie Fletcher with cardiology. Has congestive heart failure and high blood pressure. Has chronic kidney disease and diabetes. Has COPD. Has an AICD in place. Presents with 85 pound weight gain since January per patient. He was admitted 2 weeks ago to another facility for diuresis with shortness of breath and congestive heart failure. States 7 days but does not feel it was long enough. Shortness of breath is worsening in the spread of edema up both legs. Past Medical History/Comorbidities:   Mr. Thomas Davial  has a past medical history of Acute encephalopathy (3/18/2017); [facility] AICD (automatic cardioverter/defibrillator) present (10/21/2015); Atypical chest pain (4/23/2010); Bronchitis; CAD (coronary artery disease); Cardiomyopathy; Chest pain (10/21/2015); Chronic kidney disease; Chronic pain; Congestive heart failure (CHF) ([address] (10/21/2015); COPD; Diabetes ([address] Diabetes mellitus type II, uncontrolled ([address] (7/2/2013); GERD (gastroesophageal reflux disease); Gout; Heart failure ([address] Hypertension; Hyponatremia (12/20/2010); Ill-defined condition; Morbid obesity ([address] Nausea & vomiting (11/30/2015); Neuropathy; Obstructive sleep apnea (2/15/2010); Other unknown and unspecified cause of morbidity or mortality; Severe sepsis ([address] and Unspecified sleep apnea. He also has no past medical history of Adverse effect of anesthesia; Aneurysm ([address] Coagulation disorder ([address] DEMENTIA; Difficult intubation; Malignant hyperthermia due to anesthesia; Other ill-defined conditions; Pseudocholinesterase deficiency; or Psychiatric disorder.   Mr. Thomas Davila  has a past surgical history that includes pacemaker; heart catheterization ([place] 10, 2008); and chest surgery procedure unlisted. Social History/Living Environment:   Home Environment: Private residence  # Steps to Enter: 0  One/Two Story Residence: One story  Living Alone: No  Support Systems: Spouse/Significant Other/Partner  Patient Expects to be Discharged to[de-identified] Private residence  Current DME Used/Available at Home: Walker, Oxygen, portable  Tub or Shower Type: Tub/Shower combination  Prior Level of Function/Work/Activity:  Lives with wife, indep with gait until 1 month ago now uses walker, mostly indep with ADLs requires assist at times for set up from wife, drives      Number of Personal Factors/Comorbidities that affect the Plan of Care: 3+: HIGH COMPLEXITY   EXAMINATION:   Most Recent Physical Functioning:   Gross Assessment:  AROM: Generally decreased, functional  Strength: Generally decreased, functional  Coordination: Generally decreased, functional  Sensation: Impaired (neuropathy)               Posture:     Balance:  Sitting: Intact; Without support  Standing: Intact; Without support Bed Mobility:  Supine to Sit: Minimum assistance  Sit to Supine: Minimum assistance (to B LE)  Wheelchair Mobility:     Transfers:  Sit to Stand: Contact guard assistance  Stand to Sit: Contact guard assistance  Gait:     Base of Support: Widened  Speed/Mira: Slow  Step Length: Left shortened;Right shortened  Gait Abnormalities: Decreased step clearance;Trunk sway increased  Distance (ft): 20 Feet (ft)  Assistive Device:  (pushing IV pole)  Ambulation - Level of Assistance: Contact guard assistance  Interventions: Safety awareness training; Tactile cues; Verbal cues       Body Structures Involved:  1. Heart  2. Lungs  3. Bones  4. Joints  5. Muscles Body Functions Affected:  1. Sensory/Pain  2. Cardio  3. Respiratory  4. Neuromusculoskeletal  5. Movement Related Activities and Participation Affected:  1. General Tasks and Demands  2. Mobility  3.  Self Care  4. Domestic Life  5. Interpersonal Interactions and Relationships  6. Community, Social and [place]   Number of elements that affect the Plan of Care: 4+: HIGH COMPLEXITY   CLINICAL PRESENTATION:   Presentation: Evolving clinical presentation with changing clinical characteristics: MODERATE COMPLEXITY   CLINICAL DECISION MAKIN [facility] Mobility Inpatient Short Form  How much difficulty does the patient currently have. .. Unable A Lot A Little None   1. Turning over in bed (including adjusting bedclothes, sheets and blankets)? [ ] 1   [ ] 2   [X] 3   [ ] 4   2. Sitting down on and standing up from a chair with arms ( e.g., wheelchair, bedside commode, etc.)   [ ] 1   [ ] 2   [X] 3   [ ] 4   3. Moving from lying on back to sitting on the side of the bed? [ ] 1   [ ] 2   [X] 3   [ ] 4   How much help from another person does the patient currently need. .. Total A Lot A Little None   4. Moving to and from a bed to a chair (including a wheelchair)? [ ] 1   [ ] 2   [X] 3   [ ] 4   5. Need to walk in hospital room? [ ] 1   [ ] 2   [X] 3   [ ] 4   6. Climbing 3-5 steps with a railing? [ ] 1   [X] 2   [ ] 3   [ ] 4   © , Trustees of [address] [place] [address], under license to [employer]. All rights reserved    Score:  Initial: 17 Most Recent: X (Date: -- )     Interpretation of Tool:  Represents activities that are increasingly more difficult (i.e. Bed mobility, Transfers, Gait).        Score 24 23 22-20 19-15 14-10 9-7 6       Modifier CH CI CJ CK CL CM CN         · Mobility - Walking and Moving Around:               - CURRENT STATUS:    CK - 40%-59% impaired, limited or restricted               - GOAL STATUS:           CJ - 20%-39% impaired, limited or restricted               - D/C STATUS:                       ---------------To be determined---------------  Payor: CARE IMPROVEMENT PLUS [place] / Product Type: Managed Care Medicare /       Medical Necessity:     · Patient is expected to demonstrate progress in strength, balance, coordination and functional technique to decrease assistance required with gait and functional mobility. Reason for Services/Other Comments:  · Patient continues to require skilled intervention due to decreased strength, decreased balance, decreased functional tolerance, decreased cardiopulmonary endurance affecting participation in basic ADLs and functional tasks. Use of outcome tool(s) and clinical judgement create a POC that gives a: Clear prediction of patient's progress: LOW COMPLEXITY                 TREATMENT:   (In addition to Assessment/Re-Assessment sessions the following treatments were rendered)   Pre-treatment Symptoms/Complaints:  Generalized muscle soreness  Pain: Initial:   Pain Intensity 1: 0  Post Session:  0/10, resting comfortably supine in bed. In addition to evaluation:  Therapeutic Exercise: (12 Minutes):  Exercises per grid below to improve mobility, strength and coordination. Required minimal visual, verbal, manual and tactile cues to promote proper body alignment, promote proper body posture and promote proper body mechanics. Progressed range, repetitions and complexity of movement as indicated. Date:  6/22/17 Date:    Date:      Activity/Exercise Parameters Parameters Parameters   Seated LAQ 15 X B       Seated alt marches 15 X B       Supine ankle pumps 15 X B       Supine quad set 15 X B       Supine glute set 15 X       Supine hip abduction  15 X L       Supine heel slides 15 X B                Braces/Orthotics/Lines/Etc:   · IV  · O2: NC  Treatment/Session Assessment:    · Response to Treatment:  Pt participated well with PT and motivated to get moving.  Ambulated 20 ft with CGA  · Interdisciplinary Collaboration:  · Physical Therapist  · Registered Nurse  · After treatment position/precautions:  · Supine in bed  · Bed/Chair-wheels locked  · Bed in low position  · Caregiver at bedside  · Call light within reach  · Compliance with Program/Exercises: Will assess as treatment progresses. · Recommendations/Intent for next treatment session: \"Next visit will focus on advancements to more challenging activities and reduction in assistance provided\".   Total Treatment Duration:  PT Patient Time In/Time Out  Time In: 2909  Time Out: 208 [facility]

## 2017-06-22 NOTE — PROGRESS NOTES
Verbal bedside report given to Irlanda Dennisy, oncoming RN. Patient's situation, background, assessment and recommendations provided. Opportunity for questions provided. Oncoming RN assumed care of patient. Bumex IV drip verified at bedside with oncoming RN.

## 2017-06-22 NOTE — PROGRESS NOTES
Problem: Falls - Risk of  Goal: *Absence of falls  Outcome: Progressing Towards Goal  Pt progressing towards goal. No falls since admission. Bed low and locked. Call light within reach. Side rails x 2. Gripper socks applied. Personal belongings within reach. Pt verbalizes understanding to call for assistance.          Problem: Heart Failure: Day 1  Goal: Medications  Outcome: Progressing Towards Goal  Pt on Bumex drip

## 2017-06-22 NOTE — PROGRESS NOTES
Bedside and Verbal shift change report given to Tru Sanchez RN (oncoming nurse) by self Marlon Francisco nurse). Report included the following information SBAR, Kardex, MAR and Recent Results.

## 2017-06-22 NOTE — PROGRESS NOTES
Bedside and Verbal shift change report given to self (oncoming nurse) by Rachel Blanton (offgoing nurse). Report included the following information SBAR, Kardex, MAR and Recent Results.

## 2017-06-22 NOTE — PROGRESS NOTES
Verbal bedside report received from Elfego Chao RN. Assumed care of patient. Bumex IV drip verified at bedside with outgoing RN. Pt in room sleeping during BSR. Currently on C-pap.   Wife in room also sleeping

## 2017-06-22 NOTE — PROGRESS NOTES
[facility] CARDIOLOGY PROGRESS NOTE           6/22/2017 8:52 AM    Admit Date: 6/20/2017      Subjective:     Patient feels as if he is doing well breathing better. Review of Systems   Constitutional: Negative for fever. Respiratory: Negative for cough. Cardiovascular: Negative for chest pain. Genitourinary: Negative for dysuria. Musculoskeletal: Positive for myalgias. Skin: Negative for rash. Neurological: Positive for headaches. Negative for dizziness. Endo/Heme/Allergies: Negative for polydipsia. Psychiatric/Behavioral: Negative for hallucinations. Objective:      Vitals:    06/21/17 2140 06/22/17 0115 06/22/17 0611 06/22/17 0730   BP: 127/90 (!) 146/96 136/72    Pulse: 78 75 74    Resp: 18 18 18    Temp: 96.8 °F (36 °C) 97 °F (36.1 °C) 97.6 °F (36.4 °C)    SpO2: 98% 97% 98% 94%   Weight:   (!) 180.4 kg (397 lb 9.6 oz)    Height:             Physical Exam   Constitutional: He is oriented to person, place, and time. He appears well-developed. He has a sickly appearance. HENT:   Head: Normocephalic and atraumatic. Eyes: Pupils are equal, round, and reactive to light. Neck: Normal range of motion. Cardiovascular: Normal rate. No murmur heard. Pulmonary/Chest: Effort normal.   Abdominal: Soft. He exhibits distension. There is no tenderness. Genitourinary:   Genitourinary Comments: Scrotal swelling   Musculoskeletal: He exhibits edema. Neurological: He is alert and oriented to person, place, and time. No cranial nerve deficit. Skin: Skin is warm and dry. No rash noted. No erythema. Psychiatric: He has a normal mood and affect.  His behavior is normal.       Data Review:   Recent Labs      06/22/17   0425  06/21/17   0809  06/20/17   1830   NA  140  140  140   K  3.6  3.1*  2.8*   MG  2.0  2.0   --    BUN  51*  51*  50*   CREA  2.70*  2.78*  2.66*   GLU  121*  139*  85   WBC   --   6.9  6.8   HGB   --   9.7*  10.1*   HCT   --   31.6*  32.1*   PLT   --   267  269 TROIQ   --    --   <0.02*         Intake/Output Summary (Last 24 hours) at 06/22/17 0852  Last data filed at 06/22/17 0040   Gross per 24 hour   Intake              960 ml   Output             3200 ml   Net            -2240 ml     Current Facility-Administered Medications   Medication Dose Route Frequency    spironolactone (ALDACTONE) tablet 50 mg  50 mg Oral DAILY    metOLazone (ZAROXOLYN) tablet 10 mg  10 mg Oral DAILY    amiodarone (CORDARONE) tablet 200 mg  200 mg Oral DAILY    allopurinol (ZYLOPRIM) tablet 100 mg  100 mg Oral DAILY    carvedilol (COREG) tablet 6.25 mg  6.25 mg Oral BID WITH MEALS    docusate sodium (COLACE) capsule 100 mg  100 mg Oral BID    gabapentin (NEURONTIN) capsule 300 mg  300 mg Oral TID    insulin glargine (LANTUS) injection 25 Units  25 Units SubCUTAneous QHS    oxyCODONE IR (ROXICODONE) tablet 10 mg  10 mg Oral Q6H PRN    famotidine (PEPCID) tablet 20 mg  20 mg Oral BID    senna (SENOKOT) tablet 17.2 mg  2 Tab Oral QHS    tamsulosin (FLOMAX) capsule 0.4 mg  0.4 mg Oral DAILY    bumetanide (BUMEX) 12.5 mg in 0.9% sodium chloride 100 mL infusion  2 mg/hr IntraVENous CONTINUOUS    sodium chloride (NS) flush 5-10 mL  5-10 mL IntraVENous Q8H    sodium chloride (NS) flush 5-10 mL  5-10 mL IntraVENous PRN    nitroglycerin (NITROSTAT) tablet 0.4 mg  0.4 mg SubLINGual Q5MIN PRN    ondansetron (ZOFRAN) injection 4 mg  4 mg IntraVENous Q4H PRN    heparin (porcine) injection 5,000 Units  5,000 Units SubCUTAneous Q8H    potassium chloride (K-DUR, KLOR-CON) SR tablet 40 mEq  40 mEq Oral BID    insulin lispro (HUMALOG) injection   SubCUTAneous AC&HS           Assessment/Plan:     1. Cardiomyopathy New York Heart Association class IV symptoms. Patient has severe cardiomyopathy. This is then addressed on numerous admissions in the last 6 months. He has been end-stage for a great deal of time. Wishes to continue aggressive therapies.   Continue current diuresis patient initiated on intravenous diuretics now on Zaroxolyn and Aldactone. Relatively well compensated 6/22/2017. Continue beta blocker not candidate for ACE inhibitor therapies. 2. Chronic kidney disease being followed by nephrology currently on intravenous diuretics. Adequate urine output. Will decrease rate of Bumex drip 6/22/2017 due to adequate urine output renal function stable patient relatively well compensated. Patient diuresing well at this time. We'll continue to follow nephrology recommendations. 3. Diabetes controlled continue Lantus therapy  4. Anemia possibly due to underlying renal disease. Hemoglobin 9.79/22/2017.  5. Nonsustained ventricular tachycardia previously documented ICD in place on low-dose amiodarone  6. Previous history of atrial flutter not on anticoagulation therapy. Defer to Dr. Napoleon Sanchez primary cardiologist.  Last progress note 5/12/2017 reviewed. Patient not on AC at that time.      Derick Greer MD  6/22/2017 8:52 AM

## 2017-06-22 NOTE — PROGRESS NOTES
Care Management Interventions  PCP Verified by CM: Yes  Transition of Care Consult (CM Consult): Discharge Planning  Physical Therapy Consult: Yes  Occupational Therapy Consult: Yes  Current Support Network: Lives with Spouse  Confirm Follow Up Transport: Family  Plan discussed with Pt/Family/Caregiver: Yes    BLAINE dc screening. Pt well known to this worker from previous admissions. Alert and oriented in all spheres. Now residing with spouse - Kayleigh Markham ([phone]). Ambulating with a walker. Spouse assists with ADLs. Transportation via family. No HH at present. Pt states they are still residing at his daughter's home but in the process of relocating to [facility]  DME: Rollator, home oxygen, nocturnal ([address])  [address]. Has a PCP and Rx plan. PT/OT consulted to eval current functioning. SW following.

## 2017-06-22 NOTE — PROGRESS NOTES
Admit Date: 6/20/2017      Subjective:          Review of Systems  Cardio-vascular: no chest pain, SOB better  GI: no N/V/D  : no dysuria, no hematuria    Objective:     Patient Vitals for the past 8 hrs:   BP Temp Pulse Resp SpO2 Weight   06/22/17 0944 (!) 138/99 96.8 °F (36 °C) 74 18 92 % -   06/22/17 0730 - - - - 94 % -   06/22/17 0611 136/72 97.6 °F (36.4 °C) 74 18 98 % (!) 180.4 kg (397 lb 9.6 oz)     06/22 0701 - 06/22 1900  In: -   Out: 1000 [Urine:1000]      Physical Exam:   Lungs: decreased BS  CV: RR,   Abdomen: obese, distended  Ext: +++ edema          Data Review   Recent Results (from the past 8 hour(s))   METABOLIC PANEL, BASIC    Collection Time: 06/22/17  4:25 AM   Result Value Ref Range    Sodium 140 136 - 145 mmol/L    Potassium 3.6 3.5 - 5.1 mmol/L    Chloride 96 (L) 98 - 107 mmol/L    CO2 33 (H) 21 - 32 mmol/L    Anion gap 11 7 - 16 mmol/L    Glucose 121 (H) 65 - 100 mg/dL    BUN 51 (H) 6 - 23 MG/DL    Creatinine 2.70 (H) 0.8 - 1.5 MG/DL    GFR est AA 32 (L) >60 ml/min/1.73m2    GFR est non-AA 27 (L) >60 ml/min/1.73m2    Calcium 9.0 8.3 - 10.4 MG/DL   MAGNESIUM    Collection Time: 06/22/17  4:25 AM   Result Value Ref Range    Magnesium 2.0 1.8 - 2.4 mg/dL   GLUCOSE, POC    Collection Time: 06/22/17  6:23 AM   Result Value Ref Range    Glucose (POC) 140 (H) 65 - 100 mg/dL           Assessment:     Active Problems:    Diabetes mellitus type II, uncontrolled ([address] (3/18/2017)      Abdominal fluid collection (8/18/2014)      Overview: Perisplenic and splenic, likely related to pancreatitis      AICD (automatic cardioverter/defibrillator) present (3/18/2017)      Cardiomyopathy ([address] (3/18/2017)      Hypertension (3/18/2017)      Acute on chronic systolic (congestive) heart failure ([facility] (10/18/2016)      Obesity hypoventilation syndrome ([facility] (3/18/2017)      Morbid obesity with BMI of 60.0-69.9, adult ([facility] (3/18/2017)      Acute renal failure (ARF) ([facility] (3/18/2017)      Acute on chronic systolic heart failure ([facility] (6/20/2017)        Plan:     Good diuresis on Bumex drip  Renal F. Stable  Prognosis is poor    Kapil Sue MD

## 2017-06-22 NOTE — PROGRESS NOTES
CHF teaching completed , verbalize emphasis on monitoring self and report to MD:   If you gain 2 lbs in one day or 5 lbs in a week, and short of breath.  If you can not lay flat without developing short of breath or rapid breathing at night; or if it wakes you up. Develop a cough or wheezing.  If you notice swollen hands/feet/ankles or stomach with a bloated/ full feeling.  If you become confused or mentally fuzzy or dizzy.  If you notice a rapid or change in your heart rate.  If you become more exhausted all the time and unable to do the same level of activity without stopping to catch your breath. Drink no more than 8 cups a day in 8 oz. cups. Richie Martell

## 2017-06-23 LAB
ANION GAP BLD CALC-SCNC: 11 MMOL/L (ref 7–16)
BUN SERPL-MCNC: 58 MG/DL (ref 6–23)
CALCIUM SERPL-MCNC: 9.5 MG/DL (ref 8.3–10.4)
CHLORIDE SERPL-SCNC: 96 MMOL/L (ref 98–107)
CO2 SERPL-SCNC: 34 MMOL/L (ref 21–32)
CREAT SERPL-MCNC: 2.94 MG/DL (ref 0.8–1.5)
GLUCOSE BLD STRIP.AUTO-MCNC: 120 MG/DL (ref 65–100)
GLUCOSE BLD STRIP.AUTO-MCNC: 127 MG/DL (ref 65–100)
GLUCOSE BLD STRIP.AUTO-MCNC: 130 MG/DL (ref 65–100)
GLUCOSE BLD STRIP.AUTO-MCNC: 146 MG/DL (ref 65–100)
GLUCOSE SERPL-MCNC: 118 MG/DL (ref 65–100)
MAGNESIUM SERPL-MCNC: 2 MG/DL (ref 1.8–2.4)
POTASSIUM SERPL-SCNC: 3.9 MMOL/L (ref 3.5–5.1)
SODIUM SERPL-SCNC: 141 MMOL/L (ref 136–145)

## 2017-06-23 PROCEDURE — 80048 BASIC METABOLIC PNL TOTAL CA: CPT | Performed by: INTERNAL MEDICINE

## 2017-06-23 PROCEDURE — 83735 ASSAY OF MAGNESIUM: CPT | Performed by: INTERNAL MEDICINE

## 2017-06-23 PROCEDURE — 74011250637 HC RX REV CODE- 250/637: Performed by: INTERNAL MEDICINE

## 2017-06-23 PROCEDURE — 65660000000 HC RM CCU STEPDOWN

## 2017-06-23 PROCEDURE — 74011250636 HC RX REV CODE- 250/636: Performed by: NURSE PRACTITIONER

## 2017-06-23 PROCEDURE — 74011250637 HC RX REV CODE- 250/637: Performed by: NURSE PRACTITIONER

## 2017-06-23 PROCEDURE — 36415 COLL VENOUS BLD VENIPUNCTURE: CPT | Performed by: INTERNAL MEDICINE

## 2017-06-23 PROCEDURE — 82962 GLUCOSE BLOOD TEST: CPT

## 2017-06-23 PROCEDURE — 94760 N-INVAS EAR/PLS OXIMETRY 1: CPT

## 2017-06-23 RX ORDER — FAMOTIDINE 20 MG/1
20 TABLET, FILM COATED ORAL DAILY
Status: DISCONTINUED | OUTPATIENT
Start: 2017-06-24 | End: 2017-06-28 | Stop reason: HOSPADM

## 2017-06-23 RX ORDER — METOLAZONE 5 MG/1
5 TABLET ORAL DAILY
Status: DISCONTINUED | OUTPATIENT
Start: 2017-06-24 | End: 2017-06-24

## 2017-06-23 RX ORDER — BUMETANIDE 1 MG/1
2 TABLET ORAL 2 TIMES DAILY
Status: DISCONTINUED | OUTPATIENT
Start: 2017-06-23 | End: 2017-06-26

## 2017-06-23 RX ADMIN — OXYCODONE HYDROCHLORIDE 10 MG: 5 TABLET ORAL at 09:50

## 2017-06-23 RX ADMIN — CARVEDILOL 6.25 MG: 6.25 TABLET, FILM COATED ORAL at 18:04

## 2017-06-23 RX ADMIN — TAMSULOSIN HYDROCHLORIDE 0.4 MG: 0.4 CAPSULE ORAL at 08:01

## 2017-06-23 RX ADMIN — POTASSIUM CHLORIDE 40 MEQ: 20 TABLET, EXTENDED RELEASE ORAL at 18:05

## 2017-06-23 RX ADMIN — METOLAZONE 10 MG: 5 TABLET ORAL at 08:00

## 2017-06-23 RX ADMIN — GABAPENTIN 300 MG: 300 CAPSULE ORAL at 08:00

## 2017-06-23 RX ADMIN — HEPARIN SODIUM 5000 UNITS: 5000 INJECTION, SOLUTION INTRAVENOUS; SUBCUTANEOUS at 22:49

## 2017-06-23 RX ADMIN — Medication 10 ML: at 14:13

## 2017-06-23 RX ADMIN — FAMOTIDINE 20 MG: 20 TABLET ORAL at 08:01

## 2017-06-23 RX ADMIN — GABAPENTIN 300 MG: 300 CAPSULE ORAL at 18:04

## 2017-06-23 RX ADMIN — BUMETANIDE 2 MG: 1 TABLET ORAL at 18:07

## 2017-06-23 RX ADMIN — ALLOPURINOL 100 MG: 100 TABLET ORAL at 08:00

## 2017-06-23 RX ADMIN — POTASSIUM CHLORIDE 40 MEQ: 20 TABLET, EXTENDED RELEASE ORAL at 08:01

## 2017-06-23 RX ADMIN — HEPARIN SODIUM 5000 UNITS: 5000 INJECTION, SOLUTION INTRAVENOUS; SUBCUTANEOUS at 05:22

## 2017-06-23 RX ADMIN — DOCUSATE SODIUM 100 MG: 100 CAPSULE, LIQUID FILLED ORAL at 08:00

## 2017-06-23 RX ADMIN — GABAPENTIN 300 MG: 300 CAPSULE ORAL at 22:48

## 2017-06-23 RX ADMIN — DOCUSATE SODIUM 100 MG: 100 CAPSULE, LIQUID FILLED ORAL at 18:05

## 2017-06-23 RX ADMIN — OXYCODONE HYDROCHLORIDE 10 MG: 5 TABLET ORAL at 18:05

## 2017-06-23 RX ADMIN — CARVEDILOL 6.25 MG: 6.25 TABLET, FILM COATED ORAL at 08:01

## 2017-06-23 RX ADMIN — AMIODARONE HYDROCHLORIDE 200 MG: 200 TABLET ORAL at 08:01

## 2017-06-23 RX ADMIN — SPIRONOLACTONE 50 MG: 25 TABLET, FILM COATED ORAL at 08:00

## 2017-06-23 RX ADMIN — HEPARIN SODIUM 5000 UNITS: 5000 INJECTION, SOLUTION INTRAVENOUS; SUBCUTANEOUS at 14:23

## 2017-06-23 RX ADMIN — SENNOSIDES 17.2 MG: 8.6 TABLET, FILM COATED ORAL at 22:48

## 2017-06-23 NOTE — PROGRESS NOTES
Verbal bedside report received from Johnie Cox RN. Assumed care of patient. Bumex IV drip verified at bedside with outgoing RN. Pt in bed resting. Wife in the room.

## 2017-06-23 NOTE — PROGRESS NOTES
Morning assessment charted and completed by Tanya Lu Student,  reviewed by RN. Discussed assessment. Agree with assessment as charted.

## 2017-06-23 NOTE — PROGRESS NOTES
[facility]    Follow-Up on:6/23/17    HPI: feels better    ROS:  Denies CP, SOB.     Current Facility-Administered Medications   Medication Dose Route Frequency    sacubitril-valsartan (ENTRESTO) 24-26 mg tablet 1 Tab  1 Tab Oral Q12H    spironolactone (ALDACTONE) tablet 50 mg  50 mg Oral DAILY    metOLazone (ZAROXOLYN) tablet 10 mg  10 mg Oral DAILY    amiodarone (CORDARONE) tablet 200 mg  200 mg Oral DAILY    allopurinol (ZYLOPRIM) tablet 100 mg  100 mg Oral DAILY    carvedilol (COREG) tablet 6.25 mg  6.25 mg Oral BID WITH MEALS    docusate sodium (COLACE) capsule 100 mg  100 mg Oral BID    gabapentin (NEURONTIN) capsule 300 mg  300 mg Oral TID    insulin glargine (LANTUS) injection 25 Units  25 Units SubCUTAneous QHS    oxyCODONE IR (ROXICODONE) tablet 10 mg  10 mg Oral Q6H PRN    famotidine (PEPCID) tablet 20 mg  20 mg Oral BID    senna (SENOKOT) tablet 17.2 mg  2 Tab Oral QHS    tamsulosin (FLOMAX) capsule 0.4 mg  0.4 mg Oral DAILY    bumetanide (BUMEX) 12.5 mg in 0.9% sodium chloride 100 mL infusion  1 mg/hr IntraVENous CONTINUOUS    sodium chloride (NS) flush 5-10 mL  5-10 mL IntraVENous Q8H    sodium chloride (NS) flush 5-10 mL  5-10 mL IntraVENous PRN    nitroglycerin (NITROSTAT) tablet 0.4 mg  0.4 mg SubLINGual Q5MIN PRN    ondansetron (ZOFRAN) injection 4 mg  4 mg IntraVENous Q4H PRN    heparin (porcine) injection 5,000 Units  5,000 Units SubCUTAneous Q8H    potassium chloride (K-DUR, KLOR-CON) SR tablet 40 mEq  40 mEq Oral BID    insulin lispro (HUMALOG) injection   SubCUTAneous AC&HS       Exam:  Vitals:    06/23/17 0509 06/23/17 0537 06/23/17 0940 06/23/17 0946   BP:  165/88 119/77 119/77   Pulse:  69 75 75   Resp:  16  20   Temp:  97.4 °F (36.3 °C)  97.5 °F (36.4 °C)   SpO2:  96%  93%   Weight: (!) 176.4 kg (388 lb 14.4 oz)      Height:             Intake/Output Summary (Last 24 hours) at 06/23/17 1124  Last data filed at 06/23/17 0939   Gross per 24 hour   Intake 1230 ml   Output             5500 ml   Net            -4270 ml     PE:  GEN - in no distress  CV - regular, no murmur, no rub  Lung - clear bilaterally  Abd - soft, nontender  Ext - no edema    Labs  Recent Labs      06/21/17   0809  06/20/17   1830   WBC  6.9  6.8   HGB  9.7*  10.1*   HCT  31.6*  32.1*   PLT  267  269     Recent Labs      06/23/17   0733  06/22/17   0425  06/21/17   0809   NA  141  140  140   K  3.9  3.6  3.1*   CL  96*  96*  96*   CO2  34*  33*  34*   BUN  58*  51*  51*   CREA  2.94*  2.70*  2.78*   GLU  118*  121*  139*   CA  9.5  9.0  9.1   MG  2.0  2.0  2.0     No results for input(s): PH, PCO2, PO2, PCO2 in the last 72 hours.     Problem List:  Patient Active Problem List    Diagnosis Date Noted    Acute on chronic systolic heart failure ([facility] 92/60/6113    Metabolic alkalosis 27/24/4953    Acute on chronic respiratory failure with hypoxia and hypercapnia (HCC) 03/21/2017    Cardiogenic shock ([facility] 03/20/2017    Obstructive sleep apnea 03/18/2017    Chronic back pain 03/18/2017    Dyslipidemia 03/18/2017    Diabetes mellitus type II, uncontrolled ([address] 03/18/2017    Noncompliance with medication regimen 03/18/2017    AICD (automatic cardioverter/defibrillator) present 03/18/2017    Congestive heart failure (CHF) ([facility] 03/18/2017    Cardiomyopathy ([facility] 03/18/2017    Hypertension 03/18/2017    Obesity hypoventilation syndrome ([address] 03/18/2017    Morbid obesity with BMI of 60.0-69.9, adult ([address] 03/18/2017    Acute encephalopathy 03/18/2017    Acute renal failure (ARF) ([address] 03/18/2017    Syncope 01/19/2017    Nonsustained ventricular tachycardia ([address] 01/19/2017    NSVT (nonsustained ventricular tachycardia) ([facility] 01/19/2017    Constipation 01/08/2017    Hypoxemia 11/28/2016    Hypersomnia 11/28/2016    Atrial flutter ([facility] 10/20/2016    Acute on chronic systolic (congestive) heart failure (HCC) 10/18/2016    Nausea & vomiting 11/30/2015    Chest pain 10/21/2015    Abdominal fluid collection 08/18/2014    Pleural effusion 08/13/2014    CKD (chronic kidney disease) stage 3, GFR 30-59 ml/min 08/13/2014    Chronic pancreatitis ([facility] 08/13/2014    Nonischemic dilated cardiomyopathy ([facility] 07/02/2013       Issues Addressed By Nephrology:    Plan:  1. Endstage NI-CM EF<10%  2.  HTN  3. Cardiorenal syndrome  Good diuresis   Switch to oral   I will sign off stable from my stand point for discharge with outpt follow up in 2-3 weeks

## 2017-06-23 NOTE — PROGRESS NOTES
Verbal bedside report given to Mustapha Mclaughlin oncoming RN. Patient's situation, background, assessment and recommendations provided. Opportunity for questions provided. Oncoming RN assumed care of patient.

## 2017-06-23 NOTE — PROGRESS NOTES
Bedside and Verbal shift change report given to self (oncoming nurse) by Sylvester Ham (offgoing nurse). Report included the following information SBAR, Kardex, MAR and Recent Results.

## 2017-06-23 NOTE — PROGRESS NOTES
Problem: Falls - Risk of  Goal: *Absence of falls  Outcome: Progressing Towards Goal  Pt progressing towards goal. No falls since admission. Bed low and locked. Call light within reach. Side rails x 2. Gripper socks applied. Personal belongings within reach. Pt verbalizes understanding to call for assistance.          Problem: Heart Failure: Day 3  Goal: Activity/Safety  Outcome: Progressing Towards Goal  Pt up to bathroom with standby/1 person assist  Goal: Medications  Outcome: Progressing Towards Goal  Pt remains on IV Dobutamine

## 2017-06-23 NOTE — PROGRESS NOTES
Verbal bedside report given to oncoming RN, Ryne Camejo. Patient's situation, background, assessment and recommendations provided. Opportunity for questions provided. Oncoming RN assumed care of patient.

## 2017-06-23 NOTE — PROGRESS NOTES
[facility] CARDIOLOGY PROGRESS NOTE           6/23/2017 8:13 AM    Admit Date: 6/20/2017      Subjective:   Feeling better. ROS:  Cardiovascular:  As noted above    Objective:      Vitals:    06/22/17 2135 06/23/17 0057 06/23/17 0509 06/23/17 0537   BP: 110/61 146/64  165/88   Pulse: 78 78  69   Resp: 22 17 16   Temp: 98.9 °F (37.2 °C) 97.9 °F (36.6 °C)  97.4 °F (36.3 °C)   SpO2: 93% 94%  96%   Weight:   (!) 176.4 kg (388 lb 14.4 oz)    Height:           Physical Exam:  General-No Acute Distress    Data Review:   Recent Labs      06/22/17   0425  06/21/17   0809  06/20/17   1830   NA  140  140  140   K  3.6  3.1*  2.8*   MG  2.0  2.0   --    BUN  51*  51*  50*   CREA  2.70*  2.78*  2.66*   GLU  121*  139*  85   WBC   --   6.9  6.8   HGB   --   9.7*  10.1*   HCT   --   31.6*  32.1*   PLT   --   267  269   TROIQ   --    --   <0.02*       Assessment/Plan:     Active Problems:    Diabetes mellitus type II, uncontrolled ([facility]) (3/18/2017)      Abdominal fluid collection (8/18/2014)      Overview: Perisplenic and splenic, likely related to pancreatitis      AICD (automatic cardioverter/defibrillator) present (3/18/2017)      Cardiomyopathy ([facility] (3/18/2017)      Hypertension (3/18/2017)      Acute on chronic systolic (congestive) heart failure (HCC) (10/18/2016)      Obesity hypoventilation syndrome ([address] (3/18/2017)      Morbid obesity with BMI of 60.0-69.9, adult (HCC) (3/18/2017)      Acute renal failure (ARF) ([facility]) (3/18/2017)      Acute on chronic systolic heart failure (HCC) (6/20/2017)      ///  Remains stable. Meds reviewed. Add Entresto.     Ioana Rodriguez MD  6/23/2017 8:13 AM

## 2017-06-23 NOTE — PROGRESS NOTES
OT Note:    Charts reviewed and order appreciated, pt refused OT evaluation this morning, stating he just got his pain meds and requested OT to come back later.     Thanks,    Stacy Hicks, OTR/L

## 2017-06-24 LAB
ANION GAP BLD CALC-SCNC: 9 MMOL/L (ref 7–16)
BUN SERPL-MCNC: 60 MG/DL (ref 6–23)
CALCIUM SERPL-MCNC: 9.2 MG/DL (ref 8.3–10.4)
CHLORIDE SERPL-SCNC: 94 MMOL/L (ref 98–107)
CO2 SERPL-SCNC: 35 MMOL/L (ref 21–32)
CREAT SERPL-MCNC: 3.1 MG/DL (ref 0.8–1.5)
GLUCOSE BLD STRIP.AUTO-MCNC: 134 MG/DL (ref 65–100)
GLUCOSE BLD STRIP.AUTO-MCNC: 141 MG/DL (ref 65–100)
GLUCOSE BLD STRIP.AUTO-MCNC: 143 MG/DL (ref 65–100)
GLUCOSE BLD STRIP.AUTO-MCNC: 157 MG/DL (ref 65–100)
GLUCOSE SERPL-MCNC: 143 MG/DL (ref 65–100)
MAGNESIUM SERPL-MCNC: 1.9 MG/DL (ref 1.8–2.4)
POTASSIUM SERPL-SCNC: 4 MMOL/L (ref 3.5–5.1)
SODIUM SERPL-SCNC: 138 MMOL/L (ref 136–145)

## 2017-06-24 PROCEDURE — 80048 BASIC METABOLIC PNL TOTAL CA: CPT | Performed by: NURSE PRACTITIONER

## 2017-06-24 PROCEDURE — 36415 COLL VENOUS BLD VENIPUNCTURE: CPT | Performed by: NURSE PRACTITIONER

## 2017-06-24 PROCEDURE — 97165 OT EVAL LOW COMPLEX 30 MIN: CPT

## 2017-06-24 PROCEDURE — 82962 GLUCOSE BLOOD TEST: CPT

## 2017-06-24 PROCEDURE — 83735 ASSAY OF MAGNESIUM: CPT | Performed by: NURSE PRACTITIONER

## 2017-06-24 PROCEDURE — 74011250637 HC RX REV CODE- 250/637: Performed by: NURSE PRACTITIONER

## 2017-06-24 PROCEDURE — 74011250636 HC RX REV CODE- 250/636: Performed by: NURSE PRACTITIONER

## 2017-06-24 PROCEDURE — 74011250637 HC RX REV CODE- 250/637: Performed by: INTERNAL MEDICINE

## 2017-06-24 PROCEDURE — 97110 THERAPEUTIC EXERCISES: CPT

## 2017-06-24 PROCEDURE — 74011636637 HC RX REV CODE- 636/637: Performed by: NURSE PRACTITIONER

## 2017-06-24 PROCEDURE — 94760 N-INVAS EAR/PLS OXIMETRY 1: CPT

## 2017-06-24 PROCEDURE — 65660000000 HC RM CCU STEPDOWN

## 2017-06-24 RX ORDER — SIMETHICONE 80 MG
80 TABLET,CHEWABLE ORAL
Status: DISCONTINUED | OUTPATIENT
Start: 2017-06-24 | End: 2017-06-28 | Stop reason: HOSPADM

## 2017-06-24 RX ADMIN — Medication 10 ML: at 14:43

## 2017-06-24 RX ADMIN — BUMETANIDE 2 MG: 1 TABLET ORAL at 09:45

## 2017-06-24 RX ADMIN — FAMOTIDINE 20 MG: 20 TABLET ORAL at 09:46

## 2017-06-24 RX ADMIN — DOCUSATE SODIUM 100 MG: 100 CAPSULE, LIQUID FILLED ORAL at 17:01

## 2017-06-24 RX ADMIN — GABAPENTIN 300 MG: 300 CAPSULE ORAL at 21:17

## 2017-06-24 RX ADMIN — POTASSIUM CHLORIDE 40 MEQ: 20 TABLET, EXTENDED RELEASE ORAL at 17:00

## 2017-06-24 RX ADMIN — INSULIN LISPRO 2 UNITS: 100 INJECTION, SOLUTION INTRAVENOUS; SUBCUTANEOUS at 12:03

## 2017-06-24 RX ADMIN — POTASSIUM CHLORIDE 40 MEQ: 20 TABLET, EXTENDED RELEASE ORAL at 09:45

## 2017-06-24 RX ADMIN — HEPARIN SODIUM 5000 UNITS: 5000 INJECTION, SOLUTION INTRAVENOUS; SUBCUTANEOUS at 05:51

## 2017-06-24 RX ADMIN — SENNOSIDES 17.2 MG: 8.6 TABLET, FILM COATED ORAL at 21:17

## 2017-06-24 RX ADMIN — CARVEDILOL 6.25 MG: 6.25 TABLET, FILM COATED ORAL at 17:00

## 2017-06-24 RX ADMIN — OXYCODONE HYDROCHLORIDE 10 MG: 5 TABLET ORAL at 22:45

## 2017-06-24 RX ADMIN — Medication 10 ML: at 21:19

## 2017-06-24 RX ADMIN — AMIODARONE HYDROCHLORIDE 200 MG: 200 TABLET ORAL at 09:45

## 2017-06-24 RX ADMIN — HEPARIN SODIUM 5000 UNITS: 5000 INJECTION, SOLUTION INTRAVENOUS; SUBCUTANEOUS at 21:17

## 2017-06-24 RX ADMIN — GABAPENTIN 300 MG: 300 CAPSULE ORAL at 09:46

## 2017-06-24 RX ADMIN — TAMSULOSIN HYDROCHLORIDE 0.4 MG: 0.4 CAPSULE ORAL at 09:45

## 2017-06-24 RX ADMIN — SPIRONOLACTONE 50 MG: 25 TABLET, FILM COATED ORAL at 09:45

## 2017-06-24 RX ADMIN — HEPARIN SODIUM 5000 UNITS: 5000 INJECTION, SOLUTION INTRAVENOUS; SUBCUTANEOUS at 14:41

## 2017-06-24 RX ADMIN — GABAPENTIN 300 MG: 300 CAPSULE ORAL at 17:00

## 2017-06-24 RX ADMIN — DOCUSATE SODIUM 100 MG: 100 CAPSULE, LIQUID FILLED ORAL at 09:45

## 2017-06-24 RX ADMIN — CARVEDILOL 6.25 MG: 6.25 TABLET, FILM COATED ORAL at 09:45

## 2017-06-24 RX ADMIN — ALLOPURINOL 100 MG: 100 TABLET ORAL at 09:45

## 2017-06-24 RX ADMIN — Medication 5 ML: at 05:51

## 2017-06-24 RX ADMIN — BUMETANIDE 2 MG: 1 TABLET ORAL at 17:01

## 2017-06-24 NOTE — PROGRESS NOTES
No cp or incsob. Pt up on bedside, awake and alert. Lungs are clear. Wt is down and creat/bun sl up. I suspect he's at \"dry' weight so will stop metolazone. Entsreto on hold due to ckd.

## 2017-06-24 NOTE — PROGRESS NOTES
Bedside shift change report given to Matti Calderon RN. Report included the following information SBAR, Kardex, MAR and Recent Results.

## 2017-06-24 NOTE — PROGRESS NOTES
Problem: Self Care Deficits Care Plan (Adult)  Goal: *Acute Goals and Plan of Care (Insert Text)  1. Adam Marie will be modified independent with functional mobility for ADL within 4 - 7 visits. 2. Angel Luis Pollard will be modified independent with total body bathing and dressing within 4 - 7 visits. 3. Angel Luis Pollard will state and demonstrate at least 5 energy conservation techniques during ADL/therapeutic activities within 4 - 7 visits. 4. Angel Luis Pollard will voice a plan for 2-3 appropriate home modifications for home safety and fall prevention within 7 visits. 5. Angel Luis Pollard will participate at least 30 minutes of ADL with 3 or less rest breaks within 7 visits. 6. Adam Marie will complete a toilet transfer with modified independence within 7 visits. OCCUPATIONAL THERAPY: Initial Assessment and Treatment Day: 1st 6/24/2017  INPATIENT: Hospital Day: 5  Payor: CARE IMPROVEMENT PLUS [place] / Product Type: arGEN-X Care Medicare /      NAME/AGE/GENDER: Adam Marie is a 45 y.o. male         PRIMARY DIAGNOSIS:  Acute on chronic systolic heart failure (HCC) <principal problem not specified> <principal problem not specified>        ICD-10: Treatment Diagnosis:        · Generalized Muscle Weakness (M62.81)   Precautions/Allergies:         Dilaudid [hydromorphone]; Iodinated contrast- oral and iv dye; and Penicillins       ASSESSMENT:      Mr. Moulton First presents for the above with history of ejection fraction 10-15% per chart and history significant for morbid obesity. He is complaining of his calves hurting when standing for long periods of time - RN notified. He did complete bathroom/functional mobility for ADL with supervision and all else with supervision. Amenable to UE exercises sitting edge of bed with rest breaks.   Adam Marie presents with the following problems and would benefit from occupational therapy to maximize independence with self-care,instrumental activities of daily living, and functional transfers/mobility for activities of daily living/instrumental activities of daily living via the stated goals. This section established at most recent assessment   PROBLEM LIST (Impairments causing functional limitations):  1. Decreased Strength  2. Decreased ADL/Functional Activities  3. Decreased Transfer Abilities  4. Decreased Balance  5. Decreased Activity Tolerance  6. Decreased Pacing Skills  7. Decreased Work Simplification/Energy Conservation Techniques  8. Increased Fatigue  9. Increased Shortness of Breath  10. Decreased Flexibility/Joint Mobility  11. Edema/Girth  12. Decreased [place] with Home Exercise Program    INTERVENTIONS PLANNED: (Benefits and precautions of occupational therapy have been discussed with the patient.)  1. Activities of daily living training  2. Therapeutic activity  3. Therapeutic exercise      TREATMENT PLAN: Frequency/Duration: Follow patient 3 times/week to address above goals. Rehabilitation Potential For Stated Goals: GOOD      RECOMMENDED REHABILITATION/EQUIPMENT: (at time of discharge pending progress): Continue Skilled Therapy. OCCUPATIONAL PROFILE AND HISTORY:   History of Present Injury/Illness (Reason for Referral):  Per MD H & P: thisis a MO, endstage nonischemic cardiomyopathy male who has had decompensated CHF for months and rotating from [address] to [address] trying to get euvolemic. He has an EF<10% and suprisingly he is able to maintain a systolic QM>727 mmhg. He has cardio-renal syndrome. I have been consulted to assist in his plethora of issues. Past Medical History/Comorbidities:   Mr. Cierra Fried  has a past medical history of Acute encephalopathy (3/18/2017); Acute systolic heart failure [facility]) (May, 2009); AICD (automatic cardioverter/defibrillator) present (10/21/2015); Atypical chest pain (4/23/2010); Bronchitis; CAD (coronary artery disease); Cardiomyopathy; Chest pain (10/21/2015);  Chronic kidney disease; Chronic pain; Congestive heart failure (CHF) ([facility] (10/21/2015); COPD; Diabetes ([facility] Diabetes mellitus type II, uncontrolled ([facility] (7/2/2013); GERD (gastroesophageal reflux disease); Gout; Heart failure ([facility] Hypertension; Hyponatremia (12/20/2010); Ill-defined condition; Morbid obesity ([facility] Nausea & vomiting (11/30/2015); Neuropathy; Obstructive sleep apnea (2/15/2010); Other unknown and unspecified cause of morbidity or mortality; Severe sepsis ([facility] and Unspecified sleep apnea. He also has no past medical history of Adverse effect of anesthesia; Aneurysm ([facility] Coagulation disorder ([facility] DEMENTIA; Difficult intubation; Malignant hyperthermia due to anesthesia; Other ill-defined conditions; Pseudocholinesterase deficiency; or Psychiatric disorder. Mr. Concepcion Hawkins  has a past surgical history that includes pacemaker; heart catheterization ([place] 10, 2008); and chest surgery procedure unlisted. Social History/Living Environment:   Home Environment: Private residence  # Steps to Enter: 2  Rails to Enter: No  One/Two Story Residence: One story  Living Alone: No  Support Systems: Spouse/Significant Other/Partner  Patient Expects to be Discharged to[de-identified] Private residence  Current DME Used/Available at Home: Walker, Oxygen, portable  Tub or Shower Type: Tub/Shower combination  Prior Level of Function/Work/Activity:  Lives with wife and typically completes own ADL. States he uses a rolling walker - car transfers difficult - drives. Uses a power cart in the grocery store.   Previous Treatment Approaches:          Acute OT and PT   Number of Personal Factors/Comorbidities that affect the Plan of Care: Expanded review of therapy/medical records (1-2):  MODERATE COMPLEXITY   ASSESSMENT OF OCCUPATIONAL PERFORMANCE[de-identified]   Activities of Daily Living:          Basic ADLs (From Assessment) Complex ADLs (From Assessment)   Basic ADL  Feeding: Supervision  Oral Facial Hygiene/Grooming: Supervision  Bathing: Minimum assistance  Upper Body Dressing: Supervision  Lower Body Dressing: Minimum assistance  Toileting: Supervision Instrumental ADL  Meal Preparation: Minimum assistance  Homemaking: Minimum assistance   Grooming/Bathing/Dressing Activities of Daily Living     Cognitive Retraining  Safety/Judgement: Awareness of environment                 Functional Transfers  Bathroom Mobility: Supervision/set up     Bed/Mat Mobility  Sit to Stand: Supervision  Scooting: Supervision          Most Recent Physical Functioning:   Gross Assessment:  AROM: Generally decreased, functional  Strength: Generally decreased, functional               Posture:     Balance:  Sitting: Intact  Standing: Intact Bed Mobility:  Scooting: Supervision  Wheelchair Mobility:     Transfers:  Sit to Stand: Supervision  Stand to Sit: Supervision                 Patient Vitals for the past 6 hrs:       BP BP Patient Position SpO2 O2 Flow Rate (L/min) Pulse   06/24/17 0621 129/69 At rest 94 % - 77   06/24/17 0840 145/90 At rest 94 % - 80   06/24/17 1027 - - 98 % 3 l/min -   06/24/17 1130 - Post activity 98 % 2 l/min -        Mental Status  Neurologic State: Alert  Orientation Level: Oriented to person, Oriented to place  Cognition: Follows commands  Perception: Appears intact  Perseveration: No perseveration noted  Safety/Judgement: Awareness of environment                               Physical Skills Involved:  1. Range of Motion  2. Balance  3. Strength  4. Activity Tolerance  5. Edema Cognitive Skills Affected (resulting in the inability to perform in a timely and safe manner): 1. none noted Psychosocial Skills Affected:  1. Habits/Routines  2. Environmental Adaptation   Number of elements that affect the Plan of Care: 3-5:  MODERATE COMPLEXITY   CLINICAL DECISION MAKING:   [facility] -PAC 6 Clicks   Basic Mobility Inpatient Short Form  How much help from another person does the patient currently need. .. Total A Lot A Little None   1.   Putting on and taking off regular lower body clothing?   [ ] 1   [X] 2   [ ] 3   [ ] 4   2. Bathing (including washing, rinsing, drying)? [ ] 1   [ ] 2   [X] 3   [ ] 4   3. Toileting, which includes using toilet, bedpan or urinal?   [ ] 1   [ ] 2   [X] 3   [ ] 4   4. Putting on and taking off regular upper body clothing?   [ ] 1   [ ] 2   [X] 3   [ ] 4   5. Taking care of personal grooming such as brushing teeth? [ ] 1   [ ] 2   [X] 3   [ ] 4   6. Eating meals? [ ] 1   [ ] 2   [X] 3   [ ] 4   © 2007, Trustees of [address] [place] [address] [address] [id number], under license to [employer]. All rights reserved    Score:  Initial: 15 Most Recent: X (Date: -- )     Interpretation of Tool:  Represents activities that are increasingly more difficult (i.e. Bed mobility, Transfers, Gait). Score 24 23 22-20 19-15 14-10 9-7 6       Modifier CH CI CJ CK CL CM CN         · Self Care:               - CURRENT STATUS:    CK - 40%-59% impaired, limited or restricted               - GOAL STATUS:           CI - 1%-19% impaired, limited or restricted               - D/C STATUS:                       ---------------To be determined---------------  Payor: CARE IMPROVEMENT PLUS [place]PR[place] / Product Type: Managed Care Medicare /       Medical Necessity:     · Patient demonstrates good rehab potential due to higher previous functional level. Reason for Services/Other Comments:  · Patient continues to require skilled intervention due to decreased independence with ADL and functional mobility for ADL. Use of outcome tool(s) and clinical judgement create a POC that gives a: LOW COMPLEXITY             TREATMENT:   (In addition to Assessment/Re-Assessment sessions the following treatments were rendered)      Pre-treatment Symptoms/Complaints:    Pain: Initial:   Pain Intensity 1: 0  Post Session:  same      Therapeutic Exercise: ( 9 minutes):  Exercises per grid below to improve mobility, strength and balance.   Required minimal visual and verbal cues to promote proper body alignment, promote proper body posture, promote proper body mechanics and promote proper body breathing techniques. Progressed resistance, range, repetitions and complexity of movement as indicated. Date:  6/24 Date:    Date:      Activity/Exercise Parameters Parameters Parameters   Shoulder elevation 10-15 reps       Shoulder circumduction abducted to 90 degrees 2 sets 30-45 seconds       Overhead gripping 20-25 reps          Sit to stand/stand to sit 3 reps                          Braces/Orthotics/Lines/Etc:   · IV  · O2 Device: CPAP mask  Treatment/Session Assessment:  Completed BUE exercises with minimal visual/verbal cueing. · Response to Treatment:  See initial assessment above. Patients response to todays treatment session was tolerated well with no medical complications. · Interdisciplinary Collaboration:  · Occupational Therapist  · Registered Nurse  · After treatment position/precautions:  · Bed/Chair-wheels locked  · Bed in low position  · Call light within reach  · RN notified  · Side rails x 2  · sitting edge of bed  · Compliance with Program/Exercises: Will assess as treatment progresses. · Recommendations/Intent for next treatment session: \"Next visit will focus on advancements to more challenging activities\".   Total Treatment Duration:  OT Patient Time In/Time Out  Time In: 1108  Time Out: [facility] SUGAR Rahman, OTR/L

## 2017-06-24 NOTE — PROGRESS NOTES
Verbal bedside report given to oncoming RN, Jagruti Crocker. Patient's situation, background, assessment and recommendations provided. Opportunity for questions provided. Oncoming RN assumed care of patient.

## 2017-06-24 NOTE — PROGRESS NOTES
Bedside shift change report received from [address]. Report included the following information SBAR, Kardex, MAR and Recent Results.

## 2017-06-25 LAB
ANION GAP BLD CALC-SCNC: 10 MMOL/L (ref 7–16)
BUN SERPL-MCNC: 61 MG/DL (ref 6–23)
CALCIUM SERPL-MCNC: 9.2 MG/DL (ref 8.3–10.4)
CHLORIDE SERPL-SCNC: 95 MMOL/L (ref 98–107)
CO2 SERPL-SCNC: 35 MMOL/L (ref 21–32)
CREAT SERPL-MCNC: 3.25 MG/DL (ref 0.8–1.5)
GLUCOSE BLD STRIP.AUTO-MCNC: 116 MG/DL (ref 65–100)
GLUCOSE BLD STRIP.AUTO-MCNC: 131 MG/DL (ref 65–100)
GLUCOSE BLD STRIP.AUTO-MCNC: 146 MG/DL (ref 65–100)
GLUCOSE BLD STRIP.AUTO-MCNC: 202 MG/DL (ref 65–100)
GLUCOSE SERPL-MCNC: 119 MG/DL (ref 65–100)
MAGNESIUM SERPL-MCNC: 2 MG/DL (ref 1.8–2.4)
POTASSIUM SERPL-SCNC: 4.2 MMOL/L (ref 3.5–5.1)
SODIUM SERPL-SCNC: 140 MMOL/L (ref 136–145)

## 2017-06-25 PROCEDURE — 83735 ASSAY OF MAGNESIUM: CPT | Performed by: NURSE PRACTITIONER

## 2017-06-25 PROCEDURE — 74011636637 HC RX REV CODE- 636/637: Performed by: NURSE PRACTITIONER

## 2017-06-25 PROCEDURE — 36415 COLL VENOUS BLD VENIPUNCTURE: CPT | Performed by: NURSE PRACTITIONER

## 2017-06-25 PROCEDURE — 80048 BASIC METABOLIC PNL TOTAL CA: CPT | Performed by: NURSE PRACTITIONER

## 2017-06-25 PROCEDURE — 74011250636 HC RX REV CODE- 250/636: Performed by: NURSE PRACTITIONER

## 2017-06-25 PROCEDURE — 82962 GLUCOSE BLOOD TEST: CPT

## 2017-06-25 PROCEDURE — 74011250637 HC RX REV CODE- 250/637: Performed by: INTERNAL MEDICINE

## 2017-06-25 PROCEDURE — 74011250637 HC RX REV CODE- 250/637: Performed by: NURSE PRACTITIONER

## 2017-06-25 PROCEDURE — 65660000000 HC RM CCU STEPDOWN

## 2017-06-25 RX ORDER — HYDRALAZINE HYDROCHLORIDE 10 MG/1
10 TABLET, FILM COATED ORAL 3 TIMES DAILY
Status: DISCONTINUED | OUTPATIENT
Start: 2017-06-25 | End: 2017-06-28 | Stop reason: HOSPADM

## 2017-06-25 RX ORDER — ISOSORBIDE DINITRATE 10 MG/1
20 TABLET ORAL 3 TIMES DAILY
Status: DISCONTINUED | OUTPATIENT
Start: 2017-06-25 | End: 2017-06-28 | Stop reason: HOSPADM

## 2017-06-25 RX ADMIN — ISOSORBIDE DINITRATE 20 MG: 10 TABLET ORAL at 10:07

## 2017-06-25 RX ADMIN — ALLOPURINOL 100 MG: 100 TABLET ORAL at 10:07

## 2017-06-25 RX ADMIN — CARVEDILOL 6.25 MG: 6.25 TABLET, FILM COATED ORAL at 10:07

## 2017-06-25 RX ADMIN — SENNOSIDES 17.2 MG: 8.6 TABLET, FILM COATED ORAL at 22:52

## 2017-06-25 RX ADMIN — Medication 10 ML: at 23:01

## 2017-06-25 RX ADMIN — BUMETANIDE 2 MG: 1 TABLET ORAL at 17:14

## 2017-06-25 RX ADMIN — GABAPENTIN 300 MG: 300 CAPSULE ORAL at 17:13

## 2017-06-25 RX ADMIN — AMIODARONE HYDROCHLORIDE 200 MG: 200 TABLET ORAL at 10:07

## 2017-06-25 RX ADMIN — HYDRALAZINE HYDROCHLORIDE 10 MG: 10 TABLET, FILM COATED ORAL at 10:06

## 2017-06-25 RX ADMIN — BUMETANIDE 2 MG: 1 TABLET ORAL at 10:07

## 2017-06-25 RX ADMIN — INSULIN LISPRO 4 UNITS: 100 INJECTION, SOLUTION INTRAVENOUS; SUBCUTANEOUS at 17:10

## 2017-06-25 RX ADMIN — ISOSORBIDE DINITRATE 20 MG: 10 TABLET ORAL at 22:54

## 2017-06-25 RX ADMIN — POTASSIUM CHLORIDE 40 MEQ: 20 TABLET, EXTENDED RELEASE ORAL at 10:06

## 2017-06-25 RX ADMIN — HEPARIN SODIUM 5000 UNITS: 5000 INJECTION, SOLUTION INTRAVENOUS; SUBCUTANEOUS at 14:20

## 2017-06-25 RX ADMIN — GABAPENTIN 300 MG: 300 CAPSULE ORAL at 22:54

## 2017-06-25 RX ADMIN — CARVEDILOL 6.25 MG: 6.25 TABLET, FILM COATED ORAL at 17:13

## 2017-06-25 RX ADMIN — HYDRALAZINE HYDROCHLORIDE 10 MG: 10 TABLET, FILM COATED ORAL at 22:56

## 2017-06-25 RX ADMIN — DOCUSATE SODIUM 100 MG: 100 CAPSULE, LIQUID FILLED ORAL at 10:07

## 2017-06-25 RX ADMIN — Medication 10 ML: at 06:13

## 2017-06-25 RX ADMIN — TAMSULOSIN HYDROCHLORIDE 0.4 MG: 0.4 CAPSULE ORAL at 10:07

## 2017-06-25 RX ADMIN — Medication 10 ML: at 14:00

## 2017-06-25 RX ADMIN — POTASSIUM CHLORIDE 40 MEQ: 20 TABLET, EXTENDED RELEASE ORAL at 17:13

## 2017-06-25 RX ADMIN — HEPARIN SODIUM 5000 UNITS: 5000 INJECTION, SOLUTION INTRAVENOUS; SUBCUTANEOUS at 22:59

## 2017-06-25 RX ADMIN — SPIRONOLACTONE 50 MG: 25 TABLET, FILM COATED ORAL at 10:07

## 2017-06-25 RX ADMIN — HYDRALAZINE HYDROCHLORIDE 10 MG: 10 TABLET, FILM COATED ORAL at 17:13

## 2017-06-25 RX ADMIN — DOCUSATE SODIUM 100 MG: 100 CAPSULE, LIQUID FILLED ORAL at 17:14

## 2017-06-25 RX ADMIN — HEPARIN SODIUM 5000 UNITS: 5000 INJECTION, SOLUTION INTRAVENOUS; SUBCUTANEOUS at 06:12

## 2017-06-25 RX ADMIN — GABAPENTIN 300 MG: 300 CAPSULE ORAL at 10:07

## 2017-06-25 RX ADMIN — FAMOTIDINE 20 MG: 20 TABLET ORAL at 10:07

## 2017-06-25 RX ADMIN — ISOSORBIDE DINITRATE 20 MG: 10 TABLET ORAL at 17:14

## 2017-06-25 NOTE — PROGRESS NOTES
Bedside shift change report given to Princess Gwyn ROBBINS. Report included the following information SBAR, Kardex, MAR and Recent Results.

## 2017-06-25 NOTE — PROGRESS NOTES
Bedside and Verbal shift change report given to Suraj Chirinos RN (oncoming nurse) by self Tomas Bias nurse). Report included the following information Kardex, Intake/Output, MAR and Cardiac Rhythm NSR with first degree AVB. Jairon Atwood

## 2017-06-25 NOTE — PROGRESS NOTES
Bedside shift change report received from [address] [place] [address]. Report included the following information SBAR, Kardex, MAR and Recent Results.

## 2017-06-25 NOTE — PROGRESS NOTES
[facility] CARDIOLOGY PROGRESS NOTE           6/25/2017 8:27 AM    Admit Date: 6/20/2017      Subjective:   Feeling better. Less dyspnea      Objective:      Vitals:    06/24/17 2059 06/24/17 2148 06/25/17 0052 06/25/17 0505   BP: 141/81  117/69 127/61   Pulse: 75  75 75   Resp: 22  20 20   Temp: 98 °F (36.7 °C)  97.9 °F (36.6 °C) 97.6 °F (36.4 °C)   SpO2: 99% 99% 96% 95%   Weight:    (!) 171.9 kg (378 lb 14.4 oz)   Height:           Physical Exam:  General-No Acute Distress, on the bedside, some somnolence    Data Review:   Recent Labs      06/25/17   0400  06/24/17   0457   NA  140  138   K  4.2  4.0   MG  2.0  1.9   BUN  61*  60*   CREA  3.25*  3.10*   GLU  119*  143*       Assessment/Plan:     End stage HFrEF  A/ckd  Victor Hugo  M. Obesity  ///  Improved. Hydralazine/isdn combo added. Cont. Close observe.     Caryle Citron, MD  6/25/2017 8:27 AM

## 2017-06-26 LAB
ANION GAP BLD CALC-SCNC: 8 MMOL/L (ref 7–16)
BUN SERPL-MCNC: 59 MG/DL (ref 6–23)
CALCIUM SERPL-MCNC: 9.2 MG/DL (ref 8.3–10.4)
CHLORIDE SERPL-SCNC: 94 MMOL/L (ref 98–107)
CO2 SERPL-SCNC: 36 MMOL/L (ref 21–32)
CREAT SERPL-MCNC: 3.31 MG/DL (ref 0.8–1.5)
GLUCOSE BLD STRIP.AUTO-MCNC: 130 MG/DL (ref 65–100)
GLUCOSE BLD STRIP.AUTO-MCNC: 140 MG/DL (ref 65–100)
GLUCOSE BLD STRIP.AUTO-MCNC: 158 MG/DL (ref 65–100)
GLUCOSE SERPL-MCNC: 136 MG/DL (ref 65–100)
MAGNESIUM SERPL-MCNC: 1.9 MG/DL (ref 1.8–2.4)
POTASSIUM SERPL-SCNC: 4 MMOL/L (ref 3.5–5.1)
SODIUM SERPL-SCNC: 138 MMOL/L (ref 136–145)

## 2017-06-26 PROCEDURE — 77010033678 HC OXYGEN DAILY

## 2017-06-26 PROCEDURE — 97110 THERAPEUTIC EXERCISES: CPT

## 2017-06-26 PROCEDURE — 36415 COLL VENOUS BLD VENIPUNCTURE: CPT | Performed by: NURSE PRACTITIONER

## 2017-06-26 PROCEDURE — 74011250637 HC RX REV CODE- 250/637: Performed by: NURSE PRACTITIONER

## 2017-06-26 PROCEDURE — 74011250637 HC RX REV CODE- 250/637: Performed by: INTERNAL MEDICINE

## 2017-06-26 PROCEDURE — 74011250636 HC RX REV CODE- 250/636: Performed by: NURSE PRACTITIONER

## 2017-06-26 PROCEDURE — 83735 ASSAY OF MAGNESIUM: CPT | Performed by: NURSE PRACTITIONER

## 2017-06-26 PROCEDURE — 80048 BASIC METABOLIC PNL TOTAL CA: CPT | Performed by: NURSE PRACTITIONER

## 2017-06-26 PROCEDURE — 94760 N-INVAS EAR/PLS OXIMETRY 1: CPT

## 2017-06-26 PROCEDURE — 65660000000 HC RM CCU STEPDOWN

## 2017-06-26 PROCEDURE — 82962 GLUCOSE BLOOD TEST: CPT

## 2017-06-26 RX ORDER — BUMETANIDE 1 MG/1
1 TABLET ORAL 2 TIMES DAILY
Status: DISCONTINUED | OUTPATIENT
Start: 2017-06-26 | End: 2017-06-28 | Stop reason: HOSPADM

## 2017-06-26 RX ADMIN — Medication 5 ML: at 22:50

## 2017-06-26 RX ADMIN — CARVEDILOL 6.25 MG: 6.25 TABLET, FILM COATED ORAL at 08:57

## 2017-06-26 RX ADMIN — HYDRALAZINE HYDROCHLORIDE 10 MG: 10 TABLET, FILM COATED ORAL at 17:01

## 2017-06-26 RX ADMIN — POTASSIUM CHLORIDE 40 MEQ: 20 TABLET, EXTENDED RELEASE ORAL at 17:01

## 2017-06-26 RX ADMIN — AMIODARONE HYDROCHLORIDE 200 MG: 200 TABLET ORAL at 08:58

## 2017-06-26 RX ADMIN — BUMETANIDE 2 MG: 1 TABLET ORAL at 08:58

## 2017-06-26 RX ADMIN — POTASSIUM CHLORIDE 40 MEQ: 20 TABLET, EXTENDED RELEASE ORAL at 09:00

## 2017-06-26 RX ADMIN — SENNOSIDES 17.2 MG: 8.6 TABLET, FILM COATED ORAL at 22:48

## 2017-06-26 RX ADMIN — OXYCODONE HYDROCHLORIDE 10 MG: 5 TABLET ORAL at 05:48

## 2017-06-26 RX ADMIN — DOCUSATE SODIUM 100 MG: 100 CAPSULE, LIQUID FILLED ORAL at 08:58

## 2017-06-26 RX ADMIN — HYDRALAZINE HYDROCHLORIDE 10 MG: 10 TABLET, FILM COATED ORAL at 08:58

## 2017-06-26 RX ADMIN — HEPARIN SODIUM 5000 UNITS: 5000 INJECTION, SOLUTION INTRAVENOUS; SUBCUTANEOUS at 22:48

## 2017-06-26 RX ADMIN — HEPARIN SODIUM 5000 UNITS: 5000 INJECTION, SOLUTION INTRAVENOUS; SUBCUTANEOUS at 14:46

## 2017-06-26 RX ADMIN — Medication 10 ML: at 14:47

## 2017-06-26 RX ADMIN — ISOSORBIDE DINITRATE 20 MG: 10 TABLET ORAL at 08:58

## 2017-06-26 RX ADMIN — TAMSULOSIN HYDROCHLORIDE 0.4 MG: 0.4 CAPSULE ORAL at 08:58

## 2017-06-26 RX ADMIN — ISOSORBIDE DINITRATE 20 MG: 10 TABLET ORAL at 17:01

## 2017-06-26 RX ADMIN — CARVEDILOL 6.25 MG: 6.25 TABLET, FILM COATED ORAL at 17:00

## 2017-06-26 RX ADMIN — HYDRALAZINE HYDROCHLORIDE 10 MG: 10 TABLET, FILM COATED ORAL at 22:48

## 2017-06-26 RX ADMIN — SPIRONOLACTONE 50 MG: 25 TABLET, FILM COATED ORAL at 08:57

## 2017-06-26 RX ADMIN — OXYCODONE HYDROCHLORIDE 10 MG: 5 TABLET ORAL at 23:06

## 2017-06-26 RX ADMIN — ALLOPURINOL 100 MG: 100 TABLET ORAL at 08:58

## 2017-06-26 RX ADMIN — FAMOTIDINE 20 MG: 20 TABLET ORAL at 08:58

## 2017-06-26 RX ADMIN — ISOSORBIDE DINITRATE 20 MG: 10 TABLET ORAL at 22:47

## 2017-06-26 RX ADMIN — GABAPENTIN 300 MG: 300 CAPSULE ORAL at 17:00

## 2017-06-26 RX ADMIN — BUMETANIDE 1 MG: 1 TABLET ORAL at 17:01

## 2017-06-26 RX ADMIN — DOCUSATE SODIUM 100 MG: 100 CAPSULE, LIQUID FILLED ORAL at 17:01

## 2017-06-26 RX ADMIN — HEPARIN SODIUM 5000 UNITS: 5000 INJECTION, SOLUTION INTRAVENOUS; SUBCUTANEOUS at 05:41

## 2017-06-26 RX ADMIN — GABAPENTIN 300 MG: 300 CAPSULE ORAL at 08:58

## 2017-06-26 RX ADMIN — Medication 10 ML: at 05:41

## 2017-06-26 RX ADMIN — GABAPENTIN 300 MG: 300 CAPSULE ORAL at 22:49

## 2017-06-26 NOTE — PROGRESS NOTES
[facility] CARDIOLOGY PROGRESS NOTE           6/26/2017 10:39 AM    Admit Date: 6/20/2017      Subjective:   Feels ok. ROS:  GEN:  No fever or chills  Cardiovascular:  As noted above:no CP or palpitations. Pulmonary:  As noted above:less SOB. Neuro:  No new focal motor or sensory loss    Objective:      Vitals:    06/25/17 1958 06/25/17 2150 06/26/17 0118 06/26/17 0440   BP:  133/79 112/62 153/71   Pulse:  79 77 83   Resp:  18 18 18   Temp:  97.8 °F (36.6 °C) 97.2 °F (36.2 °C) 97.4 °F (36.3 °C)   SpO2: 96% 95% 97% 98%   Weight:    (!) 169.1 kg (372 lb 14.4 oz)   Height:           Physical Exam:  General-no distress  Neck- supple, no JVD  CV- regular rate and rhythm no MRG  Lung- clear bilaterally  Abd- soft, nontender, nondistended  Ext- 2-3+ bilateral  edema bilaterally. Skin- warm and dry  Psychiatric:  Normal mood and affect. Neurologic:  Alert and oriented X 3      Data Review:   Recent Labs      06/26/17   0440  06/25/17   0400   NA  138  140   K  4.0  4.2   MG  1.9  2.0   BUN  59*  61*   CREA  3.31*  3.25*   GLU  136*  119*   Telemetry:NSR    Assessment/Plan:     Active Problems:    Diabetes mellitus type II, uncontrolled ([facility] (3/18/2017)      Abdominal fluid collection (8/18/2014)      Overview: Perisplenic and splenic, likely related to pancreatitis      AICD (automatic cardioverter/defibrillator) present (3/18/2017)      Cardiomyopathy ([facility] (3/18/2017):End stage. Refused hospice in past.      Hypertension (3/18/2017)      Acute on chronic systolic (congestive) heart failure (HCC) (10/18/2016):Continue Hydralazine and ISDN. Adjusting diuretic therapy due to renal failure      Obesity hypoventilation syndrome ([facility] (3/18/2017)      Morbid obesity with BMI of 60.0-69.9, adult ([facility] (3/18/2017)      Acute renal failure (ARF) ([facility]) (3/18/2017):Creatinine is slowly rising. I wll decrease Bumex. BMP daily.       Acute on chronic systolic heart failure ([facility] (6/20/2017)                Tin Perla Minal Castillo MD  6/26/2017 10:39 AM

## 2017-06-26 NOTE — PROGRESS NOTES
Problem: Mobility Impaired (Adult and Pediatric)  Goal: *Acute Goals and Plan of Care (Insert Text)  STG:  (1.)Mr. Pollard will move from supine to sit and sit to supine , scoot up and down and roll side to side with CONTACT GUARD ASSIST within 3 day(s). (2.)Mr. Pollard will transfer from bed to chair and chair to bed with STAND BY ASSIST using the least restrictive device within 3 day(s). (3.)Mr. Pollard will ambulate with CONTACT GUARD ASSIST for 100 feet with the least restrictive device within 3 day(s). LTG:  (1.)Mr. Pollard will move from supine to sit and sit to supine , scoot up and down and roll side to side in bed with INDEPENDENT within 7 day(s). (2.)Mr. Pollard will transfer from bed to chair and chair to bed with MODIFIED INDEPENDENCE using the least restrictive device within 7 day(s). (3.)Mr. Pollard will ambulate with SUPERVISION for 250+ feet with the least restrictive device within 7 day(s). ________________________________________________________________________________________________      PHYSICAL THERAPY: Daily Note, Treatment Day: 1st and AM 6/26/2017  INPATIENT: Hospital Day: 7  Payor: CARE IMPROVEMENT PLUS / [place] / Product Type: [employer] Medicare /      NAME/AGE/GENDER: Clarisa Werner is a 45 y.o. male         PRIMARY DIAGNOSIS: Acute on chronic systolic heart failure (HCC) <principal problem not specified> <principal problem not specified>        ICD-10: Treatment Diagnosis:       · Generalized Muscle Weakness (M62.81)  · Difficulty in walking, Not elsewhere classified (R26.2)   Precaution/Allergies:  Dilaudid [hydromorphone]; Iodinated contrast- oral and iv dye; and Penicillins       ASSESSMENT:      Mr. Armani Andersen was sound asleep in bed upon contact and agreeable to PT after waking up. Patient states drowsy due to taking pain medication this morning. He took off his O2 and stated that he didn't need it. Patient required SBA for transfers.   He ambulated 250' with several standing rest breaks, also holding siderail at times. HR increased from 75 to 84 and SpO2 90% after ambulation. Sat in chair to eat lunch. Patient demonstrated progress with activity tolerance today. Keely Bonner will benefit from skilled PT (medically necessary) to address decreased strength, decreased balance, decreased functional tolerance, decreased cardiopulmonary endurance affecting participation in basic ADLs and functional tasks. This section established at most recent assessment   PROBLEM LIST (Impairments causing functional limitations):  1. Decreased Strength  2. Decreased ADL/Functional Activities  3. Decreased Transfer Abilities  4. Decreased Ambulation Ability/Technique  5. Decreased Balance  6. Increased Pain  7. Decreased Activity Tolerance  8. Increased Fatigue  9. Increased Shortness of Breath    INTERVENTIONS PLANNED: (Benefits and precautions of physical therapy have been discussed with the patient.)  1. Balance Exercise  2. Bed Mobility  3. Family Education  4. Gait Training  5. Home Exercise Program (HEP)  6. Neuromuscular Re-education/Strengthening  7. Range of Motion (ROM)  8. Therapeutic Activites  9. Therapeutic Exercise/Strengthening  10. Transfer Training  11. Group Therapy      TREATMENT PLAN: Frequency/Duration: 3 times a week for duration of hospital stay  Rehabilitation Potential For Stated Goals: GOOD      RECOMMENDED REHABILITATION/EQUIPMENT: (at time of discharge pending progress): Continue Skilled Therapy. HISTORY:   History of Present Injury/Illness (Reason for Referral):  See H&P below  Patient is followed by Dr. Sofía Zavala with cardiology. Has congestive heart failure and high blood pressure. Has chronic kidney disease and diabetes. Has COPD. Has an AICD in place. Presents with 85 pound weight gain since January per patient.   He was admitted 2 weeks ago to another facility for diuresis with shortness of breath and congestive heart failure. States 7 days but does not feel it was long enough. Shortness of breath is worsening in the spread of edema up both legs. Past Medical History/Comorbidities:   Mr. Elias Flores  has a past medical history of Acute encephalopathy (3/18/2017); Acute systolic heart failure [facility]) (May, 2009); AICD (automatic cardioverter/defibrillator) present (10/21/2015); Atypical chest pain (4/23/2010); Bronchitis; CAD (coronary artery disease); Cardiomyopathy; Chest pain (10/21/2015); Chronic kidney disease; Chronic pain; Congestive heart failure (CHF) ([address] (10/21/2015); COPD; Diabetes ([address] Diabetes mellitus type II, uncontrolled ([address] (7/2/2013); GERD (gastroesophageal reflux disease); Gout; Heart failure ([address] Hypertension; Hyponatremia (12/20/2010); Ill-defined condition; Morbid obesity ([address] Nausea & vomiting (11/30/2015); Neuropathy; Obstructive sleep apnea (2/15/2010); Other unknown and unspecified cause of morbidity or mortality; Severe sepsis ([address] and Unspecified sleep apnea. He also has no past medical history of Adverse effect of anesthesia; Aneurysm ([address] Coagulation disorder ([address] DEMENTIA; Difficult intubation; Malignant hyperthermia due to anesthesia; Other ill-defined conditions; Pseudocholinesterase deficiency; or Psychiatric disorder. Mr. Elias Flores  has a past surgical history that includes pacemaker; heart catheterization ([place] [facility], 2008); and chest surgery procedure unlisted.   Social History/Living Environment:   Home Environment: Private residence  # Steps to Enter: 2  Rails to Enter: No  One/Two Story Residence: One story  Living Alone: No  Support Systems: Spouse/Significant Other/Partner  Patient Expects to be Discharged to[de-identified] Private residence  Current DME Used/Available at Home: Walker, Oxygen, portable  Tub or Shower Type: Tub/Shower combination  Prior Level of Function/Work/Activity:  Lives with wife, indep with gait until 1 month ago now uses walker, mostly indep with ADLs requires assist at times for set up from wife, drives      Number of Personal Factors/Comorbidities that affect the Plan of Care: 3+: HIGH COMPLEXITY   EXAMINATION:   Most Recent Physical Functioning:   Gross Assessment:                  Posture:     Balance:  Sitting: Intact  Standing: Impaired  Standing - Static: Good  Standing - Dynamic : Fair Bed Mobility:  Supine to Sit: Stand-by asssistance  Scooting: Stand-by asssistance  Wheelchair Mobility:     Transfers:  Sit to Stand: Stand-by asssistance  Stand to Sit: Stand-by asssistance  Gait:     Base of Support: Widened  Speed/Mira: Slow  Step Length: Right shortened;Left shortened  Gait Abnormalities: Decreased step clearance;Trunk sway increased  Distance (ft): 250 Feet (ft) (with multiple standing rest breaks)  Assistive Device: Other (comment) (holding siderail in hallway at times)  Ambulation - Level of Assistance: Stand-by asssistance       Body Structures Involved:  1. Heart  2. Lungs  3. Bones  4. Joints  5. Muscles Body Functions Affected:  1. Sensory/Pain  2. Cardio  3. Respiratory  4. Neuromusculoskeletal  5. Movement Related Activities and Participation Affected:  1. General Tasks and Demands  2. Mobility  3. Self Care  4. Domestic Life  5. Interpersonal Interactions and Relationships  6. Community, Social and [place]   Number of elements that affect the Plan of Care: 4+: HIGH COMPLEXITY   CLINICAL PRESENTATION:   Presentation: Evolving clinical presentation with changing clinical characteristics: MODERATE COMPLEXITY   CLINICAL DECISION MAKIN [facility] Mobility Inpatient Short Form  How much difficulty does the patient currently have. .. Unable A Lot A Little None   1. Turning over in bed (including adjusting bedclothes, sheets and blankets)? [ ] 1   [ ] 2   [X] 3   [ ] 4   2. Sitting down on and standing up from a chair with arms ( e.g., wheelchair, bedside commode, etc.)   [ ] 1   [ ] 2   [X] 3   [ ] 4   3. Moving from lying on back to sitting on the side of the bed? [ ] 1   [ ] 2   [X] 3   [ ] 4   How much help from another person does the patient currently need. .. Total A Lot A Little None   4. Moving to and from a bed to a chair (including a wheelchair)? [ ] 1   [ ] 2   [X] 3   [ ] 4   5. Need to walk in hospital room? [ ] 1   [ ] 2   [X] 3   [ ] 4   6. Climbing 3-5 steps with a railing? [ ] 1   [X] 2   [ ] 3   [ ] 4   © 2007, Trustees of [address] [place] [address] [address] [id number], under license to [employer]. All rights reserved    Score:  Initial: 17 Most Recent: X (Date: -- )     Interpretation of Tool:  Represents activities that are increasingly more difficult (i.e. Bed mobility, Transfers, Gait). Score 24 23 22-20 19-15 14-10 9-7 6       Modifier CH CI CJ CK CL CM CN         · Mobility - Walking and Moving Around:               - CURRENT STATUS:    CK - 40%-59% impaired, limited or restricted               - GOAL STATUS:           CJ - 20%-39% impaired, limited or restricted               - D/C STATUS:                       ---------------To be determined---------------  Payor: CARE IMPROVEMENT PLUS [place] / Product Type: Managed Care Medicare /       Medical Necessity:     · Patient is expected to demonstrate progress in strength, balance, coordination and functional technique to decrease assistance required with gait and functional mobility. Reason for Services/Other Comments:  · Patient continues to require skilled intervention due to decreased strength, decreased balance, decreased functional tolerance, decreased cardiopulmonary endurance affecting participation in basic ADLs and functional tasks.    Use of outcome tool(s) and clinical judgement create a POC that gives a: Clear prediction of patient's progress: LOW COMPLEXITY                 TREATMENT:   (In addition to Assessment/Re-Assessment sessions the following treatments were rendered)   Pre-treatment Symptoms/Complaints:  Generalized muscle soreness  Pain: Initial:   Pain Intensity 1: 2  Pain Location 1: Back  Pain Orientation 1: Lower  Pain Intervention(s) 1: Repositioned  Post Session:  8/10 after ambulation   In addition to evaluation:  Therapeutic Exercise: ( 23 minutes):  Exercises per grid below to improve mobility, strength and coordination. Required minimal visual, verbal, manual and tactile cues to promote proper body alignment, promote proper body posture and promote proper body mechanics. Progressed range, repetitions and complexity of movement as indicated. Date:  6/22/17 Date:  6/26/17  Date:      Activity/Exercise Parameters Parameters Parameters   ambulation  250'  Holding hand rail  SBA    Seated LAQ 15 X B       Seated alt marches 15 X B       Supine ankle pumps 15 X B       Supine quad set 15 X B       Supine glute set 15 X       Supine hip abduction  15 X L             Supine heel slides 15 X B                Braces/Orthotics/Lines/Etc:   · IV  · O2: NC  Treatment/Session Assessment:    · Response to Treatment:  Increased LBP with standing, increased HR with activity. · Interdisciplinary Collaboration:  · Physical Therapist  · Registered Nurse  · After treatment position/precautions:  · Supine in bed, Bed/Chair-wheels locked, Call light within reach and RN notified  · Compliance with Program/Exercises: Will assess as treatment progresses. · Recommendations/Intent for next treatment session: \"Next visit will focus on advancements to more challenging activities and reduction in assistance provided\".   Total Treatment Duration:  PT Patient Time In/Time Out  Time In: 1145  Time Out: 791 Lukas Jaimes, PT

## 2017-06-26 NOTE — PROGRESS NOTES
Bedside and Verbal shift change report given to Serafin Doran RN (oncoming nurse) by SELF (offgoing nurse). Report included the following information Kardex, Intake/Output, MAR, Recent Results and Cardiac Rhythm NSR with first degree AVB. Rissa Snell

## 2017-06-27 LAB
ANION GAP BLD CALC-SCNC: 8 MMOL/L (ref 7–16)
BUN SERPL-MCNC: 60 MG/DL (ref 6–23)
CALCIUM SERPL-MCNC: 9.4 MG/DL (ref 8.3–10.4)
CHLORIDE SERPL-SCNC: 94 MMOL/L (ref 98–107)
CO2 SERPL-SCNC: 36 MMOL/L (ref 21–32)
CREAT SERPL-MCNC: 3.31 MG/DL (ref 0.8–1.5)
GLUCOSE BLD STRIP.AUTO-MCNC: 103 MG/DL (ref 65–100)
GLUCOSE BLD STRIP.AUTO-MCNC: 150 MG/DL (ref 65–100)
GLUCOSE BLD STRIP.AUTO-MCNC: 162 MG/DL (ref 65–100)
GLUCOSE BLD STRIP.AUTO-MCNC: 163 MG/DL (ref 65–100)
GLUCOSE SERPL-MCNC: 114 MG/DL (ref 65–100)
MAGNESIUM SERPL-MCNC: 1.8 MG/DL (ref 1.8–2.4)
POTASSIUM SERPL-SCNC: 3.9 MMOL/L (ref 3.5–5.1)
SODIUM SERPL-SCNC: 138 MMOL/L (ref 136–145)

## 2017-06-27 PROCEDURE — 74011250637 HC RX REV CODE- 250/637: Performed by: INTERNAL MEDICINE

## 2017-06-27 PROCEDURE — 97530 THERAPEUTIC ACTIVITIES: CPT

## 2017-06-27 PROCEDURE — 94760 N-INVAS EAR/PLS OXIMETRY 1: CPT

## 2017-06-27 PROCEDURE — 80048 BASIC METABOLIC PNL TOTAL CA: CPT | Performed by: NURSE PRACTITIONER

## 2017-06-27 PROCEDURE — 83735 ASSAY OF MAGNESIUM: CPT | Performed by: NURSE PRACTITIONER

## 2017-06-27 PROCEDURE — 82962 GLUCOSE BLOOD TEST: CPT

## 2017-06-27 PROCEDURE — 65660000000 HC RM CCU STEPDOWN

## 2017-06-27 PROCEDURE — 74011636637 HC RX REV CODE- 636/637: Performed by: NURSE PRACTITIONER

## 2017-06-27 PROCEDURE — 94660 CPAP INITIATION&MGMT: CPT

## 2017-06-27 PROCEDURE — 74011250637 HC RX REV CODE- 250/637: Performed by: NURSE PRACTITIONER

## 2017-06-27 PROCEDURE — 36415 COLL VENOUS BLD VENIPUNCTURE: CPT | Performed by: NURSE PRACTITIONER

## 2017-06-27 PROCEDURE — 74011250636 HC RX REV CODE- 250/636: Performed by: NURSE PRACTITIONER

## 2017-06-27 PROCEDURE — 77010033678 HC OXYGEN DAILY

## 2017-06-27 RX ORDER — MAG HYDROX/ALUMINUM HYD/SIMETH 200-200-20
30 SUSPENSION, ORAL (FINAL DOSE FORM) ORAL
Status: DISCONTINUED | OUTPATIENT
Start: 2017-06-27 | End: 2017-06-28 | Stop reason: HOSPADM

## 2017-06-27 RX ADMIN — SPIRONOLACTONE 50 MG: 25 TABLET, FILM COATED ORAL at 08:42

## 2017-06-27 RX ADMIN — HYDRALAZINE HYDROCHLORIDE 10 MG: 10 TABLET, FILM COATED ORAL at 21:25

## 2017-06-27 RX ADMIN — ALUMINUM HYDROXIDE, MAGNESIUM HYDROXIDE, AND SIMETHICONE 30 ML: 200; 200; 20 SUSPENSION ORAL at 21:47

## 2017-06-27 RX ADMIN — HEPARIN SODIUM 5000 UNITS: 5000 INJECTION, SOLUTION INTRAVENOUS; SUBCUTANEOUS at 06:32

## 2017-06-27 RX ADMIN — ISOSORBIDE DINITRATE 20 MG: 10 TABLET ORAL at 21:25

## 2017-06-27 RX ADMIN — OXYCODONE HYDROCHLORIDE 10 MG: 5 TABLET ORAL at 11:30

## 2017-06-27 RX ADMIN — Medication 5 ML: at 06:33

## 2017-06-27 RX ADMIN — Medication 10 ML: at 21:30

## 2017-06-27 RX ADMIN — Medication 5 ML: at 14:12

## 2017-06-27 RX ADMIN — DOCUSATE SODIUM 100 MG: 100 CAPSULE, LIQUID FILLED ORAL at 08:42

## 2017-06-27 RX ADMIN — CARVEDILOL 6.25 MG: 6.25 TABLET, FILM COATED ORAL at 17:16

## 2017-06-27 RX ADMIN — FAMOTIDINE 20 MG: 20 TABLET ORAL at 08:42

## 2017-06-27 RX ADMIN — GABAPENTIN 300 MG: 300 CAPSULE ORAL at 08:43

## 2017-06-27 RX ADMIN — BUMETANIDE 1 MG: 1 TABLET ORAL at 08:42

## 2017-06-27 RX ADMIN — DOCUSATE SODIUM 100 MG: 100 CAPSULE, LIQUID FILLED ORAL at 17:16

## 2017-06-27 RX ADMIN — ISOSORBIDE DINITRATE 20 MG: 10 TABLET ORAL at 08:42

## 2017-06-27 RX ADMIN — GABAPENTIN 300 MG: 300 CAPSULE ORAL at 21:25

## 2017-06-27 RX ADMIN — INSULIN LISPRO 2 UNITS: 100 INJECTION, SOLUTION INTRAVENOUS; SUBCUTANEOUS at 17:16

## 2017-06-27 RX ADMIN — POTASSIUM CHLORIDE 40 MEQ: 20 TABLET, EXTENDED RELEASE ORAL at 08:42

## 2017-06-27 RX ADMIN — INSULIN LISPRO 2 UNITS: 100 INJECTION, SOLUTION INTRAVENOUS; SUBCUTANEOUS at 08:42

## 2017-06-27 RX ADMIN — POTASSIUM CHLORIDE 40 MEQ: 20 TABLET, EXTENDED RELEASE ORAL at 17:16

## 2017-06-27 RX ADMIN — HEPARIN SODIUM 5000 UNITS: 5000 INJECTION, SOLUTION INTRAVENOUS; SUBCUTANEOUS at 21:24

## 2017-06-27 RX ADMIN — OXYCODONE HYDROCHLORIDE 10 MG: 5 TABLET ORAL at 21:25

## 2017-06-27 RX ADMIN — HEPARIN SODIUM 5000 UNITS: 5000 INJECTION, SOLUTION INTRAVENOUS; SUBCUTANEOUS at 14:12

## 2017-06-27 RX ADMIN — CARVEDILOL 6.25 MG: 6.25 TABLET, FILM COATED ORAL at 08:42

## 2017-06-27 RX ADMIN — INSULIN LISPRO 2 UNITS: 100 INJECTION, SOLUTION INTRAVENOUS; SUBCUTANEOUS at 12:22

## 2017-06-27 RX ADMIN — ISOSORBIDE DINITRATE 20 MG: 10 TABLET ORAL at 15:46

## 2017-06-27 RX ADMIN — AMIODARONE HYDROCHLORIDE 200 MG: 200 TABLET ORAL at 08:42

## 2017-06-27 RX ADMIN — BUMETANIDE 1 MG: 1 TABLET ORAL at 17:16

## 2017-06-27 RX ADMIN — TAMSULOSIN HYDROCHLORIDE 0.4 MG: 0.4 CAPSULE ORAL at 08:42

## 2017-06-27 RX ADMIN — HYDRALAZINE HYDROCHLORIDE 10 MG: 10 TABLET, FILM COATED ORAL at 08:42

## 2017-06-27 RX ADMIN — ALLOPURINOL 100 MG: 100 TABLET ORAL at 08:42

## 2017-06-27 RX ADMIN — HYDRALAZINE HYDROCHLORIDE 10 MG: 10 TABLET, FILM COATED ORAL at 15:46

## 2017-06-27 RX ADMIN — GABAPENTIN 300 MG: 300 CAPSULE ORAL at 15:46

## 2017-06-27 NOTE — PROGRESS NOTES
OT Note:    OT attempted to see patient this afternoon for therapy. Pt asleep at this time. OT will re-attempt to see patient at a later date/time.     Thanks,  Britney Mahan

## 2017-06-27 NOTE — PROGRESS NOTES
Problem: Mobility Impaired (Adult and Pediatric)  Goal: *Acute Goals and Plan of Care (Insert Text)  STG:  (1.)Mr. Pollard will move from supine to sit and sit to supine , scoot up and down and roll side to side with CONTACT GUARD ASSIST within 3 day(s). (2.)Mr. Pollard will transfer from bed to chair and chair to bed with STAND BY ASSIST using the least restrictive device within 3 day(s). (3.)Mr. Pollard will ambulate with CONTACT GUARD ASSIST for 100 feet with the least restrictive device within 3 day(s). GOAL MET 6/27/2017    LTG:  (1.)Mr. Pollard will move from supine to sit and sit to supine , scoot up and down and roll side to side in bed with INDEPENDENT within 7 day(s). (2.)Mr. Pollard will transfer from bed to chair and chair to bed with MODIFIED INDEPENDENCE using the least restrictive device within 7 day(s). (3.)Mr. Pollard will ambulate with SUPERVISION for 250+ feet with the least restrictive device within 7 day(s). ________________________________________________________________________________________________      PHYSICAL THERAPY: Daily Note, Treatment Day: 2nd and AM 6/27/2017  INPATIENT: Hospital Day: 8  Payor: CARE IMPROVEMENT PLUS [place] / Product Type: [employer] Medicare /      NAME/AGE/GENDER: Aman Houston is a 45 y.o. male         PRIMARY DIAGNOSIS: Acute on chronic systolic heart failure (HCC) <principal problem not specified> <principal problem not specified>        ICD-10: Treatment Diagnosis:       · Generalized Muscle Weakness (M62.81)  · Difficulty in walking, Not elsewhere classified (R26.2)   Precaution/Allergies:  Dilaudid [hydromorphone]; Iodinated contrast- oral and iv dye; and Penicillins       ASSESSMENT:      Mr. Partida was sitting EOB on arrival, spouse present in room. Pt on 2 L/min O2, O2 stats 98%. Pt removed O2 for ambulation stating \"I don't need it\".  Pt supervision to SBA for sit to stand transfers, ambulated few feet to bathroom with SBA, pt independent in toileting needs. Pt demo good static standing balance, fair dynamic standing. Pt ambulated into hallway for 250' on room air, O2 stats dropped to 85% after 100', cued for pursed lip breathing, returned to 92% within a few seconds of rest. Pt encouraged in pursed lip breathing with activity, educated in energy conservation techniques. Overall pt making progress toward goals, PT to cont to follow for acute care needs. Pt may benefit from HHPT at discharge pending progress to assess safety and mobility in the home. This section established at most recent assessment   PROBLEM LIST (Impairments causing functional limitations):  1. Decreased Strength  2. Decreased ADL/Functional Activities  3. Decreased Transfer Abilities  4. Decreased Ambulation Ability/Technique  5. Decreased Balance  6. Increased Pain  7. Decreased Activity Tolerance  8. Increased Fatigue  9. Increased Shortness of Breath    INTERVENTIONS PLANNED: (Benefits and precautions of physical therapy have been discussed with the patient.)  1. Balance Exercise  2. Bed Mobility  3. Family Education  4. Gait Training  5. Home Exercise Program (HEP)  6. Neuromuscular Re-education/Strengthening  7. Range of Motion (ROM)  8. Therapeutic Activites  9. Therapeutic Exercise/Strengthening  10. Transfer Training  11. Group Therapy      TREATMENT PLAN: Frequency/Duration: 3 times a week for duration of hospital stay  Rehabilitation Potential For Stated Goals: GOOD      RECOMMENDED REHABILITATION/EQUIPMENT: (at time of discharge pending progress): Continue Skilled Therapy. HISTORY:   History of Present Injury/Illness (Reason for Referral):  See H&P below  Patient is followed by Dr. Laurie Reed with cardiology. Has congestive heart failure and high blood pressure. Has chronic kidney disease and diabetes. Has COPD. Has an AICD in place. Presents with 85 pound weight gain since January per patient.   He was admitted 2 weeks ago to another facility for diuresis with shortness of breath and congestive heart failure. States 7 days but does not feel it was long enough. Shortness of breath is worsening in the spread of edema up both legs. Past Medical History/Comorbidities:   Mr. Sesar Hernández  has a past medical history of Acute encephalopathy (3/18/2017); Acute systolic heart failure [facility]) (May, 2009); AICD (automatic cardioverter/defibrillator) present (10/21/2015); Atypical chest pain (4/23/2010); Bronchitis; CAD (coronary artery disease); Cardiomyopathy; Chest pain (10/21/2015); Chronic kidney disease; Chronic pain; Congestive heart failure (CHF) ([address] (10/21/2015); COPD; Diabetes ([address] Diabetes mellitus type II, uncontrolled ([address] (7/2/2013); GERD (gastroesophageal reflux disease); Gout; Heart failure ([address] Hypertension; Hyponatremia (12/20/2010); Ill-defined condition; Morbid obesity ([address] Nausea & vomiting (11/30/2015); Neuropathy; Obstructive sleep apnea (2/15/2010); Other unknown and unspecified cause of morbidity or mortality; Severe sepsis ([address] and Unspecified sleep apnea. He also has no past medical history of Adverse effect of anesthesia; Aneurysm ([address] Coagulation disorder ([address] DEMENTIA; Difficult intubation; Malignant hyperthermia due to anesthesia; Other ill-defined conditions; Pseudocholinesterase deficiency; or Psychiatric disorder. Mr. Sesar Hernández  has a past surgical history that includes pacemaker; heart catheterization ([place] [address], 2008); and chest surgery procedure unlisted.   Social History/Living Environment:   Home Environment: Private residence  # Steps to Enter: 2  Rails to Enter: No  One/Two Story Residence: One story  Living Alone: No  Support Systems: Spouse/Significant Other/Partner  Patient Expects to be Discharged to[de-identified] Private residence  Current DME Used/Available at Home: Walker, Oxygen, portable  Tub or Shower Type: Tub/Shower combination  Prior Level of Function/Work/Activity:  Lives with wife, indep with gait until 1 month ago now uses walker, mostly indep with ADLs requires assist at times for set up from wife, drives      Number of Personal Factors/Comorbidities that affect the Plan of Care: 3+: HIGH COMPLEXITY   EXAMINATION:   Most Recent Physical Functioning:   Gross Assessment:                  Posture:  Posture (WDL): Exceptions to WDL  Posture Assessment: Rounded shoulders  Balance:  Sitting: Intact  Standing: Impaired  Standing - Static: Good  Standing - Dynamic : Fair Bed Mobility:  Rolling:  (sitting EOB on arrival)  Sit to Supine:  (left up sitting EOB post treatment)  Wheelchair Mobility:     Transfers:  Sit to Stand: Supervision  Stand to Sit: Supervision  Gait:     Base of Support: Widened  Speed/Mira: Slow  Step Length: Left shortened;Right shortened  Gait Abnormalities: Decreased step clearance;Trunk sway increased  Distance (ft): 250 Feet (ft) (multiple standing rest breaks, O2 on room air 85-90%)  Assistive Device:  (holding railing along wall for support)  Ambulation - Level of Assistance: Stand-by asssistance  Interventions: Safety awareness training;Verbal cues (pursed lip breathing; energy conservation)       Body Structures Involved:  1. Heart  2. Lungs  3. Bones  4. Joints  5. Muscles Body Functions Affected:  1. Sensory/Pain  2. Cardio  3. Respiratory  4. Neuromusculoskeletal  5. Movement Related Activities and Participation Affected:  1. General Tasks and Demands  2. Mobility  3. Self Care  4. Domestic Life  5. Interpersonal Interactions and Relationships  6. Community, Social and [place]   Number of elements that affect the Plan of Care: 4+: HIGH COMPLEXITY   CLINICAL PRESENTATION:   Presentation: Evolving clinical presentation with changing clinical characteristics: MODERATE COMPLEXITY   CLINICAL DECISION MAKIN [facility] Inpatient Short Form  How much difficulty does the patient currently have. .. Unable A Lot A Little None   1.   Turning over in bed (including adjusting bedclothes, sheets and blankets)? [ ] 1   [ ] 2   [X] 3   [ ] 4   2. Sitting down on and standing up from a chair with arms ( e.g., wheelchair, bedside commode, etc.)   [ ] 1   [ ] 2   [X] 3   [ ] 4   3. Moving from lying on back to sitting on the side of the bed? [ ] 1   [ ] 2   [X] 3   [ ] 4   How much help from another person does the patient currently need. .. Total A Lot A Little None   4. Moving to and from a bed to a chair (including a wheelchair)? [ ] 1   [ ] 2   [X] 3   [ ] 4   5. Need to walk in hospital room? [ ] 1   [ ] 2   [X] 3   [ ] 4   6. Climbing 3-5 steps with a railing? [ ] 1   [X] 2   [ ] 3   [ ] 4   © 2007, Trustees of [address] [place] [address], under license to [employer]. All rights reserved    Score:  Initial: 17 Most Recent: X (Date: -- )     Interpretation of Tool:  Represents activities that are increasingly more difficult (i.e. Bed mobility, Transfers, Gait). Score 24 23 22-20 19-15 14-10 9-7 6       Modifier CH CI CJ CK CL CM CN         · Mobility - Walking and Moving Around:               - CURRENT STATUS:    CK - 40%-59% impaired, limited or restricted               - GOAL STATUS:           CJ - 20%-39% impaired, limited or restricted               - D/C STATUS:                       ---------------To be determined---------------  Payor: CARE IMPROVEMENT PLUS [place] / Product Type: Managed Care Medicare /       Medical Necessity:     · Patient is expected to demonstrate progress in strength, balance, coordination and functional technique to decrease assistance required with gait and functional mobility. Reason for Services/Other Comments:  · Patient continues to require skilled intervention due to decreased strength, decreased balance, decreased functional tolerance, decreased cardiopulmonary endurance affecting participation in basic ADLs and functional tasks.    Use of outcome tool(s) and clinical judgement create a POC that gives a: Clear prediction of patient's progress: LOW COMPLEXITY                 TREATMENT:   (In addition to Assessment/Re-Assessment sessions the following treatments were rendered)   Pre-treatment Symptoms/Complaints:  \"I was hoping to wait until this afternoon\" ;complains of gas pain  Pain: Initial:   Pain Intensity 1: 0  Post Session:  0/10 after ambulation     Therapeutic Activity: (    23 min): Therapeutic activities including Chair transfers, Toilet transfers, weight shifting, pursed lip breathing technique and ambulation on level surfaces to improve mobility, strength, balance and coordination. Required minimal Safety awareness training;Verbal cues (pursed lip breathing; energy conservation) to promote static and dynamic balance in standing and promote coordination of bilateral, lower extremity(s). Therapeutic Exercise: ( 0 minutes):  Exercises per grid below to improve mobility, strength and coordination. Required minimal visual, verbal, manual and tactile cues to promote proper body alignment, promote proper body posture and promote proper body mechanics. Progressed range, repetitions and complexity of movement as indicated.            Date:  6/22/17 Date:  6/26/17  Date:      Activity/Exercise Parameters Parameters Parameters   ambulation  250'  Holding hand rail  SBA    Seated LAQ 15 X B       Seated alt marches 15 X B       Supine ankle pumps 15 X B       Supine quad set 15 X B       Supine glute set 15 X       Supine hip abduction  15 X L             Supine heel slides 15 X B                Braces/Orthotics/Lines/Etc:   · IV  · O2: NC  Treatment/Session Assessment:    · Response to Treatment: decreased O2 on room air with mobility, encouraged pursed lip breathing  · Interdisciplinary Collaboration:  · Physical Therapist  · Registered Nurse  · After treatment position/precautions:  · Bed/Chair-wheels locked, Bed in low position, Call light within reach, RN notified, Family at bedside and left sitting EOB  · Compliance with Program/Exercises: Will assess as treatment progresses. · Recommendations/Intent for next treatment session: \"Next visit will focus on advancements to more challenging activities and reduction in assistance provided\".   Total Treatment Duration:  PT Patient Time In/Time Out  Time In: 1048  Time Out: [address], PT

## 2017-06-27 NOTE — PROGRESS NOTES
Problem: Nutrition Deficit  Goal: *Optimize nutritional status  Nutrition LOS Note: day 7  Assessment  Diet order(s): CCHO 2 gm Na 2L FR   Food,Nutrition, and Pertinent History: The patient is well known to this RD. He has history remarkable for CKD, COPD, DM, end stage heart failure. He is admitted with SOB and fluid overload. The patient has been educated upon numerous occassions during previous admission on CCHO diet, sodium and fluid restriction. He reports a good appetite throughout admission, eating majority of meals. He requests an additional apple juice during my visit at breakfast.  Anthropometrics: Height: 5' 10\" (177.8 cm), Weight Source: Standing scale (comment), Weight: (!) 164.7 kg (363 lb), Body mass index is 52.09 kg/(m^2). BMI class of morbid obesity class III. Edema: Generalized anasarca; ~48 pound fluid related weight loss in 7 days since admission. He continues to receive bumex drip. Macronutrient Needs:  · EER:  6508-3044 kcal /day (11-13 kcal/kg listed BW)  · EPR:  45-60 grams protein/day (0.6-0.8 grams/kg IBW)(GFR 25)-CKD  Intake/Comparative Standards: Per RD meal rounds: 100% of breakfast. Average intake for past 7 day(s)/8 recorded meal(s): 94%. This potentially meets ~92% of kcal and ~100% of protein needs     Nutrition Diagnosis: No nutrition diagnosis at this time     Intervention: Meals and snacks: Continue current diet.      Megan Rebolledo [address], [address] [place] [address], [address] [place] [address], [phone]

## 2017-06-28 ENCOUNTER — HOME HEALTH ADMISSION (OUTPATIENT)
Dept: HOME HEALTH SERVICES | Facility: HOME HEALTH | Age: 52
End: 2017-06-28

## 2017-06-28 VITALS
HEIGHT: 70 IN | WEIGHT: 315 LBS | RESPIRATION RATE: 16 BRPM | DIASTOLIC BLOOD PRESSURE: 70 MMHG | SYSTOLIC BLOOD PRESSURE: 118 MMHG | BODY MASS INDEX: 45.1 KG/M2 | TEMPERATURE: 97.5 F | HEART RATE: 85 BPM | OXYGEN SATURATION: 95 %

## 2017-06-28 LAB
ANION GAP BLD CALC-SCNC: 13 MMOL/L (ref 7–16)
BUN SERPL-MCNC: 60 MG/DL (ref 6–23)
CALCIUM SERPL-MCNC: 9.3 MG/DL (ref 8.3–10.4)
CHLORIDE SERPL-SCNC: 95 MMOL/L (ref 98–107)
CO2 SERPL-SCNC: 31 MMOL/L (ref 21–32)
CREAT SERPL-MCNC: 3.25 MG/DL (ref 0.8–1.5)
GLUCOSE BLD STRIP.AUTO-MCNC: 120 MG/DL (ref 65–100)
GLUCOSE BLD STRIP.AUTO-MCNC: 127 MG/DL (ref 65–100)
GLUCOSE BLD STRIP.AUTO-MCNC: 127 MG/DL (ref 65–100)
GLUCOSE BLD STRIP.AUTO-MCNC: 178 MG/DL (ref 65–100)
GLUCOSE SERPL-MCNC: 105 MG/DL (ref 65–100)
MAGNESIUM SERPL-MCNC: 1.9 MG/DL (ref 1.8–2.4)
POTASSIUM SERPL-SCNC: 4.3 MMOL/L (ref 3.5–5.1)
SODIUM SERPL-SCNC: 139 MMOL/L (ref 136–145)

## 2017-06-28 PROCEDURE — 74011250636 HC RX REV CODE- 250/636: Performed by: NURSE PRACTITIONER

## 2017-06-28 PROCEDURE — 80048 BASIC METABOLIC PNL TOTAL CA: CPT | Performed by: NURSE PRACTITIONER

## 2017-06-28 PROCEDURE — 74011250637 HC RX REV CODE- 250/637: Performed by: INTERNAL MEDICINE

## 2017-06-28 PROCEDURE — 83735 ASSAY OF MAGNESIUM: CPT | Performed by: NURSE PRACTITIONER

## 2017-06-28 PROCEDURE — 74011250637 HC RX REV CODE- 250/637: Performed by: NURSE PRACTITIONER

## 2017-06-28 PROCEDURE — 36415 COLL VENOUS BLD VENIPUNCTURE: CPT | Performed by: NURSE PRACTITIONER

## 2017-06-28 PROCEDURE — 74011636637 HC RX REV CODE- 636/637: Performed by: NURSE PRACTITIONER

## 2017-06-28 RX ORDER — ISOSORBIDE DINITRATE 20 MG/1
20 TABLET ORAL 3 TIMES DAILY
Qty: 90 TAB | Refills: 6 | Status: SHIPPED | OUTPATIENT
Start: 2017-06-28 | End: 2017-08-11 | Stop reason: SDUPTHER

## 2017-06-28 RX ORDER — BUMETANIDE 1 MG/1
1 TABLET ORAL 2 TIMES DAILY
Qty: 60 TAB | Refills: 6 | Status: ON HOLD | OUTPATIENT
Start: 2017-06-28 | End: 2017-07-18

## 2017-06-28 RX ORDER — HYDRALAZINE HYDROCHLORIDE 10 MG/1
10 TABLET, FILM COATED ORAL 3 TIMES DAILY
Qty: 90 TAB | Refills: 6 | Status: ON HOLD | OUTPATIENT
Start: 2017-06-28 | End: 2017-07-18

## 2017-06-28 RX ORDER — SPIRONOLACTONE 50 MG/1
50 TABLET, FILM COATED ORAL DAILY
Qty: 30 TAB | Refills: 6 | Status: SHIPPED | OUTPATIENT
Start: 2017-06-28 | End: 2017-08-11

## 2017-06-28 RX ADMIN — INSULIN LISPRO 2 UNITS: 100 INJECTION, SOLUTION INTRAVENOUS; SUBCUTANEOUS at 12:21

## 2017-06-28 RX ADMIN — ALLOPURINOL 100 MG: 100 TABLET ORAL at 08:56

## 2017-06-28 RX ADMIN — GABAPENTIN 300 MG: 300 CAPSULE ORAL at 08:56

## 2017-06-28 RX ADMIN — FAMOTIDINE 20 MG: 20 TABLET ORAL at 08:56

## 2017-06-28 RX ADMIN — AMIODARONE HYDROCHLORIDE 200 MG: 200 TABLET ORAL at 08:56

## 2017-06-28 RX ADMIN — TAMSULOSIN HYDROCHLORIDE 0.4 MG: 0.4 CAPSULE ORAL at 08:56

## 2017-06-28 RX ADMIN — DOCUSATE SODIUM 100 MG: 100 CAPSULE, LIQUID FILLED ORAL at 08:56

## 2017-06-28 RX ADMIN — HYDRALAZINE HYDROCHLORIDE 10 MG: 10 TABLET, FILM COATED ORAL at 08:56

## 2017-06-28 RX ADMIN — CARVEDILOL 6.25 MG: 6.25 TABLET, FILM COATED ORAL at 08:56

## 2017-06-28 RX ADMIN — BUMETANIDE 1 MG: 1 TABLET ORAL at 08:56

## 2017-06-28 RX ADMIN — ISOSORBIDE DINITRATE 20 MG: 10 TABLET ORAL at 08:56

## 2017-06-28 RX ADMIN — Medication 10 ML: at 06:09

## 2017-06-28 RX ADMIN — SPIRONOLACTONE 50 MG: 25 TABLET, FILM COATED ORAL at 08:56

## 2017-06-28 RX ADMIN — HEPARIN SODIUM 5000 UNITS: 5000 INJECTION, SOLUTION INTRAVENOUS; SUBCUTANEOUS at 06:09

## 2017-06-28 RX ADMIN — POTASSIUM CHLORIDE 40 MEQ: 20 TABLET, EXTENDED RELEASE ORAL at 08:56

## 2017-06-28 NOTE — PROGRESS NOTES
Care Management Interventions  PCP Verified by CM: Yes  Transition of Care Consult (CM Consult): [address]: Yes  Physical Therapy Consult: Yes  Occupational Therapy Consult: Yes  Current Support Network: Lives with Spouse  Confirm Follow Up Transport: Family  Plan discussed with Pt/Family/Caregiver: Yes  Freedom of Choice Offered: Yes  Discharge Location  Discharge Placement: Home with home health    DC home with [facility] nursing and PT. Referral made. Pt had nocturnal oxygen through  [facility]. Now qualifies for continuous oxygen - 2 liters with ambulation and exertion. Updated order transmitted to [facility] along with order to evaluate pt for an oxygen conserving device and portable concentrator.

## 2017-06-28 NOTE — DISCHARGE SUMMARY
Physician Discharge Summary     Patient ID:  Fatimah Zapata  [id number]  84 y.o.  1965    Admit date: 6/20/2017    Discharge date and time: 6/28/17    Admitting Physician: Sky Francis MD     Primary Cardiologist:Dr. Jayashree Quinones    Primary Care Bryce Parekh MD    Discharge Physician: Marija Li NP    Admission Diagnoses: Acute on chronic systolic heart failure [facility])    Discharge Diagnoses:   Patient Active Problem List    Diagnosis Date Noted    Acute on chronic systolic heart failure ([address] 69/34/8667    Metabolic alkalosis 89/23/0343    Acute on chronic respiratory failure with hypoxia and hypercapnia (HCC) 03/21/2017    Cardiogenic shock ([address] 03/20/2017    Obstructive sleep apnea 03/18/2017    Chronic back pain 03/18/2017    Dyslipidemia 03/18/2017    Diabetes mellitus type II, uncontrolled ([address] 03/18/2017    Noncompliance with medication regimen 03/18/2017    AICD (automatic cardioverter/defibrillator) present 03/18/2017    Congestive heart failure (CHF) ([address] 03/18/2017    Cardiomyopathy ([address] 03/18/2017    Hypertension 03/18/2017    Obesity hypoventilation syndrome ([address] 03/18/2017    Morbid obesity with BMI of 60.0-69.9, adult ([address] 03/18/2017    Acute encephalopathy 03/18/2017    Acute renal failure (ARF) ([address] 03/18/2017    Syncope 01/19/2017    Nonsustained ventricular tachycardia ([address] 01/19/2017    NSVT (nonsustained ventricular tachycardia) ([facility]) 01/19/2017    Constipation 01/08/2017    Hypoxemia 11/28/2016    Hypersomnia 11/28/2016    Atrial flutter ([address] 10/20/2016    Acute on chronic systolic (congestive) heart failure (HCC) 10/18/2016    Nausea & vomiting 11/30/2015    Chest pain 10/21/2015    Abdominal fluid collection 08/18/2014    Pleural effusion 08/13/2014    CKD (chronic kidney disease) stage 3, GFR 30-59 ml/min 08/13/2014    Chronic pancreatitis ([address] 08/13/2014    Nonischemic dilated cardiomyopathy [facility] 07/02/2013           Hospital Course:52 yo with history of Nonischemic cardiomyopathy with documented EF 10 %, ICD in placed, was recently in [address] for CHF, volume overload. He presented again --see Dr. Krystina Frias progress note ( H and P). He has CKD stage 4, morbid obesity,  which complicates and makes his heart failure challenging. He has been referred to hospice in past but he has refused. He was admitted, given IV diuretics and hydralazine and isordil added to regimen. He has failed ACE and ARB and Entresto with worsening renal failure. He will have close follow up in our office with BMP in one week. He was seen by Dr. Santosh Brooks today and felt acceptable for discharge. Discharge Exam:     Visit Vitals    /79    Pulse 78    Temp 97.4 °F (36.3 °C)    Resp 20    Ht 5' 10\" (1.778 m)    Wt (!) 164.7 kg (363 lb)    SpO2 99%    BMI 52.09 kg/m2     General Appearance:  Well developed, well nourished,alert and oriented x 3, and individual in no acute distress. Ears/Nose/Mouth/Throat:   Hearing grossly normal.         Neck: Supple. Chest:   Lungs clear to auscultation bilaterally. Cardiovascular:  Regular rate and rhythm, S1, S2 normal, no murmur. Abdomen:   Soft, non-tender, bowel sounds are active. Extremities: No edema bilaterally. Skin: Warm and dry.                  Final Laboratory Data:Recent Results (from the past 24 hour(s))   GLUCOSE, POC    Collection Time: 06/27/17 11:38 AM   Result Value Ref Range    Glucose (POC) 150 (H) 65 - 100 mg/dL   GLUCOSE, POC    Collection Time: 06/27/17  5:09 PM   Result Value Ref Range    Glucose (POC) 163 (H) 65 - 100 mg/dL   GLUCOSE, POC    Collection Time: 06/27/17  9:21 PM   Result Value Ref Range    Glucose (POC) 103 (H) 65 - 104 mg/dL   METABOLIC PANEL, BASIC    Collection Time: 06/28/17  3:35 AM   Result Value Ref Range    Sodium 139 136 - 145 mmol/L    Potassium 4.3 3.5 - 5.1 mmol/L    Chloride 95 (L) 98 - 107 mmol/L    CO2 31 21 - 32 mmol/L    Anion gap 13 7 - 16 mmol/L    Glucose 105 (H) 65 - 100 mg/dL    BUN 60 (H) 6 - 23 MG/DL    Creatinine 3.25 (H) 0.8 - 1.5 MG/DL    GFR est AA 26 (L) >60 ml/min/1.73m2    GFR est non-AA 21 (L) >60 ml/min/1.73m2    Calcium 9.3 8.3 - 10.4 MG/DL   MAGNESIUM    Collection Time: 06/28/17  3:35 AM   Result Value Ref Range    Magnesium 1.9 1.8 - 2.4 mg/dL   GLUCOSE, POC    Collection Time: 06/28/17  6:11 AM   Result Value Ref Range    Glucose (POC) 120 (H) 65 - 100 mg/dL       Disposition: home    Patient Instructions:   Current Discharge Medication List      START taking these medications    Details   hydrALAZINE (APRESOLINE) 10 mg tablet Take 1 Tab by mouth three (3) times daily. Qty: 90 Tab, Refills: 6      isosorbide dinitrate (ISORDIL) 20 mg tablet Take 1 Tab by mouth three (3) times daily. Qty: 90 Tab, Refills: 6      spironolactone (ALDACTONE) 50 mg tablet Take 1 Tab by mouth daily. Qty: 30 Tab, Refills: 6         CONTINUE these medications which have CHANGED    Details   bumetanide (BUMEX) 1 mg tablet Take 1 Tab by mouth two (2) times a day. Qty: 60 Tab, Refills: 6         CONTINUE these medications which have NOT CHANGED    Details   carvedilol (COREG) 6.25 mg tablet Take 6.25 mg by mouth two (2) times daily (with meals). oxyCODONE IR (ROXICODONE) 5 mg immediate release tablet Take 10 mg by mouth every six (6) hours as needed for Pain.      metOLazone (ZAROXOLYN) 2.5 mg tablet Take  by mouth every Monday, Wednesday, Friday. tamsulosin (FLOMAX) 0.4 mg capsule Take 0.4 mg by mouth daily. ascorbic acid, vitamin C, (VITAMIN C) 500 mg tablet Take 500 mg by mouth daily. Indications: Pt. takes 2 tablets daily      amiodarone (CORDARONE) 200 mg tablet Take 1 Tab by mouth daily. Qty: 30 Tab, Refills: 6    Associated Diagnoses: NSVT (nonsustained ventricular tachycardia) (HCC)      docusate sodium (COLACE) 100 mg capsule Take 1 Cap by mouth two (2) times a day.   Qty: 60 Cap, Refills: 0      gabapentin (NEURONTIN) 600 mg tablet Take 300 mg by mouth three (3) times daily. Indications: Pt. takes 1 to 2 capsules every 8 hours      ranitidine (ZANTAC) 150 mg tablet Take 150 mg by mouth nightly. pravastatin (PRAVACHOL) 80 mg tablet Take 1 Tab by mouth nightly. Qty: 30 Tab, Refills: 0      cpap machine kit 10 cm qhs      allopurinol (ZYLOPRIM) 100 mg tablet Take 100 mg by mouth two (2) times a day. nitroglycerin (NITROSTAT) 0.4 mg SL tablet 1 Tab by SubLINGual route every five (5) minutes as needed for Chest Pain. Qty: 4 Bottle, Refills: 6      hydrOXYzine HCl (ATARAX) 25 mg tablet Take  by mouth two (2) times a day. sennosides (SENNA) 8.6 mg cap Take 17.2 mg by mouth nightly. insulin glargine (LANTUS) 100 unit/mL injection 50 Units by SubCUTAneous route nightly. Qty: 1 Vial, Refills: 0      polyethylene glycol (MIRALAX) 17 gram packet Take 1 Packet by mouth daily as needed (constipation). Qty: 10 Packet, Refills: 5      OXYGEN-AIR DELIVERY SYSTEMS 2 lpm qhs      glucose blood VI test strips (ASCENSIA AUTODISC VI, ONE TOUCH ULTRA TEST VI) strip Test strips for 30 day supply  Qty: 120 strip, Refills: 0      colchicine 0.6 mg tablet Take 0.6 mg by mouth every morning. Referenced discharge instructions provided by nursing for diet and activity. Follow-up:  Primary Cardiologist:Dr. Corbin Goldberg in one week  PCP: Pavel Barajas MD) in about 4 weeks.     Signed:  Alysa Tavarez NP  6/28/2017  8:33 AM

## 2017-06-28 NOTE — DISCHARGE INSTRUCTIONS
DISCHARGE SUMMARY from Nurse    The following personal items are in your possession at time of discharge:    Dental Appliances: None        Home Medications: None  Jewelry: None  Clothing: At bedside  Other Valuables: Cell Phone, Money clip             PATIENT INSTRUCTIONS:    After general anesthesia or intravenous sedation, for 24 hours or while taking prescription Narcotics:  · Limit your activities  · Do not drive and operate hazardous machinery  · Do not make important personal or business decisions  · Do  not drink alcoholic beverages  · If you have not urinated within 8 hours after discharge, please contact your surgeon on call. Report the following to your surgeon:  · Excessive pain, swelling, redness or odor of or around the surgical area  · Temperature over 100.5  · Nausea and vomiting lasting longer than 4 hours or if unable to take medications  · Any signs of decreased circulation or nerve impairment to extremity: change in color, persistent  numbness, tingling, coldness or increase pain  · Any questions        What to do at Home:  Recommended activity: Activity as tolerated    If you experience any of the following symptoms chest pain, shortness of breath, weight gain of 3 pounds overnight or 5 pounds in a week, please follow up with [facility]. *  Please give a list of your current medications to your Primary Care Provider. *  Please update this list whenever your medications are discontinued, doses are      changed, or new medications (including over-the-counter products) are added. *  Please carry medication information at all times in case of emergency situations. These are general instructions for a healthy lifestyle:    No smoking/ No tobacco products/ Avoid exposure to second hand smoke    Surgeon General's Warning:  Quitting smoking now greatly reduces serious risk to your health.     Obesity, smoking, and sedentary lifestyle greatly increases your risk for illness    A healthy diet, regular physical exercise & weight monitoring are important for maintaining a healthy lifestyle    You may be retaining fluid if you have a history of heart failure or if you experience any of the following symptoms:  Weight gain of 3 pounds or more overnight or 5 pounds in a week, increased swelling in our hands or feet or shortness of breath while lying flat in bed. Please call your doctor as soon as you notice any of these symptoms; do not wait until your next office visit. Recognize signs and symptoms of STROKE:    F-face looks uneven    A-arms unable to move or move unevenly    S-speech slurred or non-existent    T-time-call 911 as soon as signs and symptoms begin-DO NOT go       Back to bed or wait to see if you get better-TIME IS BRAIN. Warning Signs of HEART ATTACK     Call 911 if you have these symptoms:   Chest discomfort. Most heart attacks involve discomfort in the center of the chest that lasts more than a few minutes, or that goes away and comes back. It can feel like uncomfortable pressure, squeezing, fullness, or pain.  Discomfort in other areas of the upper body. Symptoms can include pain or discomfort in one or both arms, the back, neck, jaw, or stomach.  Shortness of breath with or without chest discomfort.  Other signs may include breaking out in a cold sweat, nausea, or lightheadedness. Don't wait more than five minutes to call 911 - MINUTES MATTER! Fast action can save your life. Calling 911 is almost always the fastest way to get lifesaving treatment. Emergency Medical Services staff can begin treatment when they arrive -- up to an hour sooner than if someone gets to the hospital by car. The discharge information has been reviewed with the patient. The patient verbalized understanding. Discharge medications reviewed with the patient and appropriate educational materials and side effects teaching were provided.     Heart Failure: Care Instructions  Your Care Instructions    Heart failure occurs when your heart does not pump as much blood as the body needs. Failure does not mean that the heart has stopped pumping but rather that it is not pumping as well as it should. Over time, this causes fluid buildup in your lungs and other parts of your body. Fluid buildup can cause shortness of breath, fatigue, swollen ankles, and other problems. By taking medicines regularly, reducing sodium (salt) in your diet, checking your weight every day, and making lifestyle changes, you can feel better and live longer. Follow-up care is a key part of your treatment and safety. Be sure to make and go to all appointments, and call your doctor if you are having problems. It's also a good idea to know your test results and keep a list of the medicines you take. How can you care for yourself at home? Medicines  · Be safe with medicines. Take your medicines exactly as prescribed. Call your doctor if you think you are having a problem with your medicine. · Do not take any vitamins, over-the-counter medicine, or herbal products without talking to your doctor first. Thomes Manners not take ibuprofen (Advil or Motrin) and naproxen (Aleve) without talking to your doctor first. They could make your heart failure worse. · You may be taking some of the following medicine. ¨ Beta-blockers can slow heart rate, decrease blood pressure, and improve your condition. Taking a beta-blocker may lower your chance of needing to be hospitalized. ¨ Angiotensin-converting enzyme inhibitors (ACEIs) reduce the heart's workload, lower blood pressure, and reduce swelling. Taking an ACEI may lower your chance of needing to be hospitalized again. ¨ Angiotensin II receptor blockers (ARBs) work like ACEIs. Your doctor may prescribe them instead of ACEIs. ¨ Diuretics, also called water pills, reduce swelling.   ¨ Potassium supplements replace this important mineral, which is sometimes lost with diuretics. ¨ Aspirin and other blood thinners prevent blood clots, which can cause a stroke or heart attack. You will get more details on the specific medicines your doctor prescribes. Diet  · Your doctor may suggest that you limit sodium to 2,000 milligrams (mg) a day or less. That is less than 1 teaspoon of salt a day, including all the salt you eat in cooking or in packaged foods. People get most of their sodium from processed foods. Fast food and restaurant meals also tend to be very high in sodium. · Ask your doctor how much liquid you can drink each day. You may have to limit liquids. Weight  · Weigh yourself without clothing at the same time each day. Record your weight. Call your doctor if you have a sudden weight gain, such as more than 2 to 3 pounds in a day or 5 pounds in a week. (Your doctor may suggest a different range of weight gain.) A sudden weight gain may mean that your heart failure is getting worse. Activity level  · Start light exercise (if your doctor says it is okay). Even if you can only do a small amount, exercise will help you get stronger, have more energy, and manage your weight and your stress. Walking is an easy way to get exercise. Start out by walking a little more than you did before. Bit by bit, increase the amount you walk. · When you exercise, watch for signs that your heart is working too hard. You are pushing yourself too hard if you cannot talk while you are exercising. If you become short of breath or dizzy or have chest pain, stop, sit down, and rest.  · If you feel \"wiped out\" the day after you exercise, walk slower or for a shorter distance until you can work up to a better pace. · Get enough rest at night. Sleeping with 1 or 2 pillows under your upper body and head may help you breathe easier. Lifestyle changes  · Do not smoke. Smoking can make a heart condition worse. If you need help quitting, talk to your doctor about stop-smoking programs and medicines.  These can increase your chances of quitting for good. Quitting smoking may be the most important step you can take to protect your heart. · Limit alcohol to 2 drinks a day for men and 1 drink a day for women. Too much alcohol can cause health problems. · Avoid getting sick from colds and the flu. Get a pneumococcal vaccine shot. If you have had one before, ask your doctor whether you need another dose. Get a flu shot each year. If you must be around people with colds or the flu, wash your hands often. When should you call for help? Call 911 if you have symptoms of sudden heart failure such as:  · You have severe trouble breathing. · You cough up pink, foamy mucus. · You have a new irregular or rapid heartbeat. Call your doctor now or seek immediate medical care if:  · You have new or increased shortness of breath. · You are dizzy or lightheaded, or you feel like you may faint. · You have sudden weight gain, such as more than 2 to 3 pounds in a day or 5 pounds in a week. (Your doctor may suggest a different range of weight gain.)  · You have increased swelling in your legs, ankles, or feet. · You are suddenly so tired or weak that you cannot do your usual activities. Watch closely for changes in your health, and be sure to contact your doctor if:  · You develop new symptoms. Where can you learn more? Go to http://armando-nirav.info/. Enter K691 in the search box to learn more about \"Heart Failure: Care Instructions. \"  Current as of: April 3, 2017  Content Version: 11.3  © 9102-0914 [employer]. Care instructions adapted under license by [employer] (which disclaims liability or warranty for this information). If you have questions about a medical condition or this instruction, always ask your healthcare professional. [address] any warranty or liability for your use of this information.      Avoiding Triggers With Heart Failure: Care Instructions  Your Care Instructions  Triggers are anything that make your heart failure flare up. A flare-up is also called \"sudden heart failure\" or \"acute heart failure. \" When you have a flare-up, fluid builds up in your lungs, and you have problems breathing. You might need to go to the hospital. By watching for changes in your condition and avoiding triggers, you can prevent heart failure flare-ups. Follow-up care is a key part of your treatment and safety. Be sure to make and go to all appointments, and call your doctor if you are having problems. It's also a good idea to know your test results and keep a list of the medicines you take. How can you care for yourself at home? Watch for changes in your weight and condition  · Weigh yourself without clothing at the same time each day. Record your weight. Call your doctor if you have sudden weight gain, such as more than 2 to 3 pounds in a day or 5 pounds in a week. (Your doctor may suggest a different range of weight gain.) A sudden weight gain may mean that your heart failure is getting worse. · Keep a daily record of your symptoms. Write down any changes in how you feel, such as new shortness of breath, cough, or problems eating. Also record if your ankles are more swollen than usual and if you feel more tired than usual. Note anything that you ate or did that could have triggered these changes. Limit sodium  Sodium causes your body to hold on to extra water. This may cause your heart failure symptoms to get worse. People get most of their sodium from processed foods. Fast food and restaurant meals also tend to be very high in sodium. · Your doctor may suggest that you limit sodium to 2,000 milligrams (mg) a day or less. That is less than 1 teaspoon of salt a day, including all the salt you eat in cooking or in packaged foods. · Read food labels on cans and food packages. They tell you how much sodium you get in one serving. Check the serving size.  If you eat more than one serving, you are getting more sodium. · Be aware that sodium can come in forms other than salt, including monosodium glutamate (MSG), sodium citrate, and sodium bicarbonate (baking soda). MSG is often added to Asian food. You can sometimes ask for food without MSG or salt. · Slowly reducing salt will help you adjust to the taste. Take the salt shaker off the table. · Flavor your food with garlic, lemon juice, onion, vinegar, herbs, and spices instead of salt. Do not use soy sauce, steak sauce, onion salt, garlic salt, mustard, or ketchup on your food, unless it is labeled \"low-sodium\" or \"low-salt. \"  · Make your own salad dressings, sauces, and ketchup without adding salt. · Use fresh or frozen ingredients, instead of canned ones, whenever you can. Choose low-sodium canned goods. · Eat less processed food and food from restaurants, including fast food. Exercise as directed  Moderate, regular exercise is very good for your heart. It improves your blood flow and helps control your weight. But too much exercise can stress your heart and cause a heart failure flare-up. · Check with your doctor before you start an exercise program.  · Walking is an easy way to get exercise. Start out slowly. Gradually increase the length and pace of your walk. Swimming, riding a bike, and using a treadmill are also good forms of exercise. · When you exercise, watch for signs that your heart is working too hard. You are pushing yourself too hard if you cannot talk while you are exercising. If you become short of breath or dizzy or have chest pain, stop, sit down, and rest.  · Do not exercise when you do not feel well. Take medicines correctly  · Take your medicines exactly as prescribed. Call your doctor if you think you are having a problem with your medicine. · Make a list of all the medicines you take.  Include those prescribed to you by other doctors and any over-the-counter medicines, vitamins, or supplements you take. Take this list with you when you go to any doctor. · Take your medicines at the same time every day. It may help you to post a list of all the medicines you take every day and what time of day you take them. · Make taking your medicine as simple as you can. Plan times to take your medicines when you are doing other things, such as eating a meal or getting ready for bed. This will make it easier to remember to take your medicines. · Get organized. Use helpful tools, such as daily or weekly pill containers. When should you call for help? Call 911 if you have symptoms of sudden heart failure such as:  · You have severe trouble breathing. · You cough up pink, foamy mucus. · You have a new irregular or rapid heartbeat. Call your doctor now or seek immediate medical care if:  · You have new or increased shortness of breath. · You are dizzy or lightheaded, or you feel like you may faint. · You have sudden weight gain, such as more than 2 to 3 pounds in a day or 5 pounds in a week. (Your doctor may suggest a different range of weight gain.)  · You have increased swelling in your legs, ankles, or feet. · You are suddenly so tired or weak that you cannot do your usual activities. Watch closely for changes in your health, and be sure to contact your doctor if you develop new symptoms. Where can you learn more? Go to http://armando-nirav.info/. Enter D919 in the search box to learn more about \"Avoiding Triggers With Heart Failure: Care Instructions. \"  Current as of: February 23, 2017  Content Version: 11.3  © 7035-1365 [employer]. Care instructions adapted under license by [employer] (which disclaims liability or warranty for this information).  If you have questions about a medical condition or this instruction, always ask your healthcare professional. [address] any warranty or liability for your use of this information.

## 2017-06-28 NOTE — PROGRESS NOTES
Verbal bedside report given to [facility], oncoming RN. Patient's situation, background, assessment and recommendations provided. Opportunity for questions provided. Oncoming RN assumed care of patient.

## 2017-06-28 NOTE — PROGRESS NOTES
[facility] CARDIOLOGY PROGRESS NOTE           6/28/2017 8:04 AM    Admit Date: 6/20/2017      Subjective:   Feels ok. ROS:  GEN:  No fever or chills  Cardiovascular:  As noted above:no chest pain or palpitations. Pulmonary:  As noted above:Chronic SOB. Neuro:  No new focal motor or sensory loss    Objective:      Vitals:    06/27/17 1815 06/27/17 2124 06/28/17 0059 06/28/17 0509   BP: 119/72 117/70 (!) 129/99 134/79   Pulse: 71 83 93 78   Resp: 20 20 20 20   Temp: 98.2 °F (36.8 °C) 97.7 °F (36.5 °C) 98.7 °F (37.1 °C) 97.4 °F (36.3 °C)   SpO2: 96% 95% 99% 99%   Weight:    (!) 164.7 kg (363 lb)   Height:           Physical Exam:  General-no distress  Neck- supple, no JVD  CV- regular rate and rhythm no MRG  Lung- rare wheeze  bilaterally  Abd- soft, nontender, nondistended  Ext- 2-3+ edema bilaterally. Skin- warm and dry  Psychiatric:  Normal mood and affect. Neurologic:  Alert and oriented X 3      Data Review:   Recent Labs      06/28/17   0335  06/27/17   0435   NA  139  138   K  4.3  3.9   MG  1.9  1.8   BUN  60*  60*   CREA  3.25*  3.31*   GLU  105*  114*       TELEMETRY:  NSR    Assessment/Plan:     Active Problems:    Diabetes mellitus type II, uncontrolled ([facility] [address] (3/18/2017)      Abdominal fluid collection (8/18/2014)      Overview: Perisplenic and splenic, likely related to pancreatitis      AICD (automatic cardioverter/defibrillator) present (3/18/2017)      Cardiomyopathy ([facility] [address] (3/18/2017):End stage with ICD. Tolerating Amiodarone,Coreg. Hydralazine,and Bumex. Hypertension (3/18/2017):stable on current medications. Acute on chronic systolic (congestive) heart failure (HCC) (10/18/2016):Continue current medications. Try to discharge today. I discussed hospice and palliative care again with the patient. Again,he refuses. He asks for O2 when he is traveling. Unfortunately,his last RA SAo2 with ambulation was 90%. Will repeat again to see if he qualifies and if so then try to arrange. Outpatient follow up with me in 1-2 weeks. Obesity hypoventilation syndrome ([facility] [address] (3/18/2017)      Morbid obesity with BMI of 60.0-69.9, adult ([address] (3/18/2017)      Acute renal failure (ARF) ([facility] [address] (3/18/2017):Remains about the same. Presently,he is tolerating current medications. Acute on chronic systolic heart failure ([facility] [address] (6/20/2017): Appears back to baseline. Try to discharge on current medications.                 Yaquelin Hines MD  6/28/2017 8:04 AM

## 2017-06-28 NOTE — PROGRESS NOTES
DISCHARGE SUMMARY from Nurse    The following personal items are in your possession at time of discharge:    Dental Appliances: None        Home Medications: None  Jewelry: None  Clothing: At bedside  Other Valuables: Cell Phone, Money clip             PATIENT INSTRUCTIONS:    After general anesthesia or intravenous sedation, for 24 hours or while taking prescription Narcotics:  · Limit your activities  · Do not drive and operate hazardous machinery  · Do not make important personal or business decisions  · Do  not drink alcoholic beverages  · If you have not urinated within 8 hours after discharge, please contact your surgeon on call. Report the following to your surgeon:  · Excessive pain, swelling, redness or odor of or around the surgical area  · Temperature over 100.5  · Nausea and vomiting lasting longer than 4 hours or if unable to take medications  · Any signs of decreased circulation or nerve impairment to extremity: change in color, persistent  numbness, tingling, coldness or increase pain  · Any questions        What to do at Home:  Recommended activity: Activity as tolerated,           *  Please give a list of your current medications to your Primary Care Provider. *  Please update this list whenever your medications are discontinued, doses are      changed, or new medications (including over-the-counter products) are added. *  Please carry medication information at all times in case of emergency situations. These are general instructions for a healthy lifestyle:    No smoking/ No tobacco products/ Avoid exposure to second hand smoke    Surgeon General's Warning:  Quitting smoking now greatly reduces serious risk to your health.     Obesity, smoking, and sedentary lifestyle greatly increases your risk for illness    A healthy diet, regular physical exercise & weight monitoring are important for maintaining a healthy lifestyle    You may be retaining fluid if you have a history of heart failure or if you experience any of the following symptoms:  Weight gain of 3 pounds or more overnight or 5 pounds in a week, increased swelling in our hands or feet or shortness of breath while lying flat in bed. Please call your doctor as soon as you notice any of these symptoms; do not wait until your next office visit. Recognize signs and symptoms of STROKE:    F-face looks uneven    A-arms unable to move or move unevenly    S-speech slurred or non-existent    T-time-call 911 as soon as signs and symptoms begin-DO NOT go       Back to bed or wait to see if you get better-TIME IS BRAIN. Warning Signs of HEART ATTACK     Call 911 if you have these symptoms:   Chest discomfort. Most heart attacks involve discomfort in the center of the chest that lasts more than a few minutes, or that goes away and comes back. It can feel like uncomfortable pressure, squeezing, fullness, or pain.  Discomfort in other areas of the upper body. Symptoms can include pain or discomfort in one or both arms, the back, neck, jaw, or stomach.  Shortness of breath with or without chest discomfort.  Other signs may include breaking out in a cold sweat, nausea, or lightheadedness. Don't wait more than five minutes to call [address]! Fast action can save your life. Calling 911 is almost always the fastest way to get lifesaving treatment. Emergency Medical Services staff can begin treatment when they arrive  up to an hour sooner than if someone gets to the hospital by car. The discharge information has been reviewed with the patient and spouse. The patient and spouse verbalized understanding. Discharge medications reviewed with the patient and spouse and appropriate educational materials and side effects teaching were provided.

## 2017-06-28 NOTE — PROGRESS NOTES
[address]  Face to Face Encounter    Patients Name: Danielle Young    YOB: 1965    Ordering Physician:  Dr. Sanjeev Hardy  Primary Diagnosis: Acute on chronic systolic heart failure [facility])    Date of Face to Face:   6/28/2017                                  Face to Face Encounter findings are related to primary reason for home care:   yes. 1. I certify that the patient needs intermittent care as follows: skilled nursing care:  skilled observation/assessment, patient education; PT - home eval    2. I certify that this patient is homebound, that is: 1) patient requires the use of a walker device, special transportation, or assistance of another to leave the home; or 2) patient's condition makes leaving the home medically contraindicated; and 3) patient has a normal inability to leave the home and leaving the home requires considerable and taxing effort. Patient may leave the home for infrequent and short duration for medical reasons, and occasional absences for non-medical reasons. Homebound status is due to the following functional limitations: Patient with increased shortness of breath with all exertional activity limiting ambulation outside the home. Patient with strength deficits limiting the ability to carry portable O2 for distances outside the home without the assistance of a caregiver. 3. I certify that this patient is under my care and that I, or a nurse practitioner or  [id number], or clinical nurse specialist, or certified nurse midwife, working with me, had a Face-to-Face Encounter that meets the physician Face-to-Face Encounter requirements.   The following are the clinical findings from the [address] [place] [address] encounter that support the need for skilled services and is a summary of the encounter:     See hospital chart      JUD Granados  6/28/2017      THE FOLLOWING TO BE COMPLETED BY THE COMMUNITY PHYSICIAN:    I concur with the findings described above from the F2F encounter that this patient is homebound and in need of a skilled service.     Certifying Physician: _____________________________________      Printed Certifying Physician Name: _____________________________________    Date: _________________

## 2017-06-29 ENCOUNTER — TELEPHONE (OUTPATIENT)
Dept: HOME HEALTH SERVICES | Facility: HOME HEALTH | Age: 52
End: 2017-06-29

## 2017-06-29 ENCOUNTER — PATIENT OUTREACH (OUTPATIENT)
Dept: CASE MANAGEMENT | Age: 52
End: 2017-06-29

## 2017-06-29 NOTE — PROGRESS NOTES
Attempted JOSIANE outreach  #1 for high risk patient. UTR patient on all contacts and numbers, and all are documented appropriately in call initiation. Scheduled JOSIANE #2 for tomorrow. Gurpreet Palomares LPN/ Care Coordinator  [address]. [facility], [address] [facility]  [email or web address] note will not be viewable in [address]

## 2017-06-29 NOTE — TELEPHONE ENCOUNTER
[address] [facility] Miguel Angel Thomas Pharmacist consult. Mr. Sesar Hernández was discharged yesterday prior to my consult. He is a prior patient from Jan., Feb., and March 2017, so is well known to me. I have called and only gotten voice mail. I left my name and cell phone number. I asked him to call me with any medication questions or issues.   6/29/17  1436

## 2017-06-30 ENCOUNTER — PATIENT OUTREACH (OUTPATIENT)
Dept: CASE MANAGEMENT | Age: 52
End: 2017-06-30

## 2017-06-30 NOTE — PROGRESS NOTES
Attempted on all accounts and all contacts to reach patient for JOSIANE #2. UTR at all attempts. All documented in Appropriate section of CC. Scheduled JOSIANE #3 for 5 business days. Jocelin Ybarra LPN/ Care Coordinator  [address] [place] [address] / [place], [address] [facility]  [email or web address] note will not be viewable in [address]

## 2017-07-01 ENCOUNTER — HOME CARE VISIT (OUTPATIENT)
Dept: SCHEDULING | Facility: HOME HEALTH | Age: 52
End: 2017-07-01

## 2017-07-05 ENCOUNTER — PATIENT OUTREACH (OUTPATIENT)
Dept: CASE MANAGEMENT | Age: 52
End: 2017-07-05

## 2017-07-05 NOTE — PROGRESS NOTES
Unable to reach patient for JOSIANE #3. Will close case. Una Jackman LPN/ Care Coordinator  6 [place] Gunn [address]. [facility] [place], [address] [facility]  [email or web address] note will not be viewable in [address]

## 2017-07-08 ENCOUNTER — HOSPITAL ENCOUNTER (INPATIENT)
Age: 52
LOS: 9 days | Discharge: HOME | DRG: 291 | End: 2017-07-18
Attending: EMERGENCY MEDICINE | Admitting: INTERNAL MEDICINE
Payer: MEDICARE

## 2017-07-08 ENCOUNTER — APPOINTMENT (OUTPATIENT)
Dept: GENERAL RADIOLOGY | Age: 52
DRG: 291 | End: 2017-07-08
Attending: EMERGENCY MEDICINE
Payer: MEDICARE

## 2017-07-08 DIAGNOSIS — I50.23 ACUTE ON CHRONIC SYSTOLIC CONGESTIVE HEART FAILURE (HCC): Primary | ICD-10-CM

## 2017-07-08 PROBLEM — N17.9 ACUTE RENAL FAILURE (ARF) (HCC): Status: RESOLVED | Noted: 2017-03-18 | Resolved: 2017-07-08

## 2017-07-08 PROBLEM — E87.3 METABOLIC ALKALOSIS: Status: RESOLVED | Noted: 2017-03-27 | Resolved: 2017-07-08

## 2017-07-08 PROBLEM — R55 SYNCOPE: Status: RESOLVED | Noted: 2017-01-19 | Resolved: 2017-07-08

## 2017-07-08 PROBLEM — R57.0 CARDIOGENIC SHOCK (HCC): Status: RESOLVED | Noted: 2017-03-20 | Resolved: 2017-07-08

## 2017-07-08 LAB
ALBUMIN SERPL BCP-MCNC: 3.1 G/DL (ref 3.5–5)
ALBUMIN/GLOB SERPL: 0.6 {RATIO} (ref 1.2–3.5)
ALP SERPL-CCNC: 244 U/L (ref 50–136)
ALT SERPL-CCNC: 21 U/L (ref 12–65)
ANION GAP BLD CALC-SCNC: 8 MMOL/L (ref 7–16)
AST SERPL W P-5'-P-CCNC: 24 U/L (ref 15–37)
ATRIAL RATE: 82 BPM
BASOPHILS # BLD AUTO: 0 K/UL (ref 0–0.2)
BASOPHILS # BLD: 0 % (ref 0–2)
BILIRUB SERPL-MCNC: 2.7 MG/DL (ref 0.2–1.1)
BNP SERPL-MCNC: 1304 PG/ML
BUN SERPL-MCNC: 42 MG/DL (ref 6–23)
CALCIUM SERPL-MCNC: 8.8 MG/DL (ref 8.3–10.4)
CALCULATED P AXIS, ECG09: 51 DEGREES
CALCULATED R AXIS, ECG10: 169 DEGREES
CALCULATED T AXIS, ECG11: 23 DEGREES
CHLORIDE SERPL-SCNC: 102 MMOL/L (ref 98–107)
CO2 SERPL-SCNC: 28 MMOL/L (ref 21–32)
CREAT SERPL-MCNC: 2.11 MG/DL (ref 0.8–1.5)
DIAGNOSIS, 93000: NORMAL
DIFFERENTIAL METHOD BLD: ABNORMAL
EOSINOPHIL # BLD: 0.3 K/UL (ref 0–0.8)
EOSINOPHIL NFR BLD: 4 % (ref 0.5–7.8)
ERYTHROCYTE [DISTWIDTH] IN BLOOD BY AUTOMATED COUNT: 19.9 % (ref 11.9–14.6)
GLOBULIN SER CALC-MCNC: 5.3 G/DL (ref 2.3–3.5)
GLUCOSE BLD STRIP.AUTO-MCNC: 134 MG/DL (ref 65–100)
GLUCOSE SERPL-MCNC: 143 MG/DL (ref 65–100)
HCT VFR BLD AUTO: 32.3 % (ref 41.1–50.3)
HGB BLD-MCNC: 10.3 G/DL (ref 13.6–17.2)
IMM GRANULOCYTES # BLD: 0 K/UL (ref 0–0.5)
IMM GRANULOCYTES NFR BLD AUTO: 0.3 % (ref 0–5)
LYMPHOCYTES # BLD AUTO: 32 % (ref 13–44)
LYMPHOCYTES # BLD: 2.1 K/UL (ref 0.5–4.6)
MAGNESIUM SERPL-MCNC: 2 MG/DL (ref 1.8–2.4)
MCH RBC QN AUTO: 23.7 PG (ref 26.1–32.9)
MCHC RBC AUTO-ENTMCNC: 31.9 G/DL (ref 31.4–35)
MCV RBC AUTO: 74.4 FL (ref 79.6–97.8)
MONOCYTES # BLD: 0.6 K/UL (ref 0.1–1.3)
MONOCYTES NFR BLD AUTO: 9 % (ref 4–12)
NEUTS SEG # BLD: 3.5 K/UL (ref 1.7–8.2)
NEUTS SEG NFR BLD AUTO: 55 % (ref 43–78)
P-R INTERVAL, ECG05: 258 MS
PLATELET # BLD AUTO: 185 K/UL (ref 150–450)
PMV BLD AUTO: 9.8 FL (ref 10.8–14.1)
POTASSIUM SERPL-SCNC: 3.4 MMOL/L (ref 3.5–5.1)
PROT SERPL-MCNC: 8.4 G/DL (ref 6.3–8.2)
Q-T INTERVAL, ECG07: 340 MS
QRS DURATION, ECG06: 104 MS
QTC CALCULATION (BEZET), ECG08: 397 MS
RBC # BLD AUTO: 4.34 M/UL (ref 4.23–5.67)
SODIUM SERPL-SCNC: 138 MMOL/L (ref 136–145)
TROPONIN I SERPL-MCNC: <0.02 NG/ML (ref 0.02–0.05)
VENTRICULAR RATE, ECG03: 82 BPM
WBC # BLD AUTO: 6.5 K/UL (ref 4.3–11.1)

## 2017-07-08 PROCEDURE — 74011000258 HC RX REV CODE- 258: Performed by: PHYSICIAN ASSISTANT

## 2017-07-08 PROCEDURE — 96374 THER/PROPH/DIAG INJ IV PUSH: CPT | Performed by: EMERGENCY MEDICINE

## 2017-07-08 PROCEDURE — 93005 ELECTROCARDIOGRAM TRACING: CPT | Performed by: EMERGENCY MEDICINE

## 2017-07-08 PROCEDURE — 83735 ASSAY OF MAGNESIUM: CPT | Performed by: EMERGENCY MEDICINE

## 2017-07-08 PROCEDURE — 80053 COMPREHEN METABOLIC PANEL: CPT | Performed by: EMERGENCY MEDICINE

## 2017-07-08 PROCEDURE — 99218 HC RM OBSERVATION: CPT

## 2017-07-08 PROCEDURE — 94760 N-INVAS EAR/PLS OXIMETRY 1: CPT

## 2017-07-08 PROCEDURE — 77010033678 HC OXYGEN DAILY

## 2017-07-08 PROCEDURE — 74011250636 HC RX REV CODE- 250/636: Performed by: EMERGENCY MEDICINE

## 2017-07-08 PROCEDURE — 87641 MR-STAPH DNA AMP PROBE: CPT | Performed by: INTERNAL MEDICINE

## 2017-07-08 PROCEDURE — 82962 GLUCOSE BLOOD TEST: CPT

## 2017-07-08 PROCEDURE — 94640 AIRWAY INHALATION TREATMENT: CPT

## 2017-07-08 PROCEDURE — 74011000250 HC RX REV CODE- 250: Performed by: PHYSICIAN ASSISTANT

## 2017-07-08 PROCEDURE — 83880 ASSAY OF NATRIURETIC PEPTIDE: CPT | Performed by: EMERGENCY MEDICINE

## 2017-07-08 PROCEDURE — 74011250637 HC RX REV CODE- 250/637: Performed by: PHYSICIAN ASSISTANT

## 2017-07-08 PROCEDURE — 74011250637 HC RX REV CODE- 250/637: Performed by: EMERGENCY MEDICINE

## 2017-07-08 PROCEDURE — 85025 COMPLETE CBC W/AUTO DIFF WBC: CPT | Performed by: EMERGENCY MEDICINE

## 2017-07-08 PROCEDURE — 71010 XR CHEST PORT: CPT

## 2017-07-08 PROCEDURE — 84484 ASSAY OF TROPONIN QUANT: CPT | Performed by: EMERGENCY MEDICINE

## 2017-07-08 PROCEDURE — 99284 EMERGENCY DEPT VISIT MOD MDM: CPT | Performed by: EMERGENCY MEDICINE

## 2017-07-08 RX ORDER — GABAPENTIN 300 MG/1
300 CAPSULE ORAL 3 TIMES DAILY
Status: DISCONTINUED | OUTPATIENT
Start: 2017-07-08 | End: 2017-07-18 | Stop reason: HOSPADM

## 2017-07-08 RX ORDER — MORPHINE SULFATE 2 MG/ML
2 INJECTION, SOLUTION INTRAMUSCULAR; INTRAVENOUS
Status: DISCONTINUED | OUTPATIENT
Start: 2017-07-08 | End: 2017-07-18 | Stop reason: HOSPADM

## 2017-07-08 RX ORDER — AMIODARONE HYDROCHLORIDE 200 MG/1
200 TABLET ORAL DAILY
Status: DISCONTINUED | OUTPATIENT
Start: 2017-07-09 | End: 2017-07-18 | Stop reason: HOSPADM

## 2017-07-08 RX ORDER — POLYETHYLENE GLYCOL 3350 17 G/17G
17 POWDER, FOR SOLUTION ORAL
Status: DISCONTINUED | OUTPATIENT
Start: 2017-07-08 | End: 2017-07-18 | Stop reason: HOSPADM

## 2017-07-08 RX ORDER — TAMSULOSIN HYDROCHLORIDE 0.4 MG/1
0.4 CAPSULE ORAL DAILY
Status: DISCONTINUED | OUTPATIENT
Start: 2017-07-09 | End: 2017-07-18 | Stop reason: HOSPADM

## 2017-07-08 RX ORDER — PRAVASTATIN SODIUM 80 MG/1
80 TABLET ORAL
Status: DISCONTINUED | OUTPATIENT
Start: 2017-07-08 | End: 2017-07-18 | Stop reason: HOSPADM

## 2017-07-08 RX ORDER — ARFORMOTEROL TARTRATE 15 UG/2ML
15 SOLUTION RESPIRATORY (INHALATION) 2 TIMES DAILY
Status: DISCONTINUED | OUTPATIENT
Start: 2017-07-08 | End: 2017-07-18 | Stop reason: HOSPADM

## 2017-07-08 RX ORDER — SPIRONOLACTONE 25 MG/1
50 TABLET ORAL DAILY
Status: DISCONTINUED | OUTPATIENT
Start: 2017-07-09 | End: 2017-07-18 | Stop reason: HOSPADM

## 2017-07-08 RX ORDER — POTASSIUM CHLORIDE 20MEQ/15ML
40 LIQUID (ML) ORAL
Status: COMPLETED | OUTPATIENT
Start: 2017-07-08 | End: 2017-07-08

## 2017-07-08 RX ORDER — ALBUTEROL SULFATE 90 UG/1
2 AEROSOL, METERED RESPIRATORY (INHALATION)
Status: DISCONTINUED | OUTPATIENT
Start: 2017-07-08 | End: 2017-07-18 | Stop reason: HOSPADM

## 2017-07-08 RX ORDER — CARVEDILOL 6.25 MG/1
6.25 TABLET ORAL 2 TIMES DAILY WITH MEALS
Status: DISCONTINUED | OUTPATIENT
Start: 2017-07-08 | End: 2017-07-09

## 2017-07-08 RX ORDER — HYDROXYZINE 25 MG/1
25 TABLET, FILM COATED ORAL 2 TIMES DAILY
Status: DISCONTINUED | OUTPATIENT
Start: 2017-07-08 | End: 2017-07-18 | Stop reason: HOSPADM

## 2017-07-08 RX ORDER — ISOSORBIDE DINITRATE 10 MG/1
20 TABLET ORAL 3 TIMES DAILY
Status: DISCONTINUED | OUTPATIENT
Start: 2017-07-08 | End: 2017-07-18 | Stop reason: HOSPADM

## 2017-07-08 RX ORDER — ALLOPURINOL 100 MG/1
100 TABLET ORAL 2 TIMES DAILY
Status: DISCONTINUED | OUTPATIENT
Start: 2017-07-08 | End: 2017-07-18 | Stop reason: HOSPADM

## 2017-07-08 RX ORDER — ACETAMINOPHEN 325 MG/1
650 TABLET ORAL
Status: DISCONTINUED | OUTPATIENT
Start: 2017-07-08 | End: 2017-07-18 | Stop reason: HOSPADM

## 2017-07-08 RX ORDER — COLCHICINE 0.6 MG/1
0.6 TABLET ORAL
Status: DISCONTINUED | OUTPATIENT
Start: 2017-07-09 | End: 2017-07-11 | Stop reason: SDUPTHER

## 2017-07-08 RX ORDER — FAMOTIDINE 20 MG/1
20 TABLET, FILM COATED ORAL 2 TIMES DAILY
Status: DISCONTINUED | OUTPATIENT
Start: 2017-07-08 | End: 2017-07-12

## 2017-07-08 RX ORDER — SENNOSIDES 8.6 MG/1
2 TABLET ORAL
Status: DISCONTINUED | OUTPATIENT
Start: 2017-07-08 | End: 2017-07-18 | Stop reason: HOSPADM

## 2017-07-08 RX ORDER — PAROXETINE HYDROCHLORIDE 20 MG/1
20 TABLET, FILM COATED ORAL
Status: DISCONTINUED | OUTPATIENT
Start: 2017-07-08 | End: 2017-07-18 | Stop reason: HOSPADM

## 2017-07-08 RX ORDER — INSULIN LISPRO 100 [IU]/ML
INJECTION, SOLUTION INTRAVENOUS; SUBCUTANEOUS
Status: DISCONTINUED | OUTPATIENT
Start: 2017-07-08 | End: 2017-07-18 | Stop reason: HOSPADM

## 2017-07-08 RX ORDER — FUROSEMIDE 10 MG/ML
40 INJECTION INTRAMUSCULAR; INTRAVENOUS
Status: COMPLETED | OUTPATIENT
Start: 2017-07-08 | End: 2017-07-08

## 2017-07-08 RX ORDER — ONDANSETRON 2 MG/ML
4 INJECTION INTRAMUSCULAR; INTRAVENOUS
Status: DISCONTINUED | OUTPATIENT
Start: 2017-07-08 | End: 2017-07-18 | Stop reason: HOSPADM

## 2017-07-08 RX ORDER — HYDRALAZINE HYDROCHLORIDE 10 MG/1
10 TABLET, FILM COATED ORAL 3 TIMES DAILY
Status: DISCONTINUED | OUTPATIENT
Start: 2017-07-08 | End: 2017-07-14

## 2017-07-08 RX ORDER — NITROGLYCERIN 0.4 MG/1
0.4 TABLET SUBLINGUAL
Status: DISCONTINUED | OUTPATIENT
Start: 2017-07-08 | End: 2017-07-18 | Stop reason: HOSPADM

## 2017-07-08 RX ORDER — INSULIN GLARGINE 100 [IU]/ML
50 INJECTION, SOLUTION SUBCUTANEOUS
Status: DISCONTINUED | OUTPATIENT
Start: 2017-07-08 | End: 2017-07-18 | Stop reason: HOSPADM

## 2017-07-08 RX ORDER — DIGOXIN 250 MCG
0.25 TABLET ORAL DAILY
Status: CANCELLED | OUTPATIENT
Start: 2017-07-09

## 2017-07-08 RX ORDER — OXYCODONE HYDROCHLORIDE 5 MG/1
10 TABLET ORAL
Status: DISCONTINUED | OUTPATIENT
Start: 2017-07-08 | End: 2017-07-18 | Stop reason: HOSPADM

## 2017-07-08 RX ORDER — DOCUSATE SODIUM 100 MG/1
100 CAPSULE, LIQUID FILLED ORAL 2 TIMES DAILY
Status: DISCONTINUED | OUTPATIENT
Start: 2017-07-08 | End: 2017-07-18 | Stop reason: HOSPADM

## 2017-07-08 RX ADMIN — HYDRALAZINE HYDROCHLORIDE 10 MG: 10 TABLET, FILM COATED ORAL at 23:02

## 2017-07-08 RX ADMIN — HYDROXYZINE HYDROCHLORIDE 25 MG: 25 TABLET, FILM COATED ORAL at 19:20

## 2017-07-08 RX ADMIN — FAMOTIDINE 20 MG: 20 TABLET ORAL at 19:20

## 2017-07-08 RX ADMIN — ARFORMOTEROL TARTRATE 15 MCG: 15 SOLUTION RESPIRATORY (INHALATION) at 19:49

## 2017-07-08 RX ADMIN — ALLOPURINOL 100 MG: 100 TABLET ORAL at 19:20

## 2017-07-08 RX ADMIN — BUMETANIDE 1 MG/HR: 0.25 INJECTION INTRAMUSCULAR; INTRAVENOUS at 19:19

## 2017-07-08 RX ADMIN — ISOSORBIDE DINITRATE 20 MG: 10 TABLET ORAL at 23:02

## 2017-07-08 RX ADMIN — POTASSIUM CHLORIDE 40 MEQ: 1.5 SOLUTION ORAL at 16:40

## 2017-07-08 RX ADMIN — ISOSORBIDE DINITRATE 20 MG: 10 TABLET ORAL at 19:20

## 2017-07-08 RX ADMIN — HYDRALAZINE HYDROCHLORIDE 10 MG: 10 TABLET, FILM COATED ORAL at 19:20

## 2017-07-08 RX ADMIN — GABAPENTIN 300 MG: 300 CAPSULE ORAL at 23:00

## 2017-07-08 RX ADMIN — DOCUSATE SODIUM 100 MG: 100 CAPSULE, LIQUID FILLED ORAL at 19:20

## 2017-07-08 RX ADMIN — FUROSEMIDE 40 MG: 10 INJECTION, SOLUTION INTRAMUSCULAR; INTRAVENOUS at 16:28

## 2017-07-08 RX ADMIN — PRAVASTATIN SODIUM 80 MG: 80 TABLET ORAL at 23:02

## 2017-07-08 RX ADMIN — CARVEDILOL 6.25 MG: 6.25 TABLET, FILM COATED ORAL at 19:20

## 2017-07-08 RX ADMIN — GABAPENTIN 300 MG: 300 CAPSULE ORAL at 19:20

## 2017-07-08 RX ADMIN — OXYCODONE HYDROCHLORIDE 10 MG: 5 TABLET ORAL at 20:53

## 2017-07-08 NOTE — ROUTINE PROCESS
TRANSFER - OUT REPORT:    Verbal report given to receiving RN on Zeinab Buchanan  being transferred to room 331 Tele for routine progression of care       Report consisted of patients Situation, Background, Assessment and   Recommendations(SBAR). Information from the following report(s) SBAR, ED Summary and Recent Results was reviewed with the receiving nurse. Lines:   Peripheral IV 07/08/17 Left Antecubital (Active)   Site Assessment Clean, dry, & intact 7/8/2017  5:26 PM   Phlebitis Assessment 0 7/8/2017  5:26 PM   Infiltration Assessment 0 7/8/2017  5:26 PM   Dressing Status Clean, dry, & intact 7/8/2017  5:26 PM        Opportunity for questions and clarification was provided.       Patient transported with:   Monitor  O2 @ 2 liters  Registered Nurse

## 2017-07-08 NOTE — PROGRESS NOTES
Bedside and Verbal shift change report given to Angel Mcgee RN (oncoming nurse) by Negrito Garcia RN (offgoing nurse). Report included the following information SBAR, Kardex, MAR and Recent Results. Bumex 1 mg/h verified at bedside with offgoing nurse.

## 2017-07-08 NOTE — PROGRESS NOTES
TRANSFER - IN REPORT:    Verbal report received from Rani Arauz RN on Kate Singh being received from ED for routine progression of care      Report consisted of patients Situation, Background, Assessment and Recommendations(SBAR). Information from the following reports was received: Kardex, ED Summary, MAR and Recent Results. Opportunity for questions and clarification was provided. Patient received to room 331. Patient connected to monitor and assessment completed. Plan of care reviewed. Patient oriented to room and call light. Patient aware to use call light to communicate any chest pain or needs. NSR 78 noted on telemetry monitor. Admission skin assessment completed with second RN and reveals the following: Scabs to bilateral heels from previous sores as patient states. Sacrum is dry, intact, and blanchable. Obese abdomen and extremities have striae. Penis and scrotal area enlarged, swollen, and retracted.

## 2017-07-08 NOTE — PROGRESS NOTES
Verbal bedside report given to monica Crawford RN. Patient's situation, background, assessment and recommendations provided. Opportunity for questions provided. Oncoming RN assumed care of patient. Bumex IV drip verified at bedside with oncoming RN.

## 2017-07-08 NOTE — ED TRIAGE NOTES
Pt arrives complaining of shortness of breath. Pt states he believes he's retaining fluid again. Pt states he was seen for the same thing 2 weeks ago, states hx of chronic heart failure, states ejection fraction of 10%. Pt states he's always on 2 L of O2. Pt alert and oriented in triage.

## 2017-07-08 NOTE — IP AVS SNAPSHOT
Andre Flower 
 
 
 [address] [facility] [address] 
[phone] Patient: Huy Haley MRN: [id number] You are allergic to the following Allergen Reactions Dilaudid (Hydromorphone) Other (comments) Hotflashes, patient states he's not allergic Iodinated Contrast- Oral And Iv Dye Other (comments) \"shuts my kidneys down\" Penicillins Unknown (comments) Pt states he is not allergic his mother just wouldn't allow him take it Recent Documentation Height Weight BMI Smoking Status 1.676 m 158.5 kg 56.39 kg/m2 Former Smoker Emergency Contacts Name Discharge Info Relation Home Work Mobile Darron Marte  Child [2] [phone] Che Roche  Daughter [21] [phone] About your hospitalization You were admitted on:  July 8, 2017 You last received care in the:  [facility] 3 CLINICAL OBSERVATION You were discharged on:  July 18, 2017 Unit phone number:  [phone] Why you were hospitalized Your primary diagnosis was:  Acute On Chronic Systolic (Congestive) Heart Failure (Hcc) Your diagnoses also included:  Diabetes Mellitus Type Ii, Uncontrolled (Hcc), Ckd (Chronic Kidney Disease) Stage 3, Gfr 30-59 Ml/Min, Morbid Obesity With Bmi Of 60.0-69.9, Adult (Hcc), Hypertension, Obstructive Sleep Apnea, Chf (Congestive Heart Failure) (Hcc) Providers Seen During Your Hospitalizations Provider Role Specialty Primary office phone Linda Frias MD Attending Provider Emergency Medicine [phone] Arlet Oneill MD Attending Provider Cardiology [phone] Your Primary Care Physician (PCP) Primary Care Physician Office Phone Office Fax [facility] [phone] Follow-up Information Follow up With Details Comments Contact Info  Alexy Roque MD On 7/25/2017 Follow up on July 25th at 9:00am ([facility])  [address] [facility] [address] 
[phone] Lazaro Sy MD  As needed [address] [place] [address] [address] [facility] [address] 
[phone] Your Appointments Tuesday July 25, 2017  9:00 AM EDT TRANSITIONAL CARE MANAGEMENT with Ericka Moreno MD  
[facility] Card[facility] ([address] [place] [address]) [address]  
[address] [place] [facility]  
[phone] Friday August 04, 2017  2:30 PM EDT  
OFFICE DEVICE CHECKS with [facility] 39 [facility] ([address] [place] [address]) [address]  
[address] [place] [facility]  
[phone] This upcoming device check will take place IN OUR OFFICE. Please arrive 15 minutes early to review any necessary paperwork requirements. If you have any further questions or need to change this appointment, we are happy to help and can be reached at [phone]. Current Discharge Medication List  
  
CONTINUE these medications which have CHANGED Dose & Instructions Dispensing Information Comments Morning Noon Evening Bedtime  
 bumetanide 2 mg tablet Commonly known as:  Willia Maid What changed:   
- medication strength 
- how much to take Dose:  2 mg Take 1 Tab by mouth two (2) times a day. Quantity:  60 Tab Refills:  1  
     
  
   
   
  
   
  
 hydrALAZINE 25 mg tablet Commonly known as:  APRESOLINE What changed:   
- medication strength 
- how much to take Dose:  25 mg Take 1 Tab by mouth three (3) times daily. Quantity:  90 Tab Refills:  3 CONTINUE these medications which have NOT CHANGED Dose & Instructions Dispensing Information Comments Morning Noon Evening Bedtime  
 allopurinol 100 mg tablet Commonly known as:  Alexander Rashaun Dose:  100 mg Take 100 mg by mouth two (2) times a day. Refills:  0 amiodarone 200 mg tablet Commonly known as:  CORDARONE Dose:  200 mg Take 1 Tab by mouth daily. Quantity:  30 Tab Refills:  6  
     
  
   
   
   
  
 ammonium lactate 12 % lotion Commonly known as:  LAC-HYDRIN Notes to Patient:  AS NEEDED Apply  to affected area as needed. rub in to affected area well Refills:  0 BROVANA 15 mcg/2 mL Nebu neb solution Generic drug:  arformoterol Dose:  15 mcg 15 mcg by Nebulization route two (2) times a day. Refills:  0  
     
  
   
   
   
  
  
 buffered aspirin 325 mg tablet Commonly known as:  BUFFERIN Dose:  325 mg Take 325 mg by mouth daily. Refills:  0  
     
  
   
   
   
  
 colchicine 0.6 mg tablet Dose:  0.6 mg Take 0.6 mg by mouth every morning. Refills:  0 COREG 6.25 mg tablet Generic drug:  carvedilol Dose:  6.25 mg Take 6.25 mg by mouth two (2) times daily (with meals). Refills:  0  
     
  
   
   
  
   
  
 cpap machine kit 10 cm qhs Refills:  0  
     
   
   
   
  
  
 cyclobenzaprine 10 mg tablet Commonly known as:  FLEXERIL Notes to Patient:  AS NEEDED Dose:  10 mg Take 10 mg by mouth every eight (8) hours as needed for Muscle Spasm(s). Refills:  0  
     
   
   
   
  
 docusate sodium 100 mg capsule Commonly known as:  Willie Parody Dose:  100 mg Take 1 Cap by mouth two (2) times a day. Quantity:  60 Cap Refills:  0  
     
  
   
   
  
   
  
 FLOMAX 0.4 mg capsule Generic drug:  tamsulosin Dose:  0.4 mg Take 0.4 mg by mouth daily. Refills:  0  
     
  
   
   
   
  
 gabapentin 600 mg tablet Commonly known as:  NEURONTIN Dose:  300 mg Take 300 mg by mouth three (3) times daily. Indications: Pt. takes 1 to 2 capsules every 8 hours Refills:  0  
     
  
   
   
   
  
  
 glucose blood VI test strips strip Commonly known as:  ASCENSIA AUTODISC VI, ONE TOUCH ULTRA TEST VI Test strips for 30 day supply Quantity:  120 strip Refills:  0  
     
   
   
   
  
 hydrOXYzine HCl 25 mg tablet Commonly known as:  ATARAX Take  by mouth two (2) times a day. Refills:  0  
     
  
   
   
   
  
  
 insulin glargine 100 unit/mL injection Commonly known as:  LANTUS Dose:  50 Units 50 Units by SubCUTAneous route nightly. Quantity:  1 Vial  
Refills:  0  
     
   
   
   
  
  
 isosorbide dinitrate 20 mg tablet Commonly known as:  ISORDIL Dose:  20 mg Take 1 Tab by mouth three (3) times daily. Quantity:  90 Tab Refills:  6  
     
  
   
  
   
   
  
  
 metOLazone 2.5 mg tablet Commonly known as:  Vertie Shone Notes to Patient:  AS DIRECTED Take  by mouth every Monday, Wednesday, Friday. Refills:  0  
     
   
   
   
  
 nitroglycerin 0.4 mg SL tablet Commonly known as:  NITROSTAT Notes to Patient:  AS NEEDED Dose:  0.4 mg  
1 Tab by SubLINGual route every five (5) minutes as needed for Chest Pain. Quantity:  4 Bottle Refills:  6 NovoLIN 70/30 100 unit/mL (70-30) injection Generic drug:  insulin NPH/insulin regular  
   
 by SubCUTAneous route. 40 units am, 35 units pm  
 Refills:  0 OXYGEN-AIR DELIVERY SYSTEMS  
   
 2 lpm cont. Refills:  0 PAXIL 20 mg tablet Generic drug:  PARoxetine Dose:  20 mg Take 20 mg by mouth nightly. Refills:  0  
     
   
   
   
  
 polyethylene glycol 17 gram packet Commonly known as:  Lawerance Amas Dose:  17 g Take 1 Packet by mouth daily as needed (constipation). Quantity:  10 Packet Refills:  5  
     
   
   
   
  
  
 pravastatin 80 mg tablet Commonly known as:  PRAVACHOL Dose:  80 mg Take 1 Tab by mouth nightly. Quantity:  30 Tab Refills:  0 PROVENTIL HFA 90 mcg/actuation inhaler Generic drug:  albuterol Notes to Patient:  AS NEEDED Dose:  2 Puff Take 2 Puffs by inhalation. q 4-6 hrs prn wheezing Refills:  0  
     
   
   
   
  
 raNITIdine 150 mg tablet Commonly known as:  ZANTAC Dose:  150 mg Take 150 mg by mouth nightly. Refills:  0  
     
   
   
   
  
  
 ROXICODONE 5 mg immediate release tablet Generic drug:  oxyCODONE IR Notes to Patient:  AS NEEDED Dose:  10 mg Take 10 mg by mouth every six (6) hours as needed for Pain. Refills:  0 Senna 8.6 mg Cap Generic drug:  sennosides Dose:  17.2 mg Take 17.2 mg by mouth nightly. Refills:  0  
     
   
   
   
  
  
 spironolactone 50 mg tablet Commonly known as:  ALDACTONE Dose:  50 mg Take 1 Tab by mouth daily. Quantity:  30 Tab Refills:  6 VITAMIN C 500 mg tablet Generic drug:  ascorbic acid (vitamin C) Dose:  500 mg Take 500 mg by mouth daily. Indications: Pt. takes 2 tablets daily Refills:  0  
     
  
   
   
   
  
 XOPENEX 1.25 mg/3 mL Nebu Generic drug:  levalbuterol Notes to Patient:  AS NEEDED Dose:  1.25 mg  
1.25 mg by Nebulization route. q 6-8 hrs prn wheezing and breathing problems Refills:  0 STOP taking these medications   
 furosemide 20 mg tablet Commonly known as:  LASIX  
   
  
 torsemide 100 mg tablet Commonly known as:  DEMADEX Where to Get Your Medications Information on where to get these meds will be given to you by the nurse or doctor. ! Ask your nurse or doctor about these medications  
  bumetanide 2 mg tablet  
 hydrALAZINE 25 mg tablet Discharge Instructions Avoiding Triggers With Heart Failure: Care Instructions Your Care Instructions Triggers are anything that make your heart failure flare up.  A flare-up is also called \"sudden heart failure\" or \"acute heart failure. \" When you have a flare-up, fluid builds up in your lungs, and you have problems breathing. You might need to go to the hospital. By watching for changes in your condition and avoiding triggers, you can prevent heart failure flare-ups. Follow-up care is a key part of your treatment and safety. Be sure to make and go to all appointments, and call your doctor if you are having problems. It's also a good idea to know your test results and keep a list of the medicines you take. How can you care for yourself at home? Watch for changes in your weight and condition · Weigh yourself without clothing at the same time each day. Record your weight. Call your doctor if you have sudden weight gain, such as more than 2 to 3 pounds in a day or 5 pounds in a week. (Your doctor may suggest a different range of weight gain.) A sudden weight gain may mean that your heart failure is getting worse. · Keep a daily record of your symptoms. Write down any changes in how you feel, such as new shortness of breath, cough, or problems eating. Also record if your ankles are more swollen than usual and if you feel more tired than usual. Note anything that you ate or did that could have triggered these changes. Limit sodium Sodium causes your body to hold on to extra water. This may cause your heart failure symptoms to get worse. People get most of their sodium from processed foods. Fast food and restaurant meals also tend to be very high in sodium. · Your doctor may suggest that you limit sodium to 2,000 milligrams (mg) a day or less. That is less than 1 teaspoon of salt a day, including all the salt you eat in cooking or in packaged foods. · Read food labels on cans and food packages. They tell you how much sodium you get in one serving. Check the serving size. If you eat more than one serving, you are getting more sodium. · Be aware that sodium can come in forms other than salt, including monosodium glutamate (MSG), sodium citrate, and sodium bicarbonate (baking soda). MSG is often added to Asian food. You can sometimes ask for food without MSG or salt. · Slowly reducing salt will help you adjust to the taste. Take the salt shaker off the table. · Flavor your food with garlic, lemon juice, onion, vinegar, herbs, and spices instead of salt. Do not use soy sauce, steak sauce, onion salt, garlic salt, mustard, or ketchup on your food, unless it is labeled \"low-sodium\" or \"low-salt. \" 
· Make your own salad dressings, sauces, and ketchup without adding salt. · Use fresh or frozen ingredients, instead of canned ones, whenever you can. Choose low-sodium canned goods. · Eat less processed food and food from restaurants, including fast food. Exercise as directed Moderate, regular exercise is very good for your heart. It improves your blood flow and helps control your weight. But too much exercise can stress your heart and cause a heart failure flare-up. · Check with your doctor before you start an exercise program. 
· Walking is an easy way to get exercise. Start out slowly. Gradually increase the length and pace of your walk. Swimming, riding a bike, and using a treadmill are also good forms of exercise. · When you exercise, watch for signs that your heart is working too hard. You are pushing yourself too hard if you cannot talk while you are exercising. If you become short of breath or dizzy or have chest pain, stop, sit down, and rest. 
· Do not exercise when you do not feel well. Take medicines correctly · Take your medicines exactly as prescribed. Call your doctor if you think you are having a problem with your medicine. · Make a list of all the medicines you take.  Include those prescribed to you by other doctors and any over-the-counter medicines, vitamins, or supplements you take. Take this list with you when you go to any doctor. · Take your medicines at the same time every day. It may help you to post a list of all the medicines you take every day and what time of day you take them. · Make taking your medicine as simple as you can. Plan times to take your medicines when you are doing other things, such as eating a meal or getting ready for bed. This will make it easier to remember to take your medicines. · Get organized. Use helpful tools, such as daily or weekly pill containers. When should you call for help? Call 911 if you have symptoms of sudden heart failure such as: 
· You have severe trouble breathing. · You cough up pink, foamy mucus. · You have a new irregular or rapid heartbeat. Call your doctor now or seek immediate medical care if: 
· You have new or increased shortness of breath. · You are dizzy or lightheaded, or you feel like you may faint. · You have sudden weight gain, such as more than 2 to 3 pounds in a day or 5 pounds in a week. (Your doctor may suggest a different range of weight gain.) · You have increased swelling in your legs, ankles, or feet. · You are suddenly so tired or weak that you cannot do your usual activities. Watch closely for changes in your health, and be sure to contact your doctor if you develop new symptoms. Where can you learn more? Go to http://armando-nirav.info/. Enter F529 in the search box to learn more about \"Avoiding Triggers With Heart Failure: Care Instructions. \" Current as of: February 23, 2017 Content Version: 11.3 © 2096-2914 [employer]. Care instructions adapted under license by [employer] (which disclaims liability or warranty for this information). If you have questions about a medical condition or this instruction, always ask your healthcare professional. [address] any warranty or liability for your use of this information. Limiting Sodium and Fluids With Heart Failure: Care Instructions Your Care Instructions Sodium causes your body to hold on to extra water. This may cause your heart failure symptoms to get worse. Limiting sodium may help you feel better and lower your risk of having to go to the hospital. 
People get most of their sodium from processed foods. Fast food and restaurant meals also tend to be very high in sodium. Your doctor may suggest that you limit sodium to 2,000 milligrams (mg) a day or less. That is less than 1 teaspoon of salt a day, including all the salt you eat in cooked or packaged foods. Usually, you have to limit the amount of liquids you drink only if your heart failure is severe. Limiting sodium alone often is enough to help your body get rid of extra fluids. However, your doctor may tell you to limit your fluid intake to a set amount each day. Follow-up care is a key part of your treatment and safety. Be sure to make and go to all appointments, and call your doctor if you are having problems. It's also a good idea to know your test results and keep a list of the medicines you take. How can you care for yourself at home? Read food labels · Read food labels on cans and food packages. The labels tell you how much sodium is in each serving. Make sure that you look at the serving size. If you eat more than the serving size, you have eaten more sodium than is listed for one serving. · Food labels also tell you the Percent Daily Value. If the Percent Daily Value says 50%, it means that you will get at least 50% of all the sodium you need for the entire day in one serving. Choose products with low Percent Daily Values for sodium. · Be aware that sodium can come in forms other than salt, including monosodium glutamate (MSG), sodium citrate, and sodium bicarbonate (baking soda). MSG is often added to Asian food. You can sometimes ask for food without MSG or salt. Buy low-sodium foods · Buy foods that are labeled \"unsalted\" (no salt added), \"sodium-free\" (less than 5 mg of sodium per serving), or \"low-sodium\" (less than 140 mg of sodium per serving). A food labeled \"light sodium\" has less than half of the full-sodium version of that food. Foods labeled \"reduced-sodium\" may still have too much sodium. · Buy fresh vegetables or plain, frozen vegetables. Buy low-sodium versions of canned vegetables, soups, and other canned goods. Prepare low-sodium meals · Use less salt each day when cooking. Reducing salt in this way will help you adjust to the taste. Do not add salt after cooking. Take the salt shaker off the table. · Flavor your food with garlic, lemon juice, onion, vinegar, herbs, and spices instead of salt. Do not use soy sauce, steak sauce, onion salt, garlic salt, mustard, or ketchup on your food. · Make your own salad dressings, sauces, and ketchup without adding salt. · Use less salt (or none) when recipes call for it. You can often use half the salt a recipe calls for without losing flavor. Other dishes like rice, pasta, and grains do not need added salt. · Rinse canned vegetables. This removes somebut not allof the salt. · Avoid water that has a naturally high sodium content or that has been treated with water softeners, which add sodium. Call your local water company to find out the sodium content of your water supply. If you buy bottled water, read the label and choose a sodium-free brand. Avoid high-sodium foods, such as: 
· Smoked, cured, salted, and canned meat, fish, and poultry. · Ham, fuller, hot dogs, and luncheon meats. · Regular, hard, and processed cheese and regular peanut butter. · Crackers with salted tops. · Frozen prepared meals. · Canned and dried soups, broths, and bouillon, unless labeled sodium-free or low-sodium. · Canned vegetables, unless labeled sodium-free or low-sodium. · Salted snack foods such as chips and pretzels. · Western Dariela fries, pizza, tacos, and other fast foods. · Pickles, olives, ketchup, and other condiments, especially soy sauce, unless labeled sodium-free or low-sodium. If you cannot cook for yourself · Have family members or friends help you, or have someone cook low-sodium meals. · Check with your local senior nutrition program to find out where meals are served and whether they offer a low-sodium option. You can often find these programs through your local health department or hospital. 
· Have meals delivered to your home. Most [facility] have a Meals on Icount.comney. These programs provide one hot meal a day for older adults, delivered to their homes. Ask whether these meals are low-sodium. Let them know that you are on a low-sodium diet. Limiting fluid intake · Find a method that works for you. You might simply write down how much you drink every time you do. Some people keep a container filled with the amount of fluid allowed for that day. If they drink from a source other than the container, then they pour out that amount. · Measure your regular drinking glasses to find out how much fluid each one holds. Once you know this, you will not have to measure every time. · Besides water, milk, juices, and other drinks, some foods have a lot of fluid. Count any foods that will melt (such as ice cream or gelatin dessert) or liquid foods (such as soup) as part of your fluid intake for the day. Where can you learn more? Go to http://armando-nirav.info/. Enter A166 in the search box to learn more about \"Limiting Sodium and Fluids With Heart Failure: Care Instructions. \" Current as of: November 15, 2016 Content Version: 11.3 © 2475-4026 [employer]. Care instructions adapted under license by [employer] (which disclaims liability or warranty for this information).  If you have questions about a medical condition or this instruction, always ask your healthcare professional. [place] [address] any warranty or liability for your use of this information. DISCHARGE SUMMARY from Nurse The following personal items are in your possession at time of discharge: 
 
Dental Appliances: None Visual Aid: Glasses, With patient Clothing: Pants, Shirt, Undergarments, With patient Other Valuables: Other (comment) (Oxygen tank) PATIENT INSTRUCTIONS: 
 
 
F-face looks uneven A-arms unable to move or move unevenly S-speech slurred or non-existent T-time-call 911 as soon as signs and symptoms begin-DO NOT go Back to bed or wait to see if you get better-TIME IS BRAIN. Warning Signs of HEART ATTACK Call 911 if you have these symptoms: 
? Chest discomfort. Most heart attacks involve discomfort in the center of the chest that lasts more than a few minutes, or that goes away and comes back. It can feel like uncomfortable pressure, squeezing, fullness, or pain. ? Discomfort in other areas of the upper body. Symptoms can include pain or discomfort in one or both arms, the back, neck, jaw, or stomach. ? Shortness of breath with or without chest discomfort. ? Other signs may include breaking out in a cold sweat, nausea, or lightheadedness. Don't wait more than five minutes to call [address]! Fast action can save your life. Calling 911 is almost always the fastest way to get lifesaving treatment. Emergency Medical Services staff can begin treatment when they arrive  up to an hour sooner than if someone gets to the hospital by car. The discharge information has been reviewed with the patient.   The patient verbalized understanding. Discharge medications reviewed with the patient and appropriate educational materials and side effects teaching were provided. Discharge Orders None [employer] Announcement We are excited to announce that we are making your provider's discharge notes available to you in [employer]. You will see these notes when they are completed and signed by the physician that discharged you from your recent hospital stay. If you have any questions or concerns about any information you see in [employer], please call the Health Information Department where you were seen or reach out to your Primary Care Provider for more information about your plan of care. Introducing [facility]! Maddy Gracia introduces [employer] patient portal. Now you can access parts of your medical record, email your doctor's office, and request medication refills online. 1. In your internet browser, go to [email or web address] 2. Click on the First Time User? Click Here link in the Sign In box. You will see the New Member Sign Up page. 3. Enter your [employer] Access Code exactly as it appears below. You will not need to use this code after youve completed the sign-up process. If you do not sign up before the expiration date, you must request a new code. · [employer] Access Code: [email or web address] Expires: 10/10/2017  6:39 AM 
 
4. Enter the last four digits of your Social Security Number (xxxx) and Date of Birth (mm/dd/yyyy) as indicated and click Submit. You will be taken to the next sign-up page. 5. Create a [employer] ID. This will be your [employer] login ID and cannot be changed, so think of one that is secure and easy to remember. 6. Create a [employer] password. You can change your password at any time. 7. Enter your Password Reset Question and Answer. This can be used at a later time if you forget your password. 8. Enter your e-mail address. You will receive e-mail notification when new information is available in [address] 9. Click Sign Up. You can now view and download portions of your medical record. 10. Click the Download Summary menu link to download a portable copy of your medical information. If you have questions, please visit the Frequently Asked Questions section of the [employer] website. Remember, [facility] is NOT to be used for urgent needs. For medical emergencies, dial 911. Now available from your iPhone and Android! General Information Please provide this summary of care documentation to your next provider. Patient Signature:  ____________________________________________________________ Date:  ____________________________________________________________  
  
Shey Ortega Provider Signature:  ____________________________________________________________ Date:  ____________________________________________________________

## 2017-07-08 NOTE — H&P
[facility] CARDIOLOGY History &Physical                 Primary Cardiologist: Dr. Jermaine Adam    Primary Care Physician: Dr. Abhilash Restrepo    Admitting Physician: Dr. Edison Mccarthy    Subjective:     Patient is a 45 y.o. male who presents with progressive shortness of breath. He has known end stage systolic CHF. He was discharged on 6/28. He missed an office appt yesterday. He admits to too much fluid intake including \"eating ice. \" He states he has been trying to be careful but the medications make him thirsty. He has refused hospice/palliative care on numerous occasions. He cannot advise how much weight he has gained. He is tearful on exam regarding domestic issues within his family. He denies any chest pain. His BNP is elevated and he is volume overloaded on exam.   He has an ICD in place with no discharges. Past Medical History:   Diagnosis Date    Acquired claw toe of right foot     Acute encephalopathy 9/02/5332    Acute systolic heart failure ([address] May, 2009    Also had transient acute renal failure secondary to poor perfusion.     AICD (automatic cardioverter/defibrillator) present 10/21/2015    Asthma     Atypical chest pain 4/23/2010    Bronchitis     CAD (coronary artery disease)     Cardiomyopathy     Chest pain 10/21/2015    Chronic kidney disease     renal insufficiency    Chronic pain     back    Congestive heart failure (CHF) ([address] 10/21/2015    COPD     Depressive disorder     Diabetes ([address]     type 2 insulin reliant- BS average- 160's-180's    Diabetes mellitus type II, uncontrolled ([address] 7/2/2013    GERD (gastroesophageal reflux disease)     Gout     Heart failure ([address]     cardiomyopathy with ef 10-20%    Hypertension     Hyponatremia 12/20/2010    Ill-defined condition     gout, neuropathy, sciatica    Morbid obesity (HCC)     Nausea & vomiting 11/30/2015    Neuropathy     Obstructive sleep apnea 2/15/2010    Other unknown and unspecified cause of morbidity or mortality Gout    Severe sepsis (HCC)     Ulcer of leg, chronic ([facility]     Unspecified sleep apnea     cpap      Past Surgical History:   Procedure Laterality Date    CHEST SURGERY PROCEDURE UNLISTED      thorocentesis    HX HEART CATHETERIZATION  Sandy 10, 2008    Severe multivessel disease with severe LV dysfunction    HX PACEMAKER      may 2010      Allergies   Allergen Reactions    Dilaudid [Hydromorphone] Other (comments)     Hotflashes, patient states he's not allergic    Iodinated Contrast- Oral And Iv Dye Other (comments)     \"shuts my kidneys down\"    Penicillins Unknown (comments)     Pt states he is not allergic his mother just wouldn't allow him take it     Social History   Substance Use Topics    Smoking status: Former Smoker     Packs/day: 0.25     Years: 1.00     Quit date: 7/12/1984    Smokeless tobacco: Never Used      Comment: pt states that he only tried smoking as a kid     Alcohol use No      FH:   Family History   Problem Relation Age of Onset    Heart Disease Father     Stroke Father     Coronary Artery Disease Father     Other Father      kidney failure    Diabetes Sister     Stroke Sister     Diabetes Sister     Coronary Artery Disease Mother     Heart Disease Other           Review of Systems   Constitution: Negative for chills, fever, weakness, malaise/fatigue, weight gain and weight loss. HENT: Negative for ear pain, headaches, hearing loss, nosebleeds, sore throat and tinnitus. Eyes: Negative for blurred vision, vision loss in left eye and vision loss in right eye. Cardiovascular: Positive for dyspnea on exertion and leg swelling. Negative for chest pain, near-syncope, orthopnea, palpitations, paroxysmal nocturnal dyspnea and syncope. Respiratory: Positive for shortness of breath. Negative for cough, hemoptysis, sputum production and wheezing. Endocrine: Negative for cold intolerance, heat intolerance and polydipsia.    Hematologic/Lymphatic: Does not bruise/bleed easily. Skin: Negative for color change and rash. Musculoskeletal: Negative for back pain, joint pain, joint swelling and myalgias. Gastrointestinal: Positive for bloating. Negative for abdominal pain, constipation, diarrhea, dysphagia, heartburn, hematemesis, melena, nausea and vomiting. Genitourinary: Negative for dysuria, frequency, hematuria and urgency. Neurological: Negative for difficulty with concentration, dizziness, light-headedness, numbness, paresthesias, seizures and vertigo. Psychiatric/Behavioral: Negative for altered mental status and depression. Objective:       Visit Vitals    /89 (BP 1 Location: Right arm, BP Patient Position: At rest)    Pulse 84    Temp 98.8 °F (37.1 °C)    Resp 16    Ht 5' 6\" (1.676 m)    Wt 158.8 kg (350 lb)    SpO2 98%    BMI 56.49 kg/m2     Physical Exam:  General: Well Developed, Obese, No Acute Distress  HEENT: pupils equal and round, no abnormalities noted  Neck: supple, no JVD  Heart: S1S2 with RRR  Lungs: mostly clear throughout auscultation bilaterally  Abd: soft, nontender, nondistended, with good bowel sounds  Ext: warm, 3+ edema, calves supple/nontender, pulses 2+ bilaterally  Skin: warm and dry  Psychiatric: Depressed mood and affect, tearful on interview  Neurologic: Alert and oriented X 3      ECG: sinus rhythm     Data Review:   Recent Labs      07/08/17   1515   NA  138   K  3.4*   MG  2.0   BUN  42*   CREA  2.11*   GLU  143*   WBC  6.5   HGB  10.3*   HCT  32.3*   PLT  185   TROIQ  <0.02*         CXR: Cardiomegaly.  No acute findings    Assessment/Plan:   Principal Problem:    Acute on chronic systolic (congestive) heart failure ([facility] (10/18/2016)  Admit, resume Bumex drip for diuresis, continue Coreg, Aldactone, pt has failed ACE-I/ARB, and Entresto due to ARF, poor prognosis has been discussed on numerous occasions     Active Problems:    Obstructive sleep apnea (3/18/2017)  Continue cpap at night      Diabetes mellitus type II, uncontrolled ([facility] (3/18/2017)  Continue Lantus, SSI      CKD (chronic kidney disease) stage 3, GFR 30-59 ml/min (8/13/2014)  Monitor renal function with diuresis      Hypertension (3/18/2017)  Continue home meds and monitor      Morbid obesity with BMI of 60.0-69.9, adult ([facility] (3/18/2017)          Debra Sue PA-C  7/8/2017  5:03 PM

## 2017-07-08 NOTE — ED PROVIDER NOTES
HPI Comments: Presents with complaint of shortness of breath. Patient reports he has heart failure and chronic kidney disease and difficulty with diuresis. He states his Bumex was changed from 2 mg daily to 1 mg twice a day and his urine output has decreased. He is not taking Lasix because of his kidney disease. He states he can only walk 5-10 steps without becoming short of breath. Reports that he can normally walk about 25 steps. States he had chest pain nausea and vomiting earlier today but none now. Patient is a 45 y.o. male presenting with shortness of breath. The history is provided by the patient. Shortness of Breath   This is a new problem. The problem occurs continuously. The current episode started more than 2 days ago. The problem has been gradually worsening. Associated symptoms include leg pain and leg swelling. Pertinent negatives include no fever, no cough, no wheezing and no chest pain. He has had prior hospitalizations. He has had prior ED visits. Associated medical issues include heart failure. Past Medical History:   Diagnosis Date    Acquired claw toe of right foot     Acute encephalopathy 1/30/9945    Acute systolic heart failure ([address] May, 2009    Also had transient acute renal failure secondary to poor perfusion.     AICD (automatic cardioverter/defibrillator) present 10/21/2015    Asthma     Atypical chest pain 4/23/2010    Bronchitis     CAD (coronary artery disease)     Cardiomyopathy     Chest pain 10/21/2015    Chronic kidney disease     renal insufficiency    Chronic pain     back    Congestive heart failure (CHF) ([address] 10/21/2015    COPD     Depressive disorder     Diabetes ([address]     type 2 insulin reliant- BS average- 160's-180's    Diabetes mellitus type II, uncontrolled ([address] 7/2/2013    GERD (gastroesophageal reflux disease)     Gout     Heart failure ([address]     cardiomyopathy with ef 10-20%    Hypertension     Hyponatremia 12/20/2010    Ill-defined condition     gout, neuropathy, sciatica    Morbid obesity ([facility]     Nausea & vomiting 11/30/2015    Neuropathy     Obstructive sleep apnea 2/15/2010    Other unknown and unspecified cause of morbidity or mortality     Gout    Severe sepsis (HCC)     Ulcer of leg, chronic (HCC)     Unspecified sleep apnea     cpap       Past Surgical History:   Procedure Laterality Date    CHEST SURGERY PROCEDURE UNLISTED      thorocentesis    HX HEART CATHETERIZATION  [place] 10, 2008    Severe multivessel disease with severe LV dysfunction    HX PACEMAKER      may 2010         Family History:   Problem Relation Age of Onset    Heart Disease Father     Stroke Father     Coronary Artery Disease Father     Other Father      kidney failure    Diabetes Sister     Stroke Sister     Diabetes Sister     Coronary Artery Disease Mother     Heart Disease Other        Social History     Social History    Marital status:      Spouse name: N/A    Number of children: N/A    Years of education: N/A     Occupational History    Not on file. Social History Main Topics    Smoking status: Former Smoker     Packs/day: 0.25     Years: 1.00     Quit date: 7/12/1984    Smokeless tobacco: Never Used      Comment: pt states that he only tried smoking as a kid     Alcohol use No    Drug use: No    Sexual activity: Not on file     Other Topics Concern    Not on file     Social History Narrative         ALLERGIES: Dilaudid [hydromorphone]; Iodinated contrast- oral and iv dye; and Penicillins    Review of Systems   Constitutional: Negative for chills and fever. Respiratory: Positive for shortness of breath. Negative for cough and wheezing. Cardiovascular: Positive for leg swelling. Negative for chest pain. All other systems reviewed and are negative.       Vitals:    07/08/17 1442   BP: 138/89   Pulse: 84   Resp: 16   Temp: 98.8 °F (37.1 °C)   SpO2: 98%   Weight: 158.8 kg (350 lb)   Height: 5' 6\" (1.676 m) Physical Exam   Constitutional: He is oriented to person, place, and time. He appears well-developed and well-nourished. He appears distressed (aappears dyspneic). Morbidly obese, his body habitus makes his physical exam difficult   HENT:   Head: Normocephalic and atraumatic. Neck: Normal range of motion. Neck supple. Cardiovascular: Normal rate and regular rhythm. Pulmonary/Chest: He is in respiratory distress. He has no wheezes. Abdominal: Soft. He exhibits distension. There is no tenderness. Musculoskeletal: Normal range of motion. He exhibits edema (bilateral lower extremity edema). Neurological: He is alert and oriented to person, place, and time. No cranial nerve deficit. Skin: Skin is warm and dry. He is not diaphoretic. Psychiatric: He has a normal mood and affect. His behavior is normal.   Nursing note and vitals reviewed. MDM  Number of Diagnoses or Management Options  Acute on chronic systolic congestive heart failure [facility]):   Diagnosis management comments: Patient discharge sheet and 28 same problem. He has a history of nonischemic cardiomyopathy and nonsustained V. Tach for which she was given AICD. He has refused hospice in the past.    Lasix 40 IV x1.  K replaced. D/w pt and Dr. Adam Rios with [facility]       Amount and/or Complexity of Data Reviewed  Clinical lab tests: ordered and reviewed  Review and summarize past medical records: yes  Discuss the patient with other providers: yes  Independent visualization of images, tracings, or specimens: yes (NSR, no ST elevation.   CXR per my view looks like HF)    Risk of Complications, Morbidity, and/or Mortality  Presenting problems: high  Diagnostic procedures: moderate  Management options: high    Patient Progress  Patient progress: stable    ED Course       Procedures

## 2017-07-08 NOTE — IP AVS SNAPSHOT
[address] [address] 
[phone] Patient: Kush Hahn MRN: [id number] HXT:[id number] Current Discharge Medication List  
  
CONTINUE these medications which have CHANGED Dose & Instructions Dispensing Information Comments Morning Noon Evening Bedtime  
 bumetanide 2 mg tablet Commonly known as:  Azam [place] What changed:   
- medication strength 
- how much to take Dose:  2 mg Take 1 Tab by mouth two (2) times a day. Quantity:  60 Tab Refills:  1  
     
  
   
   
  
   
  
 hydrALAZINE 25 mg tablet Commonly known as:  APRESOLINE What changed:   
- medication strength 
- how much to take Dose:  25 mg Take 1 Tab by mouth three (3) times daily. Quantity:  90 Tab Refills:  3 CONTINUE these medications which have NOT CHANGED Dose & Instructions Dispensing Information Comments Morning Noon Evening Bedtime  
 allopurinol 100 mg tablet Commonly known as:  Brandi Fulling Dose:  100 mg Take 100 mg by mouth two (2) times a day. Refills:  0  
     
  
   
   
   
  
  
 amiodarone 200 mg tablet Commonly known as:  CORDARONE Dose:  200 mg Take 1 Tab by mouth daily. Quantity:  30 Tab Refills:  6  
     
  
   
   
   
  
 ammonium lactate 12 % lotion Commonly known as:  LAC-HYDRIN Notes to Patient:  AS NEEDED Apply  to affected area as needed. rub in to affected area well Refills:  0 BROVANA 15 mcg/2 mL Nebu neb solution Generic drug:  arformoterol Dose:  15 mcg 15 mcg by Nebulization route two (2) times a day. Refills:  0  
     
  
   
   
   
  
  
 buffered aspirin 325 mg tablet Commonly known as:  BUFFERIN Dose:  325 mg Take 325 mg by mouth daily. Refills:  0  
     
  
   
   
   
  
 colchicine 0.6 mg tablet Dose:  0.6 mg Take 0.6 mg by mouth every morning. Refills:  0 COREG 6.25 mg tablet Generic drug:  carvedilol Dose:  6.25 mg Take 6.25 mg by mouth two (2) times daily (with meals). Refills:  0  
     
  
   
   
  
   
  
 cpap machine kit 10 cm qhs Refills:  0  
     
   
   
   
  
  
 cyclobenzaprine 10 mg tablet Commonly known as:  FLEXERIL Notes to Patient:  AS NEEDED Dose:  10 mg Take 10 mg by mouth every eight (8) hours as needed for Muscle Spasm(s). Refills:  0  
     
   
   
   
  
 docusate sodium 100 mg capsule Commonly known as:  Brisa Gomez Dose:  100 mg Take 1 Cap by mouth two (2) times a day. Quantity:  60 Cap Refills:  0  
     
  
   
   
  
   
  
 FLOMAX 0.4 mg capsule Generic drug:  tamsulosin Dose:  0.4 mg Take 0.4 mg by mouth daily. Refills:  0  
     
  
   
   
   
  
 gabapentin 600 mg tablet Commonly known as:  NEURONTIN Dose:  300 mg Take 300 mg by mouth three (3) times daily. Indications: Pt. takes 1 to 2 capsules every 8 hours Refills:  0  
     
  
   
   
   
  
  
 glucose blood VI test strips strip Commonly known as:  ASCENSIA AUTODISC VI, ONE TOUCH ULTRA TEST VI Test strips for 30 day supply Quantity:  120 strip Refills:  0  
     
   
   
   
  
 hydrOXYzine HCl 25 mg tablet Commonly known as:  ATARAX Take  by mouth two (2) times a day. Refills:  0  
     
  
   
   
   
  
  
 insulin glargine 100 unit/mL injection Commonly known as:  LANTUS Dose:  50 Units 50 Units by SubCUTAneous route nightly. Quantity:  1 Vial  
Refills:  0  
     
   
   
   
  
  
 isosorbide dinitrate 20 mg tablet Commonly known as:  ISORDIL Dose:  20 mg Take 1 Tab by mouth three (3) times daily. Quantity:  90 Tab Refills:  6  
     
  
   
  
   
   
  
  
 metOLazone 2.5 mg tablet Commonly known as:  Edvin Cohn Notes to Patient:  AS DIRECTED  
   
 Take  by mouth every Monday, Wednesday, Friday. Refills:  0  
     
   
   
   
  
 nitroglycerin 0.4 mg SL tablet Commonly known as:  NITROSTAT Notes to Patient:  AS NEEDED Dose:  0.4 mg  
1 Tab by SubLINGual route every five (5) minutes as needed for Chest Pain. Quantity:  4 Bottle Refills:  6 NovoLIN 70/30 100 unit/mL (70-30) injection Generic drug:  insulin NPH/insulin regular  
   
 by SubCUTAneous route. 40 units am, 35 units pm  
 Refills:  0 OXYGEN-AIR DELIVERY SYSTEMS  
   
 2 lpm cont. Refills:  0 PAXIL 20 mg tablet Generic drug:  PARoxetine Dose:  20 mg Take 20 mg by mouth nightly. Refills:  0  
     
   
   
   
  
 polyethylene glycol 17 gram packet Commonly known as:  Eric Holster Dose:  17 g Take 1 Packet by mouth daily as needed (constipation). Quantity:  10 Packet Refills:  5  
     
   
   
   
  
  
 pravastatin 80 mg tablet Commonly known as:  PRAVACHOL Dose:  80 mg Take 1 Tab by mouth nightly. Quantity:  30 Tab Refills:  0 PROVENTIL HFA 90 mcg/actuation inhaler Generic drug:  albuterol Notes to Patient:  AS NEEDED Dose:  2 Puff Take 2 Puffs by inhalation. q 4-6 hrs prn wheezing Refills:  0  
     
   
   
   
  
 raNITIdine 150 mg tablet Commonly known as:  ZANTAC Dose:  150 mg Take 150 mg by mouth nightly. Refills:  0  
     
   
   
   
  
  
 ROXICODONE 5 mg immediate release tablet Generic drug:  oxyCODONE IR Notes to Patient:  AS NEEDED Dose:  10 mg Take 10 mg by mouth every six (6) hours as needed for Pain. Refills:  0 Senna 8.6 mg Cap Generic drug:  sennosides Dose:  17.2 mg Take 17.2 mg by mouth nightly. Refills:  0  
     
   
   
   
  
  
 spironolactone 50 mg tablet Commonly known as:  ALDACTONE  Dose:  50 mg  
 Take 1 Tab by mouth daily. Quantity:  30 Tab Refills:  6 VITAMIN C 500 mg tablet Generic drug:  ascorbic acid (vitamin C) Dose:  500 mg Take 500 mg by mouth daily. Indications: Pt. takes 2 tablets daily Refills:  0  
     
  
   
   
   
  
 XOPENEX 1.25 mg/3 mL Nebu Generic drug:  levalbuterol Notes to Patient:  AS NEEDED Dose:  1.25 mg  
1.25 mg by Nebulization route. q 6-8 hrs prn wheezing and breathing problems Refills:  0 STOP taking these medications   
 furosemide 20 mg tablet Commonly known as:  LASIX  
   
  
 torsemide 100 mg tablet Commonly known as:  DEMADEX Where to Get Your Medications Information on where to get these meds will be given to you by the nurse or doctor. ! Ask your nurse or doctor about these medications  
  bumetanide 2 mg tablet  
 hydrALAZINE 25 mg tablet

## 2017-07-08 NOTE — PROGRESS NOTES
Problem: Heart Failure: Day 1  Goal: Medications  Outcome: Progressing Towards Goal  Bumex drip to infuse when it is delivered to the floor. Problem: Falls - Risk of  Goal: *Absence of falls  Outcome: Progressing Towards Goal  Patient with yellow non-skid socks in place, call light within reach, bed rails up x2. Daughter consistently stays at bedside. Verbalizes understanding to call for any assistance.

## 2017-07-09 PROBLEM — I50.9 CHF (CONGESTIVE HEART FAILURE) (HCC): Status: ACTIVE | Noted: 2017-07-09

## 2017-07-09 LAB
ANION GAP BLD CALC-SCNC: 7 MMOL/L (ref 7–16)
BACTERIA SPEC CULT: NORMAL
BUN SERPL-MCNC: 41 MG/DL (ref 6–23)
CALCIUM SERPL-MCNC: 8.6 MG/DL (ref 8.3–10.4)
CHLORIDE SERPL-SCNC: 102 MMOL/L (ref 98–107)
CO2 SERPL-SCNC: 30 MMOL/L (ref 21–32)
CREAT SERPL-MCNC: 2.03 MG/DL (ref 0.8–1.5)
ERYTHROCYTE [DISTWIDTH] IN BLOOD BY AUTOMATED COUNT: 19.8 % (ref 11.9–14.6)
GLUCOSE BLD STRIP.AUTO-MCNC: 127 MG/DL (ref 65–100)
GLUCOSE BLD STRIP.AUTO-MCNC: 137 MG/DL (ref 65–100)
GLUCOSE BLD STRIP.AUTO-MCNC: 138 MG/DL (ref 65–100)
GLUCOSE BLD STRIP.AUTO-MCNC: 140 MG/DL (ref 65–100)
GLUCOSE SERPL-MCNC: 134 MG/DL (ref 65–100)
HCT VFR BLD AUTO: 32.4 % (ref 41.1–50.3)
HGB BLD-MCNC: 9.9 G/DL (ref 13.6–17.2)
MCH RBC QN AUTO: 23.2 PG (ref 26.1–32.9)
MCHC RBC AUTO-ENTMCNC: 30.6 G/DL (ref 31.4–35)
MCV RBC AUTO: 76.1 FL (ref 79.6–97.8)
PLATELET # BLD AUTO: 188 K/UL (ref 150–450)
PMV BLD AUTO: 9.6 FL (ref 10.8–14.1)
POTASSIUM SERPL-SCNC: 3.6 MMOL/L (ref 3.5–5.1)
RBC # BLD AUTO: 4.26 M/UL (ref 4.23–5.67)
SERVICE CMNT-IMP: NORMAL
SODIUM SERPL-SCNC: 139 MMOL/L (ref 136–145)
WBC # BLD AUTO: 6.8 K/UL (ref 4.3–11.1)

## 2017-07-09 PROCEDURE — 94640 AIRWAY INHALATION TREATMENT: CPT

## 2017-07-09 PROCEDURE — 77010033678 HC OXYGEN DAILY

## 2017-07-09 PROCEDURE — 36415 COLL VENOUS BLD VENIPUNCTURE: CPT | Performed by: PHYSICIAN ASSISTANT

## 2017-07-09 PROCEDURE — 94760 N-INVAS EAR/PLS OXIMETRY 1: CPT

## 2017-07-09 PROCEDURE — 74011250637 HC RX REV CODE- 250/637: Performed by: INTERNAL MEDICINE

## 2017-07-09 PROCEDURE — 99218 HC RM OBSERVATION: CPT

## 2017-07-09 PROCEDURE — 74011250637 HC RX REV CODE- 250/637: Performed by: PHYSICIAN ASSISTANT

## 2017-07-09 PROCEDURE — 74011000258 HC RX REV CODE- 258: Performed by: PHYSICIAN ASSISTANT

## 2017-07-09 PROCEDURE — 74011000250 HC RX REV CODE- 250: Performed by: PHYSICIAN ASSISTANT

## 2017-07-09 PROCEDURE — 80048 BASIC METABOLIC PNL TOTAL CA: CPT | Performed by: PHYSICIAN ASSISTANT

## 2017-07-09 PROCEDURE — 82962 GLUCOSE BLOOD TEST: CPT

## 2017-07-09 PROCEDURE — 85027 COMPLETE CBC AUTOMATED: CPT | Performed by: PHYSICIAN ASSISTANT

## 2017-07-09 PROCEDURE — 65660000000 HC RM CCU STEPDOWN

## 2017-07-09 RX ORDER — CARVEDILOL 6.25 MG/1
6.25 TABLET ORAL ONCE
Status: COMPLETED | OUTPATIENT
Start: 2017-07-09 | End: 2017-07-09

## 2017-07-09 RX ORDER — CARVEDILOL 12.5 MG/1
12.5 TABLET ORAL 2 TIMES DAILY WITH MEALS
Status: DISCONTINUED | OUTPATIENT
Start: 2017-07-09 | End: 2017-07-18 | Stop reason: HOSPADM

## 2017-07-09 RX ADMIN — DOCUSATE SODIUM 100 MG: 100 CAPSULE, LIQUID FILLED ORAL at 17:47

## 2017-07-09 RX ADMIN — PRAVASTATIN SODIUM 80 MG: 80 TABLET ORAL at 22:45

## 2017-07-09 RX ADMIN — ISOSORBIDE DINITRATE 20 MG: 10 TABLET ORAL at 17:47

## 2017-07-09 RX ADMIN — GABAPENTIN 300 MG: 300 CAPSULE ORAL at 22:46

## 2017-07-09 RX ADMIN — FAMOTIDINE 20 MG: 20 TABLET ORAL at 09:08

## 2017-07-09 RX ADMIN — CARVEDILOL 6.25 MG: 6.25 TABLET, FILM COATED ORAL at 09:07

## 2017-07-09 RX ADMIN — ALLOPURINOL 100 MG: 100 TABLET ORAL at 09:07

## 2017-07-09 RX ADMIN — ARFORMOTEROL TARTRATE 15 MCG: 15 SOLUTION RESPIRATORY (INHALATION) at 07:32

## 2017-07-09 RX ADMIN — FAMOTIDINE 20 MG: 20 TABLET ORAL at 17:47

## 2017-07-09 RX ADMIN — AMIODARONE HYDROCHLORIDE 200 MG: 200 TABLET ORAL at 09:08

## 2017-07-09 RX ADMIN — ISOSORBIDE DINITRATE 20 MG: 10 TABLET ORAL at 22:45

## 2017-07-09 RX ADMIN — HYDRALAZINE HYDROCHLORIDE 10 MG: 10 TABLET, FILM COATED ORAL at 09:07

## 2017-07-09 RX ADMIN — GABAPENTIN 300 MG: 300 CAPSULE ORAL at 17:47

## 2017-07-09 RX ADMIN — SPIRONOLACTONE 50 MG: 25 TABLET, FILM COATED ORAL at 09:08

## 2017-07-09 RX ADMIN — ARFORMOTEROL TARTRATE 15 MCG: 15 SOLUTION RESPIRATORY (INHALATION) at 20:18

## 2017-07-09 RX ADMIN — TAMSULOSIN HYDROCHLORIDE 0.4 MG: 0.4 CAPSULE ORAL at 09:08

## 2017-07-09 RX ADMIN — COLCHICINE 0.6 MG: 0.6 TABLET, FILM COATED ORAL at 06:21

## 2017-07-09 RX ADMIN — HYDROXYZINE HYDROCHLORIDE 25 MG: 25 TABLET, FILM COATED ORAL at 09:07

## 2017-07-09 RX ADMIN — DOCUSATE SODIUM 100 MG: 100 CAPSULE, LIQUID FILLED ORAL at 09:08

## 2017-07-09 RX ADMIN — GABAPENTIN 300 MG: 300 CAPSULE ORAL at 09:08

## 2017-07-09 RX ADMIN — BUMETANIDE 1 MG/HR: 0.25 INJECTION INTRAMUSCULAR; INTRAVENOUS at 05:14

## 2017-07-09 RX ADMIN — CARVEDILOL 12.5 MG: 12.5 TABLET, FILM COATED ORAL at 17:46

## 2017-07-09 RX ADMIN — ISOSORBIDE DINITRATE 20 MG: 10 TABLET ORAL at 09:08

## 2017-07-09 RX ADMIN — HYDRALAZINE HYDROCHLORIDE 10 MG: 10 TABLET, FILM COATED ORAL at 22:45

## 2017-07-09 RX ADMIN — OXYCODONE HYDROCHLORIDE 10 MG: 5 TABLET ORAL at 20:31

## 2017-07-09 RX ADMIN — HYDRALAZINE HYDROCHLORIDE 10 MG: 10 TABLET, FILM COATED ORAL at 17:47

## 2017-07-09 RX ADMIN — SENNOSIDES 17.2 MG: 8.6 TABLET, FILM COATED ORAL at 22:46

## 2017-07-09 RX ADMIN — ALLOPURINOL 100 MG: 100 TABLET ORAL at 17:47

## 2017-07-09 RX ADMIN — BUMETANIDE 1 MG/HR: 0.25 INJECTION INTRAMUSCULAR; INTRAVENOUS at 17:46

## 2017-07-09 RX ADMIN — HYDROXYZINE HYDROCHLORIDE 25 MG: 25 TABLET, FILM COATED ORAL at 17:47

## 2017-07-09 NOTE — PROGRESS NOTES
Problem: Heart Failure: Day 2  Goal: Off Pathway (Use only if patient is Off Pathway)  Outcome: Progressing Towards Goal  Patient on Bumex drip. Continuing to monitor I&O.

## 2017-07-09 NOTE — PROGRESS NOTES
Bedside and Verbal shift change report given to Dixie Stallworth RN (oncoming nurse) by Mike Cross RN (offgoing nurse). Report included the following information SBAR, Kardex, MAR and Recent Results. Bumex IV drip verified at bedside. Hourly vitals placed in patients chart.

## 2017-07-09 NOTE — PROGRESS NOTES
Verbal bedside report given to Grant Patel, oncoming RN. Patient's situation, background, assessment and recommendations provided. Opportunity for questions provided. Oncoming RN assumed care of patient. Bumex IV drip verified at bedside with oncoming RN.

## 2017-07-09 NOTE — PROGRESS NOTES
[facility] CARDIOLOGY PROGRESS NOTE           7/9/2017 8:26 AM    Admit Date: 7/8/2017      Subjective:   He reports less SOB o Bumex infusion. ROS:  GEN:  No fever or chills  Cardiovascular:  As noted above:no CP or palpitations. Pulmonary:  As noted above  Neuro:  No new focal motor or sensory loss    Objective:      Vitals:    07/08/17 2302 07/09/17 0117 07/09/17 0459 07/09/17 0732   BP: 110/76 124/62 120/62    Pulse: 72 75 71    Resp:  20 20    Temp:  97.5 °F (36.4 °C) 97.3 °F (36.3 °C)    SpO2:  100% 99% 100%   Weight:   (!) 162.3 kg (357 lb 11.2 oz)    Height:           Physical Exam:  General-feels better  Neck- supple, no JVD  CV- regular rate and rhythm no MRG  Lung- diminished basilar bsbilaterally  Abd- soft, nontender, nondistended  Ext- 2+ edema bilaterally. Skin- warm and dry  Psychiatric:  Normal mood and affect. Neurologic:  Alert and oriented X 3      Data Review:   Recent Labs      07/09/17   0350  07/08/17   1515   NA  139  138   K  3.6  3.4*   MG   --   2.0   BUN  41*  42*   CREA  2.03*  2.11*   GLU  134*  143*   WBC  6.8  6.5   HGB  9.9*  10.3*   HCT  32.4*  32.3*   PLT  188  185       TELEMETRY:  NSR    Assessment/Plan:     Principal Problem:    Acute on chronic systolic (congestive) heart failure (HCC) (10/18/2016):Chronic recurrent problem. He has an end stage nonischemic cardiomyopathy . He  refuses recommended hospice. He is intolerant of ACEI/ARB's and Entresto due to renal failure. Increase Coreg and continue Bumex drip as renal fx allows. Continue Hydralazine and ISDN. Active Problems:    Obstructive sleep apnea (3/18/2017)      Diabetes mellitus type II, uncontrolled ([facility] (3/18/2017)      CKD (chronic kidney disease) stage 3, GFR 30-59 ml/min (8/13/2014): Stable. Hypertension (3/18/2017): Increase Coreg. Continue Hydralazine.       Morbid obesity with BMI of 60.0-69.9, adult ([facility] (3/18/2017)                Gretchen Berry MD  7/9/2017 8:26 AM

## 2017-07-09 NOTE — PROGRESS NOTES
Problem: Heart Failure: Day 2  Goal: Activity/Safety  Outcome: Progressing Towards Goal  Patient with yellow non-skid socks in place, call light within reach, bed rails up x2. Daughter stays at bedside with patient. Verbalizes understanding to call for any assistance. Goal: Medications  Outcome: Progressing Towards Goal  Bumex drip continues to infuse with good urine output.

## 2017-07-09 NOTE — PROGRESS NOTES
Verbal bedside report received from Carmelo Ahn RN. Assumed care of patient. Bumex IV drip verified at bedside with outgoing RN.

## 2017-07-09 NOTE — PROGRESS NOTES
Bedside and Verbal shift change report given to Zuleyka Charlton RN (oncoming nurse) by Diana Mckeon RN (offgoing nurse). Report included the following information SBAR, Kardex, MAR and Recent Results. Bumex 1mg/h verified at bedside with offgoing nurse.

## 2017-07-10 LAB
ANION GAP BLD CALC-SCNC: 8 MMOL/L (ref 7–16)
BUN SERPL-MCNC: 39 MG/DL (ref 6–23)
CALCIUM SERPL-MCNC: 8.4 MG/DL (ref 8.3–10.4)
CHLORIDE SERPL-SCNC: 103 MMOL/L (ref 98–107)
CO2 SERPL-SCNC: 27 MMOL/L (ref 21–32)
CREAT SERPL-MCNC: 2.01 MG/DL (ref 0.8–1.5)
GLUCOSE BLD STRIP.AUTO-MCNC: 113 MG/DL (ref 65–100)
GLUCOSE BLD STRIP.AUTO-MCNC: 121 MG/DL (ref 65–100)
GLUCOSE BLD STRIP.AUTO-MCNC: 152 MG/DL (ref 65–100)
GLUCOSE BLD STRIP.AUTO-MCNC: 171 MG/DL (ref 65–100)
GLUCOSE SERPL-MCNC: 121 MG/DL (ref 65–100)
POTASSIUM SERPL-SCNC: 5 MMOL/L (ref 3.5–5.1)
SODIUM SERPL-SCNC: 138 MMOL/L (ref 136–145)

## 2017-07-10 PROCEDURE — 94640 AIRWAY INHALATION TREATMENT: CPT

## 2017-07-10 PROCEDURE — 65660000000 HC RM CCU STEPDOWN

## 2017-07-10 PROCEDURE — 77010033678 HC OXYGEN DAILY

## 2017-07-10 PROCEDURE — 76450000000

## 2017-07-10 PROCEDURE — 74011000258 HC RX REV CODE- 258: Performed by: PHYSICIAN ASSISTANT

## 2017-07-10 PROCEDURE — 94660 CPAP INITIATION&MGMT: CPT

## 2017-07-10 PROCEDURE — 74011250637 HC RX REV CODE- 250/637: Performed by: INTERNAL MEDICINE

## 2017-07-10 PROCEDURE — 74011636637 HC RX REV CODE- 636/637: Performed by: PHYSICIAN ASSISTANT

## 2017-07-10 PROCEDURE — 80048 BASIC METABOLIC PNL TOTAL CA: CPT | Performed by: PHYSICIAN ASSISTANT

## 2017-07-10 PROCEDURE — 82962 GLUCOSE BLOOD TEST: CPT

## 2017-07-10 PROCEDURE — 36415 COLL VENOUS BLD VENIPUNCTURE: CPT | Performed by: PHYSICIAN ASSISTANT

## 2017-07-10 PROCEDURE — 74011000250 HC RX REV CODE- 250: Performed by: PHYSICIAN ASSISTANT

## 2017-07-10 PROCEDURE — 97165 OT EVAL LOW COMPLEX 30 MIN: CPT

## 2017-07-10 PROCEDURE — 74011250637 HC RX REV CODE- 250/637: Performed by: PHYSICIAN ASSISTANT

## 2017-07-10 PROCEDURE — 94760 N-INVAS EAR/PLS OXIMETRY 1: CPT

## 2017-07-10 RX ADMIN — HYDRALAZINE HYDROCHLORIDE 10 MG: 10 TABLET, FILM COATED ORAL at 10:02

## 2017-07-10 RX ADMIN — INSULIN LISPRO 3 UNITS: 100 INJECTION, SOLUTION INTRAVENOUS; SUBCUTANEOUS at 08:17

## 2017-07-10 RX ADMIN — DOCUSATE SODIUM 100 MG: 100 CAPSULE, LIQUID FILLED ORAL at 16:52

## 2017-07-10 RX ADMIN — SPIRONOLACTONE 50 MG: 25 TABLET, FILM COATED ORAL at 10:01

## 2017-07-10 RX ADMIN — AMIODARONE HYDROCHLORIDE 200 MG: 200 TABLET ORAL at 10:00

## 2017-07-10 RX ADMIN — HYDROXYZINE HYDROCHLORIDE 25 MG: 25 TABLET, FILM COATED ORAL at 10:01

## 2017-07-10 RX ADMIN — HYDROXYZINE HYDROCHLORIDE 25 MG: 25 TABLET, FILM COATED ORAL at 16:52

## 2017-07-10 RX ADMIN — HYDRALAZINE HYDROCHLORIDE 10 MG: 10 TABLET, FILM COATED ORAL at 22:27

## 2017-07-10 RX ADMIN — OXYCODONE HYDROCHLORIDE 10 MG: 5 TABLET ORAL at 22:25

## 2017-07-10 RX ADMIN — GABAPENTIN 300 MG: 300 CAPSULE ORAL at 22:26

## 2017-07-10 RX ADMIN — ALLOPURINOL 100 MG: 100 TABLET ORAL at 16:53

## 2017-07-10 RX ADMIN — ARFORMOTEROL TARTRATE 15 MCG: 15 SOLUTION RESPIRATORY (INHALATION) at 21:35

## 2017-07-10 RX ADMIN — CARVEDILOL 12.5 MG: 12.5 TABLET, FILM COATED ORAL at 16:52

## 2017-07-10 RX ADMIN — COLCHICINE 0.6 MG: 0.6 TABLET, FILM COATED ORAL at 06:13

## 2017-07-10 RX ADMIN — ALLOPURINOL 100 MG: 100 TABLET ORAL at 10:02

## 2017-07-10 RX ADMIN — ISOSORBIDE DINITRATE 20 MG: 10 TABLET ORAL at 16:52

## 2017-07-10 RX ADMIN — TAMSULOSIN HYDROCHLORIDE 0.4 MG: 0.4 CAPSULE ORAL at 10:01

## 2017-07-10 RX ADMIN — FAMOTIDINE 20 MG: 20 TABLET ORAL at 10:03

## 2017-07-10 RX ADMIN — HYDRALAZINE HYDROCHLORIDE 10 MG: 10 TABLET, FILM COATED ORAL at 16:53

## 2017-07-10 RX ADMIN — GABAPENTIN 300 MG: 300 CAPSULE ORAL at 16:53

## 2017-07-10 RX ADMIN — ISOSORBIDE DINITRATE 20 MG: 10 TABLET ORAL at 22:26

## 2017-07-10 RX ADMIN — OXYCODONE HYDROCHLORIDE 10 MG: 5 TABLET ORAL at 06:14

## 2017-07-10 RX ADMIN — DOCUSATE SODIUM 100 MG: 100 CAPSULE, LIQUID FILLED ORAL at 10:04

## 2017-07-10 RX ADMIN — SENNOSIDES 17.2 MG: 8.6 TABLET, FILM COATED ORAL at 22:26

## 2017-07-10 RX ADMIN — BUMETANIDE 1 MG/HR: 0.25 INJECTION INTRAMUSCULAR; INTRAVENOUS at 06:13

## 2017-07-10 RX ADMIN — ISOSORBIDE DINITRATE 20 MG: 10 TABLET ORAL at 10:03

## 2017-07-10 RX ADMIN — FAMOTIDINE 20 MG: 20 TABLET ORAL at 16:53

## 2017-07-10 RX ADMIN — GABAPENTIN 300 MG: 300 CAPSULE ORAL at 10:03

## 2017-07-10 RX ADMIN — CARVEDILOL 12.5 MG: 12.5 TABLET, FILM COATED ORAL at 10:04

## 2017-07-10 RX ADMIN — INSULIN GLARGINE 30 UNITS: 100 INJECTION, SOLUTION SUBCUTANEOUS at 22:34

## 2017-07-10 RX ADMIN — BUMETANIDE 1 MG/HR: 0.25 INJECTION INTRAMUSCULAR; INTRAVENOUS at 16:52

## 2017-07-10 RX ADMIN — PRAVASTATIN SODIUM 80 MG: 80 TABLET ORAL at 22:25

## 2017-07-10 NOTE — CONSULTS
Palliative Care    Patient: Aide Zarate MRN: [id number]  SSN: [id number]    YOB: 1965  Age: 45 y.o. Sex: male       Date of Request:  7/10/2017  Date of Consult:  7/10/2017  Reason for Consult:  assist in chronic disease management  Requesting Physician: Candie Gao     Assessment/Plan:     Principal Diagnosis:    Dyspnea  R06.00    Additional Diagnoses:   · Debility, Unspecified  R53.81  · Edema  R60.9  · Failure to Thrive  R62.7  · Pain, back  M54.9  · Counseling, Encounter for Medical Advice  Z71.9  · Encounter for Palliative Care  Z51.5    Palliative Performance Scale (PPS):  PPS: 40    Medical Decision Making:   Reviewed and summarized admission notes, previous encounters  Discussed case with appropriate providers. Thelma VALLADARES  Reviewed laboratory and x-ray data. CBC, CMP    Patient and family very familiar to Palliative Care team from previous admissions. Lengthy discussions, family meeting etc have been held. Patient was critical during much of last admission, but eventually stabilized and was discharged home. He currently states that prior to this admission, he was \"getting along alright. \"   He receives assistance from his children and other family members. He has been resistant to the concept of hospice support. Do not see that his situation has changed significantly from previous admissions. Please call if needed. Will discuss findings with members of the interdisciplinary team.      Thank you for this referral.   The Palliative Care team is available from 8 am to 4:30 pm Monday-Friday. Medical management during other hours is per the primary attending service. .    Subjective:     History obtained from:  Patient and Chart    Chief Complaint: swelling  History of Present Illness:  46 y/o male with advanced cardiomyopathy and chronic heart failure admitted again at this time with weight gain, worsening edema and dyspnea.      Advance Directive: Yes      Code Status: Full Code            Health Care Power of : Yes - Copy of [address] on file. Past Medical History:   Diagnosis Date    Acquired claw toe of right foot     Acute encephalopathy 1/09/2519    Acute systolic heart failure ([address] May, 2009    Also had transient acute renal failure secondary to poor perfusion.     AICD (automatic cardioverter/defibrillator) present 10/21/2015    Asthma     Atypical chest pain 4/23/2010    Bronchitis     CAD (coronary artery disease)     Cardiomyopathy     Chest pain 10/21/2015    Chronic kidney disease     renal insufficiency    Chronic pain     back    Congestive heart failure (CHF) ([address] 10/21/2015    COPD     Depressive disorder     Diabetes ([address]     type 2 insulin reliant- BS average- 160's-180's    Diabetes mellitus type II, uncontrolled ([address] 7/2/2013    GERD (gastroesophageal reflux disease)     Gout     Heart failure ([address]     cardiomyopathy with ef 10-20%    Hypertension     Hyponatremia 12/20/2010    Ill-defined condition     gout, neuropathy, sciatica    Morbid obesity ([address]     Nausea & vomiting 11/30/2015    Neuropathy     Obstructive sleep apnea 2/15/2010    Other unknown and unspecified cause of morbidity or mortality     Gout    Severe sepsis (HCC)     Ulcer of leg, chronic (HCC)     Unspecified sleep apnea     cpap      Past Surgical History:   Procedure Laterality Date    CHEST SURGERY PROCEDURE UNLISTED      thorocentesis    HX HEART CATHETERIZATION  S[place] 10, 2008    Severe multivessel disease with severe LV dysfunction    HX PACEMAKER      may 2010     Family History   Problem Relation Age of Onset    Heart Disease Father     Stroke Father     Coronary Artery Disease Father     Other Father      kidney failure    Diabetes Sister     Stroke Sister     Diabetes Sister     Coronary Artery Disease Mother     Heart Disease Other       Social History   Substance Use Topics    Smoking status: Former Smoker     Packs/day: 0.25     Years: 1.00     Quit date: 7/12/1984    Smokeless tobacco: Never Used      Comment: pt states that he only tried smoking as a kid     Alcohol use No     Prior to Admission medications    Medication Sig Start Date End Date Taking? Authorizing Provider   ammonium lactate (LAC-HYDRIN) 12 % lotion Apply  to affected area as needed. rub in to affected area well   Yes Historical Provider   buffered aspirin (BUFFERIN) 325 mg tablet Take 325 mg by mouth daily. Yes Historical Provider   arformoterol (BROVANA) 15 mcg/2 mL nebu neb solution 15 mcg by Nebulization route two (2) times a day. Yes Historical Provider   cyclobenzaprine (FLEXERIL) 10 mg tablet Take 10 mg by mouth every eight (8) hours as needed for Muscle Spasm(s). Yes Historical Provider   furosemide (LASIX) 20 mg tablet Take  by mouth daily. Take 3 tabs at 8 am, 3 tabs at 5 pm   Yes Historical Provider   insulin NPH/insulin regular (NOVOLIN 70/30) 100 unit/mL (70-30) injection by SubCUTAneous route. 40 units am, 35 units pm   Yes Historical Provider   PARoxetine (PAXIL) 20 mg tablet Take 20 mg by mouth nightly. Yes Historical Provider   albuterol (PROVENTIL HFA) 90 mcg/actuation inhaler Take 2 Puffs by inhalation. q 4-6 hrs prn wheezing   Yes Historical Provider   torsemide (DEMADEX) 100 mg tablet Take 100 mg by mouth two (2) times a day. 1/2 tab   Yes Historical Provider   levalbuterol (XOPENEX) 1.25 mg/3 mL nebu 1.25 mg by Nebulization route. q 6-8 hrs prn wheezing and breathing problems   Yes Historical Provider   bumetanide (BUMEX) 1 mg tablet Take 1 Tab by mouth two (2) times a day. 6/28/17  Yes Ferdinand Lange MD   hydrALAZINE (APRESOLINE) 10 mg tablet Take 1 Tab by mouth three (3) times daily. 6/28/17  Yes Ferdinand Lange MD   isosorbide dinitrate (ISORDIL) 20 mg tablet Take 1 Tab by mouth three (3) times daily. 6/28/17  Yes Ferdinand Lange MD   spironolactone (ALDACTONE) 50 mg tablet Take 1 Tab by mouth daily. 6/28/17  Yes Shaheen Madison MD   carvedilol (COREG) 6.25 mg tablet Take 6.25 mg by mouth two (2) times daily (with meals). Yes Historical Provider   oxyCODONE IR (ROXICODONE) 5 mg immediate release tablet Take 10 mg by mouth every six (6) hours as needed for Pain. Yes Historical Provider   metOLazone (ZAROXOLYN) 2.5 mg tablet Take  by mouth every Monday, Wednesday, Friday. Yes Historical Provider   tamsulosin (FLOMAX) 0.4 mg capsule Take 0.4 mg by mouth daily. Yes Historical Provider   hydrOXYzine HCl (ATARAX) 25 mg tablet Take  by mouth two (2) times a day. Yes Historical Provider   ascorbic acid, vitamin C, (VITAMIN C) 500 mg tablet Take 500 mg by mouth daily. Indications: Pt. takes 2 tablets daily   Yes Historical Provider   sennosides (SENNA) 8.6 mg cap Take 17.2 mg by mouth nightly. Yes Historical Provider   amiodarone (CORDARONE) 200 mg tablet Take 1 Tab by mouth daily. 2/24/17  Yes FATEMEH Griffin   docusate sodium (COLACE) 100 mg capsule Take 1 Cap by mouth two (2) times a day. 2/24/17  Yes FATEMEH Griffin   gabapentin (NEURONTIN) 600 mg tablet Take 300 mg by mouth three (3) times daily. Indications: Pt. takes 1 to 2 capsules every 8 hours   Yes Historical Provider   insulin glargine (LANTUS) 100 unit/mL injection 50 Units by SubCUTAneous route nightly. 10/26/16  Yes Lidia Mcfadden PA-C   ranitidine (ZANTAC) 150 mg tablet Take 150 mg by mouth nightly. Yes Historical Provider   pravastatin (PRAVACHOL) 80 mg tablet Take 1 Tab by mouth nightly. 7/14/16  Yes Nida Cornejo MD   polyethylene glycol [facility]) 17 gram packet Take 1 Packet by mouth daily as needed (constipation). 12/4/15  Yes Jodi Gonzalez MD   cpap machine kit 10 cm qhs   Yes Historical Provider   OXYGEN-AIR DELIVERY SYSTEMS 2 lpm cont. Yes Historical Provider   allopurinol (ZYLOPRIM) 100 mg tablet Take 100 mg by mouth two (2) times a day.    Yes Rhiannon Mosqueda MD   glucose blood VI test strips (ASCENSIA AUTODISC VI, ONE TOUCH ULTRA TEST VI) strip Test strips for 30 day supply 8/22/14  Yes Yinka Mchugh MD   colchicine 0.6 mg tablet Take 0.6 mg by mouth every morning. 3/22/10  Yes Rhiannon Mosqueda MD   nitroglycerin (NITROSTAT) 0.4 mg SL tablet 1 Tab by SubLINGual route every five (5) minutes as needed for Chest Pain. 4/24/10   FATEMEH Cerda       Allergies   Allergen Reactions    Dilaudid [Hydromorphone] Other (comments)     Hotflashes, patient states he's not allergic    Iodinated Contrast- Oral And Iv Dye Other (comments)     \"shuts my kidneys down\"    Penicillins Unknown (comments)     Pt states he is not allergic his mother just wouldn't allow him take it        Review of Systems:  A comprehensive review of systems was negative except for: intermittent back pain     Objective:     Visit Vitals    /86 (BP 1 Location: Right arm)    Pulse 71    Temp 97.7 °F (36.5 °C)    Resp 18    Ht 5' 6\" (1.676 m)    Wt (!) 162.9 kg (359 lb 1.6 oz)    SpO2 97%    BMI 57.96 kg/m2        Physical Exam:    General:  Cooperative. No acute distress. Obese   Eyes:  Conjunctivae/corneas clear    Nose: Nares normal. Septum midline. Neck: Supple, symmetrical, trachea midline, no JVD   Lungs:   Clear to auscultation bilaterally, unlabored   Heart:  Distant HS   Abdomen:   Soft, non-tender, non-distended   Extremities: Bilateral LE edema   Skin: Skin color, texture, turgor normal. No rash or lesions.    Neurologic: Nonfocal   Psych: Lethargic post medication      Assessment:     Hospital Problems  Date Reviewed: 4/21/2017          Codes Class Noted POA    CHF (congestive heart failure) ([facility] ICD-10-CM: I50.9  ICD-9-CM: 428.0  7/9/2017 Unknown        Obstructive sleep apnea (Chronic) ICD-10-CM: G47.33  ICD-9-CM: 327.23  3/18/2017 Yes        Diabetes mellitus type II, uncontrolled (HCC) (Chronic) ICD-10-CM: E11.65  ICD-9-CM: 250.02  3/18/2017 Yes        Hypertension (Chronic) ICD-10-CM: I10  ICD-9-CM: 401.9  3/18/2017 Yes Morbid obesity with BMI of 60.0-69.9, adult (HCC) (Chronic) ICD-10-CM: E66.01, Z68.44  ICD-9-CM: 278.01, V85.44  3/18/2017 Yes        * (Principal)Acute on chronic systolic (congestive) heart failure (HCC) ICD-10-CM: I50.23  ICD-9-CM: 428.23, 428.0  10/18/2016 Unknown        CKD (chronic kidney disease) stage 3, GFR 30-59 ml/min (Chronic) ICD-10-CM: N18.3  ICD-9-CM: 585.3  8/13/2014 Yes              Signed By: Wander Flores MD     July 10, 2017

## 2017-07-10 NOTE — PROGRESS NOTES
Verbal bedside report given to Sharon David, oncoming RN. Patient's situation, background, assessment and recommendations provided. Opportunity for questions provided. Oncoming RN assumed care of patient. Bumex IV drip verified at bedside with oncoming RN.

## 2017-07-10 NOTE — PROGRESS NOTES
Problem: Self Care Deficits Care Plan (Adult)  Goal: *Acute Goals and Plan of Care (Insert Text)  1. Rosmery Rodriguez will be modified independent with functional mobility for ADL within 4 - 7 visits. 2. Jens Pollard will be modified independent with total body bathing and dressing within 4 - 7 visits. 3. Jens Pollard will state and demonstrate at least 3 energy conservation techniques during ADL/therapeutic activities within 4 - 7 visits. 4. Jens oPllard will participate at least 30 minutes of ADL with 3 or less rest breaks within 7 visits. OCCUPATIONAL THERAPY: Initial Assessment 7/10/2017  INPATIENT: Hospital Day: 3  Payor: CARE IMPROVEMENT PLUS / [place] / Product Type: Managed Care Medicare /      NAME/AGE/GENDER: Rosmery Rodriguez is a 45 y.o. male         PRIMARY DIAGNOSIS:  Acute on chronic systolic (congestive) heart failure (HCC)  CHF (congestive heart failure) (HCC) Acute on chronic systolic (congestive) heart failure (HCC) Acute on chronic systolic (congestive) heart failure ([facility]        ICD-10: Treatment Diagnosis:        · Generalized Muscle Weakness (M62.81)   Precautions/Allergies:         Dilaudid [hydromorphone]; Iodinated contrast- oral and iv dye; and Penicillins       ASSESSMENT:      Mr. Librado Ortiz presents with history of CHF and ejection fraction of ~10% with recent hospital admission last June. He states things have been going fine up until he had some fluid built up. Lives with daughter who assists him with ADL/iADL. Patient states he has been getting up to the bathroom. Completed functional mobility with rolling walker in room/clifford with supervision. Rosmery Rodriguez presents with the following problems and would benefit from occupational therapy to maximize independence with self-care,instrumental activities of daily living, and functional transfers/mobility for activities of daily living/instrumental activities of daily living via the stated goals.       This section established at most recent assessment   PROBLEM LIST (Impairments causing functional limitations):  1. Decreased Strength  2. Decreased ADL/Functional Activities  3. Decreased Transfer Abilities  4. Decreased Balance  5. Increased Pain  6. Decreased Activity Tolerance  7. Decreased Pacing Skills  8. Decreased Work Simplification/Energy Conservation Techniques  9. Increased Fatigue  10. Decreased Flexibility/Joint Mobility  11. Edema/Girth    INTERVENTIONS PLANNED: (Benefits and precautions of occupational therapy have been discussed with the patient.)  1. Activities of daily living training  2. Therapeutic activity  3. Therapeutic exercise      TREATMENT PLAN: Frequency/Duration: Follow patient 3 times/week to address above goals. Rehabilitation Potential For Stated Goals: FAIR      RECOMMENDED REHABILITATION/EQUIPMENT: (at time of discharge pending progress): Continue Skilled Therapy. OCCUPATIONAL PROFILE AND HISTORY:   History of Present Injury/Illness (Reason for Referral):  Per MD H & P: Patient is a 45 y.o. male who presents with progressive shortness of breath. He has known end stage systolic CHF. He was discharged on 6/28. He missed an office appt yesterday. He admits to too much fluid intake including \"eating ice. \" He states he has been trying to be careful but the medications make him thirsty. He has refused hospice/palliative care on numerous occasions. He cannot advise how much weight he has gained. He is tearful on exam regarding domestic issues within his family. He denies any chest pain. His BNP is elevated and he is volume overloaded on exam.   He has an ICD in place with no discharges. Past Medical History/Comorbidities:   Mr. Marc Mace  has a past medical history of Acquired claw toe of right foot; Acute encephalopathy (3/18/2017); Acute systolic heart failure [facility]) (May, 2009); AICD (automatic cardioverter/defibrillator) present (10/21/2015); Asthma;  Atypical chest pain (4/23/2010); Bronchitis; CAD (coronary artery disease); Cardiomyopathy; Chest pain (10/21/2015); Chronic kidney disease; Chronic pain; Congestive heart failure (CHF) ([facility] (10/21/2015); COPD; Depressive disorder; Diabetes ([facility] Diabetes mellitus type II, uncontrolled ([facility] (7/2/2013); GERD (gastroesophageal reflux disease); Gout; Heart failure ([address] Hypertension; Hyponatremia (12/20/2010); Ill-defined condition; Morbid obesity ([address] Nausea & vomiting (11/30/2015); Neuropathy; Obstructive sleep apnea (2/15/2010); Other unknown and unspecified cause of morbidity or mortality; Severe sepsis ([facility] Ulcer of leg, chronic ([facility] and Unspecified sleep apnea. Mr. Bismark Mayo  has a past surgical history that includes pacemaker; heart catheterization ([place]andy 10, 2008); and chest surgery procedure unlisted. Social History/Living Environment: Lives with daughter. Has 2 sons (one in [place], [place]). Home Environment: Private residence  # Steps to Enter: 0  One/Two Story Residence: One story  Living Alone: No  Support Systems: Family member(s), Friends \ neighbors  Patient Expects to be Discharged to[de-identified] Private residence  Current DME Used/Available at Home: Cane, straight, Oxygen, portable, CPAP  Prior Level of Function/Work/Activity:  Receives assistance for ADL. Went fishing last week. Has portable oxygen he wears as needed. Previous Treatment Approaches:          Acute OT and PT   Number of Personal Factors/Comorbidities that affect the Plan of Care: Expanded review of therapy/medical records (1-2):  MODERATE COMPLEXITY   ASSESSMENT OF OCCUPATIONAL PERFORMANCE[de-identified]   Activities of Daily Living:           Basic ADLs (From Assessment) Complex ADLs (From Assessment)   Basic ADL  Feeding: Setup  Oral Facial Hygiene/Grooming: Supervision  Bathing: Moderate assistance  Upper Body Dressing: Stand-by assistance  Lower Body Dressing:  Moderate assistance  Toileting: Stand by assistance Instrumental ADL  Meal Preparation: Maximum assistance  Homemaking: Maximum assistance   Grooming/Bathing/Dressing Activities of Daily Living     Cognitive Retraining  Safety/Judgement: Awareness of environment                       Bed/Mat Mobility  Sit to Stand: Supervision          Most Recent Physical Functioning:   Gross Assessment:  AROM: Generally decreased, functional  Strength: Generally decreased, functional               Posture:     Balance:  Sitting: Intact  Standing: Impaired  Standing - Static: Fair  Standing - Dynamic :  (needs support) Bed Mobility:     Wheelchair Mobility:     Transfers:  Sit to Stand: Supervision  Stand to Sit: Supervision                 Patient Vitals for the past 6 hrs:       BP SpO2 Pulse   07/10/17 0844 112/49 - 71   07/10/17 1000 139/81 - 72   07/10/17 1210 129/86 - 71   07/10/17 1325 - 95 % -   07/10/17 1335 - 96 % -        Mental Status  Neurologic State: Alert  Orientation Level: Oriented to person, Oriented to situation, Oriented to place  Cognition: Follows commands  Perception: Appears intact  Perseveration: No perseveration noted  Safety/Judgement: Awareness of environment                               Physical Skills Involved:  1. Range of Motion  2. Balance  3. Strength  4. Activity Tolerance  5. Pain (Chronic)  6. Edema Cognitive Skills Affected (resulting in the inability to perform in a timely and safe manner): 1. none noted Psychosocial Skills Affected:  1. Habits/Routines  2. Environmental Adaptation   Number of elements that affect the Plan of Care: 1-3:  LOW COMPLEXITY   CLINICAL DECISION MAKIN [facility] [address] [address] AM-PAC 6 Clicks   Daily Activity Inpatient Short Form  How much help from another person does the patient currently need. .. Total A Lot A Little None   1. Putting on and taking off regular lower body clothing?   [ ] 1   [X] 2   [ ] 3   [ ] 4   2. Bathing (including washing, rinsing, drying)? [ ] 1   [X] 2   [ ] 3   [ ] 4   3.   Toileting, which includes using toilet, bedpan or urinal?   [ ] 1   [ ] 2   [X] 3   [ ] 4   4. Putting on and taking off regular upper body clothing?   [ ] 1   [ ] 2   [X] 3   [ ] 4   5. Taking care of personal grooming such as brushing teeth? [ ] 1   [ ] 2   [X] 3   [ ] 4   6. Eating meals? [ ] 1   [ ] 2   [X] 3   [ ] 4   © 2007, Trustees of [address] [place] [address] [address] [address], under license to [employer]. All rights reserved    Score:  Initial: 16 Most Recent: X (Date: -- )     Interpretation of Tool:  Represents activities that are increasingly more difficult (i.e. Bed mobility, Transfers, Gait). Score 24 23 22-20 19-15 14-10 9-7 6       Modifier CH CI CJ CK CL CM CN         · Self Care:               - CURRENT STATUS:    CK - 40%-59% impaired, limited or restricted               - GOAL STATUS:           CI - 1%-19% impaired, limited or restricted               - D/C STATUS:                       ---------------To be determined---------------  Payor: CARE IMPROVEMENT PLUS / [place] CARE IMPROVEMENT [place] / Product Type: Managed Care Medicare /       Medical Necessity:     · Patient demonstrates good rehab potential due to higher previous functional level. Reason for Services/Other Comments:  · Patient continues to require skilled intervention due to decreased independence with ADL and functional mobility for ADL. Use of outcome tool(s) and clinical judgement create a POC that gives a: LOW COMPLEXITY             TREATMENT:   (In addition to Assessment/Re-Assessment sessions the following treatments were rendered)      Pre-treatment Symptoms/Complaints:    Pain: Initial:   Pain Intensity 1:  (no complaints of pain)  Post Session:  same      Assessment/Reassessment only, no treatment provided today     Braces/Orthotics/Lines/Etc:   · IV  · O2 Device: Room air  Treatment/Session Assessment:    · Response to Treatment:  No treatment rendered. Assessment only.   · Interdisciplinary Collaboration:  · Occupational Therapist  · Registered Nurse  · After treatment position/precautions:  · Bed/Chair-wheels locked  · Bed in low position  · Call light within reach  · Side rails x 2  · sitting edge of bed  · Compliance with Program/Exercises: Will assess as treatment progresses. · Recommendations/Intent for next treatment session: \"Next visit will focus on advancements to more challenging activities\".   Total Treatment Duration:  OT Patient Time In/Time Out  Time In: 1314  Time Out: 1339     Anne Kain, OTD, OTR/L

## 2017-07-10 NOTE — PROGRESS NOTES
12- Pulled Roxicodone to give to patient with c/o chronic pain. When entered room, patient said no I don't want any pain medication. Returned Roxicodone to senait.

## 2017-07-10 NOTE — PROGRESS NOTES
Attempted evaluation. On arrival Mr. Pollard was asleep. He woke easily and then did not want to participate. He said his back hurt. He said he got something for pain earlier. He said he had been up earlier. When it was suggested that he needed to move and that since he had pain medicine it would be a good time he became agitated. This patient is well known to this PT from prior admissions. Will attempt later as time allows.   Jelly Varela, PT

## 2017-07-10 NOTE — PROGRESS NOTES
Bedside and Verbal shift change report given to Lilli Ards RN (oncoming nurse) by Adelfo Hernandez RN (offgoing nurse). Report included the following information SBAR, Kardex, MAR and Recent Results. Bumex IV drip verified at bedside. Hourly vitals placed in patients chart.

## 2017-07-10 NOTE — PROGRESS NOTES
Verbal bedside report received from Marnie Cain RN. Assumed care of patient. Bumex IV drip verified at bedside with outgoing RN.

## 2017-07-10 NOTE — PROGRESS NOTES
Care Management Interventions  PCP Verified by CM: Yes  Transition of Care Consult (CM Consult): Discharge Planning  Physical Therapy Consult: Yes  Occupational Therapy Consult: Yes  Current Support Network: Relative's Home  Confirm Follow Up Transport: Family    SW dc screening. Readmit for CHF. Alert and oriented in all spheres.  from his spouse. Now living with his daughter. Uses a RW for ambulation. Indep with ADLs. Ivan/son provide transportation. Has a PCP and RX plan. Home DME: RW, continuous oxygen - [address] nursing and PT were ordered on 6/28/17 when pt discharged last admission. Pt was a non -admit because HH was unable to reach him. PT/OT has been consulted this admission . Will await their evals .

## 2017-07-11 LAB
ANION GAP BLD CALC-SCNC: 9 MMOL/L (ref 7–16)
BUN SERPL-MCNC: 46 MG/DL (ref 6–23)
CALCIUM SERPL-MCNC: 8.6 MG/DL (ref 8.3–10.4)
CHLORIDE SERPL-SCNC: 100 MMOL/L (ref 98–107)
CO2 SERPL-SCNC: 29 MMOL/L (ref 21–32)
CREAT SERPL-MCNC: 2.27 MG/DL (ref 0.8–1.5)
GLUCOSE BLD STRIP.AUTO-MCNC: 108 MG/DL (ref 65–100)
GLUCOSE BLD STRIP.AUTO-MCNC: 122 MG/DL (ref 65–100)
GLUCOSE BLD STRIP.AUTO-MCNC: 137 MG/DL (ref 65–100)
GLUCOSE BLD STRIP.AUTO-MCNC: 166 MG/DL (ref 65–100)
GLUCOSE SERPL-MCNC: 102 MG/DL (ref 65–100)
POTASSIUM SERPL-SCNC: 4 MMOL/L (ref 3.5–5.1)
SODIUM SERPL-SCNC: 138 MMOL/L (ref 136–145)

## 2017-07-11 PROCEDURE — 94640 AIRWAY INHALATION TREATMENT: CPT

## 2017-07-11 PROCEDURE — 74011250637 HC RX REV CODE- 250/637: Performed by: PHYSICIAN ASSISTANT

## 2017-07-11 PROCEDURE — 74011636637 HC RX REV CODE- 636/637: Performed by: PHYSICIAN ASSISTANT

## 2017-07-11 PROCEDURE — 65660000000 HC RM CCU STEPDOWN

## 2017-07-11 PROCEDURE — 36415 COLL VENOUS BLD VENIPUNCTURE: CPT | Performed by: PHYSICIAN ASSISTANT

## 2017-07-11 PROCEDURE — 74011000258 HC RX REV CODE- 258: Performed by: INTERNAL MEDICINE

## 2017-07-11 PROCEDURE — 94760 N-INVAS EAR/PLS OXIMETRY 1: CPT

## 2017-07-11 PROCEDURE — 74011000250 HC RX REV CODE- 250: Performed by: INTERNAL MEDICINE

## 2017-07-11 PROCEDURE — 80048 BASIC METABOLIC PNL TOTAL CA: CPT | Performed by: PHYSICIAN ASSISTANT

## 2017-07-11 PROCEDURE — 74011250637 HC RX REV CODE- 250/637: Performed by: INTERNAL MEDICINE

## 2017-07-11 PROCEDURE — 97162 PT EVAL MOD COMPLEX 30 MIN: CPT

## 2017-07-11 PROCEDURE — 82962 GLUCOSE BLOOD TEST: CPT

## 2017-07-11 PROCEDURE — 74011000250 HC RX REV CODE- 250: Performed by: PHYSICIAN ASSISTANT

## 2017-07-11 RX ORDER — COLCHICINE 0.6 MG/1
0.6 CAPSULE ORAL DAILY
Status: DISCONTINUED | OUTPATIENT
Start: 2017-07-12 | End: 2017-07-18 | Stop reason: HOSPADM

## 2017-07-11 RX ADMIN — ISOSORBIDE DINITRATE 20 MG: 10 TABLET ORAL at 17:45

## 2017-07-11 RX ADMIN — GABAPENTIN 300 MG: 300 CAPSULE ORAL at 09:17

## 2017-07-11 RX ADMIN — BUMETANIDE 2 MG/HR: 0.25 INJECTION INTRAMUSCULAR; INTRAVENOUS at 20:00

## 2017-07-11 RX ADMIN — ARFORMOTEROL TARTRATE 15 MCG: 15 SOLUTION RESPIRATORY (INHALATION) at 10:00

## 2017-07-11 RX ADMIN — DOCUSATE SODIUM 100 MG: 100 CAPSULE, LIQUID FILLED ORAL at 17:45

## 2017-07-11 RX ADMIN — BUMETANIDE 2 MG/HR: 0.25 INJECTION INTRAMUSCULAR; INTRAVENOUS at 11:09

## 2017-07-11 RX ADMIN — AMIODARONE HYDROCHLORIDE 200 MG: 200 TABLET ORAL at 09:17

## 2017-07-11 RX ADMIN — HYDRALAZINE HYDROCHLORIDE 10 MG: 10 TABLET, FILM COATED ORAL at 22:11

## 2017-07-11 RX ADMIN — SENNOSIDES 17.2 MG: 8.6 TABLET, FILM COATED ORAL at 21:30

## 2017-07-11 RX ADMIN — HYDROXYZINE HYDROCHLORIDE 25 MG: 25 TABLET, FILM COATED ORAL at 17:45

## 2017-07-11 RX ADMIN — COLCHICINE 0.6 MG: 0.6 TABLET, FILM COATED ORAL at 09:16

## 2017-07-11 RX ADMIN — TAMSULOSIN HYDROCHLORIDE 0.4 MG: 0.4 CAPSULE ORAL at 09:16

## 2017-07-11 RX ADMIN — GABAPENTIN 300 MG: 300 CAPSULE ORAL at 17:45

## 2017-07-11 RX ADMIN — HYDRALAZINE HYDROCHLORIDE 10 MG: 10 TABLET, FILM COATED ORAL at 09:16

## 2017-07-11 RX ADMIN — FAMOTIDINE 20 MG: 20 TABLET ORAL at 17:45

## 2017-07-11 RX ADMIN — INSULIN LISPRO 3 UNITS: 100 INJECTION, SOLUTION INTRAVENOUS; SUBCUTANEOUS at 11:07

## 2017-07-11 RX ADMIN — CARVEDILOL 12.5 MG: 12.5 TABLET, FILM COATED ORAL at 09:17

## 2017-07-11 RX ADMIN — ALLOPURINOL 100 MG: 100 TABLET ORAL at 17:46

## 2017-07-11 RX ADMIN — FAMOTIDINE 20 MG: 20 TABLET ORAL at 09:16

## 2017-07-11 RX ADMIN — ISOSORBIDE DINITRATE 20 MG: 10 TABLET ORAL at 22:11

## 2017-07-11 RX ADMIN — ARFORMOTEROL TARTRATE 15 MCG: 15 SOLUTION RESPIRATORY (INHALATION) at 21:56

## 2017-07-11 RX ADMIN — CARVEDILOL 12.5 MG: 12.5 TABLET, FILM COATED ORAL at 17:45

## 2017-07-11 RX ADMIN — HYDROXYZINE HYDROCHLORIDE 25 MG: 25 TABLET, FILM COATED ORAL at 09:16

## 2017-07-11 RX ADMIN — PRAVASTATIN SODIUM 80 MG: 80 TABLET ORAL at 21:29

## 2017-07-11 RX ADMIN — SPIRONOLACTONE 50 MG: 25 TABLET, FILM COATED ORAL at 09:16

## 2017-07-11 RX ADMIN — ISOSORBIDE DINITRATE 20 MG: 10 TABLET ORAL at 09:17

## 2017-07-11 RX ADMIN — OXYCODONE HYDROCHLORIDE 10 MG: 5 TABLET ORAL at 21:40

## 2017-07-11 RX ADMIN — HYDRALAZINE HYDROCHLORIDE 10 MG: 10 TABLET, FILM COATED ORAL at 17:46

## 2017-07-11 RX ADMIN — GABAPENTIN 300 MG: 300 CAPSULE ORAL at 22:11

## 2017-07-11 RX ADMIN — OXYCODONE HYDROCHLORIDE 10 MG: 5 TABLET ORAL at 11:11

## 2017-07-11 RX ADMIN — ALLOPURINOL 100 MG: 100 TABLET ORAL at 09:16

## 2017-07-11 RX ADMIN — DOCUSATE SODIUM 100 MG: 100 CAPSULE, LIQUID FILLED ORAL at 09:16

## 2017-07-11 NOTE — PROGRESS NOTES
Problem: Heart Failure: Day 4  Goal: Activity/Safety  Outcome: Progressing Towards Goal  Patient with yellow non-skid socks in place, call light within reach, bed rails up x2. Verbalizes understanding to call for any assistance. Goal: Medications  Outcome: Progressing Towards Goal  Bumex gtt titrated up to 2mg/hr.

## 2017-07-11 NOTE — PROGRESS NOTES
[facility] CARDIOLOGY PROGRESS NOTE           7/11/2017 10:44 AM    Admit Date: 7/8/2017      Subjective:   Patient feels worse today and feels he is holding onto more fluid    ROS:  Cardiovascular:  As noted above    Objective:      Vitals:    07/11/17 0413 07/11/17 0521 07/11/17 0916 07/11/17 1002   BP:  105/59 (!) 147/93    Pulse:  69 72    Resp:  18 18    Temp:  97.4 °F (36.3 °C) 96.9 °F (36.1 °C)    SpO2:  95% 94% 93%   Weight: (!) 163.9 kg (361 lb 6.4 oz)      Height:           Physical Exam:  General-No Acute Distress  Neck- supple, no JVD  CV- regular rate and rhythm no MRG  Lung- clear bilaterally  Abd- soft, nontender, nondistended  Ext- no edema bilaterally. Skin- warm and dry    Data Review:   Recent Labs      07/11/17   0415  07/10/17   0420  07/09/17   0350  07/08/17   1515   NA  138  138  139  138   K  4.0  5.0  3.6  3.4*   MG   --    --    --   2.0   BUN  46*  39*  41*  42*   CREA  2.27*  2.01*  2.03*  2.11*   GLU  102*  121*  134*  143*   WBC   --    --   6.8  6.5   HGB   --    --   9.9*  10.3*   HCT   --    --   32.4*  32.3*   PLT   --    --   188  185   TROIQ   --    --    --   <0.02*       Assessment/Plan:     Principal Problem:    Acute on chronic systolic (congestive) heart failure ([facility] (10/18/2016) - This is chronic issues with ongoing poor prognosis. Will increase Bumex to 2mg/hr. He had less than 500 cc diuresis yesterday and he feels he is gaining fluid. Active Problems:    Obstructive sleep apnea (3/18/2017) - Bipap      Diabetes mellitus type II, uncontrolled ([facility] (3/18/2017) - Medical management      CKD (chronic kidney disease) stage 3, GFR 30-59 ml/min (8/13/2014) - This is a significant issue as we are more aggressive with diuresis he develops RED.         Hypertension (3/18/2017)      Morbid obesity with BMI of 60.0-69.9, adult ([facility] (3/18/2017)      CHF (congestive heart failure) ([facility] (7/9/2017)          Anjelica Garcia MD  7/11/2017 10:44 AM

## 2017-07-11 NOTE — PROGRESS NOTES
Verbal bedside report given to Lorrie Landeros oncoming RN. Patient's situation, background, assessment and recommendations provided. Opportunity for questions provided. Oncoming RN assumed care of patient. Bumex IV drip verified at bedside with oncoming RN.

## 2017-07-11 NOTE — PROGRESS NOTES
Problem: Mobility Impaired (Adult and Pediatric)  Goal: *Acute Goals and Plan of Care (Insert Text)      PHYSICAL THERAPY: INITIAL ASSESSMENT, DISCHARGE, AM 7/11/2017  INPATIENT: Hospital Day: 4  Payor: CARE IMPROVEMENT PLUS / [place] / Product Type: Managed Care Medicare /      NAME/AGE/GENDER: Aidee Walker is a 45 y.o. male         PRIMARY DIAGNOSIS: Acute on chronic systolic (congestive) heart failure (HCC)  CHF (congestive heart failure) ([facility]) Acute on chronic systolic (congestive) heart failure (HCC) Acute on chronic systolic (congestive) heart failure ([facility]        ICD-10: Treatment Diagnosis:       · Generalized Muscle Weakness (M62.81)   Precaution/Allergies:  Dilaudid [hydromorphone]; Iodinated contrast- oral and iv dye; and Penicillins       ASSESSMENT:      Mr. Mandy Urrutia is a 45 y.o. male familiar to rehab from previous visits who was admitted to the hospital for the above. Pt reports that he lives with his daughter who helps him with ADLs occasionally. He also reports that he uses a RW for ambulation and has no recent history of falling. Pt presents to PT with generalized weakness in B LEs (hip flexion/knee extension) and limited hip flexion AROM. He has good sitting balance and performed transfers with supervision. Pt ambulated in clifford with RW/modified independence and required numerous standing rest breaks. His gait is slow and shuffled with widened BRYAN and increased trunk sway. Pt SPO2 recorded at 93% both before and after ambulation. Pt appears to be functioning at his baseline and currently and will be discharged from PT at this time. HISTORY:   History of Present Injury/Illness (Reason for Referral):  Per H&P:   Subjective:      Patient is a 45 y.o. male who presents with progressive shortness of breath. He has known end stage systolic CHF. He was discharged on 6/28. He missed an office appt yesterday.  He admits to too much fluid intake including \"eating ice. \" He states he has been trying to be careful but the medications make him thirsty. He has refused hospice/palliative care on numerous occasions. He cannot advise how much weight he has gained. He is tearful on exam regarding domestic issues within his family. He denies any chest pain. His BNP is elevated and he is volume overloaded on exam.   He has an ICD in place with no discharges. Past Medical History/Comorbidities:   Mr. Michael Hughes  has a past medical history of Acquired claw toe of right foot; Acute encephalopathy (3/18/2017); Acute systolic heart failure [facility]) (May, 2009); AICD (automatic cardioverter/defibrillator) present (10/21/2015); Asthma; Atypical chest pain (4/23/2010); Bronchitis; CAD (coronary artery disease); Cardiomyopathy; Chest pain (10/21/2015); Chronic kidney disease; Chronic pain; Congestive heart failure (CHF) ([address] (10/21/2015); COPD; Depressive disorder; Diabetes ([address] Diabetes mellitus type II, uncontrolled ([address] (7/2/2013); GERD (gastroesophageal reflux disease); Gout; Heart failure ([address] Hypertension; Hyponatremia (12/20/2010); Ill-defined condition; Morbid obesity ([address] Nausea & vomiting (11/30/2015); Neuropathy; Obstructive sleep apnea (2/15/2010); Other unknown and unspecified cause of morbidity or mortality; Severe sepsis ([address] Ulcer of leg, chronic ([address] and Unspecified sleep apnea. Mr. Michael Hughes  has a past surgical history that includes pacemaker; heart catheterization ([place] 10, 2008); and chest surgery procedure unlisted. Social History/Living Environment:   Home Environment: Apartment  # Steps to Enter: 0  One/Two Story Residence: One story  Living Alone: No  Support Systems: Child(reza)  Patient Expects to be Discharged to[de-identified] Apartment  Current DME Used/Available at Home: Walker, rolling  Tub or Shower Type: Tub/Shower combination  Prior Level of Function/Work/Activity:  Pt lives with his daughter in an apartment and uses a RW for ambulation.   His daughter assists with ADLs on occasion. Number of Personal Factors/Comorbidities that affect the Plan of Care:  HF  CKD  Chronic pain  COPD  Depression  DM II 3+: HIGH COMPLEXITY   EXAMINATION:   Most Recent Physical Functioning:   Gross Assessment:  AROM: Generally decreased, functional (B hip flexion)  Strength: Generally decreased, functional (B hip flexion/knee ext 4-/5)               Posture:     Balance:  Sitting: Intact  Standing: Impaired  Standing - Static: Good  Standing - Dynamic : Fair Bed Mobility:     Wheelchair Mobility:     Transfers:  Sit to Stand: Supervision  Stand to Sit: Supervision  Gait:     Base of Support: Widened  Speed/Mira: Slow;Shuffled  Step Length: Right shortened;Left shortened  Gait Abnormalities: Decreased step clearance;Trunk sway increased  Distance (ft): 250 Feet (ft)  Assistive Device: Walker, rolling  Ambulation - Level of Assistance: Modified independent       Body Structures Involved:  1. Nerves  2. Heart  3. Lungs  4. Muscles Body Functions Affected:  1. Cardio  2. Respiratory  3. Neuromusculoskeletal  4. Movement Related Activities and Participation Affected:  1. Self Care  2. Community, Social and [place]   Number of elements that affect the Plan of Care: 4+: HIGH COMPLEXITY   CLINICAL PRESENTATION:   Presentation: Evolving clinical presentation with changing clinical characteristics: MODERATE COMPLEXITY   CLINICAL DECISION MAKIN [facility] Inpatient Short Form  How much difficulty does the patient currently have. .. Unable A Lot A Little None   1. Turning over in bed (including adjusting bedclothes, sheets and blankets)? [ ] 1   [ ] 2   [ ] 3   [X] 4   2. Sitting down on and standing up from a chair with arms ( e.g., wheelchair, bedside commode, etc.)   [ ] 1   [ ] 2   [ ] 3   [X] 4   3. Moving from lying on back to sitting on the side of the bed?    [ ] 1   [ ] 2   [ ] 3   [X] 4   How much help from another person does the patient currently need. .. Total A Lot A Little None   4. Moving to and from a bed to a chair (including a wheelchair)? [ ] 1   [ ] 2   [X] 3   [ ] 4   5. Need to walk in hospital room? [ ] 1   [ ] 2   [ ] 3   [X] 4   6. Climbing 3-5 steps with a railing? [ ] 1   [ ] 2   [X] 3   [ ] 4   © 2007, Trustees of [facility] [employer], under license to [employer]. All rights reserved    Score:  Initial: 22 Most Recent: X (Date: -- )     Interpretation of Tool:  Represents activities that are increasingly more difficult (i.e. Bed mobility, Transfers, Gait). Score 24 23 22-20 19-15 14-10 9-7 6       Modifier CH CI CJ CK CL CM CN         · Mobility - Walking and Moving Around:               - CURRENT STATUS:    CJ - 20%-39% impaired, limited or restricted               - GOAL STATUS:           CJ - 20%-39% impaired, limited or restricted               - D/C STATUS:                       CJ - 20%-39% impaired, limited or restricted  Payor: CARE IMPROVEMENT PLUS / [place] CARE IMPROVEMENT [place] / Product Type: [employer] Care Medicare /           Use of outcome tool(s) and clinical judgement create a POC that gives a: Questionable prediction of patient's progress: MODERATE COMPLEXITY                 TREATMENT:   (In addition to Assessment/Re-Assessment sessions the following treatments were rendered)   Pre-treatment Symptoms/Complaints:  Low back pain  Pain: Initial:   Pain Intensity 1: 7  Pain Location 1: Back  Pain Orientation 1: Lower  Post Session:  9/10 (RN alerted)      Assessment/Reassessment only, no treatment provided today     Braces/Orthotics/Lines/Etc:   · IV  · O2 Device: Room air  Treatment/Session Assessment:    · Response to Treatment:  Pt tolerated very well but reports increased pain in back after ambulation.   · Interdisciplinary Collaboration:  · Physical Therapist  · Occupational Therapist  · Registered Nurse  ·   · After treatment position/precautions:  · Bed/Chair-wheels locked  · Bed in low position  · Call light within reach  · RN notified  · Sitting on edge of bed  · Recommendations/Intent for next treatment session:  Pt will be discharged from PT at this time as he is functioning close to baseline. ·  Bed/Chair-wheels locked  · Bed in low position  · Call light within reach  · RN notified  · Sitting on edge of bed\".   Total Treatment Duration:  PT Patient Time In/Time Out  Time In: 1014  Time Out: 1300 Duggererma Wiggins, PT, DPT

## 2017-07-11 NOTE — PROGRESS NOTES
Verbal bedside report received from Sharon David RN. Assumed care of patient. Bumex IV drip verified at bedside with outgoing RN.

## 2017-07-11 NOTE — ADT AUTH CERT NOTES
Utilization Review           Heart Failure - Care Day 2 (7/10/2017) by Nathalie Oviedo RN        Review Status Review Entered       Completed 7/10/2017       Details              Care Day: 2 Care Date: 7/10/2017 Level of Care: Telemetry       Guideline Day 2        Level Of Care       (X) Intermediate care or floor       7/10/2017 4:31 PM EDT by Speedy new                     Clinical Status       ( ) * Hemodynamic stability       (X) * Mental status at baseline       (X) * MI excluded       ( ) * Cardiac rate and rhythm acceptable       ( ) * Oxygenation at baseline or improved       ( ) * Pulmonary edema absent or improved              Routes       (X) Oral or parenteral medications       7/10/2017 4:31 PM EDT by Speedy Smith         Amio 200mg po daily, Bumex gtt, Coreg 12.5mg po bid, Apresoline 10mg po tid, SSI x 1, Isordil 20mg po tid, Roxicodone 10mg po q 6 prn x 1, Pravachol 80mg po hs, Aldactone 50mg po daily,              (X) Low-salt diet              Interventions       (X) * Pulmonary catheter absent       (X) Possible electrolytes [I]       7/10/2017 4:31 PM EDT by Speedy Smith         glu 121, BUN 39, CR 2.01, GFR 45,                     Medications       (X) Diuretics       7/10/2017 4:31 PM EDT by Speedy Smith         Bumex gtt, Aldactone 50mg po daily              (X) ACE inhibitor or ARB       (X) Beta-blocker       (X) Possible aldosterone antagonist                                   * Milestone              Additional Notes       Date of Service: 07/10/17 0755       Feeling better on iv bumex. Some Patti-Vidales like respiration noted.  Options are limited.       VS: 97.7, 129/86, 71, 18, 100% RA        Labs:  glu 121, BUN 39, CR 2.01, GFR 45,       Amio 200mg po daily, Bumex gtt, Coreg 12.5mg po bid, Apresoline 10mg po tid, SSI x 1, Isordil 20mg po tid, Roxicodone 10mg po q 6 prn x 1, Pravachol 80mg po hs, Aldactone 50mg po daily,           Heart Failure - Care Day 1 (7/9/2017) by Rocio Pineda RN        Review Status Review Entered       Completed 7/10/2017       Details              Care Day: 1 Care Date: 7/9/2017 Level of Care: Telemetry       Guideline Day 1        Level Of Care       ( ) ICU [D] or intermediate care [E] after emergency treatment       7/10/2017 4:24 PM EDT by Jorge Parikh         tele                     Clinical Status       (X) * Clinical Indications met [F]       (X) Tachypnea, edema, and dyspnea       7/10/2017 4:24 PM EDT by Jorge Parikh         SOB, diminished basilar bs bilaterally                     Routes       (X) Parenteral medications       7/10/2017 4:24 PM EDT by Jorge Parikh         :  Amio 200mg po daily, Bumex gtt, Coreg 12.5mg po bid, Apresoline 10mg po tid, Isordil 20mg po tid, Roxicodone 10mg po prn x 1, Pravachol 80mg po hs, Aldactone 50mg po daily,                     Interventions       (X) Oxygen       7/10/2017 4:24 PM EDT by Jorge Parikh         2L/NC                     Medications       (X) IV diuretics       7/10/2017 4:24 PM EDT by Jorge Parikh         Bumex gtt              (X) ACE inhibitor or ARB       (X) Beta-blocker [H]                                   * Milestone              Additional Notes       07/09/17 2295 INITIAL PHYSICIAN ORDER: INPATIENT Telemetry; 3. Patient receiving treatment that can only be provided in an inpatient setting (further clarification in H&P documentation) (ADMISSION ORDERS) ONE TIME         Authorizing Provider: Emiliana Rebolledo MD        User who entered the order: Parisa Rubio RN               References: CLICK HERE-** FAQ-NEW CMS RULES FOR INPATIENT CERTIFICATION **       Question Answer Comment       Status: Inpatient        Type of Bed Telemetry        Inpatient Hospitalization Certified Necessary for the Following Reasons 3.  Patient receiving treatment that can only be provided in an inpatient setting (further clarification in H&P documentation)       Admitting Diagnosis CHF (congestive heart failure) ([facility]        Admitting Physician Oz BEE [address]     Attending Physician Oz BEE [address]     Estimated Length of Stay 2 Midnights        Discharge Plan: [facility] CARDIOLOGY PROGRESS NOTE                        7/9/2017 8:26 AM               Admit Date: 7/8/2017                       Subjective:       He reports less SOB o Bumex infusion.               ROS:       GEN:  No fever or chills       Cardiovascular:  As noted above:no CP or palpitations.       Pulmonary:  As noted above       Neuro:  No new focal motor or sensory loss       BP: 110/76 124/62 120/62        Pulse: 72 75 71        Resp: 20 20        Temp: 97.5 °F (36.4 °C) 97.3 °F (36.3 °C)        SpO2: 100% 99% 100%/2L/NC       Weight: (!) 162.3 kg (357 lb 11.2 oz)               Physical Exam:       General-feels better       Neck- supple, no JVD       CV- regular rate and rhythm no MRG       Lung- diminished basilar bsbilaterally       Abd- soft, nontender, nondistended       Ext- 2+ edema bilaterally.       Skin- warm and dry       Psychiatric:  Normal mood and affect.       Neurologic:  Alert and oriented X 3       Labs: BUN 41, Cr 2.03, glu 134, H/H 9.9/32. 4       TELEMETRY:  NSR               Assessment/Plan:               Principal Problem:         Acute on chronic systolic (congestive) heart failure (HCC) (10/18/2016):Chronic recurrent problem. He has an end stage nonischemic cardiomyopathy .  He  refuses recommended hospice. He is intolerant of ACEI/ARB's and Entresto due to renal failure. Increase Coreg and continue Bumex drip as renal fx allows. Continue Hydralazine and ISDN.               Active Problems:         Obstructive sleep apnea (3/18/2017)                 Diabetes mellitus type II, uncontrolled ([facility] (3/18/2017)                 CKD (chronic kidney disease) stage 3, GFR 30-59 ml/min (8/13/2014): Stable.                 Hypertension (3/18/2017): Increase Coreg. Continue Hydralazine.                 Morbid obesity with BMI of 60.0-69.9, adult ([facility] (3/18/2017)                               MEDS:  Amio 200mg po daily, Bumex gtt, Coreg 12.5mg po bid, Apresoline 10mg po tid, Isordil 20mg po tid, Roxicodone 10mg po prn x 1, Pravachol 80mg po hs, Aldactone 50mg po daily,           Heart Failure - Clinical Indications for Admission to Inpatient Care by Poonam Baptiste RN        Review Status Review Entered       Completed 7/10/2017       Details              Clinical Indications for Admission to Inpatient Care       Most Recent : Lis Cm Most Recent Date: 7/10/2017 4:21 PM EDT       (X) Admission is indicated by 1 or more of the following  (1) (2) (3) (4) (5) (6) (7):          (X) Severe electrolyte abnormalities requiring inpatient care          7/10/2017 4:21 PM EDT by Lis Cm            BUN 41, Cr 2.03

## 2017-07-12 LAB
ANION GAP BLD CALC-SCNC: 10 MMOL/L (ref 7–16)
BUN SERPL-MCNC: 54 MG/DL (ref 6–23)
CALCIUM SERPL-MCNC: 8.8 MG/DL (ref 8.3–10.4)
CHLORIDE SERPL-SCNC: 102 MMOL/L (ref 98–107)
CO2 SERPL-SCNC: 24 MMOL/L (ref 21–32)
CREAT SERPL-MCNC: 2.51 MG/DL (ref 0.8–1.5)
GLUCOSE BLD STRIP.AUTO-MCNC: 103 MG/DL (ref 65–100)
GLUCOSE BLD STRIP.AUTO-MCNC: 120 MG/DL (ref 65–100)
GLUCOSE BLD STRIP.AUTO-MCNC: 129 MG/DL (ref 65–100)
GLUCOSE BLD STRIP.AUTO-MCNC: 173 MG/DL (ref 65–100)
GLUCOSE SERPL-MCNC: 91 MG/DL (ref 65–100)
POTASSIUM SERPL-SCNC: 4.6 MMOL/L (ref 3.5–5.1)
SODIUM SERPL-SCNC: 136 MMOL/L (ref 136–145)

## 2017-07-12 PROCEDURE — 94760 N-INVAS EAR/PLS OXIMETRY 1: CPT

## 2017-07-12 PROCEDURE — 36415 COLL VENOUS BLD VENIPUNCTURE: CPT | Performed by: PHYSICIAN ASSISTANT

## 2017-07-12 PROCEDURE — 74011000250 HC RX REV CODE- 250: Performed by: INTERNAL MEDICINE

## 2017-07-12 PROCEDURE — 74011000250 HC RX REV CODE- 250: Performed by: PHYSICIAN ASSISTANT

## 2017-07-12 PROCEDURE — 74011250637 HC RX REV CODE- 250/637: Performed by: PHYSICIAN ASSISTANT

## 2017-07-12 PROCEDURE — 74011000258 HC RX REV CODE- 258: Performed by: INTERNAL MEDICINE

## 2017-07-12 PROCEDURE — 77010033678 HC OXYGEN DAILY

## 2017-07-12 PROCEDURE — 74011636637 HC RX REV CODE- 636/637: Performed by: PHYSICIAN ASSISTANT

## 2017-07-12 PROCEDURE — 74011250637 HC RX REV CODE- 250/637: Performed by: INTERNAL MEDICINE

## 2017-07-12 PROCEDURE — 65660000000 HC RM CCU STEPDOWN

## 2017-07-12 PROCEDURE — 94640 AIRWAY INHALATION TREATMENT: CPT

## 2017-07-12 PROCEDURE — 82962 GLUCOSE BLOOD TEST: CPT

## 2017-07-12 PROCEDURE — 80048 BASIC METABOLIC PNL TOTAL CA: CPT | Performed by: PHYSICIAN ASSISTANT

## 2017-07-12 RX ORDER — FAMOTIDINE 20 MG/1
20 TABLET, FILM COATED ORAL DAILY
Status: DISCONTINUED | OUTPATIENT
Start: 2017-07-13 | End: 2017-07-18 | Stop reason: HOSPADM

## 2017-07-12 RX ADMIN — BUMETANIDE 2 MG/HR: 0.25 INJECTION INTRAMUSCULAR; INTRAVENOUS at 05:46

## 2017-07-12 RX ADMIN — HYDRALAZINE HYDROCHLORIDE 10 MG: 10 TABLET, FILM COATED ORAL at 17:30

## 2017-07-12 RX ADMIN — COLCHICINE 0.6 MG: 0.6 CAPSULE ORAL at 08:54

## 2017-07-12 RX ADMIN — AMIODARONE HYDROCHLORIDE 200 MG: 200 TABLET ORAL at 08:54

## 2017-07-12 RX ADMIN — ALLOPURINOL 100 MG: 100 TABLET ORAL at 17:30

## 2017-07-12 RX ADMIN — ISOSORBIDE DINITRATE 20 MG: 10 TABLET ORAL at 17:29

## 2017-07-12 RX ADMIN — ARFORMOTEROL TARTRATE 15 MCG: 15 SOLUTION RESPIRATORY (INHALATION) at 20:32

## 2017-07-12 RX ADMIN — ALLOPURINOL 100 MG: 100 TABLET ORAL at 08:54

## 2017-07-12 RX ADMIN — INSULIN LISPRO 3 UNITS: 100 INJECTION, SOLUTION INTRAVENOUS; SUBCUTANEOUS at 12:41

## 2017-07-12 RX ADMIN — SPIRONOLACTONE 50 MG: 25 TABLET, FILM COATED ORAL at 08:54

## 2017-07-12 RX ADMIN — GABAPENTIN 300 MG: 300 CAPSULE ORAL at 17:29

## 2017-07-12 RX ADMIN — ISOSORBIDE DINITRATE 20 MG: 10 TABLET ORAL at 22:09

## 2017-07-12 RX ADMIN — TAMSULOSIN HYDROCHLORIDE 0.4 MG: 0.4 CAPSULE ORAL at 08:54

## 2017-07-12 RX ADMIN — OXYCODONE HYDROCHLORIDE 10 MG: 5 TABLET ORAL at 17:28

## 2017-07-12 RX ADMIN — PRAVASTATIN SODIUM 80 MG: 80 TABLET ORAL at 22:09

## 2017-07-12 RX ADMIN — CARVEDILOL 12.5 MG: 12.5 TABLET, FILM COATED ORAL at 08:54

## 2017-07-12 RX ADMIN — ISOSORBIDE DINITRATE 20 MG: 10 TABLET ORAL at 08:54

## 2017-07-12 RX ADMIN — DOCUSATE SODIUM 100 MG: 100 CAPSULE, LIQUID FILLED ORAL at 08:54

## 2017-07-12 RX ADMIN — BUMETANIDE 2 MG/HR: 0.25 INJECTION INTRAMUSCULAR; INTRAVENOUS at 18:25

## 2017-07-12 RX ADMIN — CARVEDILOL 12.5 MG: 12.5 TABLET, FILM COATED ORAL at 17:30

## 2017-07-12 RX ADMIN — GABAPENTIN 300 MG: 300 CAPSULE ORAL at 22:09

## 2017-07-12 RX ADMIN — GABAPENTIN 300 MG: 300 CAPSULE ORAL at 08:54

## 2017-07-12 RX ADMIN — HYDROXYZINE HYDROCHLORIDE 25 MG: 25 TABLET, FILM COATED ORAL at 08:54

## 2017-07-12 RX ADMIN — HYDRALAZINE HYDROCHLORIDE 10 MG: 10 TABLET, FILM COATED ORAL at 08:52

## 2017-07-12 RX ADMIN — HYDROXYZINE HYDROCHLORIDE 25 MG: 25 TABLET, FILM COATED ORAL at 17:30

## 2017-07-12 RX ADMIN — FAMOTIDINE 20 MG: 20 TABLET ORAL at 08:54

## 2017-07-12 RX ADMIN — HYDRALAZINE HYDROCHLORIDE 10 MG: 10 TABLET, FILM COATED ORAL at 22:09

## 2017-07-12 RX ADMIN — DOCUSATE SODIUM 100 MG: 100 CAPSULE, LIQUID FILLED ORAL at 17:29

## 2017-07-12 RX ADMIN — SENNOSIDES 17.2 MG: 8.6 TABLET, FILM COATED ORAL at 22:09

## 2017-07-12 NOTE — PROGRESS NOTES
Patient resting quietly in bed. Slept at long intervals throughout the shift, no distress noted during hourly bedside checks. No request or needs at present.  Shift change report given to oncoming Primary Christi Mcelroy RN

## 2017-07-12 NOTE — PROGRESS NOTES
Bedside and Verbal shift change report given to Yessica Aguayo RN (oncoming nurse) by self (offgoing nurse). Report included the following information SBAR and Recent Results.

## 2017-07-12 NOTE — PROGRESS NOTES
[facility] CARDIOLOGY PROGRESS NOTE           7/12/2017 8:28 AM    Admit Date: 7/8/2017      Subjective:   Less dyspnea. Objective:      Vitals:    07/11/17 2157 07/11/17 2211 07/12/17 0403 07/12/17 0749   BP:  147/86 130/62    Pulse:  78 72    Resp:  18 18    Temp:  97.9 °F (36.6 °C) 97.6 °F (36.4 °C)    SpO2: 100% 100% 100% 95%   Weight:   (!) 162.9 kg (359 lb 1.6 oz)    Height:   5' 6\" (1.676 m)        Physical Exam:  General-No Acute Distress    Data Review:   Recent Labs      07/12/17   0441  07/11/17   0415   NA  136  138   K  4.6  4.0   BUN  54*  46*   CREA  2.51*  2.27*   GLU  91  102*       Assessment/Plan:     Principal Problem:    Acute on chronic systolic (congestive) heart failure (HCC) (10/18/2016)    Active Problems:    Obstructive sleep apnea (3/18/2017)      Diabetes mellitus type II, uncontrolled ([facility] (3/18/2017)      CKD (chronic kidney disease) stage 3, GFR 30-59 ml/min (8/13/2014)      Hypertension (3/18/2017)      Morbid obesity with BMI of 60.0-69.9, adult ([facility] (3/18/2017)      CHF (congestive heart failure) ([facility] (7/9/2017)      ///  End stage. Cont. Current rx.     Katherine Matias MD  7/12/2017 8:28 AM

## 2017-07-12 NOTE — PROGRESS NOTES
Bedside and Verbal shift change report given to self (oncoming nurse) by Oralee Lose (offgoing nurse). Report included the following information SBAR, Kardex, MAR and Recent Results.

## 2017-07-13 LAB
ANION GAP BLD CALC-SCNC: 12 MMOL/L (ref 7–16)
BUN SERPL-MCNC: 57 MG/DL (ref 6–23)
CALCIUM SERPL-MCNC: 8.9 MG/DL (ref 8.3–10.4)
CHLORIDE SERPL-SCNC: 99 MMOL/L (ref 98–107)
CO2 SERPL-SCNC: 29 MMOL/L (ref 21–32)
CREAT SERPL-MCNC: 2.36 MG/DL (ref 0.8–1.5)
GLUCOSE BLD STRIP.AUTO-MCNC: 110 MG/DL (ref 65–100)
GLUCOSE BLD STRIP.AUTO-MCNC: 134 MG/DL (ref 65–100)
GLUCOSE BLD STRIP.AUTO-MCNC: 153 MG/DL (ref 65–100)
GLUCOSE BLD STRIP.AUTO-MCNC: 161 MG/DL (ref 65–100)
GLUCOSE SERPL-MCNC: 114 MG/DL (ref 65–100)
POTASSIUM SERPL-SCNC: 3.8 MMOL/L (ref 3.5–5.1)
SODIUM SERPL-SCNC: 140 MMOL/L (ref 136–145)

## 2017-07-13 PROCEDURE — 77010033678 HC OXYGEN DAILY

## 2017-07-13 PROCEDURE — 94640 AIRWAY INHALATION TREATMENT: CPT

## 2017-07-13 PROCEDURE — 65660000000 HC RM CCU STEPDOWN

## 2017-07-13 PROCEDURE — 74011000250 HC RX REV CODE- 250: Performed by: INTERNAL MEDICINE

## 2017-07-13 PROCEDURE — 80048 BASIC METABOLIC PNL TOTAL CA: CPT | Performed by: PHYSICIAN ASSISTANT

## 2017-07-13 PROCEDURE — 74011000250 HC RX REV CODE- 250: Performed by: PHYSICIAN ASSISTANT

## 2017-07-13 PROCEDURE — 82962 GLUCOSE BLOOD TEST: CPT

## 2017-07-13 PROCEDURE — 74011250637 HC RX REV CODE- 250/637: Performed by: PHYSICIAN ASSISTANT

## 2017-07-13 PROCEDURE — 74011636637 HC RX REV CODE- 636/637: Performed by: PHYSICIAN ASSISTANT

## 2017-07-13 PROCEDURE — 74011000258 HC RX REV CODE- 258: Performed by: INTERNAL MEDICINE

## 2017-07-13 PROCEDURE — 74011250637 HC RX REV CODE- 250/637: Performed by: INTERNAL MEDICINE

## 2017-07-13 PROCEDURE — 36415 COLL VENOUS BLD VENIPUNCTURE: CPT | Performed by: PHYSICIAN ASSISTANT

## 2017-07-13 PROCEDURE — 94760 N-INVAS EAR/PLS OXIMETRY 1: CPT

## 2017-07-13 RX ADMIN — HYDROXYZINE HYDROCHLORIDE 25 MG: 25 TABLET, FILM COATED ORAL at 17:24

## 2017-07-13 RX ADMIN — PRAVASTATIN SODIUM 80 MG: 80 TABLET ORAL at 21:55

## 2017-07-13 RX ADMIN — ALLOPURINOL 100 MG: 100 TABLET ORAL at 09:38

## 2017-07-13 RX ADMIN — SENNOSIDES 17.2 MG: 8.6 TABLET, FILM COATED ORAL at 21:55

## 2017-07-13 RX ADMIN — GABAPENTIN 300 MG: 300 CAPSULE ORAL at 17:24

## 2017-07-13 RX ADMIN — BUMETANIDE 2 MG/HR: 0.25 INJECTION INTRAMUSCULAR; INTRAVENOUS at 14:00

## 2017-07-13 RX ADMIN — ISOSORBIDE DINITRATE 20 MG: 10 TABLET ORAL at 17:24

## 2017-07-13 RX ADMIN — ARFORMOTEROL TARTRATE 15 MCG: 15 SOLUTION RESPIRATORY (INHALATION) at 08:45

## 2017-07-13 RX ADMIN — CARVEDILOL 12.5 MG: 12.5 TABLET, FILM COATED ORAL at 08:00

## 2017-07-13 RX ADMIN — OXYCODONE HYDROCHLORIDE 10 MG: 5 TABLET ORAL at 21:55

## 2017-07-13 RX ADMIN — HYDRALAZINE HYDROCHLORIDE 10 MG: 10 TABLET, FILM COATED ORAL at 22:00

## 2017-07-13 RX ADMIN — HYDRALAZINE HYDROCHLORIDE 10 MG: 10 TABLET, FILM COATED ORAL at 09:39

## 2017-07-13 RX ADMIN — INSULIN LISPRO 3 UNITS: 100 INJECTION, SOLUTION INTRAVENOUS; SUBCUTANEOUS at 12:29

## 2017-07-13 RX ADMIN — SPIRONOLACTONE 50 MG: 25 TABLET, FILM COATED ORAL at 09:38

## 2017-07-13 RX ADMIN — GABAPENTIN 300 MG: 300 CAPSULE ORAL at 09:38

## 2017-07-13 RX ADMIN — ISOSORBIDE DINITRATE 20 MG: 10 TABLET ORAL at 09:38

## 2017-07-13 RX ADMIN — BUMETANIDE 2 MG/HR: 0.25 INJECTION INTRAMUSCULAR; INTRAVENOUS at 21:58

## 2017-07-13 RX ADMIN — COLCHICINE 0.6 MG: 0.6 CAPSULE ORAL at 09:38

## 2017-07-13 RX ADMIN — ALLOPURINOL 100 MG: 100 TABLET ORAL at 17:24

## 2017-07-13 RX ADMIN — ARFORMOTEROL TARTRATE 15 MCG: 15 SOLUTION RESPIRATORY (INHALATION) at 19:50

## 2017-07-13 RX ADMIN — GABAPENTIN 300 MG: 300 CAPSULE ORAL at 22:00

## 2017-07-13 RX ADMIN — INSULIN LISPRO 3 UNITS: 100 INJECTION, SOLUTION INTRAVENOUS; SUBCUTANEOUS at 17:23

## 2017-07-13 RX ADMIN — FAMOTIDINE 20 MG: 20 TABLET ORAL at 09:38

## 2017-07-13 RX ADMIN — CARVEDILOL 12.5 MG: 12.5 TABLET, FILM COATED ORAL at 17:24

## 2017-07-13 RX ADMIN — ISOSORBIDE DINITRATE 20 MG: 10 TABLET ORAL at 22:00

## 2017-07-13 RX ADMIN — TAMSULOSIN HYDROCHLORIDE 0.4 MG: 0.4 CAPSULE ORAL at 09:38

## 2017-07-13 RX ADMIN — BUMETANIDE 2 MG/HR: 0.25 INJECTION INTRAMUSCULAR; INTRAVENOUS at 03:08

## 2017-07-13 RX ADMIN — DOCUSATE SODIUM 100 MG: 100 CAPSULE, LIQUID FILLED ORAL at 09:39

## 2017-07-13 RX ADMIN — OXYCODONE HYDROCHLORIDE 10 MG: 5 TABLET ORAL at 09:42

## 2017-07-13 RX ADMIN — AMIODARONE HYDROCHLORIDE 200 MG: 200 TABLET ORAL at 09:38

## 2017-07-13 RX ADMIN — HYDRALAZINE HYDROCHLORIDE 10 MG: 10 TABLET, FILM COATED ORAL at 17:24

## 2017-07-13 RX ADMIN — DOCUSATE SODIUM 100 MG: 100 CAPSULE, LIQUID FILLED ORAL at 17:24

## 2017-07-13 RX ADMIN — HYDROXYZINE HYDROCHLORIDE 25 MG: 25 TABLET, FILM COATED ORAL at 09:38

## 2017-07-13 NOTE — PROGRESS NOTES
[facility] CARDIOLOGY PROGRESS NOTE           7/13/2017 10:44 AM    Admit Date: 7/8/2017      Subjective:   3 liter diuresis with IV bumex. Renal function stable. BP stable. ROS:  Cardiovascular:  As noted above    Objective:      Vitals:    07/12/17 2032 07/12/17 2100 07/13/17 0125 07/13/17 0552   BP:  147/89 106/61 122/77   Pulse:  69 70 75   Resp:  20 18 16   Temp:  98.3 °F (36.8 °C) 97.8 °F (36.6 °C) 98.1 °F (36.7 °C)   SpO2: 93% 93% 95% 90%   Weight:    (!) 162.6 kg (358 lb 6.4 oz)   Height:           Physical Exam:  General-No Acute Distress  Neck- supple, no JVD  CV- regular rate and rhythm no MRG  Lung- clear bilaterally  Abd- soft, nontender, nondistended  Ext- 2+ edema bilaterally. Skin- warm and dry    Data Review:   Recent Labs      07/13/17   0500  07/12/17   0441   NA  140  136   K  3.8  4.6   BUN  57*  54*   CREA  2.36*  2.51*   GLU  114*  91       Assessment/Plan:     Principal Problem:    Acute on chronic systolic (congestive) heart failure (HCC) (10/18/2016) - Severe LV dysfunction. Poor prognosis discussed. Continue Bumex gtt. High likelihood for re-admission. Active Problems:    Obstructive sleep apnea (3/18/2017) - Bipap      Diabetes mellitus type II, uncontrolled ([facility] (3/18/2017) - Medical management      CKD (chronic kidney disease) stage 3, GFR 30-59 ml/min (8/13/2014) - Daily BMP. Hypertension (3/18/2017)      Morbid obesity with BMI of 60.0-69.9, adult ([facility] (3/18/2017)  Needs weight loss.          Garcia Mtz MD  7/13/2017 10:44 AM

## 2017-07-13 NOTE — PROGRESS NOTES
Bedside and Verbal shift change report given to self (oncoming nurse) by Romana Press (offgoing nurse). Report included the following information SBAR, Kardex, MAR and Recent Results.

## 2017-07-13 NOTE — PROGRESS NOTES
Verbal bedside report given to oncoming RN, Lindy Henry. Patient's situation, background, assessment and recommendations provided. Opportunity for questions provided. Oncoming RN assumed care of patient.

## 2017-07-14 LAB
ANION GAP BLD CALC-SCNC: 12 MMOL/L (ref 7–16)
BUN SERPL-MCNC: 60 MG/DL (ref 6–23)
CALCIUM SERPL-MCNC: 9.1 MG/DL (ref 8.3–10.4)
CHLORIDE SERPL-SCNC: 98 MMOL/L (ref 98–107)
CO2 SERPL-SCNC: 29 MMOL/L (ref 21–32)
CREAT SERPL-MCNC: 2.33 MG/DL (ref 0.8–1.5)
GLUCOSE BLD STRIP.AUTO-MCNC: 116 MG/DL (ref 65–100)
GLUCOSE BLD STRIP.AUTO-MCNC: 122 MG/DL (ref 65–100)
GLUCOSE BLD STRIP.AUTO-MCNC: 124 MG/DL (ref 65–100)
GLUCOSE BLD STRIP.AUTO-MCNC: 152 MG/DL (ref 65–100)
GLUCOSE SERPL-MCNC: 110 MG/DL (ref 65–100)
POTASSIUM SERPL-SCNC: 3.9 MMOL/L (ref 3.5–5.1)
SODIUM SERPL-SCNC: 139 MMOL/L (ref 136–145)

## 2017-07-14 PROCEDURE — 65660000000 HC RM CCU STEPDOWN

## 2017-07-14 PROCEDURE — 94640 AIRWAY INHALATION TREATMENT: CPT

## 2017-07-14 PROCEDURE — 74011250637 HC RX REV CODE- 250/637: Performed by: INTERNAL MEDICINE

## 2017-07-14 PROCEDURE — 80048 BASIC METABOLIC PNL TOTAL CA: CPT | Performed by: PHYSICIAN ASSISTANT

## 2017-07-14 PROCEDURE — 74011636637 HC RX REV CODE- 636/637: Performed by: PHYSICIAN ASSISTANT

## 2017-07-14 PROCEDURE — 74011000258 HC RX REV CODE- 258: Performed by: INTERNAL MEDICINE

## 2017-07-14 PROCEDURE — 82962 GLUCOSE BLOOD TEST: CPT

## 2017-07-14 PROCEDURE — 74011000250 HC RX REV CODE- 250: Performed by: INTERNAL MEDICINE

## 2017-07-14 PROCEDURE — 74011000250 HC RX REV CODE- 250: Performed by: PHYSICIAN ASSISTANT

## 2017-07-14 PROCEDURE — 94760 N-INVAS EAR/PLS OXIMETRY 1: CPT

## 2017-07-14 PROCEDURE — 74011250637 HC RX REV CODE- 250/637: Performed by: PHYSICIAN ASSISTANT

## 2017-07-14 PROCEDURE — 36415 COLL VENOUS BLD VENIPUNCTURE: CPT | Performed by: PHYSICIAN ASSISTANT

## 2017-07-14 RX ORDER — HYDRALAZINE HYDROCHLORIDE 25 MG/1
25 TABLET, FILM COATED ORAL 3 TIMES DAILY
Status: DISCONTINUED | OUTPATIENT
Start: 2017-07-14 | End: 2017-07-18 | Stop reason: HOSPADM

## 2017-07-14 RX ORDER — POLYETHYLENE GLYCOL 3350 17 G/17G
17 POWDER, FOR SOLUTION ORAL DAILY
Status: DISCONTINUED | OUTPATIENT
Start: 2017-07-14 | End: 2017-07-18 | Stop reason: HOSPADM

## 2017-07-14 RX ADMIN — ALLOPURINOL 100 MG: 100 TABLET ORAL at 08:18

## 2017-07-14 RX ADMIN — HYDROXYZINE HYDROCHLORIDE 25 MG: 25 TABLET, FILM COATED ORAL at 17:21

## 2017-07-14 RX ADMIN — HYDRALAZINE HYDROCHLORIDE 25 MG: 25 TABLET, FILM COATED ORAL at 22:31

## 2017-07-14 RX ADMIN — TAMSULOSIN HYDROCHLORIDE 0.4 MG: 0.4 CAPSULE ORAL at 08:27

## 2017-07-14 RX ADMIN — CARVEDILOL 12.5 MG: 12.5 TABLET, FILM COATED ORAL at 08:18

## 2017-07-14 RX ADMIN — ISOSORBIDE DINITRATE 20 MG: 10 TABLET ORAL at 22:31

## 2017-07-14 RX ADMIN — PRAVASTATIN SODIUM 80 MG: 80 TABLET ORAL at 22:31

## 2017-07-14 RX ADMIN — DOCUSATE SODIUM 100 MG: 100 CAPSULE, LIQUID FILLED ORAL at 17:21

## 2017-07-14 RX ADMIN — ISOSORBIDE DINITRATE 20 MG: 10 TABLET ORAL at 08:18

## 2017-07-14 RX ADMIN — GABAPENTIN 300 MG: 300 CAPSULE ORAL at 08:18

## 2017-07-14 RX ADMIN — BUMETANIDE 2 MG/HR: 0.25 INJECTION INTRAMUSCULAR; INTRAVENOUS at 20:51

## 2017-07-14 RX ADMIN — HYDROXYZINE HYDROCHLORIDE 25 MG: 25 TABLET, FILM COATED ORAL at 08:18

## 2017-07-14 RX ADMIN — GABAPENTIN 300 MG: 300 CAPSULE ORAL at 17:21

## 2017-07-14 RX ADMIN — COLCHICINE 0.6 MG: 0.6 CAPSULE ORAL at 08:17

## 2017-07-14 RX ADMIN — ARFORMOTEROL TARTRATE 15 MCG: 15 SOLUTION RESPIRATORY (INHALATION) at 19:52

## 2017-07-14 RX ADMIN — AMIODARONE HYDROCHLORIDE 200 MG: 200 TABLET ORAL at 08:18

## 2017-07-14 RX ADMIN — SPIRONOLACTONE 50 MG: 25 TABLET, FILM COATED ORAL at 08:27

## 2017-07-14 RX ADMIN — SENNOSIDES 17.2 MG: 8.6 TABLET, FILM COATED ORAL at 22:31

## 2017-07-14 RX ADMIN — ALLOPURINOL 100 MG: 100 TABLET ORAL at 17:21

## 2017-07-14 RX ADMIN — HYDRALAZINE HYDROCHLORIDE 25 MG: 25 TABLET, FILM COATED ORAL at 13:58

## 2017-07-14 RX ADMIN — DOCUSATE SODIUM 100 MG: 100 CAPSULE, LIQUID FILLED ORAL at 08:18

## 2017-07-14 RX ADMIN — POLYETHYLENE GLYCOL 3350 17 G: 17 POWDER, FOR SOLUTION ORAL at 13:58

## 2017-07-14 RX ADMIN — FAMOTIDINE 20 MG: 20 TABLET ORAL at 08:18

## 2017-07-14 RX ADMIN — OXYCODONE HYDROCHLORIDE 10 MG: 5 TABLET ORAL at 17:21

## 2017-07-14 RX ADMIN — OXYCODONE HYDROCHLORIDE 10 MG: 5 TABLET ORAL at 11:53

## 2017-07-14 RX ADMIN — GABAPENTIN 300 MG: 300 CAPSULE ORAL at 22:31

## 2017-07-14 RX ADMIN — HYDRALAZINE HYDROCHLORIDE 25 MG: 25 TABLET, FILM COATED ORAL at 08:18

## 2017-07-14 RX ADMIN — CARVEDILOL 12.5 MG: 12.5 TABLET, FILM COATED ORAL at 17:21

## 2017-07-14 RX ADMIN — BUMETANIDE 2 MG/HR: 0.25 INJECTION INTRAMUSCULAR; INTRAVENOUS at 08:16

## 2017-07-14 RX ADMIN — ARFORMOTEROL TARTRATE 15 MCG: 15 SOLUTION RESPIRATORY (INHALATION) at 08:36

## 2017-07-14 RX ADMIN — INSULIN LISPRO 3 UNITS: 100 INJECTION, SOLUTION INTRAVENOUS; SUBCUTANEOUS at 12:40

## 2017-07-14 RX ADMIN — ISOSORBIDE DINITRATE 20 MG: 10 TABLET ORAL at 17:21

## 2017-07-14 NOTE — PROGRESS NOTES
Problem: Nutrition Deficit  Goal: *Optimize nutritional status  Nutrition LOS Note: day 5  Assessment  Diet order(s): Cardiac/CCHO, 1800 kcal, 2L FR  Food,Nutrition, and Pertinent History: The patient is well-known to this RD. He has been educated on low sodium and CCHO diet on multiple occassions. He has a h/o DM, CKD, CHF, and is in frequently volume overloaded. He is receiving Bumex drip today. Anthropometrics: Height: 5' 6\" (167.6 cm), Weight Source: Standing scale (comment), Weight: (!) 162.5 kg (358 lb 4.8 oz), Body mass index is 57.83 kg/(m^2). BMI class of morbid obesity class III. Generalized anasarca noted. Macronutrient Needs:  · EER:  9496-7659 kcal /day (25-30 kcal/kg I BW)-edematous  · EPR:  45-60 grams protein/day (0.6-0.8 grams/kg IBW)(GFR 38)-CKD  Intake/Comparative Standards:  Average intake for past 5 day(s)/11 recorded meal(s): 100%. This potentially meets ~100% of kcal and ~100% of protein needs     Nutrition Diagnosis: No nutrition diagnosis at this time     Intervention: Meals and snacks: Continue current diet.      Megan Walker [address], [address] [place] [address], [facility] [address], [phone]

## 2017-07-14 NOTE — ROUTINE PROCESS
CHF teaching continue reinforcement post re-introduction to pt/family; aware of diagnosis. Planner/scales @ home @ BS and will follow. Smoking/ ETOH/Illicit drug use cessation covered. Pt/family aware that I can not prescribe nor adjust  medications: 15mins  Palliative Care score:  Start  2 liter/day Fluid Restriction  CHF teaching continues to pt/family. Emphasis on taking prescription meds as ordered, to keep F/U appts and to call MD STAT if any of the following occur:   If you gain 2 lbs in one day or 5 lbs in a week, and short of breath.  If you can not lay flat without developing short of breath or rapid breathing at night; or if it wakes you up. Develop a cough or wheezing.  If you notice swollen hands/feet/ankles or stomach with a bloated/ full feeling.  If you become confused or mentally fuzzy or dizzy.  If you notice a rapid or change in your heart rate.  If you become more exhausted all the time and unable to do the same level of activity without stopping to catch your breath. Drink no more than 8 cups a day in 8 oz. cups. Your Heart can not handle any more. Stay away from salt (limit anything with salt or sodium in it). Limit to 250mg per serving.   Pt/family verbalizes understanding, reinforced teaching skills: 60 mins total    Post Test passed again

## 2017-07-14 NOTE — PROGRESS NOTES
Bedside and Verbal shift change report given to Nixon Smith RN (oncoming nurse) by self (offgoing nurse). Report included the following information SBAR and Recent Results.

## 2017-07-14 NOTE — PROGRESS NOTES
Verbal bedside report given to oncoming RN, Christian Urbina. Patient's situation, background, assessment and recommendations provided. Opportunity for questions provided. Oncoming RN assumed care of patient.

## 2017-07-14 NOTE — PROGRESS NOTES
Bedside and Verbal shift change report given to self (oncoming nurse) by Praveena Henrdon (offgoing nurse). Report included the following information SBAR, Kardex, MAR and Recent Results.

## 2017-07-14 NOTE — PROGRESS NOTES
[facility] CARDIOLOGY PROGRESS NOTE           7/14/2017 10:44 AM    Admit Date: 7/8/2017      Subjective:   Patient feels \"stiff\". Edema improved. Additional 2.7 liters out for total 8.8 total diuresis since admission. BP stable. ROS:  Cardiovascular:  As noted above    Objective:      Vitals:    07/13/17 1950 07/13/17 2140 07/14/17 0156 07/14/17 0545   BP:  127/89 124/76 142/78   Pulse:  85 88 77   Resp:  18 19 19   Temp:  98.2 °F (36.8 °C) 98.1 °F (36.7 °C) 98.6 °F (37 °C)   SpO2: 97% 92% 97% 96%   Weight:    (!) 162.5 kg (358 lb 4.8 oz)   Height:           Physical Exam:  General-No Acute Distress  Neck- supple, no JVD  CV- regular rate and rhythm no MRG  Lung- clear bilaterally  Abd- soft, nontender, nondistended  Ext- 1-2+ edema bilaterally. Skin- warm and dry    Data Review:   Recent Labs      07/14/17   0505  07/13/17   0500   NA  139  140   K  3.9  3.8   BUN  60*  57*   CREA  2.33*  2.36*   GLU  110*  114*       Assessment/Plan:     Principal Problem:    Acute on chronic systolic (congestive) heart failure (HCC) (10/18/2016) - Severe LV dysfunction. Poor prognosis discussed. Continue Bumex gtt. High likelihood for re-admission. Increase hydralazine for afterload reduction. On coreg. No ACE-I/ARB with renal failure. Active Problems:    Obstructive sleep apnea (3/18/2017) - Bipap      Diabetes mellitus type II, uncontrolled ([facility] [place] [address] (3/18/2017) - Medical management appropriate. CKD (chronic kidney disease) stage 3, GFR 30-59 ml/min (8/13/2014) - Daily BMP. Hypertension (3/18/2017)      Morbid obesity with BMI of 60.0-69.9, adult ([facility] [place] [address] (3/18/2017)  Needs weight loss.          Haresh Guerra MD  7/14/2017 10:44 AM

## 2017-07-15 LAB
ANION GAP BLD CALC-SCNC: 11 MMOL/L (ref 7–16)
BUN SERPL-MCNC: 62 MG/DL (ref 6–23)
CALCIUM SERPL-MCNC: 9.1 MG/DL (ref 8.3–10.4)
CHLORIDE SERPL-SCNC: 97 MMOL/L (ref 98–107)
CO2 SERPL-SCNC: 31 MMOL/L (ref 21–32)
CREAT SERPL-MCNC: 2.51 MG/DL (ref 0.8–1.5)
GLUCOSE BLD STRIP.AUTO-MCNC: 132 MG/DL (ref 65–100)
GLUCOSE BLD STRIP.AUTO-MCNC: 135 MG/DL (ref 65–100)
GLUCOSE BLD STRIP.AUTO-MCNC: 138 MG/DL (ref 65–100)
GLUCOSE BLD STRIP.AUTO-MCNC: 141 MG/DL (ref 65–100)
GLUCOSE SERPL-MCNC: 135 MG/DL (ref 65–100)
POTASSIUM SERPL-SCNC: 4 MMOL/L (ref 3.5–5.1)
SODIUM SERPL-SCNC: 139 MMOL/L (ref 136–145)

## 2017-07-15 PROCEDURE — 94640 AIRWAY INHALATION TREATMENT: CPT

## 2017-07-15 PROCEDURE — 74011250637 HC RX REV CODE- 250/637: Performed by: INTERNAL MEDICINE

## 2017-07-15 PROCEDURE — 74011250637 HC RX REV CODE- 250/637: Performed by: PHYSICIAN ASSISTANT

## 2017-07-15 PROCEDURE — 94760 N-INVAS EAR/PLS OXIMETRY 1: CPT

## 2017-07-15 PROCEDURE — 74011000250 HC RX REV CODE- 250: Performed by: INTERNAL MEDICINE

## 2017-07-15 PROCEDURE — 36415 COLL VENOUS BLD VENIPUNCTURE: CPT | Performed by: PHYSICIAN ASSISTANT

## 2017-07-15 PROCEDURE — 94660 CPAP INITIATION&MGMT: CPT

## 2017-07-15 PROCEDURE — 80048 BASIC METABOLIC PNL TOTAL CA: CPT | Performed by: PHYSICIAN ASSISTANT

## 2017-07-15 PROCEDURE — 74011000250 HC RX REV CODE- 250: Performed by: PHYSICIAN ASSISTANT

## 2017-07-15 PROCEDURE — 65660000000 HC RM CCU STEPDOWN

## 2017-07-15 PROCEDURE — 82962 GLUCOSE BLOOD TEST: CPT

## 2017-07-15 PROCEDURE — 74011000258 HC RX REV CODE- 258: Performed by: INTERNAL MEDICINE

## 2017-07-15 RX ADMIN — OXYCODONE HYDROCHLORIDE 10 MG: 5 TABLET ORAL at 12:23

## 2017-07-15 RX ADMIN — ISOSORBIDE DINITRATE 20 MG: 10 TABLET ORAL at 16:18

## 2017-07-15 RX ADMIN — ISOSORBIDE DINITRATE 20 MG: 10 TABLET ORAL at 22:18

## 2017-07-15 RX ADMIN — ALLOPURINOL 100 MG: 100 TABLET ORAL at 08:19

## 2017-07-15 RX ADMIN — PAROXETINE HYDROCHLORIDE 20 MG: 20 TABLET, FILM COATED ORAL at 22:18

## 2017-07-15 RX ADMIN — CARVEDILOL 12.5 MG: 12.5 TABLET, FILM COATED ORAL at 17:59

## 2017-07-15 RX ADMIN — TAMSULOSIN HYDROCHLORIDE 0.4 MG: 0.4 CAPSULE ORAL at 08:19

## 2017-07-15 RX ADMIN — CARVEDILOL 12.5 MG: 12.5 TABLET, FILM COATED ORAL at 08:19

## 2017-07-15 RX ADMIN — BUMETANIDE 2 MG/HR: 0.25 INJECTION INTRAMUSCULAR; INTRAVENOUS at 12:15

## 2017-07-15 RX ADMIN — SPIRONOLACTONE 50 MG: 25 TABLET, FILM COATED ORAL at 08:19

## 2017-07-15 RX ADMIN — HYDROXYZINE HYDROCHLORIDE 25 MG: 25 TABLET, FILM COATED ORAL at 08:19

## 2017-07-15 RX ADMIN — COLCHICINE 0.6 MG: 0.6 CAPSULE ORAL at 08:19

## 2017-07-15 RX ADMIN — HYDRALAZINE HYDROCHLORIDE 25 MG: 25 TABLET, FILM COATED ORAL at 05:47

## 2017-07-15 RX ADMIN — GABAPENTIN 300 MG: 300 CAPSULE ORAL at 08:19

## 2017-07-15 RX ADMIN — GABAPENTIN 300 MG: 300 CAPSULE ORAL at 16:19

## 2017-07-15 RX ADMIN — FAMOTIDINE 20 MG: 20 TABLET ORAL at 08:19

## 2017-07-15 RX ADMIN — DOCUSATE SODIUM 100 MG: 100 CAPSULE, LIQUID FILLED ORAL at 08:19

## 2017-07-15 RX ADMIN — HYDRALAZINE HYDROCHLORIDE 25 MG: 25 TABLET, FILM COATED ORAL at 16:19

## 2017-07-15 RX ADMIN — DOCUSATE SODIUM 100 MG: 100 CAPSULE, LIQUID FILLED ORAL at 17:58

## 2017-07-15 RX ADMIN — ALLOPURINOL 100 MG: 100 TABLET ORAL at 17:58

## 2017-07-15 RX ADMIN — ARFORMOTEROL TARTRATE 15 MCG: 15 SOLUTION RESPIRATORY (INHALATION) at 20:12

## 2017-07-15 RX ADMIN — OXYCODONE HYDROCHLORIDE 10 MG: 5 TABLET ORAL at 01:29

## 2017-07-15 RX ADMIN — HYDRALAZINE HYDROCHLORIDE 25 MG: 25 TABLET, FILM COATED ORAL at 22:18

## 2017-07-15 RX ADMIN — BUMETANIDE 2 MG/HR: 0.25 INJECTION INTRAMUSCULAR; INTRAVENOUS at 18:37

## 2017-07-15 RX ADMIN — BUMETANIDE 2 MG/HR: 0.25 INJECTION INTRAMUSCULAR; INTRAVENOUS at 03:12

## 2017-07-15 RX ADMIN — HYDROXYZINE HYDROCHLORIDE 25 MG: 25 TABLET, FILM COATED ORAL at 17:59

## 2017-07-15 RX ADMIN — ISOSORBIDE DINITRATE 20 MG: 10 TABLET ORAL at 08:19

## 2017-07-15 RX ADMIN — GABAPENTIN 300 MG: 300 CAPSULE ORAL at 22:18

## 2017-07-15 RX ADMIN — AMIODARONE HYDROCHLORIDE 200 MG: 200 TABLET ORAL at 08:19

## 2017-07-15 RX ADMIN — OXYCODONE HYDROCHLORIDE 10 MG: 5 TABLET ORAL at 19:19

## 2017-07-15 RX ADMIN — POLYETHYLENE GLYCOL 3350 17 G: 17 POWDER, FOR SOLUTION ORAL at 18:04

## 2017-07-15 RX ADMIN — PRAVASTATIN SODIUM 80 MG: 80 TABLET ORAL at 22:18

## 2017-07-15 RX ADMIN — SENNOSIDES 17.2 MG: 8.6 TABLET, FILM COATED ORAL at 22:18

## 2017-07-15 NOTE — PROGRESS NOTES
Bedside and Written shift change report given to self (oncoming nurse) by Chen Mono, RN (offgoing nurse). Report included the following information SBAR, Kardex, MAR and Recent Results. Bumex gtt verfied.

## 2017-07-15 NOTE — PROGRESS NOTES
[facility] CARDIOLOGY PROGRESS NOTE           7/15/2017 10:44 AM    Admit Date: 7/8/2017      Subjective:   Still has SOB, diuresing well. Weak overall. No CP.    ROS:  Cardiovascular:  As noted above    Objective:      Vitals:    07/14/17 2057 07/15/17 0055 07/15/17 0459 07/15/17 0504   BP: 139/66 121/84 119/62    Pulse: 73 78 79    Resp: 22 18 18    Temp: 97.9 °F (36.6 °C) 97.2 °F (36.2 °C) 97.8 °F (36.6 °C)    SpO2: 98% 91% 95%    Weight:    (!) 161.3 kg (355 lb 11.2 oz)   Height:           Physical Exam:  General-No Acute Distress, flat affect  Neck- supple, no JVD  CV- regular rate and rhythm no MRG  Lung- clear bilaterally with dec BS in the bases  Abd- soft, nontender, nondistended  Ext- 1-2+ edema bilaterally. Skin- warm and dry    Data Review:   Recent Labs      07/15/17   0510  07/14/17   0505   NA  139  139   K  4.0  3.9   BUN  62*  60*   CREA  2.51*  2.33*   GLU  135*  110*       Assessment/Plan:     Principal Problem:    Acute on chronic systolic (congestive) heart failure (HCC) (10/18/2016) - Severe LV dysfunction. Poor prognosis discussed in the past and outlined in prior notes. Continue Bumex gtt fr likely another 24 hours. High likelihood for re-admission. Increased hydralazine for afterload reduction. On coreg. No ACE-I/ARB with renal failure. Active Problems:    Obstructive sleep apnea (3/18/2017) - Bipap      Diabetes mellitus type II, uncontrolled ([facility] [address] (3/18/2017) - Medical management appropriate. CKD (chronic kidney disease) stage 3, GFR 30-59 ml/min (8/13/2014) - Daily BMP. Will need to tolerate higher Cr to keep out of HF. Follow AM labs. Hypertension (3/18/2017): continue meds. Morbid obesity with BMI of 60.0-69.9, adult ([facility] [address] (3/18/2017)  Needs weight loss.          Samara Schmidt,   7/15/2017 0800

## 2017-07-15 NOTE — PROGRESS NOTES
Verbal bedside report given to oncoming RN, Damian Lyle. Patient's situation, background, assessment and recommendations provided. Opportunity for questions provided. Oncoming RN assumed care of patient.

## 2017-07-15 NOTE — PROGRESS NOTES
Bedside and Verbal shift change report given to ITZEL Jaffe (oncoming nurse) by self (offgoing nurse). Report included the following information SBAR, Recent Results and Med Rec Status. Bumex drip verified.

## 2017-07-16 LAB
ANION GAP BLD CALC-SCNC: 9 MMOL/L (ref 7–16)
BUN SERPL-MCNC: 65 MG/DL (ref 6–23)
CALCIUM SERPL-MCNC: 9.4 MG/DL (ref 8.3–10.4)
CHLORIDE SERPL-SCNC: 99 MMOL/L (ref 98–107)
CO2 SERPL-SCNC: 31 MMOL/L (ref 21–32)
CREAT SERPL-MCNC: 2.57 MG/DL (ref 0.8–1.5)
GLUCOSE BLD STRIP.AUTO-MCNC: 142 MG/DL (ref 65–100)
GLUCOSE BLD STRIP.AUTO-MCNC: 147 MG/DL (ref 65–100)
GLUCOSE BLD STRIP.AUTO-MCNC: 171 MG/DL (ref 65–100)
GLUCOSE BLD STRIP.AUTO-MCNC: 194 MG/DL (ref 65–100)
GLUCOSE SERPL-MCNC: 110 MG/DL (ref 65–100)
MAGNESIUM SERPL-MCNC: 2.4 MG/DL (ref 1.8–2.4)
POTASSIUM SERPL-SCNC: 3.9 MMOL/L (ref 3.5–5.1)
SODIUM SERPL-SCNC: 139 MMOL/L (ref 136–145)

## 2017-07-16 PROCEDURE — 80048 BASIC METABOLIC PNL TOTAL CA: CPT | Performed by: PHYSICIAN ASSISTANT

## 2017-07-16 PROCEDURE — 94640 AIRWAY INHALATION TREATMENT: CPT

## 2017-07-16 PROCEDURE — 94760 N-INVAS EAR/PLS OXIMETRY 1: CPT

## 2017-07-16 PROCEDURE — 36415 COLL VENOUS BLD VENIPUNCTURE: CPT | Performed by: PHYSICIAN ASSISTANT

## 2017-07-16 PROCEDURE — 82962 GLUCOSE BLOOD TEST: CPT

## 2017-07-16 PROCEDURE — 74011000250 HC RX REV CODE- 250: Performed by: INTERNAL MEDICINE

## 2017-07-16 PROCEDURE — 74011250637 HC RX REV CODE- 250/637: Performed by: INTERNAL MEDICINE

## 2017-07-16 PROCEDURE — 74011636637 HC RX REV CODE- 636/637: Performed by: PHYSICIAN ASSISTANT

## 2017-07-16 PROCEDURE — 65660000000 HC RM CCU STEPDOWN

## 2017-07-16 PROCEDURE — 74011000250 HC RX REV CODE- 250: Performed by: PHYSICIAN ASSISTANT

## 2017-07-16 PROCEDURE — 74011000258 HC RX REV CODE- 258: Performed by: INTERNAL MEDICINE

## 2017-07-16 PROCEDURE — 74011250637 HC RX REV CODE- 250/637: Performed by: PHYSICIAN ASSISTANT

## 2017-07-16 PROCEDURE — 83735 ASSAY OF MAGNESIUM: CPT | Performed by: PHYSICIAN ASSISTANT

## 2017-07-16 RX ADMIN — OXYCODONE HYDROCHLORIDE 10 MG: 5 TABLET ORAL at 09:44

## 2017-07-16 RX ADMIN — PRAVASTATIN SODIUM 80 MG: 80 TABLET ORAL at 22:18

## 2017-07-16 RX ADMIN — PAROXETINE HYDROCHLORIDE 20 MG: 20 TABLET, FILM COATED ORAL at 22:18

## 2017-07-16 RX ADMIN — BUMETANIDE 2 MG/HR: 0.25 INJECTION INTRAMUSCULAR; INTRAVENOUS at 01:27

## 2017-07-16 RX ADMIN — HYDRALAZINE HYDROCHLORIDE 25 MG: 25 TABLET, FILM COATED ORAL at 14:39

## 2017-07-16 RX ADMIN — ARFORMOTEROL TARTRATE 15 MCG: 15 SOLUTION RESPIRATORY (INHALATION) at 20:43

## 2017-07-16 RX ADMIN — ISOSORBIDE DINITRATE 20 MG: 10 TABLET ORAL at 22:18

## 2017-07-16 RX ADMIN — INSULIN LISPRO 3 UNITS: 100 INJECTION, SOLUTION INTRAVENOUS; SUBCUTANEOUS at 17:52

## 2017-07-16 RX ADMIN — ARFORMOTEROL TARTRATE 15 MCG: 15 SOLUTION RESPIRATORY (INHALATION) at 07:38

## 2017-07-16 RX ADMIN — BUMETANIDE 2 MG/HR: 0.25 INJECTION INTRAMUSCULAR; INTRAVENOUS at 22:18

## 2017-07-16 RX ADMIN — ALLOPURINOL 100 MG: 100 TABLET ORAL at 17:52

## 2017-07-16 RX ADMIN — OXYCODONE HYDROCHLORIDE 10 MG: 5 TABLET ORAL at 01:06

## 2017-07-16 RX ADMIN — BUMETANIDE 2 MG/HR: 0.25 INJECTION INTRAMUSCULAR; INTRAVENOUS at 09:42

## 2017-07-16 RX ADMIN — GABAPENTIN 300 MG: 300 CAPSULE ORAL at 22:18

## 2017-07-16 RX ADMIN — DOCUSATE SODIUM 100 MG: 100 CAPSULE, LIQUID FILLED ORAL at 09:44

## 2017-07-16 RX ADMIN — DOCUSATE SODIUM 100 MG: 100 CAPSULE, LIQUID FILLED ORAL at 17:52

## 2017-07-16 RX ADMIN — SENNOSIDES 17.2 MG: 8.6 TABLET, FILM COATED ORAL at 22:18

## 2017-07-16 RX ADMIN — ISOSORBIDE DINITRATE 20 MG: 10 TABLET ORAL at 09:45

## 2017-07-16 RX ADMIN — FAMOTIDINE 20 MG: 20 TABLET ORAL at 09:44

## 2017-07-16 RX ADMIN — HYDRALAZINE HYDROCHLORIDE 25 MG: 25 TABLET, FILM COATED ORAL at 06:30

## 2017-07-16 RX ADMIN — INSULIN GLARGINE 50 UNITS: 100 INJECTION, SOLUTION SUBCUTANEOUS at 22:35

## 2017-07-16 RX ADMIN — CARVEDILOL 12.5 MG: 12.5 TABLET, FILM COATED ORAL at 17:53

## 2017-07-16 RX ADMIN — AMIODARONE HYDROCHLORIDE 200 MG: 200 TABLET ORAL at 09:44

## 2017-07-16 RX ADMIN — ISOSORBIDE DINITRATE 20 MG: 10 TABLET ORAL at 17:52

## 2017-07-16 RX ADMIN — CARVEDILOL 12.5 MG: 12.5 TABLET, FILM COATED ORAL at 09:44

## 2017-07-16 RX ADMIN — HYDROXYZINE HYDROCHLORIDE 25 MG: 25 TABLET, FILM COATED ORAL at 09:44

## 2017-07-16 RX ADMIN — TAMSULOSIN HYDROCHLORIDE 0.4 MG: 0.4 CAPSULE ORAL at 09:44

## 2017-07-16 RX ADMIN — HYDRALAZINE HYDROCHLORIDE 25 MG: 25 TABLET, FILM COATED ORAL at 22:18

## 2017-07-16 RX ADMIN — HYDROXYZINE HYDROCHLORIDE 25 MG: 25 TABLET, FILM COATED ORAL at 17:52

## 2017-07-16 RX ADMIN — BUMETANIDE 2 MG/HR: 0.25 INJECTION INTRAMUSCULAR; INTRAVENOUS at 15:52

## 2017-07-16 RX ADMIN — OXYCODONE HYDROCHLORIDE 10 MG: 5 TABLET ORAL at 20:30

## 2017-07-16 RX ADMIN — GABAPENTIN 300 MG: 300 CAPSULE ORAL at 17:52

## 2017-07-16 RX ADMIN — COLCHICINE 0.6 MG: 0.6 CAPSULE ORAL at 09:44

## 2017-07-16 RX ADMIN — OXYCODONE HYDROCHLORIDE 10 MG: 5 TABLET ORAL at 14:39

## 2017-07-16 RX ADMIN — GABAPENTIN 300 MG: 300 CAPSULE ORAL at 09:44

## 2017-07-16 RX ADMIN — SPIRONOLACTONE 50 MG: 25 TABLET, FILM COATED ORAL at 09:44

## 2017-07-16 RX ADMIN — ALLOPURINOL 100 MG: 100 TABLET ORAL at 09:45

## 2017-07-16 NOTE — PROGRESS NOTES
Bedside and Verbal shift change report given to self (oncoming nurse) by Nina Irving RN (offgoing nurse). Report included the following information SBAR, Kardex, MAR and Recent Results. Bumex gtt verfied with off going RN. Assumed care of patient.

## 2017-07-16 NOTE — PROGRESS NOTES
Bedside and Written shift change report given to Kelsey Akers Rn (oncoming nurse) by self Yudy Jung nurse). Report included the following information SBAR, Kardex, MAR and Recent Results. Bumex gtt verified with oncoming RN.

## 2017-07-16 NOTE — PROGRESS NOTES
[facility] CARDIOLOGY PROGRESS NOTE           7/16/2017 10:44 AM    Admit Date: 7/8/2017      Subjective:   Still has SOB, diuresing well. Weak overall. No CP. Aches and pains everywhere this AM.  Sitting on site of bed though. No obvious distress while sitting. ROS:  Cardiovascular:  As noted above    Objective:      Vitals:    07/15/17 2128 07/15/17 2226 07/16/17 0055 07/16/17 0554   BP: 128/58  118/61 134/87   Pulse: 76  80 77   Resp: 20  22 20   Temp: 97.6 °F (36.4 °C)  97.3 °F (36.3 °C) 97.4 °F (36.3 °C)   SpO2: 93% 96% 92% 98%   Weight:    (!) 160.3 kg (353 lb 8 oz)   Height:           Physical Exam:  General-No Acute Distress, flat affect  Neck- supple, no JVD  CV- regular rate and rhythm no MRG  Lung- clear bilaterally with dec BS in the bases  Abd- soft, nontender, nondistended  Ext- 1-2+ edema bilaterally. Skin- warm and dry    Data Review:   Recent Labs      07/16/17   0546  07/15/17   0510   NA  139  139   K  3.9  4.0   MG  2.4   --    BUN  65*  62*   CREA  2.57*  2.51*   GLU  110*  135*       Assessment/Plan:     Principal Problem:    Acute on chronic systolic (congestive) heart failure (HCC) (10/18/2016) - Severe LV dysfunction. Poor prognosis discussed in the past and outlined in prior notes. Continue Bumex gtt for likely another 24 hours. High likelihood for re-admission. Increased hydralazine for afterload reduction. On coreg. No ACE-I/ARB with renal failure. Needs palliative care. Active Problems:    Obstructive sleep apnea (3/18/2017) - Bipap      Diabetes mellitus type II, uncontrolled ([facility] [address] (3/18/2017) - Medical management appropriate. CKD (chronic kidney disease) stage 3, GFR 30-59 ml/min (8/13/2014) - Daily BMP. Will need to tolerate higher Cr to keep out of HF. Follow AM labs. Hypertension (3/18/2017): continue meds. Meds adjusted as above. Morbid obesity with BMI of 60.0-69.9, adult ([facility] (3/18/2017)  Needs weight loss. Dispo:  Transition to oral diuretics likely tomorrow.          Gladis Bernardo DO  7/16/2017 0700

## 2017-07-16 NOTE — PROGRESS NOTES
Bedside and Verbal shift change report received from Nancy Collado [facility]

## 2017-07-17 LAB
ANION GAP BLD CALC-SCNC: 10 MMOL/L (ref 7–16)
BUN SERPL-MCNC: 69 MG/DL (ref 6–23)
CALCIUM SERPL-MCNC: 8.9 MG/DL (ref 8.3–10.4)
CHLORIDE SERPL-SCNC: 97 MMOL/L (ref 98–107)
CO2 SERPL-SCNC: 30 MMOL/L (ref 21–32)
CREAT SERPL-MCNC: 2.51 MG/DL (ref 0.8–1.5)
GLUCOSE BLD STRIP.AUTO-MCNC: 119 MG/DL (ref 65–100)
GLUCOSE BLD STRIP.AUTO-MCNC: 153 MG/DL (ref 65–100)
GLUCOSE BLD STRIP.AUTO-MCNC: 156 MG/DL (ref 65–100)
GLUCOSE BLD STRIP.AUTO-MCNC: 223 MG/DL (ref 65–100)
GLUCOSE SERPL-MCNC: 99 MG/DL (ref 65–100)
MAGNESIUM SERPL-MCNC: 2.2 MG/DL (ref 1.8–2.4)
POTASSIUM SERPL-SCNC: 4.1 MMOL/L (ref 3.5–5.1)
SODIUM SERPL-SCNC: 137 MMOL/L (ref 136–145)

## 2017-07-17 PROCEDURE — 94640 AIRWAY INHALATION TREATMENT: CPT

## 2017-07-17 PROCEDURE — 77010033678 HC OXYGEN DAILY

## 2017-07-17 PROCEDURE — 74011000250 HC RX REV CODE- 250: Performed by: INTERNAL MEDICINE

## 2017-07-17 PROCEDURE — 82962 GLUCOSE BLOOD TEST: CPT

## 2017-07-17 PROCEDURE — 80048 BASIC METABOLIC PNL TOTAL CA: CPT | Performed by: PHYSICIAN ASSISTANT

## 2017-07-17 PROCEDURE — 74011250637 HC RX REV CODE- 250/637: Performed by: INTERNAL MEDICINE

## 2017-07-17 PROCEDURE — 74011000258 HC RX REV CODE- 258: Performed by: INTERNAL MEDICINE

## 2017-07-17 PROCEDURE — 36415 COLL VENOUS BLD VENIPUNCTURE: CPT | Performed by: PHYSICIAN ASSISTANT

## 2017-07-17 PROCEDURE — 94760 N-INVAS EAR/PLS OXIMETRY 1: CPT

## 2017-07-17 PROCEDURE — 74011250637 HC RX REV CODE- 250/637: Performed by: PHYSICIAN ASSISTANT

## 2017-07-17 PROCEDURE — 65660000000 HC RM CCU STEPDOWN

## 2017-07-17 PROCEDURE — 74011000250 HC RX REV CODE- 250: Performed by: PHYSICIAN ASSISTANT

## 2017-07-17 PROCEDURE — 83735 ASSAY OF MAGNESIUM: CPT | Performed by: PHYSICIAN ASSISTANT

## 2017-07-17 PROCEDURE — 74011636637 HC RX REV CODE- 636/637: Performed by: PHYSICIAN ASSISTANT

## 2017-07-17 RX ORDER — BUMETANIDE 1 MG/1
2 TABLET ORAL 2 TIMES DAILY
Status: DISCONTINUED | OUTPATIENT
Start: 2017-07-17 | End: 2017-07-18 | Stop reason: HOSPADM

## 2017-07-17 RX ADMIN — HYDROXYZINE HYDROCHLORIDE 25 MG: 25 TABLET, FILM COATED ORAL at 09:54

## 2017-07-17 RX ADMIN — INSULIN LISPRO 3 UNITS: 100 INJECTION, SOLUTION INTRAVENOUS; SUBCUTANEOUS at 22:30

## 2017-07-17 RX ADMIN — CARVEDILOL 12.5 MG: 12.5 TABLET, FILM COATED ORAL at 08:04

## 2017-07-17 RX ADMIN — HYDRALAZINE HYDROCHLORIDE 25 MG: 25 TABLET, FILM COATED ORAL at 06:14

## 2017-07-17 RX ADMIN — DOCUSATE SODIUM 100 MG: 100 CAPSULE, LIQUID FILLED ORAL at 09:54

## 2017-07-17 RX ADMIN — COLCHICINE 0.6 MG: 0.6 CAPSULE ORAL at 09:52

## 2017-07-17 RX ADMIN — BUMETANIDE 2 MG/HR: 0.25 INJECTION INTRAMUSCULAR; INTRAVENOUS at 04:11

## 2017-07-17 RX ADMIN — BUMETANIDE 2 MG: 1 TABLET ORAL at 18:18

## 2017-07-17 RX ADMIN — SENNOSIDES 17.2 MG: 8.6 TABLET, FILM COATED ORAL at 22:30

## 2017-07-17 RX ADMIN — ALLOPURINOL 100 MG: 100 TABLET ORAL at 17:43

## 2017-07-17 RX ADMIN — ISOSORBIDE DINITRATE 20 MG: 10 TABLET ORAL at 17:48

## 2017-07-17 RX ADMIN — TAMSULOSIN HYDROCHLORIDE 0.4 MG: 0.4 CAPSULE ORAL at 09:54

## 2017-07-17 RX ADMIN — ARFORMOTEROL TARTRATE 15 MCG: 15 SOLUTION RESPIRATORY (INHALATION) at 19:53

## 2017-07-17 RX ADMIN — FAMOTIDINE 20 MG: 20 TABLET ORAL at 09:53

## 2017-07-17 RX ADMIN — INSULIN LISPRO 3 UNITS: 100 INJECTION, SOLUTION INTRAVENOUS; SUBCUTANEOUS at 08:38

## 2017-07-17 RX ADMIN — ISOSORBIDE DINITRATE 20 MG: 10 TABLET ORAL at 22:41

## 2017-07-17 RX ADMIN — DOCUSATE SODIUM 100 MG: 100 CAPSULE, LIQUID FILLED ORAL at 17:45

## 2017-07-17 RX ADMIN — GABAPENTIN 300 MG: 300 CAPSULE ORAL at 17:48

## 2017-07-17 RX ADMIN — SPIRONOLACTONE 50 MG: 25 TABLET, FILM COATED ORAL at 09:53

## 2017-07-17 RX ADMIN — ALLOPURINOL 100 MG: 100 TABLET ORAL at 09:52

## 2017-07-17 RX ADMIN — GABAPENTIN 300 MG: 300 CAPSULE ORAL at 22:41

## 2017-07-17 RX ADMIN — INSULIN GLARGINE 30 UNITS: 100 INJECTION, SOLUTION SUBCUTANEOUS at 22:31

## 2017-07-17 RX ADMIN — PRAVASTATIN SODIUM 80 MG: 80 TABLET ORAL at 22:30

## 2017-07-17 RX ADMIN — GABAPENTIN 300 MG: 300 CAPSULE ORAL at 09:53

## 2017-07-17 RX ADMIN — HYDRALAZINE HYDROCHLORIDE 25 MG: 25 TABLET, FILM COATED ORAL at 14:47

## 2017-07-17 RX ADMIN — HYDRALAZINE HYDROCHLORIDE 25 MG: 25 TABLET, FILM COATED ORAL at 22:30

## 2017-07-17 RX ADMIN — AMIODARONE HYDROCHLORIDE 200 MG: 200 TABLET ORAL at 09:53

## 2017-07-17 RX ADMIN — CARVEDILOL 12.5 MG: 12.5 TABLET, FILM COATED ORAL at 17:49

## 2017-07-17 RX ADMIN — HYDROXYZINE HYDROCHLORIDE 25 MG: 25 TABLET, FILM COATED ORAL at 17:44

## 2017-07-17 RX ADMIN — PAROXETINE HYDROCHLORIDE 20 MG: 20 TABLET, FILM COATED ORAL at 22:30

## 2017-07-17 RX ADMIN — BUMETANIDE 2 MG: 1 TABLET ORAL at 14:47

## 2017-07-17 RX ADMIN — OXYCODONE HYDROCHLORIDE 10 MG: 5 TABLET ORAL at 18:18

## 2017-07-17 RX ADMIN — OXYCODONE HYDROCHLORIDE 10 MG: 5 TABLET ORAL at 10:00

## 2017-07-17 RX ADMIN — INSULIN LISPRO 3 UNITS: 100 INJECTION, SOLUTION INTRAVENOUS; SUBCUTANEOUS at 17:49

## 2017-07-17 RX ADMIN — ISOSORBIDE DINITRATE 20 MG: 10 TABLET ORAL at 09:51

## 2017-07-17 NOTE — PROGRESS NOTES
Problem: Falls - Risk of  Goal: *Absence of falls  Outcome: Progressing Towards Goal  Pt progressing towards goal. No falls since admission. Bed low and locked. Call light within reach. Side rails x 2. Gripper socks applied. Personal belongings within reach. Pt verbalizes understanding to call for assistance. Goal: *Knowledge of fall prevention  Outcome: Progressing Towards Goal  Patient uses call light appropriately.

## 2017-07-17 NOTE — PROGRESS NOTES
Bedside and Verbal shift change report given to Jeet Bazan RN (oncoming nurse) by self Krzysztof Esters nurse). Report included the following information SBAR, Kardex, MAR and Recent Results.

## 2017-07-17 NOTE — PROGRESS NOTES
Bedside and Verbal shift change report received from [facility] .  Report given with SBAR, Kardex, MAR and Recent Results.

## 2017-07-17 NOTE — PROGRESS NOTES
Pt refuses to wear CPAP. Pt states he just got in the bed and will put the CPAP on when he's ready. RT encouraged pt to call when ready for CPAP.

## 2017-07-17 NOTE — PROGRESS NOTES
Bedside and Verbal shift change report given to self (oncoming nurse) by Zaira Omer  (offgoing nurse). Report included the following information SBAR, Kardex, MAR and Recent Results.

## 2017-07-17 NOTE — PROGRESS NOTES
[facility] CARDIOLOGY PROGRESS NOTE           7/17/2017 8:58 AM    Admit Date: 7/8/2017         Subjective: Patient states he feels better. He complains of some back pain from his sciatica. He states dyspnea is much improved. ROS:  Cardiovascular:  As noted above    Objective:      Vitals:    07/17/17 0040 07/17/17 0545 07/17/17 0804 07/17/17 0817   BP: (!) 143/91 123/68 143/73    Pulse: 75 76     Resp: 16 16     Temp: 97 °F (36.1 °C) 96.8 °F (36 °C)     SpO2: 99% 99%  99%   Weight:  (!) 159.8 kg (352 lb 3.2 oz)     Height:           On telemetry: sinus rhythm       Physical Exam:  General: Well Developed, Obese, No Acute Distress, Alert & Oriented x 3, Appropriate mood  Neck: supple, no JVD  Heart: S1S2 with RRR  Lungs: Clear throughout auscultation bilaterally  Abd: soft, nontender, nondistended, with good bowel sounds  Ext: mild edema bilaterally  Skin: warm and dry      Data Review:   Recent Labs      07/17/17   0450  07/16/17   0546   NA  137  139   K  4.1  3.9   MG  2.2  2.4   BUN  69*  65*   CREA  2.51*  2.57*   GLU  99  110*         Assessment/Plan:     Principal Problem:    Acute on chronic systolic (congestive) heart failure (HCC) (10/18/2016) - Severe LV dysfunction. Poor prognosis discussed in the past and outlined in prior notes. He continues to refuse hospice. Will transition to oral meds today. He has high likelihood for re-admission as he is non-compliant with fluid restriction at home, continue coreg, hydralazine  No ACE-I/ARB with renal failure. Possible discharge tomorrow if remains stable on oral diuretics.     Active Problems:    Obstructive sleep apnea (3/18/2017) - Cpap       Diabetes mellitus type II, uncontrolled ([facility] [address] (3/18/2017) - Medical management appropriate.        CKD (chronic kidney disease) stage 3, GFR 30-59 ml/min (8/13/2014) - Daily BMP. Will need to tolerate higher Cr to keep out of HF.   Follow AM labs.        Hypertension (3/18/2017): BP stable on current regimen.         Morbid obesity with BMI of 60.0-69.9, adult ([facility] [address] (3/18/2017)  Needs weight loss.        Mallory Carbajal PA-C  7/17/2017 8:58 AM

## 2017-07-17 NOTE — PROGRESS NOTES
Bedside shift change report given to Patience Rome (oncoming nurse) by Christophe Lima (offgoing nurse). Report included the following information SBAR.

## 2017-07-18 VITALS
BODY MASS INDEX: 50.62 KG/M2 | HEIGHT: 66 IN | OXYGEN SATURATION: 100 % | TEMPERATURE: 97.4 F | WEIGHT: 315 LBS | HEART RATE: 72 BPM | RESPIRATION RATE: 16 BRPM | SYSTOLIC BLOOD PRESSURE: 132 MMHG | DIASTOLIC BLOOD PRESSURE: 83 MMHG

## 2017-07-18 LAB
ANION GAP BLD CALC-SCNC: 9 MMOL/L (ref 7–16)
BUN SERPL-MCNC: 70 MG/DL (ref 6–23)
CALCIUM SERPL-MCNC: 9.4 MG/DL (ref 8.3–10.4)
CHLORIDE SERPL-SCNC: 98 MMOL/L (ref 98–107)
CO2 SERPL-SCNC: 31 MMOL/L (ref 21–32)
CREAT SERPL-MCNC: 2.64 MG/DL (ref 0.8–1.5)
GLUCOSE BLD STRIP.AUTO-MCNC: 124 MG/DL (ref 65–100)
GLUCOSE BLD STRIP.AUTO-MCNC: 127 MG/DL (ref 65–100)
GLUCOSE SERPL-MCNC: 96 MG/DL (ref 65–100)
MAGNESIUM SERPL-MCNC: 2.4 MG/DL (ref 1.8–2.4)
POTASSIUM SERPL-SCNC: 3.8 MMOL/L (ref 3.5–5.1)
SODIUM SERPL-SCNC: 138 MMOL/L (ref 136–145)

## 2017-07-18 PROCEDURE — 77010033678 HC OXYGEN DAILY

## 2017-07-18 PROCEDURE — 74011250637 HC RX REV CODE- 250/637: Performed by: INTERNAL MEDICINE

## 2017-07-18 PROCEDURE — 94640 AIRWAY INHALATION TREATMENT: CPT

## 2017-07-18 PROCEDURE — 80048 BASIC METABOLIC PNL TOTAL CA: CPT | Performed by: PHYSICIAN ASSISTANT

## 2017-07-18 PROCEDURE — 74011250637 HC RX REV CODE- 250/637: Performed by: PHYSICIAN ASSISTANT

## 2017-07-18 PROCEDURE — 94760 N-INVAS EAR/PLS OXIMETRY 1: CPT

## 2017-07-18 PROCEDURE — 74011000250 HC RX REV CODE- 250: Performed by: PHYSICIAN ASSISTANT

## 2017-07-18 PROCEDURE — 94660 CPAP INITIATION&MGMT: CPT

## 2017-07-18 PROCEDURE — 83735 ASSAY OF MAGNESIUM: CPT | Performed by: PHYSICIAN ASSISTANT

## 2017-07-18 PROCEDURE — 36415 COLL VENOUS BLD VENIPUNCTURE: CPT | Performed by: PHYSICIAN ASSISTANT

## 2017-07-18 PROCEDURE — 82962 GLUCOSE BLOOD TEST: CPT

## 2017-07-18 RX ORDER — BUMETANIDE 2 MG/1
2 TABLET ORAL 2 TIMES DAILY
Qty: 60 TAB | Refills: 1 | Status: SHIPPED | OUTPATIENT
Start: 2017-07-18 | End: 2017-09-19 | Stop reason: SDUPTHER

## 2017-07-18 RX ORDER — HYDRALAZINE HYDROCHLORIDE 25 MG/1
25 TABLET, FILM COATED ORAL 3 TIMES DAILY
Qty: 90 TAB | Refills: 3 | Status: SHIPPED | OUTPATIENT
Start: 2017-07-18 | End: 2018-04-20 | Stop reason: SDUPTHER

## 2017-07-18 RX ADMIN — HYDRALAZINE HYDROCHLORIDE 25 MG: 25 TABLET, FILM COATED ORAL at 06:34

## 2017-07-18 RX ADMIN — CARVEDILOL 12.5 MG: 12.5 TABLET, FILM COATED ORAL at 09:04

## 2017-07-18 RX ADMIN — HYDROXYZINE HYDROCHLORIDE 25 MG: 25 TABLET, FILM COATED ORAL at 09:04

## 2017-07-18 RX ADMIN — BUMETANIDE 2 MG: 1 TABLET ORAL at 09:04

## 2017-07-18 RX ADMIN — ARFORMOTEROL TARTRATE 15 MCG: 15 SOLUTION RESPIRATORY (INHALATION) at 08:17

## 2017-07-18 RX ADMIN — COLCHICINE 0.6 MG: 0.6 CAPSULE ORAL at 09:04

## 2017-07-18 RX ADMIN — TAMSULOSIN HYDROCHLORIDE 0.4 MG: 0.4 CAPSULE ORAL at 09:04

## 2017-07-18 RX ADMIN — SPIRONOLACTONE 50 MG: 25 TABLET, FILM COATED ORAL at 09:04

## 2017-07-18 RX ADMIN — ISOSORBIDE DINITRATE 20 MG: 10 TABLET ORAL at 09:04

## 2017-07-18 RX ADMIN — GABAPENTIN 300 MG: 300 CAPSULE ORAL at 09:04

## 2017-07-18 RX ADMIN — ALLOPURINOL 100 MG: 100 TABLET ORAL at 09:04

## 2017-07-18 RX ADMIN — FAMOTIDINE 20 MG: 20 TABLET ORAL at 09:04

## 2017-07-18 RX ADMIN — DOCUSATE SODIUM 100 MG: 100 CAPSULE, LIQUID FILLED ORAL at 09:04

## 2017-07-18 RX ADMIN — AMIODARONE HYDROCHLORIDE 200 MG: 200 TABLET ORAL at 09:04

## 2017-07-18 NOTE — PROGRESS NOTES
Discharge instructions reviewed with patient and daughter. Prescriptions given for bumex and hydrolazine and med info sheets provided for all new medications. Opportunity for questions provided. Patient and daughter voiced understanding of all discharge instructions. Wheel chair called for discharge.

## 2017-07-18 NOTE — DISCHARGE INSTRUCTIONS
Avoiding Triggers With Heart Failure: Care Instructions  Your Care Instructions  Triggers are anything that make your heart failure flare up. A flare-up is also called \"sudden heart failure\" or \"acute heart failure. \" When you have a flare-up, fluid builds up in your lungs, and you have problems breathing. You might need to go to the hospital. By watching for changes in your condition and avoiding triggers, you can prevent heart failure flare-ups. Follow-up care is a key part of your treatment and safety. Be sure to make and go to all appointments, and call your doctor if you are having problems. It's also a good idea to know your test results and keep a list of the medicines you take. How can you care for yourself at home? Watch for changes in your weight and condition  · Weigh yourself without clothing at the same time each day. Record your weight. Call your doctor if you have sudden weight gain, such as more than 2 to 3 pounds in a day or 5 pounds in a week. (Your doctor may suggest a different range of weight gain.) A sudden weight gain may mean that your heart failure is getting worse. · Keep a daily record of your symptoms. Write down any changes in how you feel, such as new shortness of breath, cough, or problems eating. Also record if your ankles are more swollen than usual and if you feel more tired than usual. Note anything that you ate or did that could have triggered these changes. Limit sodium  Sodium causes your body to hold on to extra water. This may cause your heart failure symptoms to get worse. People get most of their sodium from processed foods. Fast food and restaurant meals also tend to be very high in sodium. · Your doctor may suggest that you limit sodium to 2,000 milligrams (mg) a day or less. That is less than 1 teaspoon of salt a day, including all the salt you eat in cooking or in packaged foods. · Read food labels on cans and food packages.  They tell you how much sodium you get in one serving. Check the serving size. If you eat more than one serving, you are getting more sodium. · Be aware that sodium can come in forms other than salt, including monosodium glutamate (MSG), sodium citrate, and sodium bicarbonate (baking soda). MSG is often added to Asian food. You can sometimes ask for food without MSG or salt. · Slowly reducing salt will help you adjust to the taste. Take the salt shaker off the table. · Flavor your food with garlic, lemon juice, onion, vinegar, herbs, and spices instead of salt. Do not use soy sauce, steak sauce, onion salt, garlic salt, mustard, or ketchup on your food, unless it is labeled \"low-sodium\" or \"low-salt. \"  · Make your own salad dressings, sauces, and ketchup without adding salt. · Use fresh or frozen ingredients, instead of canned ones, whenever you can. Choose low-sodium canned goods. · Eat less processed food and food from restaurants, including fast food. Exercise as directed  Moderate, regular exercise is very good for your heart. It improves your blood flow and helps control your weight. But too much exercise can stress your heart and cause a heart failure flare-up. · Check with your doctor before you start an exercise program.  · Walking is an easy way to get exercise. Start out slowly. Gradually increase the length and pace of your walk. Swimming, riding a bike, and using a treadmill are also good forms of exercise. · When you exercise, watch for signs that your heart is working too hard. You are pushing yourself too hard if you cannot talk while you are exercising. If you become short of breath or dizzy or have chest pain, stop, sit down, and rest.  · Do not exercise when you do not feel well. Take medicines correctly  · Take your medicines exactly as prescribed. Call your doctor if you think you are having a problem with your medicine. · Make a list of all the medicines you take.  Include those prescribed to you by other doctors and any over-the-counter medicines, vitamins, or supplements you take. Take this list with you when you go to any doctor. · Take your medicines at the same time every day. It may help you to post a list of all the medicines you take every day and what time of day you take them. · Make taking your medicine as simple as you can. Plan times to take your medicines when you are doing other things, such as eating a meal or getting ready for bed. This will make it easier to remember to take your medicines. · Get organized. Use helpful tools, such as daily or weekly pill containers. When should you call for help? Call 911 if you have symptoms of sudden heart failure such as:  · You have severe trouble breathing. · You cough up pink, foamy mucus. · You have a new irregular or rapid heartbeat. Call your doctor now or seek immediate medical care if:  · You have new or increased shortness of breath. · You are dizzy or lightheaded, or you feel like you may faint. · You have sudden weight gain, such as more than 2 to 3 pounds in a day or 5 pounds in a week. (Your doctor may suggest a different range of weight gain.)  · You have increased swelling in your legs, ankles, or feet. · You are suddenly so tired or weak that you cannot do your usual activities. Watch closely for changes in your health, and be sure to contact your doctor if you develop new symptoms. Where can you learn more? Go to http://armando-nirav.info/. Enter Z942 in the search box to learn more about \"Avoiding Triggers With Heart Failure: Care Instructions. \"  Current as of: February 23, 2017  Content Version: 11.3  © 1527-9040 [employer]. Care instructions adapted under license by [employer] (which disclaims liability or warranty for this information).  If you have questions about a medical condition or this instruction, always ask your healthcare professional. Marcel Rojas disclaims any warranty or liability for your use of this information. Limiting Sodium and Fluids With Heart Failure: Care Instructions  Your Care Instructions  Sodium causes your body to hold on to extra water. This may cause your heart failure symptoms to get worse. Limiting sodium may help you feel better and lower your risk of having to go to the hospital.  People get most of their sodium from processed foods. Fast food and restaurant meals also tend to be very high in sodium. Your doctor may suggest that you limit sodium to 2,000 milligrams (mg) a day or less. That is less than 1 teaspoon of salt a day, including all the salt you eat in cooked or packaged foods. Usually, you have to limit the amount of liquids you drink only if your heart failure is severe. Limiting sodium alone often is enough to help your body get rid of extra fluids. However, your doctor may tell you to limit your fluid intake to a set amount each day. Follow-up care is a key part of your treatment and safety. Be sure to make and go to all appointments, and call your doctor if you are having problems. It's also a good idea to know your test results and keep a list of the medicines you take. How can you care for yourself at home? Read food labels  · Read food labels on cans and food packages. The labels tell you how much sodium is in each serving. Make sure that you look at the serving size. If you eat more than the serving size, you have eaten more sodium than is listed for one serving. · Food labels also tell you the Percent Daily Value. If the Percent Daily Value says 50%, it means that you will get at least 50% of all the sodium you need for the entire day in one serving. Choose products with low Percent Daily Values for sodium. · Be aware that sodium can come in forms other than salt, including monosodium glutamate (MSG), sodium citrate, and sodium bicarbonate (baking soda). MSG is often added to Asian food.  You can sometimes ask for food without MSG or salt.  Buy low-sodium foods  · Buy foods that are labeled \"unsalted\" (no salt added), \"sodium-free\" (less than 5 mg of sodium per serving), or \"low-sodium\" (less than 140 mg of sodium per serving). A food labeled \"light sodium\" has less than half of the full-sodium version of that food. Foods labeled \"reduced-sodium\" may still have too much sodium. · Buy fresh vegetables or plain, frozen vegetables. Buy low-sodium versions of canned vegetables, soups, and other canned goods. Prepare low-sodium meals  · Use less salt each day when cooking. Reducing salt in this way will help you adjust to the taste. Do not add salt after cooking. Take the salt shaker off the table. · Flavor your food with garlic, lemon juice, onion, vinegar, herbs, and spices instead of salt. Do not use soy sauce, steak sauce, onion salt, garlic salt, mustard, or ketchup on your food. · Make your own salad dressings, sauces, and ketchup without adding salt. · Use less salt (or none) when recipes call for it. You can often use half the salt a recipe calls for without losing flavor. Other dishes like rice, pasta, and grains do not need added salt. · Rinse canned vegetables. This removes some--but not all--of the salt. · Avoid water that has a naturally high sodium content or that has been treated with water softeners, which add sodium. Call your local water company to find out the sodium content of your water supply. If you buy bottled water, read the label and choose a sodium-free brand. Avoid high-sodium foods, such as:  · Smoked, cured, salted, and canned meat, fish, and poultry. · Ham, fuller, hot dogs, and luncheon meats. · Regular, hard, and processed cheese and regular peanut butter. · Crackers with salted tops. · Frozen prepared meals. · Canned and dried soups, broths, and bouillon, unless labeled sodium-free or low-sodium. · Canned vegetables, unless labeled sodium-free or low-sodium.   · Salted snack foods such as chips and pretzels. · [place] fries, pizza, tacos, and other fast foods. · Pickles, olives, ketchup, and other condiments, especially soy sauce, unless labeled sodium-free or low-sodium. If you cannot cook for yourself  · Have family members or friends help you, or have someone cook low-sodium meals. · Check with your local senior nutrition program to find out where meals are served and whether they offer a low-sodium option. You can often find these programs through your local health department or hospital.  · Have meals delivered to your home. Most [facility] have a Meals on [employer] [place]. These programs provide one hot meal a day for older adults, delivered to their homes. Ask whether these meals are low-sodium. Let them know that you are on a low-sodium diet. Limiting fluid intake  · Find a method that works for you. You might simply write down how much you drink every time you do. Some people keep a container filled with the amount of fluid allowed for that day. If they drink from a source other than the container, then they pour out that amount. · Measure your regular drinking glasses to find out how much fluid each one holds. Once you know this, you will not have to measure every time. · Besides water, milk, juices, and other drinks, some foods have a lot of fluid. Count any foods that will melt (such as ice cream or gelatin dessert) or liquid foods (such as soup) as part of your fluid intake for the day. Where can you learn more? Go to http://armando-nirav.info/. Enter A166 in the search box to learn more about \"Limiting Sodium and Fluids With Heart Failure: Care Instructions. \"  Current as of: November 15, 2016  Content Version: 11.3  © 8215-9111 [employer]. Care instructions adapted under license by [employer] (which disclaims liability or warranty for this information).  If you have questions about a medical condition or this instruction, always ask your healthcare jelena. [address] any warranty or liability for your use of this information. DISCHARGE SUMMARY from Nurse    The following personal items are in your possession at time of discharge:    Dental Appliances: None  Visual Aid: Glasses, With patient           Clothing: Pants, Shirt, Undergarments, With patient  Other Valuables: Other (comment) (Oxygen tank)             PATIENT INSTRUCTIONS:    After general anesthesia or intravenous sedation, for 24 hours or while taking prescription Narcotics:  · Limit your activities  · Do not drive and operate hazardous machinery  · Do not make important personal or business decisions  · Do  not drink alcoholic beverages  · If you have not urinated within 8 hours after discharge, please contact your surgeon on call. Report the following to your surgeon:  · Excessive pain, swelling, redness or odor of or around the surgical area  · Temperature over 100.5  · Nausea and vomiting lasting longer than 4 hours or if unable to take medications  · Any signs of decreased circulation or nerve impairment to extremity: change in color, persistent  numbness, tingling, coldness or increase pain  · Any questions        What to do at Home:  Recommended activity: Activity as tolerated    If you experience any of the following symptoms increased shortness of breath and unrelieved chest pain, please follow up with [facility] [phone]. *  Please give a list of your current medications to your Primary Care Provider. *  Please update this list whenever your medications are discontinued, doses are      changed, or new medications (including over-the-counter products) are added. *  Please carry medication information at all times in case of emergency situations.           These are general instructions for a healthy lifestyle:    No smoking/ No tobacco products/ Avoid exposure to second hand smoke    Surgeon General's Warning:  Quitting smoking now greatly reduces serious risk to your health. Obesity, smoking, and sedentary lifestyle greatly increases your risk for illness    A healthy diet, regular physical exercise & weight monitoring are important for maintaining a healthy lifestyle    You may be retaining fluid if you have a history of heart failure or if you experience any of the following symptoms:  Weight gain of 3 pounds or more overnight or 5 pounds in a week, increased swelling in our hands or feet or shortness of breath while lying flat in bed. Please call your doctor as soon as you notice any of these symptoms; do not wait until your next office visit. Recognize signs and symptoms of STROKE:    F-face looks uneven    A-arms unable to move or move unevenly    S-speech slurred or non-existent    T-time-call 911 as soon as signs and symptoms begin-DO NOT go       Back to bed or wait to see if you get better-TIME IS BRAIN. Warning Signs of HEART ATTACK     Call 911 if you have these symptoms:   Chest discomfort. Most heart attacks involve discomfort in the center of the chest that lasts more than a few minutes, or that goes away and comes back. It can feel like uncomfortable pressure, squeezing, fullness, or pain.  Discomfort in other areas of the upper body. Symptoms can include pain or discomfort in one or both arms, the back, neck, jaw, or stomach.  Shortness of breath with or without chest discomfort.  Other signs may include breaking out in a cold sweat, nausea, or lightheadedness. Don't wait more than five minutes to call 911 - MINUTES MATTER! Fast action can save your life. Calling 911 is almost always the fastest way to get lifesaving treatment. Emergency Medical Services staff can begin treatment when they arrive -- up to an hour sooner than if someone gets to the hospital by car. The discharge information has been reviewed with the patient. The patient verbalized understanding.     Discharge medications reviewed with the patient and appropriate educational materials and side effects teaching were provided.

## 2017-07-18 NOTE — DISCHARGE SUMMARY
[facility] Cardiology Discharge Summary     Patient ID:  Zeinab Buchanan  [id number]  89 y.o.  1965    Admit date: 7/8/2017    Discharge date:  7/18/2017    Admitting Physician: Opal Pineda MD     Discharge Physician: FATEMEH Kerr/Dr. Graceann Jeans    Admission Diagnoses: Acute on chronic systolic (congestive) heart failure ([address]  CHF (congestive heart failure) [facility])    Discharge Diagnoses:   Patient Active Problem List    Diagnosis Date Noted    CHF (congestive heart failure) ([address] 07/09/2017    Depressive disorder     Asthma     Ulcer of leg, chronic ([address]     Acute on chronic systolic heart failure ([address] 06/20/2017    Acute on chronic respiratory failure with hypoxia and hypercapnia ([address] 03/21/2017    Obstructive sleep apnea 03/18/2017    Chronic back pain 03/18/2017    Dyslipidemia 03/18/2017    Diabetes mellitus type II, uncontrolled ([address] 03/18/2017    Noncompliance with medication regimen 03/18/2017    AICD (automatic cardioverter/defibrillator) present 03/18/2017    Congestive heart failure (CHF) ([address] 03/18/2017    Cardiomyopathy ([facility] 03/18/2017    Hypertension 03/18/2017    Obesity hypoventilation syndrome ([address] 03/18/2017    Morbid obesity with BMI of 60.0-69.9, adult ([address] 03/18/2017    Acute encephalopathy 03/18/2017    Nonsustained ventricular tachycardia ([address] 01/19/2017    NSVT (nonsustained ventricular tachycardia) ([facility] 01/19/2017    Constipation 01/08/2017    Hypersomnia 11/28/2016    Atrial flutter ([address] 10/20/2016    Acute on chronic systolic (congestive) heart failure ([address] 10/18/2016    Abdominal fluid collection 08/18/2014    CKD (chronic kidney disease) stage 3, GFR 30-59 ml/min 08/13/2014    Chronic pancreatitis ([address] 08/13/2014    Nonischemic dilated cardiomyopathy ([address] 07/02/2013       Cardiology Procedures this admission:  None  Consults: Adele Elizabeth [id number] Course: Patient presented to the emergency department of [facility] with complaints of progressive shortness of breath. He has known end stage systolic CHF. He was discharged on 6/28. He missed an office appt the day before presentation to the ER. He admitted to too much fluid intake including \"eating ice. \" He stated he had been trying to be careful but the medications make him thirsty. He has refused hospice/palliative care on numerous occasions. He cannot advise how much weight he has gained as he was not checking daily weights. He was tearful on exam regarding domestic issues within his family. He denied any chest pain. His BNP was elevated and he was volume overloaded on exam. He has an ICD in place and denied any ICD discharges. He was admitted and started on IV Bumex drip for diuresis. He was repeatedly educated about fluid restriction and not eating ice at home. He diuresed over 18 liters of fluid. He was transitioned to oral Bumex. He frequently refused to wear his CPAP at night. The morning of 7/18/2017 patient was up feeling well without any complaints of shortness of breath. LE edema was much improved. Patient's labs were stable with creatinine of 2.64. Patient was determined stable and ready for discharge. Patient was instructed on the importance of medication compliance, low sodium diet, 2 liter per day fluid restriction and daily weights. For maximized medical therapy of congestive heart failure, patient will continue use of BB, hydralazine and Isordil. He is intolerant of ACE-I/ARB due to renal function. He developed acute renal failure on Entresto. He is high risk for readmission given non-compliance with diet and fluid restriction. He continued to refuse palliative care or hospice this admission as well. The patient will have close transitional care follow up with [facility] Dr. Vernon Justice on July 25th at 9:00am THE [facility] AT [place]).  He states he will return to live with his son in [place] at discharge but states he will have transportation to his follow up appointment with Dr. Jeannine Carney. DISPOSITION: The patient is being discharged home in stable condition on a low saturated fat, low cholesterol and low salt diet. The patient is instructed to advance activities as tolerated to the limit of fatigue or shortness of breath. The patient is informed to monitor daily weights and maintain a 2 liter per day fluid restriction. The patient is instructed to call the office for any shortness of breath, weight gain, or increased peripheral edema.         Discharge Exam:   Visit Vitals    /62 (BP 1 Location: Right arm, BP Patient Position: At rest)    Pulse 72    Temp 97.4 °F (36.3 °C)    Resp 16    Ht 5' 6\" (1.676 m)    Wt 158.5 kg (349 lb 6.4 oz)    SpO2 100%    BMI 56.39 kg/m2      Physical Exam:  General: Well Developed, Obese, No Acute Distress, Alert & Oriented  Neck: supple, no JVD  Heart: S1S2 with RRR  Lungs: Clear throughout auscultation bilaterally without adventitious sounds  Abd: soft, nontender, nondistended, with good bowel sounds  Ext: warm, mild edema  Skin: warm and dry      Recent Results (from the past 24 hour(s))   GLUCOSE, POC    Collection Time: 07/17/17 12:01 PM   Result Value Ref Range    Glucose (POC) 119 (H) 65 - 100 mg/dL   GLUCOSE, POC    Collection Time: 07/17/17  5:32 PM   Result Value Ref Range    Glucose (POC) 153 (H) 65 - 100 mg/dL   GLUCOSE, POC    Collection Time: 07/17/17  9:27 PM   Result Value Ref Range    Glucose (POC) 223 (H) 65 - 100 mg/dL   GLUCOSE, POC    Collection Time: 07/18/17  6:17 AM   Result Value Ref Range    Glucose (POC) 127 (H) 65 - 641 mg/dL   METABOLIC PANEL, BASIC    Collection Time: 07/18/17  7:45 AM   Result Value Ref Range    Sodium 138 136 - 145 mmol/L    Potassium 3.8 3.5 - 5.1 mmol/L    Chloride 98 98 - 107 mmol/L    CO2 31 21 - 32 mmol/L    Anion gap 9 7 - 16 mmol/L    Glucose 96 65 - 100 mg/dL    BUN 70 (H) 6 - 23 MG/DL    Creatinine 2.64 (H) 0.8 - 1.5 MG/DL    GFR est AA 33 (L) >60 ml/min/1.73m2    GFR est non-AA 27 (L) >60 ml/min/1.73m2    Calcium 9.4 8.3 - 10.4 MG/DL   MAGNESIUM    Collection Time: 07/18/17  7:45 AM   Result Value Ref Range    Magnesium 2.4 1.8 - 2.4 mg/dL         Patient Instructions:   Current Discharge Medication List      CONTINUE these medications which have CHANGED    Details   hydrALAZINE (APRESOLINE) 25 mg tablet Take 1 Tab by mouth three (3) times daily. Qty: 90 Tab, Refills: 3      bumetanide (BUMEX) 2 mg tablet Take 1 Tab by mouth two (2) times a day. Qty: 60 Tab, Refills: 1         CONTINUE these medications which have NOT CHANGED    Details   ammonium lactate (LAC-HYDRIN) 12 % lotion Apply  to affected area as needed. rub in to affected area well      buffered aspirin (BUFFERIN) 325 mg tablet Take 325 mg by mouth daily. arformoterol (BROVANA) 15 mcg/2 mL nebu neb solution 15 mcg by Nebulization route two (2) times a day. cyclobenzaprine (FLEXERIL) 10 mg tablet Take 10 mg by mouth every eight (8) hours as needed for Muscle Spasm(s). insulin NPH/insulin regular (NOVOLIN 70/30) 100 unit/mL (70-30) injection by SubCUTAneous route. 40 units am, 35 units pm      PARoxetine (PAXIL) 20 mg tablet Take 20 mg by mouth nightly. albuterol (PROVENTIL HFA) 90 mcg/actuation inhaler Take 2 Puffs by inhalation. q 4-6 hrs prn wheezing      levalbuterol (XOPENEX) 1.25 mg/3 mL nebu 1.25 mg by Nebulization route. q 6-8 hrs prn wheezing and breathing problems      isosorbide dinitrate (ISORDIL) 20 mg tablet Take 1 Tab by mouth three (3) times daily. Qty: 90 Tab, Refills: 6      spironolactone (ALDACTONE) 50 mg tablet Take 1 Tab by mouth daily. Qty: 30 Tab, Refills: 6      carvedilol (COREG) 6.25 mg tablet Take 6.25 mg by mouth two (2) times daily (with meals).       oxyCODONE IR (ROXICODONE) 5 mg immediate release tablet Take 10 mg by mouth every six (6) hours as needed for Pain.      metOLazone (ZAROXOLYN) 2.5 mg tablet Take  by mouth every Monday, Wednesday, Friday. tamsulosin (FLOMAX) 0.4 mg capsule Take 0.4 mg by mouth daily. hydrOXYzine HCl (ATARAX) 25 mg tablet Take  by mouth two (2) times a day. ascorbic acid, vitamin C, (VITAMIN C) 500 mg tablet Take 500 mg by mouth daily. Indications: Pt. takes 2 tablets daily      sennosides (SENNA) 8.6 mg cap Take 17.2 mg by mouth nightly. amiodarone (CORDARONE) 200 mg tablet Take 1 Tab by mouth daily. Qty: 30 Tab, Refills: 6    Associated Diagnoses: NSVT (nonsustained ventricular tachycardia) ([facility])      docusate sodium (COLACE) 100 mg capsule Take 1 Cap by mouth two (2) times a day. Qty: 60 Cap, Refills: 0      gabapentin (NEURONTIN) 600 mg tablet Take 300 mg by mouth three (3) times daily. Indications: Pt. takes 1 to 2 capsules every 8 hours      insulin glargine (LANTUS) 100 unit/mL injection 50 Units by SubCUTAneous route nightly. Qty: 1 Vial, Refills: 0      ranitidine (ZANTAC) 150 mg tablet Take 150 mg by mouth nightly. pravastatin (PRAVACHOL) 80 mg tablet Take 1 Tab by mouth nightly. Qty: 30 Tab, Refills: 0      polyethylene glycol (MIRALAX) 17 gram packet Take 1 Packet by mouth daily as needed (constipation). Qty: 10 Packet, Refills: 5      cpap machine kit 10 cm qhs      OXYGEN-AIR DELIVERY SYSTEMS 2 lpm cont. allopurinol (ZYLOPRIM) 100 mg tablet Take 100 mg by mouth two (2) times a day. glucose blood VI test strips (ASCENSIA AUTODISC VI, ONE TOUCH ULTRA TEST VI) strip Test strips for 30 day supply  Qty: 120 strip, Refills: 0      colchicine 0.6 mg tablet Take 0.6 mg by mouth every morning. nitroglycerin (NITROSTAT) 0.4 mg SL tablet 1 Tab by SubLINGual route every five (5) minutes as needed for Chest Pain.   Qty: 4 Bottle, Refills: 6         STOP taking these medications       furosemide (LASIX) 20 mg tablet Comments:   Reason for Stopping:         torsemide (DEMADEX) 100 mg tablet Comments:   Reason for Stopping:                 Signed:  Raymond Nunez ANA MARÍA  7/18/2017  10:35 AM    I interviewed and examined the patient. He has continued sob but has attained reasonable volume control with aggressive diuresis. Edema is much reduced. He complains of nausea, mild vomiting. Will give antiemetic. Observe and plan discharge later today if stable.      Kaiden Hernandez MD

## 2017-07-18 NOTE — PROGRESS NOTES
Bedside and Verbal shift change report given to Paulina Zarate RN (oncoming nurse) by self (offgoing nurse). Report included the following information SBAR, Kardex, MAR and Recent Results.

## 2017-07-19 ENCOUNTER — PATIENT OUTREACH (OUTPATIENT)
Dept: CASE MANAGEMENT | Age: 52
End: 2017-07-19

## 2017-08-11 ENCOUNTER — PATIENT OUTREACH (OUTPATIENT)
Dept: CASE MANAGEMENT | Age: 52
End: 2017-08-11

## 2017-08-11 NOTE — PROGRESS NOTES
UTR pt fot JOSIANE #2. Robin Daily, LPN/ Care Coordinator  [address] [facility]  [address] [facility] [address], [address] / [place], [address] [facility]  [email or web address] note will not be viewable in [address]

## 2018-01-04 ENCOUNTER — APPOINTMENT (OUTPATIENT)
Dept: GENERAL RADIOLOGY | Age: 53
End: 2018-01-04
Attending: EMERGENCY MEDICINE
Payer: MEDICARE

## 2018-01-04 ENCOUNTER — HOSPITAL ENCOUNTER (EMERGENCY)
Age: 53
Discharge: HOME | End: 2018-01-04
Attending: EMERGENCY MEDICINE
Payer: MEDICARE

## 2018-01-04 VITALS
HEIGHT: 65 IN | OXYGEN SATURATION: 95 % | HEART RATE: 91 BPM | DIASTOLIC BLOOD PRESSURE: 89 MMHG | TEMPERATURE: 98.6 F | RESPIRATION RATE: 18 BRPM | BODY MASS INDEX: 52.48 KG/M2 | SYSTOLIC BLOOD PRESSURE: 132 MMHG | WEIGHT: 315 LBS

## 2018-01-04 DIAGNOSIS — M54.41 CHRONIC RIGHT-SIDED LOW BACK PAIN WITH RIGHT-SIDED SCIATICA: Primary | ICD-10-CM

## 2018-01-04 DIAGNOSIS — G89.29 CHRONIC RIGHT-SIDED LOW BACK PAIN WITH RIGHT-SIDED SCIATICA: Primary | ICD-10-CM

## 2018-01-04 PROCEDURE — 99283 EMERGENCY DEPT VISIT LOW MDM: CPT | Performed by: EMERGENCY MEDICINE

## 2018-01-04 PROCEDURE — 72100 X-RAY EXAM L-S SPINE 2/3 VWS: CPT

## 2018-01-04 RX ORDER — OXYCODONE HYDROCHLORIDE 5 MG/1
5 TABLET ORAL
Status: DISCONTINUED | OUTPATIENT
Start: 2018-01-04 | End: 2018-01-05 | Stop reason: HOSPADM

## 2018-01-04 RX ORDER — OXYCODONE HYDROCHLORIDE 5 MG/1
5 TABLET ORAL
Qty: 11 TAB | Refills: 0 | Status: SHIPPED | OUTPATIENT
Start: 2018-01-04 | End: 2019-01-01

## 2018-01-04 NOTE — ED TRIAGE NOTES
Pt states lower back pain going into his right leg. Pt has not taken his medication since December. Pt has hx of gout and sciatica.

## 2018-01-05 NOTE — ED PROVIDER NOTES
HPI Comments: 48 yo male with back pain. States pain consistent with his prior sciatica and states been off of his gabapentin and oxycodone for the past 3 months because he thought it had gotten better. No weakness or numbness of his extremities, no urinary or bowel retention or incontinence, no fevers or chills, no further complaints, specifically no swelling of his legs, no chest pain or SOB, no abdominal pain. Patient is a 47 y.o. male presenting with back pain. The history is provided by the patient. No  was used. Back Pain    Pertinent negatives include no chest pain, no fever, no headaches, no abdominal pain, no dysuria and no weakness. Past Medical History:   Diagnosis Date    Acquired claw toe of right foot     Acute encephalopathy 1/27/9753    Acute systolic heart failure ([address] May, 2009    Also had transient acute renal failure secondary to poor perfusion.     AICD (automatic cardioverter/defibrillator) present 10/21/2015    Asthma     Atypical chest pain 4/23/2010    Bronchitis     CAD (coronary artery disease)     Cardiomyopathy     Chest pain 10/21/2015    Chronic kidney disease     renal insufficiency    Chronic pain     back    Congestive heart failure (CHF) ([address] 10/21/2015    COPD     Depressive disorder     Diabetes ([address]     type 2 insulin reliant- BS average- 160's-180's    Diabetes mellitus type II, uncontrolled ([address] 7/2/2013    GERD (gastroesophageal reflux disease)     Gout     Heart failure ([address]     cardiomyopathy with ef 10-20%    Hypertension     Hyponatremia 12/20/2010    Ill-defined condition     gout, neuropathy, sciatica    Morbid obesity (HCC)     Nausea & vomiting 11/30/2015    Neuropathy     Obstructive sleep apnea 2/15/2010    Other unknown and unspecified cause of morbidity or mortality     Gout    Severe sepsis (HCC)     Ulcer of leg, chronic (HCC)     Unspecified sleep apnea     cpap       Past Surgical History: Procedure Laterality Date    CHEST SURGERY PROCEDURE UNLISTED      thorocentesis    HX HEART CATHETERIZATION  Sandy 10, 2008    Severe multivessel disease with severe LV dysfunction    HX PACEMAKER      may 2010. ICD is place. Family History:   Problem Relation Age of Onset    Heart Disease Father     Stroke Father     Coronary Artery Disease Father     Other Father      kidney failure    Diabetes Sister     Stroke Sister     Diabetes Sister     Coronary Artery Disease Mother     Heart Disease Other        Social History     Social History    Marital status:      Spouse name: N/A    Number of children: N/A    Years of education: N/A     Occupational History    Not on file. Social History Main Topics    Smoking status: Former Smoker     Packs/day: 0.25     Years: 1.00     Quit date: 7/12/1984    Smokeless tobacco: Never Used      Comment: pt states that he only tried smoking as a kid     Alcohol use No    Drug use: No    Sexual activity: Not on file     Other Topics Concern    Not on file     Social History Narrative         ALLERGIES: Dilaudid [hydromorphone]; Iodinated contrast- oral and iv dye; and Penicillins    Review of Systems   Constitutional: Negative for chills and fever. HENT: Negative for rhinorrhea and sore throat. Eyes: Negative for visual disturbance. Respiratory: Negative for cough and shortness of breath. Cardiovascular: Negative for chest pain and leg swelling. Gastrointestinal: Negative for abdominal pain, diarrhea, nausea and vomiting. Genitourinary: Negative for dysuria. Musculoskeletal: Positive for back pain. Negative for neck pain. Skin: Negative for rash. Neurological: Negative for weakness and headaches. Psychiatric/Behavioral: The patient is not nervous/anxious.         Vitals:    01/04/18 1837   BP: (!) 170/104   Pulse: 90   Resp: 18   Temp: 98.6 °F (37 °C)   SpO2: 94%   Weight: 147 kg (324 lb)   Height: 5' 5\" (1.651 m) Physical Exam   Constitutional: He is oriented to person, place, and time. He appears well-developed and well-nourished. HENT:   Head: Normocephalic. Right Ear: External ear normal.   Left Ear: External ear normal.   Eyes: Conjunctivae and EOM are normal. Pupils are equal, round, and reactive to light. Neck: Normal range of motion. Neck supple. No tracheal deviation present. Cardiovascular: Normal rate, regular rhythm, normal heart sounds and intact distal pulses. No murmur heard. Pulmonary/Chest: Effort normal and breath sounds normal. No respiratory distress. Abdominal: Soft. He exhibits no distension. There is no tenderness. There is no rebound. Musculoskeletal: Normal range of motion. He exhibits tenderness (to palpation of right paraspinal muscles without mid-line tenderness. ). Strength 5/5 in bilateral lower extremities to dorsiflexion of feet, plantar flexion of feet, extension of lower legs at knee and flexion of legs at hip. DP pulses 2+ bilaterally   Neurological: He is alert and oriented to person, place, and time. No cranial nerve deficit. Skin: No rash noted. Nursing note and vitals reviewed. MDM  Number of Diagnoses or Management Options     Amount and/or Complexity of Data Reviewed  Tests in the radiology section of CPT®: ordered and reviewed  Review and summarize past medical records: yes    Risk of Complications, Morbidity, and/or Mortality  Presenting problems: moderate  Diagnostic procedures: moderate  Management options: moderate    Patient Progress  Patient progress: stable    ED Course       Procedures     Xr Spine Lumb 2 Or 3 V    Result Date: 1/4/2018  Lumbar spine History: Low back pain which radiated to right leg. History of gout. No stated injury AP and lateral views of the lumbar spine were obtained. Comparison: None. Findings: There is a slight dextrocurvature to the lumbar spine.  The vertebral body height and alignment are otherwise well-maintained The disc space heights are well-preserved. Multilevel marginal osteophyte formation is present most notable at L5-S1. There is no bony destruction. Impression: 1. Mild degenerative changes. 2. Mild dextrocurvature. 48 yo male with back pain:    Discussed return for weakness of her arms or legs, numbness or tingling in her genital region, urinary or bowel incontinence or retention, fevers or chills, worsening pain or any further concerns. Patient in full agreement with plan.

## 2018-01-05 NOTE — DISCHARGE INSTRUCTIONS

## 2018-01-05 NOTE — ED NOTES
I have reviewed medications, follow up provider options, and discharge instructions with the patient and family member. The patient and family member verbalized understanding. Copy of discharge information given to family member upon discharge. Prescription(s) given to family member. Patient discharged in no distress. Patient instructed not to drive while under influence of narcotic pain medications. Patient taken to waiting area via wheelchair. No questions at this time.

## 2018-02-14 NOTE — PROGRESS NOTES
PT ORTHO - SPINE Discharge  Observation/Bedded OP   PT Plan of Care Note    Pt seen on 4C nursing unit.    Visit Number: 2  Insurance:  ANTHEM/LEONIE  Secondary Insurance:  N/A    Note sent for required physician co-signature: Yes          Is the patient currently receiving skilled home care services (RN or therapy)? No          If yes, has the home care agency been contacted to verify approval? No          POD #: 1    DIAGNOSIS: lumbar laminectomy                     THERAPY DIAGNOSIS: Weakness, Impaired gait    ASSESSMENT:    Emphasis of session included progression of functional mobility.  Patient up and ambulatory in hallway upon arrival.  Demonstrated independence with transfers, ambulation, and stairs and reports no concerns with mobility for discharge.  Patient up in room for discussion of exercise program and walking program for discharge with no questions regarding exercises.  The patient has met goals for physical therapy at this time and feels confident with mobility for discharge.  PT will sign off at this time.    Progress: Discontinue PT  PT Identified Barriers to Discharge: None anticipated     PLAN:   Continue skilled PT, including the following Treatment/Interventions: Functional transfer training;Strengthening;Endurance training;Equipment eval/education;Bed mobility;Gait training;Stairs retraining;Safety Education (02/13/18 1700)     Treatment Plan for Next Session: Progress transfers and gait training, Educate and review lumbar spine HEP. (packet in room)          Amount: 0-30 minutes  Frequency: 5 days/week  Duration: 3 days    Co-morbidities:   Patient Active Problem List   Diagnosis   • Thoracic or lumbosacral neuritis or radiculitis, unspecified   • S/P Left Total Hip Replacement Revision   • Mechanical loosening of prosthetic joint (CMS/HCC)   • Essential hypertension   • Asthma   • Ulcerative colitis (CMS/HCC)   • Seasonal allergies   • SCOLIOSIS  (SEE ALSO CURVATURE) ACQUIRED   • HERNIATED  Problem: Mobility Impaired (Adult and Pediatric)  Goal: *Acute Goals and Plan of Care (Insert Text)  LTG:  (1.)Mr. Pollard will move from supine to sit and sit to supine , scoot up and down and roll side to side with INDEPENDENT within 7 day(s) from flat surface without handrail. (2.)Mr. Pollard will transfer from bed to chair and chair to bed with INDEPENDENT using the least restrictive device within 7 day(s). (3.)Mr. Pollard will ambulate with INDEPENDENT for 150 feet with the least restrictive device within 7 day(s), O2 stats >90%. ___________________________________________________________________________________________      PHYSICAL THERAPY: Daily Note, Treatment Day: 5th and AM 2/2/2017  INPATIENT: Hospital Day: 15  Payor: CARE IMPROVEMENT PLUS / [place] / Product Type: [employer] Medicare /      NAME/AGE/GENDER: Sandro Maloney is a 45 y.o. male         PRIMARY DIAGNOSIS: NSVT (nonsustained ventricular tachycardia) ([facility] [address] Nonsustained ventricular tachycardia (HCC) Nonsustained ventricular tachycardia (HCC)        ICD-10: Treatment Diagnosis:       · Generalized Muscle Weakness (M62.81)  · Difficulty in walking, Not elsewhere classified (R26.2)   Precaution/Allergies:  Dilaudid [hydromorphone]; Iodinated contrast media - oral and iv dye; and Penicillins       ASSESSMENT:      Mr. Wayne Valente presents sitting in recliner with daughter at bedside, agreeable to therapy with nurse in room to check IV. He c/o scrotal swelling affecting his ability to ambulate; however, agreed to attempt with PT today. He stood with supervision and ambulated about 25 feet with bariatric RW. HR increased from 90's to 114 with gait. He sat  and performed seated there ex as seen below. Patients overall increased girth/obesity affects his ability to participate in mobility and seated exercises, but did demonstrate progress with gait today. Progress per POC.        This section established at most recent DISK LUMBOSACRAL-NO MYELOPATHY   • Esophageal reflux   • CAD (coronary artery disease)   • Allergic Rhinitis       SUBJECTIVE: Subjective: Patient supine in bed and agreeable to therapy session (02/14/18 0808)    OBJECTIVE:  Precautions:  Precautions  Lumbar Precautions: Yes (02/13/18 1700)  Precautions Compliance:  Good compliance  RN reported Artis Fall Scale Score: 60 (>45 is considered at risk of fall)  Pain:  Comfort/Function (Pain) SCORE with Activity: 4 (02/13/18 2126)  Vitals:  Vital Signs: asymptomatic (02/14/18 0808)  Activity Tolerance:  no limits in patient's ability to participate in session  Exercise:  Exercise Comments: Discussed performance of abdominal bracing, ankle pumps, quad sets, heel slides, bent knee fall outs, seated knee extensions, shoulder PNF patterns and scapular squeezes with no questions or concerns.  Also discussed Phase two standing exercises to be started following post-op appointment with Dr. Burr including standing marching, bent knee kickbacks, straight knee kick backs, hip abduction, mini squats and heel raises with no questions or concerns at this time (02/14/18 0808)    Functional Mobility (as of date/time noted)  Bed Mobility:   Rolling to the Right: Independent (02/13/18 1700)  Rolling to the Left: Independent (02/13/18 1700)  Supine to Sit: Independent (02/13/18 1700)  Bed Mobility Comments: Not assessed, however patient reporting no concerns with bed mobility for discharge (02/14/18 0808)    Transfer:   Sit to Stand: Independent (02/14/18 0808)  Stand to Sit: Independent (02/14/18 0808)  Stand Pivot Transfers: Independent (02/14/18 0808)  Assistive Device/: 1 Person (02/14/18 0808)  Transfer Comments 1: Independent transfers, no losses of balance or instability (02/14/18 0808)  Transfer Comments 2: . (02/14/18 0808)    Ambulation:  Gait Assistance: Independent (02/14/18 0808)  Assistive Device/: 1 Person (02/14/18 0808)  Ambulation Distance  assessment   PROBLEM LIST (Impairments causing functional limitations):  1. Decreased Strength  2. Decreased ADL/Functional Activities  3. Decreased Transfer Abilities  4. Decreased Ambulation Ability/Technique  5. Decreased Balance  6. Decreased Activity Tolerance  7. Increased Fatigue  8. Increased Shortness of Breath  9. Edema/Girth    INTERVENTIONS PLANNED: (Benefits and precautions of physical therapy have been discussed with the patient.)  1. Balance Exercise  2. Bed Mobility  3. Family Education  4. Gait Training  5. Home Exercise Program (HEP)  6. Therapeutic Activites  7. Therapeutic Exercise/Strengthening  8. Transfer Training  9. Group Therapy      TREATMENT PLAN: Frequency/Duration: 3 times a week for duration of hospital stay  Rehabilitation Potential For Stated Goals: GOOD      RECOMMENDED REHABILITATION/EQUIPMENT: (at time of discharge pending progress): Continue Skilled Therapy and HHPT or rehab pending progress. Ryan Patel HISTORY:   History of Present Injury/Illness (Reason for Referral):  Stacy Aguilar is a 45 y.o. male with known NICM with documented EF 10 % with [employer] single chamber ICD in place. He was just discharged on 1/13 with initial complaint of constipation and volume overload. He developed worsening renal failure necessitating hold his diuretics temporarily. He was discharged home on addition of demadex 100 mg daily and miralax for constipation. Really since mid December he has not felt well with worsening orthopnea, increasing abdominal girth, increased wt and lower ext edema. Today while driving in car he felt presyncopal and was able to pull to side of rode. He did not loose consciousness. He had repeat episode of presyncope prior to coming to ER. While in ER he was noted to have nonsustained ventricular tachycardia. He is not on antiarrhythmia agent.  Interrogation of his device revealed normal operation but his VT zone is set at 158 bpm with noted VT in ER at (Feet): 500 Feet (02/14/18 0808)  Gait Comments 1: 500 feet of ambulation independent with no safety concerns.  Good balance and stability. Leg length discrepancy causing circumduction throughout (02/14/18 0808)  Gait Comments 2: . (02/14/18 0808)    Stair Negotiation:  Number of Stairs: 5 (02/14/18 0808)  Stair Management Assistance: Not applicable (02/14/18 0808)  Stair Management Technique: No rails (02/14/18 0808)  Curbs: Independent (02/14/18 0808)  Stairs Mobility Comments: 5 stairs independent, good balance and stability.  Did not require railings.  (02/14/18 0808)    See PT flowsheet for full details regarding the PT therapy provided.    EDUCATION:   On this date, the patient was educated on and provided written materials on Post-op lumbar spine packet, Bed Positioning After Back Surgery , Controlling Your Pain , Lower Extremity Exercises--Standing, Lumbar Spine Surgery Exercises - Acute Post-Op, Nutrition Tips for the Surgical Patient , Post Surgical Spinal Precautions , Preparing Your Home Before Your Surgery , Tips for Good Posture, Tips for Proper Body Mechanics and Walking Guidelines After Spine Surgery .  The response to education was: Verbalizes understanding and Demonstrates understanding    GOALS:  Short Term Goals to Be Reviewed On: 02/16/18 (02/13/18 1700)  Short Term Goals = Discharge Goals: Yes (02/13/18 1700)  Bed Mobility Discharge Goal: Patient will be independent with all bed mobility (02/13/18 1700)  Bed Mobility Discharge Goal Progress: Outcome met, complete goal (02/14/18 0808)  Transfer Discharge Goal: Patient will be indepenent with all transfers (02/13/18 1700)  Transfer Discharge Goal Progress: Outcome met, complete goal (02/14/18 0808)  Ambulation Discharge Goal: Patient will be independent with ambulation (02/13/18 1700)  Ambulation Discharge Goal Progress: Outcome met, complete goal (02/14/18 0808)  Therapeutic Exercise Discharge Goal: Patient will be independent with lumbar spine  approximately 145 bpm.   Past Medical History/Comorbidities:   Mr. Klarissa Cheng  has a past medical history of Acute systolic heart failure [facility]) (May, 2009); AICD (automatic cardioverter/defibrillator) present (10/21/2015); Atypical chest pain (4/23/2010); Bronchitis; CAD (coronary artery disease); Cardiomyopathy; Chest pain (10/21/2015); Chronic kidney disease; Chronic pain; Congestive heart failure (CHF) ([address] (10/21/2015); COPD; Diabetes ([address] Diabetes mellitus type II, uncontrolled ([address] (7/2/2013); GERD (gastroesophageal reflux disease); Gout; Heart failure ([address] Hypertension; Hyponatremia (12/20/2010); Ill-defined condition; Morbid obesity ([address] Nausea & vomiting (11/30/2015); Neuropathy; Obstructive sleep apnea (2/15/2010); Other unknown and unspecified cause of morbidity or mortality; Severe sepsis ([address] and Unspecified sleep apnea. He also has no past medical history of Adverse effect of anesthesia; Aneurysm ([address] Coagulation disorder ([address] DEMENTIA; Difficult intubation; Malignant hyperthermia due to anesthesia; Neurological disorder; Other ill-defined conditions(799.89); Pseudocholinesterase deficiency; or Psychiatric disorder. Mr. Klarissa Cheng  has a past surgical history that includes pacemaker; heart catheterization ([place] 10, 2008); and chest surgery procedure unlisted. Social History/Living Environment:   Home Environment:  (daughters house)  # Steps to Enter: 0  One/Two Story Residence: One story  Living Alone: No  Support Systems: Family member(s), Friends \ neighbors  Patient Expects to be Discharged to[de-identified] Private residence  Current DME Used/Available at Home: None  Tub or Shower Type: Tub/Shower combination  Prior Level of Function/Work/Activity:  Lives with daughter; typically independent with ambulation for short distances; drives. Personal Factors:          Sex:  male        Age:  45 y.o.    Number of Personal Factors/Comorbidities that affect the Plan of Care:  CHF, DM, obesity, edema 3+: HIGH COMPLEXITY   EXAMINATION:   Most Recent Physical Functioning:   Gross Assessment:                  Posture:     Balance:  Standing: Impaired  Standing - Static: Good  Standing - Dynamic : Fair Bed Mobility:     Wheelchair Mobility:     Transfers:  Sit to Stand: Supervision  Stand to Sit: Supervision  Gait:     Base of Support: Widened;Center of gravity altered  Speed/Mira: Slow  Step Length: Right shortened;Left shortened  Gait Abnormalities: Decreased step clearance  Distance (ft): 25 Feet (ft)  Assistive Device: Walker, rolling (bariatric RW)  Ambulation - Level of Assistance: Supervision       Body Structures Involved:  1. Lungs  2. Joints  3. Muscles Body Functions Affected:  1. Respiratory  2. Movement Related Activities and Participation Affected:  1. General Tasks and Demands  2. Mobility  3. Self Care  4. Community, Social and [place]   Number of elements that affect the Plan of Care: 4+: HIGH COMPLEXITY   CLINICAL PRESENTATION:   Presentation: Stable and uncomplicated: LOW COMPLEXITY   CLINICAL DECISION MAKIN [facility] Short Form  How much difficulty does the patient currently have. .. Unable A Lot A Little None   1. Turning over in bed (including adjusting bedclothes, sheets and blankets)? [ ] 1   [ ] 2   [X] 3   [ ] 4   2. Sitting down on and standing up from a chair with arms ( e.g., wheelchair, bedside commode, etc.)   [ ] 1   [ ] 2   [X] 3   [ ] 4   3. Moving from lying on back to sitting on the side of the bed? [ ] 1   [ ] 2   [X] 3   [ ] 4   How much help from another person does the patient currently need. .. Total A Lot A Little None   4. Moving to and from a bed to a chair (including a wheelchair)? [ ] 1   [ ] 2   [X] 3   [ ] 4   5. Need to walk in hospital room? [ ] 1   [ ] 2   [X] 3   [ ] 4   6. Climbing 3-5 steps with a railing?    [ ] 1   [ ] 2   [X] 3   [ ] 4   © , Trustees of [employer], under license to HE[employer] (18 1700)  Therapeutic Exercise Discharge Goal Progress: Outcome met, complete goal (18)     RECOMMENDATIONS FOR DISCHARGE:  Recommendation for Discharge: PT: Home (18)    PT/OT Mobility Equipment for Discharge: None anticipated (18)       BILLING INFORMATION:  Timed Procedures:      $ Gait Trainin-22 mins (18)           $ Therapy Activities per 15 min: 8-22 mins (18)            Untimed Procedures:   , , , , , , , ,      Total Treatment Time:  OBS Patients Only:  PT Timed Code TX Minutes: 25 minutes (18)    Timed Treatment Minutes:  OBS Patients Only:  PT Timed Code TX Minutes: 25 minutes (18)    The co-signature indicates that the physician certifies the need for PT furnished under this plan of treatment while under his/her care.    Perla Holt DPT  Rehab Services: [phone]     Lj. All rights reserved    Score:  Initial: 18 Most Recent: X (Date: -- )     Interpretation of Tool:  Represents activities that are increasingly more difficult (i.e. Bed mobility, Transfers, Gait). Score 24 23 22-20 19-15 14-10 9-7 6       Modifier CH CI CJ CK CL CM CN         · Mobility - Walking and Moving Around:               - CURRENT STATUS:    CK - 40%-59% impaired, limited or restricted               - GOAL STATUS:           CJ - 20%-39% impaired, limited or restricted               - D/C STATUS:                       ---------------To be determined---------------  Payor: CARE IMPROVEMENT PLUS [place] / Product Type: [employer] Medicare /       Medical Necessity:     · Patient is expected to demonstrate progress in strength, range of motion, balance and coordination to decrease assistance required with overall functional mobility, transfers, ambulation. · Patient demonstrates good rehab potential due to higher previous functional level. Reason for Services/Other Comments:  · Patient continues to require present interventions due to patient's inability to perform bed mobility, transfers, ambulation all safely and effectively at prior level of function of independence. Use of outcome tool(s) and clinical judgement create a POC that gives a: Clear prediction of patient's progress: LOW COMPLEXITY                 TREATMENT:      Pre-treatment Symptoms/Complaints:  Tremors  Pain: Initial: . Pain Intensity 1: 5  Pain Location 1: Back, Leg  Pain Intervention(s) 1: Ambulation/Increased Activity  Post Session:  6/10       Therapeutic Activity: (    ):  Therapeutic activities including bed mobility, ambulation 10 feet today using rolling walker, sit to stand, scooting to improve mobility, strength and balance. Required maximal manual and verbal cues   to promote coordination of bilateral, upper extremity(s), lower extremity(s).     Therapeutic Exercise: (25 Minutes):  Exercises per grid below to improve mobility and strength. Required minimal visual, verbal and manual cues to promote proper body alignment and promote proper body mechanics. Progressed repetitions as indicated. Date:  1/26/17 Date:  1/27/17 Date:  1/30/17 Date:  2/1/17 Date:  2/2/17   Activity/Exercise Parameters Parameters Parameters     Standing marching in place Multiple x bilaterally Multiple x B  10B A    Seated AP's X 10 B X 10 B  10 B A x20 AB   Seated LAQ's X 10 B X 10 B  10 B A x10 AB   Seated hip ABD/ADD X 10 B partial range d/t edema and adipose tissue. Supine hip abd/add   X 10 B     Supine SAQ   X 20 B     Supine AP   X 20 B     Supine HS   X 20 B     Seated hip flexion     x10 AB   Ambulation  Assist  Device     x25'  Supervision  Bariatric RW           Key:  A=active, AA=active assisted, B=bilaterally, R=right, L=left         Treatment/Session Assessment:    · Response to Treatment:  Increase in HR with activity  · Interdisciplinary Collaboration:  · Physical Therapist, Registered Nurse and Certified Nursing Assistant/Patient Care Technician  · After treatment position/precautions:  · Up in chair, Bed/Chair-wheels locked, Bed in low position, Call light within reach, RN notified and Family at bedside  · Compliance with Program/Exercises: Will assess as treatment progresses. · Recommendations/Intent for next treatment session: \"Next visit will focus on advancements to more challenging activities\".   Total Treatment Duration:  PT Patient Time In/Time Out  Time In: 1045  Time Out: 1111 [facility],

## 2018-03-06 PROBLEM — I42.9 CARDIOMYOPATHY (HCC): Chronic | Status: RESOLVED | Noted: 2017-03-18 | Resolved: 2018-03-06

## 2018-05-25 ENCOUNTER — HOSPITAL ENCOUNTER (EMERGENCY)
Age: 53
Discharge: HOME | End: 2018-05-25
Attending: EMERGENCY MEDICINE
Payer: MEDICARE

## 2018-05-25 VITALS
DIASTOLIC BLOOD PRESSURE: 95 MMHG | OXYGEN SATURATION: 95 % | BODY MASS INDEX: 45.1 KG/M2 | HEART RATE: 77 BPM | HEIGHT: 70 IN | TEMPERATURE: 97.6 F | WEIGHT: 315 LBS | RESPIRATION RATE: 14 BRPM | SYSTOLIC BLOOD PRESSURE: 145 MMHG

## 2018-05-25 DIAGNOSIS — G89.29 OTHER CHRONIC PAIN: Primary | ICD-10-CM

## 2018-05-25 PROCEDURE — 74011250637 HC RX REV CODE- 250/637: Performed by: NURSE PRACTITIONER

## 2018-05-25 PROCEDURE — 99283 EMERGENCY DEPT VISIT LOW MDM: CPT | Performed by: NURSE PRACTITIONER

## 2018-05-25 RX ORDER — CYCLOBENZAPRINE HCL 10 MG
10 TABLET ORAL
Qty: 30 TAB | Refills: 0 | Status: SHIPPED | OUTPATIENT
Start: 2018-05-25 | End: 2018-08-06

## 2018-05-25 RX ORDER — OXYCODONE HYDROCHLORIDE 5 MG/1
5 TABLET ORAL
Status: COMPLETED | OUTPATIENT
Start: 2018-05-25 | End: 2018-05-25

## 2018-05-25 RX ORDER — CYCLOBENZAPRINE HCL 10 MG
10 TABLET ORAL
Status: COMPLETED | OUTPATIENT
Start: 2018-05-25 | End: 2018-05-25

## 2018-05-25 RX ADMIN — OXYCODONE HYDROCHLORIDE 5 MG: 5 TABLET ORAL at 10:34

## 2018-05-25 RX ADMIN — CYCLOBENZAPRINE HYDROCHLORIDE 10 MG: 10 TABLET, FILM COATED ORAL at 10:34

## 2018-05-25 NOTE — ED PROVIDER NOTES
HPI Comments: Patient presents with complaints of chronic pain to his neck and his lower back. He states he went to pain management this morning and was told the office he was referred to did not take his insurance. He states he takes oxycodone for pain and is out of his medication. He denies new injury and change in symptoms. The history is provided by the patient. Past Medical History:   Diagnosis Date    Acquired claw toe of right foot     Acute encephalopathy 6/59/6112    Acute systolic heart failure ([address] May, 2009    Also had transient acute renal failure secondary to poor perfusion.  AICD (automatic cardioverter/defibrillator) present 10/21/2015    Asthma     Atypical chest pain 4/23/2010    Bronchitis     CAD (coronary artery disease)     Cardiomyopathy     Chest pain 10/21/2015    Chronic kidney disease     renal insufficiency    Chronic pain     back    Congestive heart failure (CHF) ([address] 10/21/2015    COPD     Depressive disorder     Diabetes ([address]     type 2 insulin reliant- BS average- 160's-180's    Diabetes mellitus type II, uncontrolled ([address] 7/2/2013    GERD (gastroesophageal reflux disease)     Gout     Heart failure ([address]     cardiomyopathy with ef 10-20%    Hypertension     Hyponatremia 12/20/2010    Ill-defined condition     gout, neuropathy, sciatica    Morbid obesity (HCC)     Nausea & vomiting 11/30/2015    Neuropathy     Obstructive sleep apnea 2/15/2010    Other unknown and unspecified cause of morbidity or mortality     Gout    Severe sepsis (HCC)     Ulcer of leg, chronic ([address]     Unspecified sleep apnea     cpap       Past Surgical History:   Procedure Laterality Date    CHEST SURGERY PROCEDURE UNLISTED      thorocentesis    HX HEART CATHETERIZATION  [address] 10, 2008    Severe multivessel disease with severe LV dysfunction    HX PACEMAKER      may 2010. ICD is place.          Family History:   Problem Relation Age of Onset    Heart Disease Father     Stroke Father     Coronary Artery Disease Father     Other Father      kidney failure    Diabetes Sister     Stroke Sister     Diabetes Sister     Coronary Artery Disease Mother     Heart Disease Other        Social History     Social History    Marital status:      Spouse name: N/A    Number of children: N/A    Years of education: N/A     Occupational History    Not on file. Social History Main Topics    Smoking status: Former Smoker     Packs/day: 0.25     Years: 1.00     Quit date: 7/12/1984    Smokeless tobacco: Never Used      Comment: pt states that he only tried smoking as a kid     Alcohol use No    Drug use: No    Sexual activity: Not on file     Other Topics Concern    Not on file     Social History Narrative         ALLERGIES: Dilaudid [hydromorphone]; Iodinated contrast- oral and iv dye; and Penicillins    Review of Systems   Constitutional: Negative for chills and fever. Respiratory: Negative for cough and shortness of breath. Gastrointestinal: Negative for abdominal pain, constipation, diarrhea, nausea and vomiting. Musculoskeletal: Positive for arthralgias, back pain and neck pain. Negative for joint swelling. Skin: Negative for color change and wound. Vitals:    05/25/18 1015   BP: (!) 145/95   Pulse: 77   Resp: 14   Temp: 97.6 °F (36.4 °C)   SpO2: 95%   Weight: 149.7 kg (330 lb)   Height: 5' 10\" (1.778 m)            Physical Exam   Constitutional: He is oriented to person, place, and time. He appears well-developed and well-nourished. No distress. Neck: Muscular tenderness present. No spinous process tenderness present. Cardiovascular: Normal rate and regular rhythm. No murmur heard. Pulmonary/Chest: Effort normal and breath sounds normal.   Musculoskeletal:        Right hip: He exhibits tenderness. Lumbar back: He exhibits pain. He exhibits no tenderness.         Legs:  Neurological: He is alert and oriented to person, place, and time. Skin: Skin is warm and dry. He is not diaphoretic. Psychiatric: He has a normal mood and affect. His behavior is normal.   Nursing note and vitals reviewed. MDM  Number of Diagnoses or Management Options  Other chronic pain:   Diagnosis management comments: Patient given po oxycodone prior to discharge. Patient given prescription for flexeril. No radiology studies needed at this time.  Patient encouraged to speak with social work regarding new pain management referral.        Amount and/or Complexity of Data Reviewed  Tests in the medicine section of CPT®: ordered    Patient Progress  Patient progress: stable        ED Course       Procedures

## 2018-05-25 NOTE — DISCHARGE INSTRUCTIONS
Chronic Pain: Care Instructions  Your Care Instructions    Chronic pain is pain that lasts a long time (months or even years) and may or may not have a clear cause. It is different from acute pain, which usually does have a clear cause-like an injury or illness-and gets better over time. Chronic pain:  · Lasts over time but may vary from day to day. · Does not go away despite efforts to end it. · May disrupt your sleep and lead to fatigue. · May cause depression or anxiety. · May make your muscles tense, causing more pain. · Can disrupt your work, hobbies, home life, and relationships with friends and family. Chronic pain is a very real condition. It is not just in your head. Treatment can help and usually includes several methods used together, such as medicines, physical therapy, exercise, and other treatments. Learning how to relax and changing negative thought patterns can also help you cope. Chronic pain is complex. Taking an active role in your treatment will help you better manage your pain. Tell your doctor if you have trouble dealing with your pain. You may have to try several things before you find what works best for you. Follow-up care is a key part of your treatment and safety. Be sure to make and go to all appointments, and call your doctor if you are having problems. It's also a good idea to know your test results and keep a list of the medicines you take. How can you care for yourself at home? · Pace yourself. Break up large jobs into smaller tasks. Save harder tasks for days when you have less pain, or go back and forth between hard tasks and easier ones. Take rest breaks. · Relax, and reduce stress. Relaxation techniques such as deep breathing or meditation can help. · Keep moving. Gentle, daily exercise can help reduce pain over the long run. Try low- or no-impact exercises such as walking, swimming, and stationary biking. Do stretches to stay flexible.   · Try heat, cold packs, and massage. · Get enough sleep. Chronic pain can make you tired and drain your energy. Talk with your doctor if you have trouble sleeping because of pain. · Think positive. Your thoughts can affect your pain level. Do things that you enjoy to distract yourself when you have pain instead of focusing on the pain. See a movie, read a book, listen to music, or spend time with a friend. · If you think you are depressed, talk to your doctor about treatment. · Keep a daily pain diary. Record how your moods, thoughts, sleep patterns, activities, and medicine affect your pain. You may find that your pain is worse during or after certain activities or when you are feeling a certain emotion. Having a record of your pain can help you and your doctor find the best ways to treat your pain. · Take pain medicines exactly as directed. ¨ If the doctor gave you a prescription medicine for pain, take it as prescribed. ¨ If you are not taking a prescription pain medicine, ask your doctor if you can take an over-the-counter medicine. Reducing constipation caused by pain medicine  · Include fruits, vegetables, beans, and whole grains in your diet each day. These foods are high in fiber. · Drink plenty of fluids, enough so that your urine is light yellow or clear like water. If you have kidney, heart, or liver disease and have to limit fluids, talk with your doctor before you increase the amount of fluids you drink. · If your doctor recommends it, get more exercise. Walking is a good choice. Bit by bit, increase the amount you walk every day. Try for at least 30 minutes on most days of the week. · Schedule time each day for a bowel movement. A daily routine may help. Take your time and do not strain when having a bowel movement. When should you call for help? Call your doctor now or seek immediate medical care if:  ? · Your pain gets worse or is out of control.    ? · You feel down or blue, or you do not enjoy things like you once did. You may be depressed, which is common in people with chronic pain. Depression can be treated. ? · You have vomiting or cramps for more than 2 hours. ? Watch closely for changes in your health, and be sure to contact your doctor if:  ? · You cannot sleep because of pain. ? · You are very worried or anxious about your pain. ? · You have trouble taking your pain medicine. ? · You have any concerns about your pain medicine. ? · You have trouble with bowel movements, such as:  ¨ No bowel movement in 3 days. ¨ Blood in the anal area, in your stool, or on the toilet paper. ¨ Diarrhea for more than 24 hours. Where can you learn more? Go to http://armando-nirav.info/. Enter N004 in the search box to learn more about \"Chronic Pain: Care Instructions. \"  Current as of: October 14, 2016  Content Version: 11.4  © 0575-0204 [employer]. Care instructions adapted under license by [employer] (which disclaims liability or warranty for this information). If you have questions about a medical condition or this instruction, always ask your healthcare professional. [place] [address] any warranty or liability for your use of this information.

## 2018-05-25 NOTE — ED NOTES
Patient requesting narcotic pain medication. States that he \"wants of shot of pain meds. \" Patient told by Dieter Wiggins NP that his diagnosis does not require narcotic medication and that he will be able to get po medication at this facility but will not be given narcotics to go home with.

## 2018-05-25 NOTE — ED NOTES
I have reviewed discharge instructions with the patient and spouse. The patient and spouse verbalized understanding. Patient left ED via Discharge Method: wheelchair to Home with (spouse). Opportunity for questions and clarification provided. Patient given 1 scripts. No e-sign        To continue your aftercare when you leave the hospital, you may receive an automated call from our care team to check in on how you are doing. This is a free service and part of our promise to provide the best care and service to meet your aftercare needs.  If you have questions, or wish to unsubscribe from this service please call [phone]. Thank you for Choosing our [facility] Emergency Department.

## 2018-07-15 ENCOUNTER — APPOINTMENT (OUTPATIENT)
Dept: GENERAL RADIOLOGY | Age: 53
DRG: 638 | End: 2018-07-15
Attending: HOSPITALIST
Payer: MEDICARE

## 2018-07-15 ENCOUNTER — HOSPITAL ENCOUNTER (INPATIENT)
Age: 53
LOS: 2 days | Discharge: HOME | DRG: 638 | End: 2018-07-17
Attending: EMERGENCY MEDICINE | Admitting: HOSPITALIST
Payer: MEDICARE

## 2018-07-15 DIAGNOSIS — N17.9 ACUTE RENAL FAILURE SUPERIMPOSED ON CHRONIC KIDNEY DISEASE, UNSPECIFIED CKD STAGE, UNSPECIFIED ACUTE RENAL FAILURE TYPE (HCC): ICD-10-CM

## 2018-07-15 DIAGNOSIS — Z91.14 NONCOMPLIANCE WITH MEDICATION REGIMEN: ICD-10-CM

## 2018-07-15 DIAGNOSIS — R73.9 HYPERGLYCEMIA: Primary | ICD-10-CM

## 2018-07-15 DIAGNOSIS — E87.6 HYPOKALEMIA: ICD-10-CM

## 2018-07-15 DIAGNOSIS — N18.9 ACUTE RENAL FAILURE SUPERIMPOSED ON CHRONIC KIDNEY DISEASE, UNSPECIFIED CKD STAGE, UNSPECIFIED ACUTE RENAL FAILURE TYPE (HCC): ICD-10-CM

## 2018-07-15 PROBLEM — E87.1 HYPONATREMIA: Status: ACTIVE | Noted: 2018-07-15

## 2018-07-15 PROBLEM — I27.20 PULMONARY HTN (HCC): Chronic | Status: ACTIVE | Noted: 2018-07-15

## 2018-07-15 PROBLEM — R07.9 CHEST PAIN: Status: ACTIVE | Noted: 2018-07-15

## 2018-07-15 PROBLEM — N18.30 ACUTE RENAL FAILURE SUPERIMPOSED ON STAGE 3 CHRONIC KIDNEY DISEASE (HCC): Status: ACTIVE | Noted: 2018-07-15

## 2018-07-15 LAB
ALBUMIN SERPL-MCNC: 3.2 G/DL (ref 3.5–5)
ALBUMIN/GLOB SERPL: 0.6 {RATIO} (ref 1.2–3.5)
ALP SERPL-CCNC: 303 U/L (ref 50–136)
ALT SERPL-CCNC: 27 U/L (ref 12–65)
ANION GAP SERPL CALC-SCNC: 12 MMOL/L (ref 7–16)
AST SERPL-CCNC: 26 U/L (ref 15–37)
BASOPHILS # BLD: 0 K/UL (ref 0–0.2)
BASOPHILS NFR BLD: 0 % (ref 0–2)
BILIRUB SERPL-MCNC: 1.5 MG/DL (ref 0.2–1.1)
BNP SERPL-MCNC: 126 PG/ML
BUN SERPL-MCNC: 68 MG/DL (ref 6–23)
CALCIUM SERPL-MCNC: 9.4 MG/DL (ref 8.3–10.4)
CHLORIDE SERPL-SCNC: 74 MMOL/L (ref 98–107)
CO2 SERPL-SCNC: 39 MMOL/L (ref 21–32)
CREAT SERPL-MCNC: 3.23 MG/DL (ref 0.8–1.5)
DIFFERENTIAL METHOD BLD: ABNORMAL
DIGOXIN SERPL-MCNC: 0.1 NG/ML (ref 0.9–2.1)
EOSINOPHIL # BLD: 0.1 K/UL (ref 0–0.8)
EOSINOPHIL NFR BLD: 2 % (ref 0.5–7.8)
ERYTHROCYTE [DISTWIDTH] IN BLOOD BY AUTOMATED COUNT: 14.5 % (ref 11.9–14.6)
ETHANOL SERPL-MCNC: <3 MG/DL
GGT SERPL-CCNC: 132 U/L (ref 15–85)
GLOBULIN SER CALC-MCNC: 5.2 G/DL (ref 2.3–3.5)
GLUCOSE BLD STRIP.AUTO-MCNC: 536 MG/DL (ref 65–100)
GLUCOSE BLD STRIP.AUTO-MCNC: >600 MG/DL (ref 65–100)
GLUCOSE SERPL-MCNC: 649 MG/DL (ref 65–100)
HCT VFR BLD AUTO: 46.8 % (ref 41.1–50.3)
HGB BLD-MCNC: 16.1 G/DL (ref 13.6–17.2)
IMM GRANULOCYTES # BLD: 0 K/UL (ref 0–0.5)
IMM GRANULOCYTES NFR BLD AUTO: 0 % (ref 0–5)
LYMPHOCYTES # BLD: 2.4 K/UL (ref 0.5–4.6)
LYMPHOCYTES NFR BLD: 34 % (ref 13–44)
MAGNESIUM SERPL-MCNC: 2.2 MG/DL (ref 1.8–2.4)
MCH RBC QN AUTO: 28.2 PG (ref 26.1–32.9)
MCHC RBC AUTO-ENTMCNC: 34.4 G/DL (ref 31.4–35)
MCV RBC AUTO: 82 FL (ref 79.6–97.8)
MONOCYTES # BLD: 0.5 K/UL (ref 0.1–1.3)
MONOCYTES NFR BLD: 8 % (ref 4–12)
NEUTS SEG # BLD: 4 K/UL (ref 1.7–8.2)
NEUTS SEG NFR BLD: 56 % (ref 43–78)
OSMOLALITY SERPL: 326 MOSM/KG H2O (ref 275–295)
PHOSPHATE SERPL-MCNC: 3 MG/DL (ref 2.5–4.5)
PLATELET # BLD AUTO: 158 K/UL (ref 150–450)
PMV BLD AUTO: 11.8 FL (ref 10.8–14.1)
POTASSIUM SERPL-SCNC: 2.8 MMOL/L (ref 3.5–5.1)
PROT SERPL-MCNC: 8.4 G/DL (ref 6.3–8.2)
RBC # BLD AUTO: 5.71 M/UL (ref 4.23–5.67)
SODIUM SERPL-SCNC: 125 MMOL/L (ref 136–145)
TROPONIN I SERPL-MCNC: 0.03 NG/ML (ref 0.02–0.05)
TSH SERPL DL<=0.005 MIU/L-ACNC: 2.11 UIU/ML (ref 0.36–3.74)
WBC # BLD AUTO: 7.1 K/UL (ref 4.3–11.1)

## 2018-07-15 PROCEDURE — 74011250636 HC RX REV CODE- 250/636: Performed by: HOSPITALIST

## 2018-07-15 PROCEDURE — 74011636637 HC RX REV CODE- 636/637: Performed by: HOSPITALIST

## 2018-07-15 PROCEDURE — 85025 COMPLETE CBC W/AUTO DIFF WBC: CPT | Performed by: EMERGENCY MEDICINE

## 2018-07-15 PROCEDURE — 74011636637 HC RX REV CODE- 636/637: Performed by: EMERGENCY MEDICINE

## 2018-07-15 PROCEDURE — 84443 ASSAY THYROID STIM HORMONE: CPT | Performed by: HOSPITALIST

## 2018-07-15 PROCEDURE — 82962 GLUCOSE BLOOD TEST: CPT

## 2018-07-15 PROCEDURE — 74011000302 HC RX REV CODE- 302: Performed by: HOSPITALIST

## 2018-07-15 PROCEDURE — 74011250637 HC RX REV CODE- 250/637: Performed by: EMERGENCY MEDICINE

## 2018-07-15 PROCEDURE — 74011250637 HC RX REV CODE- 250/637: Performed by: HOSPITALIST

## 2018-07-15 PROCEDURE — 71045 X-RAY EXAM CHEST 1 VIEW: CPT

## 2018-07-15 PROCEDURE — 80162 ASSAY OF DIGOXIN TOTAL: CPT | Performed by: EMERGENCY MEDICINE

## 2018-07-15 PROCEDURE — 80307 DRUG TEST PRSMV CHEM ANLYZR: CPT | Performed by: HOSPITALIST

## 2018-07-15 PROCEDURE — 80053 COMPREHEN METABOLIC PANEL: CPT | Performed by: EMERGENCY MEDICINE

## 2018-07-15 PROCEDURE — 83735 ASSAY OF MAGNESIUM: CPT | Performed by: HOSPITALIST

## 2018-07-15 PROCEDURE — 93005 ELECTROCARDIOGRAM TRACING: CPT | Performed by: EMERGENCY MEDICINE

## 2018-07-15 PROCEDURE — 65660000000 HC RM CCU STEPDOWN

## 2018-07-15 PROCEDURE — 84156 ASSAY OF PROTEIN URINE: CPT | Performed by: HOSPITALIST

## 2018-07-15 PROCEDURE — 96374 THER/PROPH/DIAG INJ IV PUSH: CPT | Performed by: EMERGENCY MEDICINE

## 2018-07-15 PROCEDURE — 84300 ASSAY OF URINE SODIUM: CPT | Performed by: HOSPITALIST

## 2018-07-15 PROCEDURE — 84100 ASSAY OF PHOSPHORUS: CPT | Performed by: HOSPITALIST

## 2018-07-15 PROCEDURE — 83935 ASSAY OF URINE OSMOLALITY: CPT | Performed by: HOSPITALIST

## 2018-07-15 PROCEDURE — 81003 URINALYSIS AUTO W/O SCOPE: CPT | Performed by: EMERGENCY MEDICINE

## 2018-07-15 PROCEDURE — 82977 ASSAY OF GGT: CPT | Performed by: HOSPITALIST

## 2018-07-15 PROCEDURE — 74011250636 HC RX REV CODE- 250/636: Performed by: EMERGENCY MEDICINE

## 2018-07-15 PROCEDURE — 82436 ASSAY OF URINE CHLORIDE: CPT | Performed by: HOSPITALIST

## 2018-07-15 PROCEDURE — 99285 EMERGENCY DEPT VISIT HI MDM: CPT | Performed by: EMERGENCY MEDICINE

## 2018-07-15 PROCEDURE — 84133 ASSAY OF URINE POTASSIUM: CPT | Performed by: HOSPITALIST

## 2018-07-15 PROCEDURE — 83880 ASSAY OF NATRIURETIC PEPTIDE: CPT | Performed by: HOSPITALIST

## 2018-07-15 PROCEDURE — 86580 TB INTRADERMAL TEST: CPT | Performed by: HOSPITALIST

## 2018-07-15 PROCEDURE — 84484 ASSAY OF TROPONIN QUANT: CPT | Performed by: EMERGENCY MEDICINE

## 2018-07-15 PROCEDURE — 81001 URINALYSIS AUTO W/SCOPE: CPT | Performed by: HOSPITALIST

## 2018-07-15 PROCEDURE — 83930 ASSAY OF BLOOD OSMOLALITY: CPT | Performed by: HOSPITALIST

## 2018-07-15 RX ORDER — ALBUTEROL SULFATE 0.83 MG/ML
2.5 SOLUTION RESPIRATORY (INHALATION)
Status: DISCONTINUED | OUTPATIENT
Start: 2018-07-15 | End: 2018-07-17 | Stop reason: HOSPADM

## 2018-07-15 RX ORDER — GABAPENTIN 100 MG/1
100 CAPSULE ORAL 2 TIMES DAILY
Status: DISCONTINUED | OUTPATIENT
Start: 2018-07-15 | End: 2018-07-16

## 2018-07-15 RX ORDER — INSULIN GLARGINE 100 [IU]/ML
30 INJECTION, SOLUTION SUBCUTANEOUS DAILY
Status: DISCONTINUED | OUTPATIENT
Start: 2018-07-15 | End: 2018-07-16

## 2018-07-15 RX ORDER — HEPARIN SODIUM 5000 [USP'U]/ML
5000 INJECTION, SOLUTION INTRAVENOUS; SUBCUTANEOUS EVERY 8 HOURS
Status: DISCONTINUED | OUTPATIENT
Start: 2018-07-15 | End: 2018-07-17 | Stop reason: HOSPADM

## 2018-07-15 RX ORDER — FAMOTIDINE 20 MG/1
20 TABLET, FILM COATED ORAL 2 TIMES DAILY
Status: DISCONTINUED | OUTPATIENT
Start: 2018-07-16 | End: 2018-07-16

## 2018-07-15 RX ORDER — SODIUM CHLORIDE 0.9 % (FLUSH) 0.9 %
5-10 SYRINGE (ML) INJECTION EVERY 8 HOURS
Status: DISCONTINUED | OUTPATIENT
Start: 2018-07-15 | End: 2018-07-17 | Stop reason: HOSPADM

## 2018-07-15 RX ORDER — ISOSORBIDE DINITRATE 10 MG/1
20 TABLET ORAL 3 TIMES DAILY
Status: DISCONTINUED | OUTPATIENT
Start: 2018-07-16 | End: 2018-07-17 | Stop reason: HOSPADM

## 2018-07-15 RX ORDER — SODIUM CHLORIDE 0.9 % (FLUSH) 0.9 %
5-10 SYRINGE (ML) INJECTION AS NEEDED
Status: DISCONTINUED | OUTPATIENT
Start: 2018-07-15 | End: 2018-07-17 | Stop reason: HOSPADM

## 2018-07-15 RX ORDER — INSULIN GLARGINE 100 [IU]/ML
20 INJECTION, SOLUTION SUBCUTANEOUS DAILY
Status: DISCONTINUED | OUTPATIENT
Start: 2018-07-15 | End: 2018-07-15

## 2018-07-15 RX ORDER — OXYCODONE HYDROCHLORIDE 5 MG/1
5 TABLET ORAL
Status: DISCONTINUED | OUTPATIENT
Start: 2018-07-15 | End: 2018-07-17 | Stop reason: HOSPADM

## 2018-07-15 RX ORDER — HYDRALAZINE HYDROCHLORIDE 25 MG/1
25 TABLET, FILM COATED ORAL 3 TIMES DAILY
Status: DISCONTINUED | OUTPATIENT
Start: 2018-07-16 | End: 2018-07-17 | Stop reason: HOSPADM

## 2018-07-15 RX ORDER — INSULIN LISPRO 100 [IU]/ML
INJECTION, SOLUTION INTRAVENOUS; SUBCUTANEOUS
Status: DISCONTINUED | OUTPATIENT
Start: 2018-07-15 | End: 2018-07-16

## 2018-07-15 RX ORDER — GUAIFENESIN 100 MG/5ML
81 LIQUID (ML) ORAL DAILY
Status: DISCONTINUED | OUTPATIENT
Start: 2018-07-15 | End: 2018-07-17 | Stop reason: HOSPADM

## 2018-07-15 RX ORDER — CARVEDILOL 6.25 MG/1
6.25 TABLET ORAL 2 TIMES DAILY WITH MEALS
Status: DISCONTINUED | OUTPATIENT
Start: 2018-07-16 | End: 2018-07-17 | Stop reason: HOSPADM

## 2018-07-15 RX ORDER — SENNOSIDES 8.6 MG/1
1 TABLET ORAL
Status: DISCONTINUED | OUTPATIENT
Start: 2018-07-15 | End: 2018-07-17 | Stop reason: HOSPADM

## 2018-07-15 RX ORDER — POTASSIUM CHLORIDE 20 MEQ/1
20 TABLET, EXTENDED RELEASE ORAL
Status: COMPLETED | OUTPATIENT
Start: 2018-07-15 | End: 2018-07-15

## 2018-07-15 RX ORDER — PRAVASTATIN SODIUM 80 MG/1
80 TABLET ORAL
Status: DISCONTINUED | OUTPATIENT
Start: 2018-07-15 | End: 2018-07-17 | Stop reason: HOSPADM

## 2018-07-15 RX ORDER — ACETAMINOPHEN 325 MG/1
650 TABLET ORAL
Status: DISCONTINUED | OUTPATIENT
Start: 2018-07-15 | End: 2018-07-17 | Stop reason: HOSPADM

## 2018-07-15 RX ORDER — ALLOPURINOL 100 MG/1
100 TABLET ORAL DAILY
Status: DISCONTINUED | OUTPATIENT
Start: 2018-07-16 | End: 2018-07-17 | Stop reason: HOSPADM

## 2018-07-15 RX ORDER — POTASSIUM CHLORIDE 14.9 MG/ML
20 INJECTION INTRAVENOUS
Status: COMPLETED | OUTPATIENT
Start: 2018-07-15 | End: 2018-07-15

## 2018-07-15 RX ADMIN — PRAVASTATIN SODIUM 80 MG: 80 TABLET ORAL at 23:13

## 2018-07-15 RX ADMIN — Medication 5 ML: at 23:00

## 2018-07-15 RX ADMIN — GABAPENTIN 100 MG: 100 CAPSULE ORAL at 23:13

## 2018-07-15 RX ADMIN — POTASSIUM CHLORIDE 20 MEQ: 200 INJECTION, SOLUTION INTRAVENOUS at 21:48

## 2018-07-15 RX ADMIN — SODIUM CHLORIDE 1000 ML: 900 INJECTION, SOLUTION INTRAVENOUS at 21:57

## 2018-07-15 RX ADMIN — INSULIN HUMAN 10 UNITS: 100 INJECTION, SOLUTION PARENTERAL at 21:55

## 2018-07-15 RX ADMIN — HEPARIN SODIUM 5000 UNITS: 5000 INJECTION, SOLUTION INTRAVENOUS; SUBCUTANEOUS at 23:16

## 2018-07-15 RX ADMIN — INSULIN GLARGINE 30 UNITS: 100 INJECTION, SOLUTION SUBCUTANEOUS at 23:15

## 2018-07-15 RX ADMIN — TUBERCULIN PURIFIED PROTEIN DERIVATIVE 5 UNITS: 5 INJECTION, SOLUTION INTRADERMAL at 23:17

## 2018-07-15 RX ADMIN — ASPIRIN 81 MG 81 MG: 81 TABLET ORAL at 23:13

## 2018-07-15 RX ADMIN — OXYCODONE HYDROCHLORIDE 5 MG: 5 TABLET ORAL at 23:39

## 2018-07-15 RX ADMIN — POTASSIUM CHLORIDE 20 MEQ: 20 TABLET, EXTENDED RELEASE ORAL at 21:48

## 2018-07-15 RX ADMIN — INSULIN LISPRO 10 UNITS: 100 INJECTION, SOLUTION INTRAVENOUS; SUBCUTANEOUS at 23:32

## 2018-07-15 NOTE — IP AVS SNAPSHOT
Flor [place] 
 
 
 [address] 
[phone] Patient: Dania Woo MRN: [id number] UEI:0/9/2615 About your hospitalization You were admitted on:  July 15, 2018 You last received care in the:  [facility] You were discharged on:  July 17, 2018 Why you were hospitalized Your primary diagnosis was:  Hyperglycemia Your diagnoses also included:  Pulmonary Htn (Hcc), Obstructive Sleep Apnea, Diabetes Mellitus Type Ii, Uncontrolled (Hcc), Noncompliance With Medication Regimen, Ckd (Chronic Kidney Disease) Stage 3, Gfr 30-59 Ml/Min, Aicd (Automatic Cardioverter/Defibrillator) Present, Obesity Hypoventilation Syndrome (Hcc), Morbid Obesity With Bmi Of 60.0-69.9, Adult (Hcc), Nonischemic Dilated Cardiomyopathy (Hcc), Hypokalemia, Chest Pain, Acute Renal Failure Superimposed On Stage 3 Chronic Kidney Disease (Hcc), Hyponatremia Follow-up Information Follow up With Details Comments Contact Info Laura Amaya MD  voicemail left for office to call with 1 week f/u appointment. [address] [place] [address] [address] [place] [address] [phone] Paulina Rachel MD  as scheduled [address] [facility] Nephrology [facility] [address] 
[phone] [facility] CARDIOLOGY [place] OFFICE On 8/1/2018 @ 9:15 am [address] Jaimes 
[id number] [place] [address] 
[phone] Your Scheduled Appointments Wednesday August 01, 2018  9:15 AM EDT HOSPITAL FOLLOW-UP with Kavon Snell MD  
[address] ([facility]) 2 Philippe Jaimes 
[address] [place] [facility]  
[phone] Discharge Orders None A check ej indicates which time of day the medication should be taken. My Medications START taking these medications  Instructions Each Dose to Equal  
 Morning Noon Evening Bedtime  
 potassium chloride 20 mEq tablet Commonly known as:  K-DUR, KLOR-CON Take 0.5 Tabs by mouth daily. 10 mEq CHANGE how you take these medications Instructions Each Dose to Equal  
 Morning Noon Evening Bedtime  
 gabapentin 600 mg tablet Commonly known as:  NEURONTIN What changed:  when to take this Take 0.5 Tabs by mouth three (3) times daily. Indications: Pt. takes 1 to 2 capsules every 8 hours 300 mg CONTINUE taking these medications Instructions Each Dose to Equal  
 Morning Noon Evening Bedtime  
 allopurinol 100 mg tablet Commonly known as:  Aamir Cons Take 100 mg by mouth two (2) times a day. 100 mg  
    
  
   
   
   
  
 bumetanide 2 mg tablet Commonly known as:  Katiuska Mart Take 1 Tab by mouth two (2) times a day. 2 mg  
    
  
   
   
  
   
  
 carvedilol 6.25 mg tablet Commonly known as:  Dareen Kaska Take 1 Tab by mouth two (2) times daily (with meals). 6.25 mg  
    
  
   
   
  
   
  
 cpap machine kit 10 cm qhs  
     
   
   
   
  
 cyclobenzaprine 10 mg tablet Commonly known as:  FLEXERIL Take 1 Tab by mouth three (3) times daily as needed for Muscle Spasm(s). 10 mg  
    
   
   
   
  
 docusate sodium 100 mg capsule Commonly known as:  Hasmukh Barefoot Take 1 Cap by mouth two (2) times a day. 100 mg  
    
  
   
   
  
   
  
 glucose blood VI test strips strip Commonly known as:  ASCENSIA AUTODISC VI, ONE TOUCH ULTRA TEST VI Test strips for 30 day supply  
     
   
   
   
  
 hydrALAZINE 25 mg tablet Commonly known as:  APRESOLINE Take 1 Tab by mouth three (3) times daily. 25 mg  
    
  
   
  
   
  
   
  
 insulin glargine 100 unit/mL injection Commonly known as:  LANTUS U-100 INSULIN  
   
 50 Units by SubCUTAneous route nightly. 50 Units isosorbide dinitrate 20 mg tablet Commonly known as:  ISORDIL Take 1 Tab by mouth three (3) times daily. 20 mg  
    
  
   
  
   
  
   
  
 metOLazone 2.5 mg tablet Commonly known as:  ZAROXOLYN  
   
 TAKE ONE TABLET BY MOUTH EVERY MONDAY, WEDNESDAY, AND FRIDAY  
     
   
   
   
  
 nitroglycerin 0.4 mg SL tablet Commonly known as:  NITROSTAT Take 1 tablet SL every 5 minutes up to 3 doses as needed for CP  
     
   
   
   
  
 oxyCODONE IR 5 mg immediate release tablet Commonly known as:  Stanley Camel Take 1 Tab by mouth every eight (8) hours as needed for Pain. Max Daily Amount: 15 mg.  
 5 mg OXYGEN-AIR DELIVERY SYSTEMS  
   
 2 lpm cont. polyethylene glycol 17 gram packet Commonly known as:  Aurther Buck Take 1 Packet by mouth daily as needed (constipation). 17 g  
    
   
   
   
  
 pravastatin 80 mg tablet Commonly known as:  PRAVACHOL Take 1 Tab by mouth nightly. 80 mg  
    
   
   
   
  
  
 raNITIdine 150 mg tablet Commonly known as:  ZANTAC Take 150 mg by mouth nightly. 150 mg Senna 8.6 mg Cap Generic drug:  sennosides Take 17.2 mg by mouth nightly. 17.2 mg  
    
   
   
   
  
  
 sildenafil citrate 50 mg tablet Commonly known as:  VIAGRA Take 1 Tab by mouth as needed. 50 mg Where to Get Your Medications These medications were sent to [address] [place] [address] Way [address] Phone:  [phone]  
  potassium chloride 20 mEq tablet Information on where to get these meds will be given to you by the nurse or doctor. ! Ask your nurse or doctor about these medications  
  gabapentin 600 mg tablet Opioid Education Prescription Opioids: What You Need to Know: Prescription opioids can be used to help relieve moderate-to-severe pain and are often prescribed following a surgery or injury, or for certain health conditions. These medications can be an important part of treatment but also come with serious risks. Opioids are strong pain medicines. Examples include hydrocodone, oxycodone, fentanyl, and morphine. Heroin is an example of an illegal opioid. It is important to work with your health care provider to make sure you are getting the safest, most effective care. WHAT ARE THE RISKS AND SIDE EFFECTS OF OPIOID USE? Prescription opioids carry serious risks of addiction and overdose, especially with prolonged use. An opioid overdose, often marked by slow breathing, can cause sudden death. The use of prescription opioids can have a number of side effects as well, even when taken as directed. · Tolerance-meaning you might need to take more of a medication for the same pain relief · Physical dependence-meaning you have symptoms of withdrawal when the medication is stopped. Withdrawal symptoms can include nausea, sweating, chills, diarrhea, stomach cramps, and muscle aches. Withdrawal can last up to several weeks, depending on which drug you took and how long you took it. · Increased sensitivity to pain · Constipation · Nausea, vomiting, and dry mouth · Sleepiness and dizziness · Confusion · Depression · Low levels of testosterone that can result in lower sex drive, energy, and strength · Itching and sweating RISKS ARE GREATER WITH:      
· History of drug misuse, substance use disorder, or overdose · Mental health conditions (such as depression or anxiety) · Sleep apnea · Older age (72 years or older) · Pregnancy Avoid alcohol while taking prescription opioids. Also, unless specifically advised by your health care provider, medications to avoid include: · Benzodiazepines (such as Xanax or Valium) · Muscle relaxants (such as Soma or Flexeril) · Hypnotics (such as Ambien or Lunesta) · Other prescription opioids KNOW YOUR OPTIONS Talk to your health care provider about ways to manage your pain that don't involve prescription opioids. Some of these options may actually work better and have fewer risks and side effects. Options may include: 
· Pain relievers such as acetaminophen, ibuprofen, and naproxen · Some medications that are also used for depression or seizures · Physical therapy and exercise · Counseling to help patients learn how to cope better with triggers of pain and stress. · Application of heat or cold compress · Massage therapy · Relaxation techniques Be Informed Make sure you know the name of your medication, how much and how often to take it, and its potential risks & side effects. IF YOU ARE PRESCRIBED OPIOIDS FOR PAIN: 
· Never take opioids in greater amounts or more often than prescribed. Remember the goal is not to be pain-free but to manage your pain at a tolerable level. · Follow up with your primary care provider to: · Work together to create a plan on how to manage your pain. · Talk about ways to help manage your pain that don't involve prescription opioids. · Talk about any and all concerns and side effects. · Help prevent misuse and abuse. · Never sell or share prescription opioids · Help prevent misuse and abuse. · Store prescription opioids in a secure place and out of reach of others (this may include visitors, children, friends, and family). · Safely dispose of unused/unwanted prescription opioids: Find your community drug take-back program or your pharmacy mail-back program, or flush them down the toilet, following guidance from the Food and Drug Administration (www.fda.gov/Drugs/ResourcesForYou). · Visit www.cdc.gov/drugoverdose to learn about the risks of opioid abuse and overdose.  
· If you believe you may be struggling with addiction, tell your health care provider and ask for guidance or call Floyd Peralta at 4-991-717-HELP. Discharge Instructions Learning About Diabetes and Coronary Artery Disease How are diabetes and heart disease connected? Many people think diabetes and heart disease go hand in hand. But having diabetes doesn't have to mean that you are going to have a heart attack someday. Healthy living can help prevent many of the problems that come with both diabetes and heart disease. For some people, diabetes can cause problems in your body that may lead to heart disease. Diabetes can make the problems of heart disease worse. Experts do not fully understand how diabetes affects the heart. Many things can lead to heart disease, including high blood sugar, insulin resistance, high cholesterol, and high blood pressure. But lifestyle and genetics may also affect a person's risk. But here's the good news: The good things you're doing to stay healthy with diabetes-eating healthy foods, quitting smoking, getting exercise and more-are also helping your heart. What increases your risk for heart disease? When you have diabetes, your risk for heart disease is even higher if you have: 
· High blood pressure, which pushes blood through the arteries with too much force. Over time, this damages the walls of the arteries. · High cholesterol, which causes the buildup of a kind of fat inside the blood vessel walls. This buildup can lower blood flow to the heart muscle and raise your risk for having a heart attack. · Kidney damage, which shares many of the risk factors for heart disease (such as high blood sugar, high blood pressure, and high cholesterol). How can you keep your heart healthy when you have diabetes? Managing your diabetes and keeping your heart healthy are two sides of the same coin. Here are some things you can do. · Test your blood sugar levels and get your diabetes tests on schedule. Try to keep your numbers within your target range. · Keep track of your blood pressure. The target for most people with diabetes is below 140/90. Your doctor will give you a goal that's right for you. If your blood pressure is high, your treatment may also include medicine. Changes in your lifestyle, such as staying at a healthy weight, may also help you lower your blood pressure. · Eat heart-healthy foods. These include fruits, vegetables, whole grains, fish, and low-fat or nonfat dairy foods. Limit sodium, alcohol, and sweets. · If your doctor recommends it, get more exercise. Walking is a good choice. Bit by bit, increase the amount you walk every day. Try for at least 30 minutes on most days of the week. · Do not smoke. Smoking can make diabetes and heart disease worse. If you need help quitting, talk to your doctor about stop-smoking programs and medicines. These can increase your chances of quitting for good. · Your doctor may talk with you about taking medicines for your heart. For example, your doctor may suggest taking a statin or daily aspirin. Where can you learn more? Go to http://armando-nirav.info/. Enter G158 in the search box to learn more about \"Learning About Diabetes and Coronary Artery Disease. \" Current as of: December 7, 2017 Content Version: 11.7 © 0881-2610 [employer]. Care instructions adapted under license by [employer] (which disclaims liability or warranty for this information). If you have questions about a medical condition or this instruction, always ask your healthcare professional. [place] [address] any warranty or liability for your use of this information. Learning About High Blood Sugar What is high blood sugar?  
 
Your body turns the food you eat into glucose (sugar), which it uses for energy. But if your body isn't able to use the sugar right away, it can build up in your blood and lead to high blood sugar. When the amount of sugar in your blood stays too high for too much of the time, you may have diabetes. Diabetes is a disease that can cause serious health problems. The good news is that lifestyle changes may help you get your blood sugar back to normal and avoid or delay diabetes. What causes high blood sugar? Sugar (glucose) can build up in your blood if you: · Are overweight. · Have a family history of diabetes. · Take certain medicines, such as steroids. What are the symptoms? Having high blood sugar may not cause any symptoms at all. Or it may make you feel very thirsty or very hungry. You may also urinate more often than usual, have blurry vision, or lose weight without trying. How is high blood sugar treated? You can take steps to lower your blood sugar level if you understand what makes it get higher. Your doctor may want you to learn how to test your blood sugar level at home. Then you can see how illness, stress, or different kinds of food or medicine raise or lower your blood sugar level. Other tests may be needed to see if you have diabetes. How can you prevent high blood sugar? · Watch your weight. If you're overweight, losing just a small amount of weight may help. Reducing fat around your waist is most important. · Limit the amount of calories, sweets, and unhealthy fat you eat. Ask your doctor if a dietitian can help you. A registered dietitian can help you create meal plans that fit your lifestyle. · Get at least 30 minutes of exercise on most days of the week. Exercise helps control your blood sugar. It also helps you maintain a healthy weight. Walking is a good choice. You also may want to do other activities, such as running, swimming, cycling, or playing tennis or team sports. · If your doctor prescribed medicines, take them exactly as prescribed. Call your doctor if you think you are having a problem with your medicine. You will get more details on the specific medicines your doctor prescribes. Follow-up care is a key part of your treatment and safety. Be sure to make and go to all appointments, and call your doctor if you are having problems. It's also a good idea to know your test results and keep a list of the medicines you take. Where can you learn more? Go to http://armando-nirav.info/. Enter O108 in the search box to learn more about \"Learning About High Blood Sugar. \" Current as of: December 7, 2017 Content Version: 11.7 © 5294-0426 [employer]. Care instructions adapted under license by Source[employer] (which disclaims liability or warranty for this information). If you have questions about a medical condition or this instruction, always ask your healthcare professional. [place] [address] any warranty or liability for your use of this information. [employer] Announcement We are excited to announce that we are making your provider's discharge notes available to you in [employer]. You will see these notes when they are completed and signed by the physician that discharged you from your recent hospital stay. If you have any questions or concerns about any information you see in [employer], please call the Health Information Department where you were seen or reach out to your Primary Care Provider for more information about your plan of care. Introducing [facility]! [facility] introduces [employer] patient portal. Now you can access parts of your medical record, email your doctor's office, and request medication refills online. 1. In your internet browser, go to [email or web address] 2. Click on the First Time User? Click Here link in the Sign In box. You will see the New Member Sign Up page. 3. Enter your Mangia Access Code exactly as it appears below. You will not need to use this code after youve completed the sign-up process. If you do not sign up before the expiration date, you must request a new code. · Mangia Access Code: [email or web address] Expires: 8/23/2018 10:05 AM 
 
4. Enter the last four digits of your Social Security Number (xxxx) and Date of Birth (mm/dd/yyyy) as indicated and click Submit. You will be taken to the next sign-up page. 5. Create a Mangia ID. This will be your Mangia login ID and cannot be changed, so think of one that is secure and easy to remember. 6. Create a Mangia password. You can change your password at any time. 7. Enter your Password Reset Question and Answer. This can be used at a later time if you forget your password. 8. Enter your e-mail address. You will receive e-mail notification when new information is available in [facility] [address]. 9. Click Sign Up. You can now view and download portions of your medical record. 10. Click the Download Summary menu link to download a portable copy of your medical information. If you have questions, please visit the Frequently Asked Questions section of the Mangia website. Remember, Mangia is NOT to be used for urgent needs. For medical emergencies, dial 911. Now available from your iPhone and Android! Introducing Jordan Fisher As a [employer] patient, I wanted to make you aware of our electronic visit tool called Jordan Montoyafaustinojomar. [employer] 24/7 allows you to connect within minutes with a medical provider 24 hours a day, seven days a week via a mobile device or tablet or logging into a secure website from your computer. You can access Jordan Fisher from anywhere in the [place].  
 
A virtual visit might be right for you when you have a simple condition and feel like you just dont want to get out of bed, or cant get away from work for an appointment, when your regular [facility] provider is not available (evenings, weekends or holidays), or when youre out of town and need minor care. Electronic visits cost only $49 and if the [facility] 24/7 provider determines a prescription is needed to treat your condition, one can be electronically transmitted to a nearby pharmacy*. Please take a moment to enroll today if you have not already done so. The enrollment process is free and takes just a few minutes. To enroll, please download the [facility] 24/7 tere to your tablet or phone, or visit [email or web address] to enroll on your computer. And, as an [address] [place] [address] patient with a [employer] account, the results of your visits will be scanned into your electronic medical record and your primary care provider will be able to view the scanned results. We urge you to continue to see your regular [facility] provider for your ongoing medical care. And while your primary care provider may not be the one available when you seek a Jordan Fisher virtual visit, the peace of mind you get from getting a real diagnosis real time can be priceless. For more information on Jordan Wildfirefaustinofin, view our Frequently Asked Questions (FAQs) at [email or web address]. Sincerely, 
 
Lorraine Russo MD 
Chief Medical Officer [address] [place] [address] *:  certain medications cannot be prescribed via Jordan Fisher Providers Seen During Your Hospitalization Provider Specialty Primary office phone Dionisio Hernández MD Emergency Medicine [phone] Paolo Castillo MD [facility] [phone] Immunizations Administered for This Admission Name Date  
 TB Skin Test (PPD) Intradermal 7/15/2018 Your Primary Care Physician (PCP) Primary Care Physician Office Phone Office Fax Dave Saldivar [phone] You are allergic to the following Allergen Reactions Dilaudid (Hydromorphone) Other (comments) Hotflashes, patient states he's not allergic Iodinated Contrast- Oral And Iv Dye Other (comments) \"shuts my kidneys down\" Penicillins Unknown (comments) Pt states he is not allergic his mother just wouldn't allow him take it Recent Documentation Height Weight BMI Smoking Status 1.676 m 141.7 kg 50.42 kg/m2 Former Smoker Emergency Contacts Name Discharge Info Relation Home Work Mobile [facility] DISCHARGE CAREGIVER [3] Daughter [21] [phone] Tete Root  Daughter [21] [phone] Patient Belongings The following personal items are in your possession at time of discharge: 
  Dental Appliances: None         Home Medications: None   Jewelry: None  Clothing: Shirt, Shorts, Socks    Other Valuables: [employer] Please provide this summary of care documentation to your next provider. Signatures-by signing, you are acknowledging that this After Visit Summary has been reviewed with you and you have received a copy. Patient Signature:  ____________________________________________________________ Date:  ____________________________________________________________  
  
[facility] Provider Signature:  ____________________________________________________________ Date:  ____________________________________________________________

## 2018-07-15 NOTE — IP AVS SNAPSHOT
[address] 
[phone] Patient: Lary Nevarez MRN: [id number] TGT:1/2/1320 A check ej indicates which time of day the medication should be taken. My Medications START taking these medications Instructions Each Dose to Equal  
 Morning Noon Evening Bedtime  
 potassium chloride 20 mEq tablet Commonly known as:  K-DUR, KLOR-CON Take 0.5 Tabs by mouth daily. 10 mEq CHANGE how you take these medications Instructions Each Dose to Equal  
 Morning Noon Evening Bedtime  
 gabapentin 600 mg tablet Commonly known as:  NEURONTIN What changed:  when to take this Take 0.5 Tabs by mouth three (3) times daily. Indications: Pt. takes 1 to 2 capsules every 8 hours 300 mg CONTINUE taking these medications Instructions Each Dose to Equal  
 Morning Noon Evening Bedtime  
 allopurinol 100 mg tablet Commonly known as:  Holley Bre Take 100 mg by mouth two (2) times a day. 100 mg  
    
  
   
   
   
  
 bumetanide 2 mg tablet Commonly known as:  Allegra Peaches Take 1 Tab by mouth two (2) times a day. 2 mg  
    
  
   
   
  
   
  
 carvedilol 6.25 mg tablet Commonly known as:  Deepak Pintos Take 1 Tab by mouth two (2) times daily (with meals). 6.25 mg  
    
  
   
   
  
   
  
 cpap machine kit 10 cm qhs  
     
   
   
   
  
 cyclobenzaprine 10 mg tablet Commonly known as:  FLEXERIL Take 1 Tab by mouth three (3) times daily as needed for Muscle Spasm(s). 10 mg  
    
   
   
   
  
 docusate sodium 100 mg capsule Commonly known as:  Dianewahalima Fuller Take 1 Cap by mouth two (2) times a day. 100 mg  
    
  
   
   
  
   
  
 glucose blood VI test strips strip Commonly known as:  ASCENSIA AUTODISC VI, ONE TOUCH ULTRA TEST VI Test strips for 30 day supply hydrALAZINE 25 mg tablet Commonly known as:  APRESOLINE Take 1 Tab by mouth three (3) times daily. 25 mg  
    
  
   
  
   
  
   
  
 insulin glargine 100 unit/mL injection Commonly known as:  LANTUS U-100 INSULIN  
   
 50 Units by SubCUTAneous route nightly. 50 Units  
    
   
   
   
  
  
 isosorbide dinitrate 20 mg tablet Commonly known as:  ISORDIL Take 1 Tab by mouth three (3) times daily. 20 mg  
    
  
   
  
   
  
   
  
 metOLazone 2.5 mg tablet Commonly known as:  ZAROXOLYN  
   
 TAKE ONE TABLET BY MOUTH EVERY MONDAY, WEDNESDAY, AND FRIDAY  
     
   
   
   
  
 nitroglycerin 0.4 mg SL tablet Commonly known as:  NITROSTAT Take 1 tablet SL every 5 minutes up to 3 doses as needed for CP  
     
   
   
   
  
 oxyCODONE IR 5 mg immediate release tablet Commonly known as:  Jyothi Meadeai Take 1 Tab by mouth every eight (8) hours as needed for Pain. Max Daily Amount: 15 mg.  
 5 mg OXYGEN-AIR DELIVERY SYSTEMS  
   
 2 lpm cont. polyethylene glycol 17 gram packet Commonly known as:  Mario Omar Take 1 Packet by mouth daily as needed (constipation). 17 g  
    
   
   
   
  
 pravastatin 80 mg tablet Commonly known as:  PRAVACHOL Take 1 Tab by mouth nightly. 80 mg  
    
   
   
   
  
  
 raNITIdine 150 mg tablet Commonly known as:  ZANTAC Take 150 mg by mouth nightly. 150 mg Senna 8.6 mg Cap Generic drug:  sennosides Take 17.2 mg by mouth nightly. 17.2 mg  
    
   
   
   
  
  
 sildenafil citrate 50 mg tablet Commonly known as:  VIAGRA Take 1 Tab by mouth as needed. 50 mg Where to Get Your Medications These medications were sent to [address] [place] [address] Mac Weber [address] [place] [address] Way [address] Phone:  [phone] potassium chloride 20 mEq tablet Information on where to get these meds will be given to you by the nurse or doctor. ! Ask your nurse or doctor about these medications  
  gabapentin 600 mg tablet

## 2018-07-16 LAB
ALBUMIN SERPL-MCNC: 3.2 G/DL (ref 3.5–5)
ALBUMIN/GLOB SERPL: 0.7 {RATIO} (ref 1.2–3.5)
ALP SERPL-CCNC: 259 U/L (ref 50–136)
ALT SERPL-CCNC: 28 U/L (ref 12–65)
AMPHET UR QL SCN: NEGATIVE
ANION GAP SERPL CALC-SCNC: 11 MMOL/L (ref 7–16)
ANION GAP SERPL CALC-SCNC: 9 MMOL/L (ref 7–16)
APPEARANCE UR: CLEAR
AST SERPL-CCNC: 30 U/L (ref 15–37)
ATRIAL RATE: 75 BPM
BACTERIA URNS QL MICRO: 0 /HPF
BARBITURATES UR QL SCN: NEGATIVE
BASOPHILS # BLD: 0 K/UL (ref 0–0.2)
BASOPHILS NFR BLD: 0 % (ref 0–2)
BENZODIAZ UR QL: NEGATIVE
BILIRUB SERPL-MCNC: 1.2 MG/DL (ref 0.2–1.1)
BILIRUB UR QL: NEGATIVE
BUN SERPL-MCNC: 65 MG/DL (ref 6–23)
BUN SERPL-MCNC: 74 MG/DL (ref 6–23)
CALCIUM SERPL-MCNC: 8.9 MG/DL (ref 8.3–10.4)
CALCIUM SERPL-MCNC: 9 MG/DL (ref 8.3–10.4)
CALCULATED P AXIS, ECG09: 56 DEGREES
CALCULATED R AXIS, ECG10: 168 DEGREES
CALCULATED T AXIS, ECG11: 91 DEGREES
CANNABINOIDS UR QL SCN: NEGATIVE
CHLORIDE SERPL-SCNC: 80 MMOL/L (ref 98–107)
CHLORIDE SERPL-SCNC: 81 MMOL/L (ref 98–107)
CHLORIDE UR-SCNC: <10 MMOL/L
CO2 SERPL-SCNC: 37 MMOL/L (ref 21–32)
CO2 SERPL-SCNC: 38 MMOL/L (ref 21–32)
COCAINE UR QL SCN: NEGATIVE
COLOR UR: YELLOW
CREAT SERPL-MCNC: 3.1 MG/DL (ref 0.8–1.5)
CREAT SERPL-MCNC: 3.12 MG/DL (ref 0.8–1.5)
CREAT UR-MCNC: 55.6 MG/DL
DIAGNOSIS, 93000: NORMAL
DIFFERENTIAL METHOD BLD: ABNORMAL
EOSINOPHIL # BLD: 0.2 K/UL (ref 0–0.8)
EOSINOPHIL NFR BLD: 2 % (ref 0.5–7.8)
ERYTHROCYTE [DISTWIDTH] IN BLOOD BY AUTOMATED COUNT: 14.6 % (ref 11.9–14.6)
EST. AVERAGE GLUCOSE BLD GHB EST-MCNC: ABNORMAL MG/DL
GLOBULIN SER CALC-MCNC: 4.4 G/DL (ref 2.3–3.5)
GLUCOSE BLD STRIP.AUTO-MCNC: 338 MG/DL (ref 65–100)
GLUCOSE BLD STRIP.AUTO-MCNC: 387 MG/DL (ref 65–100)
GLUCOSE BLD STRIP.AUTO-MCNC: 449 MG/DL (ref 65–100)
GLUCOSE BLD STRIP.AUTO-MCNC: 467 MG/DL (ref 65–100)
GLUCOSE BLD STRIP.AUTO-MCNC: 567 MG/DL (ref 65–100)
GLUCOSE SERPL-MCNC: 351 MG/DL (ref 65–100)
GLUCOSE SERPL-MCNC: 394 MG/DL (ref 65–100)
GLUCOSE UR STRIP.AUTO-MCNC: >1000 MG/DL
HBA1C MFR BLD: >16 % (ref 4.8–6)
HCT VFR BLD AUTO: 47.2 % (ref 41.1–50.3)
HGB BLD-MCNC: 16.2 G/DL (ref 13.6–17.2)
HGB UR QL STRIP: ABNORMAL
IMM GRANULOCYTES # BLD: 0 K/UL (ref 0–0.5)
IMM GRANULOCYTES NFR BLD AUTO: 0 % (ref 0–5)
INR PPP: 1
KETONES UR QL STRIP.AUTO: NEGATIVE MG/DL
LEUKOCYTE ESTERASE UR QL STRIP.AUTO: NEGATIVE
LYMPHOCYTES # BLD: 3 K/UL (ref 0.5–4.6)
LYMPHOCYTES NFR BLD: 41 % (ref 13–44)
MAGNESIUM SERPL-MCNC: 2.4 MG/DL (ref 1.8–2.4)
MCH RBC QN AUTO: 28 PG (ref 26.1–32.9)
MCHC RBC AUTO-ENTMCNC: 34.3 G/DL (ref 31.4–35)
MCV RBC AUTO: 81.7 FL (ref 79.6–97.8)
METHADONE UR QL: NEGATIVE
MM INDURATION POC: 0 MM (ref 0–5)
MONOCYTES # BLD: 0.5 K/UL (ref 0.1–1.3)
MONOCYTES NFR BLD: 7 % (ref 4–12)
NEUTS SEG # BLD: 3.7 K/UL (ref 1.7–8.2)
NEUTS SEG NFR BLD: 50 % (ref 43–78)
NITRITE UR QL STRIP.AUTO: NEGATIVE
OPIATES UR QL: NEGATIVE
OSMOLALITY UR: 360 MOSM/KG H2O (ref 50–1400)
OTHER OBSERVATIONS,UCOM: ABNORMAL
P-R INTERVAL, ECG05: 244 MS
PCP UR QL: NEGATIVE
PH UR STRIP: 5.5 [PH] (ref 5–9)
PHOSPHATE SERPL-MCNC: 3.1 MG/DL (ref 2.5–4.5)
PLATELET # BLD AUTO: 159 K/UL (ref 150–450)
PMV BLD AUTO: 12 FL (ref 10.8–14.1)
POTASSIUM SERPL-SCNC: 3 MMOL/L (ref 3.5–5.1)
POTASSIUM SERPL-SCNC: 3 MMOL/L (ref 3.5–5.1)
POTASSIUM UR-SCNC: 10 MMOL/L
PPD POC: NORMAL NEGATIVE
PROT SERPL-MCNC: 7.6 G/DL (ref 6.3–8.2)
PROT UR STRIP-MCNC: 30 MG/DL
PROT UR-MCNC: 51 MG/DL
PROT/CREAT UR-RTO: 0.9
PROTHROMBIN TIME: 13.1 SEC (ref 11.5–14.5)
Q-T INTERVAL, ECG07: 362 MS
QRS DURATION, ECG06: 114 MS
QTC CALCULATION (BEZET), ECG08: 404 MS
RBC # BLD AUTO: 5.78 M/UL (ref 4.23–5.67)
SODIUM SERPL-SCNC: 127 MMOL/L (ref 136–145)
SODIUM SERPL-SCNC: 129 MMOL/L (ref 136–145)
SODIUM UR-SCNC: 19 MMOL/L
SP GR UR REFRACTOMETRY: 1.02 (ref 1–1.02)
TROPONIN I SERPL-MCNC: 0.05 NG/ML (ref 0.02–0.05)
UROBILINOGEN UR QL STRIP.AUTO: 0.2 EU/DL (ref 0.2–1)
VENTRICULAR RATE, ECG03: 75 BPM
WBC # BLD AUTO: 7.5 K/UL (ref 4.3–11.1)
WBC URNS QL MICRO: ABNORMAL /HPF

## 2018-07-16 PROCEDURE — 74011636637 HC RX REV CODE- 636/637: Performed by: INTERNAL MEDICINE

## 2018-07-16 PROCEDURE — 85610 PROTHROMBIN TIME: CPT | Performed by: HOSPITALIST

## 2018-07-16 PROCEDURE — 74011000250 HC RX REV CODE- 250: Performed by: HOSPITALIST

## 2018-07-16 PROCEDURE — 74011636637 HC RX REV CODE- 636/637: Performed by: HOSPITALIST

## 2018-07-16 PROCEDURE — 74011250637 HC RX REV CODE- 250/637: Performed by: INTERNAL MEDICINE

## 2018-07-16 PROCEDURE — 94660 CPAP INITIATION&MGMT: CPT

## 2018-07-16 PROCEDURE — 80053 COMPREHEN METABOLIC PANEL: CPT | Performed by: HOSPITALIST

## 2018-07-16 PROCEDURE — 36415 COLL VENOUS BLD VENIPUNCTURE: CPT | Performed by: HOSPITALIST

## 2018-07-16 PROCEDURE — 74011250637 HC RX REV CODE- 250/637: Performed by: HOSPITALIST

## 2018-07-16 PROCEDURE — 84484 ASSAY OF TROPONIN QUANT: CPT | Performed by: HOSPITALIST

## 2018-07-16 PROCEDURE — 85025 COMPLETE CBC W/AUTO DIFF WBC: CPT | Performed by: HOSPITALIST

## 2018-07-16 PROCEDURE — 83735 ASSAY OF MAGNESIUM: CPT | Performed by: HOSPITALIST

## 2018-07-16 PROCEDURE — 74011250636 HC RX REV CODE- 250/636: Performed by: HOSPITALIST

## 2018-07-16 PROCEDURE — 84100 ASSAY OF PHOSPHORUS: CPT | Performed by: HOSPITALIST

## 2018-07-16 PROCEDURE — C8929 TTE W OR WO FOL WCON,DOPPLER: HCPCS

## 2018-07-16 PROCEDURE — 83036 HEMOGLOBIN GLYCOSYLATED A1C: CPT | Performed by: HOSPITALIST

## 2018-07-16 PROCEDURE — 82962 GLUCOSE BLOOD TEST: CPT

## 2018-07-16 PROCEDURE — 65660000000 HC RM CCU STEPDOWN

## 2018-07-16 RX ORDER — NITROGLYCERIN 0.4 MG/1
0.4 TABLET SUBLINGUAL AS NEEDED
Status: DISCONTINUED | OUTPATIENT
Start: 2018-07-16 | End: 2018-07-17 | Stop reason: HOSPADM

## 2018-07-16 RX ORDER — INSULIN GLARGINE 100 [IU]/ML
50 INJECTION, SOLUTION SUBCUTANEOUS
Status: DISCONTINUED | OUTPATIENT
Start: 2018-07-16 | End: 2018-07-17 | Stop reason: HOSPADM

## 2018-07-16 RX ORDER — NITROGLYCERIN 400 UG/1
1 SPRAY ORAL
Status: DISCONTINUED | OUTPATIENT
Start: 2018-07-16 | End: 2018-07-16 | Stop reason: SDUPTHER

## 2018-07-16 RX ORDER — NITROGLYCERIN 0.4 MG/1
TABLET SUBLINGUAL
Status: ACTIVE
Start: 2018-07-16 | End: 2018-07-16

## 2018-07-16 RX ORDER — POTASSIUM CHLORIDE 20 MEQ/1
40 TABLET, EXTENDED RELEASE ORAL
Status: COMPLETED | OUTPATIENT
Start: 2018-07-16 | End: 2018-07-16

## 2018-07-16 RX ORDER — FAMOTIDINE 20 MG/1
20 TABLET, FILM COATED ORAL DAILY
Status: DISCONTINUED | OUTPATIENT
Start: 2018-07-17 | End: 2018-07-17 | Stop reason: HOSPADM

## 2018-07-16 RX ORDER — GABAPENTIN 100 MG/1
100 CAPSULE ORAL 3 TIMES DAILY
Status: DISCONTINUED | OUTPATIENT
Start: 2018-07-16 | End: 2018-07-17 | Stop reason: HOSPADM

## 2018-07-16 RX ADMIN — HEPARIN SODIUM 5000 UNITS: 5000 INJECTION, SOLUTION INTRAVENOUS; SUBCUTANEOUS at 22:58

## 2018-07-16 RX ADMIN — HEPARIN SODIUM 5000 UNITS: 5000 INJECTION, SOLUTION INTRAVENOUS; SUBCUTANEOUS at 08:27

## 2018-07-16 RX ADMIN — INSULIN LISPRO 10 UNITS: 100 INJECTION, SOLUTION INTRAVENOUS; SUBCUTANEOUS at 11:31

## 2018-07-16 RX ADMIN — PERFLUTREN 1 ML: 6.52 INJECTION, SUSPENSION INTRAVENOUS at 09:00

## 2018-07-16 RX ADMIN — ISOSORBIDE DINITRATE 20 MG: 10 TABLET ORAL at 21:50

## 2018-07-16 RX ADMIN — GABAPENTIN 100 MG: 100 CAPSULE ORAL at 21:50

## 2018-07-16 RX ADMIN — FAMOTIDINE 20 MG: 20 TABLET ORAL at 08:28

## 2018-07-16 RX ADMIN — INSULIN LISPRO 8 UNITS: 100 INJECTION, SOLUTION INTRAVENOUS; SUBCUTANEOUS at 08:28

## 2018-07-16 RX ADMIN — INSULIN HUMAN 15 UNITS: 100 INJECTION, SOLUTION PARENTERAL at 21:49

## 2018-07-16 RX ADMIN — Medication 10 ML: at 13:37

## 2018-07-16 RX ADMIN — SENNOSIDES 8.6 MG: 8.6 TABLET, FILM COATED ORAL at 21:50

## 2018-07-16 RX ADMIN — CARVEDILOL 6.25 MG: 6.25 TABLET, FILM COATED ORAL at 17:39

## 2018-07-16 RX ADMIN — INSULIN GLARGINE 50 UNITS: 100 INJECTION, SOLUTION SUBCUTANEOUS at 21:50

## 2018-07-16 RX ADMIN — ALLOPURINOL 100 MG: 100 TABLET ORAL at 08:28

## 2018-07-16 RX ADMIN — HYDRALAZINE HYDROCHLORIDE 25 MG: 25 TABLET, FILM COATED ORAL at 08:28

## 2018-07-16 RX ADMIN — HYDRALAZINE HYDROCHLORIDE 25 MG: 25 TABLET, FILM COATED ORAL at 17:39

## 2018-07-16 RX ADMIN — OXYCODONE HYDROCHLORIDE 5 MG: 5 TABLET ORAL at 21:51

## 2018-07-16 RX ADMIN — HEPARIN SODIUM 5000 UNITS: 5000 INJECTION, SOLUTION INTRAVENOUS; SUBCUTANEOUS at 17:39

## 2018-07-16 RX ADMIN — INSULIN HUMAN 5 UNITS: 100 INJECTION, SOLUTION PARENTERAL at 21:49

## 2018-07-16 RX ADMIN — ISOSORBIDE DINITRATE 20 MG: 10 TABLET ORAL at 17:39

## 2018-07-16 RX ADMIN — PRAVASTATIN SODIUM 80 MG: 80 TABLET ORAL at 21:50

## 2018-07-16 RX ADMIN — Medication 10 ML: at 21:51

## 2018-07-16 RX ADMIN — INSULIN HUMAN 15 UNITS: 100 INJECTION, SOLUTION PARENTERAL at 17:40

## 2018-07-16 RX ADMIN — HYDRALAZINE HYDROCHLORIDE 25 MG: 25 TABLET, FILM COATED ORAL at 21:50

## 2018-07-16 RX ADMIN — Medication 5 ML: at 05:39

## 2018-07-16 RX ADMIN — CARVEDILOL 6.25 MG: 6.25 TABLET, FILM COATED ORAL at 08:28

## 2018-07-16 RX ADMIN — GABAPENTIN 100 MG: 100 CAPSULE ORAL at 08:28

## 2018-07-16 RX ADMIN — POTASSIUM CHLORIDE 40 MEQ: 20 TABLET, EXTENDED RELEASE ORAL at 17:39

## 2018-07-16 RX ADMIN — ISOSORBIDE DINITRATE 20 MG: 10 TABLET ORAL at 08:28

## 2018-07-16 NOTE — DIABETES MGMT
Patient admitted 7/15/18 with hyperglycemia, acute renal failure stage 3 seen by RN, KELTON. HbA1c >16%. Blood glucose on admission 649. Pt admits to noncompliance with insulin regimen. Pt states that he has not taken any insulin in months and has no blood glucose meter. Pt has been seen by the diabetes education team and RD and received education during admissions in the past. History of morbid obesity BMI 27.97, chronic systolic CHF, pulmonary HTN, HTN, CKD, KELSIE, AICD present, astthma, CAD, and GERD. Pt given educational material, \"Diabetes Self-Management: A Patient Teaching Guide\", which was reviewed with pt. Explained basic physiology of diabetes, as well as causes, s/s, and treatments for hypoglycemia and hyperglycemia. Described the effects of poor glycemic control on the development of long-term complications such as renal, eye, nerve, and cardiovascular disease. Described proper diabetic foot care and the importance of checking feet qd. Educated re: effects of carbohydrates on blood glucose, the \"plate method\" of healthy meal planning, basics of healthy meal plan, and Consistent Carbohydrate Diet. Dietitian has met with pt. Also explained the relationship between hyperglycemia and infection. Discussed target goals for blood glucose and A1C. Pt verbalizes understanding of teaching. Explained the importance of blood glucose monitoring. Recommended frequency of qid ac, hs, and to record in log book to take to PCP appointment. Provided blood glucose meter, strips, and instructed pt in use of meter. Pt was able to return demonstrate correct use of meter. Discussed the significance of an HbA1c of >16%. Educated pt re: current basal/bolus regimen of Lantus 30 units daily and Humalog sliding scale coverage 4x/day, ac and hs, including type of insulin, timing with meals, onset, duration of effect, and peak of insulin dose. Pt verbalizes understanding.   Stressed the importance of follow up care for diabetes management with PCP and compliance with diabetes regimen. Pt states that he used to be \"good about taking my insulin, but I just stopped doing all of that\". Pt states that he plans on taking his medications at discharge. Pt has no further questions at this time.

## 2018-07-16 NOTE — PROGRESS NOTES
Care Management Interventions  PCP Verified by CM: Yes  Palliative Care Criteria Met (RRAT>21 & CHF Dx)?: No (RRAT 25 Dx Hyperglycemia )  Transition of Care Consult (CM Consult): Discharge Planning  Discharge Durable Medical Equipment: No  Physical Therapy Consult: No  Occupational Therapy Consult: No  Speech Therapy Consult: No  Current Support Network: Other (lives with son and daughter)  Plan discussed with Pt/Family/Caregiver: Yes  Freedom of Choice Offered: Yes  Discharge Location  Discharge Placement: Home  Met with patient for d/c planning. Patient alert and oriented x 3, independent of ADL's and lives with his son and daughter. He has RW and O2 at home. He would like a aisha chair but explained would need to speak with his PCP Dr. Earline Mejia about ordering this after d/c and he voiced understanding. He has [facility] and goes to [facility]. States at times has difficulty with co-pay but states his children help him out. PCP is Dr. Earline Mejia who he saw a month ago. Current d/c plan is home with children. CM following.

## 2018-07-16 NOTE — ED PROVIDER NOTES
HPI Comments: Per nurse's notes: \"Patient to ED with c/c hyperglycemia. Patient with hx of DM, non-compliant with Rx. States has been w/o insulin for \"several months. \" + for EMS. Patient reports polyuria and associated weakness. Patient alert/oriented though very lethargic. When awake, patient o2 sats greater than 93%. However, when left alone, patient to very restful quite quickly with drop in O2 sats into high 80s. Patient reports some intermittent CP over the past few days. Patient reports took home NTG with relief. \"    Patient is a 47 y.o. male presenting with hyperglycemia. The history is provided by the patient and a relative. High Blood Sugar    This is a new problem. The current episode started more than 2 days ago. The problem occurs constantly. The problem has been gradually worsening. The pain is associated with an unknown (possibly secondary to stopping his insulin about 6 months ago) factor. The patient is experiencing no pain. Associated symptoms include nausea, frequency and chest pain. Pertinent negatives include no anorexia, no fever, no belching, no diarrhea, no flatus, no hematochezia, no melena, no vomiting, no constipation, no dysuria, no hematuria, no headaches, no arthralgias, no myalgias, no trauma, no testicular pain and no back pain. Nothing worsens the pain. The pain is relieved by nothing. His past medical history is significant for DM. His past medical history does not include PUD, gallstones, GERD, ulcerative colitis, Crohn's disease, irritable bowel syndrome, cancer, UTI, pancreatitis, diverticulitis, atrial fibrillation, kidney stones or small bowel obstruction. The patient's surgical history non-contributory. Past Medical History:   Diagnosis Date    Acquired claw toe of right foot     Acute encephalopathy 8/56/2512    Acute systolic heart failure ([facility] May, 2009    Also had transient acute renal failure secondary to poor perfusion.     AICD (automatic cardioverter/defibrillator) present 10/21/2015    Asthma     Atypical chest pain 4/23/2010    Bronchitis     CAD (coronary artery disease)     Cardiomyopathy     Chest pain 10/21/2015    Chronic kidney disease     renal insufficiency    Chronic pain     back    Congestive heart failure (CHF) ([facility] 10/21/2015    COPD     Depressive disorder     Diabetes ([facility]     type 2 insulin reliant- BS average- 160's-180's    Diabetes mellitus type II, uncontrolled ([facility] 7/2/2013    GERD (gastroesophageal reflux disease)     Gout     Heart failure ([facility]     cardiomyopathy with ef 10-20%    Hypertension     Hyponatremia 12/20/2010    Ill-defined condition     gout, neuropathy, sciatica    Morbid obesity (HCC)     Nausea & vomiting 11/30/2015    Neuropathy     Obstructive sleep apnea 2/15/2010    Other unknown and unspecified cause of morbidity or mortality     Gout    Severe sepsis (HCC)     Ulcer of leg, chronic ([facility]     Unspecified sleep apnea     cpap       Past Surgical History:   Procedure Laterality Date    CHEST SURGERY PROCEDURE UNLISTED      thorocentesis    HX HEART CATHETERIZATION  Sandy 10, 2008    Severe multivessel disease with severe LV dysfunction    HX PACEMAKER      may 2010. ICD is place. Family History:   Problem Relation Age of Onset    Heart Disease Father     Stroke Father     Coronary Artery Disease Father     Other Father      kidney failure    Diabetes Sister     Stroke Sister     Diabetes Sister     Coronary Artery Disease Mother     Heart Disease Other        Social History     Social History    Marital status:      Spouse name: N/A    Number of children: N/A    Years of education: N/A     Occupational History    Not on file.      Social History Main Topics    Smoking status: Former Smoker     Packs/day: 0.25     Years: 1.00     Quit date: 7/12/1984    Smokeless tobacco: Never Used      Comment: pt states that he only tried smoking as a kid     Alcohol use No    Drug use: No    Sexual activity: Not on file     Other Topics Concern    Not on file     Social History Narrative         ALLERGIES: Dilaudid [hydromorphone]; Iodinated contrast- oral and iv dye; and Penicillins    Review of Systems   Constitutional: Negative for fever. Cardiovascular: Positive for chest pain. Negative for palpitations and leg swelling. Gastrointestinal: Positive for nausea. Negative for abdominal pain, anorexia, blood in stool, constipation, diarrhea, flatus, hematochezia, melena and vomiting. Genitourinary: Positive for frequency. Negative for dysuria, hematuria and testicular pain. Musculoskeletal: Negative for arthralgias, back pain and myalgias. Neurological: Negative for headaches. Psychiatric/Behavioral: Positive for decreased concentration. All other systems reviewed and are negative. Vitals:    07/15/18 2027   BP: 125/85   Pulse: 76   Resp: 24   Temp: 99.2 °F (37.3 °C)   SpO2: 93%   Weight: 149.7 kg (330 lb)   Height: 5' 10\" (1.778 m)            Physical Exam   Constitutional: He is oriented to person, place, and time. He appears well-developed and well-nourished. He appears lethargic. No distress. HENT:   Head: Normocephalic and atraumatic. Right Ear: Tympanic membrane and external ear normal.   Left Ear: Tympanic membrane and external ear normal.   Mouth/Throat: Oropharynx is clear and moist.   Eyes: Conjunctivae and EOM are normal. Pupils are equal, round, and reactive to light. Neck: Normal range of motion. Neck supple. No tracheal deviation present. Cardiovascular: Normal rate, regular rhythm, normal heart sounds and intact distal pulses. Exam reveals no gallop and no friction rub. No murmur heard. Pulmonary/Chest: Effort normal and breath sounds normal. No respiratory distress. He has no wheezes. Abdominal: Soft. Bowel sounds are normal. He exhibits no distension and no mass. There is no hepatosplenomegaly. There is no tenderness. There is no rebound and no guarding. Musculoskeletal: Normal range of motion. He exhibits no edema. Lymphadenopathy:     He has no cervical adenopathy. Neurological: He is oriented to person, place, and time. He appears lethargic. He displays normal reflexes. No cranial nerve deficit or sensory deficit. Weakness left upper extremity, patient states this is chronic   Skin: Skin is warm and dry. No rash noted. He is not diaphoretic. No erythema. Psychiatric: He has a normal mood and affect. His speech is delayed. He is slowed. Nursing note and vitals reviewed. MDM  Number of Diagnoses or Management Options  Acute renal failure superimposed on chronic kidney disease, unspecified CKD stage, unspecified acute renal failure type ([facility]): new and requires workup  Hyperglycemia: new and requires workup  Hypokalemia: new and requires workup  Noncompliance with medication regimen: established and worsening     Amount and/or Complexity of Data Reviewed  Clinical lab tests: ordered and reviewed  Review and summarize past medical records: yes  Independent visualization of images, tracings, or specimens: yes    Risk of Complications, Morbidity, and/or Mortality  Presenting problems: high  Diagnostic procedures: moderate  Management options: high    Patient Progress  Patient progress: stable        ED Course       Procedures    The patient was observed in the ED.  With his history of congestive heart failure, recent chest pain, acute on chronic kidney injury and hypokalemia, the case was discussed with the hospitalist who will admit    Results Reviewed:      Recent Results (from the past 24 hour(s))   GLUCOSE, POC    Collection Time: 07/15/18  8:35 PM   Result Value Ref Range    Glucose (POC) >600 (HH) 65 - 100 mg/dL   CBC WITH AUTOMATED DIFF    Collection Time: 07/15/18  8:39 PM   Result Value Ref Range    WBC 7.1 4.3 - 11.1 K/uL    RBC 5.71 (H) 4.23 - 5.67 M/uL    HGB 16.1 13.6 - 17.2 g/dL    HCT 46.8 41.1 - 50.3 %    MCV 82.0 79.6 - 97.8 FL    MCH 28.2 26.1 - 32.9 PG    MCHC 34.4 31.4 - 35.0 g/dL    RDW 14.5 11.9 - 14.6 %    PLATELET 193 948 - 531 K/uL    MPV 11.8 10.8 - 14.1 FL    DF AUTOMATED      NEUTROPHILS 56 43 - 78 %    LYMPHOCYTES 34 13 - 44 %    MONOCYTES 8 4.0 - 12.0 %    EOSINOPHILS 2 0.5 - 7.8 %    BASOPHILS 0 0.0 - 2.0 %    IMMATURE GRANULOCYTES 0 0.0 - 5.0 %    ABS. NEUTROPHILS 4.0 1.7 - 8.2 K/UL    ABS. LYMPHOCYTES 2.4 0.5 - 4.6 K/UL    ABS. MONOCYTES 0.5 0.1 - 1.3 K/UL    ABS. EOSINOPHILS 0.1 0.0 - 0.8 K/UL    ABS. BASOPHILS 0.0 0.0 - 0.2 K/UL    ABS. IMM. GRANS. 0.0 0.0 - 0.5 K/UL   METABOLIC PANEL, COMPREHENSIVE    Collection Time: 07/15/18  8:39 PM   Result Value Ref Range    Sodium 125 (L) 136 - 145 mmol/L    Potassium 2.8 (LL) 3.5 - 5.1 mmol/L    Chloride 74 (L) 98 - 107 mmol/L    CO2 39 (H) 21 - 32 mmol/L    Anion gap 12 7 - 16 mmol/L    Glucose 649 (HH) 65 - 100 mg/dL    BUN 68 (H) 6 - 23 MG/DL    Creatinine 3.23 (H) 0.8 - 1.5 MG/DL    GFR est AA 26 (L) >60 ml/min/1.73m2    GFR est non-AA 21 (L) >60 ml/min/1.73m2    Calcium 9.4 8.3 - 10.4 MG/DL    Bilirubin, total 1.5 (H) 0.2 - 1.1 MG/DL    ALT (SGPT) 27 12 - 65 U/L    AST (SGOT) 26 15 - 37 U/L    Alk.  phosphatase 303 (H) 50 - 136 U/L    Protein, total 8.4 (H) 6.3 - 8.2 g/dL    Albumin 3.2 (L) 3.5 - 5.0 g/dL    Globulin 5.2 (H) 2.3 - 3.5 g/dL    A-G Ratio 0.6 (L) 1.2 - 3.5     TROPONIN I    Collection Time: 07/15/18  8:39 PM   Result Value Ref Range    Troponin-I, Qt. 0.03 0.02 - 0.05 NG/ML   EKG, 12 LEAD, INITIAL    Collection Time: 07/15/18  8:42 PM   Result Value Ref Range    Ventricular Rate 75 BPM    Atrial Rate 75 BPM    P-R Interval 244 ms    QRS Duration 114 ms    Q-T Interval 362 ms    QTC Calculation (Bezet) 404 ms    Calculated P Axis 56 degrees    Calculated R Axis 168 degrees    Calculated T Axis 91 degrees    Diagnosis       Sinus rhythm with 1st degree A-V block with Premature atrial complexes  Left posterior fascicular block  Possible Anterior infarct (cited on or before 22-JAN-2017)  Abnormal ECG  When compared with ECG of 08-JUL-2017 14:55,  Premature atrial complexes are now Present

## 2018-07-16 NOTE — ED NOTES
TRANSFER - OUT REPORT:    Verbal report given to Tyler(name) on [facility]  being transferred to [facility](unit) for routine progression of care       Report consisted of patients Situation, Background, Assessment and   Recommendations(SBAR). Information from the following report(s) SBAR, ED Summary, [facility] and Recent Results was reviewed with the receiving nurse. Lines:       Opportunity for questions and clarification was provided.       Patient transported with:   Monitor

## 2018-07-16 NOTE — PROGRESS NOTES
Verbal bedside report given to [facility], oncoming RN. Patient's situation, background, assessment and recommendations provided.

## 2018-07-16 NOTE — CONSULTS
[facility] CARDIOLOGY     7/16/2018     10:34 AM    I have personally seen and examined Gabrielle Llamas with  [facility]. I agree and confirm findings in history, physical exam, and assessment/plan as outlined above with following pertinent additions/exceptions: CV status appears reasonably stable at this pont. Will follow with you.       Tony Latif MD

## 2018-07-16 NOTE — PROGRESS NOTES
Patient c/o of chest pain. Called Dr. Ovidio Hanson. Received orders to give tylenol and MD ordered nitroglycerin. Offered to patient, but he refused, stating he \"feels better. \" Will continue to monitor.

## 2018-07-16 NOTE — PROGRESS NOTES
TRANSFER - IN REPORT:    Verbal report received from Vicente(name) on Jessie Santos  being received from ED(unit) for routine progression of care      Report consisted of patients Situation, Background, Assessment and   Recommendations(SBAR). Information from the following report(s) SBAR, Kardex, MAR, Recent Results and Cardiac Rhythm NSR was reviewed with the receiving nurse. Opportunity for questions and clarification was provided. Assessment completed upon patients arrival to unit and care assumed. Primary Nurse Mickiel Goldberg and secondary RN performed a dual skin assessment on this patient No impairment noted. Patient's sacrum in tact. Heels boggy and intact.

## 2018-07-16 NOTE — PROGRESS NOTES
Problem: Nutrition Deficit  Goal: *Optimize nutritional status  Nutrition  Reason for assessment: Referral received from nursing admission Malnutrition Screening Tool for eating poorly due to decreased appetite and EN/TPN PTA. Assessment:   Diet order(s): CCHO 2 gm Na 1.8 L FR renal cardiac diet  Food/Nutrition Patient History:  Pt known to nutrition department from previous admissions as he has been admitted to [address] [place] [address] and educated on [employer] guidelines since 2009. Per pt, he has been homeless recently (living in a hotel per pt but no longer) and is trying to get SNAP benefits again (food stamps). Pt reports that he just doesn't know what to eat. He admits to eating/drinking things he shouldn't. Pt reports drinking \"a lot\" of juice and soda. Per pt \"it is hard seeing things on t.v. - like big juicy burgers and coke and not eating it. \"  Pt states: \"my weight isn't my problem, and my heart isn't my problem, it's my blood sugars. \"  [facility] but requests a [facility] diet education refresher. Pt has questions regarding whether fuller and sausage are bad for him, if he can eat potatoes, etc.  I believe that he knows that he should be avoiding but question the amount of motivation he has had PTA. Note that the pt has not been taking insulin for months PTA and had a blood sugar of > 600 mg/dl upon admission (A1C >16.0). Pt did receive temporary enteral nutrition during admission in March of last year as mentation was inhibiting ability for pt to have a diet. Anthropometrics:Height: 5' 6\" (167.6 cm) (pt stated ),  Weight: 142.7 kg (314 lb 9.6 oz), Weight Source: Standing scale (comment), Body mass index is 50.78 kg/(m^2). BMI class of morbid obesity class III. Edema: 1+ pitting to BLEs  Macronutrient needs:  EER:  1076-2868 kcal /day (10-13 kcal/kg listed BW)  EPR:  52-65 grams protein/day (0.8-1 grams/kg IBW)(GFR 27)-CKD  Intake/Comparative Standards:  No recorded meal intakes. Nutrition Diagnosis: Limited adherence to nutrition related recommendation r/t not ready for diet/lifestyle change, as evidenced by pt report of drinking sugar sweetened beverages although pt has been educated on [employer] PTA. Intervention:  Meals and snacks: Continue current diet. Education: provided pt with brief [facility] diet education and the following handouts: My Plate [facility] diet, quick starts to diabetes, carbohydrate counting  Discharge nutrition plan: Too soon to determine.     Megan Gatica [address], [address] [place] [address], [phone]

## 2018-07-16 NOTE — H&P
HOSPITALIST H&P  NAME:  Chilo Parham   Age:  47 y.o.  :   1965   DOS:               07/15/18  MRN:   [id number]  PCP: Radha Albarado MD  Consulting MD:  Treatment Team: Attending Provider: Ashleigh Monterroso MD    HPI:   Mr. Kalyn Baron is a 46 yo with PMHX significant for Morbid obesity (BMI>60), CAD, nonischemic cardiomyopathy, chronic systolic CHF (TTE 9340 with EF:10-15%, moderate/severe pHTN), pulmonary hypertension. Hx of Aflutter and NSVT s/p AICD, HTN, HLP, CKD stage 3, GERD, KELSIE, non compliance with medical treatment who presented to ED complaining of increased lethargy, polyuria, polydipsia and falling easy to sleep. Complains of occasional mild mid sternal chest pain 4/10, w/o radiations, <2 minutes for last few days. Denies SOB or spikes of fever. States that he didn't took his insult for last year. Take his heart medications inclusive his diuretics. Didn't used his CPAP for last 2 months, states that doesn't have all the parts. Denies any alcohol, smoking or recreation drug use. ED work-up showed  WBC:7.1, H/H:16.1/46.8, Na:128, K:2.8, CL:74, CO2:39, BS:649 , Cr:3.23, BUN:68, P:3.0, M.0, TBil:1.5, AST:26, ALT:27, ALkP:303, Trop I:0.03, serum osmolarity:326, Dig level:0.1, ALc level <0.1, TSH:2.110, Using 2L NC PRN at home. Received Insulin 10 UI x1 and K supplementation per Ed provider. Daughter at bedside. Full CODE    10+ point ROS done and is negative except as noted in HPI. Past Medical History:   Diagnosis Date    Acquired claw toe of right foot     Acute encephalopathy     Acute systolic heart failure ([address] May, 2009    Also had transient acute renal failure secondary to poor perfusion.     AICD (automatic cardioverter/defibrillator) present 10/21/2015    Asthma     Atypical chest pain 2010    Bronchitis     CAD (coronary artery disease)     Cardiomyopathy     Chest pain 10/21/2015    Chronic kidney disease     renal insufficiency    Chronic pain     back    Congest[facility] heart failure (CHF) ([facility] 10/21/2015    COPD     Depressive disorder     Diabetes ([facility]     type 2 insulin reliant- BS average- 160's-180's    Diabetes mellitus type II, uncontrolled ([facility] 2013    GERD (gastroesophageal reflux disease)     Gout     Heart failure ([facility]     cardiomyopathy with ef 10-20%    Hypertension     Hyponatremia 2010    Ill-defined condition     gout, neuropathy, sciatica    Morbid obesity (HCC)     Nausea & vomiting 2015    Neuropathy     Obstructive sleep apnea 2/15/2010    Other unknown and unspecified cause of morbidity or mortality     Gout    Severe sepsis (HCC)     Ulcer [facility], [facility] ([facility]     Unspecified sleep apnea     cpap      Past Surgical History:   Procedure Laterality Date    CHEST SURGERY PROCEDURE UNLISTED      thorocentesis    HX HEART CATHETERIZATION  [place] 10, 2008    Severe multivessel disease with severe LV dysfunction    HX PACEMAKER      may 2010. ICD is place. Prior to Admission Medications   Prescriptions Last Dose Informant Patient Reported? Taking? OXYGEN-AIR DELIVERY SYSTEMS   Yes No   Si lpm cont. allopurinol (ZYLOPRIM) 100 mg tablet   Yes No   Sig: Take 100 mg by mouth two (2) times a day. bumetanide (BUMEX) 2 mg tablet   No No   Sig: Take 1 Tab by mouth two (2) times a day. carvedilol (COREG) 6.25 mg tablet   No No   Sig: Take 1 Tab by mouth two (2) times daily (with meals). cpap machine kit   Yes No   Sig: 10 cm qhs   cyclobenzaprine (FLEXERIL) 10 mg tablet   No No   Sig: Take 1 Tab by mouth three (3) times daily as needed for Muscle Spasm(s). docusate sodium (COLACE) 100 mg capsule   Yes No   Sig: Take 1 Cap by mouth two (2) times a day.   gabapentin (NEURONTIN) 600 mg tablet   Yes No   Sig: Take 300 mg by mouth four (4) times daily.  Indications: Pt. takes 1 to 2 capsules every 8 hours   glucose blood VI test strips (ASCENSIA AUTODISC VI, ONE TOUCH ULTRA TEST VI) strip   No No   Sig: Test strips for 30 day supply   hydrALAZINE (APRESOLINE) 25 mg tablet   No No   Sig: Take 1 Tab by mouth three (3) times daily. insulin glargine (LANTUS) 100 unit/mL injection   No No   Si Units by SubCUTAneous route nightly. isosorbide dinitrate (ISORDIL) 20 mg tablet   No No   Sig: Take 1 Tab by mouth three (3) times daily. metOLazone (ZAROXOLYN) 2.5 mg tablet   No No   Sig: TAKE ONE TABLET BY MOUTH EVERY MONDAY, WEDNESDAY, AND FRIDAY   nitroglycerin (NITROSTAT) 0.4 mg SL tablet   No No   Sig: Take 1 tablet SL every 5 minutes up to 3 doses as needed for CP   oxyCODONE IR (ROXICODONE) 5 mg immediate release tablet   No No   Sig: Take 1 Tab by mouth every eight (8) hours as needed for Pain. Max Daily Amount: 15 mg.   polyethylene glycol (MIRALAX) 17 gram packet   No No   Sig: Take 1 Packet by mouth daily as needed (constipation). pravastatin (PRAVACHOL) 80 mg tablet   No No   Sig: Take 1 Tab by mouth nightly. ranitidine (ZANTAC) 150 mg tablet   Yes No   Sig: Take 150 mg by mouth nightly. sennosides (SENNA) 8.6 mg cap   Yes No   Sig: Take 17.2 mg by mouth nightly. sildenafil citrate (VIAGRA) 50 mg tablet   No No   Sig: Take 1 Tab by mouth as needed. Facility-Administered Medications: None     Home meds reconciled.   Allergies   Allergen Reactions    Dilaudid [Hydromorphone] Other (comments)     Hotflashes, patient states he's not allergic    Iodinated Contrast- Oral And Iv Dye Other (comments)     \"shuts my kidneys down\"    Penicillins Unknown (comments)     Pt states he is not allergic his mother just wouldn't allow him take it      Social History   Substance Use Topics    Smoking status: Former Smoker     Packs/day: 0.25     Years: 1.00     Quit date: 1984    Smokeless tobacco: Never Used      Comment: pt states that he only tried smoking as a kid     Alcohol use No      Family History   Problem Relation Age of Onset    Heart Disease Father     Stroke Father     Coronary Artery Disease Father     Other Father      kidney failure    Diabetes Sister     Stroke Sister     Diabetes Sister     Coronary Artery Disease Mother     Heart Disease Other       I personally reviewed home medications, social and family history. Immunization History   Administered Date(s) Administered    Influenza Vaccine 2013, 2015    Influenza Vaccine (Quad) PF 10/26/2016    Pneumococcal Polysaccharide (PPSV-23) 2014, 2015    TB Skin Test (PPD) Intradermal 2017, 2017    ZZZ-RETIRED (DO NOT USE) Pneumococcal Vaccine (Unspecified Type) 2010     Objective:     Patient Vitals for the past 24 hrs:   Temp Pulse Resp BP SpO2   07/15/18 2119 - 74 - 142/77 98 %   07/15/18 2027 99.2 °F (37.3 °C) 76 24 125/85 93 %     Temp (24hrs), Av.2 °F (37.3 °C), Min:99.2 °F (37.3 °C), Max:99.2 °F (37.3 °C)    Oxygen Therapy  O2 Sat (%): 98 % (07/15/18 2119)  Pulse via Oximetry: 74 beats per minute (07/15/18 2119)  O2 Device: Room air (07/15/18 2027)  Oxygen Therapy  O2 Sat (%): 98 % (07/15/18 2119)  Pulse via Oximetry: 74 beats per minute (07/15/18 2119)  O2 Device: Room air (07/15/18 2027)    Physical Exam:  General:         Awake, alert, falls easy to sleep. Cooperative, no acute distress. Afebrile. Morbid obesity. HEENT:               NCAT. No obvious deformity. Nares normal. No drainage  Lungs:        Decreased air netry. Mild expiratory wheezing b/l. No rhonchi/rales  NC 2L  Cardiovascular:   RRR.  + murmur. No pitting pedal edema b/l. +2 PT/DT pulses b/l. Abdomen:       S/nt/nd. Bowel sounds normal. .   Skin:         No rashes or lesions. Not Jaundiced  Neurologic:    Awake, alert, falls easy to sleep. Oriented x3. CN II- XII grossly WNL. No gross focal deficit. Moves all extremities. Psychiatric:         Good mood. Normal affect. Denies any SI/HI.        Data Review:   Recent Results (from the past 24 hour(s))   GLUCOSE, POC    Collection Time: 07/15/18  8:35 PM Result Value Ref Range    Glucose (POC) >600 (HH) 65 - 100 mg/dL   CBC WITH AUTOMATED DIFF    Collection Time: 07/15/18  8:39 PM   Result Value Ref Range    WBC 7.1 4.3 - 11.1 K/uL    RBC 5.71 (H) 4.23 - 5.67 M/uL    HGB 16.1 13.6 - 17.2 g/dL    HCT 46.8 41.1 - 50.3 %    MCV 82.0 79.6 - 97.8 FL    MCH 28.2 26.1 - 32.9 PG    MCHC 34.4 31.4 - 35.0 g/dL    RDW 14.5 11.9 - 14.6 %    PLATELET 642 922 - 745 K/uL    MPV 11.8 10.8 - 14.1 FL    DF AUTOMATED      NEUTROPHILS 56 43 - 78 %    LYMPHOCYTES 34 13 - 44 %    MONOCYTES 8 4.0 - 12.0 %    EOSINOPHILS 2 0.5 - 7.8 %    BASOPHILS 0 0.0 - 2.0 %    IMMATURE GRANULOCYTES 0 0.0 - 5.0 %    ABS. NEUTROPHILS 4.0 1.7 - 8.2 K/UL    ABS. LYMPHOCYTES 2.4 0.5 - 4.6 K/UL    ABS. MONOCYTES 0.5 0.1 - 1.3 K/UL    ABS. EOSINOPHILS 0.1 0.0 - 0.8 K/UL    ABS. BASOPHILS 0.0 0.0 - 0.2 K/UL    ABS. IMM. GRANS. 0.0 0.0 - 0.5 K/UL   METABOLIC PANEL, COMPREHENSIVE    Collection Time: 07/15/18  8:39 PM   Result Value Ref Range    Sodium 125 (L) 136 - 145 mmol/L    Potassium 2.8 (LL) 3.5 - 5.1 mmol/L    Chloride 74 (L) 98 - 107 mmol/L    CO2 39 (H) 21 - 32 mmol/L    Anion gap 12 7 - 16 mmol/L    Glucose 649 (HH) 65 - 100 mg/dL    BUN 68 (H) 6 - 23 MG/DL    Creatinine 3.23 (H) 0.8 - 1.5 MG/DL    GFR est AA 26 (L) >60 ml/min/1.73m2    GFR est non-AA 21 (L) >60 ml/min/1.73m2    Calcium 9.4 8.3 - 10.4 MG/DL    Bilirubin, total 1.5 (H) 0.2 - 1.1 MG/DL    ALT (SGPT) 27 12 - 65 U/L    AST (SGOT) 26 15 - 37 U/L    Alk.  phosphatase 303 (H) 50 - 136 U/L    Protein, total 8.4 (H) 6.3 - 8.2 g/dL    Albumin 3.2 (L) 3.5 - 5.0 g/dL    Globulin 5.2 (H) 2.3 - 3.5 g/dL    A-G Ratio 0.6 (L) 1.2 - 3.5     TROPONIN I    Collection Time: 07/15/18  8:39 PM   Result Value Ref Range    Troponin-I, Qt. 0.03 0.02 - 0.05 NG/ML   DIGOXIN    Collection Time: 07/15/18  8:39 PM   Result Value Ref Range    Digoxin level 0.1 (L) 0.90 - 2.10 NG/ML   BNP    Collection Time: 07/15/18  8:39 PM   Result Value Ref Range     pg/mL   TSH 3RD GENERATION    Collection Time: 07/15/18  8:39 PM   Result Value Ref Range    TSH 2.110 0.358 - 3.740 uIU/mL   MAGNESIUM    Collection Time: 07/15/18  8:39 PM   Result Value Ref Range    Magnesium 2.2 1.8 - 2.4 mg/dL   PHOSPHORUS    Collection Time: 07/15/18  8:39 PM   Result Value Ref Range    Phosphorus 3.0 2.5 - 4.5 MG/DL   ETHYL ALCOHOL    Collection Time: 07/15/18  8:39 PM   Result Value Ref Range    ALCOHOL(ETHYL),SERUM <3 MG/DL   OSMOLALITY, SERUM/PLASMA    Collection Time: 07/15/18  8:39 PM   Result Value Ref Range    Osmolality, serum/plasma 326 (H) 275 - 295 MOSM/kg H2O   EKG, 12 LEAD, INITIAL    Collection Time: 07/15/18  8:42 PM   Result Value Ref Range    Ventricular Rate 75 BPM    Atrial Rate 75 BPM    P-R Interval 244 ms    QRS Duration 114 ms    Q-T Interval 362 ms    QTC Calculation (Bezet) 404 ms    Calculated P Axis 56 degrees    Calculated R Axis 168 degrees    Calculated T Axis 91 degrees    Diagnosis       Sinus rhythm with 1st degree A-V block with Premature atrial complexes  Left posterior fascicular block  Possible Anterior infarct (cited on or before 22-JAN-2017)  Abnormal ECG  When compared with ECG of 08-JUL-2017 14:55,  Premature atrial complexes are now Present       All Micro Results     None          Imaging /Procedures /Studies:  I personally reviewed all labs, imaging, and other studies this admission:  CXR Results  (Last 48 hours)    None            Assessment and Plan:      Active Hospital Problems    Diagnosis Date Noted    Hyperglycemia 07/15/2018    [facility] 07/15/2018     Moderate/severe per Echo 2/2017      Hyponatremia 07/15/2018    Hypokalemia 07/15/2018    Chest pain 07/15/2018    Acute renal failure superimposed on stage 3 chronic kidney disease ([facility] 07/15/2018    Obstructive sleep apnea 03/18/2017    Diabetes mellitus type II, uncontrolled ([facility] 03/18/2017    Noncompliance with medication regimen 03/18/2017    AICD (automatic cardioverter/defibrillator) present 03/18/2017    Obesity hypoventilation syndrome ([facility] 03/18/2017    Morbid obesity with BMI of 60.0-69.9, adult ([facility] 03/18/2017    CKD (chronic kidney disease) stage 3, GFR 30-59 ml/min 08/13/2014    Nonischemic dilated cardiomyopathy ([facility] 07/02/2013     LVEF 10-15% on ECHO from 12/17/10         PLAN  Uncontrolled DM:  - restart Insulin lantus 30 UI tonight and increased to his home dose of 50 UI  - ISS   - unable to give IV hydration due to severe cardiac pathology  - check HgA1c  - Diabetic diet  - diabetic education    Chest pain:  -ASa/statins  - remote telemetry  - serial Trop I/EKG  - cardiac consult - appreciate the help        Chronic systolic CHF:  - resume home medications except diuretics  - check BNP  - daily weight, strict I/O  - Echocardiogram  - cardiology consult        RED on CKD:  - get urine protein/ creatinine  - hold diuretics  - monitor renal function daily  - consult nephrology - appreciate the help      Obesity hypoventilation syndrome:  - get ABG  - resume empiric CPAP until home PAP machine available - may need BIPAP      Pulmonary HTN  - moderate/severe per last Echo  - echo cardiogram  - consider pulmonary input       Hypokalemia:  - replace  - hold diuretics and monitor     KELSIE:   - empiric CPAP  - advised to bring home CPAP machine    Morbid Obesity:  - educated          DVT ppx: Heparin SQ   Code status:  Full CODE  Estimated LOS: 4-5 days   Risk assessment: high  Plan of care discussed with: patient and daughter at bedside in ED. Care team.  Mr. Wayne Valente  will need at least 2 midnights of admission.       Jared Lancaster MD  07/15/18

## 2018-07-16 NOTE — PROGRESS NOTES
Hospitalist Progress Note    2018  Admit Date: 7/15/2018  9:06 PM   NAME: Elmo Rea   :  1965   MRN:  [id number]   Attending: Daniella Bacon MD  PCP:  Bridget Alston MD    SUBJECTIVE:   48 yo with PMHX significant for Morbid obesity (BMI>60), CAD, nonischemic cardiomyopathy, chronic systolic CHF (TTE 5087 with EF:10-15%, moderate/severe pHTN), pulmonary hypertension. Hx of Aflutter and NSVT s/p AICD, HTN, HLP, CKD stage 3, GERD, KELSIE, non compliance with medical treatment who presented to ED complaining of increased lethargy, polyuria, polydipsia and falling easy to sleep. Seen by cards and nephro. : Feels better, less SOB, appears euvolemic, Sugars are now trending down slowly. Asking to get more gabapentin. Nursing notes and chart reviewed. Review of Systems negative with exception of pertinent positives noted above. PHYSICAL EXAM     Visit Vitals    /57    Pulse 79    Temp 97.8 °F (36.6 °C)    Resp 19    Ht 5' 6\" (1.676 m)  Comment: pt stated     Wt 142.7 kg (314 lb 9.6 oz)    SpO2 97%    BMI 50.78 kg/m2      Temp (24hrs), Av.2 °F (36.8 °C), Min:97.5 °F (36.4 °C), Max:99.2 °F (37.3 °C)    Oxygen Therapy  O2 Sat (%): 97 % (18 1349)  Pulse via Oximetry: 80 beats per minute (18 0134)  O2 Device: Nasal cannula (18 0750)  O2 Flow Rate (L/min): 3 l/min (18 0750)    Intake/Output Summary (Last 24 hours) at 18 1525  Last data filed at 07/15/18 2245   Gross per 24 hour   Intake                0 ml   Output              550 ml   Net             -550 ml         General: No acute distress. Alert.  Morbidly obese  Head:  AT/NC  Lungs:  CTABL. Heart:  RRR, no murmur, rub, or gallop  Abdomen: Soft, non-distended, non-tender, +bs  Extremities: No cyanosis or clubbing. Neurologic:  No focal deficits. Moves all extremities. Skin:  No Obvious Rash  Psych:  Normal affect.      LABS AND STUDIES:  Personally reviewed all labs, meds, and studies for past 24hrs. ASSESSMENT      Active Hospital Problems    Diagnosis Date Noted    Hyperglycemia 07/15/2018    Pulmonary HTN (HCC) 07/15/2018     Moderate/severe per Echo 2/2017      Hyponatremia 07/15/2018    Hypokalemia 07/15/2018    Chest pain 07/15/2018    Acute renal failure superimposed on stage 3 chronic kidney disease ([facility] 07/15/2018    Obstructive sleep apnea 03/18/2017    Diabetes mellitus type II, uncontrolled ([facility] 03/18/2017    Noncompliance with medication regimen 03/18/2017    AICD (automatic cardioverter/defibrillator) present 03/18/2017    Obesity hypoventilation syndrome ([facility] 03/18/2017    Morbid obesity with BMI of 60.0-69.9, adult ([facility] 03/18/2017    CKD (chronic kidney disease) stage 3, GFR 30-59 ml/min 08/13/2014    Nonischemic dilated cardiomyopathy ([facility] 07/02/2013     LVEF 10-15% on ECHO from 12/17/10             PLAN:    · Symptoms likely due to meds, Severe Hyperglycemia. Mentation improved. · Appreciate cards input, monitor on current meds  · Nephrology seen pt today. Appreciate input. · Pseudohyponatremia, corrected Na is 135. Will repeat bmp for K.   · Increase lantus to 50qhs and increase ISS to resistant scale. Dispo: Hopefully DC in am once sugars better and ok with cards. DVT ppx:  hsq  Discussed plan with pt who is in agreement. All questions answered.     Signed By: Jamshid Prather MD     July 16, 2018

## 2018-07-16 NOTE — ED NOTES
Patient to ED with c/c hyperglycemia. Patient with hx of DM, non-compliant with Rx. States has been w/o insulin for \"several months. \" + for EMS. Patient reports polyuria and associated weakness. Patient alert/oriented though very lethargic. When awake, patient o2 sats greater than 93%. However, when left alone, patient to very restful quite quickly with drop in O2 sats into high 80s. Patient reports some intermittent CP over the past few days. Patient reports took home NTG with relief.

## 2018-07-16 NOTE — PROGRESS NOTES
Per Dr. Bettie Dubose, okay to do ABG tomorrow after multiple attempts unsuccessful. Maria Del Carmen Saez, RT, informed.

## 2018-07-16 NOTE — CONSULTS
[facility] Consult    Subjective:     Michaela Cm is a 47 y.o.  male who is being seen for RED/CKD. This is a delightful gentleman known to me from previous admissions admitted with hyperglycemia, possible chest pain. His creatinine is in the low 3's up from a baseline in the 2's. He has a wet/dry problem with a very reduced EF. He is currently nearly free of edema and comfortable. Past Medical History:   Diagnosis Date    Acquired claw toe of right foot     Acute encephalopathy 0/47/7527    Acute systolic heart failure ([address] May, 2009    Also had transient acute renal failure secondary to poor perfusion.  AICD (automatic cardioverter/defibrillator) present 10/21/2015    Asthma     Atypical chest pain 4/23/2010    Bronchitis     CAD (coronary artery disease)     Cardiomyopathy     Chest pain 10/21/2015    Chronic kidney disease     renal insufficiency    Chronic pain     back    Congestive heart failure (CHF) ([address] 10/21/2015    COPD     Depressive disorder     Diabetes ([address]     type 2 insulin reliant- BS average- 160's-180's    Diabetes mellitus type II, uncontrolled ([address] 7/2/2013    GERD (gastroesophageal reflux disease)     Gout     Heart failure ([address]     cardiomyopathy with ef 10-20%    Hypertension     Hyponatremia 12/20/2010    Ill-defined condition     gout, neuropathy, sciatica    Morbid obesity (HCC)     Nausea & vomiting 11/30/2015    Neuropathy     Obstructive sleep apnea 2/15/2010    Other unknown and unspecified cause of morbidity or mortality     Gout    Severe sepsis (HCC)     Ulcer of leg, chronic ([address]     Unspecified sleep apnea     cpap      Past Surgical History:   Procedure Laterality Date    CHEST SURGERY PROCEDURE UNLISTED      thorocentesis    HX HEART CATHETERIZATION  [place] 10, 2008    Severe multivessel disease with severe LV dysfunction    HX PACEMAKER      may 2010. ICD is place.      Family History   Problem Relation Age of Onset    Heart Disease Father     Stroke Father     Coronary Artery Disease Father     Other Father      kidney failure    Diabetes Sister     Stroke Sister     Diabetes Sister     Coronary Artery Disease Mother     Heart Disease Other       Social History   Substance Use Topics    Smoking status: Former Smoker     Packs/day: 0.25     Years: 1.00     Quit date: 7/12/1984    Smokeless tobacco: Never Used      Comment: pt states that he only tried smoking as a kid     Alcohol use No       Current Facility-Administered Medications   Medication Dose Route Frequency Provider Last Rate Last Dose    nitroglycerin (NITROSTAT) tablet 0.4 mg  0.4 mg SubLINGual PRN Tulio Acevedo MD        aspirin chewable tablet 81 mg  81 mg Oral DAILY Tulio Acevedo MD   81 mg at 07/15/18 2313    insulin lispro (HUMALOG) injection   SubCUTAneous AC&HS Tulio Acevedo MD   8 Units at 07/16/18 0828    allopurinol (ZYLOPRIM) tablet 100 mg  100 mg Oral DAILY Tulio Acevedo MD   100 mg at 07/16/18 0828    carvedilol (COREG) tablet 6.25 mg  6.25 mg Oral BID WITH MEALS Tulio Acevedo MD   6.25 mg at 07/16/18 0828    gabapentin (NEURONTIN) capsule 100 mg  100 mg Oral BID Tulio Acevedo MD   100 mg at 07/16/18 6953    hydrALAZINE (APRESOLINE) tablet 25 mg  25 mg Oral TID Tulio Acevedo MD   25 mg at 07/16/18 0828    isosorbide dinitrate (ISORDIL) tablet 20 mg  20 mg Oral TID Tulio Acevedo MD   20 mg at 07/16/18 0828    oxyCODONE IR (ROXICODONE) tablet 5 mg  5 mg Oral Q8H PRN Tulio Acevedo MD   5 mg at 07/15/18 2339    pravastatin (PRAVACHOL) tablet 80 mg  80 mg Oral QHS Tulio Acevedo MD   80 mg at 07/15/18 2313    famotidine (PEPCID) tablet 20 mg  20 mg Oral BID Tulio Acevedo MD   20 mg at 07/16/18 0828    senna (SENOKOT) tablet 8.6 mg  1 Tab Oral QHS Tulio Acevedo MD        sodium chloride (NS) flush 5-10 mL  5-10 mL IntraVENous Q8H Tulio Acevedo MD   5 mL at 07/16/18 0526    sodium chloride (NS) flush 5-10 mL  5-10 mL IntraVENous PRN Kiana Shields MD        tuberculin injection 5 Units  5 Units IntraDERMal ONCE Kiana Shields MD   5 Units at 07/15/18 2317    acetaminophen (TYLENOL) tablet 650 mg  650 mg Oral Q4H PRN Kiana Shields MD        heparin (porcine) injection 5,000 Units  5,000 Units SubCUTAneous Q8H Kiana Shields MD   5,000 Units at 07/16/18 0827    albuterol (PROVENTIL VENTOLIN) nebulizer solution 2.5 mg  2.5 mg Nebulization Q6H PRN Kiana Shields MD        insulin glargine (LANTUS) injection 30 Units  30 Units SubCUTAneous DAILY Kiana Shields MD   30 Units at 07/15/18 2315        Allergies   Allergen Reactions    Dilaudid [Hydromorphone] Other (comments)     Hotflashes, patient states he's not allergic    Iodinated Contrast- Oral And Iv Dye Other (comments)     \"shuts my kidneys down\"    Penicillins Unknown (comments)     Pt states he is not allergic his mother just wouldn't allow him take it        Review of Systems:  Pertinent items are noted in the History of Present Illness. Objective: Intake and Output:       07/14 1901 - 07/16 0700  In: -   Out: 550 [Urine:550]    Physical Exam:   General appearance: alert, cooperative, no distress, appears stated age  Neck: supple  Lungs: clear to auscultation bilaterally  Heart: regular rate and rhythm, S1, S2 normal, no murmur, click, rub or gallop  Abdomen: soft, non-tender.  Bowel sounds normal. No masses,  no organomegaly  Extremities: tr edema           Data Review:   Recent Results (from the past 24 hour(s))   GLUCOSE, POC    Collection Time: 07/15/18  8:35 PM   Result Value Ref Range    Glucose (POC) >600 (HH) 65 - 100 mg/dL   CBC WITH AUTOMATED DIFF    Collection Time: 07/15/18  8:39 PM   Result Value Ref Range    WBC 7.1 4.3 - 11.1 K/uL    RBC 5.71 (H) 4.23 - 5.67 M/uL    HGB 16.1 13.6 - 17.2 g/dL    HCT 46.8 41.1 - 50.3 %    MCV 82.0 79.6 - 97.8 FL    MCH 28.2 26.1 - 32.9 PG    MCHC 34.4 31.4 - 35.0 g/dL    RDW 14.5 11.9 - 14.6 %    PLATELET 520 979 - 795 K/uL    MPV 11.8 10.8 - 14.1 FL    DF AUTOMATED      NEUTROPHILS 56 43 - 78 %    LYMPHOCYTES 34 13 - 44 %    MONOCYTES 8 4.0 - 12.0 %    EOSINOPHILS 2 0.5 - 7.8 %    BASOPHILS 0 0.0 - 2.0 %    IMMATURE GRANULOCYTES 0 0.0 - 5.0 %    ABS. NEUTROPHILS 4.0 1.7 - 8.2 K/UL    ABS. LYMPHOCYTES 2.4 0.5 - 4.6 K/UL    ABS. MONOCYTES 0.5 0.1 - 1.3 K/UL    ABS. EOSINOPHILS 0.1 0.0 - 0.8 K/UL    ABS. BASOPHILS 0.0 0.0 - 0.2 K/UL    ABS. IMM. GRANS. 0.0 0.0 - 0.5 K/UL   METABOLIC PANEL, COMPREHENSIVE    Collection Time: 07/15/18  8:39 PM   Result Value Ref Range    Sodium 125 (L) 136 - 145 mmol/L    Potassium 2.8 (LL) 3.5 - 5.1 mmol/L    Chloride 74 (L) 98 - 107 mmol/L    CO2 39 (H) 21 - 32 mmol/L    Anion gap 12 7 - 16 mmol/L    Glucose 649 (HH) 65 - 100 mg/dL    BUN 68 (H) 6 - 23 MG/DL    Creatinine 3.23 (H) 0.8 - 1.5 MG/DL    GFR est AA 26 (L) >60 ml/min/1.73m2    GFR est non-AA 21 (L) >60 ml/min/1.73m2    Calcium 9.4 8.3 - 10.4 MG/DL    Bilirubin, total 1.5 (H) 0.2 - 1.1 MG/DL    ALT (SGPT) 27 12 - 65 U/L    AST (SGOT) 26 15 - 37 U/L    Alk.  phosphatase 303 (H) 50 - 136 U/L    Protein, total 8.4 (H) 6.3 - 8.2 g/dL    Albumin 3.2 (L) 3.5 - 5.0 g/dL    Globulin 5.2 (H) 2.3 - 3.5 g/dL    A-G Ratio 0.6 (L) 1.2 - 3.5     TROPONIN I    Collection Time: 07/15/18  8:39 PM   Result Value Ref Range    Troponin-I, Qt. 0.03 0.02 - 0.05 NG/ML   DIGOXIN    Collection Time: 07/15/18  8:39 PM   Result Value Ref Range    Digoxin level 0.1 (L) 0.90 - 2.10 NG/ML   BNP    Collection Time: 07/15/18  8:39 PM   Result Value Ref Range     pg/mL   TSH 3RD GENERATION    Collection Time: 07/15/18  8:39 PM   Result Value Ref Range    TSH 2.110 0.358 - 3.740 uIU/mL   MAGNESIUM    Collection Time: 07/15/18  8:39 PM   Result Value Ref Range    Magnesium 2.2 1.8 - 2.4 mg/dL   PHOSPHORUS    Collection Time: 07/15/18  8:39 PM   Result Value Ref Range    Phosphorus 3.0 2.5 - 4.5 MG/DL   ETHYL ALCOHOL    Collection Time: 07/15/18  8:39 PM   Result Value Ref Range    ALCOHOL(ETHYL),SERUM <3 MG/DL   OSMOLALITY, SERUM/PLASMA    Collection Time: 07/15/18  8:39 PM   Result Value Ref Range    Osmolality, serum/plasma 326 (H) 275 - 295 MOSM/kg H2O   GGT    Collection Time: 07/15/18  8:39 PM   Result Value Ref Range     (H) 15 - 85 U/L   EKG, 12 LEAD, INITIAL    Collection Time: 07/15/18  8:42 PM   Result Value Ref Range    Ventricular Rate 75 BPM    Atrial Rate 75 BPM    P-R Interval 244 ms    QRS Duration 114 ms    Q-T Interval 362 ms    QTC Calculation (Bezet) 404 ms    Calculated P Axis 56 degrees    Calculated R Axis 168 degrees    Calculated T Axis 91 degrees    Diagnosis       Sinus rhythm with 1st degree A-V block with Premature atrial complexes  Left posterior fascicular block  Possible Anterior infarct (cited on or before 22-JAN-2017)  Abnormal ECG  When compared with ECG of 08-JUL-2017 14:55,  Premature atrial complexes are now Present  Confirmed by MARIAN FLOWER (), Sudeep Berrios ([id number]) on 7/16/2018 7:42:36 AM     GLUCOSE, POC    Collection Time: 07/15/18 10:57 PM   Result Value Ref Range    Glucose (POC) 536 (HH) 65 - 100 mg/dL   DRUG SCREEN, URINE    Collection Time: 07/15/18 11:44 PM   Result Value Ref Range    PCP(PHENCYCLIDINE) NEGATIVE       BENZODIAZEPINES NEGATIVE       COCAINE NEGATIVE       AMPHETAMINES NEGATIVE       METHADONE NEGATIVE       THC (TH-CANNABINOL) NEGATIVE       OPIATES NEGATIVE       BARBITURATES NEGATIVE      URINALYSIS W/ RFLX MICROSCOPIC    Collection Time: 07/15/18 11:44 PM   Result Value Ref Range    Color YELLOW      Appearance CLEAR      Specific gravity 1.020 1.001 - 1.023      pH (UA) 5.5 5.0 - 9.0      Protein 30 (A) NEG mg/dL    Glucose >1000 mg/dL    Ketone NEGATIVE  NEG mg/dL    Bilirubin NEGATIVE  NEG      Blood TRACE (A) NEG      Urobilinogen 0.2 0.2 - 1.0 EU/dL    Nitrites NEGATIVE  NEG      Leukocyte Esterase NEGATIVE  NEG      WBC 0-3 0 /hpf    Bacteria 0 0 /hpf    Other observations RESULTS VERIFIED MANUALLY     PROTEIN/CREATININE RATIO, URINE    Collection Time: 07/15/18 11:44 PM   Result Value Ref Range    Protein, urine random 51 (H) <11.9 mg/dL    Creatinine, urine 55.60 mg/dL    Protein/Creat. urine Ratio 0.9     OSMOLALITY, UR    Collection Time: 07/15/18 11:44 PM   Result Value Ref Range    Osmolality,urine 360 50 - 1400 MOSM/kg H2O   SODIUM, UR, RANDOM    Collection Time: 07/15/18 11:44 PM   Result Value Ref Range    Sodium urine, random 19 MMOL/L   CHLORIDE, UR, RANDOM    Collection Time: 07/15/18 11:44 PM   Result Value Ref Range    Chloride,urine random <10 MMOL/L   POTASSIUM, UR, RANDOM    Collection Time: 07/15/18 11:44 PM   Result Value Ref Range    Potassium urine, random 10 MMOL/L   TROPONIN I    Collection Time: 07/16/18  4:45 AM   Result Value Ref Range    Troponin-I, Qt. 0.05 0.02 - 0.05 NG/ML   CBC WITH AUTOMATED DIFF    Collection Time: 07/16/18  4:45 AM   Result Value Ref Range    WBC 7.5 4.3 - 11.1 K/uL    RBC 5.78 (H) 4.23 - 5.67 M/uL    HGB 16.2 13.6 - 17.2 g/dL    HCT 47.2 41.1 - 50.3 %    MCV 81.7 79.6 - 97.8 FL    MCH 28.0 26.1 - 32.9 PG    MCHC 34.3 31.4 - 35.0 g/dL    RDW 14.6 11.9 - 14.6 %    PLATELET 556 932 - 196 K/uL    MPV 12.0 10.8 - 14.1 FL    DF AUTOMATED      NEUTROPHILS 50 43 - 78 %    LYMPHOCYTES 41 13 - 44 %    MONOCYTES 7 4.0 - 12.0 %    EOSINOPHILS 2 0.5 - 7.8 %    BASOPHILS 0 0.0 - 2.0 %    IMMATURE GRANULOCYTES 0 0.0 - 5.0 %    ABS. NEUTROPHILS 3.7 1.7 - 8.2 K/UL    ABS. LYMPHOCYTES 3.0 0.5 - 4.6 K/UL    ABS. MONOCYTES 0.5 0.1 - 1.3 K/UL    ABS. EOSINOPHILS 0.2 0.0 - 0.8 K/UL    ABS. BASOPHILS 0.0 0.0 - 0.2 K/UL    ABS. IMM.  GRANS. 0.0 0.0 - 0.5 K/UL   METABOLIC PANEL, COMPREHENSIVE    Collection Time: 07/16/18  4:45 AM   Result Value Ref Range    Sodium 127 (L) 136 - 145 mmol/L    Potassium 3.0 (L) 3.5 - 5.1 mmol/L    Chloride 81 (L) 98 - 107 mmol/L    CO2 37 (H) 21 - 32 mmol/L    Anion gap 9 7 - 16 mmol/L    Glucose 351 (H) 65 - 100 mg/dL    BUN 74 (H) 6 - 23 MG/DL    Creatinine 3.12 (H) 0.8 - 1.5 MG/DL    GFR est AA 27 (L) >60 ml/min/1.73m2    GFR est non-AA 22 (L) >60 ml/min/1.73m2    Calcium 9.0 8.3 - 10.4 MG/DL    Bilirubin, total 1.2 (H) 0.2 - 1.1 MG/DL    ALT (SGPT) 28 12 - 65 U/L    AST (SGOT) 30 15 - 37 U/L    Alk.  phosphatase 259 (H) 50 - 136 U/L    Protein, total 7.6 6.3 - 8.2 g/dL    Albumin 3.2 (L) 3.5 - 5.0 g/dL    Globulin 4.4 (H) 2.3 - 3.5 g/dL    A-G Ratio 0.7 (L) 1.2 - 3.5     MAGNESIUM    Collection Time: 07/16/18  4:45 AM   Result Value Ref Range    Magnesium 2.4 1.8 - 2.4 mg/dL   PHOSPHORUS    Collection Time: 07/16/18  4:45 AM   Result Value Ref Range    Phosphorus 3.1 2.5 - 4.5 MG/DL   PROTHROMBIN TIME + INR    Collection Time: 07/16/18  4:45 AM   Result Value Ref Range    Prothrombin time 13.1 11.5 - 14.5 sec    INR 1.0     HEMOGLOBIN A1C WITH EAG    Collection Time: 07/16/18  4:45 AM   Result Value Ref Range    Hemoglobin A1c >16.0 (H) 4.8 - 6.0 %    Est. average glucose Cannot be calculated mg/dL   GLUCOSE, POC    Collection Time: 07/16/18  6:34 AM   Result Value Ref Range    Glucose (POC) 338 (H) 65 - 100 mg/dL           Assessment:     Principal Problem:    Hyperglycemia (7/15/2018)    Active Problems:    Obstructive sleep apnea (3/18/2017)      Nonischemic dilated cardiomyopathy ([facility] (7/2/2013)      Overview: LVEF 10-15% on ECHO from 12/17/10      Diabetes mellitus type II, uncontrolled ([facility] (3/18/2017)      Noncompliance with medication regimen (3/18/2017)      CKD (chronic kidney disease) stage 3, GFR 30-59 ml/min (8/13/2014)      AICD (automatic cardioverter/defibrillator) present (3/18/2017)      Obesity hypoventilation syndrome ([facility] [facility] (3/18/2017)      Morbid obesity with BMI of 60.0-69.9, adult ([address] (3/18/2017)      Pulmonary HTN ([facility] [address] (7/15/2018)      Overview: Moderate/severe per Echo 2/2017      Hyponatremia (7/15/2018)      Hypokalemia (7/15/2018)      Chest pain (7/15/2018)      Acute renal failure superimposed on stage 3 chronic kidney disease ([facility] [address] (7/15/2018)        Plan: This is a pleasant gentleman with multiple medical problems not the least of which is morbid obesity, severe cardiomyopathy and with biventricular failure we are asked to see for a creatinine in the mid 3's. He tells me he is not a dialysis candidate and I agree. He would be high risk with a 10-20% EF. Having said that, he has done quite well in the past year. He is comfortable from a volume standpoint. He has less edema than usual. Electrolytes are unremarkable. He is not anemic. He is not hyperphosphatemic. I would continue current management. I did spend some time discussing the multitude of chronic issues he does have and maintaining comfort. Please call if needed. Thanks.     Signed By: Narda Teixeira MD     July 16, 2018

## 2018-07-16 NOTE — CONSULTS
[facility] Cardiology Consult Date of  Admission: 7/15/2018  9:06 PM  
 
Primary Care Physician: Dr. Juan Braga Primary Cardiologist: Dr. Akua Jacques Referring Physician: Dr. Olivier Dawson Consulting Physician: Dr. Akua Jacques CC/Reason for consult: chest pain, severe systolic CHF Toni Cha is a 47 y.o. male admitted for Hyperglycemia. He was weak and lethargic. Blood sugar on arrival was 649. He states he does not have a glucometer at home. He denies any recent chest pain or dyspnea. He denies weight gain or LE edema. He had recently been started on tramadol by neurology and thought that this was making him sleepy. He is followed in our office for NICM with ICD in place, NSVT, PAF. Patient Active Problem List  
Diagnosis Code  Obstructive sleep apnea G47.33  
 Chronic back pain M54.9, G89.29  
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  
 CKD (chronic kidney disease) stage 3, GFR 30-59 ml/min N18.3  Chronic pancreatitis ([address] K86.1  Abdominal fluid collection R18.8  AICD (automatic cardioverter/defibrillator) present Z95.810  
 Congestive heart failure (CHF) (HCC) I50.9  Hypertension I10  
 Acute on chronic systolic (congestive) heart failure (HCC) I50.23  
 Atrial flutter (HCC) I48.92  
 Obesity hypoventilation syndrome (HCC) E66.2  Morbid obesity with BMI of 60.0-69.9, adult (HCC) E66.01, Z68.44  
 Hypersomnia G47.10  Constipation K59.00  
 Nonsustained ventricular tachycardia (HCC) I47.2  NSVT (nonsustained ventricular tachycardia) ([facility]) I47.2  Acute encephalopathy G93.40  Acute on chronic respiratory failure with hypoxia and hypercapnia (HCC) J96.21, J96.22  
 Acute on chronic systolic heart failure (HCC) I50.23  Depressive disorder F32.9  Asthma J45.909  Ulcer of leg, chronic ([address] L97.909  CHF (congestive heart failure) ([facility]) I50.9  Hyperglycemia R73.9  
 Pulmonary HTN ([facility]) I27.20  Hyponatremia E87.1  Hypokalemia E87.6  Chest pain R07.9  Acute renal failure superimposed on stage 3 chronic kidney disease (HCC) N17.9, N18.3 Past Medical History:  
Diagnosis Date  Acquired claw toe of right foot  Acute encephalopathy 3/18/2017  Acute systolic heart failure [facility]) May, 2009 Also had transient acute renal failure secondary to poor perfusion.  AICD (automatic cardioverter/defibrillator) present 10/21/2015  Asthma  Atypical chest pain 4/23/2010  Bronchitis  CAD (coronary artery disease)  Cardiomyopathy  Chest pain 10/21/2015  Chronic kidney disease   
 renal insufficiency  Chronic pain   
 back  Congestive heart failure (CHF) ([address] 10/21/2015  COPD  Depressive disorder  Diabetes ([facility] type 2 insulin reliant- BS average- 160's-180's  Diabetes mellitus type II, uncontrolled ([address] 7/2/2013  GERD (gastroesophageal reflux disease)  Gout  Heart failure (HCC)   
 cardiomyopathy with ef 10-20%  Hypertension  Hyponatremia 12/20/2010  Ill-defined condition   
 gout, neuropathy, sciatica  Morbid obesity ([address]  Nausea & vomiting 11/30/2015  Neuropathy  Obstructive sleep apnea 2/15/2010  Other unknown and unspecified cause of morbidity or mortality Gout  Severe sepsis ([address]  Ulcer of leg, chronic ([facility])  Unspecified sleep apnea   
 cpap Past Surgical History:  
Procedure Laterality Date  CHEST SURGERY PROCEDURE UNLISTED    
 thorocentesis  HX HEART CATHETERIZATION  Sandy 10, 2008 Severe multivessel disease with severe LV dysfunction  HX PACEMAKER    
 may 2010. ICD is place. Allergies Allergen Reactions  Dilaudid [Hydromorphone] Other (comments) Hotflashes, patient states he's not allergic  Iodinated Contrast- Oral And Iv Dye Other (comments)   \"shuts my kidneys down\"  Penicillins Unknown (comments) Pt states he is not allergic his mother just wouldn't allow him take it Family History Problem Relation Age of Onset  Heart Disease Father  Stroke Father  Coronary Artery Disease Father  Other Father   
  kidney failure  Diabetes Sister  Stroke Sister  Diabetes Sister  Coronary Artery Disease Mother  Heart Disease Other Current Facility-Administered Medications Medication Dose Route Frequency  nitroglycerin (NITROSTAT) tablet 0.4 mg  0.4 mg SubLINGual PRN  
 aspirin chewable tablet 81 mg  81 mg Oral DAILY  insulin lispro (HUMALOG) injection   SubCUTAneous AC&HS  allopurinol (ZYLOPRIM) tablet 100 mg  100 mg Oral DAILY  carvedilol (COREG) tablet 6.25 mg  6.25 mg Oral BID WITH MEALS  gabapentin (NEURONTIN) capsule 100 mg  100 mg Oral BID  hydrALAZINE (APRESOLINE) tablet 25 mg  25 mg Oral TID  isosorbide dinitrate (ISORDIL) tablet 20 mg  20 mg Oral TID  oxyCODONE IR (ROXICODONE) tablet 5 mg  5 mg Oral Q8H PRN  pravastatin (PRAVACHOL) tablet 80 mg  80 mg Oral QHS  famotidine (PEPCID) tablet 20 mg  20 mg Oral BID  senna (SENOKOT) tablet 8.6 mg  1 Tab Oral QHS  sodium chloride (NS) flush 5-10 mL  5-10 mL IntraVENous Q8H  
 sodium chloride (NS) flush 5-10 mL  5-10 mL IntraVENous PRN  
 tuberculin injection 5 Units  5 Units IntraDERMal ONCE  
 acetaminophen (TYLENOL) tablet 650 mg  650 mg Oral Q4H PRN  
 heparin (porcine) injection 5,000 Units  5,000 Units SubCUTAneous Q8H  
 albuterol (PROVENTIL VENTOLIN) nebulizer solution 2.5 mg  2.5 mg Nebulization Q6H PRN  
 insulin glargine (LANTUS) injection 30 Units  30 Units SubCUTAneous DAILY Review of Systems Constitution: Positive for weakness and malaise/fatigue. Negative for chills, fever, weight gain and weight loss. HENT: Negative for ear pain, hearing loss, nosebleeds, sore throat and tinnitus.    
Eyes: Negative for blurred vision, vision loss in left eye and vision loss in right eye. Cardiovascular: Negative for chest pain, dyspnea on exertion, leg swelling, near-syncope, orthopnea, palpitations, paroxysmal nocturnal dyspnea and syncope. Respiratory: Negative for cough, hemoptysis, shortness of breath, sputum production and wheezing. Endocrine: Negative for cold intolerance, heat intolerance and polydipsia. Hematologic/Lymphatic: Does not bruise/bleed easily. Skin: Negative for color change and rash. Musculoskeletal: Negative for back pain, joint pain, joint swelling and myalgias. Gastrointestinal: Negative for abdominal pain, constipation, diarrhea, dysphagia, heartburn, hematemesis, melena, nausea and vomiting. Genitourinary: Negative for dysuria, frequency, hematuria and urgency. Neurological: Positive for excessive daytime sleepiness. Negative for difficulty with concentration, dizziness, headaches, light-headedness, numbness, paresthesias, seizures and vertigo. Pos for lethargy Psychiatric/Behavioral: Negative for altered mental status and depression. Physical Exam 
Vitals:  
 07/16/18 0134 07/16/18 0157 07/16/18 0540 07/16/18 1920 BP:  122/62 (!) 139/96 126/51 Pulse:  69 68 70 Resp:  22 22 21 Temp:  97.6 °F (36.4 °C) 97.5 °F (36.4 °C) 98 °F (36.7 °C) SpO2: 96% 97% 97% 91% Weight:   142.7 kg (314 lb 9.6 oz) Height:      
 
 
Physical Exam: 
General: Well Developed, Well Nourished, No Acute Distress HEENT: pupils equal and round, no abnormalities noted Neck: supple, no JVD Heart: S1S2 with RRR without murmurs or gallops Lungs: Clear throughout auscultation bilaterally without adventitious sounds Abd: soft, nontender, nondistended, with good bowel sounds Ext: warm, no edema, calves supple/nontender, pulses 2+ bilaterally Skin: warm and dry Psychiatric: Normal mood and affect Neurologic: Alert and oriented X 3 Cardiographics Telemetry: sinus rhythm ECG: sinus rhythm, 1st degree AV block Labs:  
Recent Labs  
   07/16/18 
 0445  07/15/18 2039 NA  127*  125* K  3.0*  2.8*  
MG  2.4  2.2 BUN  74*  68* CREA  3.12*  3.23* GLU  351*  649* WBC  7.5  7.1 HGB  16.2  16.1 HCT  47.2  46.8 PLT  159  158 INR  1.0   --   
TROIQ  0.05  0.03 All Cardiac Markers in the last 24 hours:   
Lab Results Component Value Date/Time TROIQ 0.05 07/16/2018 04:45 AM  
 TROIQ 0.03 07/15/2018 08:39 PM  
 
 
 
Assessment/Plan:  
  
Principal Problem: Hyperglycemia (7/15/2018) Insulin regimen per hospitalist, Hgb A1C >16, pt states he does not have a glucometer at home Active Problems: 
  Obstructive sleep apnea (3/18/2017) Pt states compliant with CPAP Nonischemic dilated cardiomyopathy ([address] (7/2/2013) Pt appears euvolemic on exam 
 
  Diabetes mellitus type II, uncontrolled ([address] (3/18/2017) On insulin regimen Noncompliance with medication regimen (3/18/2017) Pt states he has been taking meds as prescribed CKD (chronic kidney disease) stage 3, GFR 30-59 ml/min (8/13/2014) creatinine elevated on arrival, he has been urinating more with hyperglycemia AICD (automatic cardioverter/defibrillator) present (3/18/2017) No ICD shocks Obesity hypoventilation syndrome ([address] (3/18/2017) On O2 by NC Morbid obesity with BMI of 60.0-69.9, adult ([address] (3/18/2017) Pt states he has been trying to be more active Pulmonary HTN ([address] (7/15/2018) Hyponatremia (7/15/2018) likely due to volume depletion in setting of hyperglycemia, improved today Hypokalemia (7/15/2018) Replace and monitor Chest pain (7/15/2018) Pt denies any chest pain on interview, prior LHC with no obstructive CAD Acute renal failure superimposed on stage 3 chronic kidney disease ([address] (7/15/2018) Dehydrated on admission in setting of hyperglycemia, received fluids on admission, monitor BUN/creatinine Thank you very much for this referral. We appreciate the opportunity to participate in this patient's care. We will follow along with above stated plan. Hugo Rose PA-C Consulting MD: Dr. Yaquelin Donald

## 2018-07-17 VITALS
TEMPERATURE: 98.3 F | WEIGHT: 312.4 LBS | HEART RATE: 68 BPM | OXYGEN SATURATION: 98 % | DIASTOLIC BLOOD PRESSURE: 88 MMHG | SYSTOLIC BLOOD PRESSURE: 132 MMHG | HEIGHT: 66 IN | BODY MASS INDEX: 50.21 KG/M2 | RESPIRATION RATE: 19 BRPM

## 2018-07-17 LAB
ANION GAP SERPL CALC-SCNC: 10 MMOL/L (ref 7–16)
BUN SERPL-MCNC: 68 MG/DL (ref 6–23)
CALCIUM SERPL-MCNC: 9.2 MG/DL (ref 8.3–10.4)
CHLORIDE SERPL-SCNC: 85 MMOL/L (ref 98–107)
CO2 SERPL-SCNC: 38 MMOL/L (ref 21–32)
CREAT SERPL-MCNC: 2.83 MG/DL (ref 0.8–1.5)
GLUCOSE BLD STRIP.AUTO-MCNC: 160 MG/DL (ref 65–100)
GLUCOSE BLD STRIP.AUTO-MCNC: 164 MG/DL (ref 65–100)
GLUCOSE BLD STRIP.AUTO-MCNC: 227 MG/DL (ref 65–100)
GLUCOSE BLD STRIP.AUTO-MCNC: 289 MG/DL (ref 65–100)
GLUCOSE SERPL-MCNC: 230 MG/DL (ref 65–100)
POTASSIUM SERPL-SCNC: 3 MMOL/L (ref 3.5–5.1)
SODIUM SERPL-SCNC: 133 MMOL/L (ref 136–145)

## 2018-07-17 PROCEDURE — 74011250636 HC RX REV CODE- 250/636: Performed by: HOSPITALIST

## 2018-07-17 PROCEDURE — 74011636637 HC RX REV CODE- 636/637: Performed by: INTERNAL MEDICINE

## 2018-07-17 PROCEDURE — 74011250637 HC RX REV CODE- 250/637: Performed by: HOSPITALIST

## 2018-07-17 PROCEDURE — 94660 CPAP INITIATION&MGMT: CPT

## 2018-07-17 PROCEDURE — 82962 GLUCOSE BLOOD TEST: CPT

## 2018-07-17 PROCEDURE — 74011250637 HC RX REV CODE- 250/637: Performed by: INTERNAL MEDICINE

## 2018-07-17 PROCEDURE — 80048 BASIC METABOLIC PNL TOTAL CA: CPT | Performed by: INTERNAL MEDICINE

## 2018-07-17 PROCEDURE — 36415 COLL VENOUS BLD VENIPUNCTURE: CPT | Performed by: INTERNAL MEDICINE

## 2018-07-17 RX ORDER — POTASSIUM CHLORIDE 20 MEQ/1
10 TABLET, EXTENDED RELEASE ORAL DAILY
Qty: 30 TAB | Refills: 0 | Status: SHIPPED | OUTPATIENT
Start: 2018-07-17 | End: 2018-08-06 | Stop reason: SDUPTHER

## 2018-07-17 RX ORDER — POTASSIUM CHLORIDE 20 MEQ/1
60 TABLET, EXTENDED RELEASE ORAL
Status: COMPLETED | OUTPATIENT
Start: 2018-07-17 | End: 2018-07-17

## 2018-07-17 RX ORDER — GABAPENTIN 600 MG/1
300 TABLET ORAL 3 TIMES DAILY
Qty: 90 TAB | Refills: 0 | Status: ON HOLD
Start: 2018-07-17 | End: 2019-01-01

## 2018-07-17 RX ADMIN — FAMOTIDINE 20 MG: 20 TABLET ORAL at 09:07

## 2018-07-17 RX ADMIN — ALLOPURINOL 100 MG: 100 TABLET ORAL at 09:07

## 2018-07-17 RX ADMIN — ASPIRIN 81 MG 81 MG: 81 TABLET ORAL at 09:08

## 2018-07-17 RX ADMIN — HEPARIN SODIUM 5000 UNITS: 5000 INJECTION, SOLUTION INTRAVENOUS; SUBCUTANEOUS at 06:28

## 2018-07-17 RX ADMIN — GABAPENTIN 100 MG: 100 CAPSULE ORAL at 16:02

## 2018-07-17 RX ADMIN — INSULIN HUMAN 9 UNITS: 100 INJECTION, SOLUTION PARENTERAL at 16:02

## 2018-07-17 RX ADMIN — CARVEDILOL 6.25 MG: 6.25 TABLET, FILM COATED ORAL at 09:07

## 2018-07-17 RX ADMIN — GABAPENTIN 100 MG: 100 CAPSULE ORAL at 09:07

## 2018-07-17 RX ADMIN — Medication 10 ML: at 06:07

## 2018-07-17 RX ADMIN — CARVEDILOL 6.25 MG: 6.25 TABLET, FILM COATED ORAL at 16:13

## 2018-07-17 RX ADMIN — INSULIN HUMAN 3 UNITS: 100 INJECTION, SOLUTION PARENTERAL at 06:28

## 2018-07-17 RX ADMIN — ISOSORBIDE DINITRATE 20 MG: 10 TABLET ORAL at 16:02

## 2018-07-17 RX ADMIN — HYDRALAZINE HYDROCHLORIDE 25 MG: 25 TABLET, FILM COATED ORAL at 16:02

## 2018-07-17 RX ADMIN — ISOSORBIDE DINITRATE 20 MG: 10 TABLET ORAL at 09:07

## 2018-07-17 RX ADMIN — INSULIN HUMAN 3 UNITS: 100 INJECTION, SOLUTION PARENTERAL at 11:01

## 2018-07-17 RX ADMIN — POTASSIUM CHLORIDE 60 MEQ: 20 TABLET, EXTENDED RELEASE ORAL at 06:28

## 2018-07-17 RX ADMIN — HYDRALAZINE HYDROCHLORIDE 25 MG: 25 TABLET, FILM COATED ORAL at 09:07

## 2018-07-17 NOTE — PROGRESS NOTES
Care Management Interventions  PCP Verified by CM: Yes  Palliative Care Criteria Met (RRAT>21 & CHF Dx)?: No (RRAT 25 Dx Hyperglycemia )  Mode of Transport at Discharge: Other (see comment) (friends)  Transition of Care Consult (CM Consult): Discharge Planning  Discharge Durable Medical Equipment:  (Nitin Anderson agreed to bring portable O2 tank to the roomm)  Physical Therapy Consult: No  Occupational Therapy Consult: No  Speech Therapy Consult: No  Current Support Network: Other (lives with son and daughter)  Plan discussed with Pt/Family/Caregiver: Yes  Freedom of Choice Offered: Yes  Discharge Location  Discharge Placement: Home  Patient ready for d/c. Patient does not have his POC O2 to go home. His DME company is [employer] and I called them spoke with Omayra and he will deliver a portable O2 tank to patients room to go home today. Patient has transportation set up for 330-4 pm today. Patient updated that O2 tank will be delivered today. He voices no concerns or needs. Patient to d/c home with family.

## 2018-07-17 NOTE — PROGRESS NOTES
Pt wanted me to check his BS. He said he felt queasy for a minute. BS was 227. It has come down a great deal, so will not treat it currently and recheck before breakfast. Pt now on 3L NC. Will continue to monitor.

## 2018-07-17 NOTE — PROGRESS NOTES
Discharge instructions reviewed with patient. Opportunity for questions allowed. Patient verbalized understanding of all new medications and discharge instructions. Sign pad unavailable.

## 2018-07-17 NOTE — DISCHARGE INSTRUCTIONS
Learning About Diabetes and Coronary Artery Disease  How are diabetes and heart disease connected? Many people think diabetes and heart disease go hand in hand. But having diabetes doesn't have to mean that you are going to have a heart attack someday. Healthy living can help prevent many of the problems that come with both diabetes and heart disease. For some people, diabetes can cause problems in your body that may lead to heart disease. Diabetes can make the problems of heart disease worse. Experts do not fully understand how diabetes affects the heart. Many things can lead to heart disease, including high blood sugar, insulin resistance, high cholesterol, and high blood pressure. But lifestyle and genetics may also affect a person's risk. But here's the good news: The good things you're doing to stay healthy with diabetes-eating healthy foods, quitting smoking, getting exercise and more-are also helping your heart. What increases your risk for heart disease? When you have diabetes, your risk for heart disease is even higher if you have:  · High blood pressure, which pushes blood through the arteries with too much force. Over time, this damages the walls of the arteries. · High cholesterol, which causes the buildup of a kind of fat inside the blood vessel walls. This buildup can lower blood flow to the heart muscle and raise your risk for having a heart attack. · Kidney damage, which shares many of the risk factors for heart disease (such as high blood sugar, high blood pressure, and high cholesterol). How can you keep your heart healthy when you have diabetes? Managing your diabetes and keeping your heart healthy are two sides of the same coin. Here are some things you can do. · Test your blood sugar levels and get your diabetes tests on schedule. Try to keep your numbers within your target range. · Keep track of your blood pressure. The target for most people with diabetes is below 140/90. Your doctor will give you a goal that's right for you. If your blood pressure is high, your treatment may also include medicine. Changes in your lifestyle, such as staying at a healthy weight, may also help you lower your blood pressure. · Eat heart-healthy foods. These include fruits, vegetables, whole grains, fish, and low-fat or nonfat dairy foods. Limit sodium, alcohol, and sweets. · If your doctor recommends it, get more exercise. Walking is a good choice. Bit by bit, increase the amount you walk every day. Try for at least 30 minutes on most days of the week. · Do not smoke. Smoking can make diabetes and heart disease worse. If you need help quitting, talk to your doctor about stop-smoking programs and medicines. These can increase your chances of quitting for good. · Your doctor may talk with you about taking medicines for your heart. For example, your doctor may suggest taking a statin or daily aspirin. Where can you learn more? Go to http://armando-nirav.info/. Enter A896 in the search box to learn more about \"Learning About Diabetes and Coronary Artery Disease. \"  Current as of: December 7, 2017  Content Version: 11.7  © 7951-4060 [employer]. Care instructions adapted under license by [employer] (which disclaims liability or warranty for this information). If you have questions about a medical condition or this instruction, always ask your healthcare professional. [place] [address] any warranty or liability for your use of this information. Learning About High Blood Sugar  What is high blood sugar? Your body turns the food you eat into glucose (sugar), which it uses for energy. But if your body isn't able to use the sugar right away, it can build up in your blood and lead to high blood sugar. When the amount of sugar in your blood stays too high for too much of the time, you may have diabetes.  Diabetes is a disease that can cause serious health problems. The good news is that lifestyle changes may help you get your blood sugar back to normal and avoid or delay diabetes. What causes high blood sugar? Sugar (glucose) can build up in your blood if you:  · Are overweight. · Have a family history of diabetes. · Take certain medicines, such as steroids. What are the symptoms? Having high blood sugar may not cause any symptoms at all. Or it may make you feel very thirsty or very hungry. You may also urinate more often than usual, have blurry vision, or lose weight without trying. How is high blood sugar treated? You can take steps to lower your blood sugar level if you understand what makes it get higher. Your doctor may want you to learn how to test your blood sugar level at home. Then you can see how illness, stress, or different kinds of food or medicine raise or lower your blood sugar level. Other tests may be needed to see if you have diabetes. How can you prevent high blood sugar? · Watch your weight. If you're overweight, losing just a small amount of weight may help. Reducing fat around your waist is most important. · Limit the amount of calories, sweets, and unhealthy fat you eat. Ask your doctor if a dietitian can help you. A registered dietitian can help you create meal plans that fit your lifestyle. · Get at least 30 minutes of exercise on most days of the week. Exercise helps control your blood sugar. It also helps you maintain a healthy weight. Walking is a good choice. You also may want to do other activities, such as running, swimming, cycling, or playing tennis or team sports. · If your doctor prescribed medicines, take them exactly as prescribed. Call your doctor if you think you are having a problem with your medicine. You will get more details on the specific medicines your doctor prescribes. Follow-up care is a key part of your treatment and safety.  Be sure to make and go to all appointments, and call your doctor if you are having problems. It's also a good idea to know your test results and keep a list of the medicines you take. Where can you learn more? Go to http://armando-nirav.info/. Enter O108 in the search box to learn more about \"Learning About High Blood Sugar. \"  Current as of: December 7, 2017  Content Version: 11.7  © 7894-3342 [employer] Care instructions adapted under license by [employer] (which disclaims liability or warranty for this information). If you have questions about a medical condition or this instruction, always ask your healthcare professional. [place] [address] any warranty or liability for your use of this information.

## 2018-07-17 NOTE — DISCHARGE SUMMARY
Hospitalist Discharge Summary     Patient ID:  Singh Barclay  [id number]  61 y.o.  1965  Admit date: 7/15/2018  9:06 PM  Discharge date and time: 7/17/2018  Attending: Arnol Mariano MD  PCP:  Tristan Busch MD  Treatment Team: Attending Provider: Arnol Mariano MD; Consulting Provider: Beverly Santiago MD; Utilization Review: Pio Wilkes RN; Consulting Provider: Fitz Garcia MD    Principal Diagnosis Hyperglycemia   Principal Problem:    Hyperglycemia (7/15/2018)    Active Problems:    Obstructive sleep apnea (3/18/2017)      Nonischemic dilated cardiomyopathy ([address] (7/2/2013)      Overview: LVEF 10-15% on ECHO from 12/17/10      Diabetes mellitus type II, uncontrolled ([address] (3/18/2017)      Noncompliance with medication regimen (3/18/2017)      CKD (chronic kidney disease) stage 3, GFR 30-59 ml/min (8/13/2014)      AICD (automatic cardioverter/defibrillator) present (3/18/2017)      Obesity hypoventilation syndrome ([address] (3/18/2017)      Morbid obesity with BMI of 60.0-69.9, adult ([address] (3/18/2017)      Pulmonary HTN ([address] (7/15/2018)      Overview: Moderate/severe per Echo 2/2017      Hyponatremia (7/15/2018)      Hypokalemia (7/15/2018)      Chest pain (7/15/2018)      Acute renal failure superimposed on stage 3 chronic kidney disease ([address] (7/15/2018)       HPI:    Mr. Freda Dent is a 48 yo with PMHX significant for Morbid obesity (BMI>60), CAD, nonischemic cardiomyopathy, chronic systolic CHF, moderate/severe pHTN, Hx of Aflutter and NSVT s/p AICD, HTN, HLP, CKD stage 3, GERD, KELSIE, non compliance with medical treatment who presented to ED complaining of increased lethargy, polyuria, polydipsia and falling easy to sleep. Complains of occasional mild mid sternal chest pain 4/10, w/o radiations, <2 minutes for last few days. Denies SOB or spikes of fever. States that he didn't took his insult for last year. Take his heart medications inclusive his diuretics.  Didn't used his CPAP for last 2 months, states that doesn't have all the parts. Denies any alcohol, smoking or recreation drug use. ED work-up showed  WBC:7.1, H/H:16.1/46.8, Na:128, K:2.8, CL:74, CO2:39, BS:649 , Cr:3.23, BUN:68, P:3.0, M.0, TBil:1.5, AST:26, ALT:27, ALkP:303, Trop I:0.03, serum osmolarity:326, Dig level:0.1, ALc level <0.1, TSH:2.110, Using 2L NC PRN at home. Received Insulin 10 UI x1 and K supplementation per Ed provider. Daughter at bedside. Full CODE    Hospital Course:    Pt was given some IVF in ER and was seen by cards and Nephro. Cr Improved and both nephro and Cards did not make changes to his meds. Symptoms improved and mentation back to baseline. TTE showed improved EF from 10% to now 20%. AMS was likely due to Hyperglycemia, medication effect. Pt asked to reduce Gabapentin dose to 300mg TID given renal failure. His BS now is in 160s and he is medically ready for discharge. Out pt f/u with PCP, [facility] cardiology and Nephro. Plan discussed with pt who is in agreement with the plan. All questions answered. Pt was stable at time of discharge. Follow up as per below. Significant Diagnostic Imaging:     TTE:    LEFT VENTRICLE: The ventricle was severely dilated. Systolic function was  markedly reduced. Ejection fraction was estimated in the range of 15 % to 20 %. This study was inadequate for the evaluation of regional wall motion. Wall  thickness was mildly increased. Left ventricular diastolic function is   present. Average E/e'- 17.    RIGHT VENTRICLE: Not well visualized. LEFT ATRIUM: The atrium was mildly dilated. RIGHT ATRIUM: A pacing wire was present. Not well visualized. SYSTEMIC VEINS: IVC: The inferior vena cava was normal in size and course. AORTIC VALVE: The valve was not well visualized. There was no evidence for  stenosis. There was no insufficiency. MITRAL VALVE: Valve structure was normal. There was no evidence for stenosis. There was trivial regurgitation.     TRICUSPID VALVE: Not well visualized. There was no evidence for stenosis. There  was no regurgitation. PULMONIC VALVE: Not well visualized. There was no evidence for stenosis. There  was trivial regurgitation. PERICARDIUM: There was no pericardial effusion. AORTA: The root exhibited normal size. SUMMARY:    -  Left ventricle: The ventricle was severely dilated. Systolic function was  markedly reduced. Ejection fraction was estimated in the range of 15 % to 20   %. This study was inadequate for the evaluation of regional wall motion. Wall  thickness was mildly increased. -  Left atrium: The atrium was mildly dilated. Labs: Results:       Chemistry Recent Labs      07/17/18   0411  07/16/18   1600  07/16/18 0445  07/15/18   2039   GLU  230*  394*  351*  649*   NA  133*  129*  127*  125*   K  3.0*  3.0*  3.0*  2.8*   CL  85*  80*  81*  74*   CO2  38*  38*  37*  39*   BUN  68*  65*  74*  68*   CREA  2.83*  3.10*  3.12*  3.23*   CA  9.2  8.9  9.0  9.4   AGAP  10  11  9  12   AP   --    --   259*  303*   TP   --    --   7.6  8.4*   ALB   --    --   3.2*  3.2*   GLOB   --    --   4.4*  5.2*   AGRAT   --    --   0.7*  0.6*      CBC w/Diff Recent Labs      07/16/18   0445  07/15/18   2039   WBC  7.5  7.1   RBC  5.78*  5.71*   HGB  16.2  16.1   HCT  47.2  46.8   PLT  159  158   GRANS  50  56   LYMPH  41  34   EOS  2  2      Cardiac Enzymes No results for input(s): CPK, CKND1, IKE in the last 72 hours.     No lab exists for component: CKRMB, TROIP   Coagulation Recent Labs      07/16/18 0445   PTP  13.1   INR  1.0       Last A1c Lab Results   Component Value Date/Time    Hemoglobin A1c >16.0 (H) 07/16/2018 04:45 AM      Lipid Panel Lab Results   Component Value Date/Time    Cholesterol, total 189 07/03/2013 06:35 AM    HDL Cholesterol 60 07/03/2013 06:35 AM    LDL, calculated 108.4 (H) 07/03/2013 06:35 AM    VLDL, calculated 20.6 07/03/2013 06:35 AM    Triglyceride 103 07/03/2013 06:35 AM    CHOL/HDL Ratio 3.2 07/03/2013 06:35 AM      BNP No results for input(s): BNPP in the last 72 hours. Liver Enzymes Recent Labs      07/16/18   0445   TP  7.6   ALB  3.2*   AP  259*   SGOT  30      Thyroid Studies Lab Results   Component Value Date/Time    TSH 2.110 07/15/2018 08:39 PM            Discharge Exam:  Patient Vitals for the past 24 hrs:   Temp Pulse Resp BP SpO2   07/17/18 0914 98.3 °F (36.8 °C) 68 19 132/88 98 %   07/17/18 0445 98.6 °F (37 °C) 63 18 131/77 99 %   07/17/18 0036 97.8 °F (36.6 °C) 61 16 114/56 99 %   07/17/18 0030 - - - - 97 %   07/16/18 2042 97.2 °F (36.2 °C) 63 18 115/59 100 %   07/16/18 1644 97.6 °F (36.4 °C) 69 20 113/68 99 %   07/16/18 1349 97.8 °F (36.6 °C) 79 19 118/57 97 %     Visit Vitals    /88    Pulse 68    Temp 98.3 °F (36.8 °C)    Resp 19    Ht 5' 6\" (1.676 m)  Comment: pt stated     Wt 141.7 kg (312 lb 6.4 oz)    SpO2 98%    BMI 50.42 kg/m2     General appearance: alert, cooperative, no distress  Lungs: CTABL  Heart: RRR, no m/r/g  Abdomen: soft, non-tender. Bowel sounds normal.   Extremities: no cyanosis. Neurologic: Grossly normal    Disposition: Home  Discharge Condition: stable  Patient Instructions: activity as tolerated  Diabetic/cardiac diet  Current Discharge Medication List      CONTINUE these medications which have CHANGED    Details   gabapentin (NEURONTIN) 600 mg tablet Take 0.5 Tabs by mouth three (3) times daily. Indications: Pt. takes 1 to 2 capsules every 8 hours  Qty: 90 Tab, Refills: 0         CONTINUE these medications which have NOT CHANGED    Details   carvedilol (COREG) 6.25 mg tablet Take 1 Tab by mouth two (2) times daily (with meals).   Qty: 180 Tab, Refills: 3      nitroglycerin (NITROSTAT) 0.4 mg SL tablet Take 1 tablet SL every 5 minutes up to 3 doses as needed for CP  Qty: 4 Bottle, Refills: 6      metOLazone (ZAROXOLYN) 2.5 mg tablet TAKE ONE TABLET BY MOUTH EVERY MONDAY, WEDNESDAY, AND FRIDAY  Qty: 12 Tab, Refills: 3      cyclobenzaprine (FLEXERIL) 10 mg tablet Take 1 Tab by mouth three (3) times daily as needed for Muscle Spasm(s). Qty: 30 Tab, Refills: 0      hydrALAZINE (APRESOLINE) 25 mg tablet Take 1 Tab by mouth three (3) times daily. Qty: 90 Tab, Refills: 3      oxyCODONE IR (ROXICODONE) 5 mg immediate release tablet Take 1 Tab by mouth every eight (8) hours as needed for Pain. Max Daily Amount: 15 mg.  Qty: 11 Tab, Refills: 0    Associated Diagnoses: Chronic right-sided low back pain with right-sided sciatica      sildenafil citrate (VIAGRA) 50 mg tablet Take 1 Tab by mouth as needed. Qty: 30 Tab, Refills: 3      bumetanide (BUMEX) 2 mg tablet Take 1 Tab by mouth two (2) times a day. Qty: 60 Tab, Refills: 6    Associated Diagnoses: Chronic systolic congestive heart failure (HCC)      isosorbide dinitrate (ISORDIL) 20 mg tablet Take 1 Tab by mouth three (3) times daily. Qty: 90 Tab, Refills: 11      pravastatin (PRAVACHOL) 80 mg tablet Take 1 Tab by mouth nightly. Qty: 30 Tab, Refills: 5      sennosides (SENNA) 8.6 mg cap Take 17.2 mg by mouth nightly. docusate sodium (COLACE) 100 mg capsule Take 1 Cap by mouth two (2) times a day. Qty: 60 Cap, Refills: 0      insulin glargine (LANTUS) 100 unit/mL injection 50 Units by SubCUTAneous route nightly. Qty: 1 Vial, Refills: 0      ranitidine (ZANTAC) 150 mg tablet Take 150 mg by mouth nightly. polyethylene glycol (MIRALAX) 17 gram packet Take 1 Packet by mouth daily as needed (constipation). Qty: 10 Packet, Refills: 5      cpap machine kit 10 cm qhs      OXYGEN-AIR DELIVERY SYSTEMS 2 lpm cont. allopurinol (ZYLOPRIM) 100 mg tablet Take 100 mg by mouth two (2) times a day. glucose blood VI test strips (ASCENSIA AUTODISC VI, ONE TOUCH ULTRA TEST VI) strip Test strips for 30 day supply  Qty: 120 strip, Refills: 0             Vaccinations:   Pt screened by nursing for pneumococcal and influenza vaccination needs at discharge, will be ordered if needed and pt consented.   Immunization History   Administered Date(s) Administered    Influenza Vaccine 01/01/2013, 09/01/2015    Influenza Vaccine (Quad) PF 10/26/2016    Pneumococcal Polysaccharide (PPSV-23) 07/22/2014, 09/01/2015    TB Skin Test (PPD) Intradermal 01/30/2017, 03/18/2017, 07/15/2018    ZZZ-RETIRED (DO NOT USE) Pneumococcal Vaccine (Unspecified Type) 04/24/2010       Follow-up Information     Follow up With Details Comments [address] [place] [address] Avenue, [place]   [address] [place], [place] [address]  [phone]            Time spent in the discharge process was 35 minutes.       Signed By: Kira Maradiaga MD     July 17, 2018

## 2018-07-17 NOTE — PROGRESS NOTES
Report received. Pt alert and oriented, but sleepy. Sitting on side of bed eating snack. 3L NC. Lungs CTA. NSR w/ pac's and pvc's on monitor. No pain currently. Will continue to monitor.

## 2018-07-17 NOTE — PROGRESS NOTES
[facility] [place] CARDIOLOGY PROGRESS NOTE 
      
 
7/17/2018 11:51 AM 
 
Admit Date: 7/15/2018 Subjective:  
 
No o/e; at baseline from a cardiac standpoint. On 2L NC (baseline) ROS: 
Cardiovascular:  As noted above Objective:  
  
Vitals:  
 07/17/18 0030 07/17/18 0036 07/17/18 6812 07/17/18 4772 BP:  114/56 131/77 132/88 Pulse:  61 63 68 Resp:  16 18 19 Temp:  97.8 °F (36.6 °C) 98.6 °F (37 °C) 98.3 °F (36.8 °C) SpO2: 97% 99% 99% 98% Weight:   141.7 kg (312 lb 6.4 oz) Height:      
 
 
Physical Exam: 
General-No Acute Distress Neck- supple, no JVD 
CV- regular rate and rhythm no MRG Lung- clear bilaterally Abd- soft, nontender, nondistended Ext- no edema bilaterally. Skin- warm and dry Data Review:  
Recent Labs  
   07/17/18 
 0411  07/16/18 
 1600  07/16/18 
 0445  07/15/18 2039 NA  133*  129*  127*  125* K  3.0*  3.0*  3.0*  2.8*  
MG   --    --   2.4  2.2 BUN  68*  65*  74*  68* CREA  2.83*  3.10*  3.12*  3.23* GLU  230*  394*  351*  649* WBC   --    --   7.5  7.1 HGB   --    --   16.2  16.1 HCT   --    --   47.2  46.8 PLT   --    --   159  158 INR   --    --   1.0   --   
TROIQ   --    --   0.05  0.03 Assessment/Plan:  
 
Principal Problem: Hyperglycemia (7/15/2018) Active Problems: 
  Obstructive sleep apnea (3/18/2017) Nonischemic dilated cardiomyopathy ([facility] (7/2/2013) Diabetes mellitus type II, uncontrolled ([facility] (3/18/2017) Noncompliance with medication regimen (3/18/2017) CKD (chronic kidney disease) stage 3, GFR 30-59 ml/min (8/13/2014) AICD (automatic cardioverter/defibrillator) present (3/18/2017) Obesity hypoventilation syndrome ([facility] (3/18/2017) Morbid obesity with BMI of 60.0-69.9, adult ([facility] (3/18/2017) Pulmonary HTN ([facility] (7/15/2018) Hyponatremia (7/15/2018) Hypokalemia (7/15/2018) Chest pain (7/15/2018)   Acute renal failure superimposed on stage 3 chronic kidney disease ([facility] (7/15/2018) NICM with sev LV dysfn. Exam euvolemic; appears was intravasc depleted on presentation with initial sugars ~650; A1C >16. Received fluids with improving renal function. Continue hydralazine/nitrates/coreg; restart home bumex on d/c. Replete K per primary team. Stable from a cardiac standpoint with no further recs at this time. Discussed with hospitalist team; will sign off. Please call if questions Maricruz Ferrer MD 
7/17/2018 11:51 AM

## 2018-07-18 ENCOUNTER — PATIENT OUTREACH (OUTPATIENT)
Dept: CASE MANAGEMENT | Age: 53
End: 2018-07-18

## 2018-07-18 NOTE — PROGRESS NOTES
This note will not be viewable in [address] Transition of Care Discharge Follow-up Questionnaire   Date/Time of Call:   7/18/18 1:57pm   What was the patient hospitalized for? Hyperglycemia  Blood sugar was 649 on admission    HX:DM/CAD with ischemic cardiomyopathy EF 10-15% S/P ICD  KELSIE/Obesity  *Documented noncompliance*   Does the patient understand his/her diagnosis and/or treatment and what happened during the hospitalization? Spoke with patient, who was agreeable to RANJIT SMART call. Patient states understanding of diagnosis and treatment. Reports he is doing ok, denies any new symptoms. Patient reports he has tried to get new mask for CPAP today but has been unable due needs new order. Care Coordinator made call to Sleep center at [facility] and spoke with Conchita. Glenn Drake will contact patient with an appointment and arrange for order for new supplies. Did the patient receive discharge instructions? Yes, patient received discharge instructions. CM Assessed Risk for Readmission:       Patient stated Risk for Readmission:      High risk for readmission due to multiple comorbidities  and documented noncompliance, also patient has difficulty paying for prescriptions which may be a contributing factor in non compliance. *however patient does state intention to follow discharge directions and take better care of himself*    Per patient more problems with my sugar. Review any discharge instructions (see discharge instructions/AVS in [facility]). Ask patient if they understand these. Do they have any questions? Discharge instructions reviewed and patient verbalized understanding of instructions and denied any questions. Were home services ordered (nursing, PT, OT, ST, etc.)? No   If so, has the first visit occurred? If not, why? (Assist with coordination of services if necessary.)   N/A   Was any DME ordered? No   If so, has it been received?   If not, why?  (Assist patient in obtaining DME orders &/or equipment if necessary.) N/A   Complete a review of all medications (new, continued and discontinued meds per the D/C instructions and medication tab in [facility]). Medication review completed, patient stated understanding of all meds. Care Coordinator unsure if patient fully understands medications and importance of following orders   Were all new prescriptions filled? If not, why?  (Assist patient in obtaining medications if necessary  escalate for CCM &/or SW if ongoing issues are verbalized by pt or anticipated)   New prescription for Potassium filled per patient. Patient states all other medications are in home. Patient reports difficulty paying for prescriptions, states his children help sometimes when they can. Does the patient understand the purpose and dosing instructions for all medications? (If patient has questions, provide explanation and education.)   Patient states understanding. Care Coordinator unsure if patient fully understands medications and importance of following orders   Does the patient have any problems in performing ADLs? (If patient is unable to perform ADLs  what is the limiting factor(s)? Do they have a support system that can assist? If no support system is present, discuss possible assistance that they may be able to obtain. Escalate for CCM/SW if ongoing issues are verbalized by pt or anticipated)   Patient requires assistance for all ADLs, lives with his son who assists him. Does the patient have all follow-up appointments scheduled? 7 day f/up with PCP?   (f/up with PCP may be w/in 14 days if patient has a f/up with their specialist w/in 7 days)    7-14 day f/up with specialist?   (or per discharge instructions)    If f/up has not been made  what actions has the care coordinator made to accomplish this? Has transportation been arranged? Patient has follow up with Dr William Garsia on 8/1/18.     Patient did not have follow up scheduled with PCP,  Cammie Noel. Care Coordinator called and scheduled follow up for 7/19/18 @12:15. Patient states he may have to reschedule. Patient reports problems with transportation due to his vehicle is not operational at this time. Any other questions or concerns expressed by the patient? Discussed referral for CCM with patient to assist with transportation and medication cost, also patient could benefit from education for medications and importance of taking as directed. Patient was agreeable with referral   Schedule next appointment with RANJIT Thompson or refer to RN Case Manager/ per the workflow guidelines. When is care coordinators next follow-up call scheduled? If referred for CCM  what RN care manager was the referral assigned? Patient referred to Gloria Sherman RN, CCM   per [facility], supervisor. Patient has CC contact information and advised to call for new concerns.    JOSIANE Call Completed By: Nazario Meredith, [address] [place] [place] [address] Coordinator

## 2018-07-18 NOTE — PROGRESS NOTES
· Care coordinator did the [place] today. · Referral received from care coordinator due to patient stated that he has difficulty paying for prescriptions which may be a contributing factor in non-compliance, and children have been helping with cost of meds   · Patient also stating that his vehicle is not working and needs help with transportation. · Note from hospital not from CM reads: \"Discharge Placement: Home  Met with patient for d/c planning. Patient alert and oriented x 3, independent of ADL's and lives with his son and daughter. He has RW and O2 at home. He would like a aisha chair but explained would need to speak with his PCP Dr. Arnulfo Nobles about ordering this after d/c and he voiced understanding. He has [employer] and goes to [employer]. States at times has difficulty with co-pay but states his children help him out. PCP is Dr. Arnulfo Nobles who he saw a month ago. Current d/c plan is home with children. CM following. \"  · Referral sent to  for assistance with these issues  · Will f/u with patient in 2 days  This note will not be viewable in [address]

## 2018-07-19 ENCOUNTER — PATIENT OUTREACH (OUTPATIENT)
Dept: CASE MANAGEMENT | Age: 53
End: 2018-07-19

## 2018-07-19 NOTE — PROGRESS NOTES
Ambulatory Care Coordination  Social Work Assessment   Referral from which ITZEL CM: Mili Berry    Previously referred? If so, reason and brief outcome No   Reason for current referral: Transportation and medication/ financial assistance    Income information (if needed): Pt didnt provide at this time    Sources of Support: Family    ACP set up? Needs information? Already in place   Medication Cost assistance needed? Yes, however pt said utility assistance is most important right now   Referral to CLTC/Medicaid needed? Medicaid in place possible referral for CLTC   Referral to Medicare Extra Help/LIS program needed? NA   Small home repair needed? NA   MOW referral? NA   Any other concerns/questions? Wants possible help with a life like device    Next steps: BLAINE LEDEZMA will mail information on utility/ rent assistance programs    BLAINE LEDEZMA will follow up in a week to make sure pt got information and check if pt would like to go ahead and apply for CLTC        Pt said that really his biggest concern is his utility bill right now. Pt said that he could afford everything else if he could get back on the positive side with his utilities. BLAINE LEDEZMA talked with pt about utility assitance programs and that he would need to go in person to be able to apply for them. Pt was able to voice understanding. BLAINE LEDEZMA talked with pt about his transportation issues and let him know he could utilize [address] for transport to medical appointments through his Medicaid and that he would need to call at least 3 business days in advance. Pt was able to voice understanding and said that right now his son in law takes him to most appointments. PLAN:  BLAINE LEDEZMA will mail information to pt and follow up in a week     This note will not be viewable in [address]

## 2018-07-20 ENCOUNTER — PATIENT OUTREACH (OUTPATIENT)
Dept: CASE MANAGEMENT | Age: 53
End: 2018-07-20

## 2018-07-20 NOTE — PROGRESS NOTES
· Follow up with patient on SW referral   · SW outreached to patient to assist with transport/financial issues  · SW gave information on transportation (medicaid transport)  · SW also gave information and mailed information on assistance with utilities bill   · Patient verbalizes understanding of his meds and currently has all his meds   PLAN:  Will follow up with patient in one week to ensure no further SW or CCM needs  This note will not be viewable in [employer].

## 2018-07-24 ENCOUNTER — APPOINTMENT (OUTPATIENT)
Dept: GENERAL RADIOLOGY | Age: 53
End: 2018-07-24
Attending: EMERGENCY MEDICINE
Payer: MEDICARE

## 2018-07-24 ENCOUNTER — HOSPITAL ENCOUNTER (EMERGENCY)
Age: 53
Discharge: HOME | End: 2018-07-24
Attending: EMERGENCY MEDICINE
Payer: MEDICARE

## 2018-07-24 VITALS
HEIGHT: 66 IN | SYSTOLIC BLOOD PRESSURE: 126 MMHG | RESPIRATION RATE: 18 BRPM | BODY MASS INDEX: 48.21 KG/M2 | WEIGHT: 300 LBS | TEMPERATURE: 99.7 F | OXYGEN SATURATION: 92 % | HEART RATE: 85 BPM | DIASTOLIC BLOOD PRESSURE: 75 MMHG

## 2018-07-24 DIAGNOSIS — M25.562 LEFT KNEE PAIN, UNSPECIFIED CHRONICITY: Primary | ICD-10-CM

## 2018-07-24 PROCEDURE — 74011250637 HC RX REV CODE- 250/637: Performed by: EMERGENCY MEDICINE

## 2018-07-24 PROCEDURE — 73562 X-RAY EXAM OF KNEE 3: CPT

## 2018-07-24 PROCEDURE — 96372 THER/PROPH/DIAG INJ SC/IM: CPT | Performed by: EMERGENCY MEDICINE

## 2018-07-24 PROCEDURE — 74011250636 HC RX REV CODE- 250/636: Performed by: EMERGENCY MEDICINE

## 2018-07-24 PROCEDURE — 99283 EMERGENCY DEPT VISIT LOW MDM: CPT | Performed by: EMERGENCY MEDICINE

## 2018-07-24 RX ORDER — OXYCODONE AND ACETAMINOPHEN 7.5; 325 MG/1; MG/1
1 TABLET ORAL
Qty: 12 TAB | Refills: 0 | Status: SHIPPED | OUTPATIENT
Start: 2018-07-24 | End: 2019-01-01

## 2018-07-24 RX ORDER — DEXAMETHASONE SODIUM PHOSPHATE 100 MG/10ML
10 INJECTION INTRAMUSCULAR; INTRAVENOUS
Status: COMPLETED | OUTPATIENT
Start: 2018-07-24 | End: 2018-07-24

## 2018-07-24 RX ORDER — OXYCODONE AND ACETAMINOPHEN 7.5; 325 MG/1; MG/1
1 TABLET ORAL
Status: COMPLETED | OUTPATIENT
Start: 2018-07-24 | End: 2018-07-24

## 2018-07-24 RX ADMIN — OXYCODONE HYDROCHLORIDE AND ACETAMINOPHEN 1 TABLET: 7.5; 325 TABLET ORAL at 18:47

## 2018-07-24 RX ADMIN — DEXAMETHASONE SODIUM PHOSPHATE 10 MG: 10 INJECTION INTRAMUSCULAR; INTRAVENOUS at 18:47

## 2018-07-24 NOTE — DISCHARGE INSTRUCTIONS
Knee Pain or Injury: Care Instructions  Your Care Instructions    Injuries are a common cause of knee problems. Sudden (acute) injuries may be caused by a direct blow to the knee. They can also be caused by abnormal twisting, bending, or falling on the knee. Pain, bruising, or swelling may be severe, and may start within minutes of the injury. Overuse is another cause of knee pain. Other causes are climbing stairs, kneeling, and other activities that use the knee. Everyday wear and tear, especially as you get older, also can cause knee pain. Rest, along with home treatment, often relieves pain and allows your knee to heal. If you have a serious knee injury, you may need tests and treatment. Follow-up care is a key part of your treatment and safety. Be sure to make and go to all appointments, and call your doctor if you are having problems. It's also a good idea to know your test results and keep a list of the medicines you take. How can you care for yourself at home? · Be safe with medicines. Read and follow all instructions on the label. ¨ If the doctor gave you a prescription medicine for pain, take it as prescribed. ¨ If you are not taking a prescription pain medicine, ask your doctor if you can take an over-the-counter medicine. · Rest and protect your knee. Take a break from any activity that may cause pain. · Put ice or a cold pack on your knee for 10 to 20 minutes at a time. Put a thin cloth between the ice and your skin. · Prop up a sore knee on a pillow when you ice it or anytime you sit or lie down for the next 3 days. Try to keep it above the level of your heart. This will help reduce swelling. · If your knee is not swollen, you can put moist heat, a heating pad, or a warm cloth on your knee. · If your doctor recommends an elastic bandage, sleeve, or other type of support for your knee, wear it as directed.   · Follow your doctor's instructions about how much weight you can put on your leg. Use a cane, crutches, or a walker as instructed. · Follow your doctor's instructions about activity during your healing process. If you can do mild exercise, slowly increase your activity. · Reach and stay at a healthy weight. Extra weight can strain the joints, especially the knees and hips, and make the pain worse. Losing even a few pounds may help. When should you call for help? Call 911 anytime you think you may need emergency care. For example, call if:    · You have symptoms of a blood clot in your lung (called a pulmonary embolism). These may include:  ¨ Sudden chest pain. ¨ Trouble breathing. ¨ Coughing up blood.    Call your doctor now or seek immediate medical care if:    · You have severe or increasing pain.     · Your leg or foot turns cold or changes color.     · You cannot stand or put weight on your knee.     · Your knee looks twisted or bent out of shape.     · You cannot move your knee.     · You have signs of infection, such as:  ¨ Increased pain, swelling, warmth, or redness. ¨ Red streaks leading from the knee. ¨ Pus draining from a place on your knee. ¨ A fever.     · You have signs of a blood clot in your leg (called a deep vein thrombosis), such as:  ¨ Pain in your calf, back of the knee, thigh, or groin. ¨ Redness and swelling in your leg or groin.    Watch closely for changes in your health, and be sure to contact your doctor if:    · You have tingling, weakness, or numbness in your knee.     · You have any new symptoms, such as swelling.     · You have bruises from a knee injury that last longer than 2 weeks.     · You do not get better as expected. Where can you learn more? Go to http://armando-nirav.info/. Enter K195 in the search box to learn more about \"Knee Pain or Injury: Care Instructions. \"  Current as of: November 20, 2017  Content Version: 11.7  © 8220-6542 [employer], [employer].  Care instructions adapted under license by [employer] (which disclaims liability or warranty for this information). If you have questions about a medical condition or this instruction, always ask your healthcare professional. [address] any warranty or liability for your use of this information.

## 2018-07-24 NOTE — ED TRIAGE NOTES
Left knee pain. Reports that this pain has been present for 6 days. Patient reports hurting himself in the kiddy pool.

## 2018-07-24 NOTE — ED PROVIDER NOTES
HPI Comments: More sore but sore for some time. No penetrating injury. Minor trauma before this worsening    Patient is a 47 y.o. male presenting with knee pain. The history is provided by the patient. Knee Pain    This is a new problem. Episode onset: 7 days ago or longer. The pain is present in the left knee. The pain is moderate. Associated symptoms include limited range of motion. Pertinent negatives include no numbness, no tingling, no back pain and no neck pain. The symptoms are aggravated by palpation. He has tried nothing for the symptoms. There has been no history of extremity trauma. Past Medical History:   Diagnosis Date    Acquired claw toe of right foot     Acute encephalopathy 0/02/9095    Acute systolic heart failure ([address] May, 2009    Also had transient acute renal failure secondary to poor perfusion.     AICD (automatic cardioverter/defibrillator) present 10/21/2015    Asthma     Atypical chest pain 4/23/2010    Bronchitis     CAD (coronary artery disease)     Cardiomyopathy     Chest pain 10/21/2015    Chronic kidney disease     renal insufficiency    Chronic pain     back    Congestive heart failure (CHF) ([address] 10/21/2015    COPD     Depressive disorder     Diabetes ([address]     type 2 insulin reliant- BS average- 160's-180's    Diabetes mellitus type II, uncontrolled ([address] 7/2/2013    GERD (gastroesophageal reflux disease)     Gout     Heart failure ([address]     cardiomyopathy with ef 10-20%    Hypertension     Hyponatremia 12/20/2010    Ill-defined condition     gout, neuropathy, sciatica    Morbid obesity (HCC)     Nausea & vomiting 11/30/2015    Neuropathy     Obstructive sleep apnea 2/15/2010    Other unknown and unspecified cause of morbidity or mortality     Gout    Severe sepsis (HCC)     Ulcer of leg, chronic (HCC)     Unspecified sleep apnea     cpap       Past Surgical History:   Procedure Laterality Date    CHEST SURGERY PROCEDURE UNLISTED thorocentesis    HX HEART CATHETERIZATION  Sandy 10, 2008    Severe multivessel disease with severe LV dysfunction    HX PACEMAKER      may 2010. ICD is place. Family History:   Problem Relation Age of Onset    Heart Disease Father     Stroke Father     Coronary Artery Disease Father     Other Father      kidney failure    Diabetes Sister     Stroke Sister     Diabetes Sister     Coronary Artery Disease Mother     Heart Disease Other        Social History     Social History    Marital status:      Spouse name: N/A    Number of children: N/A    Years of education: N/A     Occupational History    Not on file. Social History Main Topics    Smoking status: Former Smoker     Packs/day: 0.25     Years: 1.00     Quit date: 7/12/1984    Smokeless tobacco: Never Used      Comment: pt states that he only tried smoking as a kid     Alcohol use No    Drug use: No    Sexual activity: Not on file     Other Topics Concern    Not on file     Social History Narrative         ALLERGIES: Dilaudid [hydromorphone]; Iodinated contrast- oral and iv dye; and Penicillins    Review of Systems   Musculoskeletal: Positive for arthralgias. Negative for back pain, neck pain and neck stiffness. Skin: Negative. Neurological: Negative for tingling and numbness. All other systems reviewed and are negative. Vitals:    07/24/18 1501   BP: 126/75   Pulse: 85   Resp: 18   Temp: 99.7 °F (37.6 °C)   SpO2: 92%   Weight: 136.1 kg (300 lb)   Height: 5' 6\" (1.676 m)            Physical Exam   Constitutional: He appears well-developed and well-nourished. No distress. MO   HENT:   Head: Atraumatic. Eyes: No scleral icterus. Neck: Neck supple. Cardiovascular: Normal rate. Pulmonary/Chest: Effort normal. No respiratory distress. Musculoskeletal: He exhibits tenderness.         Right knee: Normal.        Left knee: He exhibits no effusion, no ecchymosis, no deformity, normal alignment, no LCL laxity, normal patellar mobility and no MCL laxity. Tenderness found. Sore with no gross instability   Neurological: He is alert. He exhibits normal muscle tone. Skin: Skin is warm and dry. Psychiatric: Thought content normal.   Nursing note and vitals reviewed. MDM  Number of Diagnoses or Management Options  Left knee pain, unspecified chronicity:   Diagnosis management comments: Continued knee pain with negative Xrays.  Weight certainly worsens this issue       Amount and/or Complexity of Data Reviewed  Tests in the radiology section of CPT®: reviewed and ordered  Independent visualization of images, tracings, or specimens: yes    Risk of Complications, Morbidity, and/or Mortality  Presenting problems: moderate  Diagnostic procedures: low  Management options: moderate    Patient Progress  Patient progress: stable        ED Course       Procedures

## 2018-07-24 NOTE — ED NOTES
I have reviewed discharge instructions with the patient. The patient verbalized understanding. Patient left ED via Discharge Method: ambulatory to Home with self. Opportunity for questions and clarification provided. Patient given 1 scripts. To continue your aftercare when you leave the hospital, you may receive an automated call from our care team to check in on how you are doing. This is a free service and part of our promise to provide the best care and service to meet your aftercare needs.  If you have questions, or wish to unsubscribe from this service please call [phone]. Thank you for Choosing our [facility] Emergency Department.

## 2018-07-24 NOTE — PROGRESS NOTES
Patient with recent admit and discharge from [facility] (7-15-18 to 7-17-18)  / has Home O2 through [address] [place] [address] (per CM note).

## 2018-07-26 ENCOUNTER — PATIENT OUTREACH (OUTPATIENT)
Dept: CASE MANAGEMENT | Age: 53
End: 2018-07-26

## 2018-07-26 NOTE — PROGRESS NOTES
BLAINE LEDEZMA called pt to follow up and see if he got the information BLAINE LEDEZMA mailed him. However, BLAINE LEDEZMA got pt's voice mail. BLAINE LEDEZMA left a message for him to call BLAINE LEDEZMA back. PLAN:  BLAINE LEDEZMA will follow up with pt in a week if BLAINE LEDEZMA doesn't hear from pt before then. This note will not be viewable in [address]

## 2018-07-27 ENCOUNTER — PATIENT OUTREACH (OUTPATIENT)
Dept: CASE MANAGEMENT | Age: 53
End: 2018-07-27

## 2018-07-27 NOTE — PROGRESS NOTES
Attempted to outreach to patient for follow up - UTR no answer at this time Will attempt outreach again next week This note will not be viewable in [address]

## 2018-07-30 ENCOUNTER — PATIENT OUTREACH (OUTPATIENT)
Dept: CASE MANAGEMENT | Age: 53
End: 2018-07-30

## 2018-07-30 NOTE — PROGRESS NOTES
Attempted to outreach to patient for follow up - UTR no answer at this time Will attempt outreach again tomorrow This note will not be viewable in [address]

## 2018-08-01 ENCOUNTER — PATIENT OUTREACH (OUTPATIENT)
Dept: CASE MANAGEMENT | Age: 53
End: 2018-08-01

## 2018-08-01 NOTE — PROGRESS NOTES
Attempted to outreach to patient for follow up - UTR no answer at this time Have attempted to outreach to patient more than 3 times unsuccessfully Case closed at this time due to UTR. This note will not be viewable in [address]

## 2018-08-02 ENCOUNTER — PATIENT OUTREACH (OUTPATIENT)
Dept: CASE MANAGEMENT | Age: 53
End: 2018-08-02

## 2018-08-02 NOTE — PROGRESS NOTES
BLAINE LEDEZMA called to follow up with pt to make sure that he got the information that BLAINE LEDEZMA mailed out to him. However, BLAINE LEDEZMA got pt's voice mail. BLAINE LEDEZMA left a message for pt to call BLAINE LEDEZMA back. PLAN: 
BLAINE LEDEZMA will call to try and follow up with pt again next week and if BLAINE LEDEZMA is unable to reach pt BLAINE LEDEZMA will move to close CCM episode at this time This note will not be viewable in [address]

## 2018-08-09 ENCOUNTER — PATIENT OUTREACH (OUTPATIENT)
Dept: CASE MANAGEMENT | Age: 53
End: 2018-08-09

## 2018-08-09 NOTE — PROGRESS NOTES
BLAINE LEDEZMA tried to call pt back to follow up to make sure that he got the information that BLAINE LEDEZMA mailed out for him. However, BLAINE LEDEZMA got pt's voice mail again. BLAINE LEDEZMA left a message for him to call BLAINE LEDEZMA back. PLAN:  BLAINE LEDEZMA will remove pt from CCM at this time due to both BLAINE LEDEZMA and ITZEL CM being unable to reach pt for follow up     This note will not be viewable in [address]

## 2018-10-18 PROCEDURE — 99283 EMERGENCY DEPT VISIT LOW MDM: CPT

## 2018-10-19 ENCOUNTER — HOSPITAL ENCOUNTER (EMERGENCY)
Age: 53
Discharge: HOME | End: 2018-10-19
Payer: MEDICARE

## 2018-10-19 VITALS
BODY MASS INDEX: 50.62 KG/M2 | SYSTOLIC BLOOD PRESSURE: 133 MMHG | HEIGHT: 66 IN | HEART RATE: 85 BPM | OXYGEN SATURATION: 95 % | TEMPERATURE: 98.2 F | DIASTOLIC BLOOD PRESSURE: 84 MMHG | RESPIRATION RATE: 20 BRPM | WEIGHT: 315 LBS

## 2018-10-19 DIAGNOSIS — G89.29 CHRONIC PAIN OF LEFT KNEE: Primary | ICD-10-CM

## 2018-10-19 DIAGNOSIS — M25.562 CHRONIC PAIN OF LEFT KNEE: Primary | ICD-10-CM

## 2018-10-19 PROCEDURE — 96372 THER/PROPH/DIAG INJ SC/IM: CPT

## 2018-10-19 PROCEDURE — 74011250636 HC RX REV CODE- 250/636

## 2018-10-19 PROCEDURE — 74011250637 HC RX REV CODE- 250/637

## 2018-10-19 RX ORDER — OXYCODONE AND ACETAMINOPHEN 5; 325 MG/1; MG/1
2 TABLET ORAL
Status: COMPLETED | OUTPATIENT
Start: 2018-10-19 | End: 2018-10-19

## 2018-10-19 RX ORDER — TRAMADOL HYDROCHLORIDE 50 MG/1
50 TABLET ORAL
Qty: 12 TAB | Refills: 0 | Status: SHIPPED | OUTPATIENT
Start: 2018-10-19 | End: 2018-01-01

## 2018-10-19 RX ORDER — DEXAMETHASONE SODIUM PHOSPHATE 100 MG/10ML
10 INJECTION INTRAMUSCULAR; INTRAVENOUS
Status: COMPLETED | OUTPATIENT
Start: 2018-10-19 | End: 2018-10-19

## 2018-10-19 RX ORDER — PREDNISONE 10 MG/1
TABLET ORAL
Qty: 21 TAB | Refills: 0 | Status: SHIPPED | OUTPATIENT
Start: 2018-10-19 | End: 2018-01-01

## 2018-10-19 RX ADMIN — DEXAMETHASONE SODIUM PHOSPHATE 10 MG: 10 INJECTION INTRAMUSCULAR; INTRAVENOUS at 00:50

## 2018-10-19 RX ADMIN — OXYCODONE HYDROCHLORIDE AND ACETAMINOPHEN 2 TABLET: 5; 325 TABLET ORAL at 00:47

## 2018-10-19 NOTE — DISCHARGE INSTRUCTIONS
Joint Pain: Care Instructions  Your Care Instructions    Many people have small aches and pains from overuse or injury to muscles and joints. Joint injuries often happen during sports or recreation, work tasks, or projects around the home. An overuse injury can happen when you put too much stress on a joint or when you do an activity that stresses the joint over and over, such as using the computer or rowing a boat. You can take action at home to help your muscles and joints get better. You should feel better in 1 to 2 weeks, but it can take 3 months or more to heal completely. Follow-up care is a key part of your treatment and safety. Be sure to make and go to all appointments, and call your doctor if you are having problems. It's also a good idea to know your test results and keep a list of the medicines you take. How can you care for yourself at home? · Do not put weight on the injured joint for at least a day or two. · For the first day or two after an injury, do not take hot showers or baths, and do not use hot packs. The heat could make swelling worse. · Put ice or a cold pack on the sore joint for 10 to 20 minutes at a time. Try to do this every 1 to 2 hours for the next 3 days (when you are awake) or until the swelling goes down. Put a thin cloth between the ice and your skin. · Wrap the injury in an elastic bandage. Do not wrap it too tightly because this can cause more swelling. · Prop up the sore joint on a pillow when you ice it or anytime you sit or lie down during the next 3 days. Try to keep it above the level of your heart. This will help reduce swelling. · Take an over-the-counter pain medicine, such as acetaminophen (Tylenol), ibuprofen (Advil, Motrin), or naproxen (Aleve). Read and follow all instructions on the label. · After 1 or 2 days of rest, begin moving the joint gently.  While the joint is still healing, you can begin to exercise using activities that do not strain or hurt the painful joint. When should you call for help? Call your doctor now or seek immediate medical care if:    · You have signs of infection, such as:  ? Increased pain, swelling, warmth, and redness. ? Red streaks leading from the joint. ? A fever.    Watch closely for changes in your health, and be sure to contact your doctor if:    · Your movement or symptoms are not getting better after 1 to 2 weeks of home treatment. Where can you learn more? Go to http://armando-nirav.info/. Enter P205 in the search box to learn more about \"Joint Pain: Care Instructions. \"  Current as of: November 29, 2017  Content Version: 11.8  © 3929-1174 [employer]. Care instructions adapted under license by [employer] (which disclaims liability or warranty for this information). If you have questions about a medical condition or this instruction, always ask your healthcare professional. [address] any warranty or liability for your use of this information.

## 2018-10-19 NOTE — ED NOTES
I have reviewed discharge instructions with the patient. The patient verbalized understanding. Patient left ED via Discharge Method: wheelchair to Home with daughter. Opportunity for questions and clarification provided. Patient given 2 scripts. To continue your aftercare when you leave the hospital, you may receive an automated call from our care team to check in on how you are doing. This is a free service and part of our promise to provide the best care and service to meet your aftercare needs.  If you have questions, or wish to unsubscribe from this service please call [phone]. Thank you for Choosing our [facility] Emergency Department.

## 2018-10-19 NOTE — ED PROVIDER NOTES
54-year-old male left knee pain no new injuries. Patient's knee pain is chronic seen multiple times in the past.  Has a history of gout but also has a history of arthritis and obesity Past Medical History:  
Diagnosis Date  Acquired claw toe of right foot  Acute encephalopathy 3/18/2017  Acute systolic heart failure [facility]) May, 2009 Also had transient acute renal failure secondary to poor perfusion.  AICD (automatic cardioverter/defibrillator) present 10/21/2015  Asthma  Atypical chest pain 4/23/2010  Bronchitis  CAD (coronary artery disease)  Cardiomyopathy  Chest pain 10/21/2015  Chronic kidney disease   
 renal insufficiency  Chronic pain   
 back  Congestive heart failure (CHF) ([address] 10/21/2015  COPD  Depressive disorder  Diabetes ([address] type 2 insulin reliant- BS average- 160's-180's  Diabetes mellitus type II, uncontrolled ([address] 7/2/2013  GERD (gastroesophageal reflux disease)  Gout  Heart failure (HCC)   
 cardiomyopathy with ef 10-20%  Hypertension  Hyponatremia 12/20/2010  Ill-defined condition   
 gout, neuropathy, sciatica  Morbid obesity ([address]  Nausea & vomiting 11/30/2015  Neuropathy  Obstructive sleep apnea 2/15/2010  Other unknown and unspecified cause of morbidity or mortality Gout  Severe sepsis ([address]  Ulcer of leg, chronic (HCC)  Unspecified sleep apnea   
 cpap Past Surgical History:  
Procedure Laterality Date  CHEST SURGERY PROCEDURE UNLISTED    
 thorocentesis  HX HEART CATHETERIZATION  Sandy 10, 2008 Severe multivessel disease with severe LV dysfunction  HX PACEMAKER    
 may 2010. ICD is place. Family History:  
Problem Relation Age of Onset  Heart Disease Father  Stroke Father  Coronary Artery Disease Father  Other Father   
     kidney failure  Diabetes Sister  Stroke Sister  Diabetes Sister  Coronary Artery Disease Mother  Heart Disease Other Social History Socioeconomic History  Marital status:  Spouse name: Not on file  Number of children: Not on file  Years of education: Not on file  Highest education level: Not on file Social Needs  Financial resource strain: Not on file  Food insecurity - worry: Not on file  Food insecurity - inability: Not on file  Transportation needs - medical: Not on file  Transportation needs - non-medical: Not on file Occupational History  Not on file Tobacco Use  Smoking status: Former Smoker Packs/day: 0.25 Years: 1.00 Pack years: 0.25 Last attempt to quit: 1984 Years since quittin.2  Smokeless tobacco: Never Used  Tobacco comment: pt states that he only tried smoking as a kid Substance and Sexual Activity  Alcohol use: No  
  Alcohol/week: 0.0 oz  Drug use: No  
 Sexual activity: Not on file Other Topics Concern  Not on file Social History Narrative  Not on file ALLERGIES: Dilaudid [hydromorphone]; Iodinated contrast- oral and iv dye; and Penicillins Review of Systems Constitutional: Negative. Negative for activity change. Morbidly obese HENT: Negative. Eyes: Negative. Respiratory: Negative. Cardiovascular: Negative. Gastrointestinal: Negative. Genitourinary: Negative. Musculoskeletal: Negative. Skin: Negative. Neurological: Negative. Psychiatric/Behavioral: Negative. All other systems reviewed and are negative. Vitals:  
 10/19/18 0014 BP: 137/90 Pulse: 91  
Resp: 20 Temp: 98.6 °F (37 °C) SpO2: 93% Weight: 145.2 kg (320 lb) Height: 5' 6\" (1.676 m) Physical Exam  
Constitutional: He is oriented to person, place, and time. He appears well-developed and well-nourished. No distress. Morbidly obese HENT:  
Head: Normocephalic and atraumatic. Right Ear: External ear normal.  
Left Ear: External ear normal.  
Nose: Nose normal.  
Eyes: Conjunctivae and EOM are normal. Pupils are equal, round, and reactive to light. Right eye exhibits no discharge. Left eye exhibits no discharge. No scleral icterus. Neck: Normal range of motion. Cardiovascular: Regular rhythm. Pulmonary/Chest: Effort normal. No respiratory distress. Abdominal: Soft. Bowel sounds are normal.  
Musculoskeletal: He exhibits tenderness. He exhibits no deformity. Left knee: He exhibits decreased range of motion. He exhibits no swelling and no erythema. Tenderness found. Neurological: He is alert and oriented to person, place, and time. He exhibits normal muscle tone. Coordination normal.  
Skin: Skin is warm and dry. No rash noted. Psychiatric: He has a normal mood and affect. His behavior is normal.  
  
 
MDM Number of Diagnoses or Management Options Chronic pain of left knee:  
Diagnosis management comments: Chronic knee pain may be exacerbation of gout patient requesting same treatment as last. 
 
  
Amount and/or Complexity of Data Reviewed Tests in the radiology section of CPT®: reviewed Procedures

## 2018-10-19 NOTE — ED TRIAGE NOTES
C/o left knee and neck pain. Onset yesterday. States hx gout, seen here in past for left knee. States compliant with colchicine and allopurinol. Denies injury or trauma to knee.

## 2018-10-19 NOTE — LETTER
[facility] [facility] [place] [facility] DEPT [address] [address] 
[phone] Work/School Note Date: 10/18/2018 To Whom It May concern: 
 
Germania Shah was seen and treated today in the emergency room by the following provider(s): 
Attending Provider: Bonny Zepeda MD. Germania Shah may return to work on 10/19/2018. Patient was accompanied to emergency room by daughter.  
 
Sincerely, 
 
 
 
 
Maxx Paredes RN

## 2018-10-26 ENCOUNTER — APPOINTMENT (OUTPATIENT)
Dept: GENERAL RADIOLOGY | Age: 53
End: 2018-10-26
Payer: MEDICARE

## 2018-10-26 ENCOUNTER — HOSPITAL ENCOUNTER (EMERGENCY)
Age: 53
Discharge: HOME | End: 2018-10-27
Payer: MEDICARE

## 2018-10-26 ENCOUNTER — TELEPHONE (OUTPATIENT)
Dept: CARDIOLOGY | Age: 53
End: 2018-10-26

## 2018-10-26 DIAGNOSIS — I50.9 ACUTE ON CHRONIC CONGESTIVE HEART FAILURE, UNSPECIFIED HEART FAILURE TYPE (HCC): Primary | ICD-10-CM

## 2018-10-26 LAB
BASOPHILS # BLD: 0 K/UL (ref 0–0.2)
BASOPHILS NFR BLD: 0 % (ref 0–2)
DIFFERENTIAL METHOD BLD: ABNORMAL
EOSINOPHIL # BLD: 0.2 K/UL (ref 0–0.8)
EOSINOPHIL NFR BLD: 2 % (ref 0.5–7.8)
ERYTHROCYTE [DISTWIDTH] IN BLOOD BY AUTOMATED COUNT: 18.6 %
HCT VFR BLD AUTO: 38.4 % (ref 41.1–50.3)
HGB BLD-MCNC: 11.9 G/DL (ref 13.6–17.2)
IMM GRANULOCYTES # BLD: 0.1 K/UL (ref 0–0.5)
IMM GRANULOCYTES NFR BLD AUTO: 1 % (ref 0–5)
LYMPHOCYTES # BLD: 2 K/UL (ref 0.5–4.6)
LYMPHOCYTES NFR BLD: 21 % (ref 13–44)
MCH RBC QN AUTO: 27 PG (ref 26.1–32.9)
MCHC RBC AUTO-ENTMCNC: 31 G/DL (ref 31.4–35)
MCV RBC AUTO: 87.1 FL (ref 79.6–97.8)
MONOCYTES # BLD: 0.9 K/UL (ref 0.1–1.3)
MONOCYTES NFR BLD: 10 % (ref 4–12)
NEUTS SEG # BLD: 6.5 K/UL (ref 1.7–8.2)
NEUTS SEG NFR BLD: 67 % (ref 43–78)
NRBC # BLD: 0.03 K/UL (ref 0–0.2)
PLATELET # BLD AUTO: 224 K/UL (ref 150–450)
PMV BLD AUTO: 11.6 FL (ref 9.4–12.3)
RBC # BLD AUTO: 4.41 M/UL (ref 4.23–5.6)
TROPONIN I BLD-MCNC: 0.03 NG/ML (ref 0.02–0.05)
WBC # BLD AUTO: 9.7 K/UL (ref 4.3–11.1)

## 2018-10-26 PROCEDURE — 93005 ELECTROCARDIOGRAM TRACING: CPT

## 2018-10-26 PROCEDURE — 85025 COMPLETE CBC W/AUTO DIFF WBC: CPT

## 2018-10-26 PROCEDURE — 71045 X-RAY EXAM CHEST 1 VIEW: CPT

## 2018-10-26 PROCEDURE — 83880 ASSAY OF NATRIURETIC PEPTIDE: CPT

## 2018-10-26 PROCEDURE — 99285 EMERGENCY DEPT VISIT HI MDM: CPT

## 2018-10-26 PROCEDURE — 84484 ASSAY OF TROPONIN QUANT: CPT

## 2018-10-26 PROCEDURE — 80053 COMPREHEN METABOLIC PANEL: CPT

## 2018-10-27 VITALS
TEMPERATURE: 99.1 F | WEIGHT: 315 LBS | HEART RATE: 98 BPM | RESPIRATION RATE: 26 BRPM | OXYGEN SATURATION: 93 % | DIASTOLIC BLOOD PRESSURE: 90 MMHG | SYSTOLIC BLOOD PRESSURE: 151 MMHG | BODY MASS INDEX: 50.62 KG/M2 | HEIGHT: 66 IN

## 2018-10-27 LAB
ALBUMIN SERPL-MCNC: 3.6 G/DL (ref 3.5–5)
ALBUMIN/GLOB SERPL: 0.8 {RATIO} (ref 1.2–3.5)
ALP SERPL-CCNC: 236 U/L (ref 50–136)
ALT SERPL-CCNC: 33 U/L (ref 12–65)
ANION GAP SERPL CALC-SCNC: 8 MMOL/L (ref 7–16)
AST SERPL-CCNC: 28 U/L (ref 15–37)
ATRIAL RATE: 101 BPM
ATRIAL RATE: 95 BPM
BILIRUB SERPL-MCNC: 1.8 MG/DL (ref 0.2–1.1)
BNP SERPL-MCNC: 547 PG/ML
BUN SERPL-MCNC: 76 MG/DL (ref 6–23)
CALCIUM SERPL-MCNC: 9.2 MG/DL (ref 8.3–10.4)
CALCULATED P AXIS, ECG09: 31 DEGREES
CALCULATED P AXIS, ECG09: 49 DEGREES
CALCULATED R AXIS, ECG10: 160 DEGREES
CALCULATED R AXIS, ECG10: 166 DEGREES
CALCULATED T AXIS, ECG11: 18 DEGREES
CALCULATED T AXIS, ECG11: 25 DEGREES
CHLORIDE SERPL-SCNC: 91 MMOL/L (ref 98–107)
CO2 SERPL-SCNC: 38 MMOL/L (ref 21–32)
CREAT SERPL-MCNC: 3.36 MG/DL (ref 0.8–1.5)
DIAGNOSIS, 93000: NORMAL
DIAGNOSIS, 93000: NORMAL
GLOBULIN SER CALC-MCNC: 4.6 G/DL (ref 2.3–3.5)
GLUCOSE SERPL-MCNC: 196 MG/DL (ref 65–100)
P-R INTERVAL, ECG05: 240 MS
P-R INTERVAL, ECG05: 256 MS
POTASSIUM SERPL-SCNC: 2.8 MMOL/L (ref 3.5–5.1)
PROT SERPL-MCNC: 8.2 G/DL (ref 6.3–8.2)
Q-T INTERVAL, ECG07: 314 MS
Q-T INTERVAL, ECG07: 440 MS
QRS DURATION, ECG06: 102 MS
QRS DURATION, ECG06: 106 MS
QTC CALCULATION (BEZET), ECG08: 407 MS
QTC CALCULATION (BEZET), ECG08: 552 MS
SODIUM SERPL-SCNC: 137 MMOL/L (ref 136–145)
TROPONIN I BLD-MCNC: 0.05 NG/ML (ref 0.02–0.05)
VENTRICULAR RATE, ECG03: 101 BPM
VENTRICULAR RATE, ECG03: 95 BPM

## 2018-10-27 PROCEDURE — 74011000250 HC RX REV CODE- 250

## 2018-10-27 PROCEDURE — 84484 ASSAY OF TROPONIN QUANT: CPT

## 2018-10-27 PROCEDURE — 96374 THER/PROPH/DIAG INJ IV PUSH: CPT

## 2018-10-27 PROCEDURE — 93005 ELECTROCARDIOGRAM TRACING: CPT

## 2018-10-27 RX ORDER — FUROSEMIDE 10 MG/ML
60 INJECTION INTRAMUSCULAR; INTRAVENOUS
Status: DISCONTINUED | OUTPATIENT
Start: 2018-10-27 | End: 2018-10-27

## 2018-10-27 RX ORDER — BUMETANIDE 0.25 MG/ML
1 INJECTION INTRAMUSCULAR; INTRAVENOUS ONCE
Status: COMPLETED | OUTPATIENT
Start: 2018-10-27 | End: 2018-10-27

## 2018-10-27 RX ADMIN — BUMETANIDE 1 MG: 0.25 INJECTION INTRAMUSCULAR; INTRAVENOUS at 03:15

## 2018-10-27 NOTE — ED NOTES
I have reviewed discharge instructions with the patient. The patient verbalized understanding. Patient left ED via Discharge Method: wheelchair to Home with self. Pt drove here and walked in but requests w/c to lobby Opportunity for questions and clarification provided. Patient given 0 scripts. To continue your aftercare when you leave the hospital, you may receive an automated call from our care team to check in on how you are doing. This is a free service and part of our promise to provide the best care and service to meet your aftercare needs.  If you have questions, or wish to unsubscribe from this service please call [phone]. Thank you for Choosing our [facility] Emergency Department.

## 2018-10-27 NOTE — ED PROVIDER NOTES
53-year-old male complaining of shortness of breath and exertional dyspnea. The patient has a long history of congestive heart failure. Patient is oxygen dependent. Patient states his daughter called cardiology and they were to come in and they would see him. Patient had an echocardiogram done in July below the results: 
 
Left ventricle: The ventricle was severely dilated. Systolic function was 
markedly reduced. Ejection fraction was estimated in the range of 15 % to 20  
%. This study was inadequate for the evaluation of regional wall motion. Wall 
thickness was mildly increased. The history is provided by the patient. Shortness of Breath This is a recurrent problem. The problem occurs continuously. The current episode started more than 2 days ago. The problem has been gradually worsening. Associated symptoms include orthopnea and leg swelling. Pertinent negatives include no fever, no headaches, no coryza, no sore throat, no cough and no abdominal pain. It is unknown what precipitated the problem. He has tried nothing for the symptoms. Associated medical issues include CAD and heart failure. Past Medical History:  
Diagnosis Date  Acquired claw toe of right foot  Acute encephalopathy 3/18/2017  Acute systolic heart failure [facility]) May, 2009 Also had transient acute renal failure secondary to poor perfusion.  AICD (automatic cardioverter/defibrillator) present 10/21/2015  Asthma  Atypical chest pain 4/23/2010  Bronchitis  CAD (coronary artery disease)  Cardiomyopathy  Chest pain 10/21/2015  Chronic kidney disease   
 renal insufficiency  Chronic pain   
 back  Congestive heart failure (CHF) ([address] 10/21/2015  COPD  Depressive disorder  Diabetes ([address] type 2 insulin reliant- BS average- 160's-180's  Diabetes mellitus type II, uncontrolled ([address] 7/2/2013  GERD (gastroesophageal reflux disease)  Gout  Heart failure ([address] cardiomyopathy with ef 10-20%  Hypertension  Hyponatremia 2010  Ill-defined condition   
 gout, neuropathy, sciatica  Morbid obesity [address]  Nausea & vomiting 2015  Neuropathy  Obstructive sleep apnea 2/15/2010  Other unknown and unspecified cause of morbidity or mortality Gout  Severe sepsis ([facility]  Ulcer of leg, chronic (HCC)  Unspecified sleep apnea   
 cpap Past Surgical History:  
Procedure Laterality Date  CHEST SURGERY PROCEDURE UNLISTED    
 thorocentesis  HX HEART CATHETERIZATION  Sandy 10, 2008 Severe multivessel disease with severe LV dysfunction  HX PACEMAKER    
 may 2010. ICD is place. Family History:  
Problem Relation Age of Onset  Heart Disease Father  Stroke Father  Coronary Artery Disease Father  Other Father   
     kidney failure  Diabetes Sister  Stroke Sister  Diabetes Sister  Coronary Artery Disease Mother  Heart Disease Other Social History Socioeconomic History  Marital status:  Spouse name: Not on file  Number of children: Not on file  Years of education: Not on file  Highest education level: Not on file Social Needs  Financial resource strain: Not on file  Food insecurity - worry: Not on file  Food insecurity - inability: Not on file  Transportation needs - medical: Not on file  Transportation needs - non-medical: Not on file Occupational History  Not on file Tobacco Use  Smoking status: Former Smoker Packs/day: 0.25 Years: 1.00 Pack years: 0.25 Last attempt to quit: 1984 Years since quittin.3  Smokeless tobacco: Never Used  Tobacco comment: pt states that he only tried smoking as a kid Substance and Sexual Activity  Alcohol use: No  
  Alcohol/week: 0.0 oz  Drug use: No  
 Sexual activity: Not on file Other Topics Concern  Not on file Social History Narrative  Not on file ALLERGIES: Dilaudid [hydromorphone]; Iodinated contrast- oral and iv dye; and Penicillins Review of Systems Constitutional: Negative. Negative for activity change and fever. HENT: Negative. Negative for sore throat. Eyes: Negative. Respiratory: Positive for shortness of breath. Negative for cough. Cardiovascular: Positive for orthopnea and leg swelling. Gastrointestinal: Negative. Negative for abdominal pain. Genitourinary: Negative. Musculoskeletal: Negative. Skin: Negative. Neurological: Negative. Negative for headaches. Psychiatric/Behavioral: Negative. All other systems reviewed and are negative. Vitals:  
 10/26/18 2333 BP: (!) 188/111 Pulse: (!) 105 Resp: 26 Temp: 99.1 °F (37.3 °C) SpO2: 96% Weight: 147 kg (324 lb) Height: 5' 6\" (1.676 m) Physical Exam  
Constitutional: He is oriented to person, place, and time. He appears well-developed and well-nourished. No distress. HENT:  
Head: Normocephalic and atraumatic. Right Ear: External ear normal.  
Left Ear: External ear normal.  
Nose: Nose normal.  
Eyes: Conjunctivae and EOM are normal. Pupils are equal, round, and reactive to light. Right eye exhibits no discharge. Left eye exhibits no discharge. No scleral icterus. Neck: Normal range of motion. Cardiovascular: Regular rhythm. Pulmonary/Chest: Effort normal. No stridor. No respiratory distress. He has decreased breath sounds in the right lower field and the left lower field. He has no wheezes. He has no rales. Abdominal: Soft. Bowel sounds are normal. He exhibits no distension. There is no tenderness. Musculoskeletal: Normal range of motion. Neurological: He is alert and oriented to person, place, and time. He exhibits normal muscle tone. Coordination normal.  
Skin: Skin is warm and dry. No rash noted. Psychiatric: He has a normal mood and affect.  His behavior is normal.  
  
 
MDM 
 Number of Diagnoses or Management Options Acute on chronic congestive heart failure, unspecified heart failure type [facility]):  
Diagnosis management comments: The cardiology 400 number was contacted. Patient's demographics were relayed. Request for urgent follow-up was made. Procedures

## 2018-10-27 NOTE — ED TRIAGE NOTES
Patient complains of SOB and weight gain. States hx of CHF and pacer with defibrillator. States SOB has been \"acting up\" for about 2 weeks. States he has not weighed himself because the batteries went out on his scale.  SpO2 97% on home oxygen upon arrival.

## 2018-10-27 NOTE — TELEPHONE ENCOUNTER
Received call from [facility] who reports that Pt going to [facility] for difficulty breathing. States she wanted UC to be aware. Explained that if needed UC would be contacted by ED. She v/u.

## 2018-10-27 NOTE — ED NOTES
Pt is resting on stretcher with eyes closed and snoring. Pt appears absent of distress at this time.

## 2018-10-31 PROBLEM — E11.21 TYPE 2 DIABETES WITH NEPHROPATHY (HCC): Status: ACTIVE | Noted: 2018-10-31

## 2019-01-01 ENCOUNTER — HOSPITAL ENCOUNTER (OUTPATIENT)
Dept: GENERAL RADIOLOGY | Age: 54
Discharge: HOME | End: 2019-05-23

## 2019-01-01 ENCOUNTER — HOSPITAL ENCOUNTER (INPATIENT)
Age: 54
LOS: 4 days | Discharge: HOME HEALTH | DRG: 291 | End: 2019-03-16
Attending: EMERGENCY MEDICINE | Admitting: INTERNAL MEDICINE
Payer: MEDICARE

## 2019-01-01 ENCOUNTER — HOME CARE VISIT (OUTPATIENT)
Dept: HOME HEALTH SERVICES | Facility: HOME HEALTH | Age: 54
End: 2019-01-01
Payer: MEDICARE

## 2019-01-01 ENCOUNTER — APPOINTMENT (OUTPATIENT)
Dept: GENERAL RADIOLOGY | Age: 54
DRG: 291 | End: 2019-01-01
Attending: INTERNAL MEDICINE
Payer: MEDICARE

## 2019-01-01 ENCOUNTER — HOME CARE VISIT (OUTPATIENT)
Dept: SCHEDULING | Facility: HOME HEALTH | Age: 54
End: 2019-01-01
Payer: MEDICARE

## 2019-01-01 ENCOUNTER — HOSPITAL ENCOUNTER (OUTPATIENT)
Dept: LAB | Age: 54
Discharge: HOME | End: 2019-05-23
Payer: MEDICARE

## 2019-01-01 ENCOUNTER — HOSPITAL ENCOUNTER (OUTPATIENT)
Dept: GENERAL RADIOLOGY | Age: 54
Discharge: HOME | End: 2019-03-19
Payer: MEDICARE

## 2019-01-01 ENCOUNTER — APPOINTMENT (OUTPATIENT)
Dept: GENERAL RADIOLOGY | Age: 54
End: 2019-01-01
Attending: EMERGENCY MEDICINE
Payer: MEDICARE

## 2019-01-01 ENCOUNTER — HOSPITAL ENCOUNTER (EMERGENCY)
Age: 54
Discharge: HOME | End: 2019-10-31
Attending: EMERGENCY MEDICINE
Payer: MEDICARE

## 2019-01-01 ENCOUNTER — HOSPITAL ENCOUNTER (OUTPATIENT)
Dept: CT IMAGING | Age: 54
Discharge: HOME | End: 2019-10-25
Attending: PHYSICIAN ASSISTANT
Payer: MEDICARE

## 2019-01-01 ENCOUNTER — TELEPHONE (OUTPATIENT)
Dept: HOME HEALTH SERVICES | Facility: HOME HEALTH | Age: 54
End: 2019-01-01

## 2019-01-01 ENCOUNTER — HOSPITAL ENCOUNTER (OUTPATIENT)
Dept: GENERAL RADIOLOGY | Age: 54
Discharge: HOME | End: 2019-09-06
Payer: MEDICARE

## 2019-01-01 ENCOUNTER — HOSPITAL ENCOUNTER (EMERGENCY)
Age: 54
Discharge: HOME | End: 2019-11-15
Attending: EMERGENCY MEDICINE
Payer: MEDICARE

## 2019-01-01 ENCOUNTER — HOME HEALTH ADMISSION (OUTPATIENT)
Dept: HOME HEALTH SERVICES | Facility: HOME HEALTH | Age: 54
End: 2019-01-01
Payer: MEDICARE

## 2019-01-01 ENCOUNTER — APPOINTMENT (OUTPATIENT)
Dept: GENERAL RADIOLOGY | Age: 54
DRG: 291 | End: 2019-01-01
Attending: EMERGENCY MEDICINE
Payer: MEDICARE

## 2019-01-01 ENCOUNTER — APPOINTMENT (OUTPATIENT)
Dept: ULTRASOUND IMAGING | Age: 54
DRG: 291 | End: 2019-01-01
Attending: INTERNAL MEDICINE
Payer: MEDICARE

## 2019-01-01 ENCOUNTER — HOSPITAL ENCOUNTER (EMERGENCY)
Age: 54
Discharge: HOME | End: 2019-02-28
Attending: EMERGENCY MEDICINE
Payer: MEDICARE

## 2019-01-01 VITALS
RESPIRATION RATE: 18 BRPM | SYSTOLIC BLOOD PRESSURE: 110 MMHG | TEMPERATURE: 98 F | HEART RATE: 78 BPM | OXYGEN SATURATION: 98 % | DIASTOLIC BLOOD PRESSURE: 62 MMHG

## 2019-01-01 VITALS
HEIGHT: 66 IN | SYSTOLIC BLOOD PRESSURE: 110 MMHG | TEMPERATURE: 98.2 F | HEART RATE: 98 BPM | WEIGHT: 315 LBS | RESPIRATION RATE: 18 BRPM | OXYGEN SATURATION: 100 % | DIASTOLIC BLOOD PRESSURE: 79 MMHG | BODY MASS INDEX: 50.62 KG/M2

## 2019-01-01 VITALS
BODY MASS INDEX: 50.62 KG/M2 | RESPIRATION RATE: 18 BRPM | SYSTOLIC BLOOD PRESSURE: 112 MMHG | TEMPERATURE: 98.7 F | DIASTOLIC BLOOD PRESSURE: 73 MMHG | HEART RATE: 79 BPM | HEIGHT: 66 IN | WEIGHT: 315 LBS | OXYGEN SATURATION: 97 %

## 2019-01-01 VITALS
SYSTOLIC BLOOD PRESSURE: 122 MMHG | TEMPERATURE: 98.1 F | OXYGEN SATURATION: 98 % | HEART RATE: 72 BPM | RESPIRATION RATE: 18 BRPM | DIASTOLIC BLOOD PRESSURE: 78 MMHG

## 2019-01-01 VITALS
RESPIRATION RATE: 19 BRPM | DIASTOLIC BLOOD PRESSURE: 82 MMHG | HEART RATE: 70 BPM | SYSTOLIC BLOOD PRESSURE: 122 MMHG | TEMPERATURE: 98.9 F | OXYGEN SATURATION: 97 %

## 2019-01-01 VITALS
BODY MASS INDEX: 50.62 KG/M2 | HEART RATE: 84 BPM | SYSTOLIC BLOOD PRESSURE: 151 MMHG | TEMPERATURE: 98.2 F | RESPIRATION RATE: 23 BRPM | OXYGEN SATURATION: 98 % | HEIGHT: 66 IN | DIASTOLIC BLOOD PRESSURE: 94 MMHG | WEIGHT: 315 LBS

## 2019-01-01 VITALS
TEMPERATURE: 98.9 F | DIASTOLIC BLOOD PRESSURE: 82 MMHG | OXYGEN SATURATION: 97 % | HEART RATE: 70 BPM | SYSTOLIC BLOOD PRESSURE: 122 MMHG | RESPIRATION RATE: 18 BRPM

## 2019-01-01 VITALS
WEIGHT: 315 LBS | RESPIRATION RATE: 20 BRPM | TEMPERATURE: 97.7 F | SYSTOLIC BLOOD PRESSURE: 100 MMHG | BODY MASS INDEX: 57.62 KG/M2 | DIASTOLIC BLOOD PRESSURE: 60 MMHG | HEART RATE: 70 BPM

## 2019-01-01 VITALS
OXYGEN SATURATION: 92 % | RESPIRATION RATE: 20 BRPM | TEMPERATURE: 97.5 F | SYSTOLIC BLOOD PRESSURE: 118 MMHG | HEART RATE: 78 BPM | DIASTOLIC BLOOD PRESSURE: 65 MMHG

## 2019-01-01 VITALS
RESPIRATION RATE: 16 BRPM | HEIGHT: 66 IN | OXYGEN SATURATION: 95 % | HEART RATE: 94 BPM | BODY MASS INDEX: 50.62 KG/M2 | DIASTOLIC BLOOD PRESSURE: 88 MMHG | SYSTOLIC BLOOD PRESSURE: 128 MMHG | TEMPERATURE: 97.7 F | WEIGHT: 315 LBS

## 2019-01-01 DIAGNOSIS — K59.09 OTHER CONSTIPATION: ICD-10-CM

## 2019-01-01 DIAGNOSIS — I50.22 CHRONIC SYSTOLIC CONGESTIVE HEART FAILURE (HCC): ICD-10-CM

## 2019-01-01 DIAGNOSIS — R05.9 COUGH: ICD-10-CM

## 2019-01-01 DIAGNOSIS — R06.09 OTHER FORM OF DYSPNEA: ICD-10-CM

## 2019-01-01 DIAGNOSIS — R10.31 RIGHT LOWER QUADRANT PAIN: ICD-10-CM

## 2019-01-01 DIAGNOSIS — I50.9 ACUTE ON CHRONIC CONGESTIVE HEART FAILURE, UNSPECIFIED HEART FAILURE TYPE (HCC): Primary | ICD-10-CM

## 2019-01-01 DIAGNOSIS — M54.42 CHRONIC MIDLINE LOW BACK PAIN WITH BILATERAL SCIATICA: Primary | ICD-10-CM

## 2019-01-01 DIAGNOSIS — R07.89 ATYPICAL CHEST PAIN: ICD-10-CM

## 2019-01-01 DIAGNOSIS — R93.89 ABNORMAL CT SCAN: ICD-10-CM

## 2019-01-01 DIAGNOSIS — M54.41 CHRONIC MIDLINE LOW BACK PAIN WITH BILATERAL SCIATICA: Primary | ICD-10-CM

## 2019-01-01 DIAGNOSIS — J42 CHRONIC BRONCHITIS, UNSPECIFIED CHRONIC BRONCHITIS TYPE (HCC): Primary | ICD-10-CM

## 2019-01-01 DIAGNOSIS — N18.30 CKD (CHRONIC KIDNEY DISEASE) STAGE 3, GFR 30-59 ML/MIN (HCC): Chronic | ICD-10-CM

## 2019-01-01 DIAGNOSIS — I42.0 NONISCHEMIC DILATED CARDIOMYOPATHY (HCC): ICD-10-CM

## 2019-01-01 DIAGNOSIS — R06.02 SOB (SHORTNESS OF BREATH): Primary | ICD-10-CM

## 2019-01-01 DIAGNOSIS — G89.29 CHRONIC MIDLINE LOW BACK PAIN WITH BILATERAL SCIATICA: Primary | ICD-10-CM

## 2019-01-01 LAB
ALBUMIN SERPL-MCNC: 3.2 G/DL (ref 3.5–5)
ALBUMIN SERPL-MCNC: 3.4 G/DL (ref 3.5–5)
ALBUMIN/GLOB SERPL: 0.7 {RATIO} (ref 1.2–3.5)
ALBUMIN/GLOB SERPL: 0.8 {RATIO} (ref 1.2–3.5)
ALP SERPL-CCNC: 195 U/L (ref 50–136)
ALP SERPL-CCNC: 234 U/L (ref 50–136)
ALT SERPL-CCNC: 21 U/L (ref 12–65)
ALT SERPL-CCNC: 28 U/L (ref 12–65)
AMYLASE SERPL-CCNC: 91 U/L (ref 25–115)
ANION GAP SERPL CALC-SCNC: 10 MMOL/L (ref 7–16)
ANION GAP SERPL CALC-SCNC: 11 MMOL/L (ref 7–16)
ANION GAP SERPL CALC-SCNC: 12 MMOL/L (ref 7–16)
ANION GAP SERPL CALC-SCNC: 5 MMOL/L (ref 7–16)
ANION GAP SERPL CALC-SCNC: 7 MMOL/L (ref 7–16)
ANION GAP SERPL CALC-SCNC: 8 MMOL/L (ref 7–16)
ANION GAP SERPL CALC-SCNC: 8 MMOL/L (ref 7–16)
ANION GAP SERPL CALC-SCNC: 9 MMOL/L (ref 7–16)
AST SERPL-CCNC: 22 U/L (ref 15–37)
AST SERPL-CCNC: 25 U/L (ref 15–37)
ATRIAL RATE: 163 BPM
ATRIAL RATE: 89 BPM
BASOPHILS # BLD: 0 K/UL (ref 0–0.2)
BASOPHILS # BLD: 0 K/UL (ref 0–0.2)
BASOPHILS # BLD: 0.1 K/UL (ref 0–0.2)
BASOPHILS # BLD: 0.1 K/UL (ref 0–0.2)
BASOPHILS NFR BLD: 1 % (ref 0–2)
BILIRUB SERPL-MCNC: 1.6 MG/DL (ref 0.2–1.1)
BILIRUB SERPL-MCNC: 2.1 MG/DL (ref 0.2–1.1)
BNP SERPL-MCNC: 1002 PG/ML
BNP SERPL-MCNC: 188 PG/ML
BNP SERPL-MCNC: 494 PG/ML
BNP SERPL-MCNC: 711 PG/ML
BNP SERPL-MCNC: 9319 PG/ML (ref 5–125)
BUN SERPL-MCNC: 130 MG/DL (ref 6–23)
BUN SERPL-MCNC: 71 MG/DL (ref 6–23)
BUN SERPL-MCNC: 73 MG/DL (ref 6–23)
BUN SERPL-MCNC: 80 MG/DL (ref 6–23)
BUN SERPL-MCNC: 83 MG/DL (ref 6–23)
BUN SERPL-MCNC: 84 MG/DL (ref 6–23)
BUN SERPL-MCNC: 91 MG/DL (ref 6–23)
BUN SERPL-MCNC: 91 MG/DL (ref 6–23)
BUN SERPL-MCNC: 92 MG/DL (ref 6–23)
BUN SERPL-MCNC: 93 MG/DL (ref 6–23)
BUN SERPL-MCNC: 99 MG/DL (ref 6–23)
CALCIUM SERPL-MCNC: 8.8 MG/DL (ref 8.3–10.4)
CALCIUM SERPL-MCNC: 8.9 MG/DL (ref 8.3–10.4)
CALCIUM SERPL-MCNC: 9 MG/DL (ref 8.3–10.4)
CALCIUM SERPL-MCNC: 9.2 MG/DL (ref 8.3–10.4)
CALCIUM SERPL-MCNC: 9.2 MG/DL (ref 8.3–10.4)
CALCIUM SERPL-MCNC: 9.3 MG/DL (ref 8.3–10.4)
CALCIUM SERPL-MCNC: 9.4 MG/DL (ref 8.3–10.4)
CALCIUM SERPL-MCNC: 9.9 MG/DL (ref 8.3–10.4)
CALCULATED P AXIS, ECG09: 28 DEGREES
CALCULATED R AXIS, ECG10: 154 DEGREES
CALCULATED R AXIS, ECG10: 159 DEGREES
CALCULATED T AXIS, ECG11: 34 DEGREES
CALCULATED T AXIS, ECG11: 73 DEGREES
CHLORIDE SERPL-SCNC: 103 MMOL/L (ref 98–107)
CHLORIDE SERPL-SCNC: 87 MMOL/L (ref 98–107)
CHLORIDE SERPL-SCNC: 92 MMOL/L (ref 98–107)
CHLORIDE SERPL-SCNC: 93 MMOL/L (ref 98–107)
CHLORIDE SERPL-SCNC: 94 MMOL/L (ref 98–107)
CHLORIDE SERPL-SCNC: 95 MMOL/L (ref 98–107)
CHLORIDE SERPL-SCNC: 96 MMOL/L (ref 98–107)
CHLORIDE SERPL-SCNC: 97 MMOL/L (ref 98–107)
CO2 SERPL-SCNC: 27 MMOL/L (ref 21–32)
CO2 SERPL-SCNC: 28 MMOL/L (ref 21–32)
CO2 SERPL-SCNC: 32 MMOL/L (ref 21–32)
CO2 SERPL-SCNC: 33 MMOL/L (ref 21–32)
CO2 SERPL-SCNC: 34 MMOL/L (ref 21–32)
CREAT SERPL-MCNC: 2.52 MG/DL (ref 0.8–1.5)
CREAT SERPL-MCNC: 2.56 MG/DL (ref 0.8–1.5)
CREAT SERPL-MCNC: 2.64 MG/DL (ref 0.8–1.5)
CREAT SERPL-MCNC: 2.9 MG/DL (ref 0.8–1.5)
CREAT SERPL-MCNC: 2.92 MG/DL (ref 0.8–1.5)
CREAT SERPL-MCNC: 3.01 MG/DL (ref 0.8–1.5)
CREAT SERPL-MCNC: 3.04 MG/DL (ref 0.8–1.5)
CREAT SERPL-MCNC: 3.14 MG/DL (ref 0.8–1.5)
CREAT SERPL-MCNC: 3.21 MG/DL (ref 0.8–1.5)
CREAT SERPL-MCNC: 3.26 MG/DL (ref 0.8–1.5)
CREAT SERPL-MCNC: 3.37 MG/DL (ref 0.8–1.5)
CREAT SERPL-MCNC: 3.43 MG/DL (ref 0.8–1.5)
CREAT SERPL-MCNC: 3.8 MG/DL (ref 0.8–1.5)
DIAGNOSIS, 93000: NORMAL
DIAGNOSIS, 93000: NORMAL
DIFFERENTIAL METHOD BLD: ABNORMAL
EOSINOPHIL # BLD: 0.1 K/UL (ref 0–0.8)
EOSINOPHIL # BLD: 0.2 K/UL (ref 0–0.8)
EOSINOPHIL NFR BLD: 2 % (ref 0.5–7.8)
EOSINOPHIL NFR BLD: 2 % (ref 0.5–7.8)
EOSINOPHIL NFR BLD: 3 % (ref 0.5–7.8)
EOSINOPHIL NFR BLD: 3 % (ref 0.5–7.8)
ERYTHROCYTE [DISTWIDTH] IN BLOOD BY AUTOMATED COUNT: 17.2 % (ref 11.9–14.6)
ERYTHROCYTE [DISTWIDTH] IN BLOOD BY AUTOMATED COUNT: 17.7 % (ref 11.9–14.6)
ERYTHROCYTE [DISTWIDTH] IN BLOOD BY AUTOMATED COUNT: 19.1 % (ref 11.9–14.6)
ERYTHROCYTE [DISTWIDTH] IN BLOOD BY AUTOMATED COUNT: 19.5 % (ref 11.9–14.6)
ERYTHROCYTE [DISTWIDTH] IN BLOOD BY AUTOMATED COUNT: 19.7 % (ref 11.9–14.6)
EST. AVERAGE GLUCOSE BLD GHB EST-MCNC: 243 MG/DL
GLOBULIN SER CALC-MCNC: 4.4 G/DL (ref 2.3–3.5)
GLOBULIN SER CALC-MCNC: 4.6 G/DL (ref 2.3–3.5)
GLUCOSE BLD STRIP.AUTO-MCNC: 118 MG/DL (ref 65–100)
GLUCOSE BLD STRIP.AUTO-MCNC: 141 MG/DL (ref 65–100)
GLUCOSE BLD STRIP.AUTO-MCNC: 143 MG/DL (ref 65–100)
GLUCOSE BLD STRIP.AUTO-MCNC: 149 MG/DL (ref 65–100)
GLUCOSE BLD STRIP.AUTO-MCNC: 152 MG/DL (ref 65–100)
GLUCOSE BLD STRIP.AUTO-MCNC: 155 MG/DL (ref 65–100)
GLUCOSE BLD STRIP.AUTO-MCNC: 158 MG/DL (ref 65–100)
GLUCOSE BLD STRIP.AUTO-MCNC: 160 MG/DL (ref 65–100)
GLUCOSE BLD STRIP.AUTO-MCNC: 164 MG/DL (ref 65–100)
GLUCOSE BLD STRIP.AUTO-MCNC: 167 MG/DL (ref 65–100)
GLUCOSE BLD STRIP.AUTO-MCNC: 175 MG/DL (ref 65–100)
GLUCOSE BLD STRIP.AUTO-MCNC: 183 MG/DL (ref 65–100)
GLUCOSE BLD STRIP.AUTO-MCNC: 188 MG/DL (ref 65–100)
GLUCOSE BLD STRIP.AUTO-MCNC: 189 MG/DL (ref 65–100)
GLUCOSE BLD STRIP.AUTO-MCNC: 192 MG/DL (ref 65–100)
GLUCOSE BLD STRIP.AUTO-MCNC: 192 MG/DL (ref 65–100)
GLUCOSE BLD STRIP.AUTO-MCNC: 199 MG/DL (ref 65–100)
GLUCOSE BLD STRIP.AUTO-MCNC: 206 MG/DL (ref 65–100)
GLUCOSE BLD STRIP.AUTO-MCNC: 214 MG/DL (ref 65–100)
GLUCOSE BLD STRIP.AUTO-MCNC: 216 MG/DL (ref 65–100)
GLUCOSE BLD STRIP.AUTO-MCNC: 226 MG/DL (ref 65–100)
GLUCOSE BLD STRIP.AUTO-MCNC: 228 MG/DL (ref 65–100)
GLUCOSE BLD STRIP.AUTO-MCNC: 235 MG/DL (ref 65–100)
GLUCOSE BLD STRIP.AUTO-MCNC: 236 MG/DL (ref 65–100)
GLUCOSE BLD STRIP.AUTO-MCNC: 238 MG/DL (ref 65–100)
GLUCOSE BLD STRIP.AUTO-MCNC: 241 MG/DL (ref 65–100)
GLUCOSE BLD STRIP.AUTO-MCNC: 242 MG/DL (ref 65–100)
GLUCOSE BLD STRIP.AUTO-MCNC: 244 MG/DL (ref 65–100)
GLUCOSE BLD STRIP.AUTO-MCNC: 248 MG/DL (ref 65–100)
GLUCOSE BLD STRIP.AUTO-MCNC: 251 MG/DL (ref 65–100)
GLUCOSE BLD STRIP.AUTO-MCNC: 251 MG/DL (ref 65–100)
GLUCOSE BLD STRIP.AUTO-MCNC: 259 MG/DL (ref 65–100)
GLUCOSE BLD STRIP.AUTO-MCNC: 265 MG/DL (ref 65–100)
GLUCOSE BLD STRIP.AUTO-MCNC: 277 MG/DL (ref 65–100)
GLUCOSE BLD STRIP.AUTO-MCNC: 283 MG/DL (ref 65–100)
GLUCOSE BLD STRIP.AUTO-MCNC: 291 MG/DL (ref 65–100)
GLUCOSE SERPL-MCNC: 117 MG/DL (ref 65–100)
GLUCOSE SERPL-MCNC: 141 MG/DL (ref 65–100)
GLUCOSE SERPL-MCNC: 148 MG/DL (ref 65–100)
GLUCOSE SERPL-MCNC: 151 MG/DL (ref 65–100)
GLUCOSE SERPL-MCNC: 151 MG/DL (ref 65–100)
GLUCOSE SERPL-MCNC: 153 MG/DL (ref 65–100)
GLUCOSE SERPL-MCNC: 167 MG/DL (ref 65–100)
GLUCOSE SERPL-MCNC: 174 MG/DL (ref 65–100)
GLUCOSE SERPL-MCNC: 196 MG/DL (ref 65–100)
GLUCOSE SERPL-MCNC: 204 MG/DL (ref 65–100)
GLUCOSE SERPL-MCNC: 207 MG/DL (ref 65–100)
GLUCOSE SERPL-MCNC: 212 MG/DL (ref 65–100)
GLUCOSE SERPL-MCNC: 237 MG/DL (ref 65–100)
HBA1C MFR BLD: 10.1 % (ref 4.8–6)
HCT VFR BLD AUTO: 34.2 % (ref 41.1–50.3)
HCT VFR BLD AUTO: 34.4 % (ref 41.1–50.3)
HCT VFR BLD AUTO: 34.6 % (ref 41.1–50.3)
HCT VFR BLD AUTO: 36.4 % (ref 41.1–50.3)
HCT VFR BLD AUTO: 36.4 % (ref 41.1–50.3)
HGB BLD-MCNC: 10.7 G/DL (ref 13.6–17.2)
HGB BLD-MCNC: 10.9 G/DL (ref 13.6–17.2)
HGB BLD-MCNC: 11 G/DL (ref 13.6–17.2)
HGB BLD-MCNC: 11.4 G/DL (ref 13.6–17.2)
HGB BLD-MCNC: 11.5 G/DL (ref 13.6–17.2)
IMM GRANULOCYTES # BLD AUTO: 0 K/UL (ref 0–0.5)
IMM GRANULOCYTES # BLD AUTO: 0 K/UL (ref 0–0.5)
IMM GRANULOCYTES # BLD AUTO: 0.1 K/UL (ref 0–0.5)
IMM GRANULOCYTES # BLD AUTO: 0.1 K/UL (ref 0–0.5)
IMM GRANULOCYTES NFR BLD AUTO: 0 % (ref 0–5)
IMM GRANULOCYTES NFR BLD AUTO: 0 % (ref 0–5)
IMM GRANULOCYTES NFR BLD AUTO: 1 % (ref 0–5)
IMM GRANULOCYTES NFR BLD AUTO: 1 % (ref 0–5)
LACTATE BLD-SCNC: 0.78 MMOL/L (ref 0.5–1.9)
LIPASE SERPL-CCNC: 138 U/L (ref 73–393)
LYMPHOCYTES # BLD: 1.7 K/UL (ref 0.5–4.6)
LYMPHOCYTES # BLD: 2.2 K/UL (ref 0.5–4.6)
LYMPHOCYTES NFR BLD: 21 % (ref 13–44)
LYMPHOCYTES NFR BLD: 21 % (ref 13–44)
LYMPHOCYTES NFR BLD: 29 % (ref 13–44)
LYMPHOCYTES NFR BLD: 30 % (ref 13–44)
MAGNESIUM SERPL-MCNC: 1.7 MG/DL (ref 1.8–2.4)
MAGNESIUM SERPL-MCNC: 2.3 MG/DL (ref 1.8–2.4)
MAGNESIUM SERPL-MCNC: 2.4 MG/DL (ref 1.8–2.4)
MAGNESIUM SERPL-MCNC: 2.5 MG/DL (ref 1.8–2.4)
MAGNESIUM SERPL-MCNC: 2.6 MG/DL (ref 1.8–2.4)
MAGNESIUM SERPL-MCNC: 2.7 MG/DL (ref 1.8–2.4)
MAGNESIUM SERPL-MCNC: 2.7 MG/DL (ref 1.8–2.4)
MCH RBC QN AUTO: 25.7 PG (ref 26.1–32.9)
MCH RBC QN AUTO: 26.1 PG (ref 26.1–32.9)
MCH RBC QN AUTO: 26.2 PG (ref 26.1–32.9)
MCH RBC QN AUTO: 27.4 PG (ref 26.1–32.9)
MCH RBC QN AUTO: 27.6 PG (ref 26.1–32.9)
MCHC RBC AUTO-ENTMCNC: 31.3 G/DL (ref 31.4–35)
MCHC RBC AUTO-ENTMCNC: 31.3 G/DL (ref 31.4–35)
MCHC RBC AUTO-ENTMCNC: 31.5 G/DL (ref 31.4–35)
MCHC RBC AUTO-ENTMCNC: 31.6 G/DL (ref 31.4–35)
MCHC RBC AUTO-ENTMCNC: 32 G/DL (ref 31.4–35)
MCV RBC AUTO: 82.2 FL (ref 79.6–97.8)
MCV RBC AUTO: 82.7 FL (ref 79.6–97.8)
MCV RBC AUTO: 83.2 FL (ref 79.6–97.8)
MCV RBC AUTO: 86.4 FL (ref 79.6–97.8)
MCV RBC AUTO: 87.7 FL (ref 79.6–97.8)
MONOCYTES # BLD: 0.7 K/UL (ref 0.1–1.3)
MONOCYTES # BLD: 0.7 K/UL (ref 0.1–1.3)
MONOCYTES # BLD: 0.8 K/UL (ref 0.1–1.3)
MONOCYTES # BLD: 1 K/UL (ref 0.1–1.3)
MONOCYTES NFR BLD: 10 % (ref 4–12)
MONOCYTES NFR BLD: 12 % (ref 4–12)
MONOCYTES NFR BLD: 12 % (ref 4–12)
MONOCYTES NFR BLD: 9 % (ref 4–12)
NEUTS SEG # BLD: 3.2 K/UL (ref 1.7–8.2)
NEUTS SEG # BLD: 4.3 K/UL (ref 1.7–8.2)
NEUTS SEG # BLD: 5.1 K/UL (ref 1.7–8.2)
NEUTS SEG # BLD: 5.4 K/UL (ref 1.7–8.2)
NEUTS SEG NFR BLD: 54 % (ref 43–78)
NEUTS SEG NFR BLD: 58 % (ref 43–78)
NEUTS SEG NFR BLD: 63 % (ref 43–78)
NEUTS SEG NFR BLD: 65 % (ref 43–78)
NRBC # BLD: 0 K/UL (ref 0–0.2)
NRBC # BLD: 0.02 K/UL (ref 0–0.2)
NRBC # BLD: 0.02 K/UL (ref 0–0.2)
P-R INTERVAL, ECG05: 232 MS
PLATELET # BLD AUTO: 133 K/UL (ref 150–450)
PLATELET # BLD AUTO: 148 K/UL (ref 150–450)
PLATELET # BLD AUTO: 172 K/UL (ref 150–450)
PLATELET # BLD AUTO: 174 K/UL (ref 150–450)
PLATELET # BLD AUTO: 180 K/UL (ref 150–450)
PLATELET COMMENTS,PCOM: ADEQUATE
PMV BLD AUTO: 11 FL (ref 9.4–12.3)
PMV BLD AUTO: 11.1 FL (ref 9.4–12.3)
PMV BLD AUTO: 11.4 FL (ref 9.4–12.3)
PMV BLD AUTO: 11.5 FL (ref 9.4–12.3)
PMV BLD AUTO: 11.9 FL (ref 9.4–12.3)
POTASSIUM SERPL-SCNC: 2.7 MMOL/L (ref 3.5–5.1)
POTASSIUM SERPL-SCNC: 3 MMOL/L (ref 3.5–5.1)
POTASSIUM SERPL-SCNC: 3.2 MMOL/L (ref 3.5–5.1)
POTASSIUM SERPL-SCNC: 3.7 MMOL/L (ref 3.5–5.1)
POTASSIUM SERPL-SCNC: 3.8 MMOL/L (ref 3.5–5.1)
POTASSIUM SERPL-SCNC: 3.9 MMOL/L (ref 3.5–5.1)
POTASSIUM SERPL-SCNC: 4 MMOL/L (ref 3.5–5.1)
POTASSIUM SERPL-SCNC: 4 MMOL/L (ref 3.5–5.1)
POTASSIUM SERPL-SCNC: 4.5 MMOL/L (ref 3.5–5.1)
PROT SERPL-MCNC: 7.8 G/DL (ref 6.3–8.2)
PROT SERPL-MCNC: 7.8 G/DL (ref 6.3–8.2)
Q-T INTERVAL, ECG07: 410 MS
Q-T INTERVAL, ECG07: 414 MS
QRS DURATION, ECG06: 102 MS
QRS DURATION, ECG06: 98 MS
QTC CALCULATION (BEZET), ECG08: 490 MS
QTC CALCULATION (BEZET), ECG08: 503 MS
RBC # BLD AUTO: 3.9 M/UL (ref 4.23–5.6)
RBC # BLD AUTO: 3.98 M/UL (ref 4.23–5.6)
RBC # BLD AUTO: 4.16 M/UL (ref 4.23–5.6)
RBC # BLD AUTO: 4.4 M/UL (ref 4.23–5.6)
RBC # BLD AUTO: 4.43 M/UL (ref 4.23–5.6)
RBC MORPH BLD: ABNORMAL
RBC MORPH BLD: ABNORMAL
SODIUM SERPL-SCNC: 132 MMOL/L (ref 136–145)
SODIUM SERPL-SCNC: 134 MMOL/L (ref 136–145)
SODIUM SERPL-SCNC: 135 MMOL/L (ref 136–145)
SODIUM SERPL-SCNC: 136 MMOL/L (ref 136–145)
SODIUM SERPL-SCNC: 138 MMOL/L (ref 136–145)
TROPONIN I SERPL-MCNC: 0.02 NG/ML (ref 0.02–0.05)
TROPONIN I SERPL-MCNC: 0.02 NG/ML (ref 0.02–0.05)
TSH SERPL DL<=0.005 MIU/L-ACNC: 3.86 UIU/ML (ref 0.36–3.74)
VENTRICULAR RATE, ECG03: 86 BPM
VENTRICULAR RATE, ECG03: 89 BPM
WBC # BLD AUTO: 5.7 K/UL (ref 4.3–11.1)
WBC # BLD AUTO: 5.8 K/UL (ref 4.3–11.1)
WBC # BLD AUTO: 7.5 K/UL (ref 4.3–11.1)
WBC # BLD AUTO: 8.1 K/UL (ref 4.3–11.1)
WBC # BLD AUTO: 8.2 K/UL (ref 4.3–11.1)
WBC MORPH BLD: ABNORMAL

## 2019-01-01 PROCEDURE — 74011250636 HC RX REV CODE- 250/636: Performed by: NURSE PRACTITIONER

## 2019-01-01 PROCEDURE — 85025 COMPLETE CBC W/AUTO DIFF WBC: CPT

## 2019-01-01 PROCEDURE — 99285 EMERGENCY DEPT VISIT HI MDM: CPT | Performed by: EMERGENCY MEDICINE

## 2019-01-01 PROCEDURE — 83735 ASSAY OF MAGNESIUM: CPT

## 2019-01-01 PROCEDURE — 80048 BASIC METABOLIC PNL TOTAL CA: CPT

## 2019-01-01 PROCEDURE — 74011000250 HC RX REV CODE- 250: Performed by: NURSE PRACTITIONER

## 2019-01-01 PROCEDURE — 3331090001 HH PPS REVENUE CREDIT

## 2019-01-01 PROCEDURE — 82962 GLUCOSE BLOOD TEST: CPT

## 2019-01-01 PROCEDURE — 93005 ELECTROCARDIOGRAM TRACING: CPT | Performed by: EMERGENCY MEDICINE

## 2019-01-01 PROCEDURE — 74011636637 HC RX REV CODE- 636/637: Performed by: NURSE PRACTITIONER

## 2019-01-01 PROCEDURE — 82150 ASSAY OF AMYLASE: CPT

## 2019-01-01 PROCEDURE — 74011250637 HC RX REV CODE- 250/637: Performed by: NURSE PRACTITIONER

## 2019-01-01 PROCEDURE — 77030018846 HC SOL IRR STRL H20 ICUM -A

## 2019-01-01 PROCEDURE — 84484 ASSAY OF TROPONIN QUANT: CPT

## 2019-01-01 PROCEDURE — 94760 N-INVAS EAR/PLS OXIMETRY 1: CPT

## 2019-01-01 PROCEDURE — 99218 HC RM OBSERVATION: CPT

## 2019-01-01 PROCEDURE — 74011250637 HC RX REV CODE- 250/637: Performed by: INTERNAL MEDICINE

## 2019-01-01 PROCEDURE — 3331090002 HH PPS REVENUE DEBIT

## 2019-01-01 PROCEDURE — G0157 HHC PT ASSISTANT EA 15: HCPCS

## 2019-01-01 PROCEDURE — 76775 US EXAM ABDO BACK WALL LIM: CPT

## 2019-01-01 PROCEDURE — 36415 COLL VENOUS BLD VENIPUNCTURE: CPT

## 2019-01-01 PROCEDURE — 74176 CT ABD & PELVIS W/O CONTRAST: CPT

## 2019-01-01 PROCEDURE — G0151 HHCP-SERV OF PT,EA 15 MIN: HCPCS

## 2019-01-01 PROCEDURE — 71046 X-RAY EXAM CHEST 2 VIEWS: CPT

## 2019-01-01 PROCEDURE — 65660000000 HC RM CCU STEPDOWN

## 2019-01-01 PROCEDURE — 83036 HEMOGLOBIN GLYCOSYLATED A1C: CPT

## 2019-01-01 PROCEDURE — 74011250637 HC RX REV CODE- 250/637: Performed by: PHYSICIAN ASSISTANT

## 2019-01-01 PROCEDURE — 400013 HH SOC

## 2019-01-01 PROCEDURE — 77010033678 HC OXYGEN DAILY

## 2019-01-01 PROCEDURE — 74011000250 HC RX REV CODE- 250: Performed by: EMERGENCY MEDICINE

## 2019-01-01 PROCEDURE — 97530 THERAPEUTIC ACTIVITIES: CPT

## 2019-01-01 PROCEDURE — 94640 AIRWAY INHALATION TREATMENT: CPT

## 2019-01-01 PROCEDURE — 74011250636 HC RX REV CODE- 250/636: Performed by: INTERNAL MEDICINE

## 2019-01-01 PROCEDURE — 80053 COMPREHEN METABOLIC PANEL: CPT

## 2019-01-01 PROCEDURE — 96372 THER/PROPH/DIAG INJ SC/IM: CPT | Performed by: EMERGENCY MEDICINE

## 2019-01-01 PROCEDURE — 96365 THER/PROPH/DIAG IV INF INIT: CPT | Performed by: EMERGENCY MEDICINE

## 2019-01-01 PROCEDURE — 74011250636 HC RX REV CODE- 250/636: Performed by: EMERGENCY MEDICINE

## 2019-01-01 PROCEDURE — 74011000258 HC RX REV CODE- 258: Performed by: NURSE PRACTITIONER

## 2019-01-01 PROCEDURE — 85027 COMPLETE CBC AUTOMATED: CPT

## 2019-01-01 PROCEDURE — 83880 ASSAY OF NATRIURETIC PEPTIDE: CPT

## 2019-01-01 PROCEDURE — 84443 ASSAY THYROID STIM HORMONE: CPT

## 2019-01-01 PROCEDURE — 97161 PT EVAL LOW COMPLEX 20 MIN: CPT

## 2019-01-01 PROCEDURE — G0299 HHS/HOSPICE OF RN EA 15 MIN: HCPCS

## 2019-01-01 PROCEDURE — 99282 EMERGENCY DEPT VISIT SF MDM: CPT | Performed by: EMERGENCY MEDICINE

## 2019-01-01 PROCEDURE — 71045 X-RAY EXAM CHEST 1 VIEW: CPT

## 2019-01-01 PROCEDURE — 74011000250 HC RX REV CODE- 250

## 2019-01-01 PROCEDURE — 81003 URINALYSIS AUTO W/O SCOPE: CPT | Performed by: EMERGENCY MEDICINE

## 2019-01-01 PROCEDURE — 76705 ECHO EXAM OF ABDOMEN: CPT

## 2019-01-01 PROCEDURE — G0155 HHCP-SVS OF CSW,EA 15 MIN: HCPCS

## 2019-01-01 PROCEDURE — 96374 THER/PROPH/DIAG INJ IV PUSH: CPT | Performed by: EMERGENCY MEDICINE

## 2019-01-01 PROCEDURE — 3331090003 HH PPS REVENUE ADJ

## 2019-01-01 PROCEDURE — 77030021668 HC NEB PREFIL KT VYRM -A

## 2019-01-01 PROCEDURE — 83690 ASSAY OF LIPASE: CPT

## 2019-01-01 PROCEDURE — 84132 ASSAY OF SERUM POTASSIUM: CPT

## 2019-01-01 PROCEDURE — 83605 ASSAY OF LACTIC ACID: CPT

## 2019-01-01 RX ORDER — METOLAZONE 5 MG/1
2.5 TABLET ORAL
Status: DISCONTINUED | OUTPATIENT
Start: 2019-01-01 | End: 2019-01-01

## 2019-01-01 RX ORDER — IPRATROPIUM BROMIDE AND ALBUTEROL SULFATE 2.5; .5 MG/3ML; MG/3ML
3 SOLUTION RESPIRATORY (INHALATION)
Status: COMPLETED | OUTPATIENT
Start: 2019-01-01 | End: 2019-01-01

## 2019-01-01 RX ORDER — DOCUSATE SODIUM 100 MG/1
100 CAPSULE, LIQUID FILLED ORAL 2 TIMES DAILY
Status: DISCONTINUED | OUTPATIENT
Start: 2019-01-01 | End: 2019-01-01 | Stop reason: HOSPADM

## 2019-01-01 RX ORDER — FUROSEMIDE 10 MG/ML
100 INJECTION INTRAMUSCULAR; INTRAVENOUS ONCE
Status: DISCONTINUED | OUTPATIENT
Start: 2019-01-01 | End: 2019-01-01 | Stop reason: SDUPTHER

## 2019-01-01 RX ORDER — HYDROCODONE BITARTRATE AND ACETAMINOPHEN 5; 325 MG/1; MG/1
1 TABLET ORAL
Status: DISCONTINUED | OUTPATIENT
Start: 2019-01-01 | End: 2019-01-01 | Stop reason: HOSPADM

## 2019-01-01 RX ORDER — HYDRALAZINE HYDROCHLORIDE 25 MG/1
25 TABLET, FILM COATED ORAL 3 TIMES DAILY
Status: DISCONTINUED | OUTPATIENT
Start: 2019-01-01 | End: 2019-01-01 | Stop reason: HOSPADM

## 2019-01-01 RX ORDER — COLCHICINE 0.6 MG/1
0.6 TABLET ORAL DAILY
Status: DISCONTINUED | OUTPATIENT
Start: 2019-01-01 | End: 2019-01-01 | Stop reason: HOSPADM

## 2019-01-01 RX ORDER — POTASSIUM CHLORIDE 20 MEQ/1
40 TABLET, EXTENDED RELEASE ORAL ONCE
Status: COMPLETED | OUTPATIENT
Start: 2019-01-01 | End: 2019-01-01

## 2019-01-01 RX ORDER — LORAZEPAM 0.5 MG/1
0.5 TABLET ORAL
Status: DISCONTINUED | OUTPATIENT
Start: 2019-01-01 | End: 2019-01-01 | Stop reason: HOSPADM

## 2019-01-01 RX ORDER — GABAPENTIN 300 MG/1
600 CAPSULE ORAL 4 TIMES DAILY
Status: DISCONTINUED | OUTPATIENT
Start: 2019-01-01 | End: 2019-01-01

## 2019-01-01 RX ORDER — SODIUM CHLORIDE 0.9 % (FLUSH) 0.9 %
5-40 SYRINGE (ML) INJECTION EVERY 8 HOURS
Status: DISCONTINUED | OUTPATIENT
Start: 2019-01-01 | End: 2019-01-01

## 2019-01-01 RX ORDER — FAMOTIDINE 20 MG/1
20 TABLET, FILM COATED ORAL 2 TIMES DAILY
Status: DISCONTINUED | OUTPATIENT
Start: 2019-01-01 | End: 2019-01-01

## 2019-01-01 RX ORDER — INSULIN GLARGINE 100 [IU]/ML
25 INJECTION, SOLUTION SUBCUTANEOUS
Status: DISCONTINUED | OUTPATIENT
Start: 2019-01-01 | End: 2019-01-01 | Stop reason: HOSPADM

## 2019-01-01 RX ORDER — MAGNESIUM SULFATE HEPTAHYDRATE 40 MG/ML
2 INJECTION, SOLUTION INTRAVENOUS ONCE
Status: COMPLETED | OUTPATIENT
Start: 2019-01-01 | End: 2019-01-01

## 2019-01-01 RX ORDER — MORPHINE SULFATE 10 MG/ML
10 INJECTION, SOLUTION INTRAMUSCULAR; INTRAVENOUS
Status: COMPLETED | OUTPATIENT
Start: 2019-01-01 | End: 2019-01-01

## 2019-01-01 RX ORDER — PRAVASTATIN SODIUM 80 MG/1
80 TABLET ORAL
Status: DISCONTINUED | OUTPATIENT
Start: 2019-01-01 | End: 2019-01-01 | Stop reason: HOSPADM

## 2019-01-01 RX ORDER — ONDANSETRON 2 MG/ML
4 INJECTION INTRAMUSCULAR; INTRAVENOUS
Status: DISCONTINUED | OUTPATIENT
Start: 2019-01-01 | End: 2019-01-01 | Stop reason: HOSPADM

## 2019-01-01 RX ORDER — POTASSIUM CHLORIDE 750 MG/1
10 TABLET, EXTENDED RELEASE ORAL DAILY
Status: DISCONTINUED | OUTPATIENT
Start: 2019-01-01 | End: 2019-01-01 | Stop reason: HOSPADM

## 2019-01-01 RX ORDER — CLINDAMYCIN PHOSPHATE 900 MG/50ML
900 INJECTION INTRAVENOUS
Status: COMPLETED | OUTPATIENT
Start: 2019-01-01 | End: 2019-01-01

## 2019-01-01 RX ORDER — SODIUM CHLORIDE 0.9 % (FLUSH) 0.9 %
5-40 SYRINGE (ML) INJECTION AS NEEDED
Status: DISCONTINUED | OUTPATIENT
Start: 2019-01-01 | End: 2019-01-01

## 2019-01-01 RX ORDER — BUMETANIDE 1 MG/1
2 TABLET ORAL 2 TIMES DAILY
Status: DISCONTINUED | OUTPATIENT
Start: 2019-01-01 | End: 2019-01-01 | Stop reason: HOSPADM

## 2019-01-01 RX ORDER — GABAPENTIN 300 MG/1
600 CAPSULE ORAL 4 TIMES DAILY
Status: DISCONTINUED | OUTPATIENT
Start: 2019-01-01 | End: 2019-01-01 | Stop reason: HOSPADM

## 2019-01-01 RX ORDER — POTASSIUM CHLORIDE 20 MEQ/1
40 TABLET, EXTENDED RELEASE ORAL
Status: COMPLETED | OUTPATIENT
Start: 2019-01-01 | End: 2019-01-01

## 2019-01-01 RX ORDER — GABAPENTIN 600 MG/1
600 TABLET ORAL
COMMUNITY

## 2019-01-01 RX ORDER — HEPARIN SODIUM 5000 [USP'U]/ML
5000 INJECTION, SOLUTION INTRAVENOUS; SUBCUTANEOUS EVERY 8 HOURS
Status: DISCONTINUED | OUTPATIENT
Start: 2019-01-01 | End: 2019-01-01 | Stop reason: HOSPADM

## 2019-01-01 RX ORDER — CARVEDILOL 12.5 MG/1
12.5 TABLET ORAL 2 TIMES DAILY WITH MEALS
Status: DISCONTINUED | OUTPATIENT
Start: 2019-01-01 | End: 2019-01-01 | Stop reason: HOSPADM

## 2019-01-01 RX ORDER — SENNOSIDES 8.6 MG/1
2 TABLET ORAL
Status: DISCONTINUED | OUTPATIENT
Start: 2019-01-01 | End: 2019-01-01 | Stop reason: HOSPADM

## 2019-01-01 RX ORDER — INSULIN LISPRO 100 [IU]/ML
INJECTION, SOLUTION INTRAVENOUS; SUBCUTANEOUS
Status: DISCONTINUED | OUTPATIENT
Start: 2019-01-01 | End: 2019-01-01

## 2019-01-01 RX ORDER — CARVEDILOL 12.5 MG/1
12.5 TABLET ORAL 2 TIMES DAILY WITH MEALS
Qty: 60 TAB | Refills: 6 | Status: SHIPPED | OUTPATIENT
Start: 2019-01-01 | End: 2019-01-01

## 2019-01-01 RX ORDER — PREDNISONE 20 MG/1
TABLET ORAL
Qty: 13 TAB | Refills: 0 | Status: SHIPPED | OUTPATIENT
Start: 2019-01-01

## 2019-01-01 RX ORDER — FACIAL-BODY WIPES
10 EACH TOPICAL DAILY PRN
Status: DISCONTINUED | OUTPATIENT
Start: 2019-01-01 | End: 2019-01-01 | Stop reason: HOSPADM

## 2019-01-01 RX ORDER — INSULIN GLARGINE 100 [IU]/ML
25 INJECTION, SOLUTION SUBCUTANEOUS
Qty: 1 VIAL | Refills: 0 | Status: SHIPPED
Start: 2019-01-01

## 2019-01-01 RX ORDER — BUMETANIDE 0.25 MG/ML
1 INJECTION INTRAMUSCULAR; INTRAVENOUS ONCE
Status: COMPLETED | OUTPATIENT
Start: 2019-01-01 | End: 2019-01-01

## 2019-01-01 RX ORDER — LANOLIN ALCOHOL/MO/W.PET/CERES
400 CREAM (GRAM) TOPICAL DAILY
Status: DISCONTINUED | OUTPATIENT
Start: 2019-01-01 | End: 2019-01-01 | Stop reason: HOSPADM

## 2019-01-01 RX ORDER — POLYETHYLENE GLYCOL 3350 17 G/17G
17 POWDER, FOR SOLUTION ORAL
Status: DISCONTINUED | OUTPATIENT
Start: 2019-01-01 | End: 2019-01-01 | Stop reason: HOSPADM

## 2019-01-01 RX ORDER — DOXYCYCLINE HYCLATE 100 MG
100 TABLET ORAL 2 TIMES DAILY
Qty: 20 TAB | Refills: 0 | Status: SHIPPED | OUTPATIENT
Start: 2019-01-01 | End: 2019-11-25

## 2019-01-01 RX ORDER — ISOSORBIDE DINITRATE 20 MG/1
20 TABLET ORAL 3 TIMES DAILY
Status: DISCONTINUED | OUTPATIENT
Start: 2019-01-01 | End: 2019-01-01 | Stop reason: HOSPADM

## 2019-01-01 RX ORDER — ACETAMINOPHEN 325 MG/1
650 TABLET ORAL
Status: DISCONTINUED | OUTPATIENT
Start: 2019-01-01 | End: 2019-01-01 | Stop reason: HOSPADM

## 2019-01-01 RX ORDER — CARVEDILOL 6.25 MG/1
6.25 TABLET ORAL 2 TIMES DAILY WITH MEALS
Status: DISCONTINUED | OUTPATIENT
Start: 2019-01-01 | End: 2019-01-01

## 2019-01-01 RX ORDER — FAMOTIDINE 20 MG/1
20 TABLET, FILM COATED ORAL DAILY
Status: DISCONTINUED | OUTPATIENT
Start: 2019-01-01 | End: 2019-01-01 | Stop reason: HOSPADM

## 2019-01-01 RX ORDER — INSULIN LISPRO 100 [IU]/ML
INJECTION, SOLUTION INTRAVENOUS; SUBCUTANEOUS
Status: DISCONTINUED | OUTPATIENT
Start: 2019-01-01 | End: 2019-01-01 | Stop reason: HOSPADM

## 2019-01-01 RX ADMIN — HYDROCODONE BITARTRATE AND ACETAMINOPHEN 1 TABLET: 5; 325 TABLET ORAL at 13:47

## 2019-01-01 RX ADMIN — ISOSORBIDE DINITRATE 20 MG: 20 TABLET ORAL at 16:18

## 2019-01-01 RX ADMIN — ISOSORBIDE DINITRATE 20 MG: 20 TABLET ORAL at 22:05

## 2019-01-01 RX ADMIN — HEPARIN SODIUM 5000 UNITS: 5000 INJECTION INTRAVENOUS; SUBCUTANEOUS at 22:35

## 2019-01-01 RX ADMIN — CARVEDILOL 12.5 MG: 12.5 TABLET, FILM COATED ORAL at 17:09

## 2019-01-01 RX ADMIN — Medication 400 MG: at 08:15

## 2019-01-01 RX ADMIN — INSULIN LISPRO 6 UNITS: 100 INJECTION, SOLUTION INTRAVENOUS; SUBCUTANEOUS at 11:51

## 2019-01-01 RX ADMIN — METOLAZONE 2.5 MG: 5 TABLET ORAL at 17:34

## 2019-01-01 RX ADMIN — COLCHICINE 0.6 MG: 0.6 TABLET, FILM COATED ORAL at 09:04

## 2019-01-01 RX ADMIN — ISOSORBIDE DINITRATE 20 MG: 20 TABLET ORAL at 09:04

## 2019-01-01 RX ADMIN — HEPARIN SODIUM 5000 UNITS: 5000 INJECTION INTRAVENOUS; SUBCUTANEOUS at 22:01

## 2019-01-01 RX ADMIN — BUMETANIDE 2 MG: 1 TABLET ORAL at 08:00

## 2019-01-01 RX ADMIN — PRAVASTATIN SODIUM 80 MG: 80 TABLET ORAL at 21:59

## 2019-01-01 RX ADMIN — HYDRALAZINE HYDROCHLORIDE 25 MG: 25 TABLET, FILM COATED ORAL at 16:56

## 2019-01-01 RX ADMIN — INSULIN GLARGINE 25 UNITS: 100 INJECTION, SOLUTION SUBCUTANEOUS at 22:37

## 2019-01-01 RX ADMIN — INSULIN LISPRO 4 UNITS: 100 INJECTION, SOLUTION INTRAVENOUS; SUBCUTANEOUS at 07:47

## 2019-01-01 RX ADMIN — INSULIN LISPRO 2 UNITS: 100 INJECTION, SOLUTION INTRAVENOUS; SUBCUTANEOUS at 12:40

## 2019-01-01 RX ADMIN — CARVEDILOL 6.25 MG: 6.25 TABLET, FILM COATED ORAL at 17:00

## 2019-01-01 RX ADMIN — INSULIN LISPRO 4 UNITS: 100 INJECTION, SOLUTION INTRAVENOUS; SUBCUTANEOUS at 07:45

## 2019-01-01 RX ADMIN — ISOSORBIDE DINITRATE 20 MG: 20 TABLET ORAL at 22:04

## 2019-01-01 RX ADMIN — HYDRALAZINE HYDROCHLORIDE 25 MG: 25 TABLET, FILM COATED ORAL at 09:25

## 2019-01-01 RX ADMIN — ACETAMINOPHEN 650 MG: 325 TABLET, FILM COATED ORAL at 09:00

## 2019-01-01 RX ADMIN — INSULIN LISPRO 4 UNITS: 100 INJECTION, SOLUTION INTRAVENOUS; SUBCUTANEOUS at 21:25

## 2019-01-01 RX ADMIN — SENNOSIDES 17.2 MG: 8.6 TABLET, FILM COATED ORAL at 21:14

## 2019-01-01 RX ADMIN — GABAPENTIN 600 MG: 300 CAPSULE ORAL at 13:04

## 2019-01-01 RX ADMIN — GABAPENTIN 600 MG: 300 CAPSULE ORAL at 17:17

## 2019-01-01 RX ADMIN — BUMETANIDE 2 MG/HR: 0.25 INJECTION, SOLUTION INTRAMUSCULAR; INTRAVENOUS at 08:55

## 2019-01-01 RX ADMIN — DOCUSATE SODIUM 100 MG: 100 CAPSULE, LIQUID FILLED ORAL at 09:04

## 2019-01-01 RX ADMIN — ISOSORBIDE DINITRATE 20 MG: 20 TABLET ORAL at 21:14

## 2019-01-01 RX ADMIN — PRAVASTATIN SODIUM 80 MG: 80 TABLET ORAL at 21:14

## 2019-01-01 RX ADMIN — SENNOSIDES 17.2 MG: 8.6 TABLET, FILM COATED ORAL at 02:03

## 2019-01-01 RX ADMIN — INSULIN GLARGINE 25 UNITS: 100 INJECTION, SOLUTION SUBCUTANEOUS at 02:04

## 2019-01-01 RX ADMIN — GABAPENTIN 600 MG: 300 CAPSULE ORAL at 21:59

## 2019-01-01 RX ADMIN — ISOSORBIDE DINITRATE 20 MG: 20 TABLET ORAL at 16:06

## 2019-01-01 RX ADMIN — MAGNESIUM SULFATE HEPTAHYDRATE 2 G: 500 INJECTION, SOLUTION INTRAMUSCULAR; INTRAVENOUS at 04:13

## 2019-01-01 RX ADMIN — GABAPENTIN 600 MG: 300 CAPSULE ORAL at 13:07

## 2019-01-01 RX ADMIN — HYDRALAZINE HYDROCHLORIDE 25 MG: 25 TABLET, FILM COATED ORAL at 09:51

## 2019-01-01 RX ADMIN — CLINDAMYCIN PHOSPHATE 900 MG: 900 INJECTION, SOLUTION INTRAVENOUS at 12:47

## 2019-01-01 RX ADMIN — ACETAMINOPHEN 650 MG: 325 TABLET, FILM COATED ORAL at 02:03

## 2019-01-01 RX ADMIN — FAMOTIDINE 20 MG: 20 TABLET ORAL at 08:36

## 2019-01-01 RX ADMIN — BUMETANIDE 2 MG/HR: 0.25 INJECTION, SOLUTION INTRAMUSCULAR; INTRAVENOUS at 02:38

## 2019-01-01 RX ADMIN — ISOSORBIDE DINITRATE 20 MG: 20 TABLET ORAL at 17:12

## 2019-01-01 RX ADMIN — HYDROCODONE BITARTRATE AND ACETAMINOPHEN 1 TABLET: 5; 325 TABLET ORAL at 08:36

## 2019-01-01 RX ADMIN — HYDROCODONE BITARTRATE AND ACETAMINOPHEN 1 TABLET: 5; 325 TABLET ORAL at 16:18

## 2019-01-01 RX ADMIN — INSULIN GLARGINE 25 UNITS: 100 INJECTION, SOLUTION SUBCUTANEOUS at 22:13

## 2019-01-01 RX ADMIN — INSULIN LISPRO 2 UNITS: 100 INJECTION, SOLUTION INTRAVENOUS; SUBCUTANEOUS at 07:57

## 2019-01-01 RX ADMIN — GABAPENTIN 600 MG: 300 CAPSULE ORAL at 11:37

## 2019-01-01 RX ADMIN — ISOSORBIDE DINITRATE 20 MG: 20 TABLET ORAL at 08:22

## 2019-01-01 RX ADMIN — GABAPENTIN 600 MG: 300 CAPSULE ORAL at 14:22

## 2019-01-01 RX ADMIN — FAMOTIDINE 20 MG: 20 TABLET ORAL at 09:24

## 2019-01-01 RX ADMIN — INSULIN GLARGINE 25 UNITS: 100 INJECTION, SOLUTION SUBCUTANEOUS at 21:25

## 2019-01-01 RX ADMIN — HYDRALAZINE HYDROCHLORIDE 25 MG: 25 TABLET, FILM COATED ORAL at 21:59

## 2019-01-01 RX ADMIN — BUMETANIDE 2 MG: 1 TABLET ORAL at 17:31

## 2019-01-01 RX ADMIN — DOCUSATE SODIUM 100 MG: 100 CAPSULE, LIQUID FILLED ORAL at 17:31

## 2019-01-01 RX ADMIN — INSULIN LISPRO 2 UNITS: 100 INJECTION, SOLUTION INTRAVENOUS; SUBCUTANEOUS at 08:23

## 2019-01-01 RX ADMIN — HEPARIN SODIUM 5000 UNITS: 5000 INJECTION INTRAVENOUS; SUBCUTANEOUS at 13:04

## 2019-01-01 RX ADMIN — POLYETHYLENE GLYCOL 3350 17 G: 17 POWDER, FOR SOLUTION ORAL at 11:37

## 2019-01-01 RX ADMIN — METHYLPREDNISOLONE SODIUM SUCCINATE 125 MG: 125 INJECTION, POWDER, FOR SOLUTION INTRAMUSCULAR; INTRAVENOUS at 11:54

## 2019-01-01 RX ADMIN — DOCUSATE SODIUM 100 MG: 100 CAPSULE, LIQUID FILLED ORAL at 08:51

## 2019-01-01 RX ADMIN — ISOSORBIDE DINITRATE 20 MG: 20 TABLET ORAL at 07:49

## 2019-01-01 RX ADMIN — FAMOTIDINE 20 MG: 20 TABLET ORAL at 09:46

## 2019-01-01 RX ADMIN — GABAPENTIN 600 MG: 300 CAPSULE ORAL at 17:31

## 2019-01-01 RX ADMIN — PRAVASTATIN SODIUM 80 MG: 80 TABLET ORAL at 22:36

## 2019-01-01 RX ADMIN — CARVEDILOL 6.25 MG: 6.25 TABLET, FILM COATED ORAL at 17:31

## 2019-01-01 RX ADMIN — CARVEDILOL 12.5 MG: 12.5 TABLET, FILM COATED ORAL at 08:00

## 2019-01-01 RX ADMIN — HEPARIN SODIUM 5000 UNITS: 5000 INJECTION INTRAVENOUS; SUBCUTANEOUS at 21:18

## 2019-01-01 RX ADMIN — HYDRALAZINE HYDROCHLORIDE 25 MG: 25 TABLET, FILM COATED ORAL at 08:52

## 2019-01-01 RX ADMIN — INSULIN LISPRO 2 UNITS: 100 INJECTION, SOLUTION INTRAVENOUS; SUBCUTANEOUS at 09:05

## 2019-01-01 RX ADMIN — SENNOSIDES 17.2 MG: 8.6 TABLET, FILM COATED ORAL at 21:33

## 2019-01-01 RX ADMIN — GABAPENTIN 600 MG: 300 CAPSULE ORAL at 07:41

## 2019-01-01 RX ADMIN — DOCUSATE SODIUM 100 MG: 100 CAPSULE, LIQUID FILLED ORAL at 08:22

## 2019-01-01 RX ADMIN — DOCUSATE SODIUM 100 MG: 100 CAPSULE, LIQUID FILLED ORAL at 17:07

## 2019-01-01 RX ADMIN — BUMETANIDE 2 MG: 1 TABLET ORAL at 17:17

## 2019-01-01 RX ADMIN — GABAPENTIN 600 MG: 300 CAPSULE ORAL at 07:59

## 2019-01-01 RX ADMIN — GABAPENTIN 600 MG: 300 CAPSULE ORAL at 21:33

## 2019-01-01 RX ADMIN — CARVEDILOL 6.25 MG: 6.25 TABLET, FILM COATED ORAL at 17:07

## 2019-01-01 RX ADMIN — GABAPENTIN 600 MG: 300 CAPSULE ORAL at 09:04

## 2019-01-01 RX ADMIN — SENNOSIDES 17.2 MG: 8.6 TABLET, FILM COATED ORAL at 22:03

## 2019-01-01 RX ADMIN — DOCUSATE SODIUM 100 MG: 100 CAPSULE, LIQUID FILLED ORAL at 17:17

## 2019-01-01 RX ADMIN — HYDRALAZINE HYDROCHLORIDE 25 MG: 25 TABLET, FILM COATED ORAL at 15:28

## 2019-01-01 RX ADMIN — GABAPENTIN 600 MG: 300 CAPSULE ORAL at 02:02

## 2019-01-01 RX ADMIN — DOCUSATE SODIUM 100 MG: 100 CAPSULE, LIQUID FILLED ORAL at 08:36

## 2019-01-01 RX ADMIN — Medication 400 MG: at 09:04

## 2019-01-01 RX ADMIN — DOCUSATE SODIUM 100 MG: 100 CAPSULE, LIQUID FILLED ORAL at 17:33

## 2019-01-01 RX ADMIN — INSULIN LISPRO 4 UNITS: 100 INJECTION, SOLUTION INTRAVENOUS; SUBCUTANEOUS at 02:05

## 2019-01-01 RX ADMIN — BUMETANIDE 2 MG/HR: 0.25 INJECTION, SOLUTION INTRAMUSCULAR; INTRAVENOUS at 22:11

## 2019-01-01 RX ADMIN — CARVEDILOL 12.5 MG: 12.5 TABLET, FILM COATED ORAL at 07:49

## 2019-01-01 RX ADMIN — DOCUSATE SODIUM 100 MG: 100 CAPSULE, LIQUID FILLED ORAL at 09:25

## 2019-01-01 RX ADMIN — ISOSORBIDE DINITRATE 20 MG: 20 TABLET ORAL at 16:41

## 2019-01-01 RX ADMIN — BUMETANIDE 2 MG: 1 TABLET ORAL at 08:15

## 2019-01-01 RX ADMIN — Medication 400 MG: at 08:53

## 2019-01-01 RX ADMIN — HEPARIN SODIUM 5000 UNITS: 5000 INJECTION INTRAVENOUS; SUBCUTANEOUS at 06:00

## 2019-01-01 RX ADMIN — HYDROCODONE BITARTRATE AND ACETAMINOPHEN 1 TABLET: 5; 325 TABLET ORAL at 22:36

## 2019-01-01 RX ADMIN — FAMOTIDINE 20 MG: 20 TABLET ORAL at 08:00

## 2019-01-01 RX ADMIN — DOCUSATE SODIUM 100 MG: 100 CAPSULE, LIQUID FILLED ORAL at 17:00

## 2019-01-01 RX ADMIN — BUMETANIDE 2 MG/HR: 0.25 INJECTION, SOLUTION INTRAMUSCULAR; INTRAVENOUS at 15:32

## 2019-01-01 RX ADMIN — DOCUSATE SODIUM 100 MG: 100 CAPSULE, LIQUID FILLED ORAL at 17:09

## 2019-01-01 RX ADMIN — HEPARIN SODIUM 5000 UNITS: 5000 INJECTION INTRAVENOUS; SUBCUTANEOUS at 06:29

## 2019-01-01 RX ADMIN — GABAPENTIN 600 MG: 300 CAPSULE ORAL at 21:25

## 2019-01-01 RX ADMIN — Medication 400 MG: at 08:36

## 2019-01-01 RX ADMIN — HYDRALAZINE HYDROCHLORIDE 25 MG: 25 TABLET, FILM COATED ORAL at 21:26

## 2019-01-01 RX ADMIN — INSULIN LISPRO 2 UNITS: 100 INJECTION, SOLUTION INTRAVENOUS; SUBCUTANEOUS at 17:44

## 2019-01-01 RX ADMIN — BUMETANIDE 2 MG/HR: 0.25 INJECTION, SOLUTION INTRAMUSCULAR; INTRAVENOUS at 03:34

## 2019-01-01 RX ADMIN — POLYETHYLENE GLYCOL 3350 17 G: 17 POWDER, FOR SOLUTION ORAL at 09:34

## 2019-01-01 RX ADMIN — COLCHICINE 0.6 MG: 0.6 TABLET, FILM COATED ORAL at 09:25

## 2019-01-01 RX ADMIN — HEPARIN SODIUM 5000 UNITS: 5000 INJECTION INTRAVENOUS; SUBCUTANEOUS at 13:35

## 2019-01-01 RX ADMIN — ISOSORBIDE DINITRATE 20 MG: 20 TABLET ORAL at 08:36

## 2019-01-01 RX ADMIN — POTASSIUM CHLORIDE 10 MEQ: 10 TABLET, EXTENDED RELEASE ORAL at 08:15

## 2019-01-01 RX ADMIN — HYDRALAZINE HYDROCHLORIDE 25 MG: 25 TABLET, FILM COATED ORAL at 15:34

## 2019-01-01 RX ADMIN — HEPARIN SODIUM 5000 UNITS: 5000 INJECTION INTRAVENOUS; SUBCUTANEOUS at 05:49

## 2019-01-01 RX ADMIN — POTASSIUM CHLORIDE 10 MEQ: 10 TABLET, EXTENDED RELEASE ORAL at 08:36

## 2019-01-01 RX ADMIN — CARVEDILOL 12.5 MG: 12.5 TABLET, FILM COATED ORAL at 08:15

## 2019-01-01 RX ADMIN — INSULIN LISPRO 6 UNITS: 100 INJECTION, SOLUTION INTRAVENOUS; SUBCUTANEOUS at 18:12

## 2019-01-01 RX ADMIN — FAMOTIDINE 20 MG: 20 TABLET ORAL at 08:52

## 2019-01-01 RX ADMIN — HEPARIN SODIUM 5000 UNITS: 5000 INJECTION INTRAVENOUS; SUBCUTANEOUS at 14:41

## 2019-01-01 RX ADMIN — GABAPENTIN 600 MG: 300 CAPSULE ORAL at 22:04

## 2019-01-01 RX ADMIN — SENNOSIDES 17.2 MG: 8.6 TABLET, FILM COATED ORAL at 21:59

## 2019-01-01 RX ADMIN — COLCHICINE 0.6 MG: 0.6 TABLET, FILM COATED ORAL at 07:48

## 2019-01-01 RX ADMIN — INSULIN GLARGINE 25 UNITS: 100 INJECTION, SOLUTION SUBCUTANEOUS at 21:33

## 2019-01-01 RX ADMIN — GABAPENTIN 600 MG: 300 CAPSULE ORAL at 08:35

## 2019-01-01 RX ADMIN — HEPARIN SODIUM 5000 UNITS: 5000 INJECTION INTRAVENOUS; SUBCUTANEOUS at 21:59

## 2019-01-01 RX ADMIN — GABAPENTIN 600 MG: 300 CAPSULE ORAL at 17:44

## 2019-01-01 RX ADMIN — BUMETANIDE 2 MG: 1 TABLET ORAL at 07:48

## 2019-01-01 RX ADMIN — GABAPENTIN 600 MG: 300 CAPSULE ORAL at 17:00

## 2019-01-01 RX ADMIN — GABAPENTIN 600 MG: 300 CAPSULE ORAL at 12:40

## 2019-01-01 RX ADMIN — INSULIN GLARGINE 25 UNITS: 100 INJECTION, SOLUTION SUBCUTANEOUS at 22:01

## 2019-01-01 RX ADMIN — ISOSORBIDE DINITRATE 20 MG: 20 TABLET ORAL at 16:47

## 2019-01-01 RX ADMIN — DOCUSATE SODIUM 100 MG: 100 CAPSULE, LIQUID FILLED ORAL at 17:44

## 2019-01-01 RX ADMIN — BUMETANIDE 1 MG: 0.25 INJECTION INTRAMUSCULAR; INTRAVENOUS at 21:52

## 2019-01-01 RX ADMIN — INSULIN LISPRO 4 UNITS: 100 INJECTION, SOLUTION INTRAVENOUS; SUBCUTANEOUS at 07:40

## 2019-01-01 RX ADMIN — INSULIN GLARGINE 25 UNITS: 100 INJECTION, SOLUTION SUBCUTANEOUS at 21:15

## 2019-01-01 RX ADMIN — POTASSIUM CHLORIDE 10 MEQ: 10 TABLET, EXTENDED RELEASE ORAL at 09:04

## 2019-01-01 RX ADMIN — PRAVASTATIN SODIUM 80 MG: 80 TABLET ORAL at 22:04

## 2019-01-01 RX ADMIN — HYDROCODONE BITARTRATE AND ACETAMINOPHEN 1 TABLET: 5; 325 TABLET ORAL at 02:28

## 2019-01-01 RX ADMIN — Medication 400 MG: at 07:48

## 2019-01-01 RX ADMIN — GABAPENTIN 600 MG: 300 CAPSULE ORAL at 21:14

## 2019-01-01 RX ADMIN — INSULIN LISPRO 2 UNITS: 100 INJECTION, SOLUTION INTRAVENOUS; SUBCUTANEOUS at 17:33

## 2019-01-01 RX ADMIN — GABAPENTIN 600 MG: 300 CAPSULE ORAL at 11:52

## 2019-01-01 RX ADMIN — CARVEDILOL 6.25 MG: 6.25 TABLET, FILM COATED ORAL at 09:24

## 2019-01-01 RX ADMIN — ISOSORBIDE DINITRATE 20 MG: 20 TABLET ORAL at 16:56

## 2019-01-01 RX ADMIN — INSULIN LISPRO 6 UNITS: 100 INJECTION, SOLUTION INTRAVENOUS; SUBCUTANEOUS at 17:08

## 2019-01-01 RX ADMIN — BISACODYL 10 MG: 10 SUPPOSITORY RECTAL at 15:28

## 2019-01-01 RX ADMIN — INSULIN LISPRO 6 UNITS: 100 INJECTION, SOLUTION INTRAVENOUS; SUBCUTANEOUS at 21:32

## 2019-01-01 RX ADMIN — FAMOTIDINE 20 MG: 20 TABLET ORAL at 09:04

## 2019-01-01 RX ADMIN — HYDRALAZINE HYDROCHLORIDE 25 MG: 25 TABLET, FILM COATED ORAL at 21:33

## 2019-01-01 RX ADMIN — HYDRALAZINE HYDROCHLORIDE 25 MG: 25 TABLET, FILM COATED ORAL at 08:15

## 2019-01-01 RX ADMIN — GABAPENTIN 600 MG: 300 CAPSULE ORAL at 08:15

## 2019-01-01 RX ADMIN — DOCUSATE SODIUM 100 MG: 100 CAPSULE, LIQUID FILLED ORAL at 09:46

## 2019-01-01 RX ADMIN — SENNOSIDES 17.2 MG: 8.6 TABLET, FILM COATED ORAL at 21:58

## 2019-01-01 RX ADMIN — SENNOSIDES 17.2 MG: 8.6 TABLET, FILM COATED ORAL at 21:26

## 2019-01-01 RX ADMIN — ISOSORBIDE DINITRATE 20 MG: 20 TABLET ORAL at 09:25

## 2019-01-01 RX ADMIN — INSULIN LISPRO 4 UNITS: 100 INJECTION, SOLUTION INTRAVENOUS; SUBCUTANEOUS at 17:24

## 2019-01-01 RX ADMIN — POTASSIUM CHLORIDE 10 MEQ: 10 TABLET, EXTENDED RELEASE ORAL at 09:46

## 2019-01-01 RX ADMIN — INSULIN LISPRO 4 UNITS: 100 INJECTION, SOLUTION INTRAVENOUS; SUBCUTANEOUS at 22:36

## 2019-01-01 RX ADMIN — GABAPENTIN 600 MG: 300 CAPSULE ORAL at 21:58

## 2019-01-01 RX ADMIN — CARVEDILOL 6.25 MG: 6.25 TABLET, FILM COATED ORAL at 07:48

## 2019-01-01 RX ADMIN — PRAVASTATIN SODIUM 80 MG: 80 TABLET ORAL at 21:33

## 2019-01-01 RX ADMIN — DOCUSATE SODIUM 100 MG: 100 CAPSULE, LIQUID FILLED ORAL at 07:41

## 2019-01-01 RX ADMIN — METOLAZONE 2.5 MG: 5 TABLET ORAL at 16:47

## 2019-01-01 RX ADMIN — HYDRALAZINE HYDROCHLORIDE 25 MG: 25 TABLET, FILM COATED ORAL at 08:36

## 2019-01-01 RX ADMIN — CARVEDILOL 6.25 MG: 6.25 TABLET, FILM COATED ORAL at 08:36

## 2019-01-01 RX ADMIN — INSULIN LISPRO 2 UNITS: 100 INJECTION, SOLUTION INTRAVENOUS; SUBCUTANEOUS at 11:46

## 2019-01-01 RX ADMIN — INSULIN LISPRO 2 UNITS: 100 INJECTION, SOLUTION INTRAVENOUS; SUBCUTANEOUS at 08:14

## 2019-01-01 RX ADMIN — PRAVASTATIN SODIUM 80 MG: 80 TABLET ORAL at 02:03

## 2019-01-01 RX ADMIN — HYDRALAZINE HYDROCHLORIDE 25 MG: 25 TABLET, FILM COATED ORAL at 07:47

## 2019-01-01 RX ADMIN — CARVEDILOL 6.25 MG: 6.25 TABLET, FILM COATED ORAL at 17:17

## 2019-01-01 RX ADMIN — CARVEDILOL 12.5 MG: 12.5 TABLET, FILM COATED ORAL at 16:18

## 2019-01-01 RX ADMIN — BUMETANIDE 2 MG/HR: 0.25 INJECTION, SOLUTION INTRAMUSCULAR; INTRAVENOUS at 08:22

## 2019-01-01 RX ADMIN — POTASSIUM CHLORIDE 40 MEQ: 20 TABLET, EXTENDED RELEASE ORAL at 04:00

## 2019-01-01 RX ADMIN — MAGNESIUM SULFATE HEPTAHYDRATE 2 G: 40 INJECTION, SOLUTION INTRAVENOUS at 14:46

## 2019-01-01 RX ADMIN — INSULIN GLARGINE 25 UNITS: 100 INJECTION, SOLUTION SUBCUTANEOUS at 22:04

## 2019-01-01 RX ADMIN — INSULIN LISPRO 4 UNITS: 100 INJECTION, SOLUTION INTRAVENOUS; SUBCUTANEOUS at 22:13

## 2019-01-01 RX ADMIN — HYDRALAZINE HYDROCHLORIDE 25 MG: 25 TABLET, FILM COATED ORAL at 16:18

## 2019-01-01 RX ADMIN — POLYETHYLENE GLYCOL 3350 17 G: 17 POWDER, FOR SOLUTION ORAL at 08:35

## 2019-01-01 RX ADMIN — POLYETHYLENE GLYCOL 3350 17 G: 17 POWDER, FOR SOLUTION ORAL at 09:57

## 2019-01-01 RX ADMIN — CARVEDILOL 6.25 MG: 6.25 TABLET, FILM COATED ORAL at 07:45

## 2019-01-01 RX ADMIN — GABAPENTIN 600 MG: 300 CAPSULE ORAL at 08:52

## 2019-01-01 RX ADMIN — ISOSORBIDE DINITRATE 20 MG: 20 TABLET ORAL at 22:36

## 2019-01-01 RX ADMIN — POTASSIUM CHLORIDE 10 MEQ: 10 TABLET, EXTENDED RELEASE ORAL at 08:00

## 2019-01-01 RX ADMIN — Medication 400 MG: at 09:25

## 2019-01-01 RX ADMIN — MORPHINE SULFATE 10 MG: 10 INJECTION INTRAVENOUS at 13:23

## 2019-01-01 RX ADMIN — ISOSORBIDE DINITRATE 20 MG: 20 TABLET ORAL at 09:50

## 2019-01-01 RX ADMIN — GABAPENTIN 600 MG: 300 CAPSULE ORAL at 08:22

## 2019-01-01 RX ADMIN — INSULIN LISPRO 6 UNITS: 100 INJECTION, SOLUTION INTRAVENOUS; SUBCUTANEOUS at 22:02

## 2019-01-01 RX ADMIN — FAMOTIDINE 20 MG: 20 TABLET ORAL at 08:15

## 2019-01-01 RX ADMIN — INSULIN LISPRO 2 UNITS: 100 INJECTION, SOLUTION INTRAVENOUS; SUBCUTANEOUS at 11:51

## 2019-01-01 RX ADMIN — HEPARIN SODIUM 5000 UNITS: 5000 INJECTION INTRAVENOUS; SUBCUTANEOUS at 05:52

## 2019-01-01 RX ADMIN — HEPARIN SODIUM 5000 UNITS: 5000 INJECTION INTRAVENOUS; SUBCUTANEOUS at 14:21

## 2019-01-01 RX ADMIN — POTASSIUM CHLORIDE 10 MEQ: 10 TABLET, EXTENDED RELEASE ORAL at 09:25

## 2019-01-01 RX ADMIN — CARVEDILOL 6.25 MG: 6.25 TABLET, FILM COATED ORAL at 08:23

## 2019-01-01 RX ADMIN — GABAPENTIN 600 MG: 300 CAPSULE ORAL at 21:19

## 2019-01-01 RX ADMIN — HYDROCODONE BITARTRATE AND ACETAMINOPHEN 1 TABLET: 5; 325 TABLET ORAL at 22:12

## 2019-01-01 RX ADMIN — INSULIN LISPRO 4 UNITS: 100 INJECTION, SOLUTION INTRAVENOUS; SUBCUTANEOUS at 12:04

## 2019-01-01 RX ADMIN — INSULIN LISPRO 8 UNITS: 100 INJECTION, SOLUTION INTRAVENOUS; SUBCUTANEOUS at 16:54

## 2019-01-01 RX ADMIN — HYDRALAZINE HYDROCHLORIDE 25 MG: 25 TABLET, FILM COATED ORAL at 07:59

## 2019-01-01 RX ADMIN — ISOSORBIDE DINITRATE 20 MG: 20 TABLET ORAL at 15:34

## 2019-01-01 RX ADMIN — HEPARIN SODIUM 5000 UNITS: 5000 INJECTION INTRAVENOUS; SUBCUTANEOUS at 06:02

## 2019-01-01 RX ADMIN — HEPARIN SODIUM 5000 UNITS: 5000 INJECTION INTRAVENOUS; SUBCUTANEOUS at 14:16

## 2019-01-01 RX ADMIN — PRAVASTATIN SODIUM 80 MG: 80 TABLET ORAL at 21:18

## 2019-01-01 RX ADMIN — ISOSORBIDE DINITRATE 20 MG: 20 TABLET ORAL at 08:15

## 2019-01-01 RX ADMIN — ISOSORBIDE DINITRATE 20 MG: 20 TABLET ORAL at 08:53

## 2019-01-01 RX ADMIN — ISOSORBIDE DINITRATE 20 MG: 20 TABLET ORAL at 15:29

## 2019-01-01 RX ADMIN — POTASSIUM CHLORIDE 40 MEQ: 20 TABLET, EXTENDED RELEASE ORAL at 11:51

## 2019-01-01 RX ADMIN — GABAPENTIN 600 MG: 300 CAPSULE ORAL at 22:36

## 2019-01-01 RX ADMIN — HYDRALAZINE HYDROCHLORIDE 25 MG: 25 TABLET, FILM COATED ORAL at 08:22

## 2019-01-01 RX ADMIN — COLCHICINE 0.6 MG: 0.6 TABLET, FILM COATED ORAL at 08:00

## 2019-01-01 RX ADMIN — HEPARIN SODIUM 5000 UNITS: 5000 INJECTION INTRAVENOUS; SUBCUTANEOUS at 22:04

## 2019-01-01 RX ADMIN — POTASSIUM CHLORIDE 10 MEQ: 10 TABLET, EXTENDED RELEASE ORAL at 08:52

## 2019-01-01 RX ADMIN — PRAVASTATIN SODIUM 80 MG: 80 TABLET ORAL at 21:58

## 2019-01-01 RX ADMIN — HEPARIN SODIUM 5000 UNITS: 5000 INJECTION INTRAVENOUS; SUBCUTANEOUS at 21:25

## 2019-01-01 RX ADMIN — BUMETANIDE 2 MG: 1 TABLET ORAL at 17:09

## 2019-01-01 RX ADMIN — GABAPENTIN 600 MG: 300 CAPSULE ORAL at 09:25

## 2019-01-01 RX ADMIN — INSULIN LISPRO 2 UNITS: 100 INJECTION, SOLUTION INTRAVENOUS; SUBCUTANEOUS at 13:07

## 2019-01-01 RX ADMIN — INSULIN LISPRO 2 UNITS: 100 INJECTION, SOLUTION INTRAVENOUS; SUBCUTANEOUS at 12:29

## 2019-01-01 RX ADMIN — HYDRALAZINE HYDROCHLORIDE 25 MG: 25 TABLET, FILM COATED ORAL at 16:41

## 2019-01-01 RX ADMIN — ISOSORBIDE DINITRATE 20 MG: 20 TABLET ORAL at 08:00

## 2019-01-01 RX ADMIN — HYDRALAZINE HYDROCHLORIDE 25 MG: 25 TABLET, FILM COATED ORAL at 09:04

## 2019-01-01 RX ADMIN — HEPARIN SODIUM 5000 UNITS: 5000 INJECTION INTRAVENOUS; SUBCUTANEOUS at 06:22

## 2019-01-01 RX ADMIN — PRAVASTATIN SODIUM 80 MG: 80 TABLET ORAL at 21:26

## 2019-01-01 RX ADMIN — CARVEDILOL 6.25 MG: 6.25 TABLET, FILM COATED ORAL at 16:47

## 2019-01-01 RX ADMIN — POTASSIUM CHLORIDE 10 MEQ: 10 TABLET, EXTENDED RELEASE ORAL at 07:41

## 2019-01-01 RX ADMIN — HYDRALAZINE HYDROCHLORIDE 25 MG: 25 TABLET, FILM COATED ORAL at 17:07

## 2019-01-01 RX ADMIN — INSULIN LISPRO 2 UNITS: 100 INJECTION, SOLUTION INTRAVENOUS; SUBCUTANEOUS at 17:08

## 2019-01-01 RX ADMIN — HEPARIN SODIUM 5000 UNITS: 5000 INJECTION INTRAVENOUS; SUBCUTANEOUS at 21:32

## 2019-01-01 RX ADMIN — COLCHICINE 0.6 MG: 0.6 TABLET, FILM COATED ORAL at 08:52

## 2019-01-01 RX ADMIN — INSULIN LISPRO 2 UNITS: 100 INJECTION, SOLUTION INTRAVENOUS; SUBCUTANEOUS at 08:37

## 2019-01-01 RX ADMIN — POLYETHYLENE GLYCOL 3350 17 G: 17 POWDER, FOR SOLUTION ORAL at 08:31

## 2019-01-01 RX ADMIN — COLCHICINE 0.6 MG: 0.6 TABLET, FILM COATED ORAL at 09:46

## 2019-01-01 RX ADMIN — POLYETHYLENE GLYCOL 3350 17 G: 17 POWDER, FOR SOLUTION ORAL at 08:51

## 2019-01-01 RX ADMIN — HYDRALAZINE HYDROCHLORIDE 25 MG: 25 TABLET, FILM COATED ORAL at 16:47

## 2019-01-01 RX ADMIN — INSULIN LISPRO 4 UNITS: 100 INJECTION, SOLUTION INTRAVENOUS; SUBCUTANEOUS at 12:03

## 2019-01-01 RX ADMIN — GABAPENTIN 600 MG: 300 CAPSULE ORAL at 09:46

## 2019-01-01 RX ADMIN — GABAPENTIN 600 MG: 300 CAPSULE ORAL at 17:09

## 2019-01-01 RX ADMIN — ISOSORBIDE DINITRATE 20 MG: 20 TABLET ORAL at 21:59

## 2019-01-01 RX ADMIN — DOCUSATE SODIUM 100 MG: 100 CAPSULE, LIQUID FILLED ORAL at 08:15

## 2019-01-01 RX ADMIN — BUMETANIDE 2 MG: 1 TABLET ORAL at 09:54

## 2019-01-01 RX ADMIN — BUMETANIDE 2 MG: 1 TABLET ORAL at 17:44

## 2019-01-01 RX ADMIN — Medication 400 MG: at 08:22

## 2019-01-01 RX ADMIN — ISOSORBIDE DINITRATE 20 MG: 20 TABLET ORAL at 21:19

## 2019-01-01 RX ADMIN — HEPARIN SODIUM 5000 UNITS: 5000 INJECTION INTRAVENOUS; SUBCUTANEOUS at 21:14

## 2019-01-01 RX ADMIN — Medication 400 MG: at 09:45

## 2019-01-01 RX ADMIN — ISOSORBIDE DINITRATE 20 MG: 20 TABLET ORAL at 21:26

## 2019-01-01 RX ADMIN — CARVEDILOL 6.25 MG: 6.25 TABLET, FILM COATED ORAL at 17:33

## 2019-01-01 RX ADMIN — COLCHICINE 0.6 MG: 0.6 TABLET, FILM COATED ORAL at 08:15

## 2019-01-01 RX ADMIN — HYDRALAZINE HYDROCHLORIDE 25 MG: 25 TABLET, FILM COATED ORAL at 21:19

## 2019-01-01 RX ADMIN — POTASSIUM CHLORIDE 10 MEQ: 10 TABLET, EXTENDED RELEASE ORAL at 08:22

## 2019-01-01 RX ADMIN — COLCHICINE 0.6 MG: 0.6 TABLET, FILM COATED ORAL at 08:35

## 2019-01-01 RX ADMIN — FAMOTIDINE 20 MG: 20 TABLET ORAL at 07:42

## 2019-01-01 RX ADMIN — BISACODYL 10 MG: 10 SUPPOSITORY RECTAL at 17:44

## 2019-01-01 RX ADMIN — DOCUSATE SODIUM 100 MG: 100 CAPSULE, LIQUID FILLED ORAL at 17:57

## 2019-01-01 RX ADMIN — SENNOSIDES 17.2 MG: 8.6 TABLET, FILM COATED ORAL at 21:18

## 2019-01-01 RX ADMIN — COLCHICINE 0.6 MG: 0.6 TABLET, FILM COATED ORAL at 08:22

## 2019-01-01 RX ADMIN — SENNOSIDES 17.2 MG: 8.6 TABLET, FILM COATED ORAL at 22:36

## 2019-01-01 RX ADMIN — HYDRALAZINE HYDROCHLORIDE 25 MG: 25 TABLET, FILM COATED ORAL at 16:06

## 2019-01-01 RX ADMIN — HYDROCODONE BITARTRATE AND ACETAMINOPHEN 1 TABLET: 5; 325 TABLET ORAL at 23:24

## 2019-01-01 RX ADMIN — ISOSORBIDE DINITRATE 20 MG: 20 TABLET ORAL at 21:33

## 2019-01-01 RX ADMIN — HYDRALAZINE HYDROCHLORIDE 25 MG: 25 TABLET, FILM COATED ORAL at 22:04

## 2019-01-01 RX ADMIN — GABAPENTIN 600 MG: 300 CAPSULE ORAL at 17:34

## 2019-01-01 RX ADMIN — HYDROCODONE BITARTRATE AND ACETAMINOPHEN 1 TABLET: 5; 325 TABLET ORAL at 22:59

## 2019-01-01 RX ADMIN — INSULIN GLARGINE 25 UNITS: 100 INJECTION, SOLUTION SUBCUTANEOUS at 21:59

## 2019-01-01 RX ADMIN — INSULIN LISPRO 4 UNITS: 100 INJECTION, SOLUTION INTRAVENOUS; SUBCUTANEOUS at 21:59

## 2019-01-01 RX ADMIN — DOCUSATE SODIUM 100 MG: 100 CAPSULE, LIQUID FILLED ORAL at 08:00

## 2019-01-01 RX ADMIN — Medication 400 MG: at 08:00

## 2019-01-01 RX ADMIN — HEPARIN SODIUM 5000 UNITS: 5000 INJECTION INTRAVENOUS; SUBCUTANEOUS at 13:55

## 2019-01-01 RX ADMIN — IPRATROPIUM BROMIDE AND ALBUTEROL SULFATE 3 ML: .5; 3 SOLUTION RESPIRATORY (INHALATION) at 11:57

## 2019-01-01 RX ADMIN — HEPARIN SODIUM 5000 UNITS: 5000 INJECTION INTRAVENOUS; SUBCUTANEOUS at 13:07

## 2019-01-01 RX ADMIN — FAMOTIDINE 20 MG: 20 TABLET ORAL at 08:23

## 2019-01-01 RX ADMIN — HEPARIN SODIUM 5000 UNITS: 5000 INJECTION INTRAVENOUS; SUBCUTANEOUS at 05:46

## 2019-01-01 RX ADMIN — HYDRALAZINE HYDROCHLORIDE 25 MG: 25 TABLET, FILM COATED ORAL at 22:36

## 2019-01-01 RX ADMIN — GABAPENTIN 600 MG: 300 CAPSULE ORAL at 12:03

## 2019-01-01 RX ADMIN — CARVEDILOL 6.25 MG: 6.25 TABLET, FILM COATED ORAL at 09:04

## 2019-01-01 RX ADMIN — GABAPENTIN 600 MG: 300 CAPSULE ORAL at 17:57

## 2019-01-01 RX ADMIN — HYDRALAZINE HYDROCHLORIDE 25 MG: 25 TABLET, FILM COATED ORAL at 21:14

## 2019-01-01 RX ADMIN — GABAPENTIN 600 MG: 300 CAPSULE ORAL at 14:21

## 2019-01-01 RX ADMIN — HEPARIN SODIUM 5000 UNITS: 5000 INJECTION INTRAVENOUS; SUBCUTANEOUS at 06:19

## 2019-01-01 RX ADMIN — HEPARIN SODIUM 5000 UNITS: 5000 INJECTION INTRAVENOUS; SUBCUTANEOUS at 14:22

## 2019-01-23 NOTE — PROGRESS NOTES
Hourly BP and HR stable while on Primacor and Lasix gtt. Printed and placed on bedside chart for past 12 hrs. Patient cancelled/no showed appointment on 2/15/19 at 1pm due to patient states that her insurance will not cover.      This appointment was: . CANCELLED  Message routed as Routine priority   Message routed to the Provider and the PSR Pool for the site Provider is working at today    Close message on routing unless Provider input is needed

## 2019-02-28 NOTE — ED PROVIDER NOTES
Here with worsening of his chronic back pain. He states that he is hoping to get a single shot, but realizes that we will not be providing him with long-term medication. He is under pain management, but states that he is going to terminate this soon. Have told him it would be prudent to not discontinue until he has an option that is in place that is an alternative to this. By the last 2-3 days he has had some increasing pain at the lower part of his lumbar with a Laveta sciatic radiation. He has had this multiple times in the past.  Also with a diagnosis of left carpal tunnel syndrome that has been somewhat worse. He plans on changing from chronic pain management to CBD oil. The history is provided by the patient. Back Pain This is a chronic problem. The problem occurs constantly. Patient reports not work related injury. The pain is associated with no known injury. The pain is present in the lower back. Radiates to: aching into his hips. Pertinent negatives include no chest pain, no fever, no numbness, no bowel incontinence, no perianal numbness, no bladder incontinence, no paresthesias, no paresis and no tingling. Past Medical History:  
Diagnosis Date  Acquired claw toe of right foot  Acute encephalopathy 3/18/2017  Acute systolic heart failure [facility]) May, 2009 Also had transient acute renal failure secondary to poor perfusion.  AICD (automatic cardioverter/defibrillator) present 10/21/2015  Asthma  Atypical chest pain 4/23/2010  Bronchitis  CAD (coronary artery disease)  Cardiomyopathy  Chest pain 10/21/2015  Chronic kidney disease   
 renal insufficiency  Chronic pain   
 back  Congestive heart failure (CHF) ([address] 10/21/2015  COPD  Depressive disorder  Diabetes ([facility] type 2 insulin reliant- BS average- 160's-180's  Diabetes mellitus type II, uncontrolled ([address] 7/2/2013  GERD (gastroesophageal reflux disease)  Gout  Heart failure (HCC)   
 cardiomyopathy with ef 10-20%  Hypertension  Hyponatremia 2010  Ill-defined condition   
 gout, neuropathy, sciatica  Morbid obesity ([facility]  Nausea & vomiting 2015  Neuropathy  Obstructive sleep apnea 2/15/2010  Other unknown and unspecified cause of morbidity or mortality Gout  Severe sepsis ([facility]  Ulcer of leg, chronic (HCC)  Unspecified sleep apnea   
 cpap Past Surgical History:  
Procedure Laterality Date  CHEST SURGERY PROCEDURE UNLISTED    
 thorocentesis  HX HEART CATHETERIZATION  Sandy 10, 2008 Severe multivessel disease with severe LV dysfunction  HX PACEMAKER    
 may 2010. ICD is place. Family History:  
Problem Relation Age of Onset  Heart Disease Father  Stroke Father  Coronary Artery Disease Father  Other Father   
     kidney failure  Diabetes Sister  Stroke Sister  Diabetes Sister  Coronary Artery Disease Mother  Heart Disease Other Social History Socioeconomic History  Marital status:  Spouse name: Not on file  Number of children: Not on file  Years of education: Not on file  Highest education level: Not on file Social Needs  Financial resource strain: Not on file  Food insecurity - worry: Not on file  Food insecurity - inability: Not on file  Transportation needs - medical: Not on file  Transportation needs - non-medical: Not on file Occupational History  Not on file Tobacco Use  Smoking status: Former Smoker Packs/day: 0.25 Years: 1.00 Pack years: 0.25 Last attempt to quit: 1984 Years since quittin.6  Smokeless tobacco: Never Used  Tobacco comment: pt states that he only tried smoking as a kid Substance and Sexual Activity  Alcohol use: No  
  Alcohol/week: 0.0 oz  Drug use: No  
 Sexual activity: Not on file Other Topics Concern  Not on file Social History Narrative  Not on file ALLERGIES: Iodinated contrast- oral and iv dye and Penicillins Review of Systems Constitutional: Negative for chills and fever. HENT: Negative. Respiratory: Negative for wheezing. Cardiovascular: Negative for chest pain. Gastrointestinal: Negative for bowel incontinence. Genitourinary: Negative for bladder incontinence. Musculoskeletal: Positive for back pain. Neurological: Negative for tingling, numbness and paresthesias. All other systems reviewed and are negative. Vitals:  
 02/28/19 1121 BP: 128/88 Pulse: 94 Resp: 16 Temp: 97.7 °F (36.5 °C) SpO2: 95% Weight: 152 kg (335 lb) Height: 5' 6\" (1.676 m) Physical Exam  
Constitutional: He appears well-developed and well-nourished. No distress. BMI 55 HENT:  
Head: Atraumatic. Eyes: No scleral icterus. Neck: Neck supple. Cardiovascular: Normal rate. Pulmonary/Chest: Effort normal. No respiratory distress. Abdominal: Soft. He exhibits no distension. There is no tenderness. There is no guarding. Musculoskeletal: He exhibits tenderness. He exhibits no edema or deformity. Pain to Lower region of the lumbar with mainly pain to the lateral aspectsno radiation of pain beyond or absence of function. Neurological: No sensory deficit. He exhibits normal muscle tone. Skin: Skin is warm and dry. Psychiatric: Thought content normal.  
Nursing note and vitals reviewed. MDM Number of Diagnoses or Management Options Chronic midline low back pain with bilateral sciatica:  
Diagnosis management comments: No acute injury. Patient is up and ambulatory after shot is given. Certainly a degree of patient's issues are worsened by body habitus. He has a plan in place to try and come up with optional treatment and mentions his neurologist at [facility] is working with this. Amount and/or Complexity of Data Reviewed Decide to obtain previous medical records or to obtain history from someone other than the patient: yes Risk of Complications, Morbidity, and/or Mortality Presenting problems: moderate Diagnostic procedures: low Management options: moderate General comments: Able to be up and ambulatory after shot in our department. Patient has renal insufficiency and diabetes, which limits some of medications Patient Progress Patient progress: improved Procedures

## 2019-02-28 NOTE — ED TRIAGE NOTES
Pt arrives via POV from home, pt wheelchair to triage, pt states hx of sciatica, pt states pain R lumbar back radiating to R leg, states has been going on \"forever\". Pt denies injury or urinary symptoms.

## 2019-02-28 NOTE — ED NOTES
I have reviewed discharge instructions with the patient. The patient verbalized understanding. Patient left ED via Discharge Method: ambulatory to Home with self. Opportunity for questions and clarification provided. Patient given 0 scripts. To continue your aftercare when you leave the hospital, you may receive an automated call from our care team to check in on how you are doing. This is a free service and part of our promise to provide the best care and service to meet your aftercare needs.  If you have questions, or wish to unsubscribe from this service please call [phone]. Thank you for Choosing our [facility] Emergency Department.

## 2019-03-07 NOTE — ED TRIAGE NOTES
O 2 99% on 3 L by hospital O 2 tank. Increased buminex as directed by cardiology.   States fluid has been increasing \"for a while\",.

## 2019-03-08 PROBLEM — J96.21 ACUTE ON CHRONIC RESPIRATORY FAILURE WITH HYPOXIA AND HYPERCAPNIA (HCC): Status: RESOLVED | Noted: 2017-03-21 | Resolved: 2019-01-01

## 2019-03-08 PROBLEM — N17.9 ACUTE RENAL FAILURE SUPERIMPOSED ON STAGE 3 CHRONIC KIDNEY DISEASE (HCC): Status: RESOLVED | Noted: 2018-07-15 | Resolved: 2019-01-01

## 2019-03-08 PROBLEM — N18.30 ACUTE RENAL FAILURE SUPERIMPOSED ON STAGE 3 CHRONIC KIDNEY DISEASE (HCC): Status: RESOLVED | Noted: 2018-07-15 | Resolved: 2019-01-01

## 2019-03-08 PROBLEM — I50.23 ACUTE ON CHRONIC SYSTOLIC HEART FAILURE (HCC): Status: RESOLVED | Noted: 2017-06-20 | Resolved: 2019-01-01

## 2019-03-08 PROBLEM — I50.9 CONGESTIVE HEART FAILURE (CHF) (HCC): Chronic | Status: RESOLVED | Noted: 2017-03-18 | Resolved: 2019-01-01

## 2019-03-08 PROBLEM — J96.22 ACUTE ON CHRONIC RESPIRATORY FAILURE WITH HYPOXIA AND HYPERCAPNIA (HCC): Status: RESOLVED | Noted: 2017-03-21 | Resolved: 2019-01-01

## 2019-03-08 PROBLEM — G93.40 ACUTE ENCEPHALOPATHY: Status: RESOLVED | Noted: 2017-03-18 | Resolved: 2019-01-01

## 2019-03-08 PROBLEM — R07.9 CHEST PAIN: Status: RESOLVED | Noted: 2018-07-15 | Resolved: 2019-01-01

## 2019-03-08 PROBLEM — E87.1 HYPONATREMIA: Status: RESOLVED | Noted: 2018-07-15 | Resolved: 2019-01-01

## 2019-03-08 PROBLEM — E87.6 HYPOKALEMIA: Status: RESOLVED | Noted: 2018-07-15 | Resolved: 2019-01-01

## 2019-03-08 PROBLEM — I50.9 CHF (CONGESTIVE HEART FAILURE) (HCC): Status: RESOLVED | Noted: 2017-07-09 | Resolved: 2019-01-01

## 2019-03-08 PROBLEM — R73.9 HYPERGLYCEMIA: Status: RESOLVED | Noted: 2018-07-15 | Resolved: 2019-01-01

## 2019-03-08 NOTE — ED NOTES
TRANSFER - OUT REPORT:    Verbal report given to Shannan(name) on Molly Goodell  being transferred to 317(unit) for routine progression of care       Report consisted of patients Situation, Background, Assessment and   Recommendations(SBAR). Information from the following report(s) ED Summary was reviewed with the receiving nurse. Lines:       Opportunity for questions and clarification was provided.       Patient transported with:   Monitor

## 2019-03-08 NOTE — PROGRESS NOTES
Dual skin assessment completed with Javier Estrella RN. Skin overall is appropriate for ethnicity warm, dry and intact. Bilateral heels are intact, but both feet are extremely dry and flaky. Sacrum visualized and without impairment. Groins and beneath pannus also visualized and are without excoriation or yeast irritation. No other impairments noted.

## 2019-03-08 NOTE — PROGRESS NOTES
Bedside and Verbal shift change report given to self (oncoming nurse) by Diane Bailon RN (offgoing nurse). Report included the following information SBAR, Kardex, MAR and Recent Results.

## 2019-03-08 NOTE — ED PROVIDER NOTES
21-year-old male with history of morbid obesity, congestive heart failure with ejection fraction around 15-20% with AICD, obstructive sleep apnea, chronic Kidney disease, Diabetes, Hypertension presents with increasing shortness of breath and edema since January. Symptoms have worsened over the past few weeks. He has also had a dry cough. He states he feels like he is wearing a \"tire\". he Cannot walk 10 steps without getting short of breath. He was seen by pulmonology yesterday for the same symptoms. There was some question that he may have been out of his Zaroxolyn or Bumex. He states he has been compliant with these medications. He has BiPAP at home and is on 2 L nasal cannula oxygen. Sats increased to 3 L by EMS due to hypoxia. He denies any recent changes in medications. Past Medical History:   Diagnosis Date    Acquired claw toe of right foot     Acute encephalopathy 6/76/1210    Acute systolic heart failure ([address] May, 2009    Also had transient acute renal failure secondary to poor perfusion.     AICD (automatic cardioverter/defibrillator) present 10/21/2015    Atypical chest pain 4/23/2010    Bronchitis     CAD (coronary artery disease)     Cardiomyopathy     Chest pain 10/21/2015    Chronic kidney disease     renal insufficiency    Chronic pain     back    Congestive heart failure (CHF) ([address] 10/21/2015    Depressive disorder     Diabetes ([address]     type 2 insulin reliant- BS average- 160's-180's    Diabetes mellitus type II, uncontrolled ([address] 7/2/2013    GERD (gastroesophageal reflux disease)     Gout     Heart failure ([address]     cardiomyopathy with ef 10-20%    Hypertension     Hyponatremia 12/20/2010    Ill-defined condition     gout, neuropathy, sciatica    Morbid obesity (HCC)     Nausea & vomiting 11/30/2015    Neuropathy     Obstructive sleep apnea 2/15/2010    Other unknown and unspecified cause of morbidity or mortality     Gout    Severe sepsis ([address]  Ulcer of leg, chronic (HCC)     Unspecified sleep apnea     cpap       Past Surgical History:   Procedure Laterality Date    CHEST SURGERY PROCEDURE UNLISTED      thorocentesis    HX HEART CATHETERIZATION  Sandy 10, 2008    Severe multivessel disease with severe LV dysfunction    HX PACEMAKER      may 2010. ICD is place. Family History:   Problem Relation Age of Onset    Heart Disease Father     Stroke Father     Coronary Artery Disease Father     Other Father         kidney failure    Diabetes Sister     Stroke Sister     Diabetes Sister     Coronary Artery Disease Mother     Heart Disease Other        Social History     Socioeconomic History    Marital status:      Spouse name: Not on file    Number of children: Not on file    Years of education: Not on file    Highest education level: Not on file   Social Needs    Financial resource strain: Not on file    Food insecurity - worry: Not on file    Food insecurity - inability: Not on file   [employer] needs - medical: Not on file   [employer] needs - non-medical: Not on file   Occupational History    Not on file   Tobacco Use    Smoking status: Former Smoker     Packs/day: 0.25     Years: 1.00     Pack years: 0.25     Last attempt to quit: 1984     Years since quittin.6    Smokeless tobacco: Never Used    Tobacco comment: pt states that he only tried smoking as a kid    Substance and Sexual Activity    Alcohol use: No     Alcohol/week: 0.0 oz    Drug use: No    Sexual activity: Not on file   Other Topics Concern    Not on file   Social History Narrative    Not on file         ALLERGIES: Iodinated contrast- oral and iv dye and Penicillins    Review of Systems   Constitutional: Positive for fatigue. Negative for chills and fever. HENT: Negative for hearing loss. Eyes: Negative for visual disturbance. Respiratory: Positive for cough and shortness of breath.     Cardiovascular: Positive for chest pain and leg swelling. Negative for palpitations. Gastrointestinal: Positive for abdominal distention. Negative for abdominal pain, diarrhea, nausea and vomiting. Skin: Negative for rash. Neurological: Negative for weakness and headaches. Psychiatric/Behavioral: Negative for confusion. Vitals:    03/07/19 1854 03/07/19 2134   BP: 125/79    Pulse: 90    Resp: 30    Temp: 98.6 °F (37 °C)    SpO2: (!) 87% 99%   Weight: 158.8 kg (350 lb)    Height: 5' 6\" (1.676 m)             Physical Exam   Constitutional: He appears well-developed and well-nourished. Morbid obesity   HENT:   Head: Normocephalic and atraumatic. Right Ear: External ear normal.   Left Ear: External ear normal.   Nose: Nose normal.   Mouth/Throat: Oropharynx is clear and moist.   Eyes: Conjunctivae are normal. Pupils are equal, round, and reactive to light. Neck: Normal range of motion. Neck supple. Cardiovascular: Regular rhythm and intact distal pulses. Exam reveals distant heart sounds. Pulmonary/Chest: Effort normal. No respiratory distress. He has decreased breath sounds. He has no wheezes. Abdominal: Soft. Bowel sounds are normal. He exhibits no distension. There is no tenderness. Musculoskeletal: Normal range of motion. Right lower leg: He exhibits edema. Neurological: He is alert. Skin: Skin is warm and dry. Psychiatric: Judgment normal.   Nursing note and vitals reviewed. MDM  Number of Diagnoses or Management Options  Acute on chronic congestive heart failure, unspecified heart failure type [facility]):   Diagnosis management comments: Parts of this document were created using dragon voice recognition software. The chart has been reviewed but errors may still be present. 11:54 PM  mary Romero, cardiology, for admission. IV Bumex given.        Amount and/or Complexity of Data Reviewed  Clinical lab tests: ordered and reviewed (Results for orders placed or performed during the hospital encounter of 03/07/19  -CBC WITH AUTOMATED DIFF       Result                      Value             Ref Range           WBC                         7.5               4.3 - 11.1 K*       RBC                         4.40              4.23 - 5.6 M*       HGB                         11.5 (L)          13.6 - 17.2 *       HCT                         36.4 (L)          41.1 - 50.3 %       MCV                         82.7              79.6 - 97.8 *       MCH                         26.1              26.1 - 32.9 *       MCHC                        31.6              31.4 - 35.0 *       RDW                         19.7 (H)          11.9 - 14.6 %       PLATELET                    180               150 - 450 K/*       MPV                         11.0              9.4 - 12.3 FL       ABSOLUTE NRBC               0.02              0.0 - 0.2 K/*       DF                          AUTOMATED                             NEUTROPHILS                 58                43 - 78 %           LYMPHOCYTES                 30                13 - 44 %           MONOCYTES                   10                4.0 - 12.0 %        EOSINOPHILS                 2                 0.5 - 7.8 %         BASOPHILS                   1                 0.0 - 2.0 %         IMMATURE GRANULOCYTES       0                 0.0 - 5.0 %         ABS. NEUTROPHILS            4.3               1.7 - 8.2 K/*       ABS. LYMPHOCYTES            2.2               0.5 - 4.6 K/*       ABS. MONOCYTES              0.8               0.1 - 1.3 K/*       ABS. EOSINOPHILS            0.1               0.0 - 0.8 K/*       ABS. BASOPHILS              0.0               0.0 - 0.2 K/*       ABS. IMM.  GRANS.            0.0               0.0 - 0.5 K/*  -METABOLIC PANEL, COMPREHENSIVE       Result                      Value             Ref Range           Sodium                      136               136 - 145 mm*       Potassium                   3.9               3.5 - 5.1 mm*       Chloride 97 (L)            98 - 107 mmo*       CO2                         28                21 - 32 mmol*       Anion gap                   11                7 - 16 mmol/L       Glucose                     237 (H)           65 - 100 mg/*       BUN                         71 (H)            6 - 23 MG/DL        Creatinine                  2.90 (H)          0.8 - 1.5 MG*       GFR est AA                  29 (L)            >60 ml/min/1*       GFR est non-AA              24 (L)            >60 ml/min/1*       Calcium                     8.9               8.3 - 10.4 M*       Bilirubin, total            1.6 (H)           0.2 - 1.1 MG*       ALT (SGPT)                  21                12 - 65 U/L         AST (SGOT)                  25                15 - 37 U/L         Alk.  phosphatase            234 (H)           50 - 136 U/L        Protein, total              7.8               6.3 - 8.2 g/*       Albumin                     3.2 (L)           3.5 - 5.0 g/*       Globulin                    4.6 (H)           2.3 - 3.5 g/*       A-G Ratio                   0.7 (L)           1.2 - 3.5      -TROPONIN I       Result                      Value             Ref Range           Troponin-I, Qt.             0.02              0.02 - 0.05 *  -BNP       Result                      Value             Ref Range           BNP                         1,002 (H)         0 pg/mL        -EKG, 12 LEAD, INITIAL       Result                      Value             Ref Range           Ventricular Rate            89                BPM                 Atrial Rate                 89                BPM                 P-R Interval                232               ms                  QRS Duration                98                ms                  Q-T Interval                414               ms                  QTC Calculation (Bezet)     503               ms                  Calculated P Axis           28                degrees             Calculated R Axis 154               degrees             Calculated T Axis           34                degrees             Diagnosis                                                     Sinus rhythm with 1st degree A-V block with Fusion complexes and Premature    atrial complexes   Incomplete right bundle branch block   Possible Right ventricular hypertrophy   Cannot rule out Anterior infarct (cited on or before 22-JAN-2017)   Prolonged QT   Abnormal ECG   When compared with ECG of 27-OCT-2018 06:10,   Fusion complexes are now Present   Questionable change in initial forces of Anterior leads   QT has shortened     )  Tests in the radiology section of CPT®: ordered and reviewed (Xr Chest Pa Lat    Result Date: 3/7/2019  CHEST X-RAY, 2 views. HISTORY:  Shortness breath and lower extremity swelling. TECHNIQUE: PA and lateral views. COMPARISON: 27 October 2018. FINDINGS: The heart is enlarged. There is left-sided cardiac pacemaker. Mild pulmonary vascular congestion. No pleural effusion. IMPRESSION: Cardiomegaly without pulmonary edema.  There is mild pulmonary venous hypertension.     )  Tests in the medicine section of CPT®: reviewed and ordered           Procedures

## 2019-03-08 NOTE — PROGRESS NOTES
Medicare Outpatient Observation Notice provided to the patient. Oral explanation was provided and all questions answered. Signed document placed in the medical record.  Copy to patient

## 2019-03-08 NOTE — PROGRESS NOTES
Cardiac Rehab: Referral received for diagnosis of CHF. Cardiac rehab participation is appropriate for those with stable, chronic heart failure defined as patients with left ventricular ejection fraction of 35% or less and New York Heart Association (NYHA) class II to IV symptoms despite being on optimal heart failure therapy for at least six weeks. Stable patients are defined as patients who have not had a recent ( 6 weeks or less) or planned (6 weeks or less) major cardiovascular hospitalizations or procedures. Patient's EF= 15-20%, NYHA class unknown, CHF is chronic. Pt will be contacted after discharge if qualifying referral is obtained. Pt states he has attempted Cardiac Rehab before at the [facility] but had to quit due to continued health issues.     Thank you,  RAJENDRA ZarateN, RN  Cardiac Rehab Nurse Liaison

## 2019-03-08 NOTE — PROGRESS NOTES
Bedside and Verbal shift change report given to self (oncoming nurse) by Ivory Leonardo RN (offgoing nurse). Report included the following information SBAR, Kardex, Intake/Output and MAR.

## 2019-03-08 NOTE — PROGRESS NOTES
Problem: Falls - Risk of  Goal: *Absence of Falls  Document Kadie Fall Risk and appropriate interventions in the flowsheet. Outcome: Progressing Towards Goal  Fall Risk Interventions:  Mobility Interventions: Bed/chair exit alarm, Communicate number of staff needed for ambulation/transfer, Patient to call before getting OOB         Medication Interventions: Patient to call before getting OOB, Teach patient to arise slowly           Patient progressing towards goal with no falls on current admission. Patient without confusion, agitation, or sensory perception deficits. Patient has somewhat steady gait on ambulation. Personal belongings are within reach. Bed is in the low and locked position with side rails up x2. Yellow gripper socks to bilateral feet. Call light within reach and patient verbalizes understanding of use.

## 2019-03-08 NOTE — PROGRESS NOTES
TRANSFER - IN REPORT:    Verbal report received from [address] [place] [address] St RN(name) on [employer]  being received from [facility] ED (unit) for routine progression of care      Report consisted of patients Situation, Background, Assessment and   Recommendations(SBAR). Information from the following report(s) SBAR, Kardex, ED Summary, Intake/Output, MAR, Recent Results and Cardiac Rhythm SR with 1st degree HB was reviewed with the receiving nurse. Opportunity for questions and clarification was provided.

## 2019-03-08 NOTE — ROUTINE PROCESS
Patient with planner at [facility]. Known to program. Currently on bumex gtt. Diuresing well. Continue to follow_ currently obs- follow and continue with HF education as nears d/c. CHF teaching started post introduction to pt/family; aware of diagnosis. Planner/scale @ BS and will follow. Smoking/ ETOH/Illicit drug use cessation and maintain a healthy weight covered. Pt/family aware that I can not prescribe nor adjust  medications: 15mins    Palliative Care score: currently on obs.   Refused ACP on admission  Start 2L/D Fluid restriction/ cardiac diet

## 2019-03-08 NOTE — PROGRESS NOTES
Bedside and Verbal shift change report given to Melissa Le RN (oncoming nurse) by self Britni valencia). Report included the following information SBAR, Kardex, MAR and Recent Results.

## 2019-03-08 NOTE — H&P
[facility] Cardiology History & Physical      Date of  Admission: 3/7/2019  9:19 PM     Primary Care Physician: Dr. Sindhu Bragg  Primary Cardiologist: Dr. Adán Zarate  Admitting Physician: Dr. Mignon Gomez    CC: shortness of breath, weight gain    HPI:  Meche Kyle is a 46 y.o. male with past medical history of NICM with ICD in place, depression, CKD, IDDM, morbid obesity, HTN and dyslipidemia who presented to the ER with complaints of worsening shortness of breath. Patient reports that he feels as though he is carrying a tire around is abdomen. He states that his symptoms have been present since January but became worse over the past several days. He does admit to missing almost 2 weeks of his Zaroxolyn doses (takes this M/W/F) because he thought he was out, but it was just in a different pill bottle. He also admits to only taking his Bumex once daily for a while. Upon arrival to the ER, EKG shows SR with 1st degree AV block. Labs show a creatinine of 2.9 and BNP of 1002. CXR shows cardiomegaly without pulmonary edema and mild pulmonary venous hypertension. While in the ER, he was given IV Bumex with + UOP. Past Medical History:   Diagnosis Date    Acquired claw toe of right foot     Acute encephalopathy 0/44/6413    Acute systolic heart failure ([address] May, 2009    Also had transient acute renal failure secondary to poor perfusion.     AICD (automatic cardioverter/defibrillator) present 10/21/2015    Atypical chest pain 4/23/2010    Bronchitis     CAD (coronary artery disease)     Cardiomyopathy     Chest pain 10/21/2015    Chronic kidney disease     renal insufficiency    Chronic pain     back    Congestive heart failure (CHF) ([address] 10/21/2015    Depressive disorder     Diabetes ([address]     type 2 insulin reliant- BS average- 160's-180's    Diabetes mellitus type II, uncontrolled ([address] 7/2/2013    GERD (gastroesophageal reflux disease)     Gout     Heart failure ([address]     cardiomyopathy with ef 10-20%    Hypertension     Hyponatremia 2010    Ill-defined condition     gout, neuropathy, sciatica    Morbid obesity (HCC)     Nausea & vomiting 2015    Neuropathy     Obstructive sleep apnea 2/15/2010    Other unknown and unspecified cause of morbidity or mortality     Gout    Severe sepsis (HCC)     Ulcer of leg, chronic (HCC)     Unspecified sleep apnea     cpap      Past Surgical History:   Procedure Laterality Date    CHEST SURGERY PROCEDURE UNLISTED      thorocentesis    HX HEART CATHETERIZATION  [place] 10, 2008    Severe multivessel disease with severe LV dysfunction    HX PACEMAKER      may 2010. ICD is place.        Allergies   Allergen Reactions    Iodinated Contrast- Oral And Iv Dye Other (comments)     \"shuts my kidneys down\"    Penicillins Unknown (comments)     Pt states he is not allergic his mother just wouldn't allow him take it      Social History     Socioeconomic History    Marital status:      Spouse name: Not on file    Number of children: Not on file    Years of education: Not on file    Highest education level: Not on file   Social Needs    Financial resource strain: Not on file    Food insecurity - worry: Not on file    Food insecurity - inability: Not on file   Tempo [employer] needs - medical: Not on file   Tem[employer] needs - non-medical: Not on file   Occupational History    Not on file   Tobacco Use    Smoking status: Former Smoker     Packs/day: 0.25     Years: 1.00     Pack years: 0.25     Last attempt to quit: 1984     Years since quittin.6    Smokeless tobacco: Never Used    Tobacco comment: pt states that he only tried smoking as a kid    Substance and Sexual Activity    Alcohol use: No     Alcohol/week: 0.0 oz    Drug use: No    Sexual activity: Not on file   Other Topics Concern    Not on file   Social History Narrative    Not on file     Family History   Problem Relation Age of Onset    Heart Disease Father    Moustapha Amato Stroke Father     Coronary Artery Disease Father     Other Father         kidney failure    Diabetes Sister     Stroke Sister     Diabetes Sister     Coronary Artery Disease Mother     Heart Disease Other         Current Facility-Administered Medications   Medication Dose Route Frequency    carvedilol (COREG) tablet 6.25 mg  6.25 mg Oral BID WITH MEALS    colchicine tablet 0.6 mg  0.6 mg Oral DAILY    docusate sodium (COLACE) capsule 100 mg  100 mg Oral BID    gabapentin (NEURONTIN) capsule 600 mg  600 mg Oral QID    hydrALAZINE (APRESOLINE) tablet 25 mg  25 mg Oral TID    insulin glargine (LANTUS) injection 25 Units  25 Units SubCUTAneous QHS    isosorbide dinitrate (ISORDIL) tablet 20 mg  20 mg Oral TID    magnesium oxide (MAG-OX) tablet 400 mg  400 mg Oral DAILY    metOLazone (ZAROXOLYN) tablet 2.5 mg  2.5 mg Oral Q MON, WED & FRI    polyethylene glycol (MIRALAX) packet 17 g  17 g Oral DAILY PRN    potassium chloride (KLOR-CON) tablet 10 mEq  10 mEq Oral DAILY    pravastatin (PRAVACHOL) tablet 80 mg  80 mg Oral QHS    famotidine (PEPCID) tablet 20 mg  20 mg Oral BID    senna (SENOKOT) tablet 17.2 mg  2 Tab Oral QHS    ondansetron (ZOFRAN) injection 4 mg  4 mg IntraVENous Q6H PRN    acetaminophen (TYLENOL) tablet 650 mg  650 mg Oral Q6H PRN    bumetanide (BUMEX) 0.25 mg/mL infusion  2 mg/hr IntraVENous CONTINUOUS    heparin (porcine) injection 5,000 Units  5,000 Units SubCUTAneous Q8H    insulin lispro (HUMALOG) injection   SubCUTAneous AC&HS    LORazepam (ATIVAN) tablet 0.5 mg  0.5 mg Oral BID PRN       Review of Systems    Review of Systems   Respiratory: Positive for shortness of breath. Cardiovascular: Negative for leg swelling. Gastrointestinal:        + abdominal swelling   Psychiatric/Behavioral: Positive for depression. All other systems reviewed and are negative.          Subjective:     Visit Vitals  /59   Pulse 88   Temp 98.6 °F (37 °C)   Resp 30   Ht 5' 6\" (1.676 m)   Wt 158.8 kg (350 lb)   SpO2 99%   BMI 56.49 kg/m²     Physical Exam   Constitutional: He is oriented to person, place, and time and well-developed, well-nourished, and in no distress. Cardiovascular: Normal rate and regular rhythm. Pulmonary/Chest: Effort normal. He has rales. Abdominal: Soft. + swelling   Musculoskeletal: Normal range of motion. He exhibits no edema. Neurological: He is alert and oriented to person, place, and time. Skin: Skin is warm and dry. Psychiatric:   tearful       Cardiographics  Telemetry: normal sinus rhythm  ECG: normal EKG, normal sinus rhythm  Echocardiogram: from 7/15/18  -  Left ventricle: The ventricle was severely dilated. Systolic function was markedly reduced. Ejection fraction was estimated in the range of 15 % to 20 %. This study was inadequate for the evaluation of regional wall motion. Wall  thickness was mildly increased. -  Left atrium: The atrium was mildly dilated.     Labs:   Recent Results (from the past 24 hour(s))   EKG, 12 LEAD, INITIAL    Collection Time: 03/07/19  7:24 PM   Result Value Ref Range    Ventricular Rate 89 BPM    Atrial Rate 89 BPM    P-R Interval 232 ms    QRS Duration 98 ms    Q-T Interval 414 ms    QTC Calculation (Bezet) 503 ms    Calculated P Axis 28 degrees    Calculated R Axis 154 degrees    Calculated T Axis 34 degrees    Diagnosis       Sinus rhythm with 1st degree A-V block with Fusion complexes and Premature   atrial complexes  Incomplete right bundle branch block  Possible Right ventricular hypertrophy  Cannot rule out Anterior infarct (cited on or before 22-JAN-2017)  Prolonged QT  Abnormal ECG  When compared with ECG of 27-OCT-2018 06:10,  Fusion complexes are now Present  Questionable change in initial forces of Anterior leads  QT has shortened     CBC WITH AUTOMATED DIFF    Collection Time: 03/07/19 10:23 PM   Result Value Ref Range    WBC 7.5 4.3 - 11.1 K/uL    RBC 4.40 4.23 - 5.6 M/uL    HGB 11.5 (L) 13.6 - 17.2 g/dL    HCT 36.4 (L) 41.1 - 50.3 %    MCV 82.7 79.6 - 97.8 FL    MCH 26.1 26.1 - 32.9 PG    MCHC 31.6 31.4 - 35.0 g/dL    RDW 19.7 (H) 11.9 - 14.6 %    PLATELET 538 224 - 025 K/uL    MPV 11.0 9.4 - 12.3 FL    ABSOLUTE NRBC 0.02 0.0 - 0.2 K/uL    DF AUTOMATED      NEUTROPHILS 58 43 - 78 %    LYMPHOCYTES 30 13 - 44 %    MONOCYTES 10 4.0 - 12.0 %    EOSINOPHILS 2 0.5 - 7.8 %    BASOPHILS 1 0.0 - 2.0 %    IMMATURE GRANULOCYTES 0 0.0 - 5.0 %    ABS. NEUTROPHILS 4.3 1.7 - 8.2 K/UL    ABS. LYMPHOCYTES 2.2 0.5 - 4.6 K/UL    ABS. MONOCYTES 0.8 0.1 - 1.3 K/UL    ABS. EOSINOPHILS 0.1 0.0 - 0.8 K/UL    ABS. BASOPHILS 0.0 0.0 - 0.2 K/UL    ABS. IMM. GRANS. 0.0 0.0 - 0.5 K/UL   METABOLIC PANEL, COMPREHENSIVE    Collection Time: 03/07/19 10:23 PM   Result Value Ref Range    Sodium 136 136 - 145 mmol/L    Potassium 3.9 3.5 - 5.1 mmol/L    Chloride 97 (L) 98 - 107 mmol/L    CO2 28 21 - 32 mmol/L    Anion gap 11 7 - 16 mmol/L    Glucose 237 (H) 65 - 100 mg/dL    BUN 71 (H) 6 - 23 MG/DL    Creatinine 2.90 (H) 0.8 - 1.5 MG/DL    GFR est AA 29 (L) >60 ml/min/1.73m2    GFR est non-AA 24 (L) >60 ml/min/1.73m2    Calcium 8.9 8.3 - 10.4 MG/DL    Bilirubin, total 1.6 (H) 0.2 - 1.1 MG/DL    ALT (SGPT) 21 12 - 65 U/L    AST (SGOT) 25 15 - 37 U/L    Alk. phosphatase 234 (H) 50 - 136 U/L    Protein, total 7.8 6.3 - 8.2 g/dL    Albumin 3.2 (L) 3.5 - 5.0 g/dL    Globulin 4.6 (H) 2.3 - 3.5 g/dL    A-G Ratio 0.7 (L) 1.2 - 3.5     TROPONIN I    Collection Time: 03/07/19 10:23 PM   Result Value Ref Range    Troponin-I, Qt. 0.02 0.02 - 0.05 NG/ML   BNP    Collection Time: 03/07/19 10:23 PM   Result Value Ref Range    BNP 1,002 (H) 0 pg/mL       Patient has been seen and examined by Dr. Shannan العراقي and he agrees with the following assessment and plan:     Assessment/Plan:        Acute on chronic systolic (congestive) heart failure -- admit to telemetry for observation. Given IV Bumex in the ER, will start IV Bumex drip.  Continue Coreg, hydralazine, isordil and zaroxolyn MWF. Monitor daily weights and strict I/Os. EF 15-20% by last echo in 7/15/2018      Obstructive sleep apnea -- wears BiPAP at home. Dyslipidemia -- continue statin therapy. Diabetes mellitus type II, uncontrolled -- continue home Lantus dose at half dose. Diabetic diet. [facility] ACHS for glucose monitoring and control. CKD (chronic kidney disease) stage 3, GFR 30-59 ml/min -- stable. Monitor renal function closely with daily BMPs while on IV Bumex drip. Hypertension -- continue home medications. Monitor BP closely. Titrate medications as needed.          Cherie Fonseca NP  3/8/2019 1:29 AM

## 2019-03-08 NOTE — PROGRESS NOTES
3/8/2019 7:00 AM    Admit Date: 3/7/2019    Admit Diagnosis: Acute on chronic systolic heart failure (HCC) [I50.23]      Subjective:    Patient admitted for A/C systolic heart failure. Diuresing well.      Objective:      Visit Vitals  /73 (BP 1 Location: Left arm, BP Patient Position: At rest)   Pulse 80   Temp 98 °F (36.7 °C)   Resp 18   Ht 5' 6\" (1.676 m)   Wt 154 kg (339 lb 8.1 oz)   SpO2 98%   BMI 54.80 kg/m²       ROS:  General ROS: negative for - chills  Hematological and Lymphatic ROS: negative for - blood clots or jaundice  Respiratory ROS: positive for - shortness of breath  Cardiovascular ROS: positive for - dyspnea on exertion  Gastrointestinal ROS: no abdominal pain, change in bowel habits, or black or bloody stools  Neurological ROS: no TIA or stroke symptoms    Physical Exam:    Physical Examination: General appearance - alert, well appearing, and in no distress  Mental status - alert, oriented to person, place, and time  Eyes - pupils equal and reactive, extraocular eye movements intact  Neck/lymph - supple, no significant adenopathy  Chest/CV - clear to auscultation, no wheezes, rales or rhonchi, symmetric air entry  Heart - normal rate, regular rhythm, normal S1, S2, no murmurs, rubs, clicks or gallops  Abdomen/GI - soft, nontender, nondistended, no masses or organomegaly  Musculoskeletal - no joint tenderness, deformity or swelling  Extremities - peripheral pulses normal, no pedal edema, no clubbing or cyanosis  Skin - normal coloration and turgor, no rashes, no suspicious skin lesions noted    Current Facility-Administered Medications   Medication Dose Route Frequency    carvedilol (COREG) tablet 6.25 mg  6.25 mg Oral BID WITH MEALS    colchicine tablet 0.6 mg  0.6 mg Oral DAILY    docusate sodium (COLACE) capsule 100 mg  100 mg Oral BID    hydrALAZINE (APRESOLINE) tablet 25 mg  25 mg Oral TID    insulin glargine (LANTUS) injection 25 Units  25 Units SubCUTAneous QHS    isosorbide dinitrate (ISORDIL) tablet 20 mg  20 mg Oral TID    magnesium oxide (MAG-OX) tablet 400 mg  400 mg Oral DAILY    metOLazone (ZAROXOLYN) tablet 2.5 mg  2.5 mg Oral Q MON, WED & FRI    polyethylene glycol (MIRALAX) packet 17 g  17 g Oral DAILY PRN    potassium chloride (KLOR-CON) tablet 10 mEq  10 mEq Oral DAILY    pravastatin (PRAVACHOL) tablet 80 mg  80 mg Oral QHS    famotidine (PEPCID) tablet 20 mg  20 mg Oral BID    senna (SENOKOT) tablet 17.2 mg  2 Tab Oral QHS    ondansetron (ZOFRAN) injection 4 mg  4 mg IntraVENous Q6H PRN    acetaminophen (TYLENOL) tablet 650 mg  650 mg Oral Q6H PRN    bumetanide (BUMEX) 0.25 mg/mL infusion  2 mg/hr IntraVENous CONTINUOUS    heparin (porcine) injection 5,000 Units  5,000 Units SubCUTAneous Q8H    LORazepam (ATIVAN) tablet 0.5 mg  0.5 mg Oral BID PRN    gabapentin (NEURONTIN) capsule 600 mg  600 mg Oral QID    insulin lispro (HUMALOG) injection   SubCUTAneous AC&HS       Data Review:   @LABRCNT(Na,K,BUN,CREA,WBC,HGB,HCT,PLT,INR,TRP,TCHOL*,Triglyceride*,LDL*,LDLCPOC HDL*,HDL])@    TELEMETRY: nsr    Assessment/Plan:     Principal Problem:    Acute on chronic systolic (congestive) heart failure (HCC) (10/18/2016)bumex gtt till dry weight    Active Problems:    Obstructive sleep apnea (3/18/2017)      Dyslipidemia (3/18/2017)      Diabetes mellitus type II, uncontrolled ([facility] [address] [address] (3/18/2017)The current medical regimen is effective;  continue present plan and medications. CKD (chronic kidney disease) stage 3, GFR 30-59 ml/min ([facility]) (8/13/2014)monitor      Abdominal fluid collection (8/18/2014)      Overview: Perisplenic and splenic, likely related to pancreatitis      Hypertension (3/18/2017)The current medical regimen is effective;  continue present plan and medications.             Ronny [place] MD

## 2019-03-08 NOTE — PROGRESS NOTES
Care Management Interventions  PCP Verified by CM: Yes  Palliative Care Criteria Met (RRAT>21 & CHF Dx)?: Yes(RRAT 31 CHf )  Transition of Care Consult (CM Consult): Discharge Planning  Discharge Durable Medical Equipment: No(RW, CPAP an O2 with [employer])  Physical Therapy Consult: No  Occupational Therapy Consult: No  Speech Therapy Consult: No  Current Support Network: Other(lives with son and daughter)  Confirm Follow Up Transport: Family  Plan discussed with Pt/Family/Caregiver: Yes  Freedom of Choice Offered: Yes  Discharge Location  Discharge Placement: Home  Met with patient for d/c planning. Patient alert and oriented x 3, independent of ADL's and lives with his son and daughter. DME includes Rolling walker, CPAP and O2 with [employer]. His family provides transportation for him. He has [employer] and Medicaid and is able to obtain his medications at [facility] without difficulty. Current d/c plan is home with family. He will think about home health pending his progress.

## 2019-03-08 NOTE — PROGRESS NOTES
Problem: Falls - Risk of  Goal: *Absence of Falls  Document Kadie Fall Risk and appropriate interventions in the flowsheet.   Outcome: Progressing Towards Goal  Fall Risk Interventions:  Mobility Interventions: Patient to call before getting OOB    Medication Interventions: Patient to call before getting OOB, Teach patient to arise slowly

## 2019-03-09 NOTE — ROUTINE PROCESS
Patient continues on obs. Patient with bumex gtt continuing. ~3L negative as of I/O record notes. Discussed importance of s/s recognition and reporting symptoms as soon as he notices. Patient had noticed symptoms since January and much more progressive in the past few days. Stressed importance of medication compliance as he missed 2 weeks worth of metolazone doses and changed bumex doses on his own. CHF teaching continues to pt/family. Emphasis on taking prescription meds as ordered, to keep F/U appts and to call MD STAT if any of the following occur:   If you gain 2 lbs in one day or 5 lbs in a week, and short of breath.  If you can not lay flat without developing short of breath or rapid breathing at night; or if it wakes you up. Develop a cough or wheezing.  If you notice swollen hands/feet/ankles or stomach with a bloated/ full feeling.  If you become confused or mentally fuzzy or dizzy.  If you notice a rapid or change in your heart rate.  If you become more exhausted all the time and unable to do the same level of activity without stopping to catch your breath. Drink no more than 8 cups a day in 8 oz. cups. Your Heart can not handle any more. Stay away from salt (limit anything with salt or sodium in it). Limit to 250mg per serving.   Pt/family verbalizes understanding, will follow to reinforce teaching skills: 20 mins

## 2019-03-09 NOTE — PROGRESS NOTES
Problem: Falls - Risk of  Goal: *Absence of Falls  Document Kadie Fall Risk and appropriate interventions in the flowsheet.   Outcome: Progressing Towards Goal  Fall Risk Interventions:  Mobility Interventions: Communicate number of staff needed for ambulation/transfer, Patient to call before getting OOB         Medication Interventions: Assess postural VS orthostatic hypotension, Patient to call before getting OOB, Teach patient to arise slowly

## 2019-03-09 NOTE — PROGRESS NOTES
[facility] CARDIOLOGY PROGRESS NOTE           3/9/2019 2:11 PM    Admit Date: 3/7/2019      Subjective:   C/o bitterly of muscle soreness and spasms    ROS:  Cardiovascular:  As noted above    Objective:      Vitals:    03/09/19 0059 03/09/19 0547 03/09/19 0914 03/09/19 1349   BP: 127/60 111/83 119/75 132/70   Pulse: 70 80 80 75   Resp: 18 18 17 16   Temp: 97.1 °F (36.2 °C) 97.8 °F (36.6 °C) 97.4 °F (36.3 °C) 97.4 °F (36.3 °C)   SpO2: 99% 99% 94% 94%   Weight:  155.5 kg (342 lb 12.8 oz)     Height:           Physical Exam:  General-No Acute Distress  Neck- supple, no JVD  CV- regular rate and rhythm no MRG  Lung- clear bilaterally  Abd- soft, nontender, nondistended  Ext- no edema bilaterally. Skin- warm and dry    Data Review:   Recent Labs     03/09/19  0303 03/07/19  2223    136   K 3.2* 3.9   MG 1.7*  --    BUN 73* 71*   CREA 2.52* 2.90*   * 237*   WBC 5.7 7.5   HGB 11.4* 11.5*   HCT 36.4* 36.4*    180   TROIQ  --  0.02       Assessment/Plan:     Principal Problem:    Acute on chronic systolic (congestive) heart failure (HCC) (10/18/2016)        Active Problems:    Obstructive sleep apnea (3/18/2017)          Dyslipidemia (3/18/2017)          Diabetes mellitus type II, uncontrolled ([facility] [address] (3/18/2017)          CKD (chronic kidney disease) stage 3, GFR 30-59 ml/min ([facility]) (8/13/2014)          Abdominal fluid collection (8/18/2014)          Hypertension (3/18/2017)      ///    I suspect over diuresis. Will stop diuretic rx and give add Mag and observe.           Raimundo Cárdenas MD  3/9/2019 2:11 PM

## 2019-03-09 NOTE — PROGRESS NOTES
Bedside and Verbal shift change report given to self (oncoming nurse) by Eric Wood RN (offgoing nurse). Report included the following information SBAR, Kardex and MAR.

## 2019-03-09 NOTE — PROGRESS NOTES
Bedside and Verbal shift change report given to self (oncoming nurse) by Darell Cruz (offgoing nurse). Report included the following information SBAR, Kardex, Intake/Output and MAR.

## 2019-03-09 NOTE — PROGRESS NOTES
Bedside and verbal report given to ITZEL Godinez by Evita Ramos. Report included the following information SBAR, Kardex, Intake/Output and MAR. Oncoming RN assumed care of the patient.

## 2019-03-09 NOTE — PROGRESS NOTES
Bedside and Verbal shift change report given to Jasper Pedersen RN (oncoming nurse) by self Roc Bailon nurse). Report included the following information SBAR, Kardex, MAR and Recent Results.

## 2019-03-09 NOTE — PROGRESS NOTES
Pt states that he feels weak like his potassium is low. RN called lab to go ahead and do AM labs. Labs resulted and NP made aware. Orders received. Will continue to monitor.

## 2019-03-10 NOTE — ROUTINE PROCESS
Had l;engthy discussion with patient and family in regards to HF and POC. We discussed importance of medication compliance and notifying cardiology in response to missing doses or weight gain. Patient had symptoms for > 1 months time and had had numerous missed doses of metolazone over that time. Patient and family were very appreciative of information and POC moving forward. I left contact information and encouraged to call with questions/concerns. Patient agreed with plan. Patient continues on OBS status-       CHF teaching completed, verbalize emphasis on monitoring self and report to MD:   If you gain 2 lbs in one day or 5 lbs in a week, and short of breath.  If you can not lay flat without developing short of breath or rapid breathing at night; or if it wakes you up. Develop a cough or wheezing.  If you notice swollen hands/feet/ankles or stomach with a bloated/ full feeling.  If you are  more confused or mentally fuzzy or dizzy.  If you notice a rapid or change in your heart rate.  If you become more exhausted all the time and unable to do the same level of activity without stopping to catch your breath. Drink no more than 8 cups a day in 8 oz. cups. Limit Cola Drinks. Your Heart can not handle any more. Stay away from salt (limit anything with salt or sodium in it). Limit to 250mg per serving. Exercise needs to be started with your Doctors approval.  Reduce stress; Call myself or Provider if assistance is needed. Pass post test via teach back, will make self available post DC ,if an questions arise. Diabetic teaching completed.  Planner/scale @ BS:  60 mins total

## 2019-03-10 NOTE — PROGRESS NOTES
Bedside and verbal report given to [address] [place] [address] by Cleo Rodrigez. Report included the following information SBAR, Kardex, Intake/Output and MAR. Oncoming RN assumed care of the patient.

## 2019-03-10 NOTE — PROGRESS NOTES
Bedside and Verbal shift change report given to Siva Blair RN (oncoming nurse) by self Elizabeth valencia). Report included the following information SBAR, Kardex, MAR and Recent Results.

## 2019-03-10 NOTE — PROGRESS NOTES
[facility] CARDIOLOGY PROGRESS NOTE           3/10/2019 1:24 PM    Admit Date: 3/7/2019      Subjective:   He remains concerned\" tire around my waist\". No further muscular cramps      Objective:      Vitals:    03/10/19 0440 03/10/19 0442 03/10/19 0834 03/10/19 1200   BP: 120/70  125/79 134/88   Pulse: 82  82 80   Resp: 18  18 16   Temp: 98.4 °F (36.9 °C)  98.5 °F (36.9 °C) 98.3 °F (36.8 °C)   SpO2: 97%  98% 98%   Weight:  155.6 kg (343 lb)     Height:           Physical Exam:  General-No Acute Distress, anxious  Neck- supple, no JVD  CV- irregular rate and rhythm no MRG  Lung- clear bilaterally  Abd- soft, nontender, non distended. + abdominal wall induration  Ext- no edema bilaterally. Skin- warm and dry    Data Review:   Recent Labs     03/10/19  0434 03/09/19  2047  03/09/19  0303  03/07/19  2223    136  --  138  --  136   K 3.8 4.0   < > 3.2*  --  3.9   MG 2.4 2.3  --  1.7*   < >  --    BUN 73* 73*  --  73*  --  71*   CREA 2.64* 2.56*  --  2.52*  --  2.90*   * 207*  --  174*  --  237*   WBC  --   --   --  5.7  --  7.5   HGB  --   --   --  11.4*  --  11.5*   HCT  --   --   --  36.4*  --  36.4*   PLT  --   --   --  174  --  180   TROIQ  --   --   --   --   --  0.02    < > = values in this interval not displayed.        Assessment/Plan:     Abdominal discomfort     Acute on chronic systolic (congestive) heart failure ([facility] (10/18/2016)        Active Problems:    Obstructive sleep apnea (3/18/2017)          Dyslipidemia (3/18/2017)          Diabetes mellitus type II, uncontrolled ([facility] (3/18/2017)          CKD (chronic kidney disease) stage 3, GFR 30-59 ml/min ([facility] (8/13/2014)          Abdominal fluid collection (8/18/2014)          Hypertension (3/18/2017)      ///    Check abdominal us  Resume home tin Mott MD  3/10/2019 1:24 PM

## 2019-03-10 NOTE — PROGRESS NOTES
Bedside and Verbal shift change report given to self (oncoming nurse) by Phyllis Paul (offgoing nurse). Report included the following information SBAR, Kardex, Intake/Output and MAR.

## 2019-03-10 NOTE — PROGRESS NOTES
PT states that he still feels weak and \"weird\" today after electrolyte replacement. NP notified and orders received. Will continue to monitor.

## 2019-03-11 NOTE — PROGRESS NOTES
Problem: Falls - Risk of  Goal: *Absence of Falls  Document Kadie Fall Risk and appropriate interventions in the flowsheet.   Outcome: Progressing Towards Goal  Fall Risk Interventions:  Mobility Interventions: Communicate number of staff needed for ambulation/transfer, PT Consult for mobility concerns, Patient to call before getting OOB, OT consult for ADLs         Medication Interventions: Patient to call before getting OOB, Teach patient to arise slowly

## 2019-03-11 NOTE — PROGRESS NOTES
Bedside and verbal report given to ITZEL Gardiner by Rhina Conde. Report included the following information SBAR, Kardex, Intake/Output and MAR. Oncoming RN assumed care of the patient.

## 2019-03-11 NOTE — PROGRESS NOTES
Care Management Interventions  PCP Verified by CM: Yes  Palliative Care Criteria Met (RRAT>21 & CHF Dx)?: Yes(RRAT 31 CHf )  Transition of Care Consult (CM Consult): Discharge Planning  Discharge Durable Medical Equipment: No(RW, CPAP an O2 with [employer])  Physical Therapy Consult: No  Occupational Therapy Consult: No  Speech Therapy Consult: No  Current Support Network: Other(lives with son and daughter)  Confirm Follow Up Transport: Family  Plan discussed with Pt/Family/Caregiver: Yes  Freedom of Choice Offered: Yes  Discharge Location  Discharge Placement: Home  Followed up with patient for d/c planning. Patient states he is not feeling much better c/o's constipation and not voiding as well. Concerned he gained weight today and about his kidney function. Patient lives with son and daughter who provide his care and transportation. He plans to return home when medically stable with home health if needed. He is interested in hospital bed if he could obtain one at d/c. CM following.

## 2019-03-11 NOTE — PROGRESS NOTES
Bedside and Verbal shift change report given to nadia basurto (oncoming nurse) by self (offgoing nurse). Report included the following information SBAR, Kardex and Recent Results.

## 2019-03-11 NOTE — PROGRESS NOTES
[facility] CARDIOLOGY PROGRESS NOTE      3/11/2019 9:03 AM    Admit Date: 3/7/2019    Admit Diagnosis: Acute on chronic systolic heart failure (HCC) [I50.23]      Subjective:   No chest pain or dyspnea. He and family feel he is improved despite weight going back up . Objective:      Vitals:    03/10/19 2159 03/11/19 0106 03/11/19 0523 03/11/19 0851   BP: 125/71 109/62 121/68 113/84   Pulse: 80 81 81 81   Resp:  20 18 20   Temp:  97.5 °F (36.4 °C) 97.9 °F (36.6 °C) 97.7 °F (36.5 °C)   SpO2:  98% 100% 98%   Weight:   157.6 kg (347 lb 8 oz)    Height:           Physical Exam:  Neck-   CV- rr+r  Lungs- rales  Ext-  Skin- warm and dry        Data Review:   Recent Labs     03/11/19  0354 03/10/19  0434  03/09/19  0303   * 136   < > 138   K 3.7 3.8   < > 3.2*   MG 2.4 2.4   < > 1.7*   BUN 80* 73*   < > 73*   CREA 3.01* 2.64*   < > 2.52*   * 148*   < > 174*   WBC  --   --   --  5.7   HGB  --   --   --  11.4*   HCT  --   --   --  36.4*   PLT  --   --   --  174    < > = values in this interval not displayed.        Assessment and Plan:     Principal Problem:    Acute on chronic systolic (congestive) heart failure ([facility] (10/18/2016)                 resume bumex 2gm bid will give after the metolazone in the am.        Active Problems:    Obstructive sleep apnea (3/18/2017)      Dyslipidemia (3/18/2017)      Diabetes mellitus type II, uncontrolled ([facility] (3/18/2017)      CKD (chronic kidney disease) stage 3, GFR 30-59 ml/min ([facility]) (8/13/2014)   worsening numbers   will repeat with bnp in am       Abdominal fluid collection (8/18/2014)      Overview: Perisplenic and splenic, likely related to pancreatitis      Hypertension (3/18/2017)        Adrienne Schneider MD

## 2019-03-11 NOTE — PROGRESS NOTES
Bedside and Verbal shift change report given to self (oncoming nurse) by Bela pineda (offgoing nurse).  Report included the following information SBAR and Kardex.

## 2019-03-12 PROBLEM — I50.23 ACUTE ON CHRONIC SYSTOLIC HEART FAILURE (HCC): Status: ACTIVE | Noted: 2019-01-01

## 2019-03-12 NOTE — PHYSICIAN ADVISORY
Letter of Determination: Upgrade from Observation to Inpatient Status    This patient was originally hospitalized as Outpatient Status with Observation Services on 3/7/2019 for acute on chronic congestive heart failure. This patient now meets for Inpatient Admission based on medical necessity. The patient's stay was medically necessary based on increase in creatinine from 2.52 mg/dl to 3.43 due to necessary diuresis during the hospitalization. It is our recommendation that this patient's hospitalization status should be upgraded from OBSERVATION to INPATIENT status.      The final decision regarding the patient's hospitalization status depends on the attending physician's judgement.     Kellie Navarro MD, KIM,   Physician Chas Soriano.

## 2019-03-12 NOTE — PROGRESS NOTES
Bedside and Verbal shift change report received from David Higgins, [facility]. Report included the following information SBAR, Kardex, ED Summary, Procedure Summary, Intake/Output, MAR, Recent Results and Cardiac Rhythm NSR with 1AVB.

## 2019-03-12 NOTE — PROGRESS NOTES
Bedside and Verbal shift change report given to self (oncoming nurse) by Ashley Sprague (offgoing nurse). Report included the following information SBAR, Kardex and Med Rec Status.

## 2019-03-12 NOTE — PROGRESS NOTES
[facility] CARDIOLOGY PROGRESS NOTE           3/12/2019 10:42 AM    Admit Date: 3/7/2019      Subjective:     No o/e; improved dyspnea. Family members on the phone with several questions and concerns about his care. They feel he needs to be diuresed more based on weight and concerns about his BMs. ~6.5L net neg    ROS:  Cardiovascular:  As noted above    Objective:      Vitals:    03/12/19 0534 03/12/19 0844 03/12/19 0900 03/12/19 0924   BP: 123/78 119/77 119/77    Pulse: 70 74 74    Resp: 17 18     Temp: 97.4 °F (36.3 °C) 97.7 °F (36.5 °C)     SpO2: 95% 92%  99%   Weight: 158.6 kg (349 lb 11.2 oz)      Height:           Physical Exam:  General-No Acute Distress  Neck- supple, no JVD  CV- regular rate and rhythm no MRG  Lung- clear bilaterally  Abd- soft, nontender, nondistended  Ext- tr edema bilaterally. Skin- warm and dry    Data Review:   Recent Labs     03/12/19  0437 03/11/19  0354   * 134*   K 3.8 3.7   MG 2.5* 2.4   BUN 83* 80*   CREA 3.43* 3.01*   * 196*       Assessment/Plan:     Principal Problem:    Acute on chronic systolic (congestive) heart failure (HCC) (10/18/2016)    Active Problems:    Obstructive sleep apnea (3/18/2017)      Dyslipidemia (3/18/2017)      Diabetes mellitus type II, uncontrolled ([facility] (3/18/2017)      CKD (chronic kidney disease) stage 3, GFR 30-59 ml/min ([facility]) (8/13/2014)      Abdominal fluid collection (8/18/2014)      Hypertension (3/18/2017)    Last noted EF 15-20% (7/18); NICM  Worsening renal function; not much output and exam with no significant vol o/l. Significant obesity with likely OHS/KELSIE contributing. Hold zaroxyln; hold am bumex dose. Reassess dosing  Extensive discussion with family about need to hold diuresis with no sig vol issues/worsening renal function. Prior appears has seen palliative care. Long term prognosis poor.        Carol Tucker MD  3/12/2019 10:42 AM

## 2019-03-12 NOTE — PROGRESS NOTES
Bedside and Verbal shift change report given to hemalatha iglesias (oncoming nurse) by self (offgoing nurse). Report included the following information SBAR, Kardex and Recent Results.

## 2019-03-13 NOTE — PROGRESS NOTES
Verbal bedside report given to Dorita Sevilla, oncoming RN. Patient's situation, background, assessment and recommendations provided. Opportunity for questions provided. Oncoming RN assumed care of patient.

## 2019-03-13 NOTE — PROGRESS NOTES
Problem: Falls - Risk of  Goal: *Absence of Falls  Document Kadie Fall Risk and appropriate interventions in the flowsheet.   Outcome: Progressing Towards Goal  Fall Risk Interventions:  Mobility Interventions: Patient to call before getting OOB         Medication Interventions: Patient to call before getting OOB

## 2019-03-13 NOTE — PROGRESS NOTES
[facility] CARDIOLOGY PROGRESS NOTE      3/13/2019 8:08 AM    Admit Date: 3/7/2019    Admit Diagnosis: Acute on chronic systolic [facility] [I50.23]; Acute on chronic systolic heart failure (HCC) [I50.23]      Subjective:   stable night breathing but concerned with rising BUN CR . He did diurese 6.5 liter but weight up today . Objective:      Vitals:    03/12/19 2200 03/13/19 0102 03/13/19 0546 03/13/19 0745   BP:  110/70 124/69 123/83   Pulse:  81 73 73   Resp:  18 17    Temp:  98.1 °F (36.7 °C) 97.1 °F (36.2 °C)    SpO2: 96% 92% 98%    Weight:   (!) 160 kg (352 lb 11.2 oz)    Height:           Physical Exam:  Neck-   CV- rr+r  Lungs- basilar rales   Ext-  Skin- warm and dry        Data Review:   Recent Labs     03/13/19  0515 03/12/19  0437   * 134*   K 3.7 3.8   MG 2.6* 2.5*   BUN 92* 83*   CREA 3.37* 3.43*   * 151*       Assessment and Plan:     Principal Problem:    Acute on chronic systolic (congestive) heart failure ([facility] (10/18/2016)    Active Problems:    Obstructive sleep apnea (3/18/2017)      Dyslipidemia (3/18/2017)      Diabetes mellitus type II, uncontrolled ([facility] (3/18/2017)      CKD (chronic kidney disease) stage 3, GFR 30-59 ml/min ([facility]) (8/13/2014)   continues to worsen   renal consult recommended   also has post void incontinence   will  hold diuretic today and check post void bladder ultrasound,.        Abdominal fluid collection (8/18/2014)      Overview: Perisplenic and splenic, likely related to pancreatitis      Hypertension (3/18/2017)      Acute on chronic systolic heart failure ([facility] (3/12/2019)   status quo with breathing         Rodney Griffin MD

## 2019-03-13 NOTE — PROGRESS NOTES
Noted consult for nephrology for continue increasing creatinine. Patient still plans to d/c home when medically stable with his family. CM following.

## 2019-03-13 NOTE — CONSULTS
[address] Nephrology        47 yo M admitted 2/7/19 with increased SOB and wt gain. He has hx of severe cardiomyopathy (-20%). He has been treated with IV Bumex and has diuresed 6.5 lites ( although wt is up). CXR, and abd US showed no fluid overload. He has had CKD for over 4 years. His creatinine has been hovering in the 2.4 - 3.4 range recently. He has been seen multiple times. By our service on previous admission. PMHx: CKD3-4, Severe CHF, DM2, HTN, HLD    Review of Systems -   General ROS: negative for - fever, chills  Respiratory ROS: no SOB, cough, SORENSON  Cardiovascular ROS: no CP, palpitations  Gastrointestinal ROS: no N&V, abdominal pain, diarrhea  Genito-Urinary ROS: no difficulty voiding, dysuria  Neurological ROS: no seizures, focal weekness        Objective:    Vitals:    03/13/19 0745 03/13/19 0923 03/13/19 0949 03/13/19 0950   BP: 123/83 105/84 117/82 117/82   Pulse: 73 76  77   Resp:  18     Temp:  97.6 °F (36.4 °C)     SpO2:  96%     Weight:       Height:           PE  Gen: in no acute distress  CV:reg rate  Chest:clear  Abd: soft  Ext/Access: 1+ edema       . LAB  No results for input(s): WBC, HGB, HCT, PLT, INR, HGBEXT, HCTEXT, PLTEXT in the last 72 hours.     No lab exists for component: INREXT  Recent Labs     03/13/19  0515 03/12/19  0437 03/11/19  0354   * 134* 134*   K 3.7 3.8 3.7   CL 93* 93* 92*   CO2 33* 32 33*   * 151* 196*   BUN 92* 83* 80*   CREA 3.37* 3.43* 3.01*   MG 2.6* 2.5* 2.4   CA 9.0 8.9 9.0           Radiology    A/P:   Patient Active Problem List   Diagnosis Code    Obstructive sleep apnea G47.33    Chronic back pain M54.9, G89.29    Nonischemic dilated cardiomyopathy (HCC) I42.0    Dyslipidemia E78.5    Diabetes mellitus type II, uncontrolled (HCC) E11.65    Noncompliance with medication regimen Z91.14    CKD (chronic kidney disease) stage 3, GFR 30-59 ml/min (HCC) N18.3    Chronic pancreatitis (HCC) K86.1    Abdominal fluid collection R18.8  AICD (automatic cardioverter/defibrillator) present Z95.810    Hypertension I10    Acute on chronic systolic (congestive) heart failure (HCC) I50.23    Atrial flutter (HCC) I48.92    Obesity hypoventilation syndrome (HCC) E66.2    Morbid obesity with BMI of 60.0-69.9, adult (HCC) E66.01, Z68.44    Hypersomnia G47.10    Constipation K59.00    Nonsustained ventricular tachycardia (HCC) I47.2    NSVT (nonsustained ventricular tachycardia) ([facility]) I47.2    Depressive disorder F32.9    Asthma J45.909    Pulmonary HTN ([facility]) I27.20    Type 2 diabetes with nephropathy ([facility]) E11.21    Acute on chronic systolic heart failure (HCC) I50.23       CKD3-4 - Cardiorenal syndrome. Would adjust his Bumex based on his Resp status to avoid pulm edema. Will get repeat CXR. Long term prognosis is poor. He would not be a good dialysis candidate. Agree with restarting his PO bumex. CHF - per cardiology.     DM2      Ralf العراقي MD

## 2019-03-13 NOTE — PROGRESS NOTES
Bedside and Verbal shift change report given to self (oncoming nurse) by Raymond Zavala (offgoing nurse). Report included the following information SBAR, Kardex, Intake/Output and MAR.

## 2019-03-14 NOTE — PROGRESS NOTES
Bedside and verbal report given to [facility] by Cleo Rodrigez. Report included the following information SBAR, Kardex, Intake/Output and MAR. Oncoming RN assumed care of the patient.

## 2019-03-14 NOTE — PROGRESS NOTES
Bedside and Verbal shift change report given to self (oncoming nurse) by Puja Staley RN (offgoing nurse). Report included the following information SBAR, Kardex, Intake/Output and MAR.

## 2019-03-14 NOTE — PROGRESS NOTES
[facility] Nephrology    Follow-Up on:Cardio RenaL Syndrome    HPI: Pt breathing has improved    ROS:  Denies CP, SOB.     Current Facility-Administered Medications   Medication Dose Route Frequency    carvedilol (COREG) tablet 12.5 mg  12.5 mg Oral BID WITH MEALS    bumetanide (BUMEX) tablet 2 mg  2 mg Oral BID    bisacodyl (DULCOLAX) suppository 10 mg  10 mg Rectal DAILY PRN    colchicine tablet 0.6 mg  0.6 mg Oral DAILY    docusate sodium (COLACE) capsule 100 mg  100 mg Oral BID    hydrALAZINE (APRESOLINE) tablet 25 mg  25 mg Oral TID    insulin glargine (LANTUS) injection 25 Units  25 Units SubCUTAneous QHS    isosorbide dinitrate (ISORDIL) tablet 20 mg  20 mg Oral TID    magnesium oxide (MAG-OX) tablet 400 mg  400 mg Oral DAILY    polyethylene glycol (MIRALAX) packet 17 g  17 g Oral DAILY PRN    potassium chloride (KLOR-CON) tablet 10 mEq  10 mEq Oral DAILY    pravastatin (PRAVACHOL) tablet 80 mg  80 mg Oral QHS    senna (SENOKOT) tablet 17.2 mg  2 Tab Oral QHS    ondansetron (ZOFRAN) injection 4 mg  4 mg IntraVENous Q6H PRN    acetaminophen (TYLENOL) tablet 650 mg  650 mg Oral Q6H PRN    heparin (porcine) injection 5,000 Units  5,000 Units SubCUTAneous Q8H    LORazepam (ATIVAN) tablet 0.5 mg  0.5 mg Oral BID PRN    gabapentin (NEURONTIN) capsule 600 mg  600 mg Oral QID    insulin lispro (HUMALOG) injection   SubCUTAneous AC&HS    famotidine (PEPCID) tablet 20 mg  20 mg Oral DAILY    HYDROcodone-acetaminophen (NORCO) 5-325 mg per tablet 1 Tab  1 Tab Oral Q6H PRN       Exam:  Vitals:    03/13/19 2132 03/14/19 0155 03/14/19 0438 03/14/19 0841   BP: 124/68 121/70 118/82 98/65   Pulse: 75 79 (!) 109 77   Resp:  18 18 18   Temp:  97.7 °F (36.5 °C) 96.8 °F (36 °C) 97.5 °F (36.4 °C)   SpO2:  99% 93% 97%   Weight:   (!) 160.4 kg (353 lb 9.6 oz)    Height:             Intake/Output Summary (Last 24 hours) at 3/14/2019 0953  Last data filed at 3/14/2019 0400  Gross per 24 hour   Intake    Output 950 ml Net -950 ml     PE:  GEN - in no distress  CV - regular, no murmur, no rub  Lung - clear bilaterally  Abd - soft, nontender  Ext - no edema    Labs  Recent Labs     03/14/19  0354   WBC 5.8   HGB 10.9*   HCT 34.6*        Recent Labs     03/14/19  0354 03/13/19  0515 03/12/19  0437   * 134* 134*   K 3.7 3.7 3.8   CL 93* 93* 93*   CO2 34* 33* 32   BUN 91* 92* 83*   CREA 3.26* 3.37* 3.43*   * 204* 151*   CA 9.4 9.0 8.9   MG 2.7* 2.6* 2.5*     No results for input(s): PH, PCO2, PO2, PCO2 in the last 72 hours. Problem List:  Patient Active Problem List    Diagnosis Date Noted    Acute on chronic systolic heart failure ([facility] 03/12/2019    Type 2 diabetes with nephropathy ([facility] 10/31/2018    Pulmonary HTN ([facility] 07/15/2018    Depressive disorder     Asthma     Obstructive sleep apnea 03/18/2017    Chronic back pain 03/18/2017    Dyslipidemia 03/18/2017    Diabetes mellitus type II, uncontrolled ([facility] 03/18/2017    Noncompliance with medication regimen 03/18/2017    AICD (automatic cardioverter/defibrillator) present 03/18/2017    Hypertension 03/18/2017    Obesity hypoventilation syndrome ([facility] 03/18/2017    Morbid obesity with BMI of 60.0-69.9, adult ([facility] 03/18/2017    Nonsustained ventricular tachycardia ([facility] 01/19/2017    NSVT (nonsustained ventricular tachycardia) ([facility] 01/19/2017    Constipation 01/08/2017    Hypersomnia 11/28/2016    Atrial flutter ([facility] 10/20/2016    Acute on chronic systolic (congestive) heart failure ([facility]) 10/18/2016    Abdominal fluid collection 08/18/2014    CKD (chronic kidney disease) stage 3, GFR 30-59 ml/min ([facility]) 08/13/2014    Chronic pancreatitis ([facility] 08/13/2014    Nonischemic dilated cardiomyopathy ([facility] 07/02/2013       Issues Addressed By Nephrology:    Plan:  1. End stage Cardiomyopathy  2. Cardio Renal syndrome  3. RED on CKD  4. End of life  I spoke to pt, daughter at bedside, and mother and sister in [place](on Phone).  I have explained pt is end of life and needs comfort measures. He is not a dialysis candidate with end stage cardiomyopathy. He wants things adjusted so he can \"get out of here\". His family want him to come up [place] for a second opinion. We have nothing to offer I will sign off thanks.

## 2019-03-14 NOTE — PROGRESS NOTES
3/14/2019 6:29 AM    Admit Date: 3/7/2019    Admit Diagnosis: Acute on chronic systolic heart failure ([facility] [I50.23]; Acute on chronic systolic heart failure (HCC) [I50.23]      Subjective:    Patient with A/C systolic heart failure and cardiorenal syndrome. Creat 3.26. Pt family is worried that pt could have kidney stone or BPH contributing to his current status. Will defer to renal as to their thoughts if he is obstructed.      Objective:      Visit Vitals  /82 (BP 1 Location: Right arm)   Pulse (!) 109   Temp 96.8 °F (36 °C)   Resp 18   Ht 5' 6\" (1.676 m)   Wt (!) 160.4 kg (353 lb 9.6 oz)   SpO2 93%   BMI 57.07 kg/m²       ROS:  General ROS: negative for - chills  Hematological and Lymphatic ROS: negative for - blood clots or jaundice  Respiratory ROS: no cough, shortness of breath, or wheezing  Cardiovascular ROS: no chest pain or dyspnea on exertion  Gastrointestinal ROS: no abdominal pain, change in bowel habits, or black or bloody stools  Neurological ROS: no TIA or stroke symptoms    Physical Exam:    Physical Examination: General appearance - alert, well appearing, and in no distress  Mental status - alert, oriented to person, place, and time  Eyes - pupils equal and reactive, extraocular eye movements intact  Neck/lymph - supple, no significant adenopathy  Chest/CV - clear to auscultation, no wheezes, rales or rhonchi, symmetric air entry  Heart - normal rate, regular rhythm, normal S1, S2, no murmurs, rubs, clicks or gallops  Abdomen/GI - soft, nontender, nondistended, no masses or organomegaly  Musculoskeletal - no joint tenderness, deformity or swelling  Extremities - peripheral pulses normal, no pedal edema, no clubbing or cyanosis  Skin - normal coloration and turgor, no rashes, no suspicious skin lesions noted    Current Facility-Administered Medications   Medication Dose Route Frequency    bumetanide (BUMEX) tablet 2 mg  2 mg Oral BID    bisacodyl (DULCOLAX) suppository 10 mg  10 mg Rectal DAILY PRN    carvedilol (COREG) tablet 6.25 mg  6.25 mg Oral BID WITH MEALS    colchicine tablet 0.6 mg  0.6 mg Oral DAILY    docusate sodium (COLACE) capsule 100 mg  100 mg Oral BID    hydrALAZINE (APRESOLINE) tablet 25 mg  25 mg Oral TID    insulin glargine (LANTUS) injection 25 Units  25 Units SubCUTAneous QHS    isosorbide dinitrate (ISORDIL) tablet 20 mg  20 mg Oral TID    magnesium oxide (MAG-OX) tablet 400 mg  400 mg Oral DAILY    polyethylene glycol (MIRALAX) packet 17 g  17 g Oral DAILY PRN    potassium chloride (KLOR-CON) tablet 10 mEq  10 mEq Oral DAILY    pravastatin (PRAVACHOL) tablet 80 mg  80 mg Oral QHS    senna (SENOKOT) tablet 17.2 mg  2 Tab Oral QHS    ondansetron (ZOFRAN) injection 4 mg  4 mg IntraVENous Q6H PRN    acetaminophen (TYLENOL) tablet 650 mg  650 mg Oral Q6H PRN    heparin (porcine) injection 5,000 Units  5,000 Units SubCUTAneous Q8H    LORazepam (ATIVAN) tablet 0.5 mg  0.5 mg Oral BID PRN    gabapentin (NEURONTIN) capsule 600 mg  600 mg Oral QID    insulin lispro (HUMALOG) injection   SubCUTAneous AC&HS    famotidine (PEPCID) tablet 20 mg  20 mg Oral DAILY    HYDROcodone-acetaminophen (NORCO) 5-325 mg per tablet 1 Tab  1 Tab Oral Q6H PRN       Data Review:   @LABRCNT(Na,K,BUN,CREA,WBC,HGB,HCT,PLT,INR,TRP,TCHOL*,Triglyceride*,LDL*,LDLCPOC HDL*,HDL])@    TELEMETRY: nsr    Assessment/Plan:     Principal Problem:    Acute on chronic systolic (congestive) heart failure (HCC) (10/18/2016)adjust coreg up    Active Problems:    Obstructive sleep apnea (3/18/2017)      Dyslipidemia (3/18/2017)      Diabetes mellitus type II, uncontrolled ([facility] (3/18/2017)The current medical regimen is effective;  continue present plan and medications.         CKD (chronic kidney disease) stage 3, GFR 30-59 ml/min (HCC) (8/13/2014)      Abdominal fluid collection (8/18/2014)      Overview: Perisplenic and splenic, likely related to pancreatitis      Hypertension (3/18/2017)controlled Acute on chronic systolic heart failure ([facility] (3/12/2019)          Avery Chris MD

## 2019-03-15 NOTE — PROGRESS NOTES
Patient request to speak to someone regarding living will. CM notified the  and he will come up and see patient for assistance. Per physician possible d/c today. Patient agreeable to home health RN and would like hospital bed for d/c. Spoke with Aleksandra Gerardo NP of request for [place] [phone] and hospital bed. CM following.

## 2019-03-15 NOTE — PROGRESS NOTES
Bedside and Verbal shift change report given to self (oncoming nurse) by Nestor Zapata (offgoing nurse). Report included the following information SBAR, Kardex, Intake/Output and MAR.

## 2019-03-15 NOTE — DISCHARGE SUMMARY
[address] [facility] Cardiology Discharge Summary     Patient ID:  Maranda Barth  [id number]  39 y.o.  1965    Admit date: 3/7/2019    Discharge date:  3/16/19    Admitting Physician: Jessica Louis MD     Discharge Physician: Dr. Robbin Russell     Admission Diagnoses: Acute on chronic systolic heart failure ([facility] [I50.23]  Acute on chronic systolic heart failure ([facility] [I50.23]    Discharge Diagnoses:    Diagnosis    Acute on chronic systolic heart failure (HCC)    Type 2 diabetes with nephropathy (HCC)    Pulmonary HTN (HCC)    Depressive disorder    Asthma    Obstructive sleep apnea    Chronic back pain    Dyslipidemia    Diabetes mellitus type II, uncontrolled ([facility]    Noncompliance with medication regimen    AICD (automatic cardioverter/defibrillator) present    Hypertension    Obesity hypoventilation syndrome ([facility]    Morbid obesity with BMI of 60.0-69.9, adult ([facility])    Nonsustained ventricular tachycardia ([facility])    Abdominal fluid collection    CKD (chronic kidney disease) stage 3, GFR 30-59 ml/min ([facility])    [facility]    Nonischemic dilated cardiomyopathy [facility])       Cardiology Procedures this admission:  None  Consults: Nephrology    Hospital Course: [place] [facility] [place]H of [place]ICM with ICD in place, depression, CKD, IDDM, morbid obesity, HTN and dyslipidemia who presented to the ER with complaints of worsening shortness of breath. Patient reported that he felt as though he is carrying a tire around is abdomen. He stated that his symptoms have been present since January but became worse over the past several days. He admited to missing almost 2 weeks of his Zaroxolyn doses (takes this M/W/F) because he thought he was out, but it was just in a different pill bottle. He also admits to only taking his Bumex once daily for a while. Upon arrival to the ER, EKG shows SR with 1st degree AV block.  Labs show a creatinine of 2.9 and BNP of 1002. CXR showed cardiomegaly without pulmonary edema and mild pulmonary venous hypertension. Patient was admitted and placed on a bumex drip. Patient underwent abdominal ultrasound and there was no ultrasound evidence of significant ascites. He also had a renal ultrasound and the bladder was unremarkable and there was no hydronephrosis. Nephrology was consulted and noted he is not a candidate for dialysis and his overall prognosis is poor and recommended comfort measures and home with hospice. At discharge the patient was -<10L and symptoms were improved. At time of discharge, patient was up feeling well without any complaints shortness of breath. LE edema was much improved. Patient's labs were stable with creatinine of 3.04. Patient was seen and examined by Dr. Mustafa Daily and determined stable and ready for discharge. Patient was instructed on the importance of medication compliance, low sodium diet, 2 liter per day fluid restriction and daily weights. For maximized medical therapy of congestive heart failure, patient will continue use of  BB. No ACE/ARB with RED. The patient has been scheduled for 7 day transitional care follow up with [address] [place] [address] [address] Cardiology -- Dr. Mustafa Daily. Patient will have hospital bed at home and [facility] will follow him at home. Patient has also requested a referral  to  Urology for incontinence and Endocrine for management of DM. Patient will have BMP prior to follow up. DISPOSITION: The patient is being discharged home with home health in stable condition on a low saturated fat, low cholesterol and low salt diet. The patient is instructed to advance activities as tolerated to the limit of fatigue or shortness of breath. The patient is informed to monitor daily weights and maintain a 2 liter per day fluid restriction. The patient is instructed to call the office for any shortness of breath, weight gain, or increased peripheral edema.         Discharge Exam:   Visit Vitals  BP 117/78 (BP 1 Location: Left arm, BP Patient Position: At rest)   Pulse 75   Temp 97.9 °F (36.6 °C)   Resp 16   Ht 5' 6\" (1.676 m)   Wt 151.8 kg (334 lb 11.2 oz)   SpO2 98%   BMI 54.02 kg/m²     Patient has been seen by Kris Reyna see his progress note for exam details. Recent Results (from the past 24 hour(s))   GLUCOSE, POC    Collection Time: 03/14/19  4:35 PM   Result Value Ref Range    Glucose (POC) 283 (H) 65 - 100 mg/dL   GLUCOSE, POC    Collection Time: 03/14/19  9:59 PM   Result Value Ref Range    Glucose (POC) 118 (H) 65 - 100 mg/dL   MAGNESIUM    Collection Time: 03/15/19  3:50 AM   Result Value Ref Range    Magnesium 2.7 (H) 1.8 - 2.4 mg/dL   METABOLIC PANEL, BASIC    Collection Time: 03/15/19  3:50 AM   Result Value Ref Range    Sodium 135 (L) 136 - 145 mmol/L    Potassium 3.8 3.5 - 5.1 mmol/L    Chloride 93 (L) 98 - 107 mmol/L    CO2 32 21 - 32 mmol/L    Anion gap 10 7 - 16 mmol/L    Glucose 141 (H) 65 - 100 mg/dL    BUN 91 (H) 6 - 23 MG/DL    Creatinine 3.14 (H) 0.8 - 1.5 MG/DL    GFR est AA 27 (L) >60 ml/min/1.73m2    GFR est non-AA 22 (L) >60 ml/min/1.73m2    Calcium 9.3 8.3 - 10.4 MG/DL   GLUCOSE, POC    Collection Time: 03/15/19  6:27 AM   Result Value Ref Range    Glucose (POC) 228 (H) 65 - 100 mg/dL   AMYLASE    Collection Time: 03/15/19 11:05 AM   Result Value Ref Range    Amylase 91 25 - 115 U/L   LIPASE    Collection Time: 03/15/19 11:05 AM   Result Value Ref Range    Lipase 138 73 - 393 U/L   TSH 3RD GENERATION    Collection Time: 03/15/19 11:05 AM   Result Value Ref Range    TSH 3.860 (H) 0.358 - 3.740 uIU/mL   GLUCOSE, POC    Collection Time: 03/15/19 11:34 AM   Result Value Ref Range    Glucose (POC) 192 (H) 65 - 100 mg/dL         Patient Instructions:     Current Discharge Medication List      CONTINUE these medications which have CHANGED    Details   carvedilol (COREG) 12.5 mg tablet Take 1 Tab by mouth two (2) times daily (with meals).   Qty: 60 Tab, Refills: 6      insulin glargine (LANTUS U-100 INSULIN) 100 unit/mL injection 25 Units by SubCUTAneous route nightly. Qty: 1 Vial, Refills: 0         CONTINUE these medications which have NOT CHANGED    Details   gabapentin (NEURONTIN) 600 mg tablet Take 600 mg by mouth four (4) times daily. magnesium oxide (MAG-OX) 400 mg tablet Take 400 mg by mouth daily. insulin lispro protamine/insulin lispro (HUMALOG MIX 50-50 INSULN U-100) 100 unit/mL (50-50) injection by SubCUTAneous route. bipap machine kit by Does Not Apply route. hydrALAZINE (APRESOLINE) 25 mg tablet Take 1 Tab by mouth three (3) times daily. Qty: 90 Tab, Refills: 3      bumetanide (BUMEX) 2 mg tablet Take 1 Tab by mouth two (2) times a day. Qty: 180 Tab, Refills: 3    Associated Diagnoses: Chronic systolic congestive heart failure (HCC)      isosorbide dinitrate (ISORDIL) 20 mg tablet Take 1 Tab by mouth three (3) times daily. Qty: 90 Tab, Refills: 11      pravastatin (PRAVACHOL) 80 mg tablet Take 1 Tab by mouth nightly. Qty: 30 Tab, Refills: 5      sennosides (SENNA) 8.6 mg cap Take 17.2 mg by mouth nightly. docusate sodium (COLACE) 100 mg capsule Take 1 Cap by mouth two (2) times a day. Qty: 60 Cap, Refills: 0      ranitidine (ZANTAC) 150 mg tablet Take 150 mg by mouth two (2) times a day. polyethylene glycol (MIRALAX) 17 gram packet Take 1 Packet by mouth daily as needed (constipation). Qty: 10 Packet, Refills: 5      OXYGEN-AIR DELIVERY SYSTEMS 2 lpm cont. allopurinol (ZYLOPRIM) 100 mg tablet Take 100 mg by mouth two (2) times a day. glucose blood VI test strips (ASCENSIA AUTODISC VI, ONE TOUCH ULTRA TEST VI) strip Test strips for 30 day supply  Qty: 120 strip, Refills: 0      potassium chloride SR (KLOR-CON 10) 10 mEq tablet Take  by mouth.      colchicine 0.6 mg tablet Take 0.6 mg by mouth daily.       nitroglycerin (NITROSTAT) 0.4 mg SL tablet Take 1 tablet SL every 5 minutes up to 3 doses as needed for CP  Qty: 4 Bottle, Refills: 6 STOP taking these medications       metOLazone (ZAROXOLYN) 2.5 mg tablet Comments:   Reason for Stopping:

## 2019-03-15 NOTE — PROGRESS NOTES
After weighed by PCT this AM, pt was very concerned about his weight of 355.6 lb. Pt feels that he has lost weight recently due to his increase urine output and more energy. Pt insisted to use the bed scale instead because he says that it is more accurate. Pt was assisted out of bed and the bed was zeroed. Pt was then assisted back into bed and weighed which resulted in 334.7 lb. Both have been documented. Will continue to monitor.

## 2019-03-15 NOTE — PROGRESS NOTES
Problem: Nutrition Deficit  Goal: *Optimize nutritional status  Nutrition LOS Note: day 7  Assessment  Diet order(s): CCHO, Cardiac, 2 gm Na  Food,Nutrition, and Pertinent History: Pt known to me from previous admissions. He was sleeping following lunch today and I did not wake him. His daughter is at his bedside and states that he is eating well. She does not believe that he has had any barriers to meal intake throughout admission. Pt has received nutrition education at prior admissions. Anthropometrics: Height: 5' 6\" (167.6 cm), Weight Source: Bed, Weight: 151.8 kg (334 lb 11.2 oz), Body mass index is 54.02 kg/m². BMI class of morbid obesity class III. Edema: 2+ generalized and 2+ pitting to BLEs  Macronutrient Needs:  · EER:  6692-8445 kcal /day (25-30 kcal/kg S BW of 74 kg)  · EPR:  59-74 grams protein/day (0.8-1 grams/kg SBW)(GFR 27)-CKD  Intake/Comparative Standards:Average intake for past 7 day(s)/4 recorded meal(s): 100%. This potentially meets ~100% of kcal and ~100% of protein needs. Nutrition Diagnosis: Obesity r/t prolonged energy imbalance, as evidenced by BMI >50. Intervention:   Meals and snacks: Continue current diet. Discharge nutrition plan: No discharge needs identified at this time.      Cristie Felty, [address], [address] [place] [address], [address] [place] [address], [phone]

## 2019-03-15 NOTE — PROGRESS NOTES
Bedside and verbal report given to [facility] by Yaquelin Fernandez. Report included the following information SBAR, Kardex, Intake/Output and MAR. Oncoming RN assumed care of the patient.

## 2019-03-15 NOTE — PROGRESS NOTES
Advanced Directive Consult with patient and his daughter. The patient requested that check and see if his previous HCPOA was up to date and still on his chart. It was on his chart and up to date and that was shared with the patient. Patient requested more food vouchers for his daughter.  telephoned patient relations and Tabitha Collado agreed to provide the daughter meals for today.  had a prayer with the patient.     [employer]

## 2019-03-15 NOTE — PROGRESS NOTES
Patient requires frequent changes in body position, which are not feasible in a normal bed. Elevation and upper body need more than 30 degrees is needed, thus the need for a hospital bed.         Hilda Herrera, NP

## 2019-03-15 NOTE — PROGRESS NOTES
Problem: Mobility Impaired (Adult and Pediatric)  Goal: *Acute Goals and Plan of Care (Insert Text)  LTG:  (1.)Mr. Pollard will move from supine to sit and sit to supine , scoot up and down and roll side to side in bed with MODIFIED INDEPENDENCE within 7 treatment day(s). (2.)Mr. Pollard will transfer from bed to chair and chair to bed with MODIFIED INDEPENDENCE using the least restrictive device within 7 treatment day(s). (3.)Mr. Pollard will ambulate with SUPERVISION for 250 feet with the least restrictive device within 7 treatment day(s). (4.)Mr. Pollard will participate in therapeutic activity/exercises x 23 minutes for increased strength within 7 days. ________________________________________________________________________________________________       PHYSICAL THERAPY: Initial Assessment and AM 3/15/2019  INPATIENT: PT Visit Days : 1  Payor: [address] [place],9D / Plan: [address] / Product Type: Managed Care Medicare /       NAME/AGE/GENDER: Justine Capone is a 46 y.o. male   PRIMARY DIAGNOSIS: Acute on chronic systolic heart failure (HCC) [I50.23]  Acute on chronic systolic heart failure (HCC) [I50.23] Acute on chronic systolic (congestive) heart failure (HCC) Acute on chronic systolic (congestive) heart failure (HCC)       ICD-10: Treatment Diagnosis:    · Generalized Muscle Weakness (M62.81)  · Difficulty in walking, Not elsewhere classified (R26.2)  · History of falling (Z91.81)   Precaution/Allergies:  Iodinated contrast- oral and iv dye and Penicillins      ASSESSMENT:     Mr. She Rodney is a 46 y.o. male in the hospital for the above who was supine in bed upon arrival.  Pt reports that he lives in a one story house with his children that has 5 steps to enter. Pt also reported that PTA he was getting some assistance with ADLs and ambulated with PRN use of SPC. Pt admitted to several recent falls in the past year.   Lana She Rodney presents to PT with [facility] and decreased strength in B LEs. Pt also presents with decreased L shoulder flexion AROM and decreased strength grossly in B UE's. Pt reported recent history of issues with his L UE leading to weakness and sensory deficits. He reported having a nerve conduction test and receiving PT. During evaluation pt performed supine to sit with supervision and intact sitting balance. He stood with CGA and ambulated around room with good to fair standing balance. Pt has decreased gait speed and increased trunk sway ambulating. Mr. Marc Mace could benefit from skilled PT as he is currently functioning below his baseline. In addition to evaluation pt received treatment consisting of therapeutic activity. He ambulated in clifford with CGA but reported increased fatigue. SpO2 recorded around 97% while ambulating on 3 L/min O2. Pt also took several standing rest breaks. Pt returned to room and performed seated TE from recliner chair. Pt benefited from treatment to address decreased ambulation and activity tolerance. This section established at most recent assessment   PROBLEM LIST (Impairments causing functional limitations):  1. Decreased Strength  2. Decreased ADL/Functional Activities  3. Decreased Ambulation Ability/Technique  4. Decreased Balance  5. Decreased Activity Tolerance  6. Increased Fatigue   INTERVENTIONS PLANNED: (Benefits and precautions of physical therapy have been discussed with the patient.)  1. Balance Exercise  2. Bed Mobility  3. Family Education  4. Gait Training  5. Therapeutic Activites  6. Therapeutic Exercise/Strengthening  7.  Transfer Training     TREATMENT PLAN: Frequency/Duration: 3 times a week for duration of hospital stay  Rehabilitation Potential For Stated Goals: Luis Manuel Blanchard REHABILITATION/EQUIPMENT: (at time of discharge pending progress): Due to the probability of continued deficits (see above) this patient will likely need continued skilled physical therapy after discharge. Equipment:    None at this time              HISTORY:   History of Present Injury/Illness (Reason for Referral):  See H&P  Past Medical History/Comorbidities:   Mr. Paty Tavarez  has a past medical history of Acquired claw toe of right foot, Acute encephalopathy (1/80/9706), Acute systolic heart failure ([address] (May, 2009), AICD (automatic cardioverter/defibrillator) present (10/21/2015), Atypical chest pain (4/23/2010), Bronchitis, CAD (coronary artery disease), Cardiomyopathy, Chest pain (10/21/2015), Chronic kidney disease, Chronic pain, Congestive heart failure (CHF) ([address] (10/21/2015), Depressive disorder, Diabetes ([address] Diabetes mellitus type II, uncontrolled ([address] (7/2/2013), GERD (gastroesophageal reflux disease), Gout, Heart failure ([address] Hypertension, Hyponatremia (12/20/2010), Ill-defined condition, Morbid obesity ([address] Nausea & vomiting (11/30/2015), Neuropathy, Obstructive sleep apnea (2/15/2010), Other unknown and unspecified cause of morbidity or mortality, Severe sepsis ([address] Ulcer of leg, chronic ([address] and Unspecified sleep apnea. Mr. Paty Tavarez  has a past surgical history that includes pr chest surgery procedure unlisted; hx pacemaker; and hx heart catheterization ([place]andy 10, 2008). Social History/Living Environment:   Home Environment: Private residence  # Steps to Enter: 5  Rails to Enter: Yes  Hand Rails : Bilateral  One/Two Story Residence: One story  Living Alone: No  Support Systems: Child(reza)  Patient Expects to be Discharged to[de-identified] Unknown  Current DME Used/Available at Home: Cane, straight  Tub or Shower Type: Tub/Shower combination  Prior Level of Function/Work/Activity:  Lives with children who assist with ADLs and PRN use of SPC for ambulation. Several recent falls and uses power chair when available at store.      Number of Personal Factors/Comorbidities that affect the Plan of Care: 3+: HIGH COMPLEXITY   EXAMINATION:   Most Recent Physical Functioning:   Gross Assessment:  AROM: Generally decreased, functional(L shoulder flexion)  Strength: Generally decreased, functional(L shoulder flexion 3-/5; grossly 4-/5 in rest of UE's)               Posture:     Balance:  Sitting: Intact  Standing: Impaired  Standing - Static: Good  Standing - Dynamic : Fair Bed Mobility:  Supine to Sit: Supervision  Wheelchair Mobility:     Transfers:  Sit to Stand: Stand-by assistance  Stand to Sit: Stand-by assistance  Bed to Chair: Contact guard assistance  Gait:     Base of Support: Widened  Speed/Mira: Slow;Shuffled  Step Length: Left shortened;Right shortened  Gait Abnormalities: Trunk sway increased;Decreased step clearance  Distance (ft): 200 Feet (ft)  Assistive Device: Other (comment)(HHA)  Ambulation - Level of Assistance: Contact guard assistance      Body Structures Involved:  1. Heart  2. Lungs  3. Muscles Body Functions Affected:  1. Cardio  2. Respiratory  3. Neuromusculoskeletal  4. Movement Related Activities and Participation Affected:  1. Mobility  2. Domestic Life  3. Community, Social and [address] [place] [address]   Number of elements that affect the Plan of Care: 4+: HIGH COMPLEXITY   CLINICAL PRESENTATION:   Presentation: Stable and uncomplicated: LOW COMPLEXITY   CLINICAL DECISION MAKIN [facility] Mobility Inpatient Short Form  How much difficulty does the patient currently have. .. Unable A Lot A Little None   1. Turning over in bed (including adjusting bedclothes, sheets and blankets)? [] 1   [] 2   [] 3   [x] 4   2. Sitting down on and standing up from a chair with arms ( e.g., wheelchair, bedside commode, etc.)   [] 1   [] 2   [] 3   [x] 4   3. Moving from lying on back to sitting on the side of the bed? [] 1   [] 2   [] 3   [x] 4   How much help from another person does the patient currently need. .. Total A Lot A Little None   4. Moving to and from a bed to a chair (including a wheelchair)? [] 1   [] 2   [x] 3   [] 4   5.   Need to walk in hospital room?   [] 1   [] 2   [x] 3   [] 4   6. Climbing 3-5 steps with a railing? [] 1   [] 2   [x] 3   [] 4   © 2007, Trustees of [facility] [employer], under license to [employer]. All rights reserved      Score:  Initial: 21 Most Recent: X (Date: -- )    Interpretation of Tool:  Represents activities that are increasingly more difficult (i.e. Bed mobility, Transfers, Gait). Medical Necessity:     · Patient demonstrates fair rehab potential due to higher previous functional level. Reason for Services/Other Comments:  · Patient continues to require skilled intervention due to decreased ambulation and balance. Use of outcome tool(s) and clinical judgement create a POC that gives a: Clear prediction of patient's progress: LOW COMPLEXITY            TREATMENT:   (In addition to Assessment/Re-Assessment sessions the following treatments were rendered)   Pre-treatment Symptoms/Complaints:  Chronic back pain  Pain: Initial:   Pain Intensity 1: 7  Pain Location 1: Back, Abdomen  Post Session:  7/10     Therapeutic Activity: (    8 minutes): Therapeutic activities including Chair transfers, Ambulation on level ground and seated exercises for B LEs to improve mobility, strength, balance and activity tolerance. Required minimal   to promote static and dynamic balance in standing and activity pacing. Date:  3/15/19 Date:   Date:     ACTIVITY/EXERCISE AM PM AM PM AM PM   Seated LAQ 1 x 10 B        Seated AP 1 x 10 B                                                     B = bilateral; AA = active assistive; A = active; P = passive          Braces/Orthotics/Lines/Etc:   · O2 Device: Nasal cannula  Treatment/Session Assessment:    · Response to Treatment:  Fairly given decreased activity tolerance.   · Interdisciplinary Collaboration:   o Physical Therapist  o Registered Nurse  · After treatment position/precautions:   o Up in chair  o Bed/Chair-wheels locked  o Bed in low position  o Call light within reach  o RN notified  o Family at bedside   · Compliance with Program/Exercises: Will assess as treatment progresses  · Recommendations/Intent for next treatment session: \"Next visit will focus on advancements to more challenging activities and reduction in assistance provided\".   Total Treatment Duration:  PT Patient Time In/Time Out  Time In: 1101  Time Out: [address] [place], PT, DPT

## 2019-03-15 NOTE — PROGRESS NOTES
Bedside and Verbal shift change report given to Siva Blair RN (oncoming nurse) by myself (offgoing nurse).  Report included the following information SBAR, Kardex, Intake/Output, Accordion, Med Rec Status and Cardiac Rhythm SR.

## 2019-03-15 NOTE — PROGRESS NOTES
[facility] - INPATIENT  Face to Face Encounter    Patients Name: Merline Ingram [facility]    YOB: 1965    Ordering Physician: Garcia Amato MD    Primary Diagnosis: Acute on chronic systolic heart failure ([facility] [I50.23]  Acute on chronic systolic heart failure ([facility] [I50.23]    Date of Face to Face:   3/15/2019                                  Face to Face Encounter findings are related to primary reason for home care:   yes. 1. I certify that the patient needs intermittent care as follows: skilled nursing care:  skilled observation/assessment, patient education  physical therapy: strengthening  occupational therapy:  ADL safety (ie. cooking, bathing, dressing)    2. I certify that this patient is homebound, that is:Patient's condition makes leaving the home medically contraindicated; and  patient has a normal inability to leave the home and leaving the home requires considerable and taxing effort. Patient may leave the home for infrequent and short duration for medical reasons, and occasional absences for non-medical reasons. Homebound status is due to the following functional limitations:     3. I certify that this patient is under my care and that I, or a nurse practitioner or  [id number], or clinical nurse specialist, or certified nurse midwife, working with me, had a Face-to-Face Encounter that meets the physician Face-to-Face Encounter requirements. The following are the clinical findings from the [address] [place] [address] encounter that support the need for skilled services and is a summary of the encounter:     See Progress notes and d/c summary       Hasmukh Soler RN  3/15/2019      THE FOLLOWING TO BE COMPLETED BY THE COMMUNITY PHYSICIAN:    I concur with the findings described above from the [facility] encounter that this patient is homebound and in need of a skilled service.     Certifying Physician: _____________________________________      Printed Certifying Physician Name: _____________________________________    Date: _________________

## 2019-03-15 NOTE — PROGRESS NOTES
Care Management Interventions  PCP Verified by CM: Yes  Palliative Care Criteria Met (RRAT>21 & CHF Dx)?: Yes(RRAT 31 CHf )  Transition of Care Consult (CM Consult): [address]: Yes  Discharge Durable Medical Equipment: Yes  Physical Therapy Consult: Yes  Occupational Therapy Consult: Yes  Speech Therapy Consult: No  Current Support Network: Other(lives with son and daughter)  Confirm Follow Up Transport: Family  Plan discussed with Pt/Family/Caregiver: Yes  Freedom of Choice Offered: Yes  Discharge Location  Discharge Placement: Home with home health  Patient plans to d/c home in am. Discussed with patient home health and he chose [facility] Referral sent and orders obtained. Patient request hospital bed and motorized scooter and CM explained guidelines and would need to see if his insurance will cover. Referral sent to [facility] for bed and scooter. He request referrals to [facility] confirmation [id number] and [employer] [facility] and referrals completed. [facility].

## 2019-03-15 NOTE — PROGRESS NOTES
3/15/2019 6:29 AM    Admit Date: 3/7/2019    Admit Diagnosis: Acute on chronic systolic heart failure ([address] [I50.23]; Acute on chronic systolic heart failure (HCC) [I50.23]      Subjective:    Patient with A/C systolic heart failure and cardiorenal syndrome. Creat 3.26. Pt family is worried that pt could have kidney stone or BPH contributing to his current status. Will defer to renal as to their thoughts if he is obstructed.  His weight may be down after weighing with zeroed bed scale    Objective:      Visit Vitals  /77 (BP 1 Location: Right arm, BP Patient Position: At rest)   Pulse 75   Temp 98 °F (36.7 °C)   Resp 18   Ht 5' 6\" (1.676 m)   Wt 151.8 kg (334 lb 11.2 oz)   SpO2 96%   BMI 54.02 kg/m²       ROS:  General ROS: negative for - chills  Hematological and Lymphatic ROS: negative for - blood clots or jaundice  Respiratory ROS: no cough, shortness of breath, or wheezing  Cardiovascular ROS: no chest pain or dyspnea on exertion  Gastrointestinal ROS: no abdominal pain, change in bowel habits, or black or bloody stools  Neurological ROS: no TIA or stroke symptoms    Physical Exam:    Physical Examination: General appearance - alert, well appearing, and in no distress  Mental status - alert, oriented to person, place, and time  Eyes - pupils equal and reactive, extraocular eye movements intact  Neck/lymph - supple, no significant adenopathy  Chest/CV - clear to auscultation, no wheezes, rales or rhonchi, symmetric air entry  Heart - normal rate, regular rhythm, normal S1, S2, no murmurs, rubs, clicks or gallops  Abdomen/GI - soft, nontender, nondistended, no masses or organomegaly  Musculoskeletal - no joint tenderness, deformity or swelling  Extremities - peripheral pulses normal, no pedal edema, no clubbing or cyanosis  Skin - normal coloration and turgor, no rashes, no suspicious skin lesions noted    Current Facility-Administered Medications   Medication Dose Route Frequency    carvedilol (COREG) tablet 12.5 mg  12.5 mg Oral BID WITH MEALS    bumetanide (BUMEX) tablet 2 mg  2 mg Oral BID    bisacodyl (DULCOLAX) suppository 10 mg  10 mg Rectal DAILY PRN    colchicine tablet 0.6 mg  0.6 mg Oral DAILY    docusate sodium (COLACE) capsule 100 mg  100 mg Oral BID    hydrALAZINE (APRESOLINE) tablet 25 mg  25 mg Oral TID    insulin glargine (LANTUS) injection 25 Units  25 Units SubCUTAneous QHS    isosorbide dinitrate (ISORDIL) tablet 20 mg  20 mg Oral TID    magnesium oxide (MAG-OX) tablet 400 mg  400 mg Oral DAILY    polyethylene glycol (MIRALAX) packet 17 g  17 g Oral DAILY PRN    potassium chloride (KLOR-CON) tablet 10 mEq  10 mEq Oral DAILY    pravastatin (PRAVACHOL) tablet 80 mg  80 mg Oral QHS    senna (SENOKOT) tablet 17.2 mg  2 Tab Oral QHS    ondansetron (ZOFRAN) injection 4 mg  4 mg IntraVENous Q6H PRN    acetaminophen (TYLENOL) tablet 650 mg  650 mg Oral Q6H PRN    heparin (porcine) injection 5,000 Units  5,000 Units SubCUTAneous Q8H    LORazepam (ATIVAN) tablet 0.5 mg  0.5 mg Oral BID PRN    gabapentin (NEURONTIN) capsule 600 mg  600 mg Oral QID    insulin lispro (HUMALOG) injection   SubCUTAneous AC&HS    famotidine (PEPCID) tablet 20 mg  20 mg Oral DAILY    HYDROcodone-acetaminophen (NORCO) 5-325 mg per tablet 1 Tab  1 Tab Oral Q6H PRN       Data Review:   @LABRCNT(Na,K,BUN,CREA,WBC,HGB,HCT,PLT,INR,TRP,TCHOL*,Triglyceride*,LDL*,LDLCPOC HDL*,HDL])@    TELEMETRY: nsr    Assessment/Plan:     Principal Problem:    Acute on chronic systolic (congestive) heart failure (HCC) (10/18/2016)adjust coreg up    Active Problems:    Obstructive sleep apnea (3/18/2017)      Dyslipidemia (3/18/2017)      Diabetes mellitus type II, uncontrolled ([facility] (3/18/2017)The current medical regimen is effective;  continue present plan and medications.         CKD (chronic kidney disease) stage 3, GFR 30-59 ml/min (HCC) (8/13/2014)      Abdominal fluid collection (8/18/2014)      Overview: Perisplenic and splenic, likely related to pancreatitis      Hypertension (3/18/2017)controlled      Acute on chronic systolic heart failure ([facility] (3/12/2019)          Ann Marie Figueroa MD

## 2019-03-16 NOTE — DISCHARGE INSTRUCTIONS
Patient Education     DISCHARGE SUMMARY from Nurse    PATIENT INSTRUCTIONS:    After general anesthesia or intravenous sedation, for 24 hours or while taking prescription Narcotics:  · Limit your activities  · Do not drive and operate hazardous machinery  · Do not make important personal or business decisions  · Do  not drink alcoholic beverages  · If you have not urinated within 8 hours after discharge, please contact your surgeon on call. Report the following to your surgeon:  · Excessive pain, swelling, redness or odor of or around the surgical area  · Temperature over 100.5  · Nausea and vomiting lasting longer than 4 hours or if unable to take medications  · Any signs of decreased circulation or nerve impairment to extremity: change in color, persistent  numbness, tingling, coldness or increase pain  · Any questions    What to do at Home:  Recommended activity: Activity as tolerated. *  Please give a list of your current medications to your Primary Care Provider. *  Please update this list whenever your medications are discontinued, doses are      changed, or new medications (including over-the-counter products) are added. *  Please carry medication information at all times in case of emergency situations. These are general instructions for a healthy lifestyle:    No smoking/ No tobacco products/ Avoid exposure to second hand smoke  Surgeon General's Warning:  Quitting smoking now greatly reduces serious risk to your health. Obesity, smoking, and sedentary lifestyle greatly increases your risk for illness    A healthy diet, regular physical exercise & weight monitoring are important for maintaining a healthy lifestyle    You may be retaining fluid if you have a history of heart failure or if you experience any of the following symptoms:  Weight gain of 3 pounds or more overnight or 5 pounds in a week, increased swelling in our hands or feet or shortness of breath while lying flat in bed. Please call your doctor as soon as you notice any of these symptoms; do not wait until your next office visit. Recognize signs and symptoms of STROKE:    F-face looks uneven    A-arms unable to move or move unevenly    S-speech slurred or non-existent    T-time-call 911 as soon as signs and symptoms begin-DO NOT go       Back to bed or wait to see if you get better-TIME IS BRAIN. Warning Signs of HEART ATTACK     Call 911 if you have these symptoms:   Chest discomfort. Most heart attacks involve discomfort in the center of the chest that lasts more than a few minutes, or that goes away and comes back. It can feel like uncomfortable pressure, squeezing, fullness, or pain.  Discomfort in other areas of the upper body. Symptoms can include pain or discomfort in one or both arms, the back, neck, jaw, or stomach.  Shortness of breath with or without chest discomfort.  Other signs may include breaking out in a cold sweat, nausea, or lightheadedness. Don't wait more than five minutes to call 911 - MINUTES MATTER! Fast action can save your life. Calling 911 is almost always the fastest way to get lifesaving treatment. Emergency Medical Services staff can begin treatment when they arrive -- up to an hour sooner than if someone gets to the hospital by car. The discharge information has been reviewed with the {PATIENT PARENT GUARDIAN:74416}. The {PATIENT PARENT GUARDIAN:23893} verbalized understanding. Discharge medications reviewed with the {Dishcarge meds reviewed WRKW:51372} and appropriate educational materials and side effects teaching were provided. ___________________________________________________________________________________________________________________________________     Heart Failure: Care Instructions  Your Care Instructions    Heart failure occurs when your heart does not pump as much blood as the body needs.  Failure does not mean that the heart has stopped pumping but rather that it is not pumping as well as it should. Over time, this causes fluid buildup in your lungs and other parts of your body. Fluid buildup can cause shortness of breath, fatigue, swollen ankles, and other problems. By taking medicines regularly, reducing sodium (salt) in your diet, checking your weight every day, and making lifestyle changes, you can feel better and live longer. Follow-up care is a key part of your treatment and safety. Be sure to make and go to all appointments, and call your doctor if you are having problems. It's also a good idea to know your test results and keep a list of the medicines you take. How can you care for yourself at home? Medicines    · Be safe with medicines. Take your medicines exactly as prescribed. Call your doctor if you think you are having a problem with your medicine.     · Do not take any vitamins, over-the-counter medicine, or herbal products without talking to your doctor first. Yessi Schultz not take ibuprofen (Advil or Motrin) and naproxen (Aleve) without talking to your doctor first. They could make your heart failure worse.     · You may be taking some of the following medicine. ? Beta-blockers can slow heart rate, decrease blood pressure, and improve your condition. Taking a beta-blocker may lower your chance of needing to be hospitalized. ? Angiotensin-converting enzyme inhibitors (ACEIs) reduce the heart's workload, lower blood pressure, and reduce swelling. Taking an ACEI may lower your chance of needing to be hospitalized again. ? Angiotensin II receptor blockers (ARBs) work like ACEIs. Your doctor may prescribe them instead of ACEIs. ? Diuretics, also called water pills, reduce swelling. ? Potassium supplements replace this important mineral, which is sometimes lost with diuretics. ? Aspirin and other blood thinners prevent blood clots, which can cause a stroke or heart attack.    You will get more details on the specific medicines your doctor prescribes.   Diet    · Your doctor may suggest that you limit sodium to 2,000 milligrams (mg) a day or less. That is less than 1 teaspoon of salt a day, including all the salt you eat in cooking or in packaged foods. People get most of their sodium from processed foods. Fast food and restaurant meals also tend to be very high in sodium.     · Ask your doctor how much liquid you can drink each day. You may have to limit liquids.    Weight    · Weigh yourself without clothing at the same time each day. Record your weight. Call your doctor if you have a sudden weight gain, such as more than 2 to 3 pounds in a day or 5 pounds in a week. (Your doctor may suggest a different range of weight gain.) A sudden weight gain may mean that your heart failure is getting worse.    Activity level    · Start light exercise (if your doctor says it is okay). Even if you can only do a small amount, exercise will help you get stronger, have more energy, and manage your weight and your stress. Walking is an easy way to get exercise. Start out by walking a little more than you did before. Bit by bit, increase the amount you walk.     · When you exercise, watch for signs that your heart is working too hard. You are pushing yourself too hard if you cannot talk while you are exercising. If you become short of breath or dizzy or have chest pain, stop, sit down, and rest.     · If you feel \"wiped out\" the day after you exercise, walk slower or for a shorter distance until you can work up to a better pace.     · Get enough rest at night. Sleeping with 1 or 2 pillows under your upper body and head may help you breathe easier.    Lifestyle changes    · Do not smoke. Smoking can make a heart condition worse. If you need help quitting, talk to your doctor about stop-smoking programs and medicines. These can increase your chances of quitting for good.  Quitting smoking may be the most important step you can take to protect your heart.     · Limit alcohol to 2 drinks a day for men and 1 drink a day for women. Too much alcohol can cause health problems.     · Avoid getting sick from colds and the flu. Get a pneumococcal vaccine shot. If you have had one before, ask your doctor whether you need another dose. Get a flu shot each year. If you must be around people with colds or the flu, wash your hands often. When should you call for help? Call 911 if you have symptoms of sudden heart failure such as:    · You have severe trouble breathing.     · You cough up pink, foamy mucus.     · You have a new irregular or rapid heartbeat.    Call your doctor now or seek immediate medical care if:    · You have new or increased shortness of breath.     · You are dizzy or lightheaded, or you feel like you may faint.     · You have sudden weight gain, such as more than 2 to 3 pounds in a day or 5 pounds in a week. (Your doctor may suggest a different range of weight gain.)     · You have increased swelling in your legs, ankles, or feet.     · You are suddenly so tired or weak that you cannot do your usual activities.    Watch closely for changes in your health, and be sure to contact your doctor if you develop new symptoms. Where can you learn more? Go to http://armando-nirav.info/. Enter Y581 in the search box to learn more about \"Heart Failure: Care Instructions. \"  Current as of: July 22, 2018  Content Version: 11.9  © 6538-1125 [employer]. Care instructions adapted under license by [employer] (which disclaims liability or warranty for this information). If you have questions about a medical condition or this instruction, always ask your healthcare professional. [place] [address] any warranty or liability for your use of this information. Patient Education   Carvedilol (By mouth)   Carvedilol (eul-GG-uaf-ol)  Treats high blood pressure and heart failure. Also reduces the risk of death after a heart attack.  This medicine is a beta-blocker. Brand Name(s): Coreg, Coreg CR   There may be other brand names for this medicine. When This Medicine Should Not Be Used: This medicine is not right for everyone. Do not use it if you had an allergic reaction to carvedilol, or if you have asthma, severe liver disease, or certain heart problems. Ask your doctor about these heart problems. How to Use This Medicine:   Long Acting Capsule, Tablet  · Take your medicine as directed. Your dose may need to be changed several times to find what works best for you. · It is best to take this medicine with food or milk. · Extended-release capsule instructions:   ¨ Take the capsule in the morning with food. ¨ Swallow the capsule whole. Do not crush or chew it. ¨ If you cannot swallow the capsule, you may open it and sprinkle the medicine over a spoonful of applesauce. Swallow the applesauce right away. · Read and follow the patient instructions that come with this medicine. Talk to your doctor or pharmacist if you have any questions. · Store the medicine in a closed container at room temperature, away from heat, moisture, and direct light. · Missed dose: Take a dose as soon as you remember. If it is almost time for your next dose, wait until then and take a regular dose. Do not take extra medicine to make up for a missed dose. Drugs and Foods to Avoid:   Ask your doctor or pharmacist before using any other medicine, including over-the-counter medicines, vitamins, and herbal products. · Some medicines can affect how carvedilol works. Tell your doctor if you are using amiodarone, clonidine, diltiazem, cyclosporine, digoxin, fluconazole, reserpine, rifampin, verapamil, or an MAO inhibitor (MAOI).   Warnings While Using This Medicine:   · Tell your doctor if you are pregnant or breastfeeding, or if you have kidney disease, liver disease, bradycardia (slow heartbeat), coronary artery disease, circulation problems, edema (fluid retention or swelling), heart or blood vessel problems, low blood pressure, lung problems (such as bronchitis or emphysema), an overactive thyroid, pheochromocytoma, or frequent chest pains. Tell your doctor if you have a history of severe allergic reactions or if you are scheduled to have surgery. · This medicine may cause the following problems:   ¨ Changes to your blood sugar level (if you have diabetes, report any blood sugar level changes to your doctor)  ¨ Fewer tears than usual in contact lens wearers  ¨ An eye problem called Intraoperative Floppy Iris Syndrome during cataract surgery  · This medicine may make you dizzy or drowsy. Do not drive, use machines, or do anything else that could be dangerous if you are not alert. · Do not stop using this medicine suddenly. Your doctor will need to slowly decrease your dose before you stop it completely. · Keep all medicine out of the reach of children. Never share your medicine with anyone. Possible Side Effects While Using This Medicine:   Call your doctor right away if you notice any of these side effects:  · Allergic reaction: Itching or hives, swelling in your face or hands, swelling or tingling in your mouth or throat, chest tightness, trouble breathing  · Change in how much or how often you urinate  · Chest pain that may spread to your arms, jaw, back, or neck, trouble breathing, nausea, unusual sweating, faintness  · Leg pain when you walk, legs and feet that feel cold or numb  · Lightheadedness, dizziness, or fainting  · Rapid weight gain, swelling in your hands, ankles, or feet  · Shaking, trembling, sweating, hunger, confusion  · Slow, fast, or uneven heartbeat  · Unusual bleeding or bruising  · Wheezing or trouble breathing  If you notice these less serious side effects, talk with your doctor:   · Diarrhea  · Trouble having sex  · Unusual tiredness or weakness  If you notice other side effects that you think are caused by this medicine, tell your doctor.    Call your doctor for medical advice about side effects. You may report side effects to FDA at 6-960-FDA-5481  © 2017 [address]  Information is for End User's use only and may not be sold, redistributed or otherwise used for commercial purposes. The above information is an  only. It is not intended as medical advice for individual conditions or treatments. Talk to your doctor, nurse or pharmacist before following any medical regimen to see if it is safe and effective for you.

## 2019-03-16 NOTE — PROGRESS NOTES
Pt is for discharge home today with [facility] for follow up care as ordered. Jose Alejandro Story will follow up with pt post discharge about insurance approval for hospital bed and scooter. [facility] and [facility] referrals placed by coworker and pending. No additional supportive care orders received at this time.

## 2019-03-16 NOTE — PROGRESS NOTES
[facility] CARDIOLOGY PROGRESS NOTE           3/16/2019 8:06 AM    Admit Date: 3/7/2019      Subjective:   Ready to go home. ROS:  GEN:  No fever or chills  Cardiovascular:  As noted above:no CP or palpitations. Pulmonary:  As noted above:SOB improved. Neuro:  No new focal motor or sensory loss    Objective:      Vitals:    03/15/19 2125 03/16/19 0138 03/16/19 0541 03/16/19 0558   BP: 120/69 107/61  112/70   Pulse: 77 74  78   Resp:  16  16   Temp:  98.7 °F (37.1 °C)  98.5 °F (36.9 °C)   SpO2:  96%  94%   Weight:   152.3 kg (335 lb 12.8 oz)    Height:   5' 6\" (1.676 m)        Physical Exam:  General-no distress  Neck- supple, no JVD  CV- regular rate and rhythm no MRG  Lung- clear bilaterally  Abd- soft, nontender, nondistended. Morbidly obese. Ext- no edema bilaterally. Skin- warm and dry  Psychiatric:  Normal mood and affect. Neurologic:  Alert and oriented X 3      Data Review:   Recent Labs     03/16/19  0422 03/15/19  0350 03/14/19  0354   * 135* 135*   K 3.8 3.8 3.7   MG 2.4 2.7* 2.7*   BUN 93* 91* 91*   CREA 3.04* 3.14* 3.26*   * 141* 117*   WBC  --   --  5.8   HGB  --   --  10.9*   HCT  --   --  34.6*   PLT  --   --  172       TELEMETRY:  NSR    Assessment/Plan:     Principal Problem:    Acute on chronic systolic (congestive) heart failure (HCC) (10/18/2016). Continue current medications. Active Problems:    Obstructive sleep apnea (3/18/2017)      Dyslipidemia (3/18/2017)      Diabetes mellitus type II, uncontrolled ([facility] (3/18/2017)      CKD (chronic kidney disease) stage 3, GFR 30-59 ml/min ([facility] (8/13/2014): Improved. .Continue current medications. Abdominal fluid collection (8/18/2014)      Overview: Perisplenic and splenic, likely related to pancreatitis      Hypertension (3/18/2017). Continue current medications. Acute on chronic systolic heart failure ([facility] (3/12/2019). Continue current medications. He has declined hospice care in past.He wants to go home.Discharge to home. Follow up with me in office in 1-2 weeks. Poor prognosis.                 Katherin Officer, MD  3/16/2019 8:06 AM

## 2019-03-16 NOTE — PROGRESS NOTES
Bedside and verbal report given to [facility] by Kendra Larkin. Report included the following information SBAR, Kardex, Intake/Output and MAR. Oncoming RN assumed care of the patient.

## 2019-03-18 NOTE — TELEPHONE ENCOUNTER
Mr. Roshni Marquez is taking Humalog also, so I gave him the [address] number to call for [phone]. I recommended he call and speak with a representative who can off solutions for him based on his personal circumstances.

## 2019-03-18 NOTE — TELEPHONE ENCOUNTER
[address] Pharmacist consult. Mr. Roshni Marquez is a prior patient of [facility] and is well known to me. I spoke with him today. I reviewed his discharge medications. He had no new medications, but changes to his carvedilol and Lantus. We discussed both. He has prescriptions at the drugstore, but cannot afford to get. The [facility] RN is coming to see him at noon. I told him to review this need with her as there are resources to help.

## 2019-03-18 NOTE — PROGRESS NOTES
Received call from patient that he was unable to set up appointment with Endocrinology and urology. CM called Urology and they set up appointment for April 2nd Tuesday at 10 am with Loretta Gotti at the [place] office. Spoke with Lianna at Endocrinology and they require a written referral for them to scheduled an appointment. Notified patient of appointment time with urology and that when see Dr. Mani Garcia at follow up appointment would need to obtain referral from PCP for his endocrinology appointment. Patient and daughter voiced understanding of information.

## 2019-03-19 NOTE — PROGRESS NOTES
Called and informed the pt and his daughter that there was no pleural effusion noted on CXR. Pt and his daughter verbally stated that they understood.

## 2019-04-01 NOTE — TELEPHONE ENCOUNTER
Mr. Amaury Gonzalez said he was doing pretty good today. He was able to get a new glucometer and supplies. His BS is doing OK. He saw GI doctor today and got a new prescription, but does not know what the name is. Said it started with a \"L\". His son has gone to get it. He will give me a call when he gets if any questions. Good with meds and not need to review right now as sitting on the porch enjoying the sunshine. He has my number and will call.

## 2019-05-23 PROBLEM — I50.22 CHRONIC SYSTOLIC CONGESTIVE HEART FAILURE (HCC): Status: ACTIVE | Noted: 2019-01-01

## 2019-10-31 NOTE — DISCHARGE INSTRUCTIONS
Take an extra Bumex tomorrow morning, and Saturday morning  Follow-up November 6 as scheduled with Dr. Aria Simpson  Follow-up with Dr. Srinivas Cheng regarding the gurgling in the stomach  I do not feel that the abnormality seen on the CT scan warrants antibiotics, after learning that it is probably due to positioning  Return to the ER if worse in any way      Patient Education        Heart Failure: Care Instructions  Your Care Instructions    Heart failure occurs when your heart does not pump as much blood as the body needs. Failure does not mean that the heart has stopped pumping but rather that it is not pumping as well as it should. Over time, this causes fluid buildup in your lungs and other parts of your body. Fluid buildup can cause shortness of breath, fatigue, swollen ankles, and other problems. By taking medicines regularly, reducing sodium (salt) in your diet, checking your weight every day, and making lifestyle changes, you can feel better and live longer. Follow-up care is a key part of your treatment and safety. Be sure to make and go to all appointments, and call your doctor if you are having problems. It's also a good idea to know your test results and keep a list of the medicines you take. How can you care for yourself at home? Medicines    · Be safe with medicines. Take your medicines exactly as prescribed. Call your doctor if you think you are having a problem with your medicine.     · Do not take any vitamins, over-the-counter medicine, or herbal products without talking to your doctor first. Ursula Mooreini not take ibuprofen (Advil or Motrin) and naproxen (Aleve) without talking to your doctor first. They could make your heart failure worse.     · You may take some of the following medicine. ? Angiotensin-converting enzyme inhibitors (ACEIs) or angiotensin II receptor blockers (ARBs) reduce the heart's workload, lower blood pressure, and reduce swelling.  Taking an ACEI or ARB may lower your chance of needing to be hospitalized. ? Beta-blockers can slow heart rate, decrease blood pressure, and improve your condition. Taking a beta-blocker may lower your chance of needing to be hospitalized. ? Diuretics, also called water pills, reduce swelling.    You will get more details on the specific medicines your doctor prescribes. Diet    · Your doctor may suggest that you limit sodium. Your doctor can tell you how much sodium is right for you. An example is less than 3,000 mg a day. This includes all the salt you eat in cooking or in packaged foods. People get most of their sodium from processed foods. Fast food and restaurant meals also tend to be very high in sodium.     · Ask your doctor how much liquid you can drink each day. You may have to limit liquids.    Weight    · Weigh yourself without clothing at the same time each day. Record your weight. Call your doctor if you have a sudden weight gain, such as more than 2 to 3 pounds in a day or 5 pounds in a week. (Your doctor may suggest a different range of weight gain.) A sudden weight gain may mean that your heart failure is getting worse.    Activity level    · Start light exercise (if your doctor says it is okay). Even if you can only do a small amount, exercise will help you get stronger, have more energy, and manage your weight and your stress. Walking is an easy way to get exercise. Start out by walking a little more than you did before. Bit by bit, increase the amount you walk.     · When you exercise, watch for signs that your heart is working too hard. You are pushing yourself too hard if you cannot talk while you are exercising. If you become short of breath or dizzy or have chest pain, stop, sit down, and rest.     · If you feel \"wiped out\" the day after you exercise, walk slower or for a shorter distance until you can work up to a better pace.     · Get enough rest at night. Sleeping with 1 or 2 pillows under your upper body and head may help you breathe easier.  Lifestyle changes    · Do not smoke. Smoking can make a heart condition worse. If you need help quitting, talk to your doctor about stop-smoking programs and medicines. These can increase your chances of quitting for good. Quitting smoking may be the most important step you can take to protect your heart.     · Limit alcohol to 2 drinks a day for men and 1 drink a day for women. Too much alcohol can cause health problems.     · Avoid getting sick from colds and the flu. Get a pneumococcal vaccine shot. If you have had one before, ask your doctor whether you need another dose. Get a flu shot each year. If you must be around people with colds or the flu, wash your hands often. When should you call for help? Call 911 if you have symptoms of sudden heart failure such as:    · You have severe trouble breathing.     · You cough up pink, foamy mucus.     · You have a new irregular or rapid heartbeat.    Call your doctor now or seek immediate medical care if:    · You have new or increased shortness of breath.     · You are dizzy or lightheaded, or you feel like you may faint.     · You have sudden weight gain, such as more than 2 to 3 pounds in a day or 5 pounds in a week. (Your doctor may suggest a different range of weight gain.)     · You have increased swelling in your legs, ankles, or feet.     · You are suddenly so tired or weak that you cannot do your usual activities.    Watch closely for changes in your health, and be sure to contact your doctor if you develop new symptoms. Where can you learn more? Go to http://armando-nirav.info/. Enter E359 in the search box to learn more about \"Heart Failure: Care Instructions. \"  Current as of: April 9, 2019  Content Version: 12.2  © 1707-8984 [employer]. Care instructions adapted under license by [employer] (which disclaims liability or warranty for this information).  If you have questions about a medical condition or this instruction, always ask your healthcare professional. [place] [address] any warranty or liability for your use of this information.

## 2019-10-31 NOTE — ED NOTES
I have reviewed discharge instructions with the patient. The patient verbalized understanding. Patient left ED via Discharge Method: wheelchair to Home with family Opportunity for questions and clarification provided. Patient given 0 scripts. Patient connected back to their home oxygen. Given details about Bumex To continue your aftercare when you leave the hospital, you may receive an automated call from our care team to check in on how you are doing. This is a free service and part of our promise to provide the best care and service to meet your aftercare needs.  If you have questions, or wish to unsubscribe from this service please call [phone]. Thank you for Choosing our [facility] Emergency Department.

## 2019-10-31 NOTE — ED PROVIDER NOTES
79-year-old gentleman with morbid obesity, chronic kidney disease, cardiomyopathy, diabetes presents with concern over an abnormal CT scan. He was being seen by GI for some abdominal pain and a noncontrasted CT scan of his abdomen and pelvis was done 5 or 6 days ago. There is concern over some subcutaneous density : suggestive of either cellulitis or edema - so he was sent to the ER. he complains of some abnormal gurgling in his abdomen but no focal palpable tenderness to the skin. He denies any warmth redness, he has had no fevers. Patient has an EF of around 15% and has implantable defibrillator. He takes Bumex for diuretic and does have slight increase peripheral edema in his legs. Patient has noticed several days of increased dyspnea on exertion. The subcutaneous density noted on CT scan seems to be more on the right side. And on later history it was obtained cover the patient has a penchant for laying on his right side which would explain the dependent position of this density/fluid Past Medical History:  
Diagnosis Date  Acquired claw toe of right foot  Acute encephalopathy 3/18/2017  Acute systolic heart failure [facility]) May, 2009 Also had transient acute renal failure secondary to poor perfusion.  AICD (automatic cardioverter/defibrillator) present 10/21/2015  Atypical chest pain 4/23/2010  Bronchitis  CAD (coronary artery disease)  Cardiomyopathy  Chest pain 10/21/2015  Chronic kidney disease   
 renal insufficiency  Chronic pain   
 back  Congestive heart failure (CHF) ([address] 10/21/2015  Depressive disorder  Diabetes ([address] type 2 insulin reliant- BS average- 160's-180's  Diabetes mellitus type II, uncontrolled ([address] 7/2/2013  GERD (gastroesophageal reflux disease)  Gout  Heart failure (HCC)   
 cardiomyopathy with ef 10-20%  Hypertension  Hyponatremia 12/20/2010  Ill-defined condition gout, neuropathy, sciatica  Morbid obesity ([facility]  Nausea & vomiting 2015  Neuropathy  Obstructive sleep apnea 2/15/2010  Other unknown and unspecified cause of morbidity or mortality Gout  Severe sepsis ([facility]  Ulcer of leg, chronic (HCC)  Unspecified sleep apnea   
 cpap Past Surgical History:  
Procedure Laterality Date  CHEST SURGERY PROCEDURE UNLISTED    
 thorocentesis  HX HEART CATHETERIZATION  Sandy 10, 2008 Severe multivessel disease with severe LV dysfunction  HX PACEMAKER    
 may 2010. ICD is place. Family History:  
Problem Relation Age of Onset  Heart Disease Father  Stroke Father  Coronary Artery Disease Father  Other Father   
     kidney failure  Diabetes Sister  Stroke Sister  Diabetes Sister  Coronary Artery Disease Mother  Heart Disease Other Social History Socioeconomic History  Marital status:  Spouse name: Not on file  Number of children: Not on file  Years of education: Not on file  Highest education level: Not on file Occupational History  Not on file Social Needs  Financial resource strain: Not on file  Food insecurity:  
  Worry: Not on file Inability: Not on file  Transportation needs:  
  Medical: Not on file Non-medical: Not on file Tobacco Use  Smoking status: Former Smoker Packs/day: 0.25 Years: 1.00 Pack years: 0.25 Last attempt to quit: 1984 Years since quittin.3  Smokeless tobacco: Never Used  Tobacco comment: pt states that he only tried smoking as a kid Substance and Sexual Activity  Alcohol use: No  
  Alcohol/week: 0.0 standard drinks  Drug use: No  
 Sexual activity: Not on file Lifestyle  Physical activity:  
  Days per week: Not on file Minutes per session: Not on file  Stress: Not on file Relationships  Social connections:  
  Talks on phone: Not on file Gets together: Not on file Attends [employer] service: Not on file Active member of club or organization: Not on file Attends meetings of clubs or organizations: Not on file Relationship status: Not on file  Intimate partner violence:  
  Fear of current or ex partner: Not on file Emotionally abused: Not on file Physically abused: Not on file Forced sexual activity: Not on file Other Topics Concern  Not on file Social History Narrative  Not on file ALLERGIES: Iodinated contrast media and Penicillins Review of Systems Constitutional: Negative for chills and fever. HENT: Negative for rhinorrhea and sore throat. Eyes: Negative for discharge and redness. Respiratory: Positive for shortness of breath. Negative for cough. Cardiovascular: Positive for leg swelling. Negative for chest pain and palpitations. Gastrointestinal: Positive for abdominal pain. Negative for diarrhea, nausea and vomiting. Musculoskeletal: Negative for arthralgias and back pain. Skin: Negative for rash. Neurological: Negative for dizziness and headaches. All other systems reviewed and are negative. Vitals:  
 10/31/19 1349 10/31/19 1420 10/31/19 1426 10/31/19 1518 BP: 147/84 125/77  110/79 Pulse:      
Resp:      
Temp:      
SpO2: 100% 100% 100% Weight:      
Height:      
      
 
Physical Exam  
Constitutional: He is oriented to person, place, and time. He appears well-developed and well-nourished. No distress. HENT:  
Head: Normocephalic and atraumatic. Eyes: Pupils are equal, round, and reactive to light. Conjunctivae are normal. Right eye exhibits no discharge. Left eye exhibits no discharge. No scleral icterus. Neck: Normal range of motion. Neck supple. Cardiovascular: Normal rate, regular rhythm and normal heart sounds. Exam reveals no gallop. No murmur heard.  
Pulmonary/Chest: Effort normal and breath sounds normal. No respiratory distress. He has no wheezes. He has no rales. Abdominal: Soft. Bowel sounds are normal. There is no tenderness. There is no guarding. Protuberant obese abdomen without any warmth or tenderness along the right side to suggest panniculitis. Musculoskeletal: Normal range of motion. He exhibits edema. Neurological: He is alert and oriented to person, place, and time. He exhibits normal muscle tone. cni 2-12 grossly Skin: Skin is warm and dry. He is not diaphoretic. Psychiatric: He has a normal mood and affect. His behavior is normal.  
Nursing note and vitals reviewed. MDM Number of Diagnoses or Management Options Abnormal CT scan:  
Chronic systolic congestive heart failure ([facility]):  
SOB (shortness of breath):  
Diagnosis management comments: Medical decision making note: 
Patient was given 1 dose of antibiotics empirically in the ER for suspected panniculitis. However on additional history taking and lab review. I feel this is more of a dependent edema issue than infectious. He will not be sent home with additional antibiotics. BNP is slightly elevated correlating with his exertional dyspnea and slight increase in peripheral edema. We will bump his max to 2 pills this evening, and 1 extra pill Friday morning, 1 extra pill Saturday morning. An upcoming appointment with cardiology on November 6. And should probably see GI again regarding the stomach gurgling. This concludes the \"medical decision making note\" part of this emergency department visit note. Procedures

## 2019-10-31 NOTE — ED TRIAGE NOTES
Pt had a CT scan 10/25 and was sent today for that. Said that he has been leaking when he urinates as well. Also states dry cough.

## 2019-11-15 NOTE — ED TRIAGE NOTES
Pt arrives to triage via w/c on home O2 at [facility], but states has increased to [facility]. Pt reports SOB x 1 month. Pt states he was seen a few weeks ago and was told he has pulmonary congestion. Pt has a cough. Pt states he is producing yellow sputum. Pt reports increased SOB and hypoxia on exertion with dizziness as well. Pt states he has a hx of CHF and renal failure and is on bumex. Pt has a [employer] pace maker in place. Pt denies fevers. Pt begins gagging when he coughs at times.

## 2019-11-15 NOTE — ED NOTES
Pt states to this nurse that he needs a CT scan, questioned pt of purpose and pt states that \"I need a CT scan to show my pleural effusions\", pt also states \"I need to be admitted, I havent been admitted in a long time and I know my body I need to stay the weekend. \"  
 
MD aware of pt requests, informed that we do not know of admit status at this time.

## 2019-11-15 NOTE — ED PROVIDER NOTES
Patient is a 39-year-old male with morbid obesity, asthma, chronic renal insufficiency, congestive heart failure who comes to the emergency department today complaining of shortness of breath. He states for the past month or so he has had worsening shortness of breath. He admits to a cough productive of sputum. He states he is compliant with his diuretics and his cardiac medications. Patient states that his doctor wanted him in the hospital.  Daughter reports that she is called his cardiologist and his pulmonologist and they are aware that he is here but they did not actually speak with or see a physician. The history is provided by the patient and a relative. Shortness of Breath This is a chronic problem. The current episode started more than 1 week ago. The problem has been gradually worsening. Associated symptoms include cough, sputum production and wheezing. Pertinent negatives include no fever, no rhinorrhea, no sore throat, no neck pain, no chest pain, no vomiting, no abdominal pain and no rash. He has had prior hospitalizations. He has had prior ED visits. He has had no prior ICU admissions. Associated medical issues include asthma and heart failure. Past Medical History:  
Diagnosis Date  Acquired claw toe of right foot  Acute encephalopathy 3/18/2017  Acute systolic heart failure [facility]) May, 2009 Also had transient acute renal failure secondary to poor perfusion.  AICD (automatic cardioverter/defibrillator) present 10/21/2015  Atypical chest pain 4/23/2010  Bronchitis  CAD (coronary artery disease)  Cardiomyopathy  Chest pain 10/21/2015  Chronic kidney disease   
 renal insufficiency  Chronic pain   
 back  Congestive heart failure ([address]F) ([facility] 10/21/2015  Depressive disorder  Diabetes ([facility] [address] [address] type 2 insulin reliant- BS average- 160's-180's  Diabetes mellitus type II, uncontrolled ([address] 7/2/2013  GERD (gastroesophageal reflux disease)  Gout  Heart failure (HCC)   
 cardiomyopathy with ef 10-20%  Hypertension  Hyponatremia 2010  Ill-defined condition   
 gout, neuropathy, sciatica  Morbid obesity ([facility]  Nausea & vomiting 2015  Neuropathy  Obstructive sleep apnea 2/15/2010  Other unknown and unspecified cause of morbidity or mortality Gout  Severe sepsis ([facility]  Ulcer of leg, chronic (HCC)  Unspecified sleep apnea   
 cpap Past Surgical History:  
Procedure Laterality Date  CHEST SURGERY PROCEDURE UNLISTED    
 thorocentesis  HX HEART CATHETERIZATION  Sandy 10, 2008 Severe multivessel disease with severe LV dysfunction  HX PACEMAKER    
 may 2010. ICD is place. Family History:  
Problem Relation Age of Onset  Heart Disease Father  Stroke Father  Coronary Artery Disease Father  Other Father   
     kidney failure  Diabetes Sister  Stroke Sister  Diabetes Sister  Coronary Artery Disease Mother  Heart Disease Other Social History Socioeconomic History  Marital status:  Spouse name: Not on file  Number of children: Not on file  Years of education: Not on file  Highest education level: Not on file Occupational History  Not on file Social Needs  Financial resource strain: Not on file  Food insecurity:  
  Worry: Not on file Inability: Not on file  Transportation needs:  
  Medical: Not on file Non-medical: Not on file Tobacco Use  Smoking status: Former Smoker Packs/day: 0.25 Years: 1.00 Pack years: 0.25 Last attempt to quit: 1984 Years since quittin.3  Smokeless tobacco: Never Used  Tobacco comment: pt states that he only tried smoking as a kid Substance and Sexual Activity  Alcohol use: No  
  Alcohol/week: 0.0 standard drinks  Drug use: No  
 Sexual activity: Not on file Lifestyle  Physical activity:  
  Days per week: Not on file Minutes per session: Not on file  Stress: Not on file Relationships  Social connections:  
  Talks on phone: Not on file Gets together: Not on file Attends Syn[employer] service: Not on file Active member of club or organization: Not on file Attends meetings of clubs or organizations: Not on file Relationship status: Not on file  Intimate partner violence:  
  Fear of current or ex partner: Not on file Emotionally abused: Not on file Physically abused: Not on file Forced sexual activity: Not on file Other Topics Concern  Not on file Social History Narrative  Not on file ALLERGIES: Iodinated contrast media and Penicillins Review of Systems Constitutional: Negative for chills, fatigue and fever. HENT: Negative for congestion, rhinorrhea and sore throat. Eyes: Negative for pain, discharge and visual disturbance. Respiratory: Positive for cough, sputum production, shortness of breath and wheezing. Cardiovascular: Negative for chest pain and palpitations. Gastrointestinal: Negative for abdominal pain, diarrhea, nausea and vomiting. Endocrine: Negative for polydipsia and polyuria. Genitourinary: Negative for dysuria, frequency and urgency. Musculoskeletal: Negative for back pain and neck pain. Skin: Negative for rash. Neurological: Negative for seizures, syncope and weakness. Hematological: Negative. Vitals:  
 11/15/19 1026 11/15/19 1126 BP: 139/64 (!) 143/97 Pulse: 87 82 Resp: 24 Temp: 98.2 °F (36.8 °C) SpO2: 91% 99% Weight: 147.4 kg (325 lb) Height: 5' 6\" (1.676 m) Physical Exam  
Constitutional: He is oriented to person, place, and time. He appears well-developed and well-nourished. Morbidly obese. HENT:  
Head: Normocephalic and atraumatic. Eyes: Pupils are equal, round, and reactive to light.  Conjunctivae and EOM are normal.  
 Neck: Normal range of motion. Neck supple. Cardiovascular: Normal rate, regular rhythm and intact distal pulses. Pulmonary/Chest: Tachypnea noted. He has decreased breath sounds. He has wheezes. He exhibits no tenderness. Very distant breath sounds likely secondary to his habitus. Abdominal: Soft. There is no tenderness. There is no rebound and no guarding. Musculoskeletal: Normal range of motion. He exhibits no edema or tenderness. Chronic brawny skin changes and chronic edema to bilateral lower extremities. Nonpitting. Lymphadenopathy:  
  He has no cervical adenopathy. Neurological: He is alert and oriented to person, place, and time. He has normal strength. No cranial nerve deficit or sensory deficit. GCS eye subscore is 4. GCS verbal subscore is 5. GCS motor subscore is 6. Skin: Skin is warm and dry. No rash noted. Nursing note and vitals reviewed. MDM Number of Diagnoses or Management Options Diagnosis management comments: EKG reviewed by me shows normal sinus rhythm rate of 86 with an incomplete right bundle branch block. No acute ischemia. No change compared to previous morphology. 1:17 PM 
Blood work shows a white count. Chemistries show chronic renal insufficiency with a creatinine above 3 but that is at his baseline. Troponin negative. BNP is elevated at 9000 however he does have chronic renal insufficiency and I do not think this is an appropriate test.  Chest x-ray showed some chronic cardiomegaly stable with previous with some atelectasis. No obvious pulmonary edema. After the steroids and the nebulizer treatment the patient appears much more comfortable he does have improved air movement with decreased wheezing. Clinically I think this represents a bronchitis. He is satting 98% on his nasal cannula oxygen and not in any distress I do not think he needs admission to the hospital he can be treated with antibiotic and with some prednisone.   Patient did become quite anxious and upset and scared I tried to reassure him and discussed all the test and evaluation we have done. His family is comfortable with all of this and he will arrange follow-up with his pulmonologist next week. Voice dictation software was used during the making of this note. This software is not perfect and grammatical and other typographical errors may be present. This note has been proofread, but may still contain errors. Amy Singh MD; 11/15/2019 @1:18 PM  
=================================================================== Amount and/or Complexity of Data Reviewed Clinical lab tests: ordered and reviewed Tests in the radiology section of CPT®: ordered and reviewed Review and summarize past medical records: yes Independent visualization of images, tracings, or specimens: yes Risk of Complications, Morbidity, and/or Mortality Presenting problems: moderate Diagnostic procedures: low Management options: low Patient Progress Patient progress: stable Procedures

## 2019-11-15 NOTE — ED NOTES
I have reviewed discharge instructions with the patient. The patient verbalized understanding. Patient left ED via Discharge Method: wheelchair to Home with daughter. Opportunity for questions and clarification provided. Patient given 2 scripts. To continue your aftercare when you leave the hospital, you may receive an automated call from our care team to check in on how you are doing. This is a free service and part of our promise to provide the best care and service to meet your aftercare needs.  If you have questions, or wish to unsubscribe from this service please call [phone]. Thank you for Choosing our [facility] Emergency Department.

## 2019-11-15 NOTE — DISCHARGE INSTRUCTIONS
Take the steroid and the prednisone as prescribed. Continue with your oxygen, breathing treatments, and other home medications. Follow-up with your pulmonologist next week for recheck. Return to the ER for any other acute concerns.

## 2020-04-06 NOTE — PROGRESS NOTES
Pt home care RN called stating that pt had a 5 LB weight gain over night, with an increase in edema of her left leg with a blister. Per Keira HALL, Pt to take metolazone 2.5 mg today.  Will have ACL home draw obtain a BMP, NT pro BNP in one week.  Will review labs with Keira HALL, and will then determine if we will have pt take metolazone 2.5 mg weekly.   Pt reports chest 5/10. 2L oxygen placed. Pt denies n/v or sweating. Pt denies morphine. Pt /62. 1 sublingual nitroglycerin administered. Pt reports chest pain 4/10. /72. Declines another nitroglycerin.

## 2020-06-11 NOTE — PROGRESS NOTES
02/16/17 1549   Oxygen Therapy   O2 Sat (%) 96 %   Pulse via Oximetry 75 beats per minute   O2 Device Nasal cannula   O2 Flow Rate (L/min) 4 l/min  (decreased to 3 L) Satisfactory

## 2021-02-09 NOTE — PROGRESS NOTES
Bedside and Verbal shift change report given to [address] (oncoming nurse) by self Burnie Sinning nurse). Report included the following information SBAR, Kardex and MAR. TRANSFER - OUT REPORT: 
 
Verbal report given to liane(name) on Campos Johnson  being transferred to med/surg(unit) for routine progression of care Report consisted of patients Situation, Background, Assessment and  
Recommendations(SBAR). Information from the following report(s) SBAR was reviewed with the receiving nurse. Lines:  
Peripheral IV 02/08/21 Left Hand (Active) Peripheral IV 02/08/21 Right Antecubital (Active) Site Assessment Clean, dry, & intact 02/08/21 1847 Phlebitis Assessment 0 02/08/21 1847 Infiltration Assessment 0 02/08/21 1847 Dressing Status Clean, dry, & intact 02/08/21 1847 Dressing Type Transparent 02/08/21 1847 Hub Color/Line Status Pink 02/08/21 1847 Opportunity for questions and clarification was provided. Patient transported with: 
 [employer]

## 2021-07-29 NOTE — PROGRESS NOTES
Staff requested  visit. Support, comfort and prayer offered to patient and daughter.     [employer] never used/caffeine

## 2021-10-25 NOTE — CONSULTS
The patient has been seen and examined, chart has been reviewed. Agree with plan of care delivered by Dr Severino Duffy and Schuster. Patient with morbid obesity/volume OL with a/c anuric renal failure and hyperkalemia in need for urgent HD    Placed on NIPPV- seems comfortable- should rapidly improve with adequate volume removal.    LEAPFROG category C . Critical care issues are being addressed appropriately  Will be available upon request.    There will be no bill for this encounter. Thank you.     Russell Redd MD. Current and Past Psychiatric Diagnoses/Activating Events/Stressors

## 2023-01-20 NOTE — PROGRESS NOTES
Chart reviewed and patient status discussed with Nurse. He is currently eating breakfast. Will attempt therapy at later time as schedule allows.      Yannick Rosales, PT   1/31/2017 How Severe Are Your Spot(S)?: mild Hpi Title: Evaluation of Skin Lesions

## 2024-10-01 NOTE — PROGRESS NOTES
[facility] CARDIOLOGY PROGRESS NOTE           2/21/2017 8:13 AM    Admit Date: 1/19/2017      Subjective:   Feels ok. ROS:  GEN:  No fever or chills  Cardiovascular:  As noted above:no chest pain or palpitations. Pulmonary:  As noted above:SOB is about the same. Neuro:  No new focal motor or sensory loss    Objective:      Vitals:    02/21/17 0044 02/21/17 0115 02/21/17 0649 02/21/17 0733   BP: (!) 135/91  124/73    Pulse: (!) 59  68    Resp: 20  20    Temp: 97.5 °F (36.4 °C)  97.1 °F (36.2 °C)    SpO2: 99% 98% 100% 99%   Weight:   (!) 189.9 kg (418 lb 11.2 oz)    Height:           Physical Exam:  General-no distess  Neck- supple, no JVD  CV- regular rate and rhythm no MRG  Lung- clear bilaterally  Abd- soft, nontender, morbid obesity. Ext- 2-3+ edema bilaterally. 4+scrotal edema. Skin- warm and dry  Psychiatric:  Normal mood and affect. Neurologic:  Alert and oriented X 3      Data Review:   Recent Labs      02/21/17   0505  02/20/17   0540   NA  141  141   K  4.0  3.8   BUN  42*  43*   CREA  1.91*  1.73*   GLU  79  107*       TELEMETRY: NSR. Assessment/Plan:     Principal Problem:    Nonsustained ventricular tachycardia (HCC) (1/19/2017):stable on Coreg and Amiodarone    Active Problems:    Obstructive sleep apnea (2/15/2010)      Nonischemic dilated cardiomyopathy ([facility] (7/2/2013):Poor prognosis. Needs hospice. Presently,he is not ready. Trying to discharge to rehab? Overview: LVEF 10-15% on ECHO from 12/17/10      CKD (chronic kidney disease) stage 3, GFR 30-59 ml/min (8/13/2014): Worsening. Transition to po Lasix. BMP daily. AICD (automatic cardioverter/defibrillator) present (10/21/2015)      Acute on chronic systolic (congestive) heart failure ([facility] (10/18/2016):Remains a chronic issue. He has significant scrotal edema. He has been off Primacor x 24 hours. Entresto started yesterday. Creatinine is trending up. Try tarnsitioning iv Lasix to po Lasix. Continue Spironolactone,Hydralazine,and Coreg. At[facility] flutter ([facility] (10/20/2016): Stable on Amiodarone,Coreg,and Eliquis      Obesity hypoventilation syndrome ([facility] (10/24/2016)      Morbid obesity with BMI of 60.0-69.9, adult ([facility] (11/28/2016)      Syncope (1/19/2017)      NSVT (nonsustained ventricular tachycardia) ([facility] (1/19/2017): Stable on Amiodarone & Coreg. .ICD in place                Zabrina Lara MD  2/21/2017 8:13 AM continue exercise/home instructions independently.    [] Therapy interrupted due to:    [] Pt has 2 or more no shows/cancels, is discontinued per our policy.    [] Pt has completed prescribed number of treatment sessions.    [x] Other: pt did not schedule f/u appts         Electronically signed by Tiffany George PTA on 10/1/2024 at 2:46 PM      If you have any questions or concerns, please don't hesitate to call.  Thank you for your referral.

## 2025-04-25 NOTE — PROGRESS NOTES
- resident of [facility]   - daughter shared concerns regarding pt's care and assistance with meals due to pt's blindness and weight loss   Critical Care Daily Progress Note: 3/24/2017    Mahin Pollard   Admission Date: 3/18/2017         The patient's chart is reviewed and the patient is discussed with the staff. 45 y.o. Admitted by hospitalist service on 3/18/17 with acute on chronic kidney disease and hyperkalemia with acute encephalopathy. Pt also has a history of ICM with EF <10%, chronic respiratory failure on 3L O2, HTN, HLD, OHS/KELSIE, and CKD. Pt was admitted to ICU and has been off and on BiPAP. Pt transferred out of ICU on 3/20. He required BiPAP secondary to lethargy and pH 7.28. Pt's pCO2 has remained around 48.9-49.4 on 18/8 with rate 22. We are being consulted secondary to respiratory failure. Subjective:     Lying in bed, weaned to Air-vo but wore BIPAP with sleep. States is feeling well, denies shortness of breath and no significant cough. Requesting ice chips. Remains on Bumex drip and diuresing. Family at the bedside.     Current Facility-Administered Medications   Medication Dose Route Frequency    haloperidol lactate (HALDOL) injection 2 mg  2 mg IntraVENous Q8H PRN    LORazepam (ATIVAN) injection 0.5 mg  0.5 mg IntraVENous Q6H PRN    lip protectant (BLISTEX) ointment   Topical PRN    bumetanide (BUMEX) 12.5 mg in 0.9% sodium chloride 100 mL infusion  1 mg/hr IntraVENous CONTINUOUS    levalbuterol (XOPENEX) nebulizer soln 0.63 mg/3 mL  0.63 mg Nebulization Q6H PRN    sodium chloride (NS) flush 5-10 mL  5-10 mL IntraVENous Q8H    sodium chloride (NS) flush 5-10 mL  5-10 mL IntraVENous PRN    insulin lispro (HUMALOG) injection   SubCUTAneous Q6H    dextrose 40% (GLUTOSE) oral gel 1 Tube  15 g Oral PRN    glucagon (GLUCAGEN) injection 1 mg  1 mg IntraMUSCular PRN    dextrose (D50W) injection syrg 12.5-25 g  25-50 mL IntraVENous PRN    heparin (porcine) injection 5,000 Units  5,000 Units SubCUTAneous Q8H    DOBUTamine (DOBUTREX) 500 mg/250 mL (2,000 mcg/mL) infusion  2.5-10 mcg/kg/min IntraVENous TITRATE    DOPamine (INTROPIN) 800 mg/250 mL (3,200 mcg/mL) infusion  5-20 mcg/kg/min IntraVENous TITRATE       Review of Systems  Constitutional:  negative for fever, chills, sweats  Cardiovascular:  Edema, negative for chest pain, palpitations, syncope  Gastrointestinal:  TFs, negative for dysphagia, reflux, vomiting, diarrhea, abdominal pain, or melena  Neurologic:  negative for focal weakness, numbness, headache      Objective:     Vitals:    03/24/17 0431 03/24/17 0501 03/24/17 0515 03/24/17 0531   BP: 101/53 102/73  111/63   Pulse: 74 73  67   Resp: 10 15  (!) 3   Temp:       SpO2: 96% 96% 96% 96%   Weight:       Height:           Intake and Output:   03/22 1901 - 03/24 0700  In: 2318.4 [I.V.:765.4]  Out: 83430 [Urine:57682; Drains:50]       Physical Exam:          Constitutional:  the patient is morbidly obese and in no acute distress, Air-Vo40L, 36%, sat 96%  EENMT:  Sclera clear, pupils equal, oral mucosa moist  Respiratory: clear anterior, no wheezing  Cardiovascular:  RRR without M,G,R  Gastrointestinal: soft, obese and non-tender; with positive bowel sounds, rectal tube and FT. Musculoskeletal: warm without cyanosis. There is bilateral 1-2+ lower leg edema.   Skin:  no jaundice or rashes, no wounds   Neurologic: no gross neuro deficits     Psychiatric:  alert and oriented x 3    LINES:  Right subclavian, FT, rectal tube, hansen    DRIPS:   Bumex    CXR: none today    CXR 3/23/17:      CXR 3/22/17:        LAB  Recent Labs      03/24/17   0040  03/23/17   1805  03/23/17   1157  03/22/17   2338  03/22/17   1731   GLUCPOC  136*  144*  152*  121*  122*      Recent Labs      03/24/17   0425  03/23/17   0400  03/22/17   0412   WBC  7.6  6.9  6.0   HGB  11.8*  11.4*  10.5*   HCT  37.2*  35.0*  31.8*   PLT  251  268  243     Recent Labs      03/24/17   0425  03/23/17   0400  03/22/17   0412   NA  149*  146*  141   K  3.4*  3.8  4.6   CL  99  101  101   CO2  40*  35*  27   GLU  161*  132*  117*   BUN 95*  105*  109*   CREA  7.19*  9.03*  10.90*   MG  2.0  1.6*  1.9   CA  9.3  8.8  8.4   ALB   --    --   2.9*   TBILI   --    --   1.4*   ALT   --    --   34   SGOT   --    --   42*     Recent Labs      03/23/17   0750  03/22/17   0805  03/21/17   1105   PH  7.45  7.31*  7.27*   PCO2  53*  49*  49*   PO2  138*  66*  78   HCO3  36*  24  22     No results for input(s): LCAD, LAC in the last 72 hours.     Assessment:  (Medical Decision Making)     Hospital Problems  Date Reviewed: 3/24/2017          Codes Class Noted POA    Acute on chronic respiratory failure with hypoxia and hypercapnia (HCC) ICD-10-CM: J96.21, J96.22  ICD-9-CM: 518.84, 786.09, 799.02  3/21/2017 No    Wearing Air-Vo during the day and BIPAP with sleep    * (Principal)Cardiogenic shock (HCC) ICD-10-CM: R57.0  ICD-9-CM: 785.51  3/20/2017 Yes    Hemodynamically stable    Obstructive sleep apnea (Chronic) ICD-10-CM: G47.33  ICD-9-CM: 327.23  3/18/2017 Yes    chronic    Diabetes mellitus type II, uncontrolled (HCC) (Chronic) ICD-10-CM: E11.65  ICD-9-CM: 250.02  3/18/2017 Yes    Chronic--ranges 121-152    AICD (automatic cardioverter/defibrillator) present (Chronic) ICD-10-CM: Z95.810  ICD-9-CM: V45.02  3/18/2017 Yes    chronic    Cardiomyopathy ([facility] (Chronic) ICD-10-CM: I42.9  ICD-9-CM: 425.4  3/18/2017 Yes    chronic    Obesity hypoventilation syndrome (HCC) (Chronic) ICD-10-CM: W27.3  ICD-9-CM: 278.03  3/18/2017 Yes    chronic    Morbid obesity with BMI of 60.0-69.9, adult (HCC) (Chronic) ICD-10-CM: E66.01, Z68.44  ICD-9-CM: 278.01, V85.44  3/18/2017 Yes    chronic    Acute encephalopathy ICD-10-CM: G93.40  ICD-9-CM: 348.30  3/18/2017 Yes    Oriented x2    Acute renal failure (ARF) (HCC) ICD-10-CM: N17.9  ICD-9-CM: 584.9  3/18/2017 Yes    Per nephrology--creatinine trending down          Plan:  (Medical Decision Making)     --Nephrology following and creatinine down to 7.19  --Bumex drip--10,900 ml urine output last 24 hrs  --Palliative Care following--remains full code  --Continue current     More than 50% of the time documented was spent in face-to-face contact with the patient and in the care of the patient on the floor/unit where the patient is located. Jo Ann Lundberg NP     Lungs:  Decreased BS  Heart:  Distant RRR with no Murmur/Rubs/Gallops    Additional Comments:  Now off BIPAP full time- just at night. Comfortable on Airvo. Continues with massive diuresis but now looking more alkalotic. Needs free water. Discussed need for dramatic change in diet if going to improve long term. I have spoken with and examined the patient. I agree with the above assessment and plan as documented.     Shanell Brown., MD